# Patient Record
Sex: FEMALE | Race: WHITE | NOT HISPANIC OR LATINO | Employment: OTHER | URBAN - METROPOLITAN AREA
[De-identification: names, ages, dates, MRNs, and addresses within clinical notes are randomized per-mention and may not be internally consistent; named-entity substitution may affect disease eponyms.]

---

## 2017-01-05 ENCOUNTER — HOSPITAL ENCOUNTER (OUTPATIENT)
Facility: MEDICAL CENTER | Age: 72
End: 2017-01-05
Attending: PHYSICIAN ASSISTANT
Payer: MEDICARE

## 2017-01-05 ENCOUNTER — OFFICE VISIT (OUTPATIENT)
Dept: URGENT CARE | Facility: PHYSICIAN GROUP | Age: 72
End: 2017-01-05
Payer: MEDICARE

## 2017-01-05 VITALS
DIASTOLIC BLOOD PRESSURE: 62 MMHG | HEART RATE: 116 BPM | HEIGHT: 60 IN | RESPIRATION RATE: 12 BRPM | TEMPERATURE: 98.1 F | WEIGHT: 99 LBS | SYSTOLIC BLOOD PRESSURE: 120 MMHG | OXYGEN SATURATION: 93 % | BODY MASS INDEX: 19.44 KG/M2

## 2017-01-05 DIAGNOSIS — N30.01 ACUTE CYSTITIS WITH HEMATURIA: ICD-10-CM

## 2017-01-05 LAB
APPEARANCE UR: CLEAR
BILIRUB UR STRIP-MCNC: NORMAL MG/DL
COLOR UR AUTO: YELLOW
GLUCOSE UR STRIP.AUTO-MCNC: NEGATIVE MG/DL
KETONES UR STRIP.AUTO-MCNC: NEGATIVE MG/DL
LEUKOCYTE ESTERASE UR QL STRIP.AUTO: NORMAL
NITRITE UR QL STRIP.AUTO: POSITIVE
PH UR STRIP.AUTO: 6 [PH] (ref 5–8)
PROT UR QL STRIP: NEGATIVE MG/DL
RBC UR QL AUTO: NORMAL
SP GR UR STRIP.AUTO: 1
UROBILINOGEN UR STRIP-MCNC: NEGATIVE MG/DL

## 2017-01-05 PROCEDURE — 87186 SC STD MICRODIL/AGAR DIL: CPT

## 2017-01-05 PROCEDURE — 99214 OFFICE O/P EST MOD 30 MIN: CPT | Performed by: PHYSICIAN ASSISTANT

## 2017-01-05 PROCEDURE — 81002 URINALYSIS NONAUTO W/O SCOPE: CPT | Performed by: PHYSICIAN ASSISTANT

## 2017-01-05 PROCEDURE — 87086 URINE CULTURE/COLONY COUNT: CPT

## 2017-01-05 PROCEDURE — 87077 CULTURE AEROBIC IDENTIFY: CPT

## 2017-01-05 PROCEDURE — 99000 SPECIMEN HANDLING OFFICE-LAB: CPT | Performed by: PHYSICIAN ASSISTANT

## 2017-01-05 RX ORDER — SULFAMETHOXAZOLE AND TRIMETHOPRIM 800; 160 MG/1; MG/1
1 TABLET ORAL 2 TIMES DAILY
Qty: 14 TAB | Refills: 0 | Status: SHIPPED | OUTPATIENT
Start: 2017-01-05 | End: 2017-02-17

## 2017-01-05 NOTE — PROGRESS NOTES
Chief Complaint   Patient presents with   • Urinary Frequency     Burning and lower abdominal pain - onset this morning       HISTORY OF PRESENT ILLNESS: Patient is a 71 y.o. female who presents today for 1 day of increased urinary frequency, urgency and dysuria.  She is also experiencing lower abdominal discomfort and fullness.   She denies fevers, chills, nausea or vomiting at this point.  No flank or back pain.  No vaginal bleeding or discharge.   She has taken Azo x 1 this morning, about an hour ago.  She was recently treated for pansensitive e coli with macrobid and did get better but it has returned in the last day.  She is not sexually active.     Patient Active Problem List    Diagnosis Date Noted   • History of diverticulitis of colon 11/16/2011     Priority: High   • Chronic use of opiate for therapeutic purpose 07/07/2016   • Eosinophilic colitis 07/21/2015   • Family history of nonmelanoma skin cancer    • COPD (chronic obstructive pulmonary disease) ([facility])    • Constipation 05/26/2012   • Subclavian artery stenosis, left ([facility]) 03/27/2012   • Herniated nucleus pulposus, L5-S1, left 09/08/2011   • Carotid atherosclerosis 06/15/2011   • Abdominal aortic aneurysm greater than 39 mm in diameter ([facility])    • Peripheral vascular disease ([facility])    • Essential hypertension    • Dyslipidemia    • Tobacco dependence    • Spinal stenosis of lumbar region    • Arteriosclerosis of arteries of extremities ([facility])        Allergies:Amoxicillin and Ciprofloxacin    Current Outpatient Prescriptions Ordered in [facility]   Medication Sig Dispense Refill   • phenazopyridine (PYRIDIUM) 100 MG Tab Take 1 Tab by mouth 3 times a day as needed. 12 Tab 0   • clopidogrel (PLAVIX) 75 MG Tab      • hydrocodone-acetaminophen (NORCO) 5-325 MG Tab per tablet Take 1 Tab by mouth every 6 hours as needed (back and neck pain). Seen in office 10/4/16, renewal of chronic medication, used episodically 90 Tab 0   • NITROSTAT 0.4 MG SL Tab Take 0.4 mg by  mouth.     • tizanidine (ZANAFLEX) 2 MG tablet Take 1-2 Tabs by mouth 2 times a day as needed (neck spasm). 40 Tab 0   • lisinopril (PRINIVIL) 10 MG Tab Take 1 Tab by mouth every day. 90 Tab 3   • atorvastatin (LIPITOR) 80 MG tablet Take 80 mg by mouth every day.     • aspirin 81 MG tablet Take 81 mg by mouth every day.       No current [facility]-ordered facility-administered medications on file.       Past Medical History   Diagnosis Date   • Atherosclerosis of arteries of the extremities      two stents in the groin, Dr. Pickett   • Hypertension    • Angina      with stress test   • Arthritis      thumbs   • Peripheral vascular disease (HCC)      9 months, may relate to PVD   • Unspecified hemorrhagic conditions (HCC)      asa/plavix, bruises easily   • Dyslipidemia    • COPD (chronic obstructive pulmonary disease) (H[facility])    • Diverticulosis of colon    • Spinal stenosis of lumbar region      Dr. Navarro   • Abdominal aortic aneurysm (HCC) 11/2010     3.6 cm 8/2014   • Carotid atherosclerosis      Dr. Pickett   • Diverticulitis      Dx 11/25/11   • Family history of nonmelanoma skin cancer    • PVD (posterior vitreous detachment), left eye 1/13/2015   • Eosinophilic colitis        Social History   Substance Use Topics   • Smoking status: Current Every Day Smoker -- 1.00 packs/day for 45 years     Types: Cigarettes   • Smokeless tobacco: Never Used      Comment: on nicotine patches, smokes off and on   • Alcohol Use: No       No family status information on file.     Family History   Problem Relation Age of Onset   • Hyperlipidemia Sister    • Hypertension Sister    • Non-contributory Sister      carotid atherosclerosis   • Hypertension Mother    • Hyperlipidemia Mother    • Heart Disease Mother 82     congestive heart failure, AAA   • Heart Disease Father 55     multiple heart issues   • Hypertension Father    • Hyperlipidemia Father    • Cancer Sister      sin cancer       ROS:  Review of Systems   Constitutional:  Negative for fever, chills, weight loss and malaise/fatigue.   HENT: Negative for ear pain, nosebleeds, congestion, sore throat and neck pain.    Eyes: Negative for blurred vision.   Respiratory: Negative for cough, sputum production, shortness of breath and wheezing.    Cardiovascular: Negative for chest pain, palpitations, orthopnea and leg swelling.   Gastrointestinal:SEE HPI  Genitourinary: SEE HPI  All other systems reviewed and are negative.       Exam:  Blood pressure 120/62, pulse 116, temperature 36.7 °C (98.1 °F), resp. rate 12, height 1.524 m (5'), weight 44.906 kg (99 lb), last menstrual period 03/01/1997, SpO2 93 %.  General:  Well nourished, well developed female in NAD  Eyes: PERRLA, EOM within normal limits, no conjunctival injection, no scleral icterus, visual fields and acuity grossly intact..   Mouth: reasonable hygiene, no erythema exudates or tonsillar enlargement.  Neck: no masses, range of motion within normal limits, no tracheal deviation. No lymphadenopathy  Pulmonary: Normal respiratory effort, no wheezes, crackles, or rhonchi.  Cardiovascular: regular rate and rhythm without murmurs, rubs, or gallops.  Abdomen: Soft, mild suprapubic TTP, nondistended. Normal bowel sounds. No hepatosplenomegaly or masses, or hernias. No rebound or guarding.  No CVA tenderness.   Skin: No visible rashes or lesion. Warm, pink, dry.   Extremities: no clubbing, cyanosis, or edema.  Neuro: A&O x 3. Speech normal/clear.  Normal gait.       Assessment/Plan:  1. Acute cystitis with hematuria  POCT Urinalysis    sulfamethoxazole-trimethoprim (BACTRIM DS) 800-160 MG tablet    URINE CULTURE(NEW)       -POCT U/A as above.  Culture sent again to ensure sensitivity.   -Fluids emphasized.   -signs and symptoms of worsening/ascending infection discussed and to seek prompt medical care should they arise.   -smoking cessation encouraged.     Supportive care, differential diagnoses, and indications for immediate follow-up  discussed with patient.   Pathogenesis of diagnosis discussed including typical length and natural progression.   Instructed to return to clinic or nearest emergency department for any change in condition, further concerns, or worsening of symptoms.  Patient states understanding of the plan of care and discharge instructions.  Instructed to make an appointment, for follow up, with their primary care provider.      Jessi Lainez PA-C

## 2017-01-05 NOTE — MR AVS SNAPSHOT
Karen Enriquez Jauregui   2017 10:00 AM   Office Visit   MRN: [id number]    Department:  [facility]   Dept Phone:  [phone]    Description:  Female : 1945   Provider:  Jessi Lainez PA-C           Reason for Visit     Urinary Frequency Burning and lower abdominal pain - onset this morning      Allergies as of 2017     Allergen Noted Reactions    Amoxicillin 2012   Diarrhea          Ciprofloxacin 2013   Diarrhea      You were diagnosed with     Acute cystitis with hematuria   [001545]         Vital Signs     Blood Pressure Pulse Temperature Respirations Height Weight    120/62 mmHg 116 36.7 °C (98.1 °F) 12 1.524 m (5') 44.906 kg (99 lb)    Body Mass Index Oxygen Saturation Last Menstrual Period Smoking Status          19.33 kg/m2 93% 1997 Current Every Day Smoker        Basic Information     Date Of Birth Sex Race Ethnicity Preferred Language    1945 Female White Non- English      Your appointments     2017  2:00 PM   Established Patient with [facility]   [address]  [place] [id number] [phone]           You will be receiving a confirmation call a few days before your appointment from our automated call confirmation system.            2017  2:30 PM   CT ANGIO WITH 30 with [address]   [facility] - CT - [address])    [address]  [place] [id number] [phone]           Medication List.            2017  3:30 PM   CT-CTA ABDO-PLVS W/WO POSTPROCESS with [address]   [facility] - CT - [address] ([address])    [address]  [place] [id number] [phone]              Problem List              ICD-10-CM Priority Class Noted - Resolved    Essential hypertension I10   Unknown - Present    Dyslipidemia E78.5   Unknown - Present    Tobacco dependence F17.200   Unknown - Present    Spinal stenosis of lumbar region M48.06    Unknown - Present    Arteriosclerosis of arteries of extremities ([facility]) I70.209   Unknown - Present    Abdominal aortic aneurysm greater than 39 mm in diameter ([facility]) I71.4   Unknown - Present    Peripheral vascular disease ([facility]) I73.9   Unknown - Present    Carotid atherosclerosis I65.29   6/15/2011 - Present    Herniated nucleus pulposus, L5-S1, left M51.27   9/8/2011 - Present    History of diverticulitis of colon Z87.19 High  11/16/2011 - Present    Subclavian artery stenosis, left ([facility]) I77.1   3/27/2012 - Present    Constipation    5/26/2012 - Present    COPD (chronic obstructive pulmonary disease) ([facility]) J44.9   Unknown - Present    Family history of nonmelanoma skin cancer Z80.8   Unknown - Present    Eosinophilic colitis K52.82   7/21/2015 - Present    Chronic use of opiate for therapeutic purpose Z79.899   7/7/2016 - Present      Health Maintenance        Date Due Completion Dates    IMM DTaP/Tdap/Td Vaccine (1 - Tdap) 5/17/1965 ---    IMM ZOSTER VACCINE 5/17/2006 ---    BONE DENSITY 11/30/2017 11/30/2012 (Postponed), 3/22/2011, 7/24/2008, 11/7/2005    Override on 11/30/2012: Postponed    COLONOSCOPY 3/9/2025 3/9/2015, 7/2/2013            Current Immunizations     13-VALENT PCV PREVNAR 12/8/2016    Influenza TIV (IM) 10/28/2012, 10/27/2011    Influenza Vaccine Adult HD 10/4/2016, 12/23/2015    Influenza Vaccine Quad Inj (Pf) 10/14/2014    Pneumococcal Vaccine (UF)Historical Data 9/30/2006    Pneumococcal polysaccharide vaccine (PPSV-23) 11/30/2012      Below and/or attached are the medications your provider expects you to take. Review all of your home medications and newly ordered medications with your provider and/or pharmacist. Follow medication instructions as directed by your provider and/or pharmacist. Please keep your medication list with you and share with your provider. Update the information when medications are discontinued, doses are changed, or new medications (including over-the-counter  products) are added; and carry medication information at all times in the event of emergency situations     Allergies:  AMOXICILLIN - Diarrhea     CIPROFLOXACIN - Diarrhea               Medications  Valid as of: January 05, 2017 - 10:39 AM    Generic Name Brand Name Tablet Size Instructions for use    Aspirin (Tab) aspirin 81 MG Take 81 mg by mouth every day.        Atorvastatin Calcium (Tab) LIPITOR 80 MG Take 80 mg by mouth every day.        Clopidogrel Bisulfate (Tab) PLAVIX 75 MG         Hydrocodone-Acetaminophen (Tab) NORCO 5-325 MG Take 1 Tab by mouth every 6 hours as needed (back and neck pain). Seen in office 10/4/16, renewal of chronic medication, used episodically        Lisinopril (Tab) PRINIVIL 10 MG Take 1 Tab by mouth every day.        Nitroglycerin (SL Tab) NITROSTAT 0.4 MG Take 0.4 mg by mouth.        Phenazopyridine HCl (Tab) PYRIDIUM 100 MG Take 1 Tab by mouth 3 times a day as needed.        Sulfamethoxazole-Trimethoprim (Tab) BACTRIM -160 MG Take 1 Tab by mouth 2 times a day.        TiZANidine HCl (Tab) ZANAFLEX 2 MG Take 1-2 Tabs by mouth 2 times a day as needed (neck spasm).        .                 Medicines prescribed today were sent to:     [facility][id number] - [place] - [address]    [address] [place] [address]    Phone: [phone] Fax: [phone]    Open 24 Hours?: No      Medication refill instructions:       If your prescription bottle indicates you have medication refills left, it is not necessary to call your provider’s office. Please contact your pharmacy and they will refill your medication.    If your prescription bottle indicates you do not have any refills left, you may request refills at any time through one of the following ways: The online [email or web address] system (except Urgent Care), by calling your provider’s office, or by asking your pharmacy to contact your provider’s office with a refill request. Medication refills are processed only during regular business hours  and may not be available until the next business day. Your provider may request additional information or to have a follow-up visit with you prior to refilling your medication.   *Please Note: Medication refills are assigned a new Rx number when refilled electronically. Your pharmacy may indicate that no refills were authorized even though a new prescription for the same medication is available at the pharmacy. Please request the medicine by name with the pharmacy before contacting your provider for a refill.        Your To Do List     Future Labs/Procedures Complete By Expires    URINE CULTURE(NEW)  As directed 1/5/2018         [email or web address] Access Code: Activation code not generated  Current [email or web address] Status: Active

## 2017-01-07 ENCOUNTER — HOSPITAL ENCOUNTER (OUTPATIENT)
Dept: LAB | Facility: MEDICAL CENTER | Age: 72
End: 2017-01-07
Attending: SURGERY
Payer: MEDICARE

## 2017-01-07 LAB
BACTERIA UR CULT: ABNORMAL
BUN SERPL-MCNC: 17 MG/DL (ref 8–22)
CREAT SERPL-MCNC: 0.73 MG/DL (ref 0.5–1.4)
SIGNIFICANT IND 70042: ABNORMAL
SOURCE SOURCE: ABNORMAL

## 2017-01-07 PROCEDURE — 36415 COLL VENOUS BLD VENIPUNCTURE: CPT

## 2017-01-07 PROCEDURE — 82565 ASSAY OF CREATININE: CPT

## 2017-01-07 PROCEDURE — 84520 ASSAY OF UREA NITROGEN: CPT

## 2017-01-09 ENCOUNTER — APPOINTMENT (OUTPATIENT)
Dept: RADIOLOGY | Facility: MEDICAL CENTER | Age: 72
End: 2017-01-09
Attending: SURGERY
Payer: MEDICARE

## 2017-01-09 DIAGNOSIS — I71.40 ABDOMINAL AORTIC ANEURYSM WITHOUT RUPTURE (HCC): ICD-10-CM

## 2017-01-09 DIAGNOSIS — I65.29 STENOSIS OF CAROTID ARTERY, UNSPECIFIED LATERALITY: ICD-10-CM

## 2017-01-09 PROCEDURE — 70498 CT ANGIOGRAPHY NECK: CPT

## 2017-01-09 PROCEDURE — 700117 HCHG RX CONTRAST REV CODE 255: Performed by: SURGERY

## 2017-01-09 RX ADMIN — IOHEXOL 100 ML: 350 INJECTION, SOLUTION INTRAVENOUS at 14:40

## 2017-01-20 ENCOUNTER — APPOINTMENT (OUTPATIENT)
Dept: RADIOLOGY | Facility: MEDICAL CENTER | Age: 72
End: 2017-01-20
Attending: SURGERY
Payer: MEDICARE

## 2017-02-10 ENCOUNTER — HOSPITAL ENCOUNTER (OUTPATIENT)
Dept: RADIOLOGY | Facility: MEDICAL CENTER | Age: 72
End: 2017-02-10
Attending: SURGERY
Payer: MEDICARE

## 2017-02-10 DIAGNOSIS — I71.40 ABDOMINAL AORTIC ANEURYSM WITHOUT RUPTURE (HCC): ICD-10-CM

## 2017-02-10 DIAGNOSIS — I65.29 STENOSIS OF CAROTID ARTERY, UNSPECIFIED LATERALITY: ICD-10-CM

## 2017-02-10 PROCEDURE — 700117 HCHG RX CONTRAST REV CODE 255: Performed by: SURGERY

## 2017-02-10 PROCEDURE — 74174 CTA ABD&PLVS W/CONTRAST: CPT

## 2017-02-10 RX ADMIN — IOHEXOL 100 ML: 350 INJECTION, SOLUTION INTRAVENOUS at 15:58

## 2017-02-17 ENCOUNTER — HOSPITAL ENCOUNTER (INPATIENT)
Facility: MEDICAL CENTER | Age: 72
LOS: 4 days | DRG: 389 | End: 2017-02-21
Attending: EMERGENCY MEDICINE | Admitting: HOSPITALIST
Payer: MEDICARE

## 2017-02-17 ENCOUNTER — APPOINTMENT (OUTPATIENT)
Dept: RADIOLOGY | Facility: MEDICAL CENTER | Age: 72
DRG: 389 | End: 2017-02-17
Attending: SURGERY
Payer: MEDICARE

## 2017-02-17 ENCOUNTER — RESOLUTE PROFESSIONAL BILLING HOSPITAL PROF FEE (OUTPATIENT)
Dept: HOSPITALIST | Facility: MEDICAL CENTER | Age: 72
End: 2017-02-17
Payer: MEDICARE

## 2017-02-17 ENCOUNTER — APPOINTMENT (OUTPATIENT)
Dept: RADIOLOGY | Facility: MEDICAL CENTER | Age: 72
DRG: 389 | End: 2017-02-17
Attending: EMERGENCY MEDICINE
Payer: MEDICARE

## 2017-02-17 DIAGNOSIS — K56.609 SBO (SMALL BOWEL OBSTRUCTION) (HCC): ICD-10-CM

## 2017-02-17 DIAGNOSIS — N30.00 ACUTE CYSTITIS WITHOUT HEMATURIA: ICD-10-CM

## 2017-02-17 DIAGNOSIS — I71.43 ANEURYSM OF INFRARENAL ABDOMINAL AORTA (HCC): ICD-10-CM

## 2017-02-17 DIAGNOSIS — R10.84 GENERALIZED ABDOMINAL PAIN: ICD-10-CM

## 2017-02-17 PROBLEM — N39.0 UTI (URINARY TRACT INFECTION): Status: ACTIVE | Noted: 2017-02-17

## 2017-02-17 LAB
ALBUMIN SERPL BCP-MCNC: 4.1 G/DL (ref 3.2–4.9)
ALBUMIN/GLOB SERPL: 1.3 G/DL
ALP SERPL-CCNC: 110 U/L (ref 30–99)
ALT SERPL-CCNC: 11 U/L (ref 2–50)
AMORPH CRY #/AREA URNS HPF: PRESENT /HPF
ANION GAP SERPL CALC-SCNC: 3 MMOL/L (ref 0–11.9)
APPEARANCE UR: ABNORMAL
AST SERPL-CCNC: 16 U/L (ref 12–45)
BACTERIA #/AREA URNS HPF: ABNORMAL /HPF
BASOPHILS # BLD AUTO: 0.7 % (ref 0–1.8)
BASOPHILS # BLD: 0.07 K/UL (ref 0–0.12)
BILIRUB SERPL-MCNC: 0.5 MG/DL (ref 0.1–1.5)
BILIRUB UR QL STRIP.AUTO: NEGATIVE
BUN SERPL-MCNC: 18 MG/DL (ref 8–22)
CALCIUM SERPL-MCNC: 10 MG/DL (ref 8.5–10.5)
CHLORIDE SERPL-SCNC: 105 MMOL/L (ref 96–112)
CO2 SERPL-SCNC: 27 MMOL/L (ref 20–33)
COLOR UR: YELLOW
CREAT SERPL-MCNC: 0.75 MG/DL (ref 0.5–1.4)
CULTURE IF INDICATED INDCX: YES UA CULTURE
EKG IMPRESSION: NORMAL
EOSINOPHIL # BLD AUTO: 0.26 K/UL (ref 0–0.51)
EOSINOPHIL NFR BLD: 2.4 % (ref 0–6.9)
EPI CELLS #/AREA URNS HPF: ABNORMAL /HPF
ERYTHROCYTE [DISTWIDTH] IN BLOOD BY AUTOMATED COUNT: 43.4 FL (ref 35.9–50)
GFR SERPL CREATININE-BSD FRML MDRD: >60 ML/MIN/1.73 M 2
GLOBULIN SER CALC-MCNC: 3.1 G/DL (ref 1.9–3.5)
GLUCOSE SERPL-MCNC: 98 MG/DL (ref 65–99)
GLUCOSE UR STRIP.AUTO-MCNC: NEGATIVE MG/DL
HCT VFR BLD AUTO: 50.2 % (ref 37–47)
HGB BLD-MCNC: 16.7 G/DL (ref 12–16)
IMM GRANULOCYTES # BLD AUTO: 0.03 K/UL (ref 0–0.11)
IMM GRANULOCYTES NFR BLD AUTO: 0.3 % (ref 0–0.9)
KETONES UR STRIP.AUTO-MCNC: NEGATIVE MG/DL
LACTATE BLD-SCNC: 0.7 MMOL/L (ref 0.5–2)
LACTATE BLD-SCNC: 0.9 MMOL/L (ref 0.5–2)
LEUKOCYTE ESTERASE UR QL STRIP.AUTO: ABNORMAL
LIPASE SERPL-CCNC: 33 U/L (ref 11–82)
LYMPHOCYTES # BLD AUTO: 2.25 K/UL (ref 1–4.8)
LYMPHOCYTES NFR BLD: 21 % (ref 22–41)
MCH RBC QN AUTO: 29.6 PG (ref 27–33)
MCHC RBC AUTO-ENTMCNC: 33.3 G/DL (ref 33.6–35)
MCV RBC AUTO: 89 FL (ref 81.4–97.8)
MICRO URNS: ABNORMAL
MONOCYTES # BLD AUTO: 0.5 K/UL (ref 0–0.85)
MONOCYTES NFR BLD AUTO: 4.7 % (ref 0–13.4)
MUCOUS THREADS #/AREA URNS HPF: ABNORMAL /HPF
NEUTROPHILS # BLD AUTO: 7.6 K/UL (ref 2–7.15)
NEUTROPHILS NFR BLD: 70.9 % (ref 44–72)
NITRITE UR QL STRIP.AUTO: NEGATIVE
NRBC # BLD AUTO: 0 K/UL
NRBC BLD AUTO-RTO: 0 /100 WBC
PH UR STRIP.AUTO: 6 [PH]
PLATELET # BLD AUTO: 256 K/UL (ref 164–446)
PMV BLD AUTO: 9.9 FL (ref 9–12.9)
POTASSIUM SERPL-SCNC: 3.6 MMOL/L (ref 3.6–5.5)
PROT SERPL-MCNC: 7.2 G/DL (ref 6–8.2)
PROT UR QL STRIP: 70 MG/DL
RBC # BLD AUTO: 5.64 M/UL (ref 4.2–5.4)
RBC # URNS HPF: ABNORMAL /HPF
RBC UR QL AUTO: ABNORMAL
SODIUM SERPL-SCNC: 135 MMOL/L (ref 135–145)
SP GR UR STRIP.AUTO: 1.02
TROPONIN I SERPL-MCNC: <0.01 NG/ML (ref 0–0.04)
WBC # BLD AUTO: 10.7 K/UL (ref 4.8–10.8)
WBC #/AREA URNS HPF: ABNORMAL /HPF

## 2017-02-17 PROCEDURE — 99285 EMERGENCY DEPT VISIT HI MDM: CPT

## 2017-02-17 PROCEDURE — 770006 HCHG ROOM/CARE - MED/SURG/GYN SEMI*

## 2017-02-17 PROCEDURE — 700101 HCHG RX REV CODE 250: Performed by: HOSPITALIST

## 2017-02-17 PROCEDURE — 36415 COLL VENOUS BLD VENIPUNCTURE: CPT

## 2017-02-17 PROCEDURE — 700117 HCHG RX CONTRAST REV CODE 255: Performed by: EMERGENCY MEDICINE

## 2017-02-17 PROCEDURE — 700101 HCHG RX REV CODE 250: Performed by: SURGERY

## 2017-02-17 PROCEDURE — 99223 1ST HOSP IP/OBS HIGH 75: CPT | Mod: AI | Performed by: HOSPITALIST

## 2017-02-17 PROCEDURE — 83690 ASSAY OF LIPASE: CPT

## 2017-02-17 PROCEDURE — 96374 THER/PROPH/DIAG INJ IV PUSH: CPT

## 2017-02-17 PROCEDURE — 83605 ASSAY OF LACTIC ACID: CPT

## 2017-02-17 PROCEDURE — 700102 HCHG RX REV CODE 250 W/ 637 OVERRIDE(OP): Performed by: HOSPITALIST

## 2017-02-17 PROCEDURE — 80053 COMPREHEN METABOLIC PANEL: CPT

## 2017-02-17 PROCEDURE — 84484 ASSAY OF TROPONIN QUANT: CPT

## 2017-02-17 PROCEDURE — 93005 ELECTROCARDIOGRAM TRACING: CPT

## 2017-02-17 PROCEDURE — 96375 TX/PRO/DX INJ NEW DRUG ADDON: CPT

## 2017-02-17 PROCEDURE — 700111 HCHG RX REV CODE 636 W/ 250 OVERRIDE (IP): Performed by: HOSPITALIST

## 2017-02-17 PROCEDURE — 85025 COMPLETE CBC W/AUTO DIFF WBC: CPT

## 2017-02-17 PROCEDURE — 74177 CT ABD & PELVIS W/CONTRAST: CPT

## 2017-02-17 PROCEDURE — 700111 HCHG RX REV CODE 636 W/ 250 OVERRIDE (IP): Performed by: EMERGENCY MEDICINE

## 2017-02-17 PROCEDURE — 87086 URINE CULTURE/COLONY COUNT: CPT

## 2017-02-17 PROCEDURE — 96361 HYDRATE IV INFUSION ADD-ON: CPT

## 2017-02-17 PROCEDURE — 93005 ELECTROCARDIOGRAM TRACING: CPT | Performed by: EMERGENCY MEDICINE

## 2017-02-17 PROCEDURE — 81001 URINALYSIS AUTO W/SCOPE: CPT

## 2017-02-17 PROCEDURE — 700105 HCHG RX REV CODE 258: Performed by: EMERGENCY MEDICINE

## 2017-02-17 PROCEDURE — A9270 NON-COVERED ITEM OR SERVICE: HCPCS | Performed by: HOSPITALIST

## 2017-02-17 RX ORDER — CEFTRIAXONE 1 G/1
1 INJECTION, POWDER, FOR SOLUTION INTRAMUSCULAR; INTRAVENOUS ONCE
Status: DISCONTINUED | OUTPATIENT
Start: 2017-02-17 | End: 2017-02-17

## 2017-02-17 RX ORDER — HEPARIN SODIUM 5000 [USP'U]/ML
5000 INJECTION, SOLUTION INTRAVENOUS; SUBCUTANEOUS EVERY 8 HOURS
Status: DISCONTINUED | OUTPATIENT
Start: 2017-02-17 | End: 2017-02-18

## 2017-02-17 RX ORDER — ONDANSETRON 2 MG/ML
4 INJECTION INTRAMUSCULAR; INTRAVENOUS ONCE
Status: COMPLETED | OUTPATIENT
Start: 2017-02-17 | End: 2017-02-17

## 2017-02-17 RX ORDER — SODIUM CHLORIDE 9 MG/ML
1000 INJECTION, SOLUTION INTRAVENOUS ONCE
Status: COMPLETED | OUTPATIENT
Start: 2017-02-17 | End: 2017-02-17

## 2017-02-17 RX ORDER — DOCUSATE SODIUM 100 MG/1
100 CAPSULE, LIQUID FILLED ORAL 2 TIMES DAILY
Status: DISCONTINUED | OUTPATIENT
Start: 2017-02-17 | End: 2017-02-18

## 2017-02-17 RX ORDER — ENEMA 19; 7 G/133ML; G/133ML
1 ENEMA RECTAL
Status: COMPLETED | OUTPATIENT
Start: 2017-02-17 | End: 2017-02-19

## 2017-02-17 RX ORDER — DEXTROSE MONOHYDRATE, SODIUM CHLORIDE, AND POTASSIUM CHLORIDE 50; 1.49; 9 G/1000ML; G/1000ML; G/1000ML
INJECTION, SOLUTION INTRAVENOUS CONTINUOUS
Status: DISCONTINUED | OUTPATIENT
Start: 2017-02-17 | End: 2017-02-18

## 2017-02-17 RX ORDER — LISINOPRIL 10 MG/1
10 TABLET ORAL EVERY EVENING
Status: DISCONTINUED | OUTPATIENT
Start: 2017-02-17 | End: 2017-02-21 | Stop reason: HOSPADM

## 2017-02-17 RX ORDER — ATORVASTATIN CALCIUM 40 MG/1
80 TABLET, FILM COATED ORAL EVERY EVENING
Status: DISCONTINUED | OUTPATIENT
Start: 2017-02-17 | End: 2017-02-21 | Stop reason: HOSPADM

## 2017-02-17 RX ORDER — CLOPIDOGREL BISULFATE 75 MG/1
75 TABLET ORAL EVERY EVENING
Status: DISCONTINUED | OUTPATIENT
Start: 2017-02-17 | End: 2017-02-21 | Stop reason: HOSPADM

## 2017-02-17 RX ORDER — HYDROCODONE BITARTRATE AND ACETAMINOPHEN 5; 325 MG/1; MG/1
1 TABLET ORAL EVERY 6 HOURS PRN
Status: DISCONTINUED | OUTPATIENT
Start: 2017-02-17 | End: 2017-02-18

## 2017-02-17 RX ORDER — AMOXICILLIN 250 MG
1 CAPSULE ORAL
Status: DISCONTINUED | OUTPATIENT
Start: 2017-02-17 | End: 2017-02-21 | Stop reason: HOSPADM

## 2017-02-17 RX ORDER — ONDANSETRON 4 MG/1
4 TABLET, ORALLY DISINTEGRATING ORAL EVERY 4 HOURS PRN
Status: DISCONTINUED | OUTPATIENT
Start: 2017-02-17 | End: 2017-02-21 | Stop reason: HOSPADM

## 2017-02-17 RX ORDER — MORPHINE SULFATE 4 MG/ML
4 INJECTION, SOLUTION INTRAMUSCULAR; INTRAVENOUS ONCE
Status: COMPLETED | OUTPATIENT
Start: 2017-02-17 | End: 2017-02-17

## 2017-02-17 RX ORDER — CLOPIDOGREL BISULFATE 75 MG/1
75 TABLET ORAL EVERY EVENING
COMMUNITY
End: 2018-10-10 | Stop reason: SDUPTHER

## 2017-02-17 RX ORDER — AMOXICILLIN 250 MG
1 CAPSULE ORAL NIGHTLY
Status: DISCONTINUED | OUTPATIENT
Start: 2017-02-17 | End: 2017-02-18

## 2017-02-17 RX ORDER — ASPIRIN 81 MG/1
81 TABLET, CHEWABLE ORAL EVERY EVENING
Status: DISCONTINUED | OUTPATIENT
Start: 2017-02-17 | End: 2017-02-21 | Stop reason: HOSPADM

## 2017-02-17 RX ORDER — MORPHINE SULFATE 4 MG/ML
4 INJECTION, SOLUTION INTRAMUSCULAR; INTRAVENOUS ONCE
Status: DISCONTINUED | OUTPATIENT
Start: 2017-02-17 | End: 2017-02-17

## 2017-02-17 RX ORDER — LACTULOSE 20 G/30ML
30 SOLUTION ORAL
Status: DISCONTINUED | OUTPATIENT
Start: 2017-02-17 | End: 2017-02-21 | Stop reason: HOSPADM

## 2017-02-17 RX ORDER — ONDANSETRON 2 MG/ML
4 INJECTION INTRAMUSCULAR; INTRAVENOUS EVERY 4 HOURS PRN
Status: DISCONTINUED | OUTPATIENT
Start: 2017-02-17 | End: 2017-02-21 | Stop reason: HOSPADM

## 2017-02-17 RX ORDER — LISINOPRIL 10 MG/1
10 TABLET ORAL EVERY EVENING
Status: ON HOLD | COMMUNITY
End: 2017-04-04

## 2017-02-17 RX ORDER — BISACODYL 10 MG
10 SUPPOSITORY, RECTAL RECTAL
Status: DISCONTINUED | OUTPATIENT
Start: 2017-02-17 | End: 2017-02-18

## 2017-02-17 RX ORDER — ACETAMINOPHEN 325 MG/1
650 TABLET ORAL EVERY 6 HOURS PRN
Status: DISCONTINUED | OUTPATIENT
Start: 2017-02-17 | End: 2017-02-18

## 2017-02-17 RX ORDER — LORAZEPAM 2 MG/ML
0.5 INJECTION INTRAMUSCULAR EVERY 4 HOURS PRN
Status: DISCONTINUED | OUTPATIENT
Start: 2017-02-17 | End: 2017-02-18

## 2017-02-17 RX ORDER — MORPHINE SULFATE 4 MG/ML
2 INJECTION, SOLUTION INTRAMUSCULAR; INTRAVENOUS EVERY 4 HOURS PRN
Status: DISCONTINUED | OUTPATIENT
Start: 2017-02-17 | End: 2017-02-21 | Stop reason: HOSPADM

## 2017-02-17 RX ADMIN — POTASSIUM CHLORIDE, DEXTROSE MONOHYDRATE AND SODIUM CHLORIDE: 150; 5; 900 INJECTION, SOLUTION INTRAVENOUS at 19:37

## 2017-02-17 RX ADMIN — POTASSIUM CHLORIDE, DEXTROSE MONOHYDRATE AND SODIUM CHLORIDE: 150; 5; 900 INJECTION, SOLUTION INTRAVENOUS at 11:20

## 2017-02-17 RX ADMIN — ONDANSETRON 4 MG: 2 INJECTION, SOLUTION INTRAMUSCULAR; INTRAVENOUS at 20:17

## 2017-02-17 RX ADMIN — HYDROCODONE BITARTRATE AND ACETAMINOPHEN 1 TABLET: 5; 325 TABLET ORAL at 11:01

## 2017-02-17 RX ADMIN — SODIUM CHLORIDE 1000 ML: 9 INJECTION, SOLUTION INTRAVENOUS at 06:35

## 2017-02-17 RX ADMIN — MORPHINE SULFATE 4 MG: 4 INJECTION INTRAVENOUS at 06:10

## 2017-02-17 RX ADMIN — ONDANSETRON 4 MG: 2 INJECTION, SOLUTION INTRAMUSCULAR; INTRAVENOUS at 06:10

## 2017-02-17 RX ADMIN — MORPHINE SULFATE 2 MG: 4 INJECTION INTRAVENOUS at 20:19

## 2017-02-17 RX ADMIN — LORAZEPAM 0.5 MG: 2 INJECTION INTRAMUSCULAR; INTRAVENOUS at 20:30

## 2017-02-17 RX ADMIN — IOHEXOL 80 ML: 350 INJECTION, SOLUTION INTRAVENOUS at 07:25

## 2017-02-17 RX ADMIN — CEFTRIAXONE SODIUM 1 G: 1 INJECTION, POWDER, FOR SOLUTION INTRAMUSCULAR; INTRAVENOUS at 12:21

## 2017-02-17 RX ADMIN — MORPHINE SULFATE 2 MG: 4 INJECTION INTRAVENOUS at 14:01

## 2017-02-17 ASSESSMENT — ENCOUNTER SYMPTOMS
BACK PAIN: 1
HEADACHES: 0
CONSTIPATION: 1
DIZZINESS: 0
VOMITING: 0
FLANK PAIN: 0
COUGH: 0
ABDOMINAL PAIN: 1
NAUSEA: 0
DIAPHORESIS: 1
DIARRHEA: 1
SHORTNESS OF BREATH: 0
FEVER: 0

## 2017-02-17 ASSESSMENT — PATIENT HEALTH QUESTIONNAIRE - PHQ9
2. FEELING DOWN, DEPRESSED, IRRITABLE, OR HOPELESS: NOT AT ALL
1. LITTLE INTEREST OR PLEASURE IN DOING THINGS: NOT AT ALL
SUM OF ALL RESPONSES TO PHQ9 QUESTIONS 1 AND 2: 0
SUM OF ALL RESPONSES TO PHQ QUESTIONS 1-9: 0

## 2017-02-17 ASSESSMENT — LIFESTYLE VARIABLES
DO YOU DRINK ALCOHOL: NO
EVER_SMOKED: YES
ALCOHOL_USE: NO

## 2017-02-17 ASSESSMENT — PAIN SCALES - GENERAL
PAINLEVEL_OUTOF10: 10
PAINLEVEL_OUTOF10: 8
PAINLEVEL_OUTOF10: 8

## 2017-02-17 NOTE — ED NOTES
All results back, chart up for ERP. Pt resting comfortably on gurney. Pt with no changes from previous assessments. Respirations are even and unlabored. Bed in lowest position, call light within reach. Pt with no further needs at this time.

## 2017-02-17 NOTE — ED NOTES
The Medication Reconciliation has been completed per patient  Allergies have been reviewed  Antibiotic use in 30 days - NONE    Pharmacy - [facility]

## 2017-02-17 NOTE — IP AVS SNAPSHOT
MedAvail Access Code: Activation code not generated  Current MedAvail Status: Active    [employer] Flow Powerhart  A secure, online tool to manage your health information     [employer]’s MedAvail® is a secure, online tool that connects you to your personalized health information from the privacy of your home -- day or night - making it very easy for you to manage your healthcare. Once the activation process is completed, you can even access your medical information using the MedAvail evelia, which is available for free in the Apple Evelia store or Google Play store.     MedAvail provides the following levels of access (as shown below):   My Chart Features   [facility] Primary Care Doctor [facility]  Specialists [facility]  Urgent  Care Non-[facility]  Primary Care  Doctor   Email your healthcare team securely and privately 24/7 X X X X   Manage appointments: schedule your next appointment; view details of past/upcoming appointments X      Request prescription refills. X      View recent personal medical records, including lab and immunizations X X X X   View health record, including health history, allergies, medications X X X X   Read reports about your outpatient visits, procedures, consult and ER notes X X X X   See your discharge summary, which is a recap of your hospital and/or ER visit that includes your diagnosis, lab results, and care plan. X X       How to register for MedAvail:  1. Go to  [email or web address].  2. Click on the Sign Up Now box, which takes you to the New Member Sign Up page. You will need to provide the following information:  a. Enter your MedAvail Access Code exactly as it appears at the top of this page. (You will not need to use this code after you’ve completed the sign-up process. If you do not sign up before the expiration date, you must request a new code.)   b. Enter your date of birth.   c. Enter your home email address.   d. Click Submit, and follow the next screen’s instructions.  3. Create a MedAvail ID. This will  be your [employer] login ID and cannot be changed, so think of one that is secure and easy to remember.  4. Create a [employer] password. You can change your password at any time.  5. Enter your Password Reset Question and Answer. This can be used at a later time if you forget your password.   6. Enter your e-mail address. This allows you to receive e-mail notifications when new information is available in [employer].  7. Click Sign Up. You can now view your health information.    For assistance activating your [employer] account, call [phone]

## 2017-02-17 NOTE — IP AVS SNAPSHOT
" Home Care Instructions                                                                                                                  Name:Karen Jauregui  Medical Record Number:[id number]  CSN: [id number]    YOB: 1945   Age: 71 y.o.  Sex: female  HT:1.549 m (5' 1\") WT: 43 kg (94 lb 12.8 oz)          Admit Date: 2/17/2017     Discharge Date:   Today's Date: 2/21/2017  Attending Doctor:  Brooke Duncan M.D.                  Allergies:  Amoxicillin and Ciprofloxacin            Discharge Instructions       Discharge Instructions    Discharged to home by car with relative. Discharged via wheelchair, hospital escort: Yes.  Special equipment needed: Not Applicable    Be sure to schedule a follow-up appointment with your primary care doctor or any specialists as instructed.     Discharge Plan:   Smoking Cessation Offered: Patient Counseled  Influenza Vaccine Indication: Not indicated: Previously immunized this influenza season and > 8 years of age    I understand that a diet low in cholesterol, fat, and sodium is recommended for good health. Unless I have been given specific instructions below for another diet, I accept this instruction as my diet prescription.   Other diet: regular    Special Instructions: None    · Is patient discharged on Warfarin / Coumadin?   No     · Is patient Post Blood Transfusion?  No    Depression / Suicide Risk    As you are discharged from this [facility] facility, it is important to learn how to keep safe from harming yourself.    Recognize the warning signs:  · Abrupt changes in personality, positive or negative- including increase in energy   · Giving away possessions  · Change in eating patterns- significant weight changes-  positive or negative  · Change in sleeping patterns- unable to sleep or sleeping all the time   · Unwillingness or inability to communicate  · Depression  · Unusual sadness, discouragement and loneliness  · Talk of wanting to die  · Neglect of " personal appearance   · Rebelliousness- reckless behavior  · Withdrawal from people/activities they love  · Confusion- inability to concentrate     If you or a loved one observes any of these behaviors or has concerns about self-harm, here's what you can do:  · Talk about it- your feelings and reasons for harming yourself  · Remove any means that you might use to hurt yourself (examples: pills, rope, extension cords, firearm)  · Get professional help from the community (Mental Health, Substance Abuse, psychological counseling)  · Do not be alone:Call your Safe Contact- someone whom you trust who will be there for you.  · Call your local CRISIS HOTLINE 142-6317 or 154-516-0879  · Call your local Children's Mobile Crisis Response Team [place] (407) 116-4225 or www.[employer]  · Call the toll free National Suicide Prevention Hotlines   · National Suicide Prevention Lifeline 377-061-UJBH (9505)  · [employer] Line Network 800-SUICIDE (282-1371)    Small Bowel Obstruction  A small bowel obstruction is a blockage (obstruction) of the small intestine (small bowel). The small bowel is a long, slender tube that connects the stomach to the colon. Its job is to absorb nutrients from the fluids and foods you consume into the bloodstream.   CAUSES   There are many causes of intestinal blockage. The most common ones include:  · Hernias. This is a more common cause in children than adults.  · Inflammatory bowel disease (enteritis and colitis).  · Twisting of the bowel (volvulus).  · Tumors.  · Scar tissue (adhesions) from previous surgery or radiation treatment.  · Recent surgery. This may cause an acute small bowel obstruction called an ileus.  SYMPTOMS   · Abdominal pain. This may be dull cramps or sharp pain. It may occur in one area or may be present in the entire abdomen. Pain can range from mild to severe, depending on the degree of obstruction.  · Nausea and vomiting. Vomit may be greenish or yellow bile  color.  · Distended or swollen stomach. Abdominal bloating is a common symptom.  · Constipation.  · Lack of passing gas.  · Frequent belching.  · Diarrhea. This may occur if runny stool is able to leak around the obstruction.  DIAGNOSIS   Your caregiver can usually diagnose small bowel obstruction by taking a history, doing a physical exam, and taking X-rays. If the cause is unclear, a CT scan (computerized tomography) of your abdomen and pelvis may be needed.  TREATMENT   Treatment of the blockage depends on the cause and how bad the problem is.   · Sometimes, the obstruction improves with bed rest and intravenous (IV) fluids.  · Resting the bowel is very important. This means following a simple diet. Sometimes, a clear liquid diet may be required for several days.  · Sometimes, a small tube (nasogastric tube) is placed into the stomach to decompress the bowel. When the bowel is blocked, it usually swells up like a balloon filled with air and fluids. Decompression means that the air and fluids are removed by suction through that tube. This can help with pain, discomfort, and nausea. It can also help the obstruction resolve faster.  · Surgery may be required if other treatments do not work. Bowel obstruction from a hernia may require early surgery and can be an emergency procedure. Adhesions that cause frequent or severe obstructions may also require surgery.  HOME CARE INSTRUCTIONS  If your bowel obstruction is only partial or incomplete, you may be allowed to go home.  · Get plenty of rest.  · Follow your diet as directed by your caregiver.  · Only consume clear liquids until your condition improves.  · Avoid solid foods as instructed.  SEEK IMMEDIATE MEDICAL CARE IF:  · You have increased pain or cramping.  · You vomit blood.  · You have uncontrolled vomiting or nausea.  · You cannot drink fluids due to vomiting or pain.  · You develop confusion.  · You begin feeling very dry or thirsty (dehydrated).  · You have  severe bloating.  · You have chills.  · You have a fever.  · You feel extremely weak or you faint.  MAKE SURE YOU:  · Understand these instructions.  · Will watch your condition.  · Will get help right away if you are not doing well or get worse.     This information is not intended to replace advice given to you by your health care provider. Make sure you discuss any questions you have with your health care provider.     Document Released: 03/06/2007 Document Revised: 03/11/2013 Document Reviewed: 02/11/2016  [employer] Interactive Patient Education ©2016 [employer]    Food Choices for Gastroesophageal Reflux Disease, Adult  When you have gastroesophageal reflux disease (GERD), the foods you eat and your eating habits are very important. Choosing the right foods can help ease the discomfort of GERD.  WHAT GENERAL GUIDELINES DO I NEED TO FOLLOW?  · Choose fruits, vegetables, whole grains, low-fat dairy products, and low-fat meat, fish, and poultry.  · Limit fats such as oils, salad dressings, butter, nuts, and avocado.  · Keep a food diary to identify foods that cause symptoms.  · Avoid foods that cause reflux. These may be different for different people.  · Eat frequent small meals instead of three large meals each day.  · Eat your meals slowly, in a relaxed setting.  · Limit fried foods.  · Cook foods using methods other than frying.  · Avoid drinking alcohol.  · Avoid drinking large amounts of liquids with your meals.  · Avoid bending over or lying down until 2-3 hours after eating.  WHAT FOODS ARE NOT RECOMMENDED?  The following are some foods and drinks that may worsen your symptoms:  Vegetables  Tomatoes. Tomato juice. Tomato and spaghetti sauce. Chili peppers. Onion and garlic. Horseradish.  Fruits  Oranges, grapefruit, and lemon (fruit and juice).  Meats  High-fat meats, fish, and poultry. This includes hot dogs, ribs, ham, sausage, salami, and christian.  Dairy  Whole milk and chocolate milk. Sour cream.  Cream. Butter. Ice cream. Cream cheese.   Beverages  Coffee and tea, with or without caffeine. Carbonated beverages or energy drinks.  Condiments  Hot sauce. Barbecue sauce.   Sweets/Desserts  Chocolate and cocoa. Donuts. Peppermint and spearmint.  Fats and Oils  High-fat foods, including French fries and potato chips.  Other  Vinegar. Strong spices, such as black pepper, white pepper, red pepper, cayenne, quick powder, cloves, ginger, and chili powder.  The items listed above may not be a complete list of foods and beverages to avoid. Contact your dietitian for more information.     This information is not intended to replace advice given to you by your health care provider. Make sure you discuss any questions you have with your health care provider.     Document Released: 12/18/2006 Document Revised: 01/08/2016 Document Reviewed: 10/22/2014  [employer] Interactive Patient Education ©2016 [employer] Inc.            Follow-up Information     1. Follow up with Radha Julio M.D. In 1 week.    Specialty:  Family Medicine    Contact information    [address] [place] [address] [address]  [address]  [facility] [id number] [phone]           Discharge Medication Instructions:    Below are the medications your physician expects you to take upon discharge:    Review all your home medications and newly ordered medications with your doctor and/or pharmacist. Follow medication instructions as directed by your doctor and/or pharmacist.    Please keep your medication list with you and share with your physician.               Medication List      START taking these medications        Instructions    carvedilol 6.25 MG Tabs   Last time this was given:  6.25 mg on 2/21/2017  9:26 AM   Commonly known as:  COREG    Take 1 Tab by mouth 2 times a day, with meals.   Dose:  6.25 mg       nicotine 21 MG/24HR Pt24   Last time this was given:  21 mg on 2/21/2017  5:52 AM   Commonly known as:  NICODERM    Apply 1 Patch to skin as directed every 24 hours.   Dose:  1  Patch       ondansetron 4 MG Tbdp   Commonly known as:  ZOFRAN ODT    Take 1 Tab by mouth every four hours as needed for Nausea/Vomiting (give PO if IV route is unavailable. May give per feeding tube.).   Dose:  4 mg         CONTINUE taking these medications        Instructions    aspirin 81 MG tablet    Take 81 mg by mouth every evening.   Dose:  81 mg       atorvastatin 80 MG tablet   Last time this was given:  80 mg on 2/20/2017  8:21 PM   Commonly known as:  LIPITOR    Take 80 mg by mouth every day.   Dose:  80 mg       clopidogrel 75 MG Tabs   Last time this was given:  75 mg on 2/20/2017  8:21 PM   Commonly known as:  PLAVIX    Take 75 mg by mouth every evening.   Dose:  75 mg       hydrocodone-acetaminophen 5-325 MG Tabs per tablet   Last time this was given:  1 Tab on 2/18/2017 10:59 PM   Commonly known as:  NORCO    Take 1 Tab by mouth every 6 hours as needed (back and neck pain). Seen in office 10/4/16, renewal of chronic medication, used episodically   Dose:  1 Tab       lisinopril 10 MG Tabs   Last time this was given:  10 mg on 2/20/2017  8:22 PM   Commonly known as:  PRINIVIL    Take 10 mg by mouth every evening.   Dose:  10 mg               Instructions           Diet / Nutrition:    Follow any diet instructions given to you by your doctor or the dietician, including how much salt (sodium) you are allowed each day.    If you are overweight, talk to your doctor about a weight reduction plan.    Activity:    Remain physically active following your doctor's instructions about exercise and activity.    Rest often.     Any time you become even a little tired or short of breath, SIT DOWN and rest.    Worsening Symptoms:    Report any of the following signs and symptoms to the doctor's office immediately:    *Pain of jaw, arm, or neck  *Chest pain not relieved by medication                               *Dizziness or loss of consciousness  *Difficulty breathing even when at rest   *More tired than usual                                        *Bleeding drainage or swelling of surgical site  *Swelling of feet, ankles, legs or stomach                 *Fever (>100ºF)  *Pink or blood tinged sputum  *Weight gain (3lbs/day or 5lbs /week)           *Shock from internal defibrillator (if applicable)  *Palpitations or irregular heartbeats                *Cool and/or numb extremities    Stroke Awareness    Common Risk Factors for Stroke include:    Age  Atrial Fibrillation  Carotid Artery Stenosis  Diabetes Mellitus  Excessive alcohol consumption  High blood pressure  Overweight   Physical inactivity  Smoking    Warning signs and symptoms of a stroke include:    *Sudden numbness or weakness of the face, arm or leg (especially on one side of the body).  *Sudden confusion, trouble speaking or understanding.  *Sudden trouble seeing in one or both eyes.  *Sudden trouble walking, dizziness, loss of balance or coordination.Sudden severe headache with no known cause.    It is very important to get treatment quickly when a stroke occurs. If you experience any of the above warning signs, call 911 immediately.                   Disclaimer         Quit Smoking / Tobacco Use:    I understand the use of any tobacco products increases my chance of suffering from future heart disease or stroke and could cause other illnesses which may shorten my life. Quitting the use of tobacco products is the single most important thing I can do to improve my health. For further information on smoking / tobacco cessation call a Toll Free Quit Line at 1-893.618.2652 (*National Cancer [place]) or 1-146.775.3822 (American Lung Association) or you can access the web based program at www.lungusa.org.    [place] Tobacco Users Help Line:  (436) 940-7231       Toll Free: 1-507.974.2004  Quit Tobacco Program [facility] [phone]    Crisis Hotline:    National Crisis Hotline:  5-392-GMBYYVV or 1-272.251.6511    [place] Crisis Hotline:     0-067-512-1630 or 619-906-4188    Discharge Survey:   Thank you for choosing [facility]. We hope we did everything we could to make your hospital stay a pleasant one. You may be receiving a phone survey and we would appreciate your time and participation in answering the questions. Your input is very valuable to us in our efforts to improve our service to our patients and their families.        My signature on this form indicates that:    1. I have reviewed and understand the above information.  2. My questions regarding this information have been answered to my satisfaction.  3. I have formulated a plan with my discharge nurse to obtain my prescribed medications for home.                  Disclaimer         __________________________________                     __________       ________                       Patient Signature                                                 Date                    Time

## 2017-02-17 NOTE — CONSULTS
Surgical Consult    Date: 2/17/2017    Requesting Physician: [place]    Consulting Physician: Abran Worthington M.D. [facility]    Reason for consultation: abdominal pain, obstruction      HPI: This is a 71 y.o. female who is presenting with a three week history of intermittent abdominal pain. Pain usually lasts one day and is relieved with diaarhea. Current episode is sharp pain, mostly left upper quadrant. No associated nausea or vomiting. Had small amount diarrhea this AM. Passed small amount of gas this AM, none recently. She has a significant history of hemicolectomy for diverticulitis.    Past Medical History   Diagnosis Date   • Atherosclerosis of arteries of the extremities      two stents in the groin, Dr. Pickett   • Hypertension    • Angina      with stress test   • Arthritis      thumbs   • Peripheral vascular disease (CMS-HCC)      9 months, may relate to PVD   • Unspecified hemorrhagic conditions (CMS-[facility])      asa/plavix, bruises easily   • Dyslipidemia    • COPD (chronic obstructive pulmonary disease) (CMS-HCC)    • Diverticulosis of colon    • Spinal stenosis of lumbar region      Dr. Navarro   • Abdominal aortic aneurysm (CMS-[facility]) 11/2010     3.6 cm 8/2014   • Carotid atherosclerosis      Dr. Pickett   • Diverticulitis      Dx 11/25/11   • Family history of nonmelanoma skin cancer    • PVD (posterior vitreous detachment), left eye 1/13/2015   • Eosinophilic colitis        Past Surgical History   Procedure Laterality Date   • Colonoscopy  3/1/2009, 4/2012, 7/2/13     normal, normal, normal but heavy diverticulosis   • Gyn surgery  2002     hysterectomy, tubal, fibroids, ovaries gone   • Gyn surgery  1975     ectopic pregnacy   • Other       LLE /RLEstent, common iliac, Dr. Pickett   • Colon resection laparoscopic  8/5/2013     Performed by Spring Pemberton M.D. at SURGERY [facility]   • Colonoscopy with biopsy  3/6/2015     Performed by Pranav THOMPSON M.D. at ENDOSCOPY [facility]        Current Facility-Administered Medications   Medication Dose Route Frequency Provider Last Rate Last Dose   • cefTRIAXone (ROCEPHIN) injection 1 g  1 g Intravenous Once Jessi Hi D.O.       • lisinopril (PRINIVIL) 10 MG tablet 10 mg  10 mg Oral Q EVENING Kwasi William M.D.       • hydrocodone-acetaminophen (NORCO) 5-325 MG per tablet 1 Tab  1 Tab Oral Q6HRS PRN Kwasi William M.D.   1 Tab at 02/17/17 1101   • clopidogrel (PLAVIX) tablet 75 mg  75 mg Oral Q EVENING Kwasi William M.D.       • atorvastatin (LIPITOR) tablet 80 mg  80 mg Oral Q EVENING Kwasi William M.D.       • aspirin (ASA) chewable tab 81 mg  81 mg Oral Q EVENING Kwasi William M.D.       • docusate sodium (COLACE) capsule 100 mg  100 mg Oral BID Kwasi William M.D.       • senna-docusate (PERICOLACE or SENOKOT S) 8.6-50 MG per tablet 1 Tab  1 Tab Oral Nightly Kwasi William M.D.       • senna-docusate (PERICOLACE or SENOKOT S) 8.6-50 MG per tablet 1 Tab  1 Tab Oral Q24HRS PRN Kwasi William M.D.       • lactulose 20 GM/30ML solution 30 mL  30 mL Oral Q24HRS PRN Kwasi William M.D.       • bisacodyl (DULCOLAX) suppository 10 mg  10 mg Rectal Q24HRS PRN Kwasi William M.D.       • fleet enema 133 mL  1 Each Rectal Once PRN Kwasi William M.D.       • dextrose 5 % and 0.9 % NaCl with KCl 20 mEq infusion   Intravenous Continuous Kwasi William M.D.       • acetaminophen (TYLENOL) tablet 650 mg  650 mg Oral Q6HRS PRN Kwasi William M.D.       • ondansetron (ZOFRAN) syringe/vial injection 4 mg  4 mg Intravenous Q4HRS PRN Kwasi William M.D.       • ondansetron (ZOFRAN ODT) dispertab 4 mg  4 mg Oral Q4HRS PRN Kwasi William M.D.       • heparin injection 5,000 Units  5,000 Units Subcutaneous Q8HRS Kwasi William M.D.       • [START ON 2/18/2017] cefTRIAXone (ROCEPHIN) 1 g in  mL IVPB  1 g Intravenous Q24HRS Kwasi William M.D.           Social History     Social History   • Marital Status:      Spouse Name: N/A   • Number of Children: N/A   • Years  of Education: N/A     Occupational History   • [employer] [address]     Social History Main Topics   • Smoking status: Current Every Day Smoker -- 1.00 packs/day for 45 years     Types: Cigarettes   • Smokeless tobacco: Never Used      Comment: on nicotine patches, smokes off and on   • Alcohol Use: No   • Drug Use: No   • Sexual Activity: Not on file     Other Topics Concern   • Not on file     Social History Narrative       Family History   Problem Relation Age of Onset   • Hyperlipidemia Sister    • Hypertension Sister    • Non-contributory Sister      carotid atherosclerosis   • Hypertension Mother    • Hyperlipidemia Mother    • Heart Disease Mother 82     congestive heart failure, AAA   • Heart Disease Father 55     multiple heart issues   • Hypertension Father    • Hyperlipidemia Father    • Cancer Sister      sin cancer       Allergies:  Amoxicillin and Ciprofloxacin    Review of Systems:  Constitutional: Negative for fever, chills, weight loss, malaise/fatigue and diaphoresis.   HENT: Negative for hearing loss, ear pain, nosebleeds, congestion, sore throat, neck pain, tinnitus and ear discharge.    Eyes: Negative for blurred vision, double vision, photophobia, pain, discharge and redness.   Respiratory: Negative for cough, hemoptysis, sputum production, shortness of breath, wheezing and stridor.    Cardiovascular: Negative for chest pain, palpitations, orthopnea, claudication, leg swelling and PND.   Gastrointestinal: as above  Genitourinary: Negative for dysuria, urgency, frequency, hematuria and flank pain.   Musculoskeletal: Negative for myalgias, back pain, joint pain and falls.   Skin: Negative for itching and rash.  Neurological: Negative for dizziness, tingling, tremors, sensory change, speech change, focal weakness, seizures, loss of consciousness, weakness and headaches.   Endo/Heme/Allergies: Negative for environmental allergies and polydipsia. Does not bruise/bleed easily.  "  Psychiatric/Behavioral: Negative for depression, suicidal ideas, hallucinations, memory loss and substance abuse. The patient is not nervous/anxious and does not have insomnia.    Physical Exam:  Blood pressure 151/65, pulse 82, temperature 36.6 °C (97.8 °F), temperature source Temporal, resp. rate 16, height 1.549 m (5' 1\"), weight 43 kg (94 lb 12.8 oz), last menstrual period 03/01/1997, SpO2 92 %.    Constitutional: she is oriented to person, place, and time.  she appears well-developed and well-nourished. Mild distress.   Head: Normocephalic and atraumatic.   Neck: Normal range of motion. Neck supple. No JVD present. No tracheal deviation present. No thyromegaly present.   Cardiovascular: Normal rate, regular rhythm, normal heart sounds and intact distal pulses.  Exam reveals no gallop and no friction rub.  No murmur heard.  Pulmonary/Chest: Effort normal and breath sounds normal. No stridor. No respiratory distress. she has no wheezes. She has no rales.   Abdominal: Soft.  she is distended and bowel loops are palpable There is  Tenderness to palpation periumbilically and LUQ. There is no rebound and no guarding.   Musculoskeletal: Normal range of motion. she exhibits no edema and no tenderness.   Neurological: she is alert and oriented to person, place, and time. she has normal reflexes. No cranial nerve deficit. Coordination normal.   Skin: Skin is warm and dry. No rash noted. she is not diaphoretic. No erythema. No pallor.   Psychiatric: she has a normal mood and affect.  Behavior is normal.       Labs:  Recent Labs      02/17/17 0515   WBC  10.7   RBC  5.64*   HEMOGLOBIN  16.7*   HEMATOCRIT  50.2*   MCV  89.0   MCH  29.6   MCHC  33.3*   RDW  43.4   PLATELETCT  256   MPV  9.9     Recent Labs      02/17/17 0515   SODIUM  135   POTASSIUM  3.6   CHLORIDE  105   CO2  27   GLUCOSE  98   BUN  18   CREATININE  0.75   CALCIUM  10.0         Recent Labs      02/17/17 0515   ASTSGOT  16   ALTSGPT  11   TBILIRUBIN "  0.5   ALKPHOSPHAT  110*   GLOBULIN  3.1       Radiology:  CT abd/pelvis:  1. Findings in keeping with mechanical small bowel obstruction, likely partial given bowel gas is seen distally.    2. The transition point is in the mid abdomen with some twisting of the mesentery    3. Unchanged infrarenal abdominal aortic aneurysm, measuring up to 4.8 cm.    Assessment/Plan: This is a 71 y.o. With small bowel obstruction. She appears ok clinically. No signs of peritonitis. There is some concern of twisting mesentery, but does no appear to be volvulus. Recommend continued conservative management for now with hydration and bowel rest/NGT. Trend lactates and follow abdominal exam. If change in clinical picture, may need operative intervention. Will Follow        Thank you very much for this consultation.    Abran Worthington M.D.  [facility]  [phone]

## 2017-02-17 NOTE — IP AVS SNAPSHOT
" <p align=\"LEFT\"><IMG SRC=\"//[email or web address]$/[email or web address]\" alt=\"Image\" WIDTH=\"50%\" HEIGHT=\"200\" BORDER=\"\"></p>                   Name:Karen Jauregui  Medical Record Number:[id number]  CSN: [id number]    YOB: 1945   Age: 71 y.o.  Sex: female  HT:1.549 m (5' 1\") WT: 43 kg (94 lb 12.8 oz)          Admit Date: 2/17/2017     Discharge Date:   Today's Date: 2/21/2017  Attending Doctor:  Brooke Duncan M.D.                  Allergies:  Amoxicillin and Ciprofloxacin          Follow-up Information     1. Follow up with Radha Julio M.D. In 1 week.    Specialty:  Family Medicine    Contact information    [address] [place] [address] [address]  [address]  [facility] [address] [phone]           Medication List      Take these Medications        Instructions    aspirin 81 MG tablet    Take 81 mg by mouth every evening.   Dose:  81 mg       atorvastatin 80 MG tablet   Commonly known as:  LIPITOR    Take 80 mg by mouth every day.   Dose:  80 mg       carvedilol 6.25 MG Tabs   Commonly known as:  COREG    Take 1 Tab by mouth 2 times a day, with meals.   Dose:  6.25 mg       clopidogrel 75 MG Tabs   Commonly known as:  PLAVIX    Take 75 mg by mouth every evening.   Dose:  75 mg       hydrocodone-acetaminophen 5-325 MG Tabs per tablet   Commonly known as:  NORCO    Take 1 Tab by mouth every 6 hours as needed (back and neck pain). Seen in office 10/4/16, renewal of chronic medication, used episodically   Dose:  1 Tab       lisinopril 10 MG Tabs   Commonly known as:  PRINIVIL    Take 10 mg by mouth every evening.   Dose:  10 mg       nicotine 21 MG/24HR Pt24   Commonly known as:  NICODERM    Apply 1 Patch to skin as directed every 24 hours.   Dose:  1 Patch       ondansetron 4 MG Tbdp   Commonly known as:  ZOFRAN ODT    Take 1 Tab by mouth every four hours as needed for Nausea/Vomiting (give PO if IV route is unavailable. May give per feeding tube.).   Dose:  4 mg         "

## 2017-02-17 NOTE — ED NOTES
Pt up to ambulate to bathroom with steady gait. Urine sample obtained and sent to lab. Medicated per ERPs orders. Updated on POC.

## 2017-02-17 NOTE — ED PROVIDER NOTES
"ED Provider Note    Scribed for Jessi Hi D.O. by Celeste Metzger. 2/17/2017, 5:30 AM.    Primary care provider: Radha Julio M.D.  Means of arrival: Walk-in  History obtained from: Patient  History limited by: None    CHIEF COMPLAINT  Chief Complaint   Patient presents with   • LUQ Pain       HPI  Karen Jauregui is a 71 y.o. Female with a history of diverticulits who presents to the Emergency Department complaining of intermittent severe abdominal pain, onset 2-3 weeks ago with worsening severity since yesterday. Her abdominal pain is located generally throughout but is greatest to her mid abdomen. Patient reports having three episodes of severe abdominal pain since onset. Her current episode of abdominal pain does not feel like past episodes of diverticulitis. Patient has associated sense of bloating and sensation of a gas build-up that she can not release. She reports having alternating episodes of diarrhea and constipation. Bowel movements improve her abdominal pain and eating exacerbates her pain. Her abdominal pain is not worse with deep breathing.  She confirms \"sweats\" but denies nausea, vomiting, fever, abnormal urination and shortness of breath. She has chronic back pain with no new or different treatment. She also has a history of an abdominal aortic aneurysm which was evaluated one week ago with a CT scan.  Patient has a history of hysterectomy but otherwise denies abdominal surgeries.       REVIEW OF SYSTEMS  Review of Systems   Constitutional: Positive for diaphoresis. Negative for fever.   Respiratory: Negative for cough and shortness of breath.    Cardiovascular: Negative for chest pain.   Gastrointestinal: Positive for abdominal pain, diarrhea and constipation. Negative for nausea and vomiting.   Genitourinary: Negative.  Negative for dysuria and flank pain.   Musculoskeletal: Positive for back pain.   Neurological: Negative for dizziness and headaches.   All other systems reviewed " and are negative.  C.    PAST MEDICAL HISTORY   has a past medical history of Atherosclerosis of arteries of the extremities; Hypertension; Angina; Arthritis; Peripheral vascular disease (CMS-HCC); Unspecified hemorrhagic conditions (CMS-[facility]); Dyslipidemia; COPD (chronic obstructive pulmonary disease) (CMS-[facility]); Diverticulosis of colon; Spinal stenosis of lumbar region; Abdominal aortic aneurysm (CMS-HCC) (11/2010); Carotid atherosclerosis; Diverticulitis; Family history of nonmelanoma skin cancer; PVD (posterior vitreous detachment), left eye (1/13/2015); and Eosinophilic colitis.      SURGICAL HISTORY   has past surgical history that includes colonoscopy (3/1/2009, 4/2012, 7/2/13); gyn surgery (2002); gyn surgery (1975); other; colon resection laparoscopic (8/5/2013); and colonoscopy with biopsy (3/6/2015).      SOCIAL HISTORY  Social History   Substance Use Topics   • Smoking status: Current Every Day Smoker -- 1.00 packs/day for 45 years     Types: Cigarettes   • Smokeless tobacco: Never Used      Comment: on nicotine patches, smokes off and on   • Alcohol Use: No      History   Drug Use No       FAMILY HISTORY  Family History   Problem Relation Age of Onset   • Hyperlipidemia Sister    • Hypertension Sister    • Non-contributory Sister      carotid atherosclerosis   • Hypertension Mother    • Hyperlipidemia Mother    • Heart Disease Mother 82     congestive heart failure, AAA   • Heart Disease Father 55     multiple heart issues   • Hypertension Father    • Hyperlipidemia Father    • Cancer Sister      sin cancer       CURRENT MEDICATIONS  Home Medications     Reviewed by James Martinez (Pharmacy Tech) on 02/17/17 at 0758  Med List Status: Complete    Medication Last Dose Status    aspirin 81 MG tablet 2/15/2017 Active    atorvastatin (LIPITOR) 80 MG tablet 2/15/2017 Active    clopidogrel (PLAVIX) 75 MG Tab 2/15/2017 Active    hydrocodone-acetaminophen (NORCO) 5-325 MG Tab per tablet >week Active     "lisinopril (PRINIVIL) 10 MG Tab 2/15/2017 Active                ALLERGIES  Allergies   Allergen Reactions   • Amoxicillin Diarrhea     Rxn = years ago        • Ciprofloxacin Diarrhea     Rxn = years ago          PHYSICAL EXAM  VITAL SIGNS: /79 mmHg  Pulse 112  Temp(Src) 37.3 °C (99.1 °F) (Temporal)  Resp 16  Ht 1.549 m (5' 1\")  Wt 43 kg (94 lb 12.8 oz)  BMI 17.92 kg/m2  SpO2 94%  LMP 03/01/1997  Vitals reviewed.  Constitutional: Patient is oriented to person, place, and time. Appears well-developed and well-nourished. Mild distress   Head: Normocephalic and atraumatic.   Ears: Normal external ears bilaterally.   Mouth/Throat: Oropharynx is clear and moist, no exudates.   Eyes: Conjunctivae are normal. Pupils are equal, round, and reactive to light.   Neck: Normal range of motion. Neck supple.  Cardiovascular: Tachycardic rate, regular rhythm and normal heart sounds. Normal peripheral pulses.  Pulmonary/Chest: Effort normal and breath sounds normal. No respiratory distress, no wheezes, rhonchi, or rales. No chest wall tenderness.  Abdominal: Soft. Bowel sounds are normal. Diffuse abdominal tenderness, worse in periumbilical area, no rebound or guarding, or peritoneal signs. No pulsatile masses.  No CVA tenderness.  Musculoskeletal: No edema. Bilateral lumbar paraspinal muscle tenderness.   Lymphadenopathy: No cervical adenopathy.   Neurological: No focal deficits.   Skin: Skin is warm and dry. No erythema. No pallor.   Psychiatric: Patient has a normal mood and affect.     LABS  Results for orders placed or performed during the hospital encounter of 02/17/17   CBC WITH DIFFERENTIAL   Result Value Ref Range    WBC 10.7 4.8 - 10.8 K/uL    RBC 5.64 (H) 4.20 - 5.40 M/uL    Hemoglobin 16.7 (H) 12.0 - 16.0 g/dL    Hematocrit 50.2 (H) 37.0 - 47.0 %    MCV 89.0 81.4 - 97.8 fL    MCH 29.6 27.0 - 33.0 pg    MCHC 33.3 (L) 33.6 - 35.0 g/dL    RDW 43.4 35.9 - 50.0 fL    Platelet Count 256 164 - 446 K/uL    MPV 9.9 " 9.0 - 12.9 fL    Neutrophils-Polys 70.90 44.00 - 72.00 %    Lymphocytes 21.00 (L) 22.00 - 41.00 %    Monocytes 4.70 0.00 - 13.40 %    Eosinophils 2.40 0.00 - 6.90 %    Basophils 0.70 0.00 - 1.80 %    Immature Granulocytes 0.30 0.00 - 0.90 %    Nucleated RBC 0.00 /100 WBC    Neutrophils (Absolute) 7.60 (H) 2.00 - 7.15 K/uL    Lymphs (Absolute) 2.25 1.00 - 4.80 K/uL    Monos (Absolute) 0.50 0.00 - 0.85 K/uL    Eos (Absolute) 0.26 0.00 - 0.51 K/uL    Baso (Absolute) 0.07 0.00 - 0.12 K/uL    Immature Granulocytes (abs) 0.03 0.00 - 0.11 K/uL    NRBC (Absolute) 0.00 K/uL   COMP METABOLIC PANEL   Result Value Ref Range    Sodium 135 135 - 145 mmol/L    Potassium 3.6 3.6 - 5.5 mmol/L    Chloride 105 96 - 112 mmol/L    Co2 27 20 - 33 mmol/L    Anion Gap 3.0 0.0 - 11.9    Glucose 98 65 - 99 mg/dL    Bun 18 8 - 22 mg/dL    Creatinine 0.75 0.50 - 1.40 mg/dL    Calcium 10.0 8.5 - 10.5 mg/dL    AST(SGOT) 16 12 - 45 U/L    ALT(SGPT) 11 2 - 50 U/L    Alkaline Phosphatase 110 (H) 30 - 99 U/L    Total Bilirubin 0.5 0.1 - 1.5 mg/dL    Albumin 4.1 3.2 - 4.9 g/dL    Total Protein 7.2 6.0 - 8.2 g/dL    Globulin 3.1 1.9 - 3.5 g/dL    A-G Ratio 1.3 g/dL   LIPASE   Result Value Ref Range    Lipase 33 11 - 82 U/L   TROPONIN   Result Value Ref Range    Troponin I <0.01 0.00 - 0.04 ng/mL   ESTIMATED GFR   Result Value Ref Range    GFR If African American >60 >60 mL/min/1.73 m 2    GFR If Non African American >60 >60 mL/min/1.73 m 2   URINALYSIS,CULTURE IF INDICATED   Result Value Ref Range    Micro Urine Req Microscopic     Color Yellow     Character Sl Cloudy (A)     Specific Gravity 1.019 <1.035    Ph 6.0 5.0-8.0    Glucose Negative Negative mg/dL    Ketones Negative Negative mg/dL    Protein 70 (A) Negative mg/dL    Bilirubin Negative Negative    Nitrite Negative Negative    Leukocyte Esterase Large (A) Negative    Occult Blood Small (A) Negative    Culture Indicated Yes UA Culture   URINE MICROSCOPIC (W/UA)   Result Value Ref Range    WBC  10-20 (A) /hpf    RBC 5-10 (A) /hpf    Bacteria Moderate (A) None /hpf    Epithelial Cells Few /hpf    Mucous Threads Moderate /hpf    Amorphous Crystal Present /hpf   EKG (ER)   Result Value Ref Range    Report       [facility] Emergency Dept.    Test Date:  2017  Pt Name:    AIDE TONY             Department: ER  MRN:        [id number]                      Room:        11  Gender:     F                            Technician: [id number]  :        1945                   Requested By:ER TRIAGE PROTOCOL  Order #:    [id number]                    Reading MD:    Measurements  Intervals                                Axis  Rate:       104                          P:          87  MA:         164                          QRS:        16  QRSD:       78                           T:          40  QT:         352  QTc:        463    Interpretive Statements  SINUS TACHYCARDIA  BIATRIAL ABNORMALITIES  CONSIDER RIGHT VENTRICULAR HYPERTROPHY  PROBABLE LEFT VENTRICULAR HYPERTROPHY  PROBABLE INFERIOR INFARCT, OLD  CONSIDER ANTERIOR INFARCT  Compared to ECG 2013 11:54:38  Myocardial infarct finding now present  Sinus rhythm no longer present     EKG (NOW)   Result Value Ref Range    Report       [facility] Emergency Dept.    Test Date:  2017  Pt Name:    AIDE TONY             Department: ER  MRN:        [id number]                      Room:       RD 11  Gender:     F                            Technician: [id number]  :        1945                   Requested By:VISHNU HOPKINS  Order #:    [id number]                    Reading MD:    Measurements  Intervals                                Axis  Rate:       107                          P:          84  MA:         180                          QRS:        -25  QRSD:       80                           T:          54  QT:         336  QTc:        449    Interpretive Statements  SINUS TACHYCARDIA  BIATRIAL  ABNORMALITIES  RSR' IN V1 OR V2, PROBABLY NORMAL VARIANT  PROBABLE LEFT VENTRICULAR HYPERTROPHY  PROBABLE INFERIOR INFARCT, OLD  CONSIDER ANTERIOR INFARCT  BASELINE WANDER IN LEAD(S) V5  Compared to ECG 07/31/2013 11:54:38  RSR' in V1 or V2 now present  Myocardial infarct finding now present  Sinus rh ythm no longer present     All labs reviewed by me.        EKG Interpretation  Interpreted by me  Rhythm: sinus tach   Rate: 104  Axis: normal  Ectopy: none  Conduction: normal  ST Segments: no acute change  T Waves: no acute change  Q Waves: none  Clinical Impression: no acute changes and normal EKG. Comparison made to prior EKG from 7/13/2013. Previously patient in a NSR AT 78. O/W no significant morphology changes.       RADIOLOGY  CT-ABDOMEN-PELVIS WITH   Final Result         1. Findings in keeping with mechanical small bowel obstruction, likely partial given bowel gas is seen distally.      2. The transition point is in the mid abdomen with some twisting of the mesentery      3. Unchanged infrarenal abdominal aortic aneurysm, measuring up to 4.8 cm.      The radiologist's interpretation of all radiological studies have been reviewed by me. CT abd /pelvis  from 2/10/2017:          Impression        1.  Infrarenal abdominal aortic aneurysm currently measures 4.8 cm maximum diameter as compared to 4.5 cm on 3/5/2015. No leakage of contrast or retroperitoneal hematoma.  2.  Severe atherosclerotic changes including considerable ulcerated plaque within the aorta.  3.  Enlarged left lobe of the liver unchanged. No focal mass identified.  4.  No hydronephrosis.  5.  No evidence of bowel obstruction or free fluid.  6.  Hyperexpanded and emphysematous lung bases.  7.  No definite distal colon mass identified. The lower mesentery spirals but no no definite volvulus or obstruction.         COURSE & MEDICAL DECISION MAKING  Pertinent Labs & Imaging studies reviewed. (See chart for details)    5:30 AM - Patient seen and  "examined at bedside. Patient will be treated with 4 mg/ml of morphine and 4mg Zofran. Ordered CT-abdomen-Pelvis, urinalysis, estimated GFR, CBC with differential, CMP, Lipase, and Troponin to evaluate her symptoms. The differential diagnoses include but are not limited to SBO, gastritis, diverticulitis, or pancreatits.     6:05 AM Obtained and reviewed patient's CT scan of the abdomen from one week ago.     6:17 AM Patient was reevaluated at bedside and updated with plan of care.     7:58 AM Discussed CT results with the patient which indicated positive bowel obstruction. Labs also show UTI. We discussed placement of an NGT, to help decompress bowel.    8:03 AM I discussed the patient's case and the above findings with Dr. William (Hospitalist) who agrees to admit the patient.     8:04 AM Paged Dr. Pemberton.    8:09 AM I discussed the patient's case and the above findings with Dr. Pemberton (General surgery) who says he is going out of town and advised me to call the general surgeon on call.    8:15 AM Dr. Pemberton called me back to report there is a \"agreement\" among the general surgery groups that if it has been over a year since they have operated on the patient then it can go to the on call general surgeon.    8:28 AM discussed with Dr. Worthington, general surgeon on-call, who agrees to see this patient in consultation. He is aware, that the patient has been seen and operated on in the past, August 2015 for recurrent diverticulitis by Dr. Kan who also follows her for her aneurysm. We discussed CT findings including small bowel obstruction likely partial as there is presence of distal air. Her labs were overall unremarkable. No white blood cell count elevation, no fever. I advised, I would have the nurse place an NG tube and he is aware, that a party spoken to the hospitalist. He is in agreement with this plan of care and will see her later today.    Dr. William updated on General Surgical consult change.      "     DISPOSITION:  Patient will be admitted to Dr. William in guarded condition.      FINAL IMPRESSION  1. SBO (small bowel obstruction) (CMS-HCC)    2. Aneurysm of infrarenal abdominal aorta (CMS-HCC)    3. Acute cystitis without hematuria    4. Generalized abdominal pain          Celeste WILKS (Valerie), am scribing for, and in the presence of, Jessi Hi D.O..  Electronically signed by: Celeste Metzger (Valerie), 2/17/2017  IJessi D.O. personally performed the services described in this documentation, as scribed by Celeste Metzger in my presence, and it is both accurate and complete.    The note accurately reflects work and decisions made by me.  Jessi Hi  2/17/2017  6:03 AM

## 2017-02-17 NOTE — ED NOTES
"Chief Complaint   Patient presents with   • LUQ Pain     Pt has had LUQ pain off and on for a few weeks, woke up tonight the worst it has been.  Pt has abdominal aortic aneurysm, had CT scan last Friday for it, goes in Tuesday to get results.  Describes pain as sharp, pinching.  Denies n/v.  States she feels like she can't pass gas.      L /80  R /78     EKG done by this RN and shown to ERP      /79 mmHg  Pulse 112  Temp(Src) 37.3 °C (99.1 °F) (Temporal)  Resp 16  Ht 1.549 m (5' 1\")  Wt 43 kg (94 lb 12.8 oz)  BMI 17.92 kg/m2  SpO2 94%  LMP 03/01/1997    Pt placed back in lobby, educated on triage process, and told to inform staff of any change in condition.       "

## 2017-02-17 NOTE — IP AVS SNAPSHOT
2/21/2017          Karen Jauregui  [address]  [place] [address]    Dear Karen:    [facility] wants to ensure your discharge home is safe and you or your loved ones have had all your questions answered regarding your care after you leave the hospital.    You may receive a telephone call within two days of your discharge.  This call is to make certain you understand your discharge instructions as well as ensure we provided you with the best care possible during your stay with us.     The call will only last approximately 3-5 minutes and will be done by a nurse.    Once again, we want to ensure your discharge home is safe and that you have a clear understanding of any next steps in your care.  If you have any questions or concerns, please do not hesitate to contact us, we are here for you.  Thank you for choosing [facility] for your healthcare needs.    Sincerely,    Bobby Mcdaniel    [facility]

## 2017-02-17 NOTE — H&P
HOSPITAL MEDICINE HISTORY/ PHYSICAL    Date & Time note created:    2/17/2017   8:28 AM       Patient ID:   Name: Karen Jauregui. YOB: 1945. Age: 71 y.o. female. MRN: [id number]    Admitting Attending:  Kwasi William     PCP : Radha Juloi M.D.    Outpatient GIC: Dr. Sorenson    Previous outpatient surgeon: Dr. Pemberton    Chief Complaint:       Partial small bowel obstruction    History of Present Illness:    Juventino is a 71 y.o. female w/h/o atherosclerosis, hypertension, PVD, dyslipidemia, COPD, diverticulitis who presents with abdominal pain from partial small bowel obstruction. Patient started having abdominal pain about 1 week ago. It was an epigastric pain that was intermittent. She has had a total of 3 episodes. She feels that abdominal gas makes the pain worse. She often has a diarrhea bowel movement at the end of the day and this usually makes the pain better. She has been trying a bland diet and she's not sure if this is helping or not.  She has had a history of abdominal surgeries including ex lap. She also previously had a colonoscopy back in March 2015 that showed colonic inflammatory stricture. Pathology from the biopsy at that time showed no malignancy but did show inflammation with eosinophilic infiltrate.    Review of Systems:    Has cough, constipation, diarrhea, some burning with urination left over from a UTI she had 2 months ago.  Please see HPI, all other systems were reviewed and are negative (AMA/CMS criteria)              Past Medical/ Family / Social history (PFSH):   Past Medical History   Diagnosis Date   • Atherosclerosis of arteries of the extremities      two stents in the groin, Dr. Pickett   • Hypertension    • Angina      with stress test   • Arthritis      thumbs   • Peripheral vascular disease (CMS-HCC)      9 months, may relate to PVD   • Unspecified hemorrhagic conditions (CMS-HCC)      asa/plavix, bruises easily   • Dyslipidemia    • COPD (chronic obstructive  pulmonary disease) (CMS-HCC)    • Diverticulosis of colon    • Spinal stenosis of lumbar region      Dr. Navarro   • Abdominal aortic aneurysm (CMS-[facility]) 11/2010     3.6 cm 8/2014   • Carotid atherosclerosis      Dr. Pickett   • Diverticulitis      Dx 11/25/11   • Family history of nonmelanoma skin cancer    • PVD (posterior vitreous detachment), left eye 1/13/2015   • Eosinophilic colitis      Past Surgical History   Procedure Laterality Date   • Colonoscopy  3/1/2009, 4/2012, 7/2/13     normal, normal, normal but heavy diverticulosis   • Gyn surgery  2002     hysterectomy, tubal, fibroids, ovaries gone   • Gyn surgery  1975     ectopic pregnacy   • Other       LLE /RLEstent, common iliac, Dr. Pickett   • Colon resection laparoscopic  8/5/2013     Performed by Spring Pemberton M.D. at SURGERY [facility]   • Colonoscopy with biopsy  3/6/2015     Performed by Pranav THOMPSON M.D. at ENDOSCOPY [facility]     Current Outpatient Medications:  No current facility-administered medications on file prior to encounter.     Current Outpatient Prescriptions on File Prior to Encounter   Medication Sig Dispense Refill   • hydrocodone-acetaminophen (NORCO) 5-325 MG Tab per tablet Take 1 Tab by mouth every 6 hours as needed (back and neck pain). Seen in office 10/4/16, renewal of chronic medication, used episodically 90 Tab 0   • atorvastatin (LIPITOR) 80 MG tablet Take 80 mg by mouth every day.     • aspirin 81 MG tablet Take 81 mg by mouth every evening.       Medication Allergy/Sensitivities:  Allergies   Allergen Reactions   • Amoxicillin Diarrhea     Rxn = years ago        • Ciprofloxacin Diarrhea     Rxn = years ago        Family History:  Family History   Problem Relation Age of Onset   • Hyperlipidemia Sister    • Hypertension Sister    • Non-contributory Sister      carotid atherosclerosis   • Hypertension Mother    • Hyperlipidemia Mother    • Heart Disease Mother 82     congestive heart failure, AAA   • Heart  "Disease Father 55     multiple heart issues   • Hypertension Father    • Hyperlipidemia Father    • Cancer Sister      sin cancer      Social History:  Social History   Substance Use Topics   • Smoking status: Current Every Day Smoker -- 1.00 packs/day for 45 years     Types: Cigarettes   • Smokeless tobacco: Never Used      Comment: on nicotine patches, smokes off and on   • Alcohol Use: No     #################################################################  Physical Exam:   Vitals/ General Appearance:   Weight/BMI: Body mass index is 17.92 kg/(m^2).  Blood pressure 184/79, pulse 86, temperature 37.3 °C (99.1 °F), temperature source Temporal, resp. rate 15, height 1.549 m (5' 1\"), weight 43 kg (94 lb 12.8 oz), last menstrual period 03/01/1997, SpO2 91 %.   Filed Vitals:    02/17/17 0547 02/17/17 0600 02/17/17 0630 02/17/17 0700   BP:       Pulse:   95 86   Temp:       TempSrc:       Resp: 20 20 16 15   Height:       Weight:       SpO2:   91%     Oxygen Therapy:  Pulse Oximetry: 91 %    Constitutional:  thin, frail  HENMT: Normocephalic, atraumatic, b/l ears normal, nose normal  Eyes:  EOMI, conjunctiva normal, no discharge  Neck: no tracheal deviation, supple  Cardiovascular: normal heart rate, normal rhythm, no murmurs, no rubs or gallops; no cyanosis, clubbing or edema  Lungs: Respiratory effort is normal, normal breath sounds, breath sounds clear to auscultation b/l, no rales, rhonchi or wheezing  Abdomen: soft, epigastric tenderness to palpation with some guarding but no rebound  Skin: warm, dry, no erythema, no rash  Neurologic: Alert and oriented  Psychiatric: Some anxiety or depression    #################################################################  Lab Data Review:    Objective  Recent Results (from the past 24 hour(s))   EKG (ER)    Collection Time: 02/17/17  4:48 AM   Result Value Ref Range    Report       [facility] Emergency Dept.    Test Date:  2017-02-17  Pt Name:    " AIDE TONY             Department: ER  MRN:        [id number]                      Room:       [facility]  Gender:     F                            Technician: [id number]  :        1945                   Requested By:ER TRIAGE PROTOCOL  Order #:    [id number]                    Reading MD:    Measurements  Intervals                                Axis  Rate:       104                          P:          87  OK:         164                          QRS:        16  QRSD:       78                           T:          40  QT:         352  QTc:        463    Interpretive Statements  SINUS TACHYCARDIA  BIATRIAL ABNORMALITIES  CONSIDER RIGHT VENTRICULAR HYPERTROPHY  PROBABLE LEFT VENTRICULAR HYPERTROPHY  PROBABLE INFERIOR INFARCT, OLD  CONSIDER ANTERIOR INFARCT  Compared to ECG 2013 11:54:38  Myocardial infarct finding now present  Sinus rhythm no longer present     CBC WITH DIFFERENTIAL    Collection Time: 17  5:15 AM   Result Value Ref Range    WBC 10.7 4.8 - 10.8 K/uL    RBC 5.64 (H) 4.20 - 5.40 M/uL    Hemoglobin 16.7 (H) 12.0 - 16.0 g/dL    Hematocrit 50.2 (H) 37.0 - 47.0 %    MCV 89.0 81.4 - 97.8 fL    MCH 29.6 27.0 - 33.0 pg    MCHC 33.3 (L) 33.6 - 35.0 g/dL    RDW 43.4 35.9 - 50.0 fL    Platelet Count 256 164 - 446 K/uL    MPV 9.9 9.0 - 12.9 fL    Neutrophils-Polys 70.90 44.00 - 72.00 %    Lymphocytes 21.00 (L) 22.00 - 41.00 %    Monocytes 4.70 0.00 - 13.40 %    Eosinophils 2.40 0.00 - 6.90 %    Basophils 0.70 0.00 - 1.80 %    Immature Granulocytes 0.30 0.00 - 0.90 %    Nucleated RBC 0.00 /100 WBC    Neutrophils (Absolute) 7.60 (H) 2.00 - 7.15 K/uL    Lymphs (Absolute) 2.25 1.00 - 4.80 K/uL    Monos (Absolute) 0.50 0.00 - 0.85 K/uL    Eos (Absolute) 0.26 0.00 - 0.51 K/uL    Baso (Absolute) 0.07 0.00 - 0.12 K/uL    Immature Granulocytes (abs) 0.03 0.00 - 0.11 K/uL    NRBC (Absolute) 0.00 K/uL   COMP METABOLIC PANEL    Collection Time: 17  5:15 AM   Result Value Ref Range    Sodium 135 135 - 145  mmol/L    Potassium 3.6 3.6 - 5.5 mmol/L    Chloride 105 96 - 112 mmol/L    Co2 27 20 - 33 mmol/L    Anion Gap 3.0 0.0 - 11.9    Glucose 98 65 - 99 mg/dL    Bun 18 8 - 22 mg/dL    Creatinine 0.75 0.50 - 1.40 mg/dL    Calcium 10.0 8.5 - 10.5 mg/dL    AST(SGOT) 16 12 - 45 U/L    ALT(SGPT) 11 2 - 50 U/L    Alkaline Phosphatase 110 (H) 30 - 99 U/L    Total Bilirubin 0.5 0.1 - 1.5 mg/dL    Albumin 4.1 3.2 - 4.9 g/dL    Total Protein 7.2 6.0 - 8.2 g/dL    Globulin 3.1 1.9 - 3.5 g/dL    A-G Ratio 1.3 g/dL   LIPASE    Collection Time: 02/17/17  5:15 AM   Result Value Ref Range    Lipase 33 11 - 82 U/L   TROPONIN    Collection Time: 02/17/17  5:15 AM   Result Value Ref Range    Troponin I <0.01 0.00 - 0.04 ng/mL   ESTIMATED GFR    Collection Time: 02/17/17  5:15 AM   Result Value Ref Range    GFR If African American >60 >60 mL/min/1.73 m 2    GFR If Non African American >60 >60 mL/min/1.73 m 2   URINALYSIS,CULTURE IF INDICATED    Collection Time: 02/17/17  6:10 AM   Result Value Ref Range    Micro Urine Req Microscopic     Color Yellow     Character Sl Cloudy (A)     Specific Gravity 1.019 <1.035    Ph 6.0 5.0-8.0    Glucose Negative Negative mg/dL    Ketones Negative Negative mg/dL    Protein 70 (A) Negative mg/dL    Bilirubin Negative Negative    Nitrite Negative Negative    Leukocyte Esterase Large (A) Negative    Occult Blood Small (A) Negative    Culture Indicated Yes UA Culture   URINE MICROSCOPIC (W/UA)    Collection Time: 02/17/17  6:10 AM   Result Value Ref Range    WBC 10-20 (A) /hpf    RBC 5-10 (A) /hpf    Bacteria Moderate (A) None /hpf    Epithelial Cells Few /hpf    Mucous Threads Moderate /hpf    Amorphous Crystal Present /hpf       (click the triangle to expand results)  My interpretation of lab results:   Hemoglobin 16.7, UA mildly abnormal with 10-20 WBCs and moderate bacteria  Imaging/Procedures Review:    CT-ABDOMEN-PELVIS WITH   Final Result         1. Findings in keeping with mechanical small bowel  obstruction, likely partial given bowel gas is seen distally.      2. The transition point is in the mid abdomen with some twisting of the mesentery      3. Unchanged infrarenal abdominal aortic aneurysm, measuring up to 4.8 cm.        EKG:   per my independant read:  QTc:463, HR: 104, sinus tach, no ST/T changes    Assessment and Plan:      1. Abdominal pain  - Likely from abdominal gas and partial small bowel obstruction  2. Small bowel obstruction, partial  - History of ex-lap and lysis of adhesions back in 2013  - ER physician initially consulted Dr. Pemberton but he requested that we consult the surgeon on call  - ERP consulted on-call surgeon Dr. Munson  - Conservative management for now  3. Abdominal aortic aneurysm  - Infrarenal  - Unchanged at 4.8 cm  4. UTI  - Recently treated 2 months ago  - Has some dysuria with slightly abnormal UA  - Will treat with ceftriaxone for 3 days  - Urine culture pending  5. Peripheral vascular disease  - Status post previous femoral stent  - Continue clopidogrel, aspirin, atorvastatin  6. Chronic pain  - Continue norco PRN but judicious use of pain medications given SBO  7. Prophylaxis: sc heparin  8. Code: DNR per patient   9. Dispo: She will be admitted to inpatient for management that is expected to take greater than 2 midnights

## 2017-02-18 ENCOUNTER — APPOINTMENT (OUTPATIENT)
Dept: RADIOLOGY | Facility: MEDICAL CENTER | Age: 72
DRG: 389 | End: 2017-02-18
Attending: HOSPITALIST
Payer: MEDICARE

## 2017-02-18 LAB
ALBUMIN SERPL BCP-MCNC: 3 G/DL (ref 3.2–4.9)
BASOPHILS # BLD AUTO: 0.6 % (ref 0–1.8)
BASOPHILS # BLD: 0.06 K/UL (ref 0–0.12)
BUN SERPL-MCNC: 15 MG/DL (ref 8–22)
CALCIUM SERPL-MCNC: 8.8 MG/DL (ref 8.5–10.5)
CHLORIDE SERPL-SCNC: 112 MMOL/L (ref 96–112)
CO2 SERPL-SCNC: 25 MMOL/L (ref 20–33)
CREAT SERPL-MCNC: 0.68 MG/DL (ref 0.5–1.4)
EOSINOPHIL # BLD AUTO: 0.35 K/UL (ref 0–0.51)
EOSINOPHIL NFR BLD: 3.3 % (ref 0–6.9)
ERYTHROCYTE [DISTWIDTH] IN BLOOD BY AUTOMATED COUNT: 44.5 FL (ref 35.9–50)
GFR SERPL CREATININE-BSD FRML MDRD: >60 ML/MIN/1.73 M 2
GLUCOSE SERPL-MCNC: 107 MG/DL (ref 65–99)
HCT VFR BLD AUTO: 46 % (ref 37–47)
HGB BLD-MCNC: 15.2 G/DL (ref 12–16)
IMM GRANULOCYTES # BLD AUTO: 0.03 K/UL (ref 0–0.11)
IMM GRANULOCYTES NFR BLD AUTO: 0.3 % (ref 0–0.9)
LACTATE BLD-SCNC: 0.7 MMOL/L (ref 0.5–2)
LACTATE BLD-SCNC: 0.8 MMOL/L (ref 0.5–2)
LACTATE BLD-SCNC: 0.9 MMOL/L (ref 0.5–2)
LYMPHOCYTES # BLD AUTO: 2.27 K/UL (ref 1–4.8)
LYMPHOCYTES NFR BLD: 21.5 % (ref 22–41)
MCH RBC QN AUTO: 29.7 PG (ref 27–33)
MCHC RBC AUTO-ENTMCNC: 33 G/DL (ref 33.6–35)
MCV RBC AUTO: 90 FL (ref 81.4–97.8)
MONOCYTES # BLD AUTO: 0.6 K/UL (ref 0–0.85)
MONOCYTES NFR BLD AUTO: 5.7 % (ref 0–13.4)
NEUTROPHILS # BLD AUTO: 7.26 K/UL (ref 2–7.15)
NEUTROPHILS NFR BLD: 68.6 % (ref 44–72)
NRBC # BLD AUTO: 0 K/UL
NRBC BLD AUTO-RTO: 0 /100 WBC
PHOSPHATE SERPL-MCNC: 2.8 MG/DL (ref 2.5–4.5)
PLATELET # BLD AUTO: 237 K/UL (ref 164–446)
PMV BLD AUTO: 10.1 FL (ref 9–12.9)
POTASSIUM SERPL-SCNC: 5.2 MMOL/L (ref 3.6–5.5)
RBC # BLD AUTO: 5.11 M/UL (ref 4.2–5.4)
SODIUM SERPL-SCNC: 141 MMOL/L (ref 135–145)
WBC # BLD AUTO: 10.6 K/UL (ref 4.8–10.8)

## 2017-02-18 PROCEDURE — 80069 RENAL FUNCTION PANEL: CPT

## 2017-02-18 PROCEDURE — 700105 HCHG RX REV CODE 258: Performed by: HOSPITALIST

## 2017-02-18 PROCEDURE — A9270 NON-COVERED ITEM OR SERVICE: HCPCS | Performed by: HOSPITALIST

## 2017-02-18 PROCEDURE — 700102 HCHG RX REV CODE 250 W/ 637 OVERRIDE(OP): Performed by: HOSPITALIST

## 2017-02-18 PROCEDURE — 770006 HCHG ROOM/CARE - MED/SURG/GYN SEMI*

## 2017-02-18 PROCEDURE — 74000 DX-ABDOMEN-1 VIEW: CPT

## 2017-02-18 PROCEDURE — 700111 HCHG RX REV CODE 636 W/ 250 OVERRIDE (IP): Performed by: HOSPITALIST

## 2017-02-18 PROCEDURE — 83605 ASSAY OF LACTIC ACID: CPT | Mod: 91

## 2017-02-18 PROCEDURE — 36415 COLL VENOUS BLD VENIPUNCTURE: CPT

## 2017-02-18 PROCEDURE — 99233 SBSQ HOSP IP/OBS HIGH 50: CPT | Performed by: HOSPITALIST

## 2017-02-18 PROCEDURE — 85025 COMPLETE CBC W/AUTO DIFF WBC: CPT

## 2017-02-18 RX ORDER — BISACODYL 10 MG
10 SUPPOSITORY, RECTAL RECTAL DAILY
Status: DISCONTINUED | OUTPATIENT
Start: 2017-02-18 | End: 2017-02-21 | Stop reason: HOSPADM

## 2017-02-18 RX ORDER — HYDROCODONE BITARTRATE AND ACETAMINOPHEN 5; 325 MG/1; MG/1
1 TABLET ORAL EVERY 6 HOURS PRN
Status: DISCONTINUED | OUTPATIENT
Start: 2017-02-18 | End: 2017-02-21 | Stop reason: HOSPADM

## 2017-02-18 RX ORDER — HEPARIN SODIUM 5000 [USP'U]/ML
5000 INJECTION, SOLUTION INTRAVENOUS; SUBCUTANEOUS EVERY 12 HOURS
Status: DISCONTINUED | OUTPATIENT
Start: 2017-02-18 | End: 2017-02-21 | Stop reason: HOSPADM

## 2017-02-18 RX ORDER — ACETAMINOPHEN 325 MG/1
650 TABLET ORAL EVERY 6 HOURS PRN
Status: DISCONTINUED | OUTPATIENT
Start: 2017-02-18 | End: 2017-02-21 | Stop reason: HOSPADM

## 2017-02-18 RX ORDER — LORAZEPAM 2 MG/ML
0.5 INJECTION INTRAMUSCULAR EVERY 8 HOURS PRN
Status: DISCONTINUED | OUTPATIENT
Start: 2017-02-18 | End: 2017-02-20

## 2017-02-18 RX ORDER — NICOTINE 21 MG/24HR
21 PATCH, TRANSDERMAL 24 HOURS TRANSDERMAL
Status: DISCONTINUED | OUTPATIENT
Start: 2017-02-18 | End: 2017-02-21 | Stop reason: HOSPADM

## 2017-02-18 RX ORDER — SODIUM CHLORIDE 9 MG/ML
INJECTION, SOLUTION INTRAVENOUS CONTINUOUS
Status: DISCONTINUED | OUTPATIENT
Start: 2017-02-18 | End: 2017-02-21 | Stop reason: HOSPADM

## 2017-02-18 RX ADMIN — HYDROCODONE BITARTRATE AND ACETAMINOPHEN 1 TABLET: 5; 325 TABLET ORAL at 22:59

## 2017-02-18 RX ADMIN — BISACODYL 10 MG: 10 SUPPOSITORY RECTAL at 10:38

## 2017-02-18 RX ADMIN — ONDANSETRON 4 MG: 2 INJECTION, SOLUTION INTRAMUSCULAR; INTRAVENOUS at 01:04

## 2017-02-18 RX ADMIN — MORPHINE SULFATE 2 MG: 4 INJECTION INTRAVENOUS at 01:04

## 2017-02-18 RX ADMIN — FAMOTIDINE 20 MG: 10 INJECTION INTRAVENOUS at 12:49

## 2017-02-18 RX ADMIN — CEFTRIAXONE SODIUM 1 G: 1 INJECTION, POWDER, FOR SOLUTION INTRAMUSCULAR; INTRAVENOUS at 08:08

## 2017-02-18 RX ADMIN — SODIUM CHLORIDE: 9 INJECTION, SOLUTION INTRAVENOUS at 15:41

## 2017-02-18 RX ADMIN — MORPHINE SULFATE 2 MG: 4 INJECTION INTRAVENOUS at 19:29

## 2017-02-18 RX ADMIN — CLOPIDOGREL 75 MG: 75 TABLET, FILM COATED ORAL at 22:59

## 2017-02-18 RX ADMIN — NICOTINE 21 MG: 21 PATCH TRANSDERMAL at 12:54

## 2017-02-18 RX ADMIN — MORPHINE SULFATE 2 MG: 4 INJECTION INTRAVENOUS at 15:43

## 2017-02-18 RX ADMIN — LORAZEPAM 0.5 MG: 2 INJECTION INTRAMUSCULAR; INTRAVENOUS at 08:18

## 2017-02-18 RX ADMIN — MORPHINE SULFATE 2 MG: 4 INJECTION INTRAVENOUS at 10:37

## 2017-02-18 RX ADMIN — LISINOPRIL 10 MG: 10 TABLET ORAL at 22:58

## 2017-02-18 RX ADMIN — ATORVASTATIN CALCIUM 80 MG: 40 TABLET, FILM COATED ORAL at 23:00

## 2017-02-18 RX ADMIN — ASPIRIN 81 MG: 81 TABLET, CHEWABLE ORAL at 22:59

## 2017-02-18 ASSESSMENT — PAIN SCALES - GENERAL
PAINLEVEL_OUTOF10: 7
PAINLEVEL_OUTOF10: 4
PAINLEVEL_OUTOF10: 7
PAINLEVEL_OUTOF10: 4
PAINLEVEL_OUTOF10: 7
PAINLEVEL_OUTOF10: 7

## 2017-02-18 ASSESSMENT — ENCOUNTER SYMPTOMS
VOMITING: 0
NAUSEA: 0
FEVER: 0
INSOMNIA: 1
CONSTIPATION: 1
CHILLS: 0
HEARTBURN: 1
ABDOMINAL PAIN: 1

## 2017-02-18 ASSESSMENT — COPD QUESTIONNAIRES
DURING THE PAST 4 WEEKS HOW MUCH DID YOU FEEL SHORT OF BREATH: SOME OF THE TIME
DO YOU EVER COUGH UP ANY MUCUS OR PHLEGM?: YES, A FEW DAYS A WEEK OR MONTH
COPD SCREENING SCORE: 6
HAVE YOU SMOKED AT LEAST 100 CIGARETTES IN YOUR ENTIRE LIFE: YES

## 2017-02-18 NOTE — PROGRESS NOTES
Pt arrived to unit from ED. VSS. No apparent distress. Complaining of abdominal pain - pain medication to be given. Denies nausea. Pt oriented to room. NG tube in place and to low continuous suction. Personal items and call light in reach.

## 2017-02-18 NOTE — RESPIRATORY CARE
COPD EDUCATION by COPD CLINICAL EDUCATOR  2/18/2017 at 6:48 AM by Senait Salmeron     Patient reviewed by COPD education team. Patient does not qualify for COPD program.

## 2017-02-18 NOTE — PROGRESS NOTES
"Surgical Progress Note:      Passing gas  Pain improved      PE:  /58 mmHg  Pulse 80  Temp(Src) 36.4 °C (97.5 °F) (Temporal)  Resp 13  Ht 1.549 m (5' 1\")  Wt 43 kg (94 lb 12.8 oz)  BMI 17.92 kg/m2  SpO2 92%  LMP 03/01/1997  Breastfeeding? No    I/O:   Intake/Output Summary (Last 24 hours) at 02/18/17 1037  Last data filed at 02/18/17 0705   Gross per 24 hour   Intake    975 ml   Output    400 ml   Net    575 ml     UOP:  PO:  IV:    GEN: NAD  COR: RRR  PULM: CTA  ABD: soft, NT. Distention slight improved. NGT clear      Labs:  Recent Labs      02/17/17   0515  02/18/17   0041   WBC  10.7  10.6   RBC  5.64*  5.11   HEMOGLOBIN  16.7*  15.2   HEMATOCRIT  50.2*  46.0   MCV  89.0  90.0   MCH  29.6  29.7   RDW  43.4  44.5   PLATELETCT  256  237   MPV  9.9  10.1   NEUTSPOLYS  70.90  68.60   LYMPHOCYTES  21.00*  21.50*   MONOCYTES  4.70  5.70   EOSINOPHILS  2.40  3.30   BASOPHILS  0.70  0.60     Recent Labs      02/17/17   0515  02/18/17   0041   SODIUM  135  141   POTASSIUM  3.6  5.2   CHLORIDE  105  112   CO2  27  25   GLUCOSE  98  107*   BUN  18  15         A/P:   SBO  Passing gas, but still prominent distention  Continue NPO for now, IVF hydration     Abran Worthington M.D.  [facility]  [phone]      "

## 2017-02-18 NOTE — PROGRESS NOTES
This RN called and read results impression from abdominal xray to Dr. Vasquez.  No new orders received.  Will continue with current plan of care.  This RN attempted to advance NG tube and patient declined.  Education provided to patient regard NGT not in most effective location to decompress bowels and suction gastric contents, patient verbalized understanding, but declined to allow this RN to advance tube beyond the GE junction.  This RN updated Dr. Vasquez on NGT location and refusal of patient to allow RN to advance.

## 2017-02-18 NOTE — PROGRESS NOTES
Hospital Medicine Progress Note, Adult, Complex               Author: Sandro Vasquez Date & Time created: 2/18/2017  11:21 AM     Interval History:  Admitted for PSBO.  Having heart burns.  Passing gas but no BM as she's not eaten anything in several days.    Review of Systems:  Review of Systems   Constitutional: Negative for fever and chills.   Gastrointestinal: Positive for heartburn, abdominal pain and constipation. Negative for nausea and vomiting.   Psychiatric/Behavioral: The patient has insomnia.    All other systems reviewed and are negative.      Physical Exam:  Physical Exam  Nursing note and vitals reviewed.  Constitutional: She is oriented to person, place, and time. She appears well-developed and well-nourished .   HENT:   Head: Normocephalic and atraumatic.   Right Ear: External ear normal.   Left Ear: External ear normal.   Nose: Nose normal.  NGT in place.  Mouth/Throat: Oropharynx is small with Mallanpati score of 4.  Mucosa is clear and moist.   Eyes: Conjunctivae and extraocular motions are normal. Pupils are equal, round, and reactive to light.   Neck: Normal range of motion. Neck supple.   Cardiovascular: Normal rate, regular rhythm, normal heart sounds and intact distal pulses.    Pulmonary/Chest: Effort normal and breath sounds normal.   Abdominal: Bowel sounds are decreased.  Palpation deferred.  Musculoskeletal: Normal range of motion.   Neurological: She is alert and oriented to person, place, and time.   Skin: Skin is warm and dry.       Labs:        Invalid input(s): [id number]  Recent Labs      02/17/17   0515   TROPONINI  <0.01     Recent Labs      02/17/17 0515  02/18/17   0041   SODIUM  135  141   POTASSIUM  3.6  5.2   CHLORIDE  105  112   CO2  27  25   BUN  18  15   CREATININE  0.75  0.68   PHOSPHORUS   --   2.8   CALCIUM  10.0  8.8     Recent Labs      02/17/17   0515  02/18/17   0041   ALTSGPT  11   --    ASTSGOT  16   --    ALKPHOSPHAT  110*   --    TBILIRUBIN  0.5   --     LIPASE  33   --    GLUCOSE  98  107*     Recent Labs      17   0515  17   0041   RBC  5.64*  5.11   HEMOGLOBIN  16.7*  15.2   HEMATOCRIT  50.2*  46.0   PLATELETCT  256  237     Recent Labs      17   0515  17   0041   WBC  10.7  10.6   NEUTSPOLYS  70.90  68.60   LYMPHOCYTES  21.00*  21.50*   MONOCYTES  4.70  5.70   EOSINOPHILS  2.40  3.30   BASOPHILS  0.70  0.60   ASTSGOT  16   --    ALTSGPT  11   --    ALKPHOSPHAT  110*   --    TBILIRUBIN  0.5   --            Hemodynamics:  Temp (24hrs), Av.5 °C (97.7 °F), Min:36.3 °C (97.4 °F), Max:37 °C (98.6 °F)  Temperature: 36.4 °C (97.5 °F)  Pulse  Av.7  Min: 80  Max: 112   Blood Pressure : 134/58 mmHg     Respiratory:    Respiration: 13, Pulse Oximetry: 92 %           Fluids:    Intake/Output Summary (Last 24 hours) at 17 1121  Last data filed at 17 0705   Gross per 24 hour   Intake    975 ml   Output    400 ml   Net    575 ml     Weight: 43 kg (94 lb 12.8 oz)  GI/Nutrition:  Orders Placed This Encounter   Procedures   • Diet NPO     Standing Status: Standing      Number of Occurrences: 1      Standing Expiration Date:      Order Specific Question:  Restrict to:     Answer:  Strict [1]     Medical Decision Making, by Problem:  Active Hospital Problems    Diagnosis   • SBO (small bowel obstruction) (CMS-[facility]) [K56.69] - cont NGT to wall suction.  Change PO laxatives to VT.  Check AXR.   • UTI (urinary tract infection) [N39.0] - cont ceftriaxone.  Follow UCX.   • Abdominal aortic aneurysm greater than 39 mm in diameter (CMS-HCC) [I71.4] - outpatient vas surg follow up.   COPD hx - O2, RT, IS, Vax.  Tobacco dep - cessation ed.  Nicoderm.  Anxiety - follow c Nicoderm initiation.  Insomnia - outpatient follow up.  Stable issues - med hx (eosinophilic colitis, spinal stenosis, DLD/PAD, HTN),  Preventives - Dulcolax VT and DVTP.  Dispo - complex/guarded.      EKG reviewed, Radiology images reviewed, Labs reviewed and Medications  reviewed  Resendiz catheter: No Resendiz      DVT Prophylaxis: Heparin        Assessed for rehab: Patient unable to tolerate rehabilitation therapeutic regimen

## 2017-02-18 NOTE — PROGRESS NOTES
AO x 4, NGT to LCWS.  Clear, pink tinged drainage.  Medicated with ativan for anxiety r/t NGT.  Denies pain.  Expresses discomfort from heart burn.  No nausea.  Reports a little of flatus.  Plan of care discussed, questions answered, verbalized understanding.

## 2017-02-18 NOTE — PROGRESS NOTES
Pt care assumed and assessment completed.  Pt is aaox4, up self and steady.  NG tube set to cont low suction.  Pt highly anxious and irritable.  States want to be disconnected and wants to leave AMA.  Rn educated pt on importance of staying and treating problem.  Chela LAURENT and recvd iv Ativan orders.  Medicated per MAR.  C/o pain and nausea, medicated per MAR.  PT strict NPO, offered mouth swabs, pt refused.  Will continue to monitor

## 2017-02-18 NOTE — PROGRESS NOTES
1500: CXR was obtained to very placement of NG tube. Impression suggests the NG tube is in the distal esophagus and should be advanced.      NG tube advanced 3cm by RN. Repeat CXR obtained and verified that NG tube placement is now in the stomach. NG placed back to low continuous suction.

## 2017-02-19 ENCOUNTER — APPOINTMENT (OUTPATIENT)
Dept: RADIOLOGY | Facility: MEDICAL CENTER | Age: 72
DRG: 389 | End: 2017-02-19
Attending: HOSPITALIST
Payer: MEDICARE

## 2017-02-19 LAB
BACTERIA UR CULT: ABNORMAL
BACTERIA UR CULT: ABNORMAL
EKG IMPRESSION: NORMAL
LACTATE BLD-SCNC: 0.9 MMOL/L (ref 0.5–2)
LACTATE BLD-SCNC: 1 MMOL/L (ref 0.5–2)
LACTATE BLD-SCNC: 1 MMOL/L (ref 0.5–2)
SIGNIFICANT IND 70042: ABNORMAL
SITE SITE: ABNORMAL
SOURCE SOURCE: ABNORMAL

## 2017-02-19 PROCEDURE — 700111 HCHG RX REV CODE 636 W/ 250 OVERRIDE (IP): Performed by: HOSPITALIST

## 2017-02-19 PROCEDURE — 36415 COLL VENOUS BLD VENIPUNCTURE: CPT

## 2017-02-19 PROCEDURE — 99233 SBSQ HOSP IP/OBS HIGH 50: CPT | Performed by: HOSPITALIST

## 2017-02-19 PROCEDURE — 74000 DX-ABDOMEN-1 VIEW: CPT

## 2017-02-19 PROCEDURE — A9270 NON-COVERED ITEM OR SERVICE: HCPCS | Performed by: HOSPITALIST

## 2017-02-19 PROCEDURE — 770006 HCHG ROOM/CARE - MED/SURG/GYN SEMI*

## 2017-02-19 PROCEDURE — 700101 HCHG RX REV CODE 250: Performed by: HOSPITALIST

## 2017-02-19 PROCEDURE — 83605 ASSAY OF LACTIC ACID: CPT | Mod: 91

## 2017-02-19 PROCEDURE — 700102 HCHG RX REV CODE 250 W/ 637 OVERRIDE(OP): Performed by: HOSPITALIST

## 2017-02-19 PROCEDURE — 700105 HCHG RX REV CODE 258: Performed by: HOSPITALIST

## 2017-02-19 RX ORDER — CARVEDILOL 6.25 MG/1
6.25 TABLET ORAL 2 TIMES DAILY WITH MEALS
Status: DISCONTINUED | OUTPATIENT
Start: 2017-02-19 | End: 2017-02-21 | Stop reason: HOSPADM

## 2017-02-19 RX ADMIN — CARVEDILOL 6.25 MG: 6.25 TABLET, FILM COATED ORAL at 16:22

## 2017-02-19 RX ADMIN — HEPARIN SODIUM 5000 UNITS: 5000 INJECTION, SOLUTION INTRAVENOUS; SUBCUTANEOUS at 08:15

## 2017-02-19 RX ADMIN — FAMOTIDINE 20 MG: 10 INJECTION INTRAVENOUS at 08:11

## 2017-02-19 RX ADMIN — LISINOPRIL 10 MG: 10 TABLET ORAL at 19:45

## 2017-02-19 RX ADMIN — NICOTINE 21 MG: 21 PATCH TRANSDERMAL at 05:55

## 2017-02-19 RX ADMIN — ASPIRIN 81 MG: 81 TABLET, CHEWABLE ORAL at 19:44

## 2017-02-19 RX ADMIN — SODIUM CHLORIDE: 9 INJECTION, SOLUTION INTRAVENOUS at 12:41

## 2017-02-19 RX ADMIN — MORPHINE SULFATE 2 MG: 4 INJECTION INTRAVENOUS at 16:18

## 2017-02-19 RX ADMIN — CLOPIDOGREL 75 MG: 75 TABLET, FILM COATED ORAL at 19:45

## 2017-02-19 RX ADMIN — LORAZEPAM 0.5 MG: 2 INJECTION INTRAMUSCULAR; INTRAVENOUS at 04:42

## 2017-02-19 RX ADMIN — ATORVASTATIN CALCIUM 80 MG: 40 TABLET, FILM COATED ORAL at 19:44

## 2017-02-19 RX ADMIN — SODIUM PHOSPHATE 133 ML: 7; 19 ENEMA RECTAL at 17:28

## 2017-02-19 RX ADMIN — BISACODYL 10 MG: 10 SUPPOSITORY RECTAL at 08:22

## 2017-02-19 RX ADMIN — CEFTRIAXONE SODIUM 1 G: 1 INJECTION, POWDER, FOR SOLUTION INTRAMUSCULAR; INTRAVENOUS at 08:19

## 2017-02-19 RX ADMIN — MORPHINE SULFATE 2 MG: 4 INJECTION INTRAVENOUS at 04:44

## 2017-02-19 ASSESSMENT — PAIN SCALES - GENERAL
PAINLEVEL_OUTOF10: 0
PAINLEVEL_OUTOF10: 6
PAINLEVEL_OUTOF10: 0
PAINLEVEL_OUTOF10: 3
PAINLEVEL_OUTOF10: 7
PAINLEVEL_OUTOF10: 0
PAINLEVEL_OUTOF10: 4

## 2017-02-19 ASSESSMENT — ENCOUNTER SYMPTOMS
VOMITING: 0
HEARTBURN: 1
PALPITATIONS: 0
NAUSEA: 0
ABDOMINAL PAIN: 1
CONSTIPATION: 0
INSOMNIA: 1

## 2017-02-19 NOTE — PROGRESS NOTES
Patient reports two small formed stools today.  + flatus.  Scant drainage to NGT. Patient agreed to allow this rn to advance NGT, tube advanced, xray shows needs to be advanced more, patient declines at this time despite education.

## 2017-02-19 NOTE — PROGRESS NOTES
"Patient refused bed alarm.  Patient educated on fall risks and importance of using call light. Discussed safety and fall prevention.  Patient verbalized understanding and continued to refused bed alarm.  Patient held up call bell and stated, \"See I have this to call you when I need to get up.\"  Fall precautions in place: treaded slipper socks, bed is in low position, personal belongings, wastebasket, call light, and phone are within patient's reach.    "

## 2017-02-19 NOTE — PROGRESS NOTES
Hospital Medicine Progress Note, Adult, Complex               Author: Sandro Vasquez Date & Time created: 2/19/2017  12:16 PM     Interval History:  Admitted for PSBO. Passing gas and had very small BM's c the last being mucous and blood mixed.  Feeling a little better.   She wants the NGT removed, as she thinks she'll do better.    Review of Systems:  Review of Systems   Cardiovascular: Negative for chest pain and palpitations.   Gastrointestinal: Positive for heartburn and abdominal pain. Negative for nausea, vomiting and constipation.   Psychiatric/Behavioral: The patient has insomnia.    All other systems reviewed and are negative.      Physical Exam:  Physical Exam  Nursing note and vitals reviewed.  Constitutional: She is oriented to person, place, and time. She appears well-developed and well-nourished.   HENT:   Head: Normocephalic and atraumatic.   Right Ear: External ear normal.   Left Ear: External ear normal.   Nose: Nose normal.  NGT in place.  Eyes: Conjunctivae and extraocular motions are normal.    Neck: Normal range of motion. Neck supple.   Cardiovascular: Normal rate.    Pulmonary/Chest: Effort normal.   Abdominal: Soft. Bowel sounds are normal.   Musculoskeletal: Normal range of motion.   Neurological: She is alert and oriented to person, place, and time.   Skin: Skin is warm and dry.              Labs:        Invalid input(s): [id number]  Recent Labs      02/17/17 0515   TROPONINI  <0.01     Recent Labs      02/17/17 0515 02/18/17 0041   SODIUM  135  141   POTASSIUM  3.6  5.2   CHLORIDE  105  112   CO2  27  25   BUN  18  15   CREATININE  0.75  0.68   PHOSPHORUS   --   2.8   CALCIUM  10.0  8.8     Recent Labs      02/17/17 0515  02/18/17 0041   ALTSGPT  11   --    ASTSGOT  16   --    ALKPHOSPHAT  110*   --    TBILIRUBIN  0.5   --    LIPASE  33   --    GLUCOSE  98  107*     Recent Labs      02/17/17 0515 02/18/17 0041   RBC  5.64*  5.11   HEMOGLOBIN  16.7*  15.2   HEMATOCRIT   50.2*  46.0   PLATELETCT  256  237     Recent Labs      17   0515  17   0041   WBC  10.7  10.6   NEUTSPOLYS  70.90  68.60   LYMPHOCYTES  21.00*  21.50*   MONOCYTES  4.70  5.70   EOSINOPHILS  2.40  3.30   BASOPHILS  0.70  0.60   ASTSGOT  16   --    ALTSGPT  11   --    ALKPHOSPHAT  110*   --    TBILIRUBIN  0.5   --            Hemodynamics:  Temp (24hrs), Av.9 °C (98.4 °F), Min:36.6 °C (97.9 °F), Max:37.3 °C (99.2 °F)  Temperature: 37.1 °C (98.8 °F)  Pulse  Av.7  Min: 80  Max: 114   Blood Pressure : 144/85 mmHg     Respiratory:    Respiration: 18, Pulse Oximetry: 93 %        RUL Breath Sounds: Clear, RML Breath Sounds: Clear, RLL Breath Sounds: Diminished, KISHA Breath Sounds: Clear, LLL Breath Sounds: Diminished  Fluids:    Intake/Output Summary (Last 24 hours) at 17 1216  Last data filed at 17 1200   Gross per 24 hour   Intake   1420 ml   Output   1120 ml   Net    300 ml        GI/Nutrition:  Orders Placed This Encounter   Procedures   • Diet NPO     Standing Status: Standing      Number of Occurrences: 1      Standing Expiration Date:      Order Specific Question:  Restrict to:     Answer:  Strict [1]     Medical Decision Making, by Problem:  Active Hospital Problems    Diagnosis   • SBO (small bowel obstruction) (CMS-[facility]) [K56.69] - the need for NGT and the consequences of no NGT were discussed with the patient.  She's adamant about removing the tube.  DC NGT per patient's request.  Repeat AXR.   • UTI (urinary tract infection) [N39.0] - cont ceftriaxone.  UCX neg.  Complete 3 days of Abx.   • Abdominal aortic aneurysm greater than 39 mm in diameter (CMS-[facility]) [I71.4] - outpatient vas surg follow up.   COPD hx - O2, RT, IS, Vax.  Tobacco dep - cessation ed.  Nicoderm.  Anxiety - follow c Nicoderm initiation.  Insomnia - outpatient follow up.  HTN - labile.  Follow c anxiety and pain management.  Stable issues - med hx (eosinophilic colitis, spinal stenosis, DLD/PAD, HTN),  preventives.  Dispo - complex/guarded.      Radiology images reviewed, Labs reviewed and Medications reviewed  Resendiz catheter: No Resendiz      DVT Prophylaxis: Heparin        Assessed for rehab: Patient unable to tolerate rehabilitation therapeutic regimen

## 2017-02-19 NOTE — PROGRESS NOTES
AO x 4, declines pain medication for nose soreness.  Up to restroom to void, per patient no flatus, did have scant mucous BM, clear in color.  Suppository administered this am.  Tachycardic. Non productive, congested cough.  IS in use, achieves 1500, needs encouragement to use. NG tube to low suction.  Small amount of brownish drainage in cannister, air bolus auscultated.  Has steady gait, calls for assistance. Plan of care discussed, questions answered, verbalized understanding.

## 2017-02-19 NOTE — PROGRESS NOTES
Assumed care of Ms Jauregui at 1900.    Pt is A&O x4.  Pain 4/10.  Pt describes as a tolerable level  Nausea denied  NPO  NGT in Rt Nare set to low continuous suction  Positive Urine output  Positive BM Void   Negative Flatus  1 assist   SCD's on  Bed in lowest position and locked.    ** Bed alarm refused despite pt education on fall risk.  Pt is A&O x4 and demonstrates appropriate use of call light to call for assistance.  CLIP, Hourly rounding in place.      Pt resting comfortably now.  Review plan of care with patient  Call light within reach  Hourly rounds in place  All needs met at this time

## 2017-02-19 NOTE — CARE PLAN
Problem: Safety  Goal: Will remain free from falls  Outcome: PROGRESSING AS EXPECTED  Pt remains free from fall.  Pt educated on fall risk.  Pt demonstrates understanding by appropriate use of call light to call for assistance.  CLIP, hourly rounding in place    Problem: Venous Thromboembolism (VTW)/Deep Vein Thrombosis (DVT) Prevention:  Goal: Patient will participate in Venous Thrombosis (VTE)/Deep Vein Thrombosis (DVT)Prevention Measures  Outcome: PROGRESSING SLOWER THAN EXPECTED  Pt educated on use of heparin for DVT prophylaxis.  Pt verbalized understanding but continued to refuse heparin.  Pt is currently taking Asa and Plavix.

## 2017-02-20 PROBLEM — G47.00 INSOMNIA DISORDER: Status: ACTIVE | Noted: 2017-02-20

## 2017-02-20 PROBLEM — K56.7 ILEUS (HCC): Status: ACTIVE | Noted: 2017-02-20

## 2017-02-20 PROBLEM — Z87.09 HISTORY OF COPD: Status: ACTIVE | Noted: 2017-02-20

## 2017-02-20 LAB
LACTATE BLD-SCNC: 0.8 MMOL/L (ref 0.5–2)
LACTATE BLD-SCNC: 1.3 MMOL/L (ref 0.5–2)

## 2017-02-20 PROCEDURE — A9270 NON-COVERED ITEM OR SERVICE: HCPCS | Performed by: HOSPITALIST

## 2017-02-20 PROCEDURE — 700105 HCHG RX REV CODE 258: Performed by: HOSPITALIST

## 2017-02-20 PROCEDURE — 700111 HCHG RX REV CODE 636 W/ 250 OVERRIDE (IP): Performed by: HOSPITALIST

## 2017-02-20 PROCEDURE — 99233 SBSQ HOSP IP/OBS HIGH 50: CPT | Performed by: HOSPITALIST

## 2017-02-20 PROCEDURE — 700102 HCHG RX REV CODE 250 W/ 637 OVERRIDE(OP): Performed by: HOSPITALIST

## 2017-02-20 PROCEDURE — 36415 COLL VENOUS BLD VENIPUNCTURE: CPT

## 2017-02-20 PROCEDURE — 770006 HCHG ROOM/CARE - MED/SURG/GYN SEMI*

## 2017-02-20 PROCEDURE — 83605 ASSAY OF LACTIC ACID: CPT

## 2017-02-20 RX ORDER — LORAZEPAM 1 MG/1
0.5 TABLET ORAL EVERY 6 HOURS PRN
Status: DISCONTINUED | OUTPATIENT
Start: 2017-02-20 | End: 2017-02-21 | Stop reason: HOSPADM

## 2017-02-20 RX ADMIN — CARVEDILOL 6.25 MG: 6.25 TABLET, FILM COATED ORAL at 17:09

## 2017-02-20 RX ADMIN — LISINOPRIL 10 MG: 10 TABLET ORAL at 20:22

## 2017-02-20 RX ADMIN — CLOPIDOGREL 75 MG: 75 TABLET, FILM COATED ORAL at 20:21

## 2017-02-20 RX ADMIN — HEPARIN SODIUM 5000 UNITS: 5000 INJECTION, SOLUTION INTRAVENOUS; SUBCUTANEOUS at 09:08

## 2017-02-20 RX ADMIN — FAMOTIDINE 20 MG: 10 INJECTION INTRAVENOUS at 09:05

## 2017-02-20 RX ADMIN — BISACODYL 10 MG: 10 SUPPOSITORY RECTAL at 09:11

## 2017-02-20 RX ADMIN — LORAZEPAM 0.5 MG: 1 TABLET ORAL at 17:10

## 2017-02-20 RX ADMIN — CARVEDILOL 6.25 MG: 6.25 TABLET, FILM COATED ORAL at 09:10

## 2017-02-20 RX ADMIN — MAGNESIUM CITRATE 148 ML: 1.75 LIQUID ORAL at 09:40

## 2017-02-20 RX ADMIN — ASPIRIN 81 MG: 81 TABLET, CHEWABLE ORAL at 20:21

## 2017-02-20 RX ADMIN — SODIUM CHLORIDE: 9 INJECTION, SOLUTION INTRAVENOUS at 09:03

## 2017-02-20 RX ADMIN — NICOTINE 21 MG: 21 PATCH TRANSDERMAL at 05:25

## 2017-02-20 RX ADMIN — ATORVASTATIN CALCIUM 80 MG: 40 TABLET, FILM COATED ORAL at 20:21

## 2017-02-20 ASSESSMENT — PAIN SCALES - GENERAL
PAINLEVEL_OUTOF10: 2
PAINLEVEL_OUTOF10: 0
PAINLEVEL_OUTOF10: 0

## 2017-02-20 ASSESSMENT — ENCOUNTER SYMPTOMS
HEARTBURN: 0
INSOMNIA: 1
PALPITATIONS: 0
CONSTIPATION: 1
ABDOMINAL PAIN: 1
VOMITING: 0
NAUSEA: 0

## 2017-02-20 NOTE — PROGRESS NOTES
Assumed care of Ms Jauregui at 1900.     Pt is A&O x4.  Pain denied  Nausea denied  Tolerating clear liquid diet  Positive Urine output  Positive BM Void  Positive Flatus  1 assist    SCD's on  Bed in lowest position and locked.    ** Bed alarm refused despite pt education on fall risk.  Pt is A&O x4 and demonstrates appropriate use of call light to call for assistance.  CLIP, Hourly rounding in place.      Pt resting comfortably now.  Review plan of care with patient  Call light within reach  Hourly rounds in place  All needs met at this time

## 2017-02-20 NOTE — PROGRESS NOTES
Hospital Medicine Progress Note, Adult, Complex               Author: Sandro Vasquez Date & Time created: 2017  10:55 AM     Interval History:  Admitted for PSBO. Passing gas and had very small BM's c the last being mucous and blood mixed.  Feeling better.   Had an enema c small response yesterday.    Review of Systems:  Review of Systems   Cardiovascular: Negative for chest pain and palpitations.   Gastrointestinal: Positive for abdominal pain and constipation. Negative for heartburn, nausea and vomiting.   Psychiatric/Behavioral: The patient has insomnia.    All other systems reviewed and are negative.      Physical Exam:  Physical Exam  Nursing note and vitals reviewed.  Constitutional: She is oriented to person, place, and time. She appears well-developed and well-nourished.   HENT:   Head: Normocephalic and atraumatic.   Right Ear: External ear normal.   Left Ear: External ear normal.   Nose: Nose normal.  NGT in place.  Eyes: Conjunctivae and extraocular motions are normal.    Neck: Normal range of motion. Neck supple.   Cardiovascular: Normal rate.    Pulmonary/Chest: Effort normal.   Abdominal: Bowel sounds are normal.   Musculoskeletal: Normal range of motion.   Neurological: She is alert and oriented to person, place, and time.   Skin: Skin is warm and dry.              Labs:        Invalid input(s): [id number]      Recent Labs      17   004   SODIUM  141   POTASSIUM  5.2   CHLORIDE  112   CO2  25   BUN  15   CREATININE  0.68   PHOSPHORUS  2.8   CALCIUM  8.8     Recent Labs      17   004   GLUCOSE  107*     Recent Labs      17   RBC  5.11   HEMOGLOBIN  15.2   HEMATOCRIT  46.0   PLATELETCT  237     Recent Labs      17   WBC  10.6   NEUTSPOLYS  68.60   LYMPHOCYTES  21.50*   MONOCYTES  5.70   EOSINOPHILS  3.30   BASOPHILS  0.60           Hemodynamics:  Temp (24hrs), Av.7 °C (98 °F), Min:36.1 °C (97 °F), Max:37.1 °C (98.8 °F)  Temperature: 36.7 °C (98  °F)  Pulse  Av.8  Min: 80  Max: 115   Blood Pressure : 136/76 mmHg     Respiratory:    Respiration: 15, Pulse Oximetry: 92 %        RUL Breath Sounds: Clear, RML Breath Sounds: Clear, RLL Breath Sounds: Diminished;Rhonchi, KISHA Breath Sounds: Clear, LLL Breath Sounds: Diminished;Rhonchi  Fluids:    Intake/Output Summary (Last 24 hours) at 17 1055  Last data filed at 17 0851   Gross per 24 hour   Intake   1580 ml   Output   1100 ml   Net    480 ml        GI/Nutrition:  Orders Placed This Encounter   Procedures   • DIET ORDER     Standing Status: Standing      Number of Occurrences: 1      Standing Expiration Date:      Order Specific Question:  Diet:     Answer:  Clear Liquid [10]     Medical Decision Making, by Problem:  Active Hospital Problems    Diagnosis   • Ileus/constipation - no evidence of SBO.  Mag citrate and continue NE Dulcolax.   • UTI (urinary tract infection) [N39.0] - DC ceftriaxone.  UCX neg.     • Abdominal aortic aneurysm greater than 39 mm in diameter (CMS-HCC) [I71.4] - outpatient vas surg follow up.   COPD hx - O2, RT, IS, Vax.  Tobacco dep - cessation ed.  Nicoderm.  Anxiety - change PRN Ativan to PO.  Insomnia - outpatient follow up.  HTN - labile.  Follow c anxiety and pain management.  Other - increase activity.  Stable issues - med hx (eosinophilic colitis, spinal stenosis, DLD/PAD, HTN), preventives, ?SBO.  Dispo - complex/guarded.      Labs reviewed and Medications reviewed  Resendiz catheter: No Resendiz      DVT Prophylaxis: Heparin        Assessed for rehab: Patient unable to tolerate rehabilitation therapeutic regimen

## 2017-02-20 NOTE — PROGRESS NOTES
"Surgical Progress Note:      Passing gas  Small hard BM  Pain improved    PE:  /83 mmHg  Pulse 115  Temp(Src) 36.9 °C (98.4 °F) (Temporal)  Resp 16  Ht 1.549 m (5' 1\")  Wt 43 kg (94 lb 12.8 oz)  BMI 17.92 kg/m2  SpO2 94%  LMP 03/01/1997  Breastfeeding? No    I/O:   Intake/Output Summary (Last 24 hours) at 02/19/17 1718  Last data filed at 02/19/17 1600   Gross per 24 hour   Intake   1420 ml   Output    920 ml   Net    500 ml     UOP:  PO:  IV:    GEN: NAd  COR: RRR  PULM: CTA  ABD: soft, NT. Mild distention, improved      Labs:  Recent Labs      02/17/17   0515  02/18/17   0041   WBC  10.7  10.6   RBC  5.64*  5.11   HEMOGLOBIN  16.7*  15.2   HEMATOCRIT  50.2*  46.0   MCV  89.0  90.0   MCH  29.6  29.7   RDW  43.4  44.5   PLATELETCT  256  237   MPV  9.9  10.1   NEUTSPOLYS  70.90  68.60   LYMPHOCYTES  21.00*  21.50*   MONOCYTES  4.70  5.70   EOSINOPHILS  2.40  3.30   BASOPHILS  0.70  0.60     Recent Labs      02/17/17   0515  02/18/17   0041   SODIUM  135  141   POTASSIUM  3.6  5.2   CHLORIDE  105  112   CO2  27  25   GLUCOSE  98  107*   BUN  18  15         A/P:   SBO vs ileus vs constipation  AXR reviewed, no obstruction, some increased stool burden  Agree with enema  Ok to advance to clear liquid diet  Please call for questions     Abran Worthington M.D.  [facility]  [phone]      "

## 2017-02-20 NOTE — CARE PLAN
Problem: Bowel/Gastric:  Goal: Will not experience complications related to bowel motility  Intervention: Implement interventions to promote bowel evacuation if inadequate bowel movements in past 48 hours  Enema administered.  Patient become hot and uncomfortable.  Then up to restroom, small amount of flatus and one small formed brown stool.  Patient expressed relief afterwards.  No further BM.  No nausea.  Going slowly on some clear liquids.   Intervention: Implement Bowel Protocol, if applicable  Enema administered.

## 2017-02-20 NOTE — PROGRESS NOTES
AO x 4, hyperactive BS, no BM over night.  Suppository given, now drinking mag citrate.  No n/v, about 50% of clear liquid diet.  Denies need for pain med for abdominal cramping.  Plan of care dicussed, questions answered, verbalized understanding.

## 2017-02-20 NOTE — PROGRESS NOTES
Up to restroom, + diarrhea stool, medium.  Continues to c/o mild cramping, declines medication.  Ambulated in hallway, steady

## 2017-02-20 NOTE — DIETARY
"Nutrition Services    Pt seen per Low BMI < 19 (17.9)    Pt is a 72 yo female with adm dx: SBO and UTI   Past Medical History   Diagnosis Date   • Atherosclerosis of arteries of the extremities      two stents in the groin, Dr. Pickett   • Hypertension    • Angina      with stress test   • Arthritis      thumbs   • Peripheral vascular disease (CMS-HCC)      9 months, may relate to PVD   • Unspecified hemorrhagic conditions (CMS-HCC)      asa/plavix, bruises easily   • Dyslipidemia    • COPD (chronic obstructive pulmonary disease) (CMS-HCC)    • Diverticulosis of colon    • Spinal stenosis of lumbar region      Dr. Navarro   • Abdominal aortic aneurysm (CMS-HCC) 11/2010     3.6 cm 8/2014   • Carotid atherosclerosis      Dr. Pickett   • Diverticulitis      Dx 11/25/11   • Family history of nonmelanoma skin cancer    • PVD (posterior vitreous detachment), left eye 1/13/2015   • Eosinophilic colitis      Spoke w/pt at bedside, she appears very thin with clavicle wasting present and slightly sunken occipital lobes. She reports her appetite is \"ok\" and PTA she was eating per her norm. She denies having any issues chewing or swallowing. Discussed snacks and supplements, pt states she consumes 1 boost per day at home. Offered her to try boost breeze, she agreed and preference obtained.     Current diet: Clear Liquid diet - per ADLs she consumed 50-75% of breakfast this morning   Wt: (2/17) 43 kg, stand up scale. Obtaining a wt hx was very confusing as it was very difficult to obtain straight answers from the pt. She first reports she lost approx 6# over the past month as her current wt is 94# but then stated her usual wt is 50 kg and was last that wt about 1 month ago.  Pt was unable to clarify as her UBW is more than 6# from her current wt.  With information obtained from the pt, she is noted with a significant wt loss of 14% x 1 month.    BMI: 17.9  Pertinent labs: reviewed   Pertinent meds: Coreg, Heparin, Zofran (prn) "   Fluids: IVF NS at 50 mL/hr cont   Skin: No skin breakdown noted at this time    GI: per ADLs - last BMI 2/19, pt reports having difficulty with BMs, denies having any abd pain at this time     Recommendations:   · Advance diet as pt is able   · Encourage po intake  · Obtain supplement per RD order - will send orange boost breeze BID between meals   · Record percentage of meals, snacks and supplements in ADLs to help monitor po adequacy   · Monitor wt and lab trends     RD will con't to monitor

## 2017-02-21 VITALS
BODY MASS INDEX: 17.9 KG/M2 | TEMPERATURE: 98.4 F | WEIGHT: 94.8 LBS | HEIGHT: 61 IN | RESPIRATION RATE: 17 BRPM | OXYGEN SATURATION: 92 % | HEART RATE: 85 BPM | SYSTOLIC BLOOD PRESSURE: 162 MMHG | DIASTOLIC BLOOD PRESSURE: 82 MMHG

## 2017-02-21 PROCEDURE — 700105 HCHG RX REV CODE 258: Performed by: HOSPITALIST

## 2017-02-21 PROCEDURE — 700102 HCHG RX REV CODE 250 W/ 637 OVERRIDE(OP): Performed by: HOSPITALIST

## 2017-02-21 PROCEDURE — A9270 NON-COVERED ITEM OR SERVICE: HCPCS | Performed by: HOSPITALIST

## 2017-02-21 PROCEDURE — 99239 HOSP IP/OBS DSCHRG MGMT >30: CPT | Performed by: INTERNAL MEDICINE

## 2017-02-21 PROCEDURE — 700111 HCHG RX REV CODE 636 W/ 250 OVERRIDE (IP): Performed by: HOSPITALIST

## 2017-02-21 RX ORDER — CARVEDILOL 6.25 MG/1
6.25 TABLET ORAL 2 TIMES DAILY WITH MEALS
Qty: 60 TAB | Refills: 0 | Status: SHIPPED | OUTPATIENT
Start: 2017-02-21 | End: 2017-03-13 | Stop reason: SDUPTHER

## 2017-02-21 RX ORDER — ONDANSETRON 4 MG/1
4 TABLET, ORALLY DISINTEGRATING ORAL EVERY 4 HOURS PRN
Qty: 14 TAB | Refills: 0 | Status: SHIPPED | OUTPATIENT
Start: 2017-02-21 | End: 2017-03-28

## 2017-02-21 RX ORDER — NICOTINE 21 MG/24HR
1 PATCH, TRANSDERMAL 24 HOURS TRANSDERMAL EVERY 24 HOURS
Qty: 30 PATCH | Refills: 2 | Status: SHIPPED | OUTPATIENT
Start: 2017-02-21 | End: 2017-03-28

## 2017-02-21 RX ADMIN — CARVEDILOL 6.25 MG: 6.25 TABLET, FILM COATED ORAL at 09:26

## 2017-02-21 RX ADMIN — SODIUM CHLORIDE: 9 INJECTION, SOLUTION INTRAVENOUS at 05:55

## 2017-02-21 RX ADMIN — FAMOTIDINE 20 MG: 10 INJECTION INTRAVENOUS at 09:26

## 2017-02-21 RX ADMIN — NICOTINE 21 MG: 21 PATCH TRANSDERMAL at 05:52

## 2017-02-21 ASSESSMENT — PAIN SCALES - GENERAL
PAINLEVEL_OUTOF10: 0

## 2017-02-21 NOTE — CARE PLAN
Problem: Nutritional:  Goal: Achieve adequate nutritional intake  Patient will consume >50% of meals and snacks   Outcome: PROGRESSING SLOWER THAN EXPECTED

## 2017-02-21 NOTE — DISCHARGE INSTRUCTIONS
Discharge Instructions    Discharged to home by car with relative. Discharged via wheelchair, hospital escort: Yes.  Special equipment needed: Not Applicable    Be sure to schedule a follow-up appointment with your primary care doctor or any specialists as instructed.     Discharge Plan:   Smoking Cessation Offered: Patient Counseled  Influenza Vaccine Indication: Not indicated: Previously immunized this influenza season and > 8 years of age    I understand that a diet low in cholesterol, fat, and sodium is recommended for good health. Unless I have been given specific instructions below for another diet, I accept this instruction as my diet prescription.   Other diet: regular    Special Instructions: None    · Is patient discharged on Warfarin / Coumadin?   No     · Is patient Post Blood Transfusion?  No    Depression / Suicide Risk    As you are discharged from this [facility] facility, it is important to learn how to keep safe from harming yourself.    Recognize the warning signs:  · Abrupt changes in personality, positive or negative- including increase in energy   · Giving away possessions  · Change in eating patterns- significant weight changes-  positive or negative  · Change in sleeping patterns- unable to sleep or sleeping all the time   · Unwillingness or inability to communicate  · Depression  · Unusual sadness, discouragement and loneliness  · Talk of wanting to die  · Neglect of personal appearance   · Rebelliousness- reckless behavior  · Withdrawal from people/activities they love  · Confusion- inability to concentrate     If you or a loved one observes any of these behaviors or has concerns about self-harm, here's what you can do:  · Talk about it- your feelings and reasons for harming yourself  · Remove any means that you might use to hurt yourself (examples: pills, rope, extension cords, firearm)  · Get professional help from the community (Mental Health, Substance Abuse, psychological  counseling)  · Do not be alone:Call your Safe Contact- someone whom you trust who will be there for you.  · Call your local CRISIS HOTLINE 940-1004 or 849-889-5544  · Call your local Children's Mobile Crisis Response Team [place] (530) 630-1892 or www.PriceArea  · Call the toll free National Suicide Prevention Hotlines   · National Suicide Prevention Lifeline 273-800-ORFA (3280)  · Cross[employer] Line Network 800-SUICIDE (984-7066)    Small Bowel Obstruction  A small bowel obstruction is a blockage (obstruction) of the small intestine (small bowel). The small bowel is a long, slender tube that connects the stomach to the colon. Its job is to absorb nutrients from the fluids and foods you consume into the bloodstream.   CAUSES   There are many causes of intestinal blockage. The most common ones include:  · Hernias. This is a more common cause in children than adults.  · Inflammatory bowel disease (enteritis and colitis).  · Twisting of the bowel (volvulus).  · Tumors.  · Scar tissue (adhesions) from previous surgery or radiation treatment.  · Recent surgery. This may cause an acute small bowel obstruction called an ileus.  SYMPTOMS   · Abdominal pain. This may be dull cramps or sharp pain. It may occur in one area or may be present in the entire abdomen. Pain can range from mild to severe, depending on the degree of obstruction.  · Nausea and vomiting. Vomit may be greenish or yellow bile color.  · Distended or swollen stomach. Abdominal bloating is a common symptom.  · Constipation.  · Lack of passing gas.  · Frequent belching.  · Diarrhea. This may occur if runny stool is able to leak around the obstruction.  DIAGNOSIS   Your caregiver can usually diagnose small bowel obstruction by taking a history, doing a physical exam, and taking X-rays. If the cause is unclear, a CT scan (computerized tomography) of your abdomen and pelvis may be needed.  TREATMENT   Treatment of the blockage depends on the cause  and how bad the problem is.   · Sometimes, the obstruction improves with bed rest and intravenous (IV) fluids.  · Resting the bowel is very important. This means following a simple diet. Sometimes, a clear liquid diet may be required for several days.  · Sometimes, a small tube (nasogastric tube) is placed into the stomach to decompress the bowel. When the bowel is blocked, it usually swells up like a balloon filled with air and fluids. Decompression means that the air and fluids are removed by suction through that tube. This can help with pain, discomfort, and nausea. It can also help the obstruction resolve faster.  · Surgery may be required if other treatments do not work. Bowel obstruction from a hernia may require early surgery and can be an emergency procedure. Adhesions that cause frequent or severe obstructions may also require surgery.  HOME CARE INSTRUCTIONS  If your bowel obstruction is only partial or incomplete, you may be allowed to go home.  · Get plenty of rest.  · Follow your diet as directed by your caregiver.  · Only consume clear liquids until your condition improves.  · Avoid solid foods as instructed.  SEEK IMMEDIATE MEDICAL CARE IF:  · You have increased pain or cramping.  · You vomit blood.  · You have uncontrolled vomiting or nausea.  · You cannot drink fluids due to vomiting or pain.  · You develop confusion.  · You begin feeling very dry or thirsty (dehydrated).  · You have severe bloating.  · You have chills.  · You have a fever.  · You feel extremely weak or you faint.  MAKE SURE YOU:  · Understand these instructions.  · Will watch your condition.  · Will get help right away if you are not doing well or get worse.     This information is not intended to replace advice given to you by your health care provider. Make sure you discuss any questions you have with your health care provider.     Document Released: 03/06/2007 Document Revised: 03/11/2013 Document Reviewed: 02/11/2016  [employer]  Interactive Patient Education ©2016 [employer]    Food Choices for Gastroesophageal Reflux Disease, Adult  When you have gastroesophageal reflux disease (GERD), the foods you eat and your eating habits are very important. Choosing the right foods can help ease the discomfort of GERD.  WHAT GENERAL GUIDELINES DO I NEED TO FOLLOW?  · Choose fruits, vegetables, whole grains, low-fat dairy products, and low-fat meat, fish, and poultry.  · Limit fats such as oils, salad dressings, butter, nuts, and avocado.  · Keep a food diary to identify foods that cause symptoms.  · Avoid foods that cause reflux. These may be different for different people.  · Eat frequent small meals instead of three large meals each day.  · Eat your meals slowly, in a relaxed setting.  · Limit fried foods.  · Cook foods using methods other than frying.  · Avoid drinking alcohol.  · Avoid drinking large amounts of liquids with your meals.  · Avoid bending over or lying down until 2-3 hours after eating.  WHAT FOODS ARE NOT RECOMMENDED?  The following are some foods and drinks that may worsen your symptoms:  Vegetables  Tomatoes. Tomato juice. Tomato and spaghetti sauce. Chili peppers. Onion and garlic. Horseradish.  Fruits  Oranges, grapefruit, and lemon (fruit and juice).  Meats  High-fat meats, fish, and poultry. This includes hot dogs, ribs, ham, sausage, salami, and christian.  Dairy  Whole milk and chocolate milk. Sour cream. Cream. Butter. Ice cream. Cream cheese.   Beverages  Coffee and tea, with or without caffeine. Carbonated beverages or energy drinks.  Condiments  Hot sauce. Barbecue sauce.   Sweets/Desserts  Chocolate and cocoa. Donuts. Peppermint and spearmint.  Fats and Oils  High-fat foods, including French fries and potato chips.  Other  Vinegar. Strong spices, such as black pepper, white pepper, red pepper, cayenne, quick powder, cloves, ginger, and chili powder.  The items listed above may not be a complete list of foods and  beverages to avoid. Contact your dietitian for more information.     This information is not intended to replace advice given to you by your health care provider. Make sure you discuss any questions you have with your health care provider.     Document Released: 12/18/2006 Document Revised: 01/08/2016 Document Reviewed: 10/22/2014  Elsevier Interactive Patient Education ©2016 [employer]

## 2017-02-21 NOTE — PROGRESS NOTES
Pt discharged home. IV out, all belongings with patient.     DC instructions including prescriptions provided to patient at bedside. Pt able to teach back DC instructions including medication regimen, follow up information, and s/s to report. Pt taken down to lobby in wheelchair by staff member for transport home in private vehicle.

## 2017-02-21 NOTE — CARE PLAN
Problem: Bowel/Gastric:  Goal: Normal bowel function is maintained or improved  Multiple times up to restroom for loose BM's.  Brown/green in color.

## 2017-02-21 NOTE — DISCHARGE SUMMARY
DISCHARGE SUMMARY     ADMIT DATE:  2/17/2017         DISCHARGE DATE:  2/21/17    PATIENT ID:  Name: Karen Jauregui   YOB: 1945  Age: 71 y.o. female   MRN: [id number]  Address: [address] [place] [address].  [place] [address]  Phone: [phone] (home)    DISCHARGE DIAGNOSIS:  Active Hospital Problems    Diagnosis   • Ileus (CMS-[facility]) [K56.7]resolved   • Insomnia disorder [G47.00]   • History of COPD [Z87.09]on RA   • SBO (small bowel obstruction) (CMS-[facility]) [K56.69]   • UTI (urinary tract infection) [N39.0]Gardernella vaginalis on urine cultures   • Abdominal aortic aneurysm greater than 39 mm in diameter (CMS-[facility]) [I71.4]follow op with pcp   • Tobacco dependence [F17.200]counseled cessation     CONDITION:Stable    DISPOSITION:Home    DIET: GI soft bland diet and advance as tolerated.    ACTIVITY: As tolerated     HPI/HOSPITAL COURSE:  Please refer to HPI dictated by  on 2/17/17 for complete details. In brief patient is a 71 F with hx of abdominal surgeries presented with abdominal pain, found to have partial small bowel obstruction. Patient started having abdominal pain about 1 week ago.General surgery Dr.Alex Worthington was consulted. Pt was treated conservatively with NG decompression, then she was started on clears and advanced to soft diet. She had many BMs yesterday and tolerated diet. She feels better, anxious to go home. She is ambulatory, walking independently, on RA. I have counseled her tobacco cessation.    RADIOLOGY:  [id number] VIEW   Final Result      1.  Moderate amount of stool in the colon which may represent mild constipation.      2.  No evidence of small bowel obstruction.      DX-ABDOMEN FOR TUBE PLACEMENT   Final Result      Enteric tube tip projects over the mid stomach      DX-ABDOMEN FOR TUBE PLACEMENT   Final Result      NG tube terminates over the gastric fundus laterally. The proximal port of the NG tube is near the GE junction. Advancement is recommended.       [id number] VIEW   Final Result      1.  Resolving partial small bowel obstruction. No residual small bowel dilatation. Increased gas within the colon.      2.  Recommend NG tube advancement.      DX-ABDOMEN FOR TUBE PLACEMENT   Final Result      Enteric tube has been advanced and the tip projects over the stomach.      DX-ABDOMEN FOR TUBE PLACEMENT   Final Result      1.  NG tube projects over the T12 vertebral body and is probably in the distal esophagus. This needs to be advanced several centimeters into the stomach.      CT-ABDOMEN-PELVIS WITH   Final Result         1. Findings in keeping with mechanical small bowel obstruction, likely partial given bowel gas is seen distally.      2. The transition point is in the mid abdomen with some twisting of the mesentery      3. Unchanged infrarenal abdominal aortic aneurysm, measuring up to 4.8 cm.        DISCHARGE LABS:    No results for input(s): WBC, RBC, HEMOGLOBIN, HEMATOCRIT, MCV, MCH, RDW, PLATELETCT, MPV, NEUTSPOLYS, LYMPHOCYTES, MONOCYTES, EOSINOPHILS, BASOPHILS, RBCMORPHOLO in the last 72 hours.  No results found for: [id number], FOLATE, FERRITIN, IRON, TOTIRONBC    Estimated GFR/CRCL = CrCl cannot be calculated (Unknown ideal weight.).  No results for input(s): SODIUM, POTASSIUM, CHLORIDE, CO2, BUN, CREATININE, CALCIUM, MAGNESIUM, PHOSPHORUS, ALBUMIN in the last 72 hours.    No results for input(s): ALTSGPT, ASTSGOT, ALKPHOSPHAT, TBILIRUBIN, DBILIRUBIN, GAMMAGT, LIPASE, ALBUMIN, GLOBULIN, PREALBUMIN, INR, MACROCYTOSIS in the last 72 hours.    @PNLABRCNT(GLUCOSE:3,POCGLUCOSE:3)  )  Lab Results   Component Value Date    FREET4 0.89 12/09/2016    FREET4 1.01 11/16/2011       DISCHARGE MEDICATIONS:  (X)  Medication Reconciliation Completed   Karen Jauregui   [facility] Medication Instructions SHANTANU:[id number]    Printed on:02/21/17 [id number]   Medication Information                      aspirin 81 MG tablet  Take 81 mg by mouth every evening.             atorvastatin  (LIPITOR) 80 MG tablet  Take 80 mg by mouth every day.             carvedilol (COREG) 6.25 MG Tab  Take 1 Tab by mouth 2 times a day, with meals.             clopidogrel (PLAVIX) 75 MG Tab  Take 75 mg by mouth every evening.             hydrocodone-acetaminophen (NORCO) 5-325 MG Tab per tablet  Take 1 Tab by mouth every 6 hours as needed (back and neck pain). Seen in office 10/4/16, renewal of chronic medication, used episodically             lisinopril (PRINIVIL) 10 MG Tab  Take 10 mg by mouth every evening.             nicotine (NICODERM) 21 MG/24HR PATCH 24 HR  Apply 1 Patch to skin as directed every 24 hours.             ondansetron (ZOFRAN ODT) 4 MG TABLET DISPERSIBLE  Take 1 Tab by mouth every four hours as needed for Nausea/Vomiting (give PO if IV route is unavailable. May give per feeding tube.).                 INSTRUCTIONS:   The patient was instructed to return to the ER in the event of worsening symptoms. I have counseled the patient on the importance of compliance and the patient has agreed to proceed with all medical recommendations and follow up plan indicated above.   The patient understands that all medications come with benefits and risks. Risks may include permanent injury or death and these risks can be minimized with close reassessment and monitoring.        Follow up appointment details :     PCP-Dr.Kiser Garcia in 1 week    Primary Care Provider:   Dr. Sumanth Garcia    Time spent on discharge day patient visit, preparing discharge paperwork and arranging for patient follow up 35 mins.

## 2017-02-21 NOTE — DISCHARGE PLANNING
Medical Social Work    Referral: Discharge planning     Intervention: 72 y/o female admitted on 02/17/17 for a SBO. Pt is on RA and ambulates with a steady gait with no assistance needed.     Per chart, pt lives locally with her family in a 1-story apartment. She is being followed by Dr. Julio for primary care needs and insurance coverage is showing as Medicare and . Anticipate no d/c planning needs.     Plan: Will continue to follow for d/c planning needs.

## 2017-02-21 NOTE — PROGRESS NOTES
Patient report received from day shift RN, patient resting comfortably in bed. Patient AxO x4, VSS, denies need for pain medication at this time. Patient tolerating clear liquids, denies n/v. +void, +BS, had had multiple loose stools today. All needs met at this time, hourly rounding in place.     Patient refuses a bed alarm despite education, patient calls appropriately.

## 2017-03-06 ENCOUNTER — OFFICE VISIT (OUTPATIENT)
Dept: MEDICAL GROUP | Facility: CLINIC | Age: 72
End: 2017-03-06
Payer: MEDICARE

## 2017-03-06 VITALS
TEMPERATURE: 99 F | BODY MASS INDEX: 18.2 KG/M2 | HEART RATE: 112 BPM | RESPIRATION RATE: 16 BRPM | DIASTOLIC BLOOD PRESSURE: 68 MMHG | OXYGEN SATURATION: 97 % | SYSTOLIC BLOOD PRESSURE: 128 MMHG | HEIGHT: 60 IN | WEIGHT: 92.7 LBS

## 2017-03-06 DIAGNOSIS — K56.609 SBO (SMALL BOWEL OBSTRUCTION) (HCC): ICD-10-CM

## 2017-03-06 DIAGNOSIS — Z09 HOSPITAL DISCHARGE FOLLOW-UP: ICD-10-CM

## 2017-03-06 DIAGNOSIS — R19.7 DIARRHEA, UNSPECIFIED TYPE: ICD-10-CM

## 2017-03-06 DIAGNOSIS — Z87.19 HISTORY OF DIVERTICULITIS OF COLON: ICD-10-CM

## 2017-03-06 DIAGNOSIS — K56.7 ILEUS (HCC): ICD-10-CM

## 2017-03-06 PROCEDURE — G8598 ASA/ANTIPLAT THER USED: HCPCS | Performed by: NURSE PRACTITIONER

## 2017-03-06 PROCEDURE — 4004F PT TOBACCO SCREEN RCVD TLK: CPT | Performed by: NURSE PRACTITIONER

## 2017-03-06 PROCEDURE — 99214 OFFICE O/P EST MOD 30 MIN: CPT | Performed by: NURSE PRACTITIONER

## 2017-03-06 PROCEDURE — 1101F PT FALLS ASSESS-DOCD LE1/YR: CPT | Performed by: NURSE PRACTITIONER

## 2017-03-06 PROCEDURE — G8432 DEP SCR NOT DOC, RNG: HCPCS | Performed by: NURSE PRACTITIONER

## 2017-03-06 PROCEDURE — 3017F COLORECTAL CA SCREEN DOC REV: CPT | Performed by: NURSE PRACTITIONER

## 2017-03-06 PROCEDURE — 4040F PNEUMOC VAC/ADMIN/RCVD: CPT | Performed by: NURSE PRACTITIONER

## 2017-03-06 PROCEDURE — 3014F SCREEN MAMMO DOC REV: CPT | Mod: 8P | Performed by: NURSE PRACTITIONER

## 2017-03-06 PROCEDURE — G8482 FLU IMMUNIZE ORDER/ADMIN: HCPCS | Performed by: NURSE PRACTITIONER

## 2017-03-06 PROCEDURE — G8419 CALC BMI OUT NRM PARAM NOF/U: HCPCS | Performed by: NURSE PRACTITIONER

## 2017-03-07 NOTE — PROGRESS NOTES
CC: Hospital Follow-up        HPI:     Karen is a patient of Dr. Gross, all problems are new to me today presents today for the followin. Hospital discharge follow-up/ History of diverticulitis of colon/SBO (small bowel obstruction) (CMS-[facility])/ Ileus (CMS-[facility])  Patient is here status post hospital discharge for small bowel obstruction and ileus she was there for -. She was nothing by mouth for the first several nights. With an NG tube. She did not need surgical intervention. She does have a history of colon surgery to history of diverticulitis. She got in the hospital with severe abdominal pain and bloating with a history of no bowel movements. She did not have associated nausea or vomiting.  At this point she still does not have nausea or vomiting. She has having bowel movements about every 2 or 3 days. Normally she has bowel movements every other day. She tells me at this point she is eating less because she has less appetite. She gets very hungry however she is not able to eat much at a time. She's lost about 7 pounds since she was last seen in January which is significant for her already low body weight.   She is eating things like chicken soup and essentially normal diet etc. She states the worst thing she ate was on one occasion she had a cheeseburger. She seemed to tolerate this fine.      5. Diarrhea, unspecified type  Patient states since discharge she's had a couple episodes where she'll have abdominal cramping and discomfort followed by a few episodes of diarrhea. Nothing is black or bloody. It self resolving. She's been unable to associate it with certain types of food    Current Outpatient Prescriptions   Medication Sig Dispense Refill   • carvedilol (COREG) 6.25 MG Tab Take 1 Tab by mouth 2 times a day, with meals. 60 Tab 0   • nicotine (NICODERM) 21 MG/24HR PATCH 24 HR Apply 1 Patch to skin as directed every 24 hours. 30 Patch 2   • ondansetron (ZOFRAN ODT) 4 MG TABLET DISPERSIBLE  Take 1 Tab by mouth every four hours as needed for Nausea/Vomiting (give PO if IV route is unavailable. May give per feeding tube.). 14 Tab 0   • clopidogrel (PLAVIX) 75 MG Tab Take 75 mg by mouth every evening.     • lisinopril (PRINIVIL) 10 MG Tab Take 10 mg by mouth every evening.     • hydrocodone-acetaminophen (NORCO) 5-325 MG Tab per tablet Take 1 Tab by mouth every 6 hours as needed (back and neck pain). Seen in office 10/4/16, renewal of chronic medication, used episodically 90 Tab 0   • atorvastatin (LIPITOR) 80 MG tablet Take 80 mg by mouth every day.     • aspirin 81 MG tablet Take 81 mg by mouth every evening.       No current facility-administered medications for this visit.     Social History   Substance Use Topics   • Smoking status: Current Every Day Smoker -- 1.00 packs/day for 45 years     Types: Cigarettes   • Smokeless tobacco: Never Used      Comment: on nicotine patches, smokes off and on   • Alcohol Use: No     I reviewed patients allergies, problem list and medications today in [facility].    ROS: Any/all pertinent positives listed in the HPI, otherwise all others reviewed are negative today.      /68 mmHg  Pulse 112  Temp(Src) 37.2 °C (99 °F)  Resp 16  Ht 1.524 m (5')  Wt 42.048 kg (92 lb 11.2 oz)  BMI 18.10 kg/m2  SpO2 97%  LMP 03/01/1997  Breastfeeding? No    Exam:    Gen: Alert and oriented, No apparent distress. WDWN, thin  Psych: A+Ox3, normal affect and mood  Skin: Warm, dry and intact. Good turgor   No rashes in visible areas.  Eye: Conjunctiva clear, lids normal  ENMT: Lips without lesions, good dentition   Oropharynx clear.   Neck: No Lymphadenopathy, Thyromegaly, Bruits.   Trachea midline, no masses  Lungs: Clear to auscultation bilaterally, no rales or rhonchi   Unlabored respiratory effort.   CV: Regular rate and rhythm, S1, S2. No murmurs.   No Edema  Abd: Soft non tender, non distended. Normal active bowel sounds.    No Hepatosplenomegaly, No pulsatile masses.           Assessment and Plan.   71 y.o. female with the following issues.    1. Hospital discharge follow-up/History of diverticulitis of colon/SBO (small bowel obstruction) (CMS-HCC)/ Ileus (CMS-HCC)  Stable this point. She is to follow-up with GI. She does have a scheduled provider and she will make an appointment with that person. She understands and referral was placed. She is stable and she is having bowel movements. She will proceed with a normal diet as she has been doing. She will avoid heavy or greasy foods. Small regular meals. We did complete her FMLA paperwork. She will have an additional 2 weeks off and she has lost a lot of weight and is still having some fatigue so that she is able to get her energy back.  She is having difficulty completing some of her ADLs at home due to lack of energy. She does live with her son and he does help her with this  - REFERRAL TO GASTROENTEROLOGY    5. Diarrhea, unspecified type  Stable. Rule out C. difficile given she was in the hospital. Patient will  the supplies to complete at home  - CDIFF BY PCR; Future

## 2017-03-13 ENCOUNTER — OFFICE VISIT (OUTPATIENT)
Dept: MEDICAL GROUP | Facility: CLINIC | Age: 72
End: 2017-03-13
Payer: MEDICARE

## 2017-03-13 VITALS
TEMPERATURE: 97.9 F | HEIGHT: 61 IN | DIASTOLIC BLOOD PRESSURE: 68 MMHG | OXYGEN SATURATION: 96 % | BODY MASS INDEX: 17.79 KG/M2 | HEART RATE: 68 BPM | RESPIRATION RATE: 18 BRPM | WEIGHT: 94.2 LBS | SYSTOLIC BLOOD PRESSURE: 124 MMHG

## 2017-03-13 DIAGNOSIS — I71.40 ABDOMINAL AORTIC ANEURYSM GREATER THAN 39 MM IN DIAMETER (HCC): ICD-10-CM

## 2017-03-13 DIAGNOSIS — I10 ESSENTIAL HYPERTENSION: ICD-10-CM

## 2017-03-13 DIAGNOSIS — K56.7 ILEUS (HCC): ICD-10-CM

## 2017-03-13 DIAGNOSIS — K56.609 SBO (SMALL BOWEL OBSTRUCTION) (HCC): ICD-10-CM

## 2017-03-13 DIAGNOSIS — R19.7 DIARRHEA, UNSPECIFIED TYPE: ICD-10-CM

## 2017-03-13 DIAGNOSIS — R10.84 GENERALIZED ABDOMINAL PAIN: ICD-10-CM

## 2017-03-13 PROCEDURE — 1111F DSCHRG MED/CURRENT MED MERGE: CPT | Performed by: NURSE PRACTITIONER

## 2017-03-13 PROCEDURE — G8419 CALC BMI OUT NRM PARAM NOF/U: HCPCS | Performed by: NURSE PRACTITIONER

## 2017-03-13 PROCEDURE — G8482 FLU IMMUNIZE ORDER/ADMIN: HCPCS | Performed by: NURSE PRACTITIONER

## 2017-03-13 PROCEDURE — G8432 DEP SCR NOT DOC, RNG: HCPCS | Performed by: NURSE PRACTITIONER

## 2017-03-13 PROCEDURE — 3017F COLORECTAL CA SCREEN DOC REV: CPT | Performed by: NURSE PRACTITIONER

## 2017-03-13 PROCEDURE — 4040F PNEUMOC VAC/ADMIN/RCVD: CPT | Performed by: NURSE PRACTITIONER

## 2017-03-13 PROCEDURE — 99214 OFFICE O/P EST MOD 30 MIN: CPT | Performed by: NURSE PRACTITIONER

## 2017-03-13 PROCEDURE — 1101F PT FALLS ASSESS-DOCD LE1/YR: CPT | Performed by: NURSE PRACTITIONER

## 2017-03-13 PROCEDURE — 3014F SCREEN MAMMO DOC REV: CPT | Mod: 8P | Performed by: NURSE PRACTITIONER

## 2017-03-13 PROCEDURE — G8598 ASA/ANTIPLAT THER USED: HCPCS | Performed by: NURSE PRACTITIONER

## 2017-03-13 PROCEDURE — 4004F PT TOBACCO SCREEN RCVD TLK: CPT | Performed by: NURSE PRACTITIONER

## 2017-03-13 RX ORDER — CARVEDILOL 6.25 MG/1
6.25 TABLET ORAL 2 TIMES DAILY WITH MEALS
Qty: 60 TAB | Refills: 5 | Status: SHIPPED | OUTPATIENT
Start: 2017-03-13 | End: 2017-03-16 | Stop reason: SDUPTHER

## 2017-03-13 NOTE — MR AVS SNAPSHOT
"        Karen Enriquez Juventino   3/13/2017 1:40 PM   Office Visit   MRN: [id number]    Department:  [facility]   Dept Phone:  [phone]    Description:  Female : 1945   Provider:  PRASHANT España           Reason for Visit     Letter for School/Work Work letter       Allergies as of 3/13/2017     Allergen Noted Reactions    Amoxicillin 2012   Diarrhea    Rxn = years ago         Ciprofloxacin 2013   Diarrhea    Rxn = years ago         You were diagnosed with     Abdominal aortic aneurysm greater than 39 mm in diameter (CMS-[facility])   [[id number]]       Essential hypertension   [4321936]       Ileus (CMS-[facility])   [[id number]]       SBO (small bowel obstruction) (CMS-[facility])   [[id number]]         Vital Signs     Blood Pressure Pulse Temperature Respirations Height Weight    124/68 mmHg 68 36.6 °C (97.9 °F) 18 1.549 m (5' 0.98\") 42.729 kg (94 lb 3.2 oz)    Body Mass Index Oxygen Saturation Last Menstrual Period Breastfeeding? Smoking Status       17.81 kg/m2 96% 1997 No Current Every Day Smoker       Basic Information     Date Of Birth Sex Race Ethnicity Preferred Language    1945 Female White Non- English      Problem List              ICD-10-CM Priority Class Noted - Resolved    Essential hypertension I10   Unknown - Present    Dyslipidemia E78.5   Unknown - Present    Tobacco dependence F17.200   Unknown - Present    Spinal stenosis of lumbar region M48.06   Unknown - Present    Arteriosclerosis of arteries of extremities (CMS-[facility]) I70.209   Unknown - Present    Abdominal aortic aneurysm greater than 39 mm in diameter (CMS-[facility]) I71.4   Unknown - Present    Peripheral vascular disease (CMS-[facility]) I73.9   Unknown - Present    Carotid atherosclerosis I65.29   6/15/2011 - Present    Herniated nucleus pulposus, L5-S1, left M51.27   2011 - Present    History of diverticulitis of colon Z87.19 High  2011 - Present    Subclavian artery stenosis, left (CMS-[facility]) I77.1   3/27/2012 - " Present    Constipation    5/26/2012 - Present    COPD (chronic obstructive pulmonary disease) (CMS-HCC) J44.9   Unknown - Present    Family history of nonmelanoma skin cancer Z80.8   Unknown - Present    Eosinophilic colitis K52.82   7/21/2015 - Present    Chronic use of opiate for therapeutic purpose Z79.899   7/7/2016 - Present    SBO (small bowel obstruction) (CMS-HCC) K56.69   2/17/2017 - Present    UTI (urinary tract infection) N39.0   2/17/2017 - Present    Ileus (CMS-HCC) K56.7   2/20/2017 - Present    Insomnia disorder G47.00   2/20/2017 - Present    History of COPD Z87.09   2/20/2017 - Present      Health Maintenance        Date Due Completion Dates    IMM DTaP/Tdap/Td Vaccine (1 - Tdap) 5/17/1965 ---    IMM ZOSTER VACCINE 5/17/2006 ---    BONE DENSITY 11/30/2017 11/30/2012 (Postponed), 3/22/2011, 7/24/2008, 11/7/2005    Override on 11/30/2012: Postponed    COLONOSCOPY 3/9/2025 3/9/2015, 3/6/2015, 7/2/2013            Current Immunizations     13-VALENT PCV PREVNAR 12/8/2016    Influenza TIV (IM) 10/28/2012, 10/27/2011    Influenza Vaccine Adult HD 10/4/2016, 12/23/2015    Influenza Vaccine Quad Inj (Pf) 10/14/2014    Pneumococcal Vaccine (UF)Historical Data 9/30/2006    Pneumococcal polysaccharide vaccine (PPSV-23) 11/30/2012      Below and/or attached are the medications your provider expects you to take. Review all of your home medications and newly ordered medications with your provider and/or pharmacist. Follow medication instructions as directed by your provider and/or pharmacist. Please keep your medication list with you and share with your provider. Update the information when medications are discontinued, doses are changed, or new medications (including over-the-counter products) are added; and carry medication information at all times in the event of emergency situations     Allergies:  AMOXICILLIN - Diarrhea     CIPROFLOXACIN - Diarrhea               Medications  Valid as of: March 13, 2017 -   2:05 PM    Generic Name Brand Name Tablet Size Instructions for use    Aspirin (Tab) aspirin 81 MG Take 81 mg by mouth every evening.        Atorvastatin Calcium (Tab) LIPITOR 80 MG Take 80 mg by mouth every day.        Carvedilol (Tab) COREG 6.25 MG Take 1 Tab by mouth 2 times a day, with meals.        Clopidogrel Bisulfate (Tab) PLAVIX 75 MG Take 75 mg by mouth every evening.        Hydrocodone-Acetaminophen (Tab) NORCO 5-325 MG Take 1 Tab by mouth every 6 hours as needed (back and neck pain). Seen in office 10/4/16, renewal of chronic medication, used episodically        Lisinopril (Tab) PRINIVIL 10 MG Take 10 mg by mouth every evening.        Nicotine (PATCH 24 HR) NICODERM 21 MG/24HR Apply 1 Patch to skin as directed every 24 hours.        Ondansetron (TABLET DISPERSIBLE) ZOFRAN ODT 4 MG Take 1 Tab by mouth every four hours as needed for Nausea/Vomiting (give PO if IV route is unavailable. May give per feeding tube.).        .                 Medicines prescribed today were sent to:     [facility] #[id number] - [place] - [address]    [address] [place] [address]    Phone: [phone] Fax: [phone]    Open 24 Hours?: No      Medication refill instructions:       If your prescription bottle indicates you have medication refills left, it is not necessary to call your provider’s office. Please contact your pharmacy and they will refill your medication.    If your prescription bottle indicates you do not have any refills left, you may request refills at any time through one of the following ways: The online [facility] system (except Urgent Care), by calling your provider’s office, or by asking your pharmacy to contact your provider’s office with a refill request. Medication refills are processed only during regular business hours and may not be available until the next business day. Your provider may request additional information or to have a follow-up visit with you prior to refilling your medication.      *Please Note: Medication refills are assigned a new Rx number when refilled electronically. Your pharmacy may indicate that no refills were authorized even though a new prescription for the same medication is available at the pharmacy. Please request the medicine by name with the pharmacy before contacting your provider for a refill.           "[employer]hart Access Code: Activation code not generated  Current [employer] Status: Active

## 2017-03-13 NOTE — PROGRESS NOTES
CC: Letter for School/Work        HPI:     Karen is a patient of Dr. Gross presents today for the followin. Diarrhea, unspecified type/Generalized abdominal pain/ SBO (small bowel obstruction) (CMS-HCC)/ Ileus (CMS-HCC)   Here following up from point last week. She does have an appointment with GI today for hospital follow-up for small bowel obstruction and ileus. She is having regular stools. She has not had diarrhea in a week. She does feel her appetite is increasing each day. She has gained 2 pounds from her appointment last week. She does daily have more and more energy.  She is here today for her return to work paperwork to be completed.      5. Abdominal aortic aneurysm greater than 39 mm in diameter (CMS-HCC)  Following up with her general vascular surgeon for a planned surgery to repair a almost 5 cm AAA. She is followed closely with their office to schedule a surgery which is anticipated to take place this month    6. Essential hypertension  She is doing well on the carvedilol. She is taking twice a day without any adverse reactions    Current Outpatient Prescriptions   Medication Sig Dispense Refill   • carvedilol (COREG) 6.25 MG Tab Take 1 Tab by mouth 2 times a day, with meals. 60 Tab 5   • nicotine (NICODERM) 21 MG/24HR PATCH 24 HR Apply 1 Patch to skin as directed every 24 hours. 30 Patch 2   • ondansetron (ZOFRAN ODT) 4 MG TABLET DISPERSIBLE Take 1 Tab by mouth every four hours as needed for Nausea/Vomiting (give PO if IV route is unavailable. May give per feeding tube.). 14 Tab 0   • clopidogrel (PLAVIX) 75 MG Tab Take 75 mg by mouth every evening.     • lisinopril (PRINIVIL) 10 MG Tab Take 10 mg by mouth every evening.     • hydrocodone-acetaminophen (NORCO) 5-325 MG Tab per tablet Take 1 Tab by mouth every 6 hours as needed (back and neck pain). Seen in office 10/4/16, renewal of chronic medication, used episodically 90 Tab 0   • atorvastatin (LIPITOR) 80 MG tablet Take 80 mg by mouth  "every day.     • aspirin 81 MG tablet Take 81 mg by mouth every evening.       No current facility-administered medications for this visit.     Social History   Substance Use Topics   • Smoking status: Current Every Day Smoker -- 1.00 packs/day for 45 years     Types: Cigarettes   • Smokeless tobacco: Never Used      Comment: on nicotine patches, smokes off and on   • Alcohol Use: No     I reviewed patients allergies, problem list and medications today in [facility].    ROS: Any/all pertinent positives listed in the HPI, otherwise all others reviewed are negative today.      /68 mmHg  Pulse 68  Temp(Src) 36.6 °C (97.9 °F)  Resp 18  Ht 1.549 m (5' 0.98\")  Wt 42.729 kg (94 lb 3.2 oz)  BMI 17.81 kg/m2  SpO2 96%  LMP 03/01/1997  Breastfeeding? No    Exam:   Gen: Alert and oriented, No apparent distress. WDWN  Psych: A+Ox3, normal affect and mood  Skin: Warm, dry and intact. Good turgor   No rashes in visible areas.  Eye: Conjunctiva clear, lids normal  ENMT: Lips without lesions, good dentition  Lungs: Clear to auscultation bilaterally, no rales or rhonchi   Unlabored respiratory effort.   CV: Regular rate and rhythm, S1, S2. No murmurs.   No Edema        Assessment and Plan.   71 y.o. female with the following issues.    1. Diarrhea, unspecified type/ Generalized abdominal pain  Resolved at this point     3. SBO (small bowel obstruction) (CMS-HCC)/ Ileus (CMS-HCC)  Stable. Has an upcoming appointment with gastroenterology    5. Abdominal aortic aneurysm greater than 39 mm in diameter (CMS-HCC)  Stable. She will follow up with her vascular surgeon. She talks with her office routinely in terms of scheduling her upcoming surgery. She'll continue on the carvedilol. Her return to work paperwork does exclude her from doing pushing and pulling and bending area  - carvedilol (COREG) 6.25 MG Tab; Take 1 Tab by mouth 2 times a day, with meals.  Dispense: 60 Tab; Refill: 5    6. Essential hypertension  Stable. Continue " medication, has refills  - carvedilol (COREG) 6.25 MG Tab; Take 1 Tab by mouth 2 times a day, with meals.  Dispense: 60 Tab; Refill: 5

## 2017-03-15 ENCOUNTER — PATIENT MESSAGE (OUTPATIENT)
Dept: MEDICAL GROUP | Facility: CLINIC | Age: 72
End: 2017-03-15

## 2017-03-15 DIAGNOSIS — I10 ESSENTIAL HYPERTENSION: ICD-10-CM

## 2017-03-15 DIAGNOSIS — I71.40 ABDOMINAL AORTIC ANEURYSM GREATER THAN 39 MM IN DIAMETER (HCC): ICD-10-CM

## 2017-03-16 RX ORDER — CARVEDILOL 6.25 MG/1
6.25 TABLET ORAL 2 TIMES DAILY WITH MEALS
Qty: 60 TAB | Refills: 5 | Status: SHIPPED | OUTPATIENT
Start: 2017-03-16 | End: 2018-04-19

## 2017-03-16 NOTE — TELEPHONE ENCOUNTER
From: Karen Jauregui  To: PRASHANT España  Sent: 3/15/2017 4:35 PM PDT  Subject: Prescription Question    I was in 3/13 and was suppose to get a perscription for carvedilol 6.25 mg. I checked with [facility], [place], and they had not received the perscription. Can this be filled? The hospital dr that gave me this medicine only gave me 30 day supply.    Thank you  Karen Jauregui

## 2017-03-21 ENCOUNTER — PATIENT MESSAGE (OUTPATIENT)
Dept: MEDICAL GROUP | Facility: CLINIC | Age: 72
End: 2017-03-21

## 2017-03-22 ENCOUNTER — PATIENT MESSAGE (OUTPATIENT)
Dept: MEDICAL GROUP | Facility: CLINIC | Age: 72
End: 2017-03-22

## 2017-03-22 NOTE — TELEPHONE ENCOUNTER
From: Karen Jauregui  To: Radha Julio M.D.  Sent: 3/22/2017 7:21 AM PDT  Subject: RE:what prescription are you referring to?    I requested a refill for hydrocodone. I came in and picked up a prescription.. [facility] would not accept the prescription, cause it was for a 90 day tab. They are only filling 30 day supply. Every time I go in there, its always something different, I'm just about ready to change pharmacies. Their not being straight with me, regarding this medication. She said the prescription was good for 14 day, and would not put it on file but then wouldn't fill it.  Thank you  Karen Jauregui   ----- Message -----  From: Radha Julio M.D.  Sent: 3/21/2017 5:45 PM PDT  To: Karen Jauregui  Subject: what prescription are you referring to?    None of your prescriptions are 90 day supply

## 2017-03-28 ENCOUNTER — HOSPITAL ENCOUNTER (OUTPATIENT)
Dept: RADIOLOGY | Facility: MEDICAL CENTER | Age: 72
DRG: 269 | End: 2017-03-28
Attending: SURGERY | Admitting: SURGERY
Payer: MEDICARE

## 2017-03-28 DIAGNOSIS — Z01.812 PRE-OPERATIVE LABORATORY EXAMINATION: ICD-10-CM

## 2017-03-28 DIAGNOSIS — Z01.811 PRE-OPERATIVE RESPIRATORY EXAMINATION: ICD-10-CM

## 2017-03-28 LAB
ANION GAP SERPL CALC-SCNC: 3 MMOL/L (ref 0–11.9)
BASOPHILS # BLD AUTO: 1.5 % (ref 0–1.8)
BASOPHILS # BLD: 0.1 K/UL (ref 0–0.12)
BUN SERPL-MCNC: 13 MG/DL (ref 8–22)
CALCIUM SERPL-MCNC: 9.7 MG/DL (ref 8.5–10.5)
CHLORIDE SERPL-SCNC: 104 MMOL/L (ref 96–112)
CO2 SERPL-SCNC: 31 MMOL/L (ref 20–33)
CREAT SERPL-MCNC: 0.63 MG/DL (ref 0.5–1.4)
EOSINOPHIL # BLD AUTO: 0.24 K/UL (ref 0–0.51)
EOSINOPHIL NFR BLD: 3.6 % (ref 0–6.9)
ERYTHROCYTE [DISTWIDTH] IN BLOOD BY AUTOMATED COUNT: 46.9 FL (ref 35.9–50)
GFR SERPL CREATININE-BSD FRML MDRD: >60 ML/MIN/1.73 M 2
GLUCOSE SERPL-MCNC: 92 MG/DL (ref 65–99)
HCT VFR BLD AUTO: 43 % (ref 37–47)
HGB BLD-MCNC: 14.4 G/DL (ref 12–16)
IMM GRANULOCYTES # BLD AUTO: 0.02 K/UL (ref 0–0.11)
IMM GRANULOCYTES NFR BLD AUTO: 0.3 % (ref 0–0.9)
LYMPHOCYTES # BLD AUTO: 2.63 K/UL (ref 1–4.8)
LYMPHOCYTES NFR BLD: 39.1 % (ref 22–41)
MCH RBC QN AUTO: 29.7 PG (ref 27–33)
MCHC RBC AUTO-ENTMCNC: 33.5 G/DL (ref 33.6–35)
MCV RBC AUTO: 88.7 FL (ref 81.4–97.8)
MONOCYTES # BLD AUTO: 0.5 K/UL (ref 0–0.85)
MONOCYTES NFR BLD AUTO: 7.4 % (ref 0–13.4)
NEUTROPHILS # BLD AUTO: 3.24 K/UL (ref 2–7.15)
NEUTROPHILS NFR BLD: 48.1 % (ref 44–72)
NRBC # BLD AUTO: 0 K/UL
NRBC BLD AUTO-RTO: 0 /100 WBC
PLATELET # BLD AUTO: 221 K/UL (ref 164–446)
PMV BLD AUTO: 10.1 FL (ref 9–12.9)
POTASSIUM SERPL-SCNC: 3.6 MMOL/L (ref 3.6–5.5)
RBC # BLD AUTO: 4.85 M/UL (ref 4.2–5.4)
SODIUM SERPL-SCNC: 138 MMOL/L (ref 135–145)
WBC # BLD AUTO: 6.7 K/UL (ref 4.8–10.8)

## 2017-03-28 PROCEDURE — 71020 DX-CHEST-2 VIEWS: CPT

## 2017-03-28 PROCEDURE — 85025 COMPLETE CBC W/AUTO DIFF WBC: CPT

## 2017-03-28 PROCEDURE — 80048 BASIC METABOLIC PNL TOTAL CA: CPT

## 2017-03-28 PROCEDURE — 36415 COLL VENOUS BLD VENIPUNCTURE: CPT

## 2017-03-31 ENCOUNTER — HOSPITAL ENCOUNTER (INPATIENT)
Facility: MEDICAL CENTER | Age: 72
LOS: 4 days | DRG: 269 | End: 2017-04-04
Attending: SURGERY | Admitting: SURGERY
Payer: MEDICARE

## 2017-03-31 ENCOUNTER — APPOINTMENT (OUTPATIENT)
Dept: RADIOLOGY | Facility: MEDICAL CENTER | Age: 72
DRG: 269 | End: 2017-03-31
Attending: SURGERY
Payer: MEDICARE

## 2017-03-31 DIAGNOSIS — I70.209 ARTERIOSCLEROSIS OF ARTERIES OF EXTREMITIES (HCC): ICD-10-CM

## 2017-03-31 PROBLEM — I71.40 ABDOMINAL AORTIC ANEURYSM (HCC): Status: ACTIVE | Noted: 2017-03-31

## 2017-03-31 PROCEDURE — 04LC3DZ OCCLUSION OF RIGHT COMMON ILIAC ARTERY WITH INTRALUMINAL DEVICE, PERCUTANEOUS APPROACH: ICD-10-PCS | Performed by: SURGERY

## 2017-03-31 PROCEDURE — 700101 HCHG RX REV CODE 250

## 2017-03-31 PROCEDURE — 700102 HCHG RX REV CODE 250 W/ 637 OVERRIDE(OP): Performed by: SURGERY

## 2017-03-31 PROCEDURE — 500002 HCHG ADHESIVE, DERMABOND: Performed by: SURGERY

## 2017-03-31 PROCEDURE — 502000 HCHG MISC OR IMPLANTS RC 0278: Performed by: SURGERY

## 2017-03-31 PROCEDURE — 500700 HCHG HEMOCLIP, SMALL (RED): Performed by: SURGERY

## 2017-03-31 PROCEDURE — 500445 HCHG HEMOSTAT, SURGICEL 4X8: Performed by: SURGERY

## 2017-03-31 PROCEDURE — 501422 HCHG SPONGE, SURGIFOAM 100: Performed by: SURGERY

## 2017-03-31 PROCEDURE — C1751 CATH, INF, PER/CENT/MIDLINE: HCPCS | Performed by: SURGERY

## 2017-03-31 PROCEDURE — C1894 INTRO/SHEATH, NON-LASER: HCPCS | Performed by: SURGERY

## 2017-03-31 PROCEDURE — 700111 HCHG RX REV CODE 636 W/ 250 OVERRIDE (IP): Performed by: SURGERY

## 2017-03-31 PROCEDURE — 110371 HCHG SHELL REV 272: Performed by: SURGERY

## 2017-03-31 PROCEDURE — 04V03EZ RESTRICTION OF ABDOMINAL AORTA WITH BRANCHED OR FENESTRATED INTRALUMINAL DEVICE, ONE OR TWO ARTERIES, PERCUTANEOUS APPROACH: ICD-10-PCS | Performed by: SURGERY

## 2017-03-31 PROCEDURE — 501499 HCHG SURG-I-LOOP, MINI (BLUE): Performed by: SURGERY

## 2017-03-31 PROCEDURE — 160009 HCHG ANES TIME/MIN: Performed by: SURGERY

## 2017-03-31 PROCEDURE — C1769 GUIDE WIRE: HCPCS | Performed by: SURGERY

## 2017-03-31 PROCEDURE — 700111 HCHG RX REV CODE 636 W/ 250 OVERRIDE (IP)

## 2017-03-31 PROCEDURE — 04CL0ZZ EXTIRPATION OF MATTER FROM LEFT FEMORAL ARTERY, OPEN APPROACH: ICD-10-PCS | Performed by: SURGERY

## 2017-03-31 PROCEDURE — C1876 STENT, NON-COA/NON-COV W/DEL: HCPCS | Performed by: SURGERY

## 2017-03-31 PROCEDURE — 110382 HCHG SHELL REV 271: Performed by: SURGERY

## 2017-03-31 PROCEDURE — 160035 HCHG PACU - 1ST 60 MINS PHASE I: Performed by: SURGERY

## 2017-03-31 PROCEDURE — 501445 HCHG STAPLER, SKIN DISP: Performed by: SURGERY

## 2017-03-31 PROCEDURE — 500043 HCHG BAG-A-JET: Performed by: SURGERY

## 2017-03-31 PROCEDURE — 500896 HCHG PACK, VASCULAR (FOR ROOM 10): Performed by: SURGERY

## 2017-03-31 PROCEDURE — 500698 HCHG HEMOCLIP, MEDIUM: Performed by: SURGERY

## 2017-03-31 PROCEDURE — 500258 HCHG CATH, SIZING OMNI 5F 70CM: Performed by: SURGERY

## 2017-03-31 PROCEDURE — 160002 HCHG RECOVERY MINUTES (STAT): Performed by: SURGERY

## 2017-03-31 PROCEDURE — 041L0JH BYPASS LEFT FEMORAL ARTERY TO RIGHT FEMORAL ARTERY WITH SYNTHETIC SUBSTITUTE, OPEN APPROACH: ICD-10-PCS | Performed by: SURGERY

## 2017-03-31 PROCEDURE — 500869 HCHG NEEDLE, THINWALL 19GA (COOK): Performed by: SURGERY

## 2017-03-31 PROCEDURE — 770006 HCHG ROOM/CARE - MED/SURG/GYN SEMI*

## 2017-03-31 PROCEDURE — 501837 HCHG SUTURE CV: Performed by: SURGERY

## 2017-03-31 PROCEDURE — 501498 HCHG SURG-I-LOOP, MAXI (BLUE): Performed by: SURGERY

## 2017-03-31 PROCEDURE — 160029 HCHG SURGERY MINUTES - 1ST 30 MINS LEVEL 4: Performed by: SURGERY

## 2017-03-31 PROCEDURE — 501500 HCHG SURG-I-LOOP, SUPER MAXI (BLUE): Performed by: SURGERY

## 2017-03-31 PROCEDURE — A4606 OXYGEN PROBE USED W OXIMETER: HCPCS | Performed by: SURGERY

## 2017-03-31 PROCEDURE — 502626 HCHG SURGIFLO HEMOSTATIC MATRIX 6ML: Performed by: SURGERY

## 2017-03-31 PROCEDURE — B41G1ZZ FLUOROSCOPY OF LEFT LOWER EXTREMITY ARTERIES USING LOW OSMOLAR CONTRAST: ICD-10-PCS | Performed by: SURGERY

## 2017-03-31 PROCEDURE — 160041 HCHG SURGERY MINUTES - EA ADDL 1 MIN LEVEL 4: Performed by: SURGERY

## 2017-03-31 PROCEDURE — 502594 HCHG SCISSOR HANDLE, HARMONIC ACE: Performed by: SURGERY

## 2017-03-31 PROCEDURE — 501838 HCHG SUTURE GENERAL: Performed by: SURGERY

## 2017-03-31 PROCEDURE — B4101ZZ FLUOROSCOPY OF ABDOMINAL AORTA USING LOW OSMOLAR CONTRAST: ICD-10-PCS | Performed by: SURGERY

## 2017-03-31 PROCEDURE — 502240 HCHG MISC OR SUPPLY RC 0272: Performed by: SURGERY

## 2017-03-31 PROCEDURE — 160048 HCHG OR STATISTICAL LEVEL 1-5: Performed by: SURGERY

## 2017-03-31 PROCEDURE — 160036 HCHG PACU - EA ADDL 30 MINS PHASE I: Performed by: SURGERY

## 2017-03-31 PROCEDURE — A9270 NON-COVERED ITEM OR SERVICE: HCPCS | Performed by: SURGERY

## 2017-03-31 PROCEDURE — L8670 VASCULAR GRAFT, SYNTHETIC: HCPCS | Performed by: SURGERY

## 2017-03-31 DEVICE — IMPLANTABLE DEVICE: Type: IMPLANTABLE DEVICE | Status: FUNCTIONAL

## 2017-03-31 DEVICE — GRAFT GORE PROPATEN 6MMX80CM: Type: IMPLANTABLE DEVICE | Status: FUNCTIONAL

## 2017-03-31 RX ORDER — ATORVASTATIN CALCIUM 40 MG/1
80 TABLET, FILM COATED ORAL
Status: DISCONTINUED | OUTPATIENT
Start: 2017-03-31 | End: 2017-04-04 | Stop reason: HOSPADM

## 2017-03-31 RX ORDER — ONDANSETRON 2 MG/ML
4 INJECTION INTRAMUSCULAR; INTRAVENOUS EVERY 4 HOURS PRN
Status: DISCONTINUED | OUTPATIENT
Start: 2017-03-31 | End: 2017-04-04 | Stop reason: HOSPADM

## 2017-03-31 RX ORDER — HYDROCODONE BITARTRATE AND ACETAMINOPHEN 5; 325 MG/1; MG/1
1-2 TABLET ORAL EVERY 6 HOURS PRN
Status: DISCONTINUED | OUTPATIENT
Start: 2017-03-31 | End: 2017-04-01

## 2017-03-31 RX ORDER — HYDRALAZINE HYDROCHLORIDE 20 MG/ML
5 INJECTION INTRAMUSCULAR; INTRAVENOUS EVERY 4 HOURS PRN
Status: DISCONTINUED | OUTPATIENT
Start: 2017-03-31 | End: 2017-04-04 | Stop reason: HOSPADM

## 2017-03-31 RX ORDER — CLOPIDOGREL BISULFATE 75 MG/1
75 TABLET ORAL EVERY EVENING
Status: DISCONTINUED | OUTPATIENT
Start: 2017-03-31 | End: 2017-04-04 | Stop reason: HOSPADM

## 2017-03-31 RX ORDER — LISINOPRIL 10 MG/1
10 TABLET ORAL EVERY EVENING
Status: DISCONTINUED | OUTPATIENT
Start: 2017-03-31 | End: 2017-04-02

## 2017-03-31 RX ORDER — SODIUM CHLORIDE, SODIUM LACTATE, POTASSIUM CHLORIDE, CALCIUM CHLORIDE 600; 310; 30; 20 MG/100ML; MG/100ML; MG/100ML; MG/100ML
1000 INJECTION, SOLUTION INTRAVENOUS
Status: COMPLETED | OUTPATIENT
Start: 2017-03-31 | End: 2017-03-31

## 2017-03-31 RX ORDER — IODIXANOL 270 MG/ML
INJECTION, SOLUTION INTRAVASCULAR
Status: DISCONTINUED | OUTPATIENT
Start: 2017-03-31 | End: 2017-03-31 | Stop reason: HOSPADM

## 2017-03-31 RX ORDER — ASPIRIN 81 MG/1
81 TABLET, CHEWABLE ORAL EVERY EVENING
Status: DISCONTINUED | OUTPATIENT
Start: 2017-03-31 | End: 2017-04-04 | Stop reason: HOSPADM

## 2017-03-31 RX ORDER — BUPIVACAINE HYDROCHLORIDE AND EPINEPHRINE 5; 5 MG/ML; UG/ML
INJECTION, SOLUTION EPIDURAL; INTRACAUDAL; PERINEURAL
Status: DISCONTINUED | OUTPATIENT
Start: 2017-03-31 | End: 2017-03-31 | Stop reason: HOSPADM

## 2017-03-31 RX ORDER — MORPHINE SULFATE 4 MG/ML
1-2 INJECTION, SOLUTION INTRAMUSCULAR; INTRAVENOUS
Status: DISCONTINUED | OUTPATIENT
Start: 2017-03-31 | End: 2017-04-04 | Stop reason: HOSPADM

## 2017-03-31 RX ORDER — DEXTROSE, SODIUM CHLORIDE, SODIUM LACTATE, POTASSIUM CHLORIDE, AND CALCIUM CHLORIDE 5; .6; .31; .03; .02 G/100ML; G/100ML; G/100ML; G/100ML; G/100ML
INJECTION, SOLUTION INTRAVENOUS CONTINUOUS
Status: DISCONTINUED | OUTPATIENT
Start: 2017-03-31 | End: 2017-04-02

## 2017-03-31 RX ORDER — CARVEDILOL 6.25 MG/1
6.25 TABLET ORAL 2 TIMES DAILY WITH MEALS
Status: DISCONTINUED | OUTPATIENT
Start: 2017-03-31 | End: 2017-04-02

## 2017-03-31 RX ADMIN — LISINOPRIL 10 MG: 10 TABLET ORAL at 20:48

## 2017-03-31 RX ADMIN — CLOPIDOGREL 75 MG: 75 TABLET, FILM COATED ORAL at 20:48

## 2017-03-31 RX ADMIN — SODIUM CHLORIDE, SODIUM LACTATE, POTASSIUM CHLORIDE, CALCIUM CHLORIDE 1000 ML: 600; 310; 30; 20 INJECTION, SOLUTION INTRAVENOUS at 12:46

## 2017-03-31 RX ADMIN — FENTANYL CITRATE 25 MCG: 50 INJECTION, SOLUTION INTRAMUSCULAR; INTRAVENOUS at 19:54

## 2017-03-31 RX ADMIN — FENTANYL CITRATE 25 MCG: 50 INJECTION, SOLUTION INTRAMUSCULAR; INTRAVENOUS at 19:43

## 2017-03-31 RX ADMIN — HYDROCODONE BITARTRATE AND ACETAMINOPHEN 1 TABLET: 5; 325 TABLET ORAL at 20:48

## 2017-03-31 RX ADMIN — CARVEDILOL 6.25 MG: 6.25 TABLET, FILM COATED ORAL at 20:48

## 2017-03-31 RX ADMIN — ASPIRIN 81 MG: 81 TABLET, CHEWABLE ORAL at 20:48

## 2017-03-31 RX ADMIN — SODIUM CHLORIDE, SODIUM LACTATE, POTASSIUM CHLORIDE, CALCIUM CHLORIDE AND DEXTROSE MONOHYDRATE: 5; 600; 310; 30; 20 INJECTION, SOLUTION INTRAVENOUS at 20:49

## 2017-03-31 RX ADMIN — MORPHINE SULFATE 2 MG: 4 INJECTION INTRAVENOUS at 23:11

## 2017-03-31 RX ADMIN — CEFAZOLIN SODIUM 1 G: 1 INJECTION, SOLUTION INTRAVENOUS at 21:04

## 2017-03-31 RX ADMIN — ATORVASTATIN CALCIUM 80 MG: 40 TABLET, FILM COATED ORAL at 20:48

## 2017-03-31 ASSESSMENT — PAIN SCALES - GENERAL
PAINLEVEL_OUTOF10: 6
PAINLEVEL_OUTOF10: 0
PAINLEVEL_OUTOF10: 4
PAINLEVEL_OUTOF10: 0
PAINLEVEL_OUTOF10: 0
PAINLEVEL_OUTOF10: 6
PAINLEVEL_OUTOF10: 0

## 2017-03-31 ASSESSMENT — LIFESTYLE VARIABLES
EVER_SMOKED: YES
ALCOHOL_USE: NO

## 2017-03-31 NOTE — IP AVS SNAPSHOT
" <p align=\"LEFT\"><IMG SRC=\"//[email or web address]$/[email or web address]\" alt=\"Image\" WIDTH=\"50%\" HEIGHT=\"200\" BORDER=\"\"></p>                   Name:Karne Jauregui  Medical Record Number:[id number]  CSN: [id number]    YOB: 1945   Age: 71 y.o.  Sex: female  HT:1.549 m (5' 1\") WT: 42.3 kg (93 lb 4.1 oz)          Admit Date: 3/31/2017     Discharge Date:   Today's Date: 4/4/2017  Attending Doctor:  Spring Pemberton M.D.                  Allergies:  Amoxicillin and Ciprofloxacin          Follow-up Information     1. Follow up with Spring Pemberton M.D. On 4/7/2017.    Specialty:  Surgery    Contact information    [address] [facility] [id number]  [address]  [facility] [address] [phone]           Medication List      Take these Medications        Instructions    aspirin 81 MG tablet    Take 81 mg by mouth every evening. Continue to take up to surgery per Dr. Pemberton order. Last dose 3/30 pm   Dose:  81 mg       atorvastatin 80 MG tablet   Commonly known as:  LIPITOR    Take 80 mg by mouth every day.   Dose:  80 mg       carvedilol 6.25 MG Tabs   Commonly known as:  COREG    Take 1 Tab by mouth 2 times a day, with meals.   Dose:  6.25 mg       clopidogrel 75 MG Tabs   Commonly known as:  PLAVIX    Take 75 mg by mouth every evening. Stop 7 days before surgery per Dr. Pemberton order   Dose:  75 mg       hydrocodone-acetaminophen 5-325 MG Tabs per tablet   Commonly known as:  NORCO    Take 1 Tab by mouth every 6 hours as needed (back and neck pain). Seen in office 3 /13/17 .renewal of chronic medication, used episodically   Dose:  1 Tab       sennosides-docusate sodium 8.6-50 MG tablet   Commonly known as:  SENOKOT-S    Doctor's comments:  Hold for diarrhea.   Take 1 Tab by mouth 2 times a day.   Dose:  1 Tab         "

## 2017-03-31 NOTE — IP AVS SNAPSHOT
4/4/2017          Karen Jauregui  [address]  [place] [address]    Dear Karen:    [facility] wants to ensure your discharge home is safe and you or your loved ones have had all your questions answered regarding your care after you leave the hospital.    You may receive a telephone call within two days of your discharge.  This call is to make certain you understand your discharge instructions as well as ensure we provided you with the best care possible during your stay with us.     The call will only last approximately 3-5 minutes and will be done by a nurse.    Once again, we want to ensure your discharge home is safe and that you have a clear understanding of any next steps in your care.  If you have any questions or concerns, please do not hesitate to contact us, we are here for you.  Thank you for choosing [facility] for your healthcare needs.    Sincerely,    Bobby Mcdaniel    [facility]

## 2017-03-31 NOTE — IP AVS SNAPSHOT
[employer] Access Code: Activation code not generated  Current [employer] Status: Active    [employer]  A secure, online tool to manage your health information     [employer]’s [employer]® is a secure, online tool that connects you to your personalized health information from the privacy of your home -- day or night - making it very easy for you to manage your healthcare. Once the activation process is completed, you can even access your medical information using the [employer] evelia, which is available for free in the [employer] E[employer]ia store or Google Play store.     [employer] provides the following levels of access (as shown below):   My Chart Features   [facility] Primary Care Doctor [facility]  Specialists [facility]  Urgent  Care Non-[facility]  Primary Care  Doctor   Email your healthcare team securely and privately 24/7 X X X X   Manage appointments: schedule your next appointment; view details of past/upcoming appointments X      Request prescription refills. X      View recent personal medical records, including lab and immunizations X X X X   View health record, including health history, allergies, medications X X X X   Read reports about your outpatient visits, procedures, consult and ER notes X X X X   See your discharge summary, which is a recap of your hospital and/or ER visit that includes your diagnosis, lab results, and care plan. X X       How to register for [employer]:  1. Go to  [email or web address].  2. Click on the Sign Up Now box, which takes you to the New Member Sign Up page. You will need to provide the following information:  a. Enter your [employer] Access Code exactly as it appears at the top of this page. (You will not need to use this code after you’ve completed the sign-up process. If you do not sign up before the expiration date, you must request a new code.)   b. Enter your date of birth.   c. Enter your home email address.   d. Click Submit, and follow the next screen’s instructions.  3. Create a [employer] ID. This will  be your [employer] login ID and cannot be changed, so think of one that is secure and easy to remember.  4. Create a [employer] password. You can change your password at any time.  5. Enter your Password Reset Question and Answer. This can be used at a later time if you forget your password.   6. Enter your e-mail address. This allows you to receive e-mail notifications when new information is available in [employer].  7. Click Sign Up. You can now view your health information.    For assistance activating your [employer] account, call [phone]

## 2017-03-31 NOTE — IP AVS SNAPSHOT
" Home Care Instructions                                                                                                                  Name:Karen Jauregui  Medical Record Number:[id number]  CSN: [id number]    YOB: 1945   Age: 71 y.o.  Sex: female  HT:1.549 m (5' 1\") WT: 42.3 kg (93 lb 4.1 oz)          Admit Date: 3/31/2017     Discharge Date:   Today's Date: 4/4/2017  Attending Doctor:  Spring Pemberton M.D.                  Allergies:  Amoxicillin and Ciprofloxacin            Discharge Instructions       Discharge Instructions    Discharged to home by car with relative. Discharged via wheelchair, hospital escort: Yes.  Special equipment needed: Walker    Be sure to schedule a follow-up appointment with your primary care doctor or any specialists as instructed.     Discharge Plan:   Diet Plan: Discussed  Activity Level: Discussed  Smoking Cessation Offered: Patient Refused  Confirmed Follow up Appointment: Patient to Call and Schedule Appointment  Confirmed Symptoms Management: Discussed  Medication Reconciliation Updated: Yes  Influenza Vaccine Indication: Not indicated: Previously immunized this influenza season and > 8 years of age (September 2016)    I understand that a diet low in cholesterol, fat, and sodium is recommended for good health. Unless I have been given specific instructions below for another diet, I accept this instruction as my diet prescription.   Other diet: Regular as tolerated    Special Instructions:   1) Act: As tolerated except no lifting more than 10lbs.   2) Resume home meds except stop taking Lisinopril. Hold Coreg for SBP < 110.   3) Come to Dr. Pemberton's office this Friday morning to have Prevena dressings removed and to be given follow-up appointment with me in 2 weeks.   4) Stop smoking.    · Is patient discharged on Warfarin / Coumadin?   No     · Is patient Post Blood Transfusion?  No    Depression / Suicide Risk    As you are discharged from this [facility] " facility, it is important to learn how to keep safe from harming yourself.    Recognize the warning signs:  · Abrupt changes in personality, positive or negative- including increase in energy   · Giving away possessions  · Change in eating patterns- significant weight changes-  positive or negative  · Change in sleeping patterns- unable to sleep or sleeping all the time   · Unwillingness or inability to communicate  · Depression  · Unusual sadness, discouragement and loneliness  · Talk of wanting to die  · Neglect of personal appearance   · Rebelliousness- reckless behavior  · Withdrawal from people/activities they love  · Confusion- inability to concentrate     If you or a loved one observes any of these behaviors or has concerns about self-harm, here's what you can do:  · Talk about it- your feelings and reasons for harming yourself  · Remove any means that you might use to hurt yourself (examples: pills, rope, extension cords, firearm)  · Get professional help from the community (Mental Health, Substance Abuse, psychological counseling)  · Do not be alone:Call your Safe Contact- someone whom you trust who will be there for you.  · Call your local CRISIS HOTLINE 259-9052 or 969-356-3179  · Call your local Children's Mobile Crisis Response Team [place] (821) 646-8697 or www.[employer]  · Call the toll free National Suicide Prevention Hotlines   · National Suicide Prevention Lifeline 429-643-XLWQ (6525)  · National Hope Line Network 800-SUICIDE (458-3042)      Abdominal Aortic Aneurysm Endograft Repair  Abdominal aortic aneurysm endograft repair is a procedure to fix an abdominal aortic aneurysm. An aneurysm is a weakened or damaged part of an artery wall that bulges out from the normal force of blood pumping through the body. An abdominal aortic aneurysm is an aneurysm that occurs in the lower part of the aorta, the main artery of the body.   The repair procedure is often done if the aneurysm gets so  large that it might burst (rupture). A ruptured aneurysm would cause bleeding inside the body that is life threatening. Before that happens, this procedure is needed to fix the condition. The procedure may also be done if the aneurysm causes symptoms such as pain in the back, abdomen, or side. In this procedure, a tube made up of fabric supported by a metal mesh (endograft or stent-graft) is placed in the weakened part of the aorta to repair the area. This procedure may take 1-3 hours.   LET YOUR HEALTH CARE PROVIDER KNOW ABOUT:  · Any allergies you have.    · All medicines you are taking, including vitamins, herbs, eye drops, creams, and over-the-counter medicines.  · Any steroids you are using (by mouth or as creams).    · Previous problems you or members of your family have had with the use of anesthetics.    · Any blood disorders or history of blood clots.    · Previous surgeries you have had.    · Other health problems you have.  · Possibility of pregnancy, if this applies.    RISKS AND COMPLICATIONS  Generally, endograft repair of an abdominal aortic aneurysm is a safe procedure. However, as with any surgical procedure, complications can occur. Possible complications include:  · Leaking of blood around the endograft.  · Infection.  · Damage to surrounding nerves, tissues, or structures.    · Displacement of the endograft away from the proper location.    · Blockage of blood flow through the graft.  · Blood clots.    · Kidney problems.  · Blockage of blood flow to the legs (rare).   · Rupture of the aorta even after successful endograft repair (rare).    BEFORE THE PROCEDURE   · You may need to have blood tests, a test to check heart rhythm (electrocardiography), or a test to check blood flow (angiography) done before the day of the procedure.  · Imaging tests will be done to help determine the size and location of the aneurysm. This could include ultrasonography, a CT scan, or an MRI.   · Ask your health care  provider about changing or stopping your regular medicines.  · Do not eat or drink anything for at least 8 hours before the procedure. Ask your health care provider if it is okay to take any needed medicines with a sip of water.  · Do not smoke for as long as possible before the surgery.  · Make plans to have someone drive you home after your hospital stay.  PROCEDURE   · You will be given medicine to help you relax (sedative). You may also be given medicine to make you sleep through the procedure (general anesthetic) or medicine to numb the affected area of the body (local or regional anesthetic). Medicine may be given through an intravenous (IV) access tube that is put into one of your veins.    · The groin area will be washed and shaved.    · Small cuts (incisions) are usually made on both sides of the groin. Long, thin tubes (catheters) are passed through these incisions, inserted into the artery in your thigh, and moved up into the aneurysm in the aorta.    · The health care provider uses live X-ray pictures to guide the endograft through the catheter to the site of the aneurysm.  · The endograft is released to seal off the aneurysm and line the aorta. It prevents blood from flowing into the aneurysm and helps prevent it from rupturing. The placement of the endograft is permanent.  · X-rays are used to check the position of the endograft and confirm proper placement.  · The catheters are taken out, and the incisions are closed with stitches.  AFTER THE PROCEDURE   · You will need to lie flat for several hours. Bending the leg that has the insertion site can cause it to bleed and swell.  · You will then be encouraged to move around several times a day and to gradually increase activity.    · Your blood pressure and pulse (vital signs) will be checked often.  · You will receive medicines to control pain.  · Certain tests may be done to check the function and location of the endograft after your procedure.  · You  will need to stay in the hospital for about 1-4 days.       This information is not intended to replace advice given to you by your health care provider. Make sure you discuss any questions you have with your health care provider.     Document Released: 05/06/2010 Document Revised: 08/20/2014 Document Reviewed: 05/09/2014  [employer] Interactive Patient Education ©2016 [employer]      Vacuum-Assisted Closure Therapy Home Guide  Vacuum-assisted closure therapy (VAC therapy) is a device that helps wounds heal. It is used on wounds that cannot be closed with stitches. They often heal slowly. VAC therapy helps the wound stay clean and healthy while its edges slowly grow back together.  VAC therapy uses a bandage (dressing) that is made of foam. It is put inside the wound. Then, a drape is placed over the wound. This drape sticks to your skin (adhesive) to keep air out. A tube is hooked up to a small pump and is attached to the drape. The pump sucks fluid and germs from the wound. It can also decrease any bad smell that comes from the wound.  RISKS AND COMPLICATIONS  VAC therapy is usually safe to use at home. Your skin may get sore from the adhesive drape. That is the most common problem. However, more serious problems can develop, such as:   · Bleeding. This can happen if the dressing in the wound comes into contact with blood vessels. A little bleeding may occur when the dressing is being changed. This is normal now and then. Major bleeding can happen if a large blood vessel breaks. This is more likely if you are taking blood-thinning medicine. Emergency surgery may be needed.  · Infection. This can happen if the dressing has an air leak that is not repaired within a couple of hours.  · Dehydration. This can happen if the pump sucks out too much body fluid.  DRESSING CHANGES  Your dressing will have to be changed. Sometimes this is needed once a day. Other times, a dressing change must be done 3 times a week. How  often you change your dressing will depend on what your wound is like. A trained caregiver will most likely change the dressing. However, a family member or friend may be trained to change the dressing. Below are steps to change a dressing in order to prevent an infection. The steps apply to you or the person that changes your dressing.  · Wash your hands with soap and water before and after each dressing change.  · Wear gloves and protective clothing. This may include eye protection.  · Do not allow anyone to change your dressing if they have an infection or a skin condition. Even a small cut can be a problem.  To change the dressing:   · Turn off the pump.  · Take off the adhesive drape.  · Disconnect the tube from the dressing.  · Take out the dressing that is inside the wound. If the dressing sticks, use a germ-free (sterile), saltwater solution to wet the dressing. This helps it come out more easily. If it hurts when the dressing is changed, take pain medicine 30 minutes before the dressing change.  · Cleanse the wound with normal saline or sterile water.  · Apply a skin barrier film to the skin that will be covered with the drape. This will protect the skin.  · Put a new dressing into the wound.  · Apply a new drape and tube.  · Replace the container in the pump that collects fluid if it is full. Do this at least once per week.  · Turn the pump back on.  · Your doctor will decide what setting of suction is best. Do not change the settings on the machine without talking to your nurse or doctor.  HOME CARE INSTRUCTIONS   · The VAC pump has an alarm. It goes off if there are any problems such as a leak.  · Ask your caregiver what to do if the alarm goes off.  · Call your caregiver right away if the alarm goes off and you cannot fix the problem.  · Do not turn off the pump for more than 2 hours.  · Check your wound carefully at each dressing change for signs of infection. Watch for redness, swelling, or any fluid  leaking from the wound. If you develop an infection:  · You may have to stop VAC therapy.  · The wound will need to be cleaned and washed out.  · You will have to take antibiotic medicine.  · Ask your caregiver what activities you should or should not do while you are getting VAC therapy. This will depend on your particular wound.  · Ask if it is okay to turn off the pump so you can take a shower. If it is okay, make sure the wound is covered with plastic. The wound area must stay dry.  · Drink enough fluids to keep your urine clear or pale yellow.  · Eat foods that contain a lot of protein. Examples are meat, poultry, seafood, eggs, nuts, beans, and peas. Protein can help your wound heal.  SEEK MEDICAL CARE IF:  · Your wound itches or hurts.  · Dressing changes are often painful or bleeding often occurs.  · You have a headache.  · You have diarrhea.  · You have a sore throat.  · You have a rash.  · You feel nauseous.  · You feel dizzy or weak.  SEEK IMMEDIATE MEDICAL CARE IF:   · You have very bad pain.  · You have bleeding that will not stop.  · Your wound smells bad.  · You have redness, swelling, or fluid leaking from your wound.  · Your alarm goes off and you do not know what to do.  · You have a fever.     This information is not intended to replace advice given to you by your health care provider. Make sure you discuss any questions you have with your health care provider.     Document Released: 03/11/2013 Document Reviewed: 03/11/2013  [employer] Interactive Patient Education ©2016 [employer]      Incentive Spirometer  An incentive spirometer is a tool that can help keep your lungs clear and active. This tool measures how well you are filling your lungs with each breath. Taking long, deep breaths may help reverse or decrease the chance of developing breathing (pulmonary) problems (especially infection) following:  · Surgery of the chest or abdomen.  · Surgery if you have a history of smoking or a lung  problem.  · A long period of time when you are unable to move or be active.  BEFORE THE PROCEDURE   · If the spirometer includes an indicator to show your best effort, your nurse or respiratory therapist will set it to a desired goal.  · If possible, sit up straight or lean slightly forward. Try not to slouch.  · Hold the incentive spirometer in an upright position.  INSTRUCTIONS FOR USE   · Sit on the edge of your bed if possible, or sit up as far as you can in bed or on a chair.  · Hold the incentive spirometer in an upright position.  · Breathe out normally.  · Place the mouthpiece in your mouth and seal your lips tightly around it.  · Breathe in slowly and as deeply as possible, raising the piston or the ball toward the top of the column.  · Hold your breath for 3-5 seconds or for as long as possible. Allow the piston or ball to fall to the bottom of the column.  · Remove the mouthpiece from your mouth and breathe out normally.  · Rest for a few seconds and repeat Steps 1 through 7 at least 10 times every 1-2 hours when you are awake. Take your time and take a few normal breaths between deep breaths.  · The spirometer may include an indicator to show your best effort. Use the indicator as a goal to work toward during each repetition.  · After each set of 10 deep breaths, practice coughing to be sure your lungs are clear. If you have an incision (the cut made at the time of surgery), support your incision when coughing by placing a pillow or rolled-up towels firmly against it.  Once you are able to get out of bed, walk around indoors and cough well. You may stop using the incentive spirometer when instructed by your caregiver.   RISKS AND COMPLICATIONS  · Breathing too quickly may cause dizziness. At an extreme, this could cause you to pass out. Take your time so you do not get dizzy or light-headed.  · If you are in pain, you may need to take or ask for pain medication before doing incentive spirometry. It is  harder to take a deep breath if you are having pain.  AFTER USE  · Rest and breathe slowly and easily.  · It can be helpful to keep a log of your progress. Your caregiver can provide you with a simple table to help with this.  If you are using the spirometer at home, follow these instructions:  SEEK MEDICAL CARE IF:   · You are having difficultly using the spirometer.  · You have trouble using the spirometer as often as instructed.  · Your pain medication is not giving enough relief while using the spirometer.  · You develop fever of 100.5°F (38.1°C) or higher.  SEEK IMMEDIATE MEDICAL CARE IF:   · You cough up bloody sputum that had not been present before.  · You develop fever of 102°F (38.9°C) or greater.  · You develop worsening pain at or near the incision site.  MAKE SURE YOU:   · Understand these instructions.  · Will watch your condition.  · Will get help right away if you are not doing well or get worse.     This information is not intended to replace advice given to you by your health care provider. Make sure you discuss any questions you have with your health care provider.     Document Released: 04/29/2008 Document Revised: 01/08/2016 Document Reviewed: 07/27/2015  [employer] Interactive Patient Education ©2016 [employer]      Fall Prevention in the Home   Falls can cause injuries and can affect people from all age groups. There are many simple things that you can do to make your home safe and to help prevent falls.  WHAT CAN I DO ON THE OUTSIDE OF MY HOME?  · Regularly repair the edges of walkways and driveways and fix any cracks.  · Remove high doorway thresholds.  · Trim any shrubbery on the main path into your home.  · Use bright outdoor lighting.  · Clear walkways of debris and clutter, including tools and rocks.  · Regularly check that handrails are securely fastened and in good repair. Both sides of any steps should have handrails.  · Install guardrails along the edges of any raised decks or  porches.  · Have leaves, snow, and ice cleared regularly.  · Use sand or salt on walkways during winter months.  · In the garage, clean up any spills right away, including grease or oil spills.  WHAT CAN I DO IN THE BATHROOM?  · Use night lights.  · Install grab bars by the toilet and in the tub and shower. Do not use towel bars as grab bars.  · Use non-skid mats or decals on the floor of the tub or shower.  · If you need to sit down while you are in the shower, use a plastic, non-slip stool..  · Keep the floor dry. Immediately clean up any water that spills on the floor.  · Remove soap buildup in the tub or shower on a regular basis.  · Attach bath mats securely with double-sided non-slip rug tape.  · Remove throw rugs and other tripping hazards from the floor.  WHAT CAN I DO IN THE BEDROOM?  · Use night lights.  · Make sure that a bedside light is easy to reach.  · Do not use oversized bedding that drapes onto the floor.  · Have a firm chair that has side arms to use for getting dressed.  · Remove throw rugs and other tripping hazards from the floor.  WHAT CAN I DO IN THE KITCHEN?   · Clean up any spills right away.  · Avoid walking on wet floors.  · Place frequently used items in easy-to-reach places.  · If you need to reach for something above you, use a sturdy step stool that has a grab bar.  · Keep electrical cables out of the way.  · Do not use floor polish or wax that makes floors slippery. If you have to use wax, make sure that it is non-skid floor wax.  · Remove throw rugs and other tripping hazards from the floor.  WHAT CAN I DO IN THE STAIRWAYS?  · Do not leave any items on the stairs.  · Make sure that there are handrails on both sides of the stairs. Fix handrails that are broken or loose. Make sure that handrails are as long as the stairways.  · Check any carpeting to make sure that it is firmly attached to the stairs. Fix any carpet that is loose or worn.  · Avoid having throw rugs at the top or  bottom of stairways, or secure the rugs with carpet tape to prevent them from moving.  · Make sure that you have a light switch at the top of the stairs and the bottom of the stairs. If you do not have them, have them installed.  WHAT ARE SOME OTHER FALL PREVENTION TIPS?  · Wear closed-toe shoes that fit well and support your feet. Wear shoes that have rubber soles or low heels.  · When you use a stepladder, make sure that it is completely opened and that the sides are firmly locked. Have someone hold the ladder while you are using it. Do not climb a closed stepladder.  · Add color or contrast paint or tape to grab bars and handrails in your home. Place contrasting color strips on the first and last steps.  · Use mobility aids as needed, such as canes, walkers, scooters, and crutches.  · Turn on lights if it is dark. Replace any light bulbs that burn out.  · Set up furniture so that there are clear paths. Keep the furniture in the same spot.  · Fix any uneven floor surfaces.  · Choose a carpet design that does not hide the edge of steps of a stairway.  · Be aware of any and all pets.  · Review your medicines with your healthcare provider. Some medicines can cause dizziness or changes in blood pressure, which increase your risk of falling.  Talk with your health care provider about other ways that you can decrease your risk of falls. This may include working with a physical therapist or  to improve your strength, balance, and endurance.     This information is not intended to replace advice given to you by your health care provider. Make sure you discuss any questions you have with your health care provider.     Document Released: 12/08/2003 Document Revised: 05/03/2016 Document Reviewed: 01/22/2016  [employer] Interactive Patient Education ©2016 [employer]          Follow-up Information     1. Follow up with Spring Pemberton M.D. On 4/7/2017.    Specialty:  Surgery    Contact information    [address]  [phone]  [place] [address]  [phone]           Discharge Medication Instructions:    Below are the medications your physician expects you to take upon discharge:    Review all your home medications and newly ordered medications with your doctor and/or pharmacist. Follow medication instructions as directed by your doctor and/or pharmacist.    Please keep your medication list with you and share with your physician.               Medication List      START taking these medications        Instructions    sennosides-docusate sodium 8.6-50 MG tablet   Commonly known as:  SENOKOT-S    Doctor's comments:  Hold for diarrhea.   Take 1 Tab by mouth 2 times a day.   Dose:  1 Tab         CONTINUE taking these medications        Instructions    aspirin 81 MG tablet    Take 81 mg by mouth every evening. Continue to take up to surgery per Dr. Pemberton order. Last dose 3/30 pm   Dose:  81 mg       atorvastatin 80 MG tablet   Last time this was given:  80 mg on 4/3/2017  9:52 PM   Commonly known as:  LIPITOR    Take 80 mg by mouth every day.   Dose:  80 mg       carvedilol 6.25 MG Tabs   Last time this was given:  6.25 mg on 4/4/2017  8:30 AM   Commonly known as:  COREG    Take 1 Tab by mouth 2 times a day, with meals.   Dose:  6.25 mg       clopidogrel 75 MG Tabs   Last time this was given:  75 mg on 4/3/2017  9:52 PM   Commonly known as:  PLAVIX    Take 75 mg by mouth every evening. Stop 7 days before surgery per Dr. Pemberton order   Dose:  75 mg       hydrocodone-acetaminophen 5-325 MG Tabs per tablet   Last time this was given:  2 Tabs on 4/1/2017 10:00 AM   Commonly known as:  NORCO    Take 1 Tab by mouth every 6 hours as needed (back and neck pain). Seen in office 3 /13/17 .renewal of chronic medication, used episodically   Dose:  1 Tab         STOP taking these medications     lisinopril 10 MG Tabs   Commonly known as:  PRINIVIL               Instructions           Diet / Nutrition:    Follow any diet instructions given to  you by your doctor or the dietician, including how much salt (sodium) you are allowed each day.    If you are overweight, talk to your doctor about a weight reduction plan.    Activity:    Remain physically active following your doctor's instructions about exercise and activity.    Rest often.     Any time you become even a little tired or short of breath, SIT DOWN and rest.    Worsening Symptoms:    Report any of the following signs and symptoms to the doctor's office immediately:    *Pain of jaw, arm, or neck  *Chest pain not relieved by medication                               *Dizziness or loss of consciousness  *Difficulty breathing even when at rest   *More tired than usual                                       *Bleeding drainage or swelling of surgical site  *Swelling of feet, ankles, legs or stomach                 *Fever (>100ºF)  *Pink or blood tinged sputum  *Weight gain (3lbs/day or 5lbs /week)           *Shock from internal defibrillator (if applicable)  *Palpitations or irregular heartbeats                *Cool and/or numb extremities    Stroke Awareness    Common Risk Factors for Stroke include:    Age  Atrial Fibrillation  Carotid Artery Stenosis  Diabetes Mellitus  Excessive alcohol consumption  High blood pressure  Overweight   Physical inactivity  Smoking    Warning signs and symptoms of a stroke include:    *Sudden numbness or weakness of the face, arm or leg (especially on one side of the body).  *Sudden confusion, trouble speaking or understanding.  *Sudden trouble seeing in one or both eyes.  *Sudden trouble walking, dizziness, loss of balance or coordination.Sudden severe headache with no known cause.    It is very important to get treatment quickly when a stroke occurs. If you experience any of the above warning signs, call 911 immediately.                   Disclaimer         Quit Smoking / Tobacco Use:    I understand the use of any tobacco products increases my chance of suffering from  future heart disease or stroke and could cause other illnesses which may shorten my life. Quitting the use of tobacco products is the single most important thing I can do to improve my health. For further information on smoking / tobacco cessation call a Toll Free Quit Line at 1-156.529.8777 (*National [employer]) or 1-113.270.5967 (American Lung Association) or you can access the web based program at www.lungusa.org.    [place] Tobacco Users Help Line:  (500) 312-3802       Toll Free: 1-699.210.3406  Quit Tobacco Program [facility] (222)[phone]    Crisis Hotline:    [place] Crisis Hotline:  7-708-JSCRBGL or 1-395.298.6409    [place] Crisis Hotline:    1-193.208.7744 or 388-768-6430    Discharge Survey:   Thank you for choosing [facility]. We hope we did everything we could to make your hospital stay a pleasant one. You may be receiving a phone survey and we would appreciate your time and participation in answering the questions. Your input is very valuable to us in our efforts to improve our service to our patients and their families.        My signature on this form indicates that:    1. I have reviewed and understand the above information.  2. My questions regarding this information have been answered to my satisfaction.  3. I have formulated a plan with my discharge nurse to obtain my prescribed medications for home.                  Disclaimer         __________________________________                     __________       ________                       Patient Signature                                                 Date                    Time

## 2017-04-01 LAB
ANION GAP SERPL CALC-SCNC: 2 MMOL/L (ref 0–11.9)
BASOPHILS # BLD AUTO: 0.7 % (ref 0–1.8)
BASOPHILS # BLD: 0.06 K/UL (ref 0–0.12)
BUN SERPL-MCNC: 12 MG/DL (ref 8–22)
CALCIUM SERPL-MCNC: 8.3 MG/DL (ref 8.5–10.5)
CHLORIDE SERPL-SCNC: 106 MMOL/L (ref 96–112)
CO2 SERPL-SCNC: 30 MMOL/L (ref 20–33)
CREAT SERPL-MCNC: 0.64 MG/DL (ref 0.5–1.4)
EOSINOPHIL # BLD AUTO: 0.11 K/UL (ref 0–0.51)
EOSINOPHIL NFR BLD: 1.3 % (ref 0–6.9)
ERYTHROCYTE [DISTWIDTH] IN BLOOD BY AUTOMATED COUNT: 48.6 FL (ref 35.9–50)
GFR SERPL CREATININE-BSD FRML MDRD: >60 ML/MIN/1.73 M 2
GLUCOSE SERPL-MCNC: 97 MG/DL (ref 65–99)
HCT VFR BLD AUTO: 33.9 % (ref 37–47)
HGB BLD-MCNC: 11.3 G/DL (ref 12–16)
IMM GRANULOCYTES # BLD AUTO: 0.03 K/UL (ref 0–0.11)
IMM GRANULOCYTES NFR BLD AUTO: 0.4 % (ref 0–0.9)
LYMPHOCYTES # BLD AUTO: 1.5 K/UL (ref 1–4.8)
LYMPHOCYTES NFR BLD: 17.6 % (ref 22–41)
MCH RBC QN AUTO: 29.5 PG (ref 27–33)
MCHC RBC AUTO-ENTMCNC: 32.6 G/DL (ref 33.6–35)
MCV RBC AUTO: 90.5 FL (ref 81.4–97.8)
MONOCYTES # BLD AUTO: 0.6 K/UL (ref 0–0.85)
MONOCYTES NFR BLD AUTO: 7.1 % (ref 0–13.4)
NEUTROPHILS # BLD AUTO: 6.2 K/UL (ref 2–7.15)
NEUTROPHILS NFR BLD: 72.9 % (ref 44–72)
NRBC # BLD AUTO: 0 K/UL
NRBC BLD AUTO-RTO: 0 /100 WBC
PLATELET # BLD AUTO: 151 K/UL (ref 164–446)
PMV BLD AUTO: 10.1 FL (ref 9–12.9)
POTASSIUM SERPL-SCNC: 4 MMOL/L (ref 3.6–5.5)
RBC # BLD AUTO: 3.8 M/UL (ref 4.2–5.4)
SODIUM SERPL-SCNC: 138 MMOL/L (ref 135–145)
WBC # BLD AUTO: 8.5 K/UL (ref 4.8–10.8)

## 2017-04-01 PROCEDURE — A9270 NON-COVERED ITEM OR SERVICE: HCPCS | Performed by: SURGERY

## 2017-04-01 PROCEDURE — 700111 HCHG RX REV CODE 636 W/ 250 OVERRIDE (IP): Performed by: SURGERY

## 2017-04-01 PROCEDURE — 36415 COLL VENOUS BLD VENIPUNCTURE: CPT

## 2017-04-01 PROCEDURE — 85025 COMPLETE CBC W/AUTO DIFF WBC: CPT

## 2017-04-01 PROCEDURE — 770006 HCHG ROOM/CARE - MED/SURG/GYN SEMI*

## 2017-04-01 PROCEDURE — 80048 BASIC METABOLIC PNL TOTAL CA: CPT

## 2017-04-01 PROCEDURE — 700102 HCHG RX REV CODE 250 W/ 637 OVERRIDE(OP): Performed by: SURGERY

## 2017-04-01 RX ORDER — HEPARIN SODIUM 5000 [USP'U]/ML
5000 INJECTION, SOLUTION INTRAVENOUS; SUBCUTANEOUS EVERY 12 HOURS
Status: DISCONTINUED | OUTPATIENT
Start: 2017-04-01 | End: 2017-04-04 | Stop reason: HOSPADM

## 2017-04-01 RX ORDER — CYCLOBENZAPRINE HCL 10 MG
10 TABLET ORAL 3 TIMES DAILY PRN
Status: DISCONTINUED | OUTPATIENT
Start: 2017-04-01 | End: 2017-04-04 | Stop reason: HOSPADM

## 2017-04-01 RX ORDER — OXYCODONE HYDROCHLORIDE AND ACETAMINOPHEN 5; 325 MG/1; MG/1
1-2 TABLET ORAL EVERY 4 HOURS PRN
Status: DISCONTINUED | OUTPATIENT
Start: 2017-04-01 | End: 2017-04-04 | Stop reason: HOSPADM

## 2017-04-01 RX ORDER — HEPARIN SODIUM 5000 [USP'U]/ML
5000 INJECTION, SOLUTION INTRAVENOUS; SUBCUTANEOUS EVERY 12 HOURS
Status: DISCONTINUED | OUTPATIENT
Start: 2017-04-01 | End: 2017-04-01

## 2017-04-01 RX ADMIN — HYDROCODONE BITARTRATE AND ACETAMINOPHEN 2 TABLET: 5; 325 TABLET ORAL at 10:00

## 2017-04-01 RX ADMIN — ASPIRIN 81 MG: 81 TABLET, CHEWABLE ORAL at 20:05

## 2017-04-01 RX ADMIN — ONDANSETRON 4 MG: 2 INJECTION, SOLUTION INTRAMUSCULAR; INTRAVENOUS at 10:01

## 2017-04-01 RX ADMIN — ATORVASTATIN CALCIUM 80 MG: 40 TABLET, FILM COATED ORAL at 20:05

## 2017-04-01 RX ADMIN — HEPARIN SODIUM 5000 UNITS: 5000 INJECTION, SOLUTION INTRAVENOUS; SUBCUTANEOUS at 20:06

## 2017-04-01 RX ADMIN — CARVEDILOL 6.25 MG: 6.25 TABLET, FILM COATED ORAL at 17:06

## 2017-04-01 RX ADMIN — SODIUM CHLORIDE, SODIUM LACTATE, POTASSIUM CHLORIDE, CALCIUM CHLORIDE AND DEXTROSE MONOHYDRATE: 5; 600; 310; 30; 20 INJECTION, SOLUTION INTRAVENOUS at 20:27

## 2017-04-01 RX ADMIN — CEFAZOLIN SODIUM 1 G: 1 INJECTION, SOLUTION INTRAVENOUS at 06:29

## 2017-04-01 RX ADMIN — CYCLOBENZAPRINE HYDROCHLORIDE 10 MG: 10 TABLET, FILM COATED ORAL at 15:54

## 2017-04-01 RX ADMIN — CARVEDILOL 6.25 MG: 6.25 TABLET, FILM COATED ORAL at 07:54

## 2017-04-01 RX ADMIN — CLOPIDOGREL 75 MG: 75 TABLET, FILM COATED ORAL at 20:05

## 2017-04-01 RX ADMIN — ENOXAPARIN SODIUM 40 MG: 100 INJECTION SUBCUTANEOUS at 07:54

## 2017-04-01 RX ADMIN — MORPHINE SULFATE 2 MG: 4 INJECTION INTRAVENOUS at 11:32

## 2017-04-01 RX ADMIN — OXYCODONE HYDROCHLORIDE AND ACETAMINOPHEN 2 TABLET: 5; 325 TABLET ORAL at 20:05

## 2017-04-01 RX ADMIN — HYDROCODONE BITARTRATE AND ACETAMINOPHEN 2 TABLET: 5; 325 TABLET ORAL at 02:43

## 2017-04-01 RX ADMIN — MORPHINE SULFATE 2 MG: 4 INJECTION INTRAVENOUS at 14:27

## 2017-04-01 RX ADMIN — LISINOPRIL 10 MG: 10 TABLET ORAL at 20:06

## 2017-04-01 RX ADMIN — OXYCODONE HYDROCHLORIDE AND ACETAMINOPHEN 2 TABLET: 5; 325 TABLET ORAL at 15:54

## 2017-04-01 RX ADMIN — MORPHINE SULFATE 2 MG: 4 INJECTION INTRAVENOUS at 07:54

## 2017-04-01 RX ADMIN — CEFAZOLIN SODIUM 1 G: 1 INJECTION, SOLUTION INTRAVENOUS at 14:27

## 2017-04-01 RX ADMIN — SODIUM CHLORIDE, SODIUM LACTATE, POTASSIUM CHLORIDE, CALCIUM CHLORIDE AND DEXTROSE MONOHYDRATE: 5; 600; 310; 30; 20 INJECTION, SOLUTION INTRAVENOUS at 10:52

## 2017-04-01 ASSESSMENT — PAIN SCALES - GENERAL
PAINLEVEL_OUTOF10: 8
PAINLEVEL_OUTOF10: 4
PAINLEVEL_OUTOF10: 6
PAINLEVEL_OUTOF10: 8
PAINLEVEL_OUTOF10: 9
PAINLEVEL_OUTOF10: 7
PAINLEVEL_OUTOF10: 9
PAINLEVEL_OUTOF10: 6
PAINLEVEL_OUTOF10: 7
PAINLEVEL_OUTOF10: 9
PAINLEVEL_OUTOF10: 3
PAINLEVEL_OUTOF10: 5
PAINLEVEL_OUTOF10: 5

## 2017-04-01 ASSESSMENT — ENCOUNTER SYMPTOMS: BACK PAIN: 1

## 2017-04-01 ASSESSMENT — COPD QUESTIONNAIRES
COPD SCREENING SCORE: 7
DURING THE PAST 4 WEEKS HOW MUCH DID YOU FEEL SHORT OF BREATH: SOME OF THE TIME
HAVE YOU SMOKED AT LEAST 100 CIGARETTES IN YOUR ENTIRE LIFE: YES
DO YOU EVER COUGH UP ANY MUCUS OR PHLEGM?: YES, A FEW DAYS A WEEK OR MONTH

## 2017-04-01 ASSESSMENT — LIFESTYLE VARIABLES: EVER_SMOKED: YES

## 2017-04-01 NOTE — OR SURGEON
Immediate Post-Operative Note      PreOp Diagnosis: 5cm AAA and severe right iliac stenosis.    PostOp Diagnosis: Same.    Procedure(s):  AAA WITH STENT GRAFT - 5-CM INFRARENAL - Wound Class: Clean with Drain  FEMORAL FEMORAL BYPASS - POSS - Wound Class: Clean with Drain    Surgeon:  Spring Pemberton M.D.    Assistant:  Charlie Daniel M.D.    Anesthesiologist/Type of Anesthesia:  Anesthesiologist: Will Billy M.D./General    Surgical Staff:  Circulator: Melina Magaña R.N.; Zamzam Razo R.N.  Scrub Person: Ivis Noland  Interventional Radiology Tech: Zoe Pindea    Specimen: None.    Estimated Blood Loss: 200ml.     Findings: Good fixation of stent grafts.    Complications: None.        3/31/2017 5:45 PM Spring Pemberton

## 2017-04-01 NOTE — CARE PLAN
Problem: Safety  Goal: Will remain free from falls  Outcome: PROGRESSING AS EXPECTED  Intervention: Implement fall precautions  Room/floor clear. Non skid socks on. Proper signs up. Bed alarm on, bed low and locked.  Call light and belongings within reach.  Hourly rounding in place to make sure needs are met.      Problem: Pain Management  Goal: Pain level will decrease to patient’s comfort goal  Outcome: PROGRESSING AS EXPECTED  Intervention: Follow pain managment plan developed in collaboration with patient and Interdisciplinary Team  Pain assessment q4h or q2h after medication intervention.  Encourage patient to report pain, verbalize understanding. Medicate PRN

## 2017-04-01 NOTE — PROGRESS NOTES
Surgical Progress Note    Author: Spring TERESA Ramirezen Date & Time created: 2017   9:05 AM     Interval Events:  Complains of chronic back pain.  No event overnight.     Review of Systems   Musculoskeletal: Positive for back pain (Chronic).   All other systems reviewed and are negative.    Hemodynamics:  Temp (24hrs), Av.4 °C (97.6 °F), Min:36 °C (96.8 °F), Max:37.8 °C (100 °F)  Temperature: 36.4 °C (97.5 °F)  Pulse  Av.8  Min: 57  Max: 94Heart Rate (Monitored): (!) 58  Blood Pressure : 112/54 mmHg, Arterial BP: 125/50 mmHg, NIBP: 131/48 mmHg     Respiratory:    Respiration: 18, Pulse Oximetry: 98 %, O2 Daily Delivery Respiratory : Silicone Nasal Cannula           Neuro:  GCS       Fluids:    Intake/Output Summary (Last 24 hours) at 17 0905  Last data filed at 17 0515   Gross per 24 hour   Intake   3100 ml   Output   1325 ml   Net   1775 ml     Weight: 42.3 kg (93 lb 4.1 oz)  Current Diet Order   Procedures   • DIET ORDER     Physical Exam   Constitutional: She is oriented to person, place, and time.   Thin female in NAD.   Cardiovascular: Normal rate and regular rhythm.    Pulmonary/Chest: Effort normal and breath sounds normal.   Abdominal: Soft.   No pulsatile mass.  + Well healed scars.  + Small chronic incisional hernia, reducible.   Musculoskeletal:   Prevena dressings in place.  Multiphasic Doppler flow signals over bilateral PT.   Neurological: She is alert and oriented to person, place, and time. She has normal reflexes.   Skin: Skin is warm and dry.   Psychiatric: She has a normal mood and affect. Her behavior is normal. Judgment and thought content normal.     Labs:  Recent Results (from the past 24 hour(s))   CBC WITH DIFFERENTIAL IN AM    Collection Time: 17  2:35 AM   Result Value Ref Range    WBC 8.5 4.8 - 10.8 K/uL    RBC 3.80 (L) 4.20 - 5.40 M/uL    Hemoglobin 11.3 (L) 12.0 - 16.0 g/dL    Hematocrit 33.9 (L) 37.0 - 47.0 %    MCV 90.5 81.4 - 97.8 fL    MCH 29.5 27.0 - 33.0  pg    MCHC 32.6 (L) 33.6 - 35.0 g/dL    RDW 48.6 35.9 - 50.0 fL    Platelet Count 151 (L) 164 - 446 K/uL    MPV 10.1 9.0 - 12.9 fL    Neutrophils-Polys 72.90 (H) 44.00 - 72.00 %    Lymphocytes 17.60 (L) 22.00 - 41.00 %    Monocytes 7.10 0.00 - 13.40 %    Eosinophils 1.30 0.00 - 6.90 %    Basophils 0.70 0.00 - 1.80 %    Immature Granulocytes 0.40 0.00 - 0.90 %    Nucleated RBC 0.00 /100 WBC    Neutrophils (Absolute) 6.20 2.00 - 7.15 K/uL    Lymphs (Absolute) 1.50 1.00 - 4.80 K/uL    Monos (Absolute) 0.60 0.00 - 0.85 K/uL    Eos (Absolute) 0.11 0.00 - 0.51 K/uL    Baso (Absolute) 0.06 0.00 - 0.12 K/uL    Immature Granulocytes (abs) 0.03 0.00 - 0.11 K/uL    NRBC (Absolute) 0.00 K/uL   BASIC METABOLIC PANEL IN AM    Collection Time: 04/01/17  2:35 AM   Result Value Ref Range    Sodium 138 135 - 145 mmol/L    Potassium 4.0 3.6 - 5.5 mmol/L    Chloride 106 96 - 112 mmol/L    Co2 30 20 - 33 mmol/L    Glucose 97 65 - 99 mg/dL    Bun 12 8 - 22 mg/dL    Creatinine 0.64 0.50 - 1.40 mg/dL    Calcium 8.3 (L) 8.5 - 10.5 mg/dL    Anion Gap 2.0 0.0 - 11.9   ESTIMATED GFR    Collection Time: 04/01/17  2:35 AM   Result Value Ref Range    GFR If African American >60 >60 mL/min/1.73 m 2    GFR If Non African American >60 >60 mL/min/1.73 m 2     Medical Decision Making, by Problem:  Active Hospital Problems    Diagnosis   • Abdominal aortic aneurysm (CMS-[facility]) [I71.4]     Plan:  Stable.  Mobilize.  D/C Resendiz.  Advance diet.  Ambulate with assistance.  PT consult.    I wrote in my Lovenox order NOT to be injected below the umbilicus to avoid graft infection; however, Lovenox shot was given RIGHT next to graft.  I have discussed this with patient's RN and charge nurse as well as with patient.    Quality Measures:    Resendiz catheter: One or Two Days Post Surgery (Day of Surgery being Day 0)      DVT Prophylaxis: Enoxaparin (Lovenox)  DVT prophylaxis - mechanical: SCDs  Ulcer prophylaxis: Not indicated          Discussed patient condition  with patient, RN, and charge nurse.

## 2017-04-01 NOTE — PROGRESS NOTES
Pt continues to complain of 10 out of 10 pain unrelieved by pain medications.  Paged Dr. Pemberton.  New orders received.

## 2017-04-01 NOTE — PROGRESS NOTES
Discussed Lovenox injection with Dr. Pemberton.  Informed that shot should not have been given in the lower abdomen.  No administration instructions or nursing communication seen for Lovenox.  Placed nursing communication and requested that heparin subq be administered instead of Lovenox as Lovenox administration requires it to be given below the umbilicus.  New orders received.

## 2017-04-01 NOTE — OP REPORT
DATE OF SERVICE:  03/31/2017    SURGEON:  Spring Pemberton M.D.    ASSISTANT:  Charlie Daniel M.D.    ANESTHESIOLOGIST:  Will Billy M.D.    TYPE OF ANESTHESIA:  General anesthesia.    PREOPERATIVE DIAGNOSES:  A 5 cm abdominal aortic aneurysm and severe right   iliac disease.    POSTOPERATIVE DIAGNOSES:  A 5 cm abdominal aortic aneurysm and severe right   iliac disease.    PROCEDURES PERFORMED:  1.  Bilateral common femoral artery exposure.  2.  Catheter, abdominal aorta.  3.  Endovascular repair of 5.0 cm infrarenal abdominal aortic aneurysm using   Endurant mchak-itw-pasii device on [id number] mm via left femoral approach.  4.  Placement of left iliac extensor, [id number] mm.  5.  Left common iliac stenting using [id number] balloon-expandable stent.  6.  Endo-occlusion of right common iliac artery using 12 mm Amplatzer occluder.  7.  Left-to-right femoral bypass using 6 mm Propaten graft.    INDICATION FOR THE PROCEDURE:  The patient is a pleasant lady with multiple   medical problems, prior abdominal surgery, and partial colectomy who on recent   radiology study was found to have enlargement of the aneurysm to 5 cm in   size.  She has also had a severe iliac disease on the right side.  Discussion   was made with the patient.  She would like to undergo above procedure, fully   understanding all risks.    PROCEDURE:  Informed consent was obtained.  Patient was taken to the   endovascular suite and was placed in the supine position.  She was given Ancef   intravenously.  General anesthesia was induced.  A left radial arterial   catheter was placed by Dr. Billy for intraoperative blood pressure   monitoring.  A Resendiz catheter was placed under sterile condition.    Next, her lower chest, abdomen, both thighs, and perineum were sterilely   prepped and draped in the normal fashion.  A time-out procedure was done.  A   slightly oblique incision was made in each groin, centering over the common   femoral arteries.   This incision was extended through subcutaneous tissue   using the Harmonic scalpel.  The common femoral artery was identified and   carefully dissected free from the surrounding tissue.  On the right side, was   found to have mild posterior plaque formation.  On the left side, there was   moderate posterior plaque formation.    A 6 mm Propaten graft was brought through a subcutaneous tunnel connecting the   left groin with the right groin.  Patient was then given heparin 5000 units   intravenously.  On the right side, the common femoral artery was cannulated   using an access needle.  The guidewire was passed through the needle.  The   needle was removed and replaced with a 6-Scottish sheath.  Attempt was made to   advance the 6-Scottish sheath to the common iliac artery was met with difficulty   secondary to severe iliac disease.  This problem was circumvented using a   Glidewire.  Over the guidewire, an Omni flush catheter was advanced into the   abdominal aorta and positioned at L2 vertebral level.    On the left side, the common femoral artery was also cannulated using an   access needle.  A guidewire was passed through the needle.  The needle was   removed and replaced with a 4-Scottish sheath.  A guidewire was replaced with a   Lunderquist wire.    Next, an abdominal aortogram was performed with the image intensifier   angulated at 15 degree Irish and 10 degree craniocaudal.  There was a single   renal artery on the right side.  On the left side, the main renal artery was   located superiorly.  The accessory renal artery was located inferiorly near   the aneurysm.  In order to provide an optimal fixation, the accessory renal   artery had to be covered.  Patient was made aware of this preoperatively.  The   12-Scottish sheath was removed over the Lunderquist wire on the left side.  Over   the Lunderquist wire, [id number] mm  wggya-iyd-mgasj device was advanced into   the arterial system.  The device was deployed right  below the level   of the left main renal artery.  The delivery system was removed and   replaced with a 12-Tunisian sheath.  Retrograde left iliac angiogram was   obtained with a marking catheter in place.  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  An iliac extensor was needed to   ensure optimal fixation of the devices into the left common iliac artery.  The   12-Tunisian sheath was removed over the guidewire.  Over the Lunderquist wire   on the left side, a [id number] mm iliac extensor was advanced into the arterial   system and successfully deployed under fluoroscopic guidance with appropriate   overlapping with the main device.  The delivery system was removed   and replaced with a 12-Tunisian sheath.    Following this, angioplasty of the devices was performed using [employer]   balloon.  There was moderate stenosis seen in the left common iliac artery.    This area was then stented using 10x29 mm balloon expandable stent.  A followup   angiogram was then obtained and showed complete of restoration of luminal   patency.    A followup aortogram was also obtained.  Proximally, the stent graft device   was located just right below the left renal artery.  Distally, there was 2 cm   of fixation in the left common iliac artery.    Next, attention was then turned to perform the placement of an endo-occlusion   device for the right common iliac artery followed by fem-fem bypass graft.  A   12 mm Amplatzer occlusion device was advanced into the sheath into the   common iliac artery of the right side.  The sheath was retracted.  The   occluder device was deployed with no difficulty.    Next, the sheath and wire were removed.  Vascular control of the bilateral   common femoral arteries was obtained with vascular clamps.  A longitudinal   arteriotomy was made on the left side, from the arteriotomy site.  There was   moderate posterior plaque formation.  Thromboendarterectomy of the left   common femoral  artery was performed.  The intima of the distal common femoral   artery was tacked using interrupted 6-0 Prolene sutures.  The 6 mm Propaten   graft was beveled and anastomosed end-to-side to the common femoral artery   using the CV-6 Huntington-Joseph suture.  After this was done, backbleeding and   flushing were obtained through the graft.  The graft was then clamped above the   anastomosis.    On the right side, the longitudinal arteriotomy was made on the common   femoral artery, extended from the sheath entrance site.  The Propaten graft was cut to appropriate length, beveled, and   anastomosed end-to-side to the right common femoral artery using CV-6 Huntington Joseph   suture.  Prior to completing the anastomosis, backbleeding and flushing were   obtained.  The anastomosis was completeled.  Flow was first restored retrograde   into the right iliac system and then antegrade into the profunda and then   superficial femoral artery.  A sterile Doppler probe was brought onto  the   field and excellent Doppler flow signals were obtained over the profunda femoral   and superficial femoral arteries bilaterally.    The wounds were irrigated and were found to have excellent hemostasis.  The   wounds were anesthetized with 20 mL of 0.5% Marcaine with epinephrine solution.    The wounds were closedsubcutaneously in layers with running 3-0 Vicryl sutures. and subcuticularly with 4-0 Monocryl sutures.  The wounds were cleaned and small bilateral Prevena dressings were applied.    ESTIMATED BLOOD LOSS:  200 mL.    INTRAVENOUS FLUID:  2.6 liters of crystalloids.    CONTRAST USED:  60 mL of Visipaque.    Sponge and instrument count was correct x2.    Patient was then awakened, extubated, and taken to the recovery area in stable   condition with palpable bilateral posterior tibial pulses with                    Multiphasic Doppler flow signals.       ____________________________________     MD LAURA CASTAÑEDA / ABDIEL    DD:  03/31/2017  18:25:59  DT:  03/31/2017 20:56:44    D#:  [id number]  Job#:  [id number]    cc: ZIYAD AHUMADA MD, RAMOS WILLIAMSON MD

## 2017-04-01 NOTE — RESPIRATORY CARE
COPD EDUCATION by COPD CLINICAL EDUCATOR  4/1/2017 at 5:58 AM by Senait Salmeron     Patient reviewed by COPD education team. Patient does not qualify for COPD program.

## 2017-04-01 NOTE — CARE PLAN
Acute Bacterial Rhinosinusitis (ABRS)  Acute bacterial rhinosinusitis (ABRS) is an infection of your nasal cavity and sinuses. It’s caused by bacteria. Acute means that you’ve had symptoms for less than 12 weeks.  Understanding your sinuses  The nasal cavity is the large air-filled space behind your nose. The sinuses are a group of spaces formed by the bones of your face. They connect with your nasal cavity. ABRS causes the tissue lining these spaces to become inflamed. Mucus may not drain normally. This leads to facial pain and other symptoms.  What causes ABRS?  ABRS most often follows an upper respiratory infection caused by a virus. Bacteria then infect the lining of your nasal cavity and sinuses. But you can also get ABRS if you have:  · Nasal allergies  · Long-term nasal swelling and congestion not caused by allergies  · Blockage in the nose  Symptoms of ABRS  The symptoms of ABRS may be different for each person, and can include:  · Nasal congestion  · Runny nose  · Fluid draining from the nose down the throat (postnasal drip)  · Headache  · Cough  · Pain in the sinuses  · Thick, colored fluid from the nose (mucus)  · Fever  Diagnosing ABRS  ABRS may be diagnosed if you’ve had an upper respiratory infection like a cold and cough for longer than 10 to 14 days. Your health care provider will ask about your symptoms and your medical history. The provider will check your vital signs, including your temperature. You’ll have a physical exam. The health care provider will check your ears, nose, and throat. You likely won’t need any tests. If ABRS comes back, you may have a culture or other tests.  Treatment for ABRS  Treatment may include:  · Antibiotic medicine. This is for symptoms that last for at least 10 to 14 days.  · Nasal corticosteroid medicine. Drops or spray used in the nose can lessen swelling and congestion.  · Over-the-counter pain medicine. This is to lessen sinus pain and pressure.  · Nasal  Problem: Communication  Goal: The ability to communicate needs accurately and effectively will improve  Patient able to communicate her needs verbally in English.    Problem: Infection  Goal: Will remain free from infection  Pt has no s/s of infection at this time.  Afebrile.    Problem: Venous Thromboembolism (VTW)/Deep Vein Thrombosis (DVT) Prevention:  Goal: Patient will participate in Venous Thrombosis (VTE)/Deep Vein Thrombosis (DVT)Prevention Measures  Pt received Lovenox subq this am.  Now switched to heparin subq to be administered in upper arms.    Problem: Bowel/Gastric:  Goal: Normal bowel function is maintained or improved  Pt had bowel movement prior to admit.    Problem: Mobility  Goal: Risk for activity intolerance will decrease  Pt with significant pain.  Requires 1 person assistance with ambulation.         decongestant medicine. Spray or drops may help to lessen congestion. Do not use them for more than a few days.  · Salt wash (saline irrigation). This can help to loosen mucus.  Possible complications of ABRS  ABRS may come back or become long-term (chronic).  In rare cases, ABRS may cause complications such as:   · Inflamed tissue around the brain and spinal cord (meningitis)  · Inflamed tissue around the eyes (orbital cellulitis)  · Inflamed bones around the sinuses (osteitis)  These problems may need to be treated in a hospital with intravenous (IV) antibiotic medicine or surgery.  When to call the health care provider  Call your health care provider if you have any of the following:  · Symptoms that don’t get better, or get worse  · Symptoms that don’t get better after 3 to 5 days on antibiotics  · Trouble seeing  · Swelling around your eyes  · Confusion or trouble staying awake      © 1857-9757 [employer]. [address] [place] [address], [place] [address]. All rights reserved. This information is not intended as a substitute for professional medical care. Always follow your healthcare professional's instructions.

## 2017-04-02 PROCEDURE — A9270 NON-COVERED ITEM OR SERVICE: HCPCS | Performed by: SURGERY

## 2017-04-02 PROCEDURE — 700111 HCHG RX REV CODE 636 W/ 250 OVERRIDE (IP): Performed by: SURGERY

## 2017-04-02 PROCEDURE — 700102 HCHG RX REV CODE 250 W/ 637 OVERRIDE(OP): Performed by: SURGERY

## 2017-04-02 PROCEDURE — 770006 HCHG ROOM/CARE - MED/SURG/GYN SEMI*

## 2017-04-02 RX ORDER — CARVEDILOL 6.25 MG/1
6.25 TABLET ORAL 2 TIMES DAILY WITH MEALS
Status: DISCONTINUED | OUTPATIENT
Start: 2017-04-02 | End: 2017-04-04 | Stop reason: HOSPADM

## 2017-04-02 RX ORDER — LISINOPRIL 10 MG/1
10 TABLET ORAL EVERY EVENING
Status: DISCONTINUED | OUTPATIENT
Start: 2017-04-02 | End: 2017-04-04 | Stop reason: HOSPADM

## 2017-04-02 RX ADMIN — HEPARIN SODIUM 5000 UNITS: 5000 INJECTION, SOLUTION INTRAVENOUS; SUBCUTANEOUS at 10:24

## 2017-04-02 RX ADMIN — CYCLOBENZAPRINE HYDROCHLORIDE 10 MG: 10 TABLET, FILM COATED ORAL at 20:06

## 2017-04-02 RX ADMIN — ATORVASTATIN CALCIUM 80 MG: 40 TABLET, FILM COATED ORAL at 20:06

## 2017-04-02 RX ADMIN — HEPARIN SODIUM 5000 UNITS: 5000 INJECTION, SOLUTION INTRAVENOUS; SUBCUTANEOUS at 20:06

## 2017-04-02 RX ADMIN — ASPIRIN 81 MG: 81 TABLET, CHEWABLE ORAL at 20:06

## 2017-04-02 RX ADMIN — OXYCODONE HYDROCHLORIDE AND ACETAMINOPHEN 1 TABLET: 5; 325 TABLET ORAL at 16:55

## 2017-04-02 RX ADMIN — CLOPIDOGREL 75 MG: 75 TABLET, FILM COATED ORAL at 20:06

## 2017-04-02 RX ADMIN — OXYCODONE HYDROCHLORIDE AND ACETAMINOPHEN 2 TABLET: 5; 325 TABLET ORAL at 07:51

## 2017-04-02 RX ADMIN — CYCLOBENZAPRINE HYDROCHLORIDE 10 MG: 10 TABLET, FILM COATED ORAL at 12:45

## 2017-04-02 ASSESSMENT — PAIN SCALES - GENERAL
PAINLEVEL_OUTOF10: 5
PAINLEVEL_OUTOF10: 2
PAINLEVEL_OUTOF10: 5
PAINLEVEL_OUTOF10: 9
PAINLEVEL_OUTOF10: 4

## 2017-04-02 ASSESSMENT — ENCOUNTER SYMPTOMS: BACK PAIN: 1

## 2017-04-02 NOTE — PROGRESS NOTES
VSS. A+Ox4. Reports pain to back and abd; medicated per MAR with good effect. Bilateral groin wound vac intact. Tolerating diet. Denies n/v.  Voiding with no issues. Ambulating with 1 assist to bathroom. IVF infusing as ordered. Hourly rounding ongoing. Call bell in reach.

## 2017-04-02 NOTE — CARE PLAN
Problem: Pain Management  Goal: Pain level will decrease to patient’s comfort goal  Outcome: PROGRESSING AS EXPECTED  Pain is being managed with current medication regimen.     Problem: Mobility  Goal: Risk for activity intolerance will decrease  Outcome: PROGRESSING AS EXPECTED  OOB for meals. Walking in halls TID.

## 2017-04-02 NOTE — PROGRESS NOTES
A/Ox4. Ans  O2 at *** L, sating ***%.  Pulls *** on the IS.   HUNTER ***, *** Weakness***, *** N/T ***.    *** assist.  Bed alarm ***, Bed in lowest position, Restraints ***, Splints/Braces ***.  *** wounds.    *** diet, tolerating ***.    Voiding ***, ***BS, ***flatus, ***BM.    Family ***.    Pain ***.

## 2017-04-02 NOTE — CARE PLAN
Problem: Communication  Goal: The ability to communicate needs accurately and effectively will improve  Outcome: PROGRESSING AS EXPECTED  Pt able to communicate all needs effectively. POC discussed. Pt verbalizes understanding.     Problem: Safety  Goal: Will remain free from falls  Outcome: PROGRESSING AS EXPECTED  Remains free from falls. Calls appropriately for assistance.     Problem: Infection  Goal: Will remain free from infection  Outcome: PROGRESSING AS EXPECTED  No s/s infection. Will continue to monitor.     Problem: Venous Thromboembolism (VTW)/Deep Vein Thrombosis (DVT) Prevention:  Goal: Patient will participate in Venous Thrombosis (VTE)/Deep Vein Thrombosis (DVT)Prevention Measures  Outcome: PROGRESSING AS EXPECTED  SUbq heparin and SCDs on while in bed.     Problem: Pain Management  Goal: Pain level will decrease to patient’s comfort goal  Outcome: PROGRESSING AS EXPECTED  Medicated for pain according to MAR with good effect.

## 2017-04-02 NOTE — PROGRESS NOTES
"Surgical Progress Note    Author: Spring Pemberton Date & Time created: 2017   9:14 AM     Interval Events:  Lower back pain is better controlled.  Tolerating regular diet.     Review of Systems   Musculoskeletal: Positive for back pain (\"Better\").   All other systems reviewed and are negative.    Hemodynamics:  Temp (24hrs), Av.6 °C (97.9 °F), Min:36.4 °C (97.5 °F), Max:36.7 °C (98.1 °F)  Temperature: 36.6 °C (97.9 °F)  Pulse  Av  Min: 57  Max: 94  Blood Pressure : (!) 97/54 mmHg     Respiratory:    Respiration: 16, Pulse Oximetry: 96 %        RUL Breath Sounds: Diminished, RML Breath Sounds: Diminished, RLL Breath Sounds: Diminished, KISHA Breath Sounds: Diminished, LLL Breath Sounds: Diminished  Neuro:  GCS       Fluids:    Intake/Output Summary (Last 24 hours) at 17 0914  Last data filed at 17 0600   Gross per 24 hour   Intake   2250 ml   Output   1175 ml   Net   1075 ml        Current Diet Order   Procedures   • DIET ORDER     Physical Exam   Constitutional: She is oriented to person, place, and time.   Thin female in NAD.   Cardiovascular: Normal rate and regular rhythm.    Pulmonary/Chest: Effort normal and breath sounds normal.   Abdominal: Soft.   No pulsatile mass, NT.   Musculoskeletal:   Prevena's in place, no surrounding erythema or drainage.  Palpable bilateral PT pulses with multiphasic Doppler flow signals.   Neurological: She is alert and oriented to person, place, and time. She has normal reflexes.   Skin: Skin is warm and dry.   Psychiatric: She has a normal mood and affect. Her behavior is normal. Judgment and thought content normal.     Labs:  No results found for this or any previous visit (from the past 24 hour(s)).  Medical Decision Making, by Problem:  Active Hospital Problems    Diagnosis   • Abdominal aortic aneurysm (CMS-HCC) [I71.4]     Plan:  Progressing.  Saline lock IVF.  Ambulate in hallway with assistance.  Home once able to ambulate independently.    Quality " Measures:    Resendiz catheter: No Resendiz      DVT Prophylaxis: Heparin  DVT prophylaxis - mechanical: SCDs  Ulcer prophylaxis: Not indicated          Discussed patient condition with patient.

## 2017-04-03 LAB
BASOPHILS # BLD AUTO: 0.5 % (ref 0–1.8)
BASOPHILS # BLD: 0.05 K/UL (ref 0–0.12)
EOSINOPHIL # BLD AUTO: 0.1 K/UL (ref 0–0.51)
EOSINOPHIL NFR BLD: 1 % (ref 0–6.9)
ERYTHROCYTE [DISTWIDTH] IN BLOOD BY AUTOMATED COUNT: 48.9 FL (ref 35.9–50)
HCT VFR BLD AUTO: 29.8 % (ref 37–47)
HGB BLD-MCNC: 9.8 G/DL (ref 12–16)
IMM GRANULOCYTES # BLD AUTO: 0.03 K/UL (ref 0–0.11)
IMM GRANULOCYTES NFR BLD AUTO: 0.3 % (ref 0–0.9)
LYMPHOCYTES # BLD AUTO: 0.96 K/UL (ref 1–4.8)
LYMPHOCYTES NFR BLD: 9.4 % (ref 22–41)
MCH RBC QN AUTO: 29.7 PG (ref 27–33)
MCHC RBC AUTO-ENTMCNC: 32.9 G/DL (ref 33.6–35)
MCV RBC AUTO: 90.3 FL (ref 81.4–97.8)
MONOCYTES # BLD AUTO: 0.7 K/UL (ref 0–0.85)
MONOCYTES NFR BLD AUTO: 6.9 % (ref 0–13.4)
NEUTROPHILS # BLD AUTO: 8.35 K/UL (ref 2–7.15)
NEUTROPHILS NFR BLD: 81.9 % (ref 44–72)
NRBC # BLD AUTO: 0 K/UL
NRBC BLD AUTO-RTO: 0 /100 WBC
PLATELET # BLD AUTO: 155 K/UL (ref 164–446)
PMV BLD AUTO: 10.4 FL (ref 9–12.9)
RBC # BLD AUTO: 3.3 M/UL (ref 4.2–5.4)
WBC # BLD AUTO: 10.2 K/UL (ref 4.8–10.8)

## 2017-04-03 PROCEDURE — G8978 MOBILITY CURRENT STATUS: HCPCS | Mod: CK

## 2017-04-03 PROCEDURE — A9270 NON-COVERED ITEM OR SERVICE: HCPCS | Performed by: SURGERY

## 2017-04-03 PROCEDURE — 700111 HCHG RX REV CODE 636 W/ 250 OVERRIDE (IP): Performed by: SURGERY

## 2017-04-03 PROCEDURE — 770006 HCHG ROOM/CARE - MED/SURG/GYN SEMI*

## 2017-04-03 PROCEDURE — 85025 COMPLETE CBC W/AUTO DIFF WBC: CPT

## 2017-04-03 PROCEDURE — 97162 PT EVAL MOD COMPLEX 30 MIN: CPT

## 2017-04-03 PROCEDURE — G8979 MOBILITY GOAL STATUS: HCPCS | Mod: CI

## 2017-04-03 PROCEDURE — 36415 COLL VENOUS BLD VENIPUNCTURE: CPT

## 2017-04-03 PROCEDURE — 700102 HCHG RX REV CODE 250 W/ 637 OVERRIDE(OP): Performed by: SURGERY

## 2017-04-03 RX ORDER — SODIUM CHLORIDE 9 MG/ML
500 INJECTION, SOLUTION INTRAVENOUS ONCE
Status: COMPLETED | OUTPATIENT
Start: 2017-04-03 | End: 2017-04-03

## 2017-04-03 RX ADMIN — HEPARIN SODIUM 5000 UNITS: 5000 INJECTION, SOLUTION INTRAVENOUS; SUBCUTANEOUS at 07:55

## 2017-04-03 RX ADMIN — HEPARIN SODIUM 5000 UNITS: 5000 INJECTION, SOLUTION INTRAVENOUS; SUBCUTANEOUS at 21:52

## 2017-04-03 RX ADMIN — OXYCODONE HYDROCHLORIDE AND ACETAMINOPHEN 1 TABLET: 5; 325 TABLET ORAL at 05:46

## 2017-04-03 RX ADMIN — ASPIRIN 81 MG: 81 TABLET, CHEWABLE ORAL at 21:52

## 2017-04-03 RX ADMIN — OXYCODONE HYDROCHLORIDE AND ACETAMINOPHEN 1 TABLET: 5; 325 TABLET ORAL at 15:47

## 2017-04-03 RX ADMIN — OXYCODONE HYDROCHLORIDE AND ACETAMINOPHEN 1 TABLET: 5; 325 TABLET ORAL at 23:26

## 2017-04-03 RX ADMIN — CARVEDILOL 6.25 MG: 6.25 TABLET, FILM COATED ORAL at 17:23

## 2017-04-03 RX ADMIN — ATORVASTATIN CALCIUM 80 MG: 40 TABLET, FILM COATED ORAL at 21:52

## 2017-04-03 RX ADMIN — CLOPIDOGREL 75 MG: 75 TABLET, FILM COATED ORAL at 21:52

## 2017-04-03 RX ADMIN — SODIUM CHLORIDE 500 ML: 9 INJECTION, SOLUTION INTRAVENOUS at 07:28

## 2017-04-03 ASSESSMENT — PAIN SCALES - GENERAL
PAINLEVEL_OUTOF10: 7
PAINLEVEL_OUTOF10: 5
PAINLEVEL_OUTOF10: 8
PAINLEVEL_OUTOF10: 4
PAINLEVEL_OUTOF10: 6
PAINLEVEL_OUTOF10: 2

## 2017-04-03 ASSESSMENT — GAIT ASSESSMENTS
ASSISTIVE DEVICE: FRONT WHEEL WALKER
GAIT LEVEL OF ASSIST: CONTACT GUARD ASSIST
DISTANCE (FEET): 50

## 2017-04-03 ASSESSMENT — ENCOUNTER SYMPTOMS: BACK PAIN: 1

## 2017-04-03 NOTE — THERAPY
"Physical Therapy Evaluation completed.   Bed Mobility:  Supine to Sit: Moderate Assist  Transfers: Sit to Stand: Contact Guard Assist  Gait: Level Of Assist: Contact Guard Assist with Front-Wheel Walker       Plan of Care: Will benefit from Physical Therapy 3 times per week  Discharge Recommendations: Equipment: Will Continue to Assess for Equipment Needs. Post-acute therapy Discharge to home with outpatient or home health for additional skilled therapy services.    Pt is a pleasant 70 yo F who came to [facility] secondary to infrarenal abdominal aortic aneurysm.  Pt is now s/p abdominal aortic catheterization as well as iliac stnading and L to R femoral bypass.  Pt presents with B groin grafts with wound vacs, increased pain, decreased strength, and decreased ability to stand upright secondary to pain.  Pt required ModA for bed mobility and CGA for transfer and gait with use of FWW.  Pt currently will continue to benefit from skilled PT to increase safety and independence with funcitonal mobility.    See \"Rehab Therapy-Acute\" Patient Summary Report for complete documentation.     "

## 2017-04-03 NOTE — DISCHARGE PLANNING
Care Transition Team Assessment    Information Source  Orientation : Oriented x 4  Information Given By: Patient  Informant's Name: Karen  Who is responsible for making decisions for patient? : Patient    Readmission Evaluation  Is this a readmission?: No    Elopement Risk  Legal Hold: No  Ambulatory or Self Mobile in Wheelchair: No-Not an Elopement Risk  Elopement Risk: Not at Risk for Elopement    Interdisciplinary Discharge Planning  Does Admitting Nurse Feel This Could be a Complex Discharge?: No  Primary Care Physician:  (Radha Gross MD)  Lives with - Patient's Self Care Capacity: Adult Children  Patient or legal guardian wants to designate a caregiver (see row info): No  Support Systems: Children, Family Member(s)  Housing / Facility: [address]  Do You Take your Prescribed Medications Regularly: Yes  Able to Return to Previous ADL's: No  Mobility Issues: No  Prior Services: None  Assistance Needed: No  Durable Medical Equipment: Not Applicable    Discharge Preparedness  What is your plan after discharge?: Uncertain - pending medical team collaboration  What are your discharge supports?: Child (two sons)  Prior Functional Level: Ambulatory  Difficulity with ADLs: None  Difficulity with IADLs: None    Functional Assesment  Prior Functional Level: Ambulatory    Finances  Financial Barriers to Discharge: No  Prescription Coverage: Yes    Vision / Hearing Impairment  Vision Impairment : No  Hearing Impairment : No    Values / Beliefs / Concerns  Values / Beliefs Concerns : No  Spiritual Requests During Hospitalization: No    Advance Directive  Advance Directive?: Living Will (patient not sure if it is on file)    Domestic Abuse  Have you ever been the victim of abuse or violence?: No  Physical Abuse or Sexual Abuse: No  Verbal Abuse or Emotional Abuse: No  Possible Abuse Reported to:: Not Applicable    Psychological Assessment  History of Substance Abuse: None  History of Psychiatric Problems:  No  Non-compliant with Treatment: No  Newly Diagnosed Illness: No    Discharge Risks or Barriers  Discharge risks or barriers?: No    Anticipated Discharge Information  Anticipated discharge disposition:  (unknown at this time)  Discharge Address: [address] [place] [address]  Discharge Contact Phone Number: [phone]

## 2017-04-03 NOTE — DIETARY
"Nutrition Services: Low BMI <19 kg/m2. This is a 71 y.o female with admit dx:, Abdominal aortic aneurysm (CMS-HCC). Ht: 5'1\"  Wt: 42.3 kg  BMI: 17.63 kg/m2  The patient is s/p  AAA WITH STENT GRAFT - 5-CM INFRARENAL on 3/31. The patient is on a PO diet of regular, per recorded meals PO intake is between 25-50% of meals. Per nutrition admit screen no weight loss or poor po intake noted. Labs and meds reviewed, no skin breakdown is noted, a surgical wound is noted per chart  Plan/Recommendations: Encourage good PO intake, fluids per MD  RD monitoring   "

## 2017-04-03 NOTE — PROGRESS NOTES
Pt c/o sharp shooting pain in right thigh and knee. Pt unclear about whether she has had pain like this -- at first said no but also said it feels like before she has a moshe horse. No swelling, warm, equal bilaterally, no pain in calf. Pedal and post tib pulses still easily heard on doppler. Charge RN consulted. Continue to monitor. Pt states pain has settled down a bit.

## 2017-04-03 NOTE — PROGRESS NOTES
Report received, poc discussed, assumed care of pt.   Call light in reach, hourly rounding in place.   Pt gets up 1 assist with FWW.   Reg diet, poor intake, encouraged.  + void. LBM pta.   Flexeril and perc for pain.  No further needs.  Pt assisted from chair back to bed.

## 2017-04-03 NOTE — CARE PLAN
Problem: Bowel/Gastric:  Goal: Normal bowel function is maintained or improved  Outcome: PROGRESSING SLOWER THAN EXPECTED  Notify physician about no BM for 3 days to get bowel management meds on MAR, ambulate patient x3 today, up to chair for meals, regular diet.    Problem: Mobility  Goal: Risk for activity intolerance will decrease  Outcome: PROGRESSING AS EXPECTED  Ambulate in hallway x3 today, up to chair for meals, encourage patient to turn every 2 hours when in bed.

## 2017-04-03 NOTE — CARE PLAN
"Problem: Safety  Goal: Will remain free from injury  Outcome: PROGRESSING AS EXPECTED  Pt able to answer orientation question but sometimes starts to get out of bed without assistance. Pt is a 1 assist. Room near nursing station not available, assessed frequently, bed alarm in place.     Problem: Communication  Goal: The ability to communicate needs accurately and effectively will improve  Outcome: PROGRESSING AS EXPECTED  Pt occasionally says odd things like, \"did I leave something in the oven.\" But then is able to say she is at [facility]. Pt reoriented frequently. One on one discussion provided.         "

## 2017-04-03 NOTE — PROGRESS NOTES
Assumed care of patient at 0700. Patient is alert and oriented, respirations are unlabored and regular, patient sitting in chair at this time, states she doesn't feel good because of the pain, repositioned for comfort. Dr. Pemberton was notified of patient blood pressure of 71/49, orders given to bolus 500mL normal saline, stat CBC and hold all blood pressure medications. Pedal pulse bilateral heard with doppler.  Wound vacs in place in bilateral groin. Patient states no further needs at this time.

## 2017-04-04 ENCOUNTER — APPOINTMENT (OUTPATIENT)
Dept: RADIOLOGY | Facility: MEDICAL CENTER | Age: 72
DRG: 269 | End: 2017-04-04
Attending: SURGERY
Payer: MEDICARE

## 2017-04-04 VITALS
HEIGHT: 61 IN | TEMPERATURE: 97.5 F | BODY MASS INDEX: 17.61 KG/M2 | SYSTOLIC BLOOD PRESSURE: 102 MMHG | OXYGEN SATURATION: 92 % | RESPIRATION RATE: 17 BRPM | WEIGHT: 93.25 LBS | HEART RATE: 96 BPM | DIASTOLIC BLOOD PRESSURE: 64 MMHG

## 2017-04-04 PROCEDURE — 700102 HCHG RX REV CODE 250 W/ 637 OVERRIDE(OP): Performed by: SURGERY

## 2017-04-04 PROCEDURE — A9270 NON-COVERED ITEM OR SERVICE: HCPCS | Performed by: SURGERY

## 2017-04-04 PROCEDURE — 700111 HCHG RX REV CODE 636 W/ 250 OVERRIDE (IP): Performed by: SURGERY

## 2017-04-04 PROCEDURE — 74020 DX-ABDOMEN-2 VIEWS: CPT

## 2017-04-04 RX ORDER — SENNA AND DOCUSATE SODIUM 50; 8.6 MG/1; MG/1
1 TABLET, FILM COATED ORAL 2 TIMES DAILY
Qty: 40 TAB | Refills: 1 | Status: ON HOLD | OUTPATIENT
Start: 2017-04-04 | End: 2017-06-03

## 2017-04-04 RX ADMIN — HEPARIN SODIUM 5000 UNITS: 5000 INJECTION, SOLUTION INTRAVENOUS; SUBCUTANEOUS at 08:31

## 2017-04-04 RX ADMIN — OXYCODONE HYDROCHLORIDE AND ACETAMINOPHEN 1 TABLET: 5; 325 TABLET ORAL at 05:45

## 2017-04-04 RX ADMIN — CARVEDILOL 6.25 MG: 6.25 TABLET, FILM COATED ORAL at 08:30

## 2017-04-04 ASSESSMENT — PAIN SCALES - GENERAL
PAINLEVEL_OUTOF10: 3
PAINLEVEL_OUTOF10: 7
PAINLEVEL_OUTOF10: 7
PAINLEVEL_OUTOF10: 3

## 2017-04-04 NOTE — CARE PLAN
Problem: Pain Management  Goal: Pain level will decrease to patient’s comfort goal  Intervention: Follow pain managment plan developed in collaboration with patient and Interdisciplinary Team  Patient requesting oral pain medication as needed.       Problem: Mobility  Goal: Risk for activity intolerance will decrease  Patient ambulating with staff.

## 2017-04-04 NOTE — DISCHARGE INSTRUCTIONS
Discharge Instructions    Discharged to home by car with relative. Discharged via wheelchair, hospital escort: Yes.  Special equipment needed: Walker    Be sure to schedule a follow-up appointment with your primary care doctor or any specialists as instructed.     Discharge Plan:   Diet Plan: Discussed  Activity Level: Discussed  Smoking Cessation Offered: Patient Refused  Confirmed Follow up Appointment: Patient to Call and Schedule Appointment  Confirmed Symptoms Management: Discussed  Medication Reconciliation Updated: Yes  Influenza Vaccine Indication: Not indicated: Previously immunized this influenza season and > 8 years of age (September 2016)    I understand that a diet low in cholesterol, fat, and sodium is recommended for good health. Unless I have been given specific instructions below for another diet, I accept this instruction as my diet prescription.   Other diet: Regular as tolerated    Special Instructions:   1) Act: As tolerated except no lifting more than 10lbs.   2) Resume home meds except stop taking Lisinopril. Hold Coreg for SBP < 110.   3) Come to Dr. Pemberton's office this Friday morning to have Prevena dressings removed and to be given follow-up appointment with me in 2 weeks.   4) Stop smoking.    · Is patient discharged on Warfarin / Coumadin?   No     · Is patient Post Blood Transfusion?  No    Depression / Suicide Risk    As you are discharged from this [facility] facility, it is important to learn how to keep safe from harming yourself.    Recognize the warning signs:  · Abrupt changes in personality, positive or negative- including increase in energy   · Giving away possessions  · Change in eating patterns- significant weight changes-  positive or negative  · Change in sleeping patterns- unable to sleep or sleeping all the time   · Unwillingness or inability to communicate  · Depression  · Unusual sadness, discouragement and loneliness  · Talk of wanting to die  · Neglect of personal  appearance   · Rebelliousness- reckless behavior  · Withdrawal from people/activities they love  · Confusion- inability to concentrate     If you or a loved one observes any of these behaviors or has concerns about self-harm, here's what you can do:  · Talk about it- your feelings and reasons for harming yourself  · Remove any means that you might use to hurt yourself (examples: pills, rope, extension cords, firearm)  · Get professional help from the community (Mental Health, Substance Abuse, psychological counseling)  · Do not be alone:Call your Safe Contact- someone whom you trust who will be there for you.  · Call your local CRISIS HOTLINE 153-0223 or 389-091-3224  · Call your local Children's Mobile Crisis Response Team [place] (890) 766-8934 or www.Goodoc  · Call the toll free National Suicide Prevention Hotlines   · National Suicide Prevention Lifeline 345-303-ZNXS (1522)  · [employer] [facility] Network 800-ASKXYUL (465-0879)      Abdominal Aortic Aneurysm Endograft Repair  Abdominal aortic aneurysm endograft repair is a procedure to fix an abdominal aortic aneurysm. An aneurysm is a weakened or damaged part of an artery wall that bulges out from the normal force of blood pumping through the body. An abdominal aortic aneurysm is an aneurysm that occurs in the lower part of the aorta, the main artery of the body.   The repair procedure is often done if the aneurysm gets so large that it might burst (rupture). A ruptured aneurysm would cause bleeding inside the body that is life threatening. Before that happens, this procedure is needed to fix the condition. The procedure may also be done if the aneurysm causes symptoms such as pain in the back, abdomen, or side. In this procedure, a tube made up of fabric supported by a metal mesh (endograft or stent-graft) is placed in the weakened part of the aorta to repair the area. This procedure may take 1-3 hours.   LET YOUR HEALTH CARE PROVIDER KNOW  ABOUT:  · Any allergies you have.    · All medicines you are taking, including vitamins, herbs, eye drops, creams, and over-the-counter medicines.  · Any steroids you are using (by mouth or as creams).    · Previous problems you or members of your family have had with the use of anesthetics.    · Any blood disorders or history of blood clots.    · Previous surgeries you have had.    · Other health problems you have.  · Possibility of pregnancy, if this applies.    RISKS AND COMPLICATIONS  Generally, endograft repair of an abdominal aortic aneurysm is a safe procedure. However, as with any surgical procedure, complications can occur. Possible complications include:  · Leaking of blood around the endograft.  · Infection.  · Damage to surrounding nerves, tissues, or structures.    · Displacement of the endograft away from the proper location.    · Blockage of blood flow through the graft.  · Blood clots.    · Kidney problems.  · Blockage of blood flow to the legs (rare).   · Rupture of the aorta even after successful endograft repair (rare).    BEFORE THE PROCEDURE   · You may need to have blood tests, a test to check heart rhythm (electrocardiography), or a test to check blood flow (angiography) done before the day of the procedure.  · Imaging tests will be done to help determine the size and location of the aneurysm. This could include ultrasonography, a CT scan, or an MRI.   · Ask your health care provider about changing or stopping your regular medicines.  · Do not eat or drink anything for at least 8 hours before the procedure. Ask your health care provider if it is okay to take any needed medicines with a sip of water.  · Do not smoke for as long as possible before the surgery.  · Make plans to have someone drive you home after your hospital stay.  PROCEDURE   · You will be given medicine to help you relax (sedative). You may also be given medicine to make you sleep through the procedure (general anesthetic) or  medicine to numb the affected area of the body (local or regional anesthetic). Medicine may be given through an intravenous (IV) access tube that is put into one of your veins.    · The groin area will be washed and shaved.    · Small cuts (incisions) are usually made on both sides of the groin. Long, thin tubes (catheters) are passed through these incisions, inserted into the artery in your thigh, and moved up into the aneurysm in the aorta.    · The health care provider uses live X-ray pictures to guide the endograft through the catheter to the site of the aneurysm.  · The endograft is released to seal off the aneurysm and line the aorta. It prevents blood from flowing into the aneurysm and helps prevent it from rupturing. The placement of the endograft is permanent.  · X-rays are used to check the position of the endograft and confirm proper placement.  · The catheters are taken out, and the incisions are closed with stitches.  AFTER THE PROCEDURE   · You will need to lie flat for several hours. Bending the leg that has the insertion site can cause it to bleed and swell.  · You will then be encouraged to move around several times a day and to gradually increase activity.    · Your blood pressure and pulse (vital signs) will be checked often.  · You will receive medicines to control pain.  · Certain tests may be done to check the function and location of the endograft after your procedure.  · You will need to stay in the hospital for about 1-4 days.       This information is not intended to replace advice given to you by your health care provider. Make sure you discuss any questions you have with your health care provider.     Document Released: 05/06/2010 Document Revised: 08/20/2014 Document Reviewed: 05/09/2014  [employer] Interactive Patient Education ©2016 [employer]      Vacuum-Assisted Closure Therapy Home Guide  Vacuum-assisted closure therapy (VAC therapy) is a device that helps wounds heal. It is used  on wounds that cannot be closed with stitches. They often heal slowly. VAC therapy helps the wound stay clean and healthy while its edges slowly grow back together.  VAC therapy uses a bandage (dressing) that is made of foam. It is put inside the wound. Then, a drape is placed over the wound. This drape sticks to your skin (adhesive) to keep air out. A tube is hooked up to a small pump and is attached to the drape. The pump sucks fluid and germs from the wound. It can also decrease any bad smell that comes from the wound.  RISKS AND COMPLICATIONS  VAC therapy is usually safe to use at home. Your skin may get sore from the adhesive drape. That is the most common problem. However, more serious problems can develop, such as:   · Bleeding. This can happen if the dressing in the wound comes into contact with blood vessels. A little bleeding may occur when the dressing is being changed. This is normal now and then. Major bleeding can happen if a large blood vessel breaks. This is more likely if you are taking blood-thinning medicine. Emergency surgery may be needed.  · Infection. This can happen if the dressing has an air leak that is not repaired within a couple of hours.  · Dehydration. This can happen if the pump sucks out too much body fluid.  DRESSING CHANGES  Your dressing will have to be changed. Sometimes this is needed once a day. Other times, a dressing change must be done 3 times a week. How often you change your dressing will depend on what your wound is like. A trained caregiver will most likely change the dressing. However, a family member or friend may be trained to change the dressing. Below are steps to change a dressing in order to prevent an infection. The steps apply to you or the person that changes your dressing.  · Wash your hands with soap and water before and after each dressing change.  · Wear gloves and protective clothing. This may include eye protection.  · Do not allow anyone to change your  dressing if they have an infection or a skin condition. Even a small cut can be a problem.  To change the dressing:   · Turn off the pump.  · Take off the adhesive drape.  · Disconnect the tube from the dressing.  · Take out the dressing that is inside the wound. If the dressing sticks, use a germ-free (sterile), saltwater solution to wet the dressing. This helps it come out more easily. If it hurts when the dressing is changed, take pain medicine 30 minutes before the dressing change.  · Cleanse the wound with normal saline or sterile water.  · Apply a skin barrier film to the skin that will be covered with the drape. This will protect the skin.  · Put a new dressing into the wound.  · Apply a new drape and tube.  · Replace the container in the pump that collects fluid if it is full. Do this at least once per week.  · Turn the pump back on.  · Your doctor will decide what setting of suction is best. Do not change the settings on the machine without talking to your nurse or doctor.  HOME CARE INSTRUCTIONS   · The VAC pump has an alarm. It goes off if there are any problems such as a leak.  · Ask your caregiver what to do if the alarm goes off.  · Call your caregiver right away if the alarm goes off and you cannot fix the problem.  · Do not turn off the pump for more than 2 hours.  · Check your wound carefully at each dressing change for signs of infection. Watch for redness, swelling, or any fluid leaking from the wound. If you develop an infection:  · You may have to stop VAC therapy.  · The wound will need to be cleaned and washed out.  · You will have to take antibiotic medicine.  · Ask your caregiver what activities you should or should not do while you are getting VAC therapy. This will depend on your particular wound.  · Ask if it is okay to turn off the pump so you can take a shower. If it is okay, make sure the wound is covered with plastic. The wound area must stay dry.  · Drink enough fluids to keep your  urine clear or pale yellow.  · Eat foods that contain a lot of protein. Examples are meat, poultry, seafood, eggs, nuts, beans, and peas. Protein can help your wound heal.  SEEK MEDICAL CARE IF:  · Your wound itches or hurts.  · Dressing changes are often painful or bleeding often occurs.  · You have a headache.  · You have diarrhea.  · You have a sore throat.  · You have a rash.  · You feel nauseous.  · You feel dizzy or weak.  SEEK IMMEDIATE MEDICAL CARE IF:   · You have very bad pain.  · You have bleeding that will not stop.  · Your wound smells bad.  · You have redness, swelling, or fluid leaking from your wound.  · Your alarm goes off and you do not know what to do.  · You have a fever.     This information is not intended to replace advice given to you by your health care provider. Make sure you discuss any questions you have with your health care provider.     Document Released: 03/11/2013 Document Reviewed: 03/11/2013  [employer] Interactive Patient Education ©2016 [employer]      Incentive Spirometer  An incentive spirometer is a tool that can help keep your lungs clear and active. This tool measures how well you are filling your lungs with each breath. Taking long, deep breaths may help reverse or decrease the chance of developing breathing (pulmonary) problems (especially infection) following:  · Surgery of the chest or abdomen.  · Surgery if you have a history of smoking or a lung problem.  · A long period of time when you are unable to move or be active.  BEFORE THE PROCEDURE   · If the spirometer includes an indicator to show your best effort, your nurse or respiratory therapist will set it to a desired goal.  · If possible, sit up straight or lean slightly forward. Try not to slouch.  · Hold the incentive spirometer in an upright position.  INSTRUCTIONS FOR USE   · Sit on the edge of your bed if possible, or sit up as far as you can in bed or on a chair.  · Hold the incentive spirometer in an upright  position.  · Breathe out normally.  · Place the mouthpiece in your mouth and seal your lips tightly around it.  · Breathe in slowly and as deeply as possible, raising the piston or the ball toward the top of the column.  · Hold your breath for 3-5 seconds or for as long as possible. Allow the piston or ball to fall to the bottom of the column.  · Remove the mouthpiece from your mouth and breathe out normally.  · Rest for a few seconds and repeat Steps 1 through 7 at least 10 times every 1-2 hours when you are awake. Take your time and take a few normal breaths between deep breaths.  · The spirometer may include an indicator to show your best effort. Use the indicator as a goal to work toward during each repetition.  · After each set of 10 deep breaths, practice coughing to be sure your lungs are clear. If you have an incision (the cut made at the time of surgery), support your incision when coughing by placing a pillow or rolled-up towels firmly against it.  Once you are able to get out of bed, walk around indoors and cough well. You may stop using the incentive spirometer when instructed by your caregiver.   RISKS AND COMPLICATIONS  · Breathing too quickly may cause dizziness. At an extreme, this could cause you to pass out. Take your time so you do not get dizzy or light-headed.  · If you are in pain, you may need to take or ask for pain medication before doing incentive spirometry. It is harder to take a deep breath if you are having pain.  AFTER USE  · Rest and breathe slowly and easily.  · It can be helpful to keep a log of your progress. Your caregiver can provide you with a simple table to help with this.  If you are using the spirometer at home, follow these instructions:  SEEK MEDICAL CARE IF:   · You are having difficultly using the spirometer.  · You have trouble using the spirometer as often as instructed.  · Your pain medication is not giving enough relief while using the spirometer.  · You develop  fever of 100.5°F (38.1°C) or higher.  SEEK IMMEDIATE MEDICAL CARE IF:   · You cough up bloody sputum that had not been present before.  · You develop fever of 102°F (38.9°C) or greater.  · You develop worsening pain at or near the incision site.  MAKE SURE YOU:   · Understand these instructions.  · Will watch your condition.  · Will get help right away if you are not doing well or get worse.     This information is not intended to replace advice given to you by your health care provider. Make sure you discuss any questions you have with your health care provider.     Document Released: 04/29/2008 Document Revised: 01/08/2016 Document Reviewed: 07/27/2015  [employer] Interactive Patient Education ©2016 [employer]      Fall Prevention in the Home   Falls can cause injuries and can affect people from all age groups. There are many simple things that you can do to make your home safe and to help prevent falls.  WHAT CAN I DO ON THE OUTSIDE OF MY HOME?  · Regularly repair the edges of walkways and driveways and fix any cracks.  · Remove high doorway thresholds.  · Trim any shrubbery on the main path into your home.  · Use bright outdoor lighting.  · Clear walkways of debris and clutter, including tools and rocks.  · Regularly check that handrails are securely fastened and in good repair. Both sides of any steps should have handrails.  · Install guardrails along the edges of any raised decks or porches.  · Have leaves, snow, and ice cleared regularly.  · Use sand or salt on walkways during winter months.  · In the garage, clean up any spills right away, including grease or oil spills.  WHAT CAN I DO IN THE BATHROOM?  · Use night lights.  · Install grab bars by the toilet and in the tub and shower. Do not use towel bars as grab bars.  · Use non-skid mats or decals on the floor of the tub or shower.  · If you need to sit down while you are in the shower, use a plastic, non-slip stool..  · Keep the floor dry. Immediately  clean up any water that spills on the floor.  · Remove soap buildup in the tub or shower on a regular basis.  · Attach bath mats securely with double-sided non-slip rug tape.  · Remove throw rugs and other tripping hazards from the floor.  WHAT CAN I DO IN THE BEDROOM?  · Use night lights.  · Make sure that a bedside light is easy to reach.  · Do not use oversized bedding that drapes onto the floor.  · Have a firm chair that has side arms to use for getting dressed.  · Remove throw rugs and other tripping hazards from the floor.  WHAT CAN I DO IN THE KITCHEN?   · Clean up any spills right away.  · Avoid walking on wet floors.  · Place frequently used items in easy-to-reach places.  · If you need to reach for something above you, use a sturdy step stool that has a grab bar.  · Keep electrical cables out of the way.  · Do not use floor polish or wax that makes floors slippery. If you have to use wax, make sure that it is non-skid floor wax.  · Remove throw rugs and other tripping hazards from the floor.  WHAT CAN I DO IN THE STAIRWAYS?  · Do not leave any items on the stairs.  · Make sure that there are handrails on both sides of the stairs. Fix handrails that are broken or loose. Make sure that handrails are as long as the stairways.  · Check any carpeting to make sure that it is firmly attached to the stairs. Fix any carpet that is loose or worn.  · Avoid having throw rugs at the top or bottom of stairways, or secure the rugs with carpet tape to prevent them from moving.  · Make sure that you have a light switch at the top of the stairs and the bottom of the stairs. If you do not have them, have them installed.  WHAT ARE SOME OTHER FALL PREVENTION TIPS?  · Wear closed-toe shoes that fit well and support your feet. Wear shoes that have rubber soles or low heels.  · When you use a stepladder, make sure that it is completely opened and that the sides are firmly locked. Have someone hold the ladder while you are using  it. Do not climb a closed stepladder.  · Add color or contrast paint or tape to grab bars and handrails in your home. Place contrasting color strips on the first and last steps.  · Use mobility aids as needed, such as canes, walkers, scooters, and crutches.  · Turn on lights if it is dark. Replace any light bulbs that burn out.  · Set up furniture so that there are clear paths. Keep the furniture in the same spot.  · Fix any uneven floor surfaces.  · Choose a carpet design that does not hide the edge of steps of a stairway.  · Be aware of any and all pets.  · Review your medicines with your healthcare provider. Some medicines can cause dizziness or changes in blood pressure, which increase your risk of falling.  Talk with your health care provider about other ways that you can decrease your risk of falls. This may include working with a physical therapist or  to improve your strength, balance, and endurance.     This information is not intended to replace advice given to you by your health care provider. Make sure you discuss any questions you have with your health care provider.     Document Released: 12/08/2003 Document Revised: 05/03/2016 Document Reviewed: 01/22/2016  [employer] Interactive Patient Education ©2016 [employer]

## 2017-04-04 NOTE — PROGRESS NOTES
Assumed care of patient. Patient denying pain at this time. Bilateral groin incisions with Provena wound vacs. Vacs intact. Bilateral post tib and pedal pulses found with doppler. Patient ambulating with staff. Abdominal X-ray ordered for this morning. No other needs at this time. Call light within reach.

## 2017-04-04 NOTE — CARE PLAN
Problem: Safety  Goal: Will remain free from injury  Outcome: PROGRESSING AS EXPECTED  Bed alarm in place. FWW 1 assist.     Problem: Communication  Goal: The ability to communicate needs accurately and effectively will improve  Outcome: PROGRESSING AS EXPECTED  Pt intermittently confused at night time. Starts to get out of bed without assistance. Reinforced calling for help.

## 2017-04-04 NOTE — PROGRESS NOTES
Discharge paperwork discussed and signed. All questions answered and prescriptions given. Patient verbalized understanding of when to return to ER or call MD. FWW has been delivered. Patient aware of follow up appointment on Friday for MD to remove Prevena vacs. Patient escorted out in wheelchair by staff.

## 2017-04-04 NOTE — PROGRESS NOTES
Report received, poc discussed, assumed care of pt.    Call light in reach, hourly rounding in place.    Pt gets up 1 assist with FWW.    Reg diet, poor intake, encouraged.  + void. No BM meds, will ask when MD rounds, pt states she had BM 4/1 after surgery, but was not documented by staff.    Flexeril and perc for pain.  No further needs.  Pt sleeping at this time.

## 2017-04-04 NOTE — FACE TO FACE
Face to Face Note  -  Durable Medical Equipment    Spring Pemberton M.D. - NPI: [id number]  I certify that this patient is under my care and that they had a durable medical equipment(DME)face to face encounter by myself that meets the physician DME face-to-face encounter requirements with this patient on:    Date of encounter:   Patient:                    MRN:                       YOB: 2016  Karen Jauregui  [id number]  1945     The encounter with the patient was in whole, or in part, for the following medical condition, which is the primary reason for durable medical equipment:  Other - walker    I certify that, based on my findings, the following durable medical equipment is medically necessary:  Walkers.    HOME O2 Saturation Measurements:(Values must be present for Home Oxygen orders)         ,     ,         My Clinical findings support the need for the above equipment due to:  Abnormal Gait    Supporting Symptoms: Post-op

## 2017-04-04 NOTE — PROGRESS NOTES
Surgical Progress Note    Author: Solisemily Pemberton Date & Time created: 4/3/2017   7:31 PM     Interval Events:  Low blood pressure earlier, asymptomatic.       Review of Systems   Musculoskeletal: Positive for back pain (Lower, but better).   All other systems reviewed and are negative.    Hemodynamics:  Temp (24hrs), Av.7 °C (98.1 °F), Min:36.2 °C (97.1 °F), Max:37.1 °C (98.8 °F)  Temperature: 36.8 °C (98.3 °F)  Pulse  Av.1  Min: 57  Max: 103  Blood Pressure : 142/63 mmHg     Respiratory:    Respiration: 16, Pulse Oximetry: 99 %        RUL Breath Sounds: Clear, RML Breath Sounds: Diminished, RLL Breath Sounds: Diminished, KISHA Breath Sounds: Clear, LLL Breath Sounds: Diminished  Neuro:  GCS       Fluids:    Intake/Output Summary (Last 24 hours) at 17 1931  Last data filed at 17 1758   Gross per 24 hour   Intake    880 ml   Output   1150 ml   Net   -270 ml        Current Diet Order   Procedures   • DIET ORDER     Physical Exam   Constitutional: She is oriented to person, place, and time.   Thin female in NAD.   Cardiovascular: Normal rate and regular rhythm.    Pulmonary/Chest: Effort normal and breath sounds normal.   Abdominal: Soft.   + Scars.  NO pulsatile mass.   Musculoskeletal:   Palpable bilateral PT pulses with strong Doppler flow signals.  Prevena's in place.   Neurological: She is alert and oriented to person, place, and time. She has normal reflexes.   Skin: Skin is warm and dry.   Psychiatric: She has a normal mood and affect. Her behavior is normal. Judgment and thought content normal.     Labs:  Recent Results (from the past 24 hour(s))   CBC WITH DIFFERENTIAL    Collection Time: 17  7:44 AM   Result Value Ref Range    WBC 10.2 4.8 - 10.8 K/uL    RBC 3.30 (L) 4.20 - 5.40 M/uL    Hemoglobin 9.8 (L) 12.0 - 16.0 g/dL    Hematocrit 29.8 (L) 37.0 - 47.0 %    MCV 90.3 81.4 - 97.8 fL    MCH 29.7 27.0 - 33.0 pg    MCHC 32.9 (L) 33.6 - 35.0 g/dL    RDW 48.9 35.9 - 50.0 fL    Platelet  Count 155 (L) 164 - 446 K/uL    MPV 10.4 9.0 - 12.9 fL    Neutrophils-Polys 81.90 (H) 44.00 - 72.00 %    Lymphocytes 9.40 (L) 22.00 - 41.00 %    Monocytes 6.90 0.00 - 13.40 %    Eosinophils 1.00 0.00 - 6.90 %    Basophils 0.50 0.00 - 1.80 %    Immature Granulocytes 0.30 0.00 - 0.90 %    Nucleated RBC 0.00 /100 WBC    Neutrophils (Absolute) 8.35 (H) 2.00 - 7.15 K/uL    Lymphs (Absolute) 0.96 (L) 1.00 - 4.80 K/uL    Monos (Absolute) 0.70 0.00 - 0.85 K/uL    Eos (Absolute) 0.10 0.00 - 0.51 K/uL    Baso (Absolute) 0.05 0.00 - 0.12 K/uL    Immature Granulocytes (abs) 0.03 0.00 - 0.11 K/uL    NRBC (Absolute) 0.00 K/uL     Medical Decision Making, by Problem:  Active Hospital Problems    Diagnosis   • Abdominal aortic aneurysm (CMS-[facility]) [I71.4]     Plan:  Improving.  Home tomorrow if continues to do well.    Quality Measures:          DVT Prophylaxis: Heparin  DVT prophylaxis - mechanical: SCDs            Discussed patient condition with patient.

## 2017-04-05 NOTE — DOCUMENTATION QUERY
DOCUMENTATION QUERY    PROVIDERS: Please select “Cosign w/ note”to reply to query.    To better represent the severity of illness of your patient, please review the following information and exercise your independent professional judgment in responding to this query.     A BMI of 17.63 is documented in the Progress Notes. Based upon the clinical findings, risk factors, and treatment, can a diagnosis be provided to support this finding?    • Underweight  • Cachexia  • Unable to determine  • Findings of no clinical significance  • Other explanation of clinical findings    The medical record reflects the following:   Clinical Findings 4/3 progress note @ 3:54 pm: BMI of 17.63 is documented.   Treatment  Dietary consult   Risk Factors  Age   Location within medical record  Progress Notes     Thank you,   Janet Prasad RN  Clinical Documentation Improvement  [phone]

## 2017-04-06 ENCOUNTER — TELEPHONE (OUTPATIENT)
Dept: MEDICAL GROUP | Facility: CLINIC | Age: 72
End: 2017-04-06

## 2017-04-06 DIAGNOSIS — I10 ESSENTIAL HYPERTENSION: ICD-10-CM

## 2017-04-06 RX ORDER — LISINOPRIL AND HYDROCHLOROTHIAZIDE 20; 12.5 MG/1; MG/1
1 TABLET ORAL EVERY EVENING
Qty: 30 TAB | Refills: 6 | Status: SHIPPED | OUTPATIENT
Start: 2017-04-06 | End: 2017-04-07 | Stop reason: CLARIF

## 2017-04-06 NOTE — DISCHARGE SUMMARY
ADMITTING DIAGNOSES:  1.  Enlarging infrarenal abdominal aortic aneurysm, now 5 cm in size.  2.  Hypertension.  3.  Dyslipidemia.  4.  Chronic obstructive pulmonary disease.  5.  Tobacco use.  6.  Peripheral arterial disease.  7.  Severe right iliac stenosis, diffusively.  8.  Chronic lower back pain.    DISCHARGE DIAGNOSES:  1.  Enlarging infrarenal abdominal aortic aneurysm, now 5 cm in size.  2.  Hypertension.  3.  Dyslipidemia.  4.  Chronic obstructive pulmonary disease.  5.  Tobacco use.  6.  Peripheral arterial disease.  7.  Severe right iliac stenosis, diffusively.  8.  Chronic lower back pain.\  9.  Underweight.    PROCEDURES:  1.  Bilateral femoral artery exposure.  2.  Endovascular repair of enlarging 5 cm infrarenal abdominal aortic aneurysm   using Endurant [id number] devices.  3.  Placement of left iliac extension.  4.  Left iliac stenting.  5.  Placement of right iliac occlusive device.  6.  Femorofemoral bypass.  7.  Underweight.    These procedures were performed on 03/31/2017.    HOSPITAL COURSE:  The patient is a pleasant lady with multiple medical   problems who underwent above procedures for an enlarging of 5 cm infrarenal   abdominal aortic aneurysm as well as severe diffuse right iliac artery   stenosis.  She tolerated the procedure well with minimal blood loss and was   admitted to the floor postoperatively.  She continued to progress, although   slowly secondary to her being frail.  By postop day #4, she was able to   ambulate independently with a walker.  She was able to tolerate diet and   having regular bowel movements.  Her incisions remained clean, dry, and intact   with no evidence of infection.  Her posterior tibial pulses were strongly   palpable with multiphasic Doppler flow signals.  She would like to go home and   was therefore discharged home.    DISCHARGE INSTRUCTIONS:  1.  Medication:  Resume home medication except discontinue lisinopril and to   hold Coreg for systolic  blood pressure less than 110.  The patient was   instructed to resume aspirin and Plavix at home.  She was prescribed   Rebecca-Colace for her chronic constipation.  She is on chronic pain medication   at home for her lower back.    ACTIVITY:  As tolerated except no lifting more than 10 pounds.    DISCHARGE INSTRUCTIONS:  The patient was instructed to come by my office this   Friday to have the Prevena dressing was removed.    The patient was also instructed to call my office for any problems.  She was   strongly recommended to stop smoking and to follow up with her primary care   provider for continuation of risk factor modification treatment program.    CONDITION AT THE TIME OF DISCHARGE:  Stable.       ____________________________________     MD LAURA CASTAÑEDA / ABDIEL    DD:  04/05/2017 11:23:59  DT:  04/05/2017 19:19:35    D#:  [id number]  Job#:  [id number]

## 2017-04-06 NOTE — TELEPHONE ENCOUNTER
Was the patient seen in the last year in this department? Yes     Does patient have an active prescription for medications requested? No     Received Request Via: Patient    Patient requesting discontinued medication

## 2017-04-07 RX ORDER — LISINOPRIL 10 MG/1
10 TABLET ORAL DAILY
Qty: 30 TAB | Refills: 6 | Status: SHIPPED | OUTPATIENT
Start: 2017-04-07 | End: 2017-11-22 | Stop reason: SDUPTHER

## 2017-04-07 NOTE — TELEPHONE ENCOUNTER
1. Caller Name: Karen Jauregui                      Call Back Number: [phone] (home)     2. Message: pt called stating her lisinopril 10 mg was changed to lisinopril-hydrochlorothiazide (PRINZIDE, ZESTORETIC) 20-12.5 MG per.  Is this a mistake? because that she knows of she shoul be on lisinopril 10 mg only nothing else, Dr. Spring Pemberton didn't mention anything about changing her med's after surgery. Could Dr. Julio please check this medications for her before she starts taking it, wants to make sure is safe enough to take. Thank you     3. Patient approves office to leave a detailed voicemail/MyChart message: yes

## 2017-04-19 ENCOUNTER — OFFICE VISIT (OUTPATIENT)
Dept: MEDICAL GROUP | Facility: CLINIC | Age: 72
End: 2017-04-19
Payer: MEDICARE

## 2017-04-19 VITALS
TEMPERATURE: 97.8 F | DIASTOLIC BLOOD PRESSURE: 86 MMHG | HEART RATE: 106 BPM | SYSTOLIC BLOOD PRESSURE: 146 MMHG | OXYGEN SATURATION: 95 % | HEIGHT: 60 IN | WEIGHT: 98 LBS | BODY MASS INDEX: 19.24 KG/M2 | RESPIRATION RATE: 16 BRPM

## 2017-04-19 DIAGNOSIS — M79.604 RIGHT LEG PAIN: ICD-10-CM

## 2017-04-19 DIAGNOSIS — R53.1 WEAKNESS: ICD-10-CM

## 2017-04-19 DIAGNOSIS — Z98.890 S/P ABDOMINAL AORTIC ANEURYSM REPAIR: ICD-10-CM

## 2017-04-19 DIAGNOSIS — G89.29 CHRONIC RIGHT-SIDED LOW BACK PAIN WITHOUT SCIATICA: ICD-10-CM

## 2017-04-19 DIAGNOSIS — Z86.79 S/P ABDOMINAL AORTIC ANEURYSM REPAIR: ICD-10-CM

## 2017-04-19 DIAGNOSIS — M54.50 CHRONIC RIGHT-SIDED LOW BACK PAIN WITHOUT SCIATICA: ICD-10-CM

## 2017-04-19 DIAGNOSIS — R63.6 UNDERWEIGHT: ICD-10-CM

## 2017-04-19 DIAGNOSIS — R53.81 DEBILITY: ICD-10-CM

## 2017-04-19 PROCEDURE — G8420 CALC BMI NORM PARAMETERS: HCPCS | Performed by: NURSE PRACTITIONER

## 2017-04-19 PROCEDURE — 99213 OFFICE O/P EST LOW 20 MIN: CPT | Performed by: NURSE PRACTITIONER

## 2017-04-19 PROCEDURE — G8598 ASA/ANTIPLAT THER USED: HCPCS | Performed by: NURSE PRACTITIONER

## 2017-04-19 PROCEDURE — 4040F PNEUMOC VAC/ADMIN/RCVD: CPT | Performed by: NURSE PRACTITIONER

## 2017-04-19 PROCEDURE — 4004F PT TOBACCO SCREEN RCVD TLK: CPT | Performed by: NURSE PRACTITIONER

## 2017-04-19 PROCEDURE — 3014F SCREEN MAMMO DOC REV: CPT | Mod: 8P | Performed by: NURSE PRACTITIONER

## 2017-04-19 PROCEDURE — 1111F DSCHRG MED/CURRENT MED MERGE: CPT | Performed by: NURSE PRACTITIONER

## 2017-04-19 PROCEDURE — 1101F PT FALLS ASSESS-DOCD LE1/YR: CPT | Performed by: NURSE PRACTITIONER

## 2017-04-19 PROCEDURE — G8432 DEP SCR NOT DOC, RNG: HCPCS | Performed by: NURSE PRACTITIONER

## 2017-04-19 ASSESSMENT — PAIN SCALES - GENERAL: PAINLEVEL: 10=SEVERE PAIN

## 2017-04-20 ENCOUNTER — HOME HEALTH ADMISSION (OUTPATIENT)
Dept: HOME HEALTH SERVICES | Facility: HOME HEALTHCARE | Age: 72
End: 2017-04-20
Payer: MEDICARE

## 2017-04-20 NOTE — PROGRESS NOTES
"CC: Back Pain        HPI:     Karen presents today for the followin. Chronic right-sided low back pain without sciatica/Right leg pain  With complaints related to some chronic low back pain (has prescription for daily hydrocodone related to this ) however new complaint of right leg pain that goes from the groin down to the anterior aspect of the leg. Describes \"charley horse\" type cramping around the knee area.  This did initially present after surgery in the hospital.    She was told by her vascular surgeon that she could go back and discuss this with her neurosurgeon if needed-did not feel this is related to vascular component/recent surgery. Previously she was told by her neurosurgeon she could pursue another back surgery. She is not interested in pursuing this.  No reports of bowel or bladder dysfunction    2. S/p abdominal aneurysm repair  Recently had surgery for a repair for a infrarenal abdominal aneurysm, and a femorofemoral bypass.  Was in the hospital for several days afterwards-Recuperated well however slowly related to frailty.  Had appointment yesterday-doing well with her incisions.  Vascular exam was normal in his office yesterday    3. Debility/ Weakness/underweight  Since patient's surgery she has been having some weakness and debility mostly related from frailty and some decompensation and strength over the last several weeks since her surgery. She is eating and doing well. She eats about 2 meals a day one of these is maybe only a partial male. Previously used to drink boost daily however no longer does this anymore. Doesn't have a reason why.    She has been less active since her surgery partially due to rest/recuperation however also because she is just weaker after her hospital stay.      Current Outpatient Prescriptions   Medication Sig Dispense Refill   • lisinopril (PRINIVIL) 10 MG Tab Take 1 Tab by mouth every day. 30 Tab 6   • sennosides-docusate sodium (SENOKOT-S) 8.6-50 MG " tablet Take 1 Tab by mouth 2 times a day. 40 Tab 1   • hydrocodone-acetaminophen (NORCO) 5-325 MG Tab per tablet Take 1 Tab by mouth every 6 hours as needed (back and neck pain). Seen in office 3 /13/17 .renewal of chronic medication, used episodically 90 Tab 0   • carvedilol (COREG) 6.25 MG Tab Take 1 Tab by mouth 2 times a day, with meals. 60 Tab 5   • clopidogrel (PLAVIX) 75 MG Tab Take 75 mg by mouth every evening. Stop 7 days before surgery per Dr. Pemberton order     • atorvastatin (LIPITOR) 80 MG tablet Take 80 mg by mouth every day.     • aspirin 81 MG tablet Take 81 mg by mouth every evening. Continue to take up to surgery per Dr. Pemberton order. Last dose 3/30 pm       No current facility-administered medications for this visit.     Social History   Substance Use Topics   • Smoking status: Current Every Day Smoker -- 0.50 packs/day for 45 years     Types: Cigarettes   • Smokeless tobacco: Never Used      Comment: on nicotine patches, smokes off and on   • Alcohol Use: No     I reviewed patients allergies, problem list and medications today in [facility].    ROS: Any/all pertinent positives listed in the HPI, otherwise all others reviewed are negative today.      /86 mmHg  Pulse 106  Temp(Src) 36.6 °C (97.8 °F)  Resp 16  Ht 1.524 m (5')  Wt 44.453 kg (98 lb)  BMI 19.14 kg/m2  SpO2 95%  LMP 03/01/1997  Breastfeeding? No    Exam:    Gen: Alert and oriented, No apparent distress. WDWN  Psych: A+Ox3, normal affect and mood  Skin: Warm, dry and intact. Good turgor   No rashes in visible areas.  Eye: Conjunctiva clear, lids normal  ENMT: Lips without lesions, good dentition  Lungs: Clear to auscultation bilaterally, no rales or rhonchi   Unlabored respiratory effort.   CV: Regular rate and rhythm, S1, S2. No murmurs.   No Edema  Abd: Soft non tender, non distended. Normal active bowel sounds. Visible femoral artery graft in the lower abdomen  Inguinal incisions are healing. Dry, intact without any signs of  infection, mildly tender in the inguinal areas.  She states she has a general ache in the pelvic area which has been improving since surgery.     She has patellar and Achilles DTRs are 2+ and symmetrical bilaterally. Strength of lower extremities are 5 out of 5 and symmetrical bilaterally.  Negative straight leg raise bilaterally. Her right leg is slightly weaker when she did try and raise off the table the right leg. Wobble slightly.     no sign of vascular compromise with the extremities    There is no gross deformity, erythema or edema of the extremity.    Assessment and Plan.   71 y.o. female with the following issues.    1. Chronic right-sided low back pain without sciatica/ Right leg pain  Stable.  Suspect this is more related to prolonged sitting and laying and a known hx of DDD.  Watchingful waiting on this.     2. S/p abdominal aneurysm repair   stable. Has routine surgical follow-up with vascular surgeon about 2 weeks.   Importance of proper nutrition.    3. Debility/Weakness/underweight  Stable. Will have home health come out and help her work on some strength with some physical therapy. Long discussion again about nutrition and getting some weight on her. Protein with her meals. Patient declines the need for any DME at this time to help with ambulation.  She is off work for about another month  - REFERRAL TO HOME HEALTH

## 2017-04-22 ENCOUNTER — HOME CARE VISIT (OUTPATIENT)
Dept: HOME HEALTH SERVICES | Facility: HOME HEALTHCARE | Age: 72
End: 2017-04-22
Payer: MEDICARE

## 2017-04-22 VITALS
WEIGHT: 91.13 LBS | HEIGHT: 60 IN | TEMPERATURE: 99.1 F | SYSTOLIC BLOOD PRESSURE: 128 MMHG | HEART RATE: 101 BPM | BODY MASS INDEX: 17.89 KG/M2 | DIASTOLIC BLOOD PRESSURE: 80 MMHG | RESPIRATION RATE: 18 BRPM

## 2017-04-22 PROCEDURE — 665001 SOC-HOME HEALTH

## 2017-04-22 PROCEDURE — 665999 HH PPS REVENUE DEBIT

## 2017-04-22 PROCEDURE — A6250 SKIN SEAL PROTECT MOISTURIZR: HCPCS

## 2017-04-22 PROCEDURE — G0162 HHC RN E&M PLAN SVS, 15 MIN: HCPCS

## 2017-04-22 PROCEDURE — 665998 HH PPS REVENUE CREDIT

## 2017-04-22 SDOH — ECONOMIC STABILITY: HOUSING INSECURITY: UNSAFE APPLIANCES: 0

## 2017-04-22 SDOH — ECONOMIC STABILITY: HOUSING INSECURITY: UNSAFE COOKING RANGE AREA: 0

## 2017-04-22 ASSESSMENT — ENCOUNTER SYMPTOMS: MENTAL STATUS CHANGE: 0

## 2017-04-22 ASSESSMENT — ACTIVITIES OF DAILY LIVING (ADL)
OASIS_M1830: 03
HOME_HEALTH_OASIS: 01

## 2017-04-22 ASSESSMENT — PATIENT HEALTH QUESTIONNAIRE - PHQ9
2. FEELING DOWN, DEPRESSED, IRRITABLE, OR HOPELESS: 01
1. LITTLE INTEREST OR PLEASURE IN DOING THINGS: 00

## 2017-04-23 PROCEDURE — 665998 HH PPS REVENUE CREDIT

## 2017-04-23 PROCEDURE — 665999 HH PPS REVENUE DEBIT

## 2017-04-24 ENCOUNTER — HOME CARE VISIT (OUTPATIENT)
Dept: HOME HEALTH SERVICES | Facility: HOME HEALTHCARE | Age: 72
End: 2017-04-24
Payer: MEDICARE

## 2017-04-24 VITALS
RESPIRATION RATE: 16 BRPM | TEMPERATURE: 98.8 F | BODY MASS INDEX: 18.04 KG/M2 | DIASTOLIC BLOOD PRESSURE: 68 MMHG | SYSTOLIC BLOOD PRESSURE: 118 MMHG | WEIGHT: 92.38 LBS | HEART RATE: 87 BPM

## 2017-04-24 PROCEDURE — 665999 HH PPS REVENUE DEBIT

## 2017-04-24 PROCEDURE — 665998 HH PPS REVENUE CREDIT

## 2017-04-24 PROCEDURE — G0151 HHCP-SERV OF PT,EA 15 MIN: HCPCS

## 2017-04-24 PROCEDURE — G0299 HHS/HOSPICE OF RN EA 15 MIN: HCPCS

## 2017-04-24 SDOH — ECONOMIC STABILITY: HOUSING INSECURITY: UNSAFE APPLIANCES: 0

## 2017-04-24 SDOH — ECONOMIC STABILITY: HOUSING INSECURITY: UNSAFE COOKING RANGE AREA: 0

## 2017-04-24 ASSESSMENT — ACTIVITIES OF DAILY LIVING (ADL)
EATING_ASSISTANCE: 0
GROOMING_ASSISTANCE: 0
BATHING_ASSISTANCE: 0
SHOPPING_ASSISTANCE: 6
IADLS_COMMENTS: <!--EPICS-->SEE OT REPORT<!--EPICE-->
MEAL_PREP_ASSISTANCE: 5
TELEPHONE_ASSISTANCE: 0
ORAL_CARE_ASSISTANCE: 0
ADLS_COMMENTS: <!--EPICS-->SEE OT REPORT<!--EPICE-->
TOILETING_ASSISTANCE: 0
LAUNDRY_ASSISTANCE: 6
DRESSING_LB_ASSISTANCE: 0
HOUSEKEEPING_ASSISTANCE: 6
DRESSING_UB_ASSISTANCE: 0
TRANSPORTATION_ASSISTANCE: 6

## 2017-04-24 ASSESSMENT — ENCOUNTER SYMPTOMS
VOMITING: DNIES
NAUSEA: DENIES
DEBILITATING PAIN: 1

## 2017-04-25 VITALS
TEMPERATURE: 209.7 F | RESPIRATION RATE: 18 BRPM | HEART RATE: 87 BPM | WEIGHT: 92.6 LBS | BODY MASS INDEX: 18.08 KG/M2 | DIASTOLIC BLOOD PRESSURE: 68 MMHG | SYSTOLIC BLOOD PRESSURE: 118 MMHG

## 2017-04-25 PROCEDURE — 665999 HH PPS REVENUE DEBIT

## 2017-04-25 PROCEDURE — 665998 HH PPS REVENUE CREDIT

## 2017-04-26 PROCEDURE — 665998 HH PPS REVENUE CREDIT

## 2017-04-26 PROCEDURE — 665999 HH PPS REVENUE DEBIT

## 2017-04-27 ENCOUNTER — HOME CARE VISIT (OUTPATIENT)
Dept: HOME HEALTH SERVICES | Facility: HOME HEALTHCARE | Age: 72
End: 2017-04-27
Payer: MEDICARE

## 2017-04-27 VITALS
DIASTOLIC BLOOD PRESSURE: 84 MMHG | RESPIRATION RATE: 18 BRPM | HEART RATE: 91 BPM | SYSTOLIC BLOOD PRESSURE: 147 MMHG | WEIGHT: 93 LBS | BODY MASS INDEX: 18.16 KG/M2 | TEMPERATURE: 99.7 F

## 2017-04-27 PROCEDURE — G0299 HHS/HOSPICE OF RN EA 15 MIN: HCPCS

## 2017-04-27 PROCEDURE — 665998 HH PPS REVENUE CREDIT

## 2017-04-27 PROCEDURE — 665999 HH PPS REVENUE DEBIT

## 2017-04-27 ASSESSMENT — ENCOUNTER SYMPTOMS
VOMITING: DENIES
NAUSEA: DENIES

## 2017-04-27 ASSESSMENT — ACTIVITIES OF DAILY LIVING (ADL)
LAUNDRY_ASSISTANCE: 4
GROOMING_ASSISTANCE: 0
SHOPPING_ASSISTANCE: 6
TRANSPORTATION_ASSISTANCE: 6
TOILETING_ASSISTANCE: 0
BATHING_ASSISTANCE: 0
DRESSING_LB_ASSISTANCE: 0
ORAL_CARE_ASSISTANCE: 0
DRESSING_UB_ASSISTANCE: 0
TELEPHONE_ASSISTANCE: 0
MEAL_PREP_ASSISTANCE: 4
HOUSEKEEPING_ASSISTANCE: 4
EATING_ASSISTANCE: 0

## 2017-04-28 PROCEDURE — 665999 HH PPS REVENUE DEBIT

## 2017-04-28 PROCEDURE — 665998 HH PPS REVENUE CREDIT

## 2017-04-28 SDOH — ECONOMIC STABILITY: HOUSING INSECURITY: UNSAFE APPLIANCES: 0

## 2017-04-28 SDOH — ECONOMIC STABILITY: HOUSING INSECURITY: UNSAFE COOKING RANGE AREA: 0

## 2017-04-29 PROCEDURE — 665999 HH PPS REVENUE DEBIT

## 2017-04-29 PROCEDURE — 665998 HH PPS REVENUE CREDIT

## 2017-04-30 PROCEDURE — 665999 HH PPS REVENUE DEBIT

## 2017-04-30 PROCEDURE — 665998 HH PPS REVENUE CREDIT

## 2017-05-01 PROCEDURE — 665999 HH PPS REVENUE DEBIT

## 2017-05-01 PROCEDURE — 665998 HH PPS REVENUE CREDIT

## 2017-05-02 ENCOUNTER — HOME CARE VISIT (OUTPATIENT)
Dept: HOME HEALTH SERVICES | Facility: HOME HEALTHCARE | Age: 72
End: 2017-05-02
Payer: MEDICARE

## 2017-05-02 VITALS
HEART RATE: 95 BPM | TEMPERATURE: 98.9 F | BODY MASS INDEX: 17.58 KG/M2 | DIASTOLIC BLOOD PRESSURE: 69 MMHG | RESPIRATION RATE: 18 BRPM | WEIGHT: 90 LBS | SYSTOLIC BLOOD PRESSURE: 125 MMHG

## 2017-05-02 VITALS
TEMPERATURE: 98.9 F | RESPIRATION RATE: 18 BRPM | SYSTOLIC BLOOD PRESSURE: 125 MMHG | BODY MASS INDEX: 17.69 KG/M2 | HEART RATE: 96 BPM | WEIGHT: 90.6 LBS | DIASTOLIC BLOOD PRESSURE: 69 MMHG

## 2017-05-02 PROCEDURE — G0299 HHS/HOSPICE OF RN EA 15 MIN: HCPCS

## 2017-05-02 PROCEDURE — 665998 HH PPS REVENUE CREDIT

## 2017-05-02 PROCEDURE — G0151 HHCP-SERV OF PT,EA 15 MIN: HCPCS

## 2017-05-02 PROCEDURE — 665999 HH PPS REVENUE DEBIT

## 2017-05-02 SDOH — ECONOMIC STABILITY: HOUSING INSECURITY: UNSAFE APPLIANCES: 0

## 2017-05-02 SDOH — ECONOMIC STABILITY: HOUSING INSECURITY: UNSAFE COOKING RANGE AREA: 0

## 2017-05-02 ASSESSMENT — ACTIVITIES OF DAILY LIVING (ADL)
EATING_ASSISTANCE: 0
TELEPHONE_ASSISTANCE: 0
HOUSEKEEPING_ASSISTANCE: 5
BATHING_ASSISTANCE: 0
MEAL_PREP_ASSISTANCE: 4
SHOPPING_ASSISTANCE: 6
DRESSING_LB_ASSISTANCE: 0
GROOMING_ASSISTANCE: 0
TRANSPORTATION_ASSISTANCE: 6
LAUNDRY_ASSISTANCE: 4
TOILETING_ASSISTANCE: 0
ORAL_CARE_ASSISTANCE: 0
DRESSING_UB_ASSISTANCE: 0

## 2017-05-02 ASSESSMENT — ENCOUNTER SYMPTOMS
NAUSEA: OCCASIONALLY
VOMITING: DENIES

## 2017-05-03 PROCEDURE — 665998 HH PPS REVENUE CREDIT

## 2017-05-03 PROCEDURE — 665999 HH PPS REVENUE DEBIT

## 2017-05-03 PROCEDURE — G0180 MD CERTIFICATION HHA PATIENT: HCPCS | Performed by: FAMILY MEDICINE

## 2017-05-04 PROCEDURE — 665999 HH PPS REVENUE DEBIT

## 2017-05-04 PROCEDURE — 665998 HH PPS REVENUE CREDIT

## 2017-05-05 ENCOUNTER — HOME CARE VISIT (OUTPATIENT)
Dept: HOME HEALTH SERVICES | Facility: HOME HEALTHCARE | Age: 72
End: 2017-05-05
Payer: MEDICARE

## 2017-05-05 VITALS
WEIGHT: 90 LBS | RESPIRATION RATE: 18 BRPM | DIASTOLIC BLOOD PRESSURE: 61 MMHG | HEART RATE: 86 BPM | SYSTOLIC BLOOD PRESSURE: 120 MMHG | BODY MASS INDEX: 17.58 KG/M2 | TEMPERATURE: 99.2 F

## 2017-05-05 PROCEDURE — 665999 HH PPS REVENUE DEBIT

## 2017-05-05 PROCEDURE — 665998 HH PPS REVENUE CREDIT

## 2017-05-05 PROCEDURE — G0299 HHS/HOSPICE OF RN EA 15 MIN: HCPCS

## 2017-05-05 SDOH — ECONOMIC STABILITY: HOUSING INSECURITY: UNSAFE APPLIANCES: 0

## 2017-05-05 SDOH — ECONOMIC STABILITY: HOUSING INSECURITY: UNSAFE COOKING RANGE AREA: 0

## 2017-05-05 ASSESSMENT — ACTIVITIES OF DAILY LIVING (ADL)
DRESSING_UB_ASSISTANCE: 0
LAUNDRY_ASSISTANCE: 4
HOUSEKEEPING_ASSISTANCE: 4
TELEPHONE_ASSISTANCE: 0
DRESSING_LB_ASSISTANCE: 0
BATHING_ASSISTANCE: 0
TOILETING_ASSISTANCE: 0
TRANSPORTATION_ASSISTANCE: 6
EATING_ASSISTANCE: 0
MEAL_PREP_ASSISTANCE: 0
ORAL_CARE_ASSISTANCE: 0
GROOMING_ASSISTANCE: 0
SHOPPING_ASSISTANCE: 6

## 2017-05-05 ASSESSMENT — ENCOUNTER SYMPTOMS
NAUSEA: DENIES
VOMITING: DENIES

## 2017-05-06 PROCEDURE — 665999 HH PPS REVENUE DEBIT

## 2017-05-06 PROCEDURE — 665998 HH PPS REVENUE CREDIT

## 2017-05-07 PROCEDURE — 665998 HH PPS REVENUE CREDIT

## 2017-05-07 PROCEDURE — 665999 HH PPS REVENUE DEBIT

## 2017-05-08 PROCEDURE — 665998 HH PPS REVENUE CREDIT

## 2017-05-08 PROCEDURE — 665999 HH PPS REVENUE DEBIT

## 2017-05-09 ENCOUNTER — HOME CARE VISIT (OUTPATIENT)
Dept: HOME HEALTH SERVICES | Facility: HOME HEALTHCARE | Age: 72
End: 2017-05-09
Payer: MEDICARE

## 2017-05-09 VITALS
RESPIRATION RATE: 16 BRPM | DIASTOLIC BLOOD PRESSURE: 76 MMHG | SYSTOLIC BLOOD PRESSURE: 138 MMHG | BODY MASS INDEX: 17.28 KG/M2 | WEIGHT: 88.5 LBS | HEART RATE: 90 BPM | TEMPERATURE: 99.5 F

## 2017-05-09 PROCEDURE — 665998 HH PPS REVENUE CREDIT

## 2017-05-09 PROCEDURE — G0151 HHCP-SERV OF PT,EA 15 MIN: HCPCS

## 2017-05-09 PROCEDURE — 665999 HH PPS REVENUE DEBIT

## 2017-05-09 PROCEDURE — G0299 HHS/HOSPICE OF RN EA 15 MIN: HCPCS

## 2017-05-09 SDOH — ECONOMIC STABILITY: HOUSING INSECURITY: UNSAFE COOKING RANGE AREA: 0

## 2017-05-09 SDOH — ECONOMIC STABILITY: HOUSING INSECURITY: UNSAFE APPLIANCES: 0

## 2017-05-09 ASSESSMENT — ACTIVITIES OF DAILY LIVING (ADL)
GROOMING_ASSISTANCE: 0
EATING_ASSISTANCE: 0
TOILETING_ASSISTANCE: 0
DRESSING_LB_ASSISTANCE: 0
SHOPPING_ASSISTANCE: 6
ORAL_CARE_ASSISTANCE: 0
HOUSEKEEPING_ASSISTANCE: 4
BATHING_ASSISTANCE: 0
TELEPHONE_ASSISTANCE: 0
DRESSING_UB_ASSISTANCE: 0
TRANSPORTATION_ASSISTANCE: 6
MEAL_PREP_ASSISTANCE: 4
LAUNDRY_ASSISTANCE: 4

## 2017-05-09 ASSESSMENT — ENCOUNTER SYMPTOMS
VOMITING: DENIES
NAUSEA: DENIES

## 2017-05-10 VITALS
TEMPERATURE: 98.9 F | HEART RATE: 94 BPM | RESPIRATION RATE: 16 BRPM | DIASTOLIC BLOOD PRESSURE: 73 MMHG | BODY MASS INDEX: 17.34 KG/M2 | SYSTOLIC BLOOD PRESSURE: 121 MMHG | WEIGHT: 88.8 LBS

## 2017-05-10 PROCEDURE — 665999 HH PPS REVENUE DEBIT

## 2017-05-10 PROCEDURE — 665998 HH PPS REVENUE CREDIT

## 2017-05-11 PROCEDURE — 665998 HH PPS REVENUE CREDIT

## 2017-05-11 PROCEDURE — 665999 HH PPS REVENUE DEBIT

## 2017-05-12 ENCOUNTER — HOME CARE VISIT (OUTPATIENT)
Dept: HOME HEALTH SERVICES | Facility: HOME HEALTHCARE | Age: 72
End: 2017-05-12

## 2017-05-12 ENCOUNTER — HOME CARE VISIT (OUTPATIENT)
Dept: HOME HEALTH SERVICES | Facility: HOME HEALTHCARE | Age: 72
End: 2017-05-12
Payer: MEDICARE

## 2017-05-12 PROCEDURE — 665998 HH PPS REVENUE CREDIT

## 2017-05-12 PROCEDURE — 665999 HH PPS REVENUE DEBIT

## 2017-05-13 PROCEDURE — 665999 HH PPS REVENUE DEBIT

## 2017-05-13 PROCEDURE — 665998 HH PPS REVENUE CREDIT

## 2017-05-14 PROCEDURE — 665998 HH PPS REVENUE CREDIT

## 2017-05-14 PROCEDURE — 665999 HH PPS REVENUE DEBIT

## 2017-05-15 ENCOUNTER — PATIENT MESSAGE (OUTPATIENT)
Dept: MEDICAL GROUP | Facility: CLINIC | Age: 72
End: 2017-05-15

## 2017-05-15 DIAGNOSIS — M48.061 SPINAL STENOSIS OF LUMBAR REGION: ICD-10-CM

## 2017-05-15 PROCEDURE — 665999 HH PPS REVENUE DEBIT

## 2017-05-15 PROCEDURE — 665998 HH PPS REVENUE CREDIT

## 2017-05-15 RX ORDER — HYDROCODONE BITARTRATE AND ACETAMINOPHEN 5; 325 MG/1; MG/1
1 TABLET ORAL EVERY 6 HOURS PRN
Qty: 90 TAB | Refills: 0 | Status: SHIPPED | OUTPATIENT
Start: 2017-05-15 | End: 2017-05-23

## 2017-05-16 ENCOUNTER — HOME CARE VISIT (OUTPATIENT)
Dept: HOME HEALTH SERVICES | Facility: HOME HEALTHCARE | Age: 72
End: 2017-05-16
Payer: MEDICARE

## 2017-05-16 PROCEDURE — 665999 HH PPS REVENUE DEBIT

## 2017-05-16 PROCEDURE — 665998 HH PPS REVENUE CREDIT

## 2017-05-16 ASSESSMENT — ACTIVITIES OF DAILY LIVING (ADL)
HOME_HEALTH_OASIS: 00
OASIS_M1830: 00

## 2017-05-17 PROCEDURE — 665999 HH PPS REVENUE DEBIT

## 2017-05-17 PROCEDURE — 665998 HH PPS REVENUE CREDIT

## 2017-05-18 PROCEDURE — 665999 HH PPS REVENUE DEBIT

## 2017-05-18 PROCEDURE — 665998 HH PPS REVENUE CREDIT

## 2017-05-19 PROCEDURE — 665999 HH PPS REVENUE DEBIT

## 2017-05-19 PROCEDURE — 665998 HH PPS REVENUE CREDIT

## 2017-05-20 PROCEDURE — 665999 HH PPS REVENUE DEBIT

## 2017-05-20 PROCEDURE — 665998 HH PPS REVENUE CREDIT

## 2017-05-21 PROCEDURE — 665998 HH PPS REVENUE CREDIT

## 2017-05-21 PROCEDURE — 665999 HH PPS REVENUE DEBIT

## 2017-05-22 PROCEDURE — 665998 HH PPS REVENUE CREDIT

## 2017-05-22 PROCEDURE — 665999 HH PPS REVENUE DEBIT

## 2017-05-23 ENCOUNTER — APPOINTMENT (OUTPATIENT)
Dept: RADIOLOGY | Facility: MEDICAL CENTER | Age: 72
DRG: 389 | End: 2017-05-23
Attending: EMERGENCY MEDICINE
Payer: MEDICARE

## 2017-05-23 ENCOUNTER — RESOLUTE PROFESSIONAL BILLING HOSPITAL PROF FEE (OUTPATIENT)
Dept: HOSPITALIST | Facility: MEDICAL CENTER | Age: 72
End: 2017-05-23
Payer: MEDICARE

## 2017-05-23 ENCOUNTER — HOSPITAL ENCOUNTER (INPATIENT)
Facility: MEDICAL CENTER | Age: 72
LOS: 2 days | DRG: 389 | End: 2017-05-25
Attending: EMERGENCY MEDICINE | Admitting: INTERNAL MEDICINE
Payer: MEDICARE

## 2017-05-23 ENCOUNTER — HOSPITAL ENCOUNTER (OUTPATIENT)
Dept: RADIOLOGY | Facility: MEDICAL CENTER | Age: 72
DRG: 389 | End: 2017-05-23
Attending: SURGERY
Payer: MEDICARE

## 2017-05-23 DIAGNOSIS — I71.40 ABDOMINAL AORTIC ANEURYSM WITHOUT RUPTURE (HCC): ICD-10-CM

## 2017-05-23 PROBLEM — K56.600 PARTIAL SMALL BOWEL OBSTRUCTION (HCC): Status: ACTIVE | Noted: 2017-05-23

## 2017-05-23 LAB
ALBUMIN SERPL BCP-MCNC: 3.5 G/DL (ref 3.2–4.9)
ALBUMIN/GLOB SERPL: 1.2 G/DL
ALP SERPL-CCNC: 95 U/L (ref 30–99)
ALT SERPL-CCNC: 17 U/L (ref 2–50)
ANION GAP SERPL CALC-SCNC: 7 MMOL/L (ref 0–11.9)
AST SERPL-CCNC: 18 U/L (ref 12–45)
BASOPHILS # BLD AUTO: 0.6 % (ref 0–1.8)
BASOPHILS # BLD: 0.07 K/UL (ref 0–0.12)
BILIRUB SERPL-MCNC: 0.4 MG/DL (ref 0.1–1.5)
BUN SERPL-MCNC: 15 MG/DL (ref 8–22)
CALCIUM SERPL-MCNC: 9.1 MG/DL (ref 8.5–10.5)
CHLORIDE SERPL-SCNC: 106 MMOL/L (ref 96–112)
CO2 SERPL-SCNC: 26 MMOL/L (ref 20–33)
CREAT SERPL-MCNC: 0.49 MG/DL (ref 0.5–1.4)
EOSINOPHIL # BLD AUTO: 0.09 K/UL (ref 0–0.51)
EOSINOPHIL NFR BLD: 0.7 % (ref 0–6.9)
ERYTHROCYTE [DISTWIDTH] IN BLOOD BY AUTOMATED COUNT: 46.8 FL (ref 35.9–50)
GFR SERPL CREATININE-BSD FRML MDRD: >60 ML/MIN/1.73 M 2
GLOBULIN SER CALC-MCNC: 2.9 G/DL (ref 1.9–3.5)
GLUCOSE SERPL-MCNC: 109 MG/DL (ref 65–99)
HCT VFR BLD AUTO: 40 % (ref 37–47)
HGB BLD-MCNC: 12.9 G/DL (ref 12–16)
IMM GRANULOCYTES # BLD AUTO: 0.04 K/UL (ref 0–0.11)
IMM GRANULOCYTES NFR BLD AUTO: 0.3 % (ref 0–0.9)
LIPASE SERPL-CCNC: 10 U/L (ref 11–82)
LYMPHOCYTES # BLD AUTO: 1.8 K/UL (ref 1–4.8)
LYMPHOCYTES NFR BLD: 14.3 % (ref 22–41)
MCH RBC QN AUTO: 28.6 PG (ref 27–33)
MCHC RBC AUTO-ENTMCNC: 32.3 G/DL (ref 33.6–35)
MCV RBC AUTO: 88.7 FL (ref 81.4–97.8)
MONOCYTES # BLD AUTO: 0.71 K/UL (ref 0–0.85)
MONOCYTES NFR BLD AUTO: 5.6 % (ref 0–13.4)
NEUTROPHILS # BLD AUTO: 9.91 K/UL (ref 2–7.15)
NEUTROPHILS NFR BLD: 78.5 % (ref 44–72)
NRBC # BLD AUTO: 0 K/UL
NRBC BLD AUTO-RTO: 0 /100 WBC
PLATELET # BLD AUTO: 279 K/UL (ref 164–446)
PMV BLD AUTO: 9.6 FL (ref 9–12.9)
POTASSIUM SERPL-SCNC: 3.7 MMOL/L (ref 3.6–5.5)
PROT SERPL-MCNC: 6.4 G/DL (ref 6–8.2)
RBC # BLD AUTO: 4.51 M/UL (ref 4.2–5.4)
SODIUM SERPL-SCNC: 139 MMOL/L (ref 135–145)
WBC # BLD AUTO: 12.6 K/UL (ref 4.8–10.8)

## 2017-05-23 PROCEDURE — 304562 HCHG STAT O2 MASK/CANNULA

## 2017-05-23 PROCEDURE — 99223 1ST HOSP IP/OBS HIGH 75: CPT | Mod: AI | Performed by: INTERNAL MEDICINE

## 2017-05-23 PROCEDURE — 80053 COMPREHEN METABOLIC PANEL: CPT

## 2017-05-23 PROCEDURE — 36415 COLL VENOUS BLD VENIPUNCTURE: CPT

## 2017-05-23 PROCEDURE — 96374 THER/PROPH/DIAG INJ IV PUSH: CPT

## 2017-05-23 PROCEDURE — 85025 COMPLETE CBC W/AUTO DIFF WBC: CPT

## 2017-05-23 PROCEDURE — 665998 HH PPS REVENUE CREDIT

## 2017-05-23 PROCEDURE — 700102 HCHG RX REV CODE 250 W/ 637 OVERRIDE(OP): Performed by: INTERNAL MEDICINE

## 2017-05-23 PROCEDURE — 74176 CT ABD & PELVIS W/O CONTRAST: CPT

## 2017-05-23 PROCEDURE — 700111 HCHG RX REV CODE 636 W/ 250 OVERRIDE (IP): Performed by: EMERGENCY MEDICINE

## 2017-05-23 PROCEDURE — A9270 NON-COVERED ITEM OR SERVICE: HCPCS | Performed by: INTERNAL MEDICINE

## 2017-05-23 PROCEDURE — 99285 EMERGENCY DEPT VISIT HI MDM: CPT

## 2017-05-23 PROCEDURE — 96375 TX/PRO/DX INJ NEW DRUG ADDON: CPT

## 2017-05-23 PROCEDURE — 700111 HCHG RX REV CODE 636 W/ 250 OVERRIDE (IP): Performed by: INTERNAL MEDICINE

## 2017-05-23 PROCEDURE — 665999 HH PPS REVENUE DEBIT

## 2017-05-23 PROCEDURE — 304561 HCHG STAT O2

## 2017-05-23 PROCEDURE — 83690 ASSAY OF LIPASE: CPT

## 2017-05-23 PROCEDURE — 770006 HCHG ROOM/CARE - MED/SURG/GYN SEMI*

## 2017-05-23 RX ORDER — POLYETHYLENE GLYCOL 3350 17 G/17G
1 POWDER, FOR SOLUTION ORAL
Status: DISCONTINUED | OUTPATIENT
Start: 2017-05-23 | End: 2017-05-25 | Stop reason: HOSPADM

## 2017-05-23 RX ORDER — HYDROCODONE BITARTRATE AND ACETAMINOPHEN 5; 325 MG/1; MG/1
1 TABLET ORAL EVERY 6 HOURS PRN
Status: DISCONTINUED | OUTPATIENT
Start: 2017-05-23 | End: 2017-05-25 | Stop reason: HOSPADM

## 2017-05-23 RX ORDER — CARVEDILOL 6.25 MG/1
6.25 TABLET ORAL 2 TIMES DAILY WITH MEALS
Status: DISCONTINUED | OUTPATIENT
Start: 2017-05-24 | End: 2017-05-23

## 2017-05-23 RX ORDER — HEPARIN SODIUM 5000 [USP'U]/ML
5000 INJECTION, SOLUTION INTRAVENOUS; SUBCUTANEOUS EVERY 8 HOURS
Status: DISCONTINUED | OUTPATIENT
Start: 2017-05-23 | End: 2017-05-25 | Stop reason: HOSPADM

## 2017-05-23 RX ORDER — ONDANSETRON 4 MG/1
4 TABLET, ORALLY DISINTEGRATING ORAL EVERY 4 HOURS PRN
Status: DISCONTINUED | OUTPATIENT
Start: 2017-05-23 | End: 2017-05-25 | Stop reason: HOSPADM

## 2017-05-23 RX ORDER — AMOXICILLIN 250 MG
2 CAPSULE ORAL 2 TIMES DAILY
Status: DISCONTINUED | OUTPATIENT
Start: 2017-05-23 | End: 2017-05-25 | Stop reason: HOSPADM

## 2017-05-23 RX ORDER — BISACODYL 10 MG
10 SUPPOSITORY, RECTAL RECTAL
Status: DISCONTINUED | OUTPATIENT
Start: 2017-05-23 | End: 2017-05-25 | Stop reason: HOSPADM

## 2017-05-23 RX ORDER — CLOPIDOGREL BISULFATE 75 MG/1
75 TABLET ORAL EVERY EVENING
Status: DISCONTINUED | OUTPATIENT
Start: 2017-05-23 | End: 2017-05-23

## 2017-05-23 RX ORDER — LISINOPRIL 10 MG/1
10 TABLET ORAL DAILY
Status: DISCONTINUED | OUTPATIENT
Start: 2017-05-24 | End: 2017-05-23

## 2017-05-23 RX ORDER — ONDANSETRON 2 MG/ML
4 INJECTION INTRAMUSCULAR; INTRAVENOUS ONCE
Status: COMPLETED | OUTPATIENT
Start: 2017-05-23 | End: 2017-05-23

## 2017-05-23 RX ORDER — ONDANSETRON 2 MG/ML
4 INJECTION INTRAMUSCULAR; INTRAVENOUS EVERY 4 HOURS PRN
Status: DISCONTINUED | OUTPATIENT
Start: 2017-05-23 | End: 2017-05-25 | Stop reason: HOSPADM

## 2017-05-23 RX ORDER — ACETAMINOPHEN 325 MG/1
650 TABLET ORAL EVERY 6 HOURS PRN
Status: DISCONTINUED | OUTPATIENT
Start: 2017-05-23 | End: 2017-05-25 | Stop reason: HOSPADM

## 2017-05-23 RX ORDER — MORPHINE SULFATE 4 MG/ML
4 INJECTION, SOLUTION INTRAMUSCULAR; INTRAVENOUS ONCE
Status: COMPLETED | OUTPATIENT
Start: 2017-05-23 | End: 2017-05-23

## 2017-05-23 RX ORDER — SODIUM CHLORIDE 9 MG/ML
INJECTION, SOLUTION INTRAVENOUS CONTINUOUS
Status: DISCONTINUED | OUTPATIENT
Start: 2017-05-23 | End: 2017-05-23

## 2017-05-23 RX ORDER — LABETALOL HYDROCHLORIDE 5 MG/ML
10 INJECTION, SOLUTION INTRAVENOUS EVERY 4 HOURS PRN
Status: DISCONTINUED | OUTPATIENT
Start: 2017-05-23 | End: 2017-05-25 | Stop reason: HOSPADM

## 2017-05-23 RX ORDER — ATORVASTATIN CALCIUM 20 MG/1
80 TABLET, FILM COATED ORAL DAILY
Status: DISCONTINUED | OUTPATIENT
Start: 2017-05-24 | End: 2017-05-23

## 2017-05-23 RX ADMIN — HYDROMORPHONE HYDROCHLORIDE 1 MG: 1 INJECTION, SOLUTION INTRAMUSCULAR; INTRAVENOUS; SUBCUTANEOUS at 21:39

## 2017-05-23 RX ADMIN — IOHEXOL 70 ML: 350 INJECTION, SOLUTION INTRAVENOUS at 14:30

## 2017-05-23 RX ADMIN — ONDANSETRON 4 MG: 2 INJECTION INTRAMUSCULAR; INTRAVENOUS at 21:17

## 2017-05-23 RX ADMIN — MORPHINE SULFATE 4 MG: 4 INJECTION INTRAVENOUS at 21:18

## 2017-05-23 RX ADMIN — HEPARIN SODIUM 5000 UNITS: 5000 INJECTION, SOLUTION INTRAVENOUS; SUBCUTANEOUS at 23:37

## 2017-05-23 RX ADMIN — STANDARDIZED SENNA CONCENTRATE AND DOCUSATE SODIUM 2 TABLET: 8.6; 5 TABLET, FILM COATED ORAL at 23:37

## 2017-05-23 ASSESSMENT — PAIN SCALES - GENERAL
PAINLEVEL_OUTOF10: 4
PAINLEVEL_OUTOF10: 8

## 2017-05-23 ASSESSMENT — LIFESTYLE VARIABLES
EVER_SMOKED: YES
ALCOHOL_USE: NO
DO YOU DRINK ALCOHOL: NO

## 2017-05-23 NOTE — IP AVS SNAPSHOT
" <p align=\"LEFT\"><IMG SRC=\"//[email or web address]$/[email or web address]\" alt=\"Image\" WIDTH=\"50%\" HEIGHT=\"200\" BORDER=\"\"></p>                   Name:Karen Jauregui  Medical Record Number:[id number]  CSN: [id number]    YOB: 1945   Age: 72 y.o.  Sex: female  HT:1.549 m (5' 1\") WT: 41.731 kg (92 lb)          Admit Date: 5/23/2017     Discharge Date:   Today's Date: 5/25/2017  Attending Doctor:  Champ Gibbons M.D.                  Allergies:  Review of patient's allergies indicates no active allergies.          Follow-up Information     1. Schedule an appointment as soon as possible for a visit with Radha Julio M.D..    Specialty:  Family Medicine    Why:  As needed, If symptoms worsen.    Contact information    [address] [place] [address] [address]  [address]  [facility] [address] [phone]           Medication List      Take these Medications        Instructions    aspirin EC 81 MG Tbec   Commonly known as:  ECOTRIN    Take 81 mg by mouth every day.   Dose:  81 mg       atorvastatin 80 MG tablet   Commonly known as:  LIPITOR    Take 80 mg by mouth every bedtime.   Dose:  80 mg       carvedilol 6.25 MG Tabs   Commonly known as:  COREG    Take 1 Tab by mouth 2 times a day, with meals.   Dose:  6.25 mg       clopidogrel 75 MG Tabs   Commonly known as:  PLAVIX    Take 75 mg by mouth every evening.   Dose:  75 mg       lisinopril 10 MG Tabs   Commonly known as:  PRINIVIL    Doctor's comments:  Please note the 10 milligram lisinopril is correct. The lisinopril HCT is not.   Take 1 Tab by mouth every day.   Dose:  10 mg       sennosides-docusate sodium 8.6-50 MG tablet   Commonly known as:  SENOKOT-S    Doctor's comments:  Hold for diarrhea.   Take 1 Tab by mouth 2 times a day.   Dose:  1 Tab         "

## 2017-05-23 NOTE — IP AVS SNAPSHOT
" Home Care Instructions                                                                                                                  Name:Karen Jauregui  Medical Record Number:[id number]  CSN: [id number]    YOB: 1945   Age: 72 y.o.  Sex: female  HT:1.549 m (5' 1\") WT: 41.731 kg (92 lb)          Admit Date: 5/23/2017     Discharge Date:   Today's Date: 5/25/2017  Attending Doctor:  Champ Gibbons M.D.                  Allergies:  Review of patient's allergies indicates no active allergies.            Discharge Instructions       Discharge Instructions    Discharged to home by car with relative. Discharged via wheelchair, hospital escort: Yes.  Special equipment needed: Not Applicable    Be sure to schedule a follow-up appointment with your primary care doctor or any specialists as instructed.     Discharge Plan:   Diet Plan: Discussed  Activity Level: Discussed  Confirmed Follow up Appointment: Patient to Call and Schedule Appointment  Confirmed Symptoms Management: Discussed  Medication Reconciliation Updated: Yes  Influenza Vaccine Indication: Patient Refuses    I understand that a diet low in cholesterol, fat, and sodium is recommended for good health. Unless I have been given specific instructions below for another diet, I accept this instruction as my diet prescription.   Other diet: Diet as tolerated    Special Instructions: None    · Is patient discharged on Warfarin / Coumadin?   No     · Is patient Post Blood Transfusion?  No    Depression / Suicide Risk    As you are discharged from this [facility] facility, it is important to learn how to keep safe from harming yourself.    Recognize the warning signs:  · Abrupt changes in personality, positive or negative- including increase in energy   · Giving away possessions  · Change in eating patterns- significant weight changes-  positive or negative  · Change in sleeping patterns- unable to sleep or sleeping all the time   · Unwillingness " or inability to communicate  · Depression  · Unusual sadness, discouragement and loneliness  · Talk of wanting to die  · Neglect of personal appearance   · Rebelliousness- reckless behavior  · Withdrawal from people/activities they love  · Confusion- inability to concentrate     If you or a loved one observes any of these behaviors or has concerns about self-harm, here's what you can do:  · Talk about it- your feelings and reasons for harming yourself  · Remove any means that you might use to hurt yourself (examples: pills, rope, extension cords, firearm)  · Get professional help from the community (Mental Health, Substance Abuse, psychological counseling)  · Do not be alone:Call your Safe Contact- someone whom you trust who will be there for you.  · Call your local CRISIS HOTLINE 961-1996 or 357-302-6170  · Call your local Children's Mobile Crisis Response Team [place] (222) 142-9892 or www.Wenwo  · Call the toll free National Suicide Prevention Hotlines   · National Suicide Prevention Lifeline 983-064-ZPCN (2120)  · [facility] Line Network 800-SUICIDE (997-7425)    Small Bowel Obstruction  A small bowel obstruction is a blockage (obstruction) of the small intestine (small bowel). The small bowel is a long, slender tube that connects the stomach to the colon. Its job is to absorb nutrients from the fluids and foods you consume into the bloodstream.   CAUSES   There are many causes of intestinal blockage. The most common ones include:  · Hernias. This is a more common cause in children than adults.  · Inflammatory bowel disease (enteritis and colitis).  · Twisting of the bowel (volvulus).  · Tumors.  · Scar tissue (adhesions) from previous surgery or radiation treatment.  · Recent surgery. This may cause an acute small bowel obstruction called an ileus.  SYMPTOMS   · Abdominal pain. This may be dull cramps or sharp pain. It may occur in one area or may be present in the entire abdomen. Pain can  range from mild to severe, depending on the degree of obstruction.  · Nausea and vomiting. Vomit may be greenish or yellow bile color.  · Distended or swollen stomach. Abdominal bloating is a common symptom.  · Constipation.  · Lack of passing gas.  · Frequent belching.  · Diarrhea. This may occur if runny stool is able to leak around the obstruction.  DIAGNOSIS   Your caregiver can usually diagnose small bowel obstruction by taking a history, doing a physical exam, and taking X-rays. If the cause is unclear, a CT scan (computerized tomography) of your abdomen and pelvis may be needed.  TREATMENT   Treatment of the blockage depends on the cause and how bad the problem is.   · Sometimes, the obstruction improves with bed rest and intravenous (IV) fluids.  · Resting the bowel is very important. This means following a simple diet. Sometimes, a clear liquid diet may be required for several days.  · Sometimes, a small tube (nasogastric tube) is placed into the stomach to decompress the bowel. When the bowel is blocked, it usually swells up like a balloon filled with air and fluids. Decompression means that the air and fluids are removed by suction through that tube. This can help with pain, discomfort, and nausea. It can also help the obstruction resolve faster.  · Surgery may be required if other treatments do not work. Bowel obstruction from a hernia may require early surgery and can be an emergency procedure. Adhesions that cause frequent or severe obstructions may also require surgery.  HOME CARE INSTRUCTIONS  If your bowel obstruction is only partial or incomplete, you may be allowed to go home.  · Get plenty of rest.  · Follow your diet as directed by your caregiver.  · Only consume clear liquids until your condition improves.  · Avoid solid foods as instructed.  SEEK IMMEDIATE MEDICAL CARE IF:  · You have increased pain or cramping.  · You vomit blood.  · You have uncontrolled vomiting or nausea.  · You cannot  drink fluids due to vomiting or pain.  · You develop confusion.  · You begin feeling very dry or thirsty (dehydrated).  · You have severe bloating.  · You have chills.  · You have a fever.  · You feel extremely weak or you faint.  MAKE SURE YOU:  · Understand these instructions.  · Will watch your condition.  · Will get help right away if you are not doing well or get worse.     This information is not intended to replace advice given to you by your health care provider. Make sure you discuss any questions you have with your health care provider.     Document Released: 03/06/2007 Document Revised: 03/11/2013 Document Reviewed: 02/11/2016  Zoomdata Interactive Patient Education ©2016 Elsevier Inc.    Full Liquid Diet  A full liquid diet may be used:   · To help you transition from a clear liquid diet to a soft diet.    · When your body is healing and can only tolerate foods that are easy to digest.  · Before or after certain a procedure, test, or surgery (such as stomach or intestinal surgeries).    · If you have trouble swallowing or chewing.    A full liquid diet includes fluids and foods that are liquid or will become liquid at room temperature. The full liquid diet gives you the proteins, fluids, salts, and minerals that you need for energy.  If you continue this diet for more than 72 hours, talk to your health care provider about how many calories you need to consume. If you continue the diet for more than 5 days, talk to your health care provider about taking a multivitamin or a nutritional supplement.  WHAT DO I NEED TO KNOW ABOUT A FULL LIQUID DIET?  · You may have any liquid.  · You may have any food that becomes a liquid at room temperature. The food is considered a liquid if it can be poured off a spoon at room temperature.  · Drink one serving of citrus or vitamin C-enriched fruit juice daily.  WHAT FOODS CAN I EAT?  Grains  Any grain food that can be pureed in soup (such as crackers, pasta, and rice).  Hot cereal (such as farina or oatmeal) that has been blended. Talk to your health care provider or dietitian about these foods.  Vegetables  Pulp-free tomato or vegetable juice. Vegetables pureed in soup.   Fruits  Fruit juice, including nectars and juices with pulp.  Meats and Other Protein Sources  Eggs in custard, eggnog mix, and eggs used in ice cream or pudding. Strained meats, like in baby food, may be allowed. Consult your health care provider.   Dairy  Milk and milk-based beverages, including milk shakes and instant breakfast mixes. Smooth yogurt. Pureed cottage cheese. Avoid these foods if they are not well tolerated.  Beverages  All beverages, including liquid nutritional supplements. Ask your health care provider if you can have carbonated beverages. They may not be well tolerated.  Condiments  Iodized salt, pepper, spices, and flavorings. Cocoa powder. Vinegar, ketchup, yellow mustard, smooth sauces (such as hollandaise, cheese sauce, or white sauce), and soy sauce.  Sweets and Desserts  Custard, smooth pudding. Flavored gelatin. Tapioca, junket. Plain ice cream, sherbet, fruit ices. Frozen ice pops, frozen fudge pops, pudding pops, and other frozen bars with cream. Syrups, including chocolate syrup. Sugar, honey, jelly.   Fats and Oils  Margarine, butter, cream, sour cream, and oils.  Other  Broth and cream soups. Strained, broth-based soups.  The items listed above may not be a complete list of recommended foods or beverages. Contact your dietitian for more options.   WHAT FOODS CAN I NOT EAT?  Grains  All breads. Grains are not allowed unless they are pureed into soup.  Vegetables  Vegetables are not allowed unless they are juiced, or cooked and pureed into soup.  Fruits  Fruits are not allowed unless they are juiced.  Meats and Other Protein Sources  Any meat or fish. Cooked or raw eggs. Nut butters.   Dairy  Cheese.   Condiments  Stone ground mustards.  Fats and Oils  Fats that are coarse or  chunky.  Sweets and Desserts  Ice cream or other frozen desserts that have any solids in them or on top, such as nuts, chocolate chips, and pieces of cookies. Cakes. Cookies. Candy.  Others  Soups with chunks or pieces in them.  The items listed above may not be a complete list of foods and beverages to avoid. Contact your dietitian for more information.     This information is not intended to replace advice given to you by your health care provider. Make sure you discuss any questions you have with your health care provider.     Document Released: 12/18/2006 Document Revised: 12/23/2014 Document Reviewed: 10/23/2014  [employer] Interactive Patient Education ©2016 Elsevier Inc.      Follow-up Information     1. Schedule an appointment as soon as possible for a visit with Radha Julio M.D..    Specialty:  Family Medicine    Why:  As needed, If symptoms worsen.    Contact information    [address] [place] [address] [address]  [address]  [facility] [address] [phone]           Discharge Medication Instructions:    Below are the medications your physician expects you to take upon discharge:    Review all your home medications and newly ordered medications with your doctor and/or pharmacist. Follow medication instructions as directed by your doctor and/or pharmacist.    Please keep your medication list with you and share with your physician.               Medication List      CONTINUE taking these medications        Instructions    Morning Afternoon Evening Bedtime    aspirin EC 81 MG Tbec   Last time this was given:  81 mg on 5/25/2017  8:12 AM   Commonly known as:  ECOTRIN   Next Dose Due:  5/26/2017        Take 81 mg by mouth every day.   Dose:  81 mg                        atorvastatin 80 MG tablet   Commonly known as:  LIPITOR   Next Dose Due:  5/25/2017 at bedtime        Take 80 mg by mouth every bedtime.   Dose:  80 mg                        carvedilol 6.25 MG Tabs   Last time this was given:  6.25 mg on 5/25/2017  8:12 AM   Commonly  known as:  COREG   Next Dose Due:  5/25/2017 with dinner        Take 1 Tab by mouth 2 times a day, with meals.   Dose:  6.25 mg                        clopidogrel 75 MG Tabs   Commonly known as:  PLAVIX   Next Dose Due:  5/25/2017 Evening        Take 75 mg by mouth every evening.   Dose:  75 mg                        lisinopril 10 MG Tabs   Last time this was given:  10 mg on 5/25/2017  8:12 AM   Commonly known as:  PRINIVIL   Next Dose Due:  5/26/2017        Doctor's comments:  Please note the 10 milligram lisinopril is correct. The lisinopril HCT is not.   Take 1 Tab by mouth every day.   Dose:  10 mg                        sennosides-docusate sodium 8.6-50 MG tablet   Last time this was given:  2 Tabs on 5/25/2017  8:12 AM   Commonly known as:  SENOKOT-S   Next Dose Due:  5/25/2017 9pm        Doctor's comments:  Hold for diarrhea.   Take 1 Tab by mouth 2 times a day.   Dose:  1 Tab                                Instructions           Diet / Nutrition:    Follow any diet instructions given to you by your doctor or the dietician, including how much salt (sodium) you are allowed each day.    If you are overweight, talk to your doctor about a weight reduction plan.    Activity:    Remain physically active following your doctor's instructions about exercise and activity.    Rest often.     Any time you become even a little tired or short of breath, SIT DOWN and rest.    Worsening Symptoms:    Report any of the following signs and symptoms to the doctor's office immediately:    *Pain of jaw, arm, or neck  *Chest pain not relieved by medication                               *Dizziness or loss of consciousness  *Difficulty breathing even when at rest   *More tired than usual                                       *Bleeding drainage or swelling of surgical site  *Swelling of feet, ankles, legs or stomach                 *Fever (>100ºF)  *Pink or blood tinged sputum  *Weight gain (3lbs/day or 5lbs /week)           *Shock  from internal defibrillator (if applicable)  *Palpitations or irregular heartbeats                *Cool and/or numb extremities    Stroke Awareness    Common Risk Factors for Stroke include:    Age  Atrial Fibrillation  Carotid Artery Stenosis  Diabetes Mellitus  Excessive alcohol consumption  High blood pressure  Overweight   Physical inactivity  Smoking    Warning signs and symptoms of a stroke include:    *Sudden numbness or weakness of the face, arm or leg (especially on one side of the body).  *Sudden confusion, trouble speaking or understanding.  *Sudden trouble seeing in one or both eyes.  *Sudden trouble walking, dizziness, loss of balance or coordination.Sudden severe headache with no known cause.    It is very important to get treatment quickly when a stroke occurs. If you experience any of the above warning signs, call 911 immediately.                   Disclaimer         Quit Smoking / Tobacco Use:    I understand the use of any tobacco products increases my chance of suffering from future heart disease or stroke and could cause other illnesses which may shorten my life. Quitting the use of tobacco products is the single most important thing I can do to improve my health. For further information on smoking / tobacco cessation call a Toll Free Quit Line at 1-902.130.8735 (*National [employer] [place]) or 1-228.192.4554 (American Lung Association) or you can access the web based program at www.lungusa.org.    [place] Tobacco Users Help Line:  (842) 225-4386       Toll Free: 1-188.654.3834  Quit Tobacco Program [facility] ([phone]    Crisis Hotline:    [place] Crisis Hotline:  8-888-PDAZESR or 1-105.846.2067    [place] Crisis Hotline:    1-442.122.4016 or 489-615-3948    Discharge Survey:   Thank you for choosing [facility]. We hope we did everything we could to make your hospital stay a pleasant one. You may be receiving a phone survey and we would appreciate your time and  participation in answering the questions. Your input is very valuable to us in our efforts to improve our service to our patients and their families.        My signature on this form indicates that:    1. I have reviewed and understand the above information.  2. My questions regarding this information have been answered to my satisfaction.  3. I have formulated a plan with my discharge nurse to obtain my prescribed medications for home.                  Disclaimer         __________________________________                     __________       ________                       Patient Signature                                                 Date                    Time

## 2017-05-23 NOTE — IP AVS SNAPSHOT
Capital Teas Access Code: Activation code not generated  Current Capital Teas Status: Active    "[employer]"hart  A secure, online tool to manage your health information     [employer]’s Capital Teas® is a secure, online tool that connects you to your personalized health information from the privacy of your home -- day or night - making it very easy for you to manage your healthcare. Once the activation process is completed, you can even access your medical information using the Capital Teas evelia, which is available for free in the Apple Evelia store or Google Play store.     Capital Teas provides the following levels of access (as shown below):   My Chart Features   [facility] Primary Care Doctor [facility]  Specialists [facility]  Urgent  Care Non-[facility]  Primary Care  Doctor   Email your healthcare team securely and privately 24/7 X X X X   Manage appointments: schedule your next appointment; view details of past/upcoming appointments X      Request prescription refills. X      View recent personal medical records, including lab and immunizations X X X X   View health record, including health history, allergies, medications X X X X   Read reports about your outpatient visits, procedures, consult and ER notes X X X X   See your discharge summary, which is a recap of your hospital and/or ER visit that includes your diagnosis, lab results, and care plan. X X       How to register for Capital Teas:  1. Go to  [email or web address].  2. Click on the Sign Up Now box, which takes you to the New Member Sign Up page. You will need to provide the following information:  a. Enter your Capital Teas Access Code exactly as it appears at the top of this page. (You will not need to use this code after you’ve completed the sign-up process. If you do not sign up before the expiration date, you must request a new code.)   b. Enter your date of birth.   c. Enter your home email address.   d. Click Submit, and follow the next screen’s instructions.  3. Create a Capital Teas ID. This will  be your [employer] login ID and cannot be changed, so think of one that is secure and easy to remember.  4. Create a [employer] password. You can change your password at any time.  5. Enter your Password Reset Question and Answer. This can be used at a later time if you forget your password.   6. Enter your e-mail address. This allows you to receive e-mail notifications when new information is available in A[employer]et.  7. Click Sign Up. You can now view your health information.    For assistance activating your [employer] account, call [phone]

## 2017-05-23 NOTE — IP AVS SNAPSHOT
5/25/2017    Karen Jauregui  [address]  [place] [address]    Dear Karen:    [facility] wants to ensure your discharge home is safe and you or your loved ones have had all of your questions answered regarding your care after you leave the hospital.    Below is a list of resources and contact information should you have any questions regarding your hospital stay, follow-up instructions, or active medical symptoms.    Questions or Concerns Regarding… Contact   Medical Questions Related to Your Discharge  (7 days a week, 8am-5pm) Contact a Nurse Care Coordinator   [phone]   Medical Questions Not Related to Your Discharge  (24 hours a day / 7 days a week)  Contact the Nurse Health Line   [phone]    Medications or Discharge Instructions Refer to your discharge packet   or contact your [facility] Primary Care Provider   [phone]   Follow-up Appointment(s) Schedule your appointment via GenieDB   or contact Scheduling [phone]   Billing Review your statement via GenieDB  or contact Billing [phone]   Medical Records Review your records via GenieDB   or contact Medical Records [phone]     You may receive a telephone call within two days of discharge. This call is to make certain you understand your discharge instructions and have the opportunity to have any questions answered. You can also easily access your medical information, test results and upcoming appointments via the GenieDB free online health management tool. You can learn more and sign up at [employer] [facility]/GenieDB. For assistance setting up your GenieDB account, please call [phone].    Once again, we want to ensure your discharge home is safe and that you have a clear understanding of any next steps in your care. If you have any questions or concerns, please do not hesitate to contact us, we are here for you. Thank you for choosing [facility] for your healthcare needs.    Sincerely,    Your [facility] Healthcare Team

## 2017-05-24 PROBLEM — R10.9 ABDOMINAL PAIN: Status: ACTIVE | Noted: 2017-05-24

## 2017-05-24 LAB
ANION GAP SERPL CALC-SCNC: 7 MMOL/L (ref 0–11.9)
BUN SERPL-MCNC: 15 MG/DL (ref 8–22)
CALCIUM SERPL-MCNC: 9.7 MG/DL (ref 8.5–10.5)
CHLORIDE SERPL-SCNC: 103 MMOL/L (ref 96–112)
CO2 SERPL-SCNC: 29 MMOL/L (ref 20–33)
CREAT SERPL-MCNC: 0.52 MG/DL (ref 0.5–1.4)
ERYTHROCYTE [DISTWIDTH] IN BLOOD BY AUTOMATED COUNT: 47.5 FL (ref 35.9–50)
GFR SERPL CREATININE-BSD FRML MDRD: >60 ML/MIN/1.73 M 2
GLUCOSE SERPL-MCNC: 97 MG/DL (ref 65–99)
HCT VFR BLD AUTO: 45 % (ref 37–47)
HGB BLD-MCNC: 14.2 G/DL (ref 12–16)
MCH RBC QN AUTO: 28.1 PG (ref 27–33)
MCHC RBC AUTO-ENTMCNC: 31.6 G/DL (ref 33.6–35)
MCV RBC AUTO: 89.1 FL (ref 81.4–97.8)
PLATELET # BLD AUTO: 315 K/UL (ref 164–446)
PMV BLD AUTO: 10 FL (ref 9–12.9)
POTASSIUM SERPL-SCNC: 4.3 MMOL/L (ref 3.6–5.5)
RBC # BLD AUTO: 5.05 M/UL (ref 4.2–5.4)
SODIUM SERPL-SCNC: 139 MMOL/L (ref 135–145)
WBC # BLD AUTO: 12.8 K/UL (ref 4.8–10.8)

## 2017-05-24 PROCEDURE — 665998 HH PPS REVENUE CREDIT

## 2017-05-24 PROCEDURE — 770001 HCHG ROOM/CARE - MED/SURG/GYN PRIV*

## 2017-05-24 PROCEDURE — 700105 HCHG RX REV CODE 258: Performed by: INTERNAL MEDICINE

## 2017-05-24 PROCEDURE — A9270 NON-COVERED ITEM OR SERVICE: HCPCS | Performed by: INTERNAL MEDICINE

## 2017-05-24 PROCEDURE — 700111 HCHG RX REV CODE 636 W/ 250 OVERRIDE (IP): Performed by: INTERNAL MEDICINE

## 2017-05-24 PROCEDURE — 700101 HCHG RX REV CODE 250: Performed by: INTERNAL MEDICINE

## 2017-05-24 PROCEDURE — 36415 COLL VENOUS BLD VENIPUNCTURE: CPT

## 2017-05-24 PROCEDURE — 665999 HH PPS REVENUE DEBIT

## 2017-05-24 PROCEDURE — 99232 SBSQ HOSP IP/OBS MODERATE 35: CPT | Performed by: HOSPITALIST

## 2017-05-24 PROCEDURE — 700102 HCHG RX REV CODE 250 W/ 637 OVERRIDE(OP): Performed by: INTERNAL MEDICINE

## 2017-05-24 PROCEDURE — 80048 BASIC METABOLIC PNL TOTAL CA: CPT

## 2017-05-24 PROCEDURE — 85027 COMPLETE CBC AUTOMATED: CPT

## 2017-05-24 RX ORDER — SODIUM CHLORIDE 9 MG/ML
INJECTION, SOLUTION INTRAVENOUS CONTINUOUS
Status: DISCONTINUED | OUTPATIENT
Start: 2017-05-24 | End: 2017-05-25 | Stop reason: HOSPADM

## 2017-05-24 RX ORDER — MORPHINE SULFATE 4 MG/ML
4 INJECTION, SOLUTION INTRAMUSCULAR; INTRAVENOUS EVERY 4 HOURS PRN
Status: DISCONTINUED | OUTPATIENT
Start: 2017-05-24 | End: 2017-05-25 | Stop reason: HOSPADM

## 2017-05-24 RX ADMIN — STANDARDIZED SENNA CONCENTRATE AND DOCUSATE SODIUM 2 TABLET: 8.6; 5 TABLET, FILM COATED ORAL at 10:03

## 2017-05-24 RX ADMIN — SODIUM CHLORIDE: 9 INJECTION, SOLUTION INTRAVENOUS at 16:58

## 2017-05-24 RX ADMIN — ONDANSETRON 4 MG: 2 INJECTION INTRAMUSCULAR; INTRAVENOUS at 04:03

## 2017-05-24 RX ADMIN — HYDROCODONE BITARTRATE AND ACETAMINOPHEN 1 TABLET: 5; 325 TABLET ORAL at 03:08

## 2017-05-24 RX ADMIN — LABETALOL HYDROCHLORIDE 10 MG: 5 INJECTION INTRAVENOUS at 05:20

## 2017-05-24 RX ADMIN — MORPHINE SULFATE 4 MG: 4 INJECTION INTRAVENOUS at 04:20

## 2017-05-24 RX ADMIN — SODIUM CHLORIDE: 9 INJECTION, SOLUTION INTRAVENOUS at 03:09

## 2017-05-24 RX ADMIN — STANDARDIZED SENNA CONCENTRATE AND DOCUSATE SODIUM 2 TABLET: 8.6; 5 TABLET, FILM COATED ORAL at 22:36

## 2017-05-24 RX ADMIN — HEPARIN SODIUM 5000 UNITS: 5000 INJECTION, SOLUTION INTRAVENOUS; SUBCUTANEOUS at 22:36

## 2017-05-24 RX ADMIN — HEPARIN SODIUM 5000 UNITS: 5000 INJECTION, SOLUTION INTRAVENOUS; SUBCUTANEOUS at 14:51

## 2017-05-24 ASSESSMENT — ENCOUNTER SYMPTOMS
TINGLING: 0
COUGH: 0
CHILLS: 0
TREMORS: 0
HEARTBURN: 0
DIZZINESS: 0
BACK PAIN: 0
MYALGIAS: 0
SPEECH CHANGE: 0
NAUSEA: 0
FEVER: 0
HEMOPTYSIS: 0
SENSORY CHANGE: 0
HEADACHES: 0
NECK PAIN: 0
PALPITATIONS: 0
VOMITING: 0
WEIGHT LOSS: 0

## 2017-05-24 ASSESSMENT — PAIN SCALES - GENERAL
PAINLEVEL_OUTOF10: 5
PAINLEVEL_OUTOF10: 8
PAINLEVEL_OUTOF10: 0
PAINLEVEL_OUTOF10: 8
PAINLEVEL_OUTOF10: 0

## 2017-05-24 ASSESSMENT — PATIENT HEALTH QUESTIONNAIRE - PHQ9
1. LITTLE INTEREST OR PLEASURE IN DOING THINGS: NOT AT ALL
SUM OF ALL RESPONSES TO PHQ9 QUESTIONS 1 AND 2: 0
2. FEELING DOWN, DEPRESSED, IRRITABLE, OR HOPELESS: NOT AT ALL
SUM OF ALL RESPONSES TO PHQ QUESTIONS 1-9: 0

## 2017-05-24 ASSESSMENT — COPD QUESTIONNAIRES
COPD SCREENING SCORE: 4
DURING THE PAST 4 WEEKS HOW MUCH DID YOU FEEL SHORT OF BREATH: NONE/LITTLE OF THE TIME
DO YOU EVER COUGH UP ANY MUCUS OR PHLEGM?: NO/ONLY WITH OCCASIONAL COLDS OR INFECTIONS
HAVE YOU SMOKED AT LEAST 100 CIGARETTES IN YOUR ENTIRE LIFE: YES

## 2017-05-24 ASSESSMENT — LIFESTYLE VARIABLES: DO YOU DRINK ALCOHOL: NO

## 2017-05-24 NOTE — ED NOTES
"BIBA to room 36 from home with   Chief Complaint   Patient presents with   • Abdominal Pain     onset 3 pm today; sudden sharp \"like someone squeezed me\" pain; localized above umbilicus; denies radiation     Pt has extensive PMH as added below. She reports having \"an aneurysm fixed in March of this year and had a CT of my abdomen today with IV contrast to see how it's healing\".  Pt without perfusion deficits RT arm 187/83; LT arm 171/86.    Last BM today at noon described as normal. Denies fevers.   Reports one episode emesis earlier today but no longer nauseous.   Received 100 mcg fentanyl and 250 ml NS PTA.   PIV established. Pt connected to the monitor.     Past Medical History   Diagnosis Date   • Atherosclerosis of arteries of the extremities      two stents in the groin, Dr. Pickett   • Hypertension    • Angina      with stress test   • Arthritis      thumbs   • Peripheral vascular disease (CMS-HCC)      9 months, may relate to PVD   • Unspecified hemorrhagic conditions      asa/plavix, bruises easily   • Dyslipidemia    • COPD (chronic obstructive pulmonary disease) (CMS-HCC)    • Diverticulosis of colon    • Spinal stenosis of lumbar region      Dr. Navarro   • Abdominal aortic aneurysm (CMS-[facility]) 11/2010     3.6 cm 8/2014   • Carotid atherosclerosis      Dr. Pickett   • Diverticulitis      Dx 11/25/11   • Family history of nonmelanoma skin cancer    • PVD (posterior vitreous detachment), left eye 1/13/2015   • Eosinophilic colitis    • Bowel habit changes    • Emphysema of lung (CMS-HCC)    • Snoring    • Pain        "

## 2017-05-24 NOTE — ED PROVIDER NOTES
"ED Provider Note    Scribed for Tonny Abdi M.D. by Jenna Daniel. 5/23/2017, 7:53 PM.    Primary care provider: Radha Julio M.D.  Means of arrival: EMS  History obtained from: Patient  History limited by: None    CHIEF COMPLAINT  Chief Complaint   Patient presents with   • Abdominal Pain     onset 3 pm today; sudden sharp \"like someone squeezed me\" pain; localized above umbilicus; denies radiation       HPI  Karen Jauregui is a 72 y.o. female who presents to the Emergency Department for sharp epigastric pain onset 12 PM today. The patient states she could no longer bare the pain so she came to the ED. She had one episode of vomiting today. The patient was last seen in the ED for a bowel obstruction.  She has been constipated secondary to her pain medications from her back surgery that she had in March but continues to have small bowel movements. The patient had a bowel movement this morning that she describes as a small amount. She has history of an AAA in March which was repaired by Dr. Pemberton.The patient had a CT scan with contrast to see if her AAA repair was healing properly today, but states that thereafter, the patient was having worsening of her abdominal pain which she describes as periumbilical in location, nonradiating, sharp and tightening in character, and moderate in severity.       REVIEW OF SYSTEMS  See HPI for further details. All other systems are negative.     PAST MEDICAL HISTORY   has a past medical history of Atherosclerosis of arteries of the extremities; Hypertension; Angina; Arthritis; Peripheral vascular disease (CMS-HCC); Unspecified hemorrhagic conditions; Dyslipidemia; COPD (chronic obstructive pulmonary disease) (CMS-HCC); Diverticulosis of colon; Spinal stenosis of lumbar region; Abdominal aortic aneurysm (CMS-HCC) (11/2010); Carotid atherosclerosis; Diverticulitis; Family history of nonmelanoma skin cancer; PVD (posterior vitreous detachment), left eye " "(1/13/2015); Eosinophilic colitis; Bowel habit changes; Emphysema of lung (CMS-HCC); Snoring; and Pain.    SURGICAL HISTORY   has past surgical history that includes colonoscopy (3/1/2009, 4/2012, 7/2/13); gyn surgery (2002); gyn surgery (1975); other; colon resection laparoscopic (8/5/2013); colonoscopy with biopsy (3/6/2015); other cardiac surgery (2005); aaa with stent graft (N/A, 3/31/2017); and femoral femoral bypass (N/A, 3/31/2017).    SOCIAL HISTORY  Social History   Substance Use Topics   • Smoking status: Current Every Day Smoker -- 0.50 packs/day for 45 years     Types: Cigarettes   • Smokeless tobacco: Never Used      Comment: on nicotine patches, smokes off and on   • Alcohol Use: No      History   Drug Use No       FAMILY HISTORY  Family History   Problem Relation Age of Onset   • Hyperlipidemia Sister    • Hypertension Sister    • Non-contributory Sister      carotid atherosclerosis   • Hypertension Mother    • Hyperlipidemia Mother    • Heart Disease Mother 82     congestive heart failure, AAA   • Heart Disease Father 55     multiple heart issues   • Hypertension Father    • Hyperlipidemia Father    • Cancer Sister      sin cancer       CURRENT MEDICATIONS  Reviewed.  See Encounter Summary.     ALLERGIES  Allergies   Allergen Reactions   • Amoxicillin Diarrhea     Rxn = years ago        • Ciprofloxacin Diarrhea     Rxn = years ago          PHYSICAL EXAM  VITAL SIGNS: /83 mmHg  Pulse 90  Temp(Src) 37.3 °C (99.1 °F)  Resp 18  Ht 1.549 m (5' 1\")  Wt 41.731 kg (92 lb)  BMI 17.39 kg/m2  SpO2 93%  LMP 03/01/1997   Pulse ox interpretation: I interpret this pulse ox as normal.  Constitutional: Alert in no apparent distress.  HENT: Head normocephalic, atraumatic, Bilateral external ears normal, Nose normal.  Eyes: Pupils are equal, extraocular movements intact, Conjunctiva normal, Non-icteric.   Neck: Normal range of motion, Supple, No stridor.   Lymphatic: No lymphadenopathy noted. "   Cardiovascular: Regular rate and rhythm   Thorax & Lungs: No acute respiratory distress, No wheezing, No increased work of breathing.   Abdomen: Soft, minimally tender in the periumbilical region, Non-distended, No pulsatile masses, No peritoneal signs.  Skin: Warm, Dry, No erythema, No rash.   Back: No bony tenderness, No CVA tenderness.   Extremities: Intact distal pulses, No edema, No tenderness, No cyanosis.    Musculoskeletal: Good range of motion in all major joints. No tenderness to palpation or major deformities noted.   Neurologic: Alert , Normal motor function, Normal sensory function, No focal deficits noted.   Psychiatric: Affect normal, Judgment normal, Mood normal.       DIAGNOSTIC STUDIES / PROCEDURES     LABS  Results for orders placed or performed during the hospital encounter of 05/23/17   CBC WITH DIFFERENTIAL   Result Value Ref Range    WBC 12.6 (H) 4.8 - 10.8 K/uL    RBC 4.51 4.20 - 5.40 M/uL    Hemoglobin 12.9 12.0 - 16.0 g/dL    Hematocrit 40.0 37.0 - 47.0 %    MCV 88.7 81.4 - 97.8 fL    MCH 28.6 27.0 - 33.0 pg    MCHC 32.3 (L) 33.6 - 35.0 g/dL    RDW 46.8 35.9 - 50.0 fL    Platelet Count 279 164 - 446 K/uL    MPV 9.6 9.0 - 12.9 fL    Neutrophils-Polys 78.50 (H) 44.00 - 72.00 %    Lymphocytes 14.30 (L) 22.00 - 41.00 %    Monocytes 5.60 0.00 - 13.40 %    Eosinophils 0.70 0.00 - 6.90 %    Basophils 0.60 0.00 - 1.80 %    Immature Granulocytes 0.30 0.00 - 0.90 %    Nucleated RBC 0.00 /100 WBC    Neutrophils (Absolute) 9.91 (H) 2.00 - 7.15 K/uL    Lymphs (Absolute) 1.80 1.00 - 4.80 K/uL    Monos (Absolute) 0.71 0.00 - 0.85 K/uL    Eos (Absolute) 0.09 0.00 - 0.51 K/uL    Baso (Absolute) 0.07 0.00 - 0.12 K/uL    Immature Granulocytes (abs) 0.04 0.00 - 0.11 K/uL    NRBC (Absolute) 0.00 K/uL   COMP METABOLIC PANEL   Result Value Ref Range    Sodium 139 135 - 145 mmol/L    Potassium 3.7 3.6 - 5.5 mmol/L    Chloride 106 96 - 112 mmol/L    Co2 26 20 - 33 mmol/L    Anion Gap 7.0 0.0 - 11.9    Glucose 109  (H) 65 - 99 mg/dL    Bun 15 8 - 22 mg/dL    Creatinine 0.49 (L) 0.50 - 1.40 mg/dL    Calcium 9.1 8.5 - 10.5 mg/dL    AST(SGOT) 18 12 - 45 U/L    ALT(SGPT) 17 2 - 50 U/L    Alkaline Phosphatase 95 30 - 99 U/L    Total Bilirubin 0.4 0.1 - 1.5 mg/dL    Albumin 3.5 3.2 - 4.9 g/dL    Total Protein 6.4 6.0 - 8.2 g/dL    Globulin 2.9 1.9 - 3.5 g/dL    A-G Ratio 1.2 g/dL   LIPASE   Result Value Ref Range    Lipase 10 (L) 11 - 82 U/L   ESTIMATED GFR   Result Value Ref Range    GFR If African American >60 >60 mL/min/1.73 m 2    GFR If Non African American >60 >60 mL/min/1.73 m 2       All labs were reviewed by me.    RADIOLOGY  CT-ABDOMEN-PELVIS W/O   Final Result      1.  Wall thickening of small bowel in the RIGHT lower quadrant suggests enteritis, with associated early or partial small bowel obstruction.   2.  Small amount of peritoneal fluid present, potentially increased from prior exam obtained 6 hours earlier.   3.  Increased colonic stool.   4.  Postoperative changes as described with prior abdominal aortic aneurysm repair.   5.  No pneumoperitoneum to indicate bowel perforation.        The radiologist's interpretation of all radiological studies have been reviewed by me.    COURSE & MEDICAL DECISION MAKING  Pertinent Labs & Imaging studies reviewed. (See chart for details)    7:53 PM - Patient seen and examined at bedside. Patient will be treated with 4mg morphine and 4mg Zofran. Ordered CT-abdomen, estimated GFR, CBC with differential, CMP, Lipase to evaluate her symptoms.     9:11 PM Consulted with radiology about reading patient's CT scan from today.     9:26 PM Upon recheck, the patient is still feeling abdominal pain. Patient treated with 1mg Dilaudid for her pain.    9:31 PM Paged Dr. Zayas (Hospitalist).     9:43 PM - I discussed the patient's case and the above findings with Dr. Zayas (Hospitalist) who agrees to admit the patient.     9:44 PM Paged General Surgery.     9:55 PM - I discussed the patient's case  and the above findings with Dr. Castorena (General Surgery) who recommends conservative management, he will evaluate her in the morning.      Decision Making:  This is a 72 y.o. year old female who presents with periumbilical abdominal pain. The patient has a history of small bowel obstruction, and she states that her symptoms are similar to the symptoms she had with that bowel obstruction in the past. Here the patient has a fairly nonfocal abdominal examination however I had a high suspicion for small bowel obstruction given her history. Here CT scan shows possible early obstruction with likely focal enteritis. After discussion with Dr. Castorena, he feels more concerned regarding the focal enteritis particularly given the patient's history of AAA repair, and the possibility the patient might have a focal ischemic area of the bowel. I discussed considerations with Dr. Zayas, who will admit the patient for further conservative management at this time.    DISPOSITION:  Patient will be admitted to Dr. Zayas (Hospitalist) in guarded condition.      FINAL IMPRESSION  1. Focal enteritis  2. Early small bowel obstruction  3. History of AAA     Jenna WILKS (Scribe), am scribing for, and in the presence of, Tonny Abdi M.D..    Electronically signed by: Jenna Daniel (Scribe), 5/23/2017    Tonny WILKS M.D. personally performed the services described in this documentation, as scribed by Jenna Daniel in my presence, and it is both accurate and complete.    The note accurately reflects work and decisions made by me.  Tonny Abdi  5/23/2017  10:55 PM

## 2017-05-24 NOTE — ED NOTES
Pod nurse: spoke with Dr Abdi about pts pain and elevated BP    Order pain meds and nausea medication

## 2017-05-24 NOTE — H&P
PRIMARY CARE PHYSICIAN:  Radha Julio MD    CHIEF COMPLAINT:  Abdominal pain.    HISTORY OF PRESENT ILLNESS:  This is a 72-year-old female with a past medical   history significant for COPD, peripheral vascular disease, hypertension who   presents today for evaluation of epigastric abdominal pain that began   approximately noon today.  Patient states that it is sharp in nature without   any radiation.  She presently states her pain is rated at 6/10.  Patient had   one episode of nonbloody emesis earlier today.  The patient was admitted to   our service in February of 2017 for ileus pressure and patient was admitted to   our service in February 2017 for partial small bowel obstruction.  At that   time, she was treated conservatively with NG decompression.  The patient   states her last bowel movement was earlier today she had a small bowel   movement.  She states that presently she is not passing any flatus.  She   denies presently any nausea, vomiting, chest pain, shortness of breath,   urinary urgency, frequency, dysuria, pyuria, hematuria, hematochezia or   melena.  She denies any fevers, chills, headache, sore throat.    REVIEW OF SYSTEMS:  A comprehensive review of systems was conducted and all   pertinent positive and negative are documented in the HPI.    PAST MEDICAL HISTORY:  Significant for hypertension, peripheral vascular   disease, dyslipidemia, COPD, diverticulosis, posterior vitreous detachment of   left eye, history of eosinophilic colitis, history of AAA status post repair,   history of severe right iliac peripheral vascular disease, status post left   common iliac stent placement and left to right femoral bypass done in   3/31/2017 by Dr. Pemberton.    ALLERGIES:  PATIENT IS ALLERGIC TO AMOXICILLIN, CIPROFLOXACIN.    FAMILY HISTORY:  Significant for heart disease.    SOCIAL HISTORY:  Patient denies any alcohol or illicit drug use.  The patient   has been smoking 3/4 of pack per day since the age of  17.    HOME MEDICATIONS:  Significant for aspirin 81 mg daily, Lipitor 80 mg daily,   Coreg 6.25 mg twice a day, Plavix 75 mg daily, Norco 5/325 mg q. 6 hours   p.r.n. for back and neck pain, lisinopril 10 mg daily, Senokot 1 tablet twice   a day, vitamin D 1000 units daily.    PHYSICAL EXAMINATION:  VITAL SIGNS:  Temperature of 37.3, heart rate of 83, respirations of 18, blood   pressure 150/66, O2 saturation of 97% on 2 liters nasal cannula.  GENERAL:  This is a 72-year-old pleasant female in no acute distress.  HEENT:  Normocephalic, atraumatic.  Pupils equal and reactive to light.    Extraocular motions intact.  Moist oral mucosa.  No oral exudate or erythema   noted.  NECK:  Supple, no lymphadenopathy or JVD noted.  CARDIOVASCULAR:  Regular rate and rhythm.  No murmurs, rubs or gallops noted.  LUNGS:  Clear to auscultation bilaterally.  No rhonchi, wheezes, or rales.  ABDOMEN:  Soft, nontender, and nondistended.  Bowel sounds present.  EXTREMITIES:  No clubbing, cyanosis or edema noted.  NEUROLOGIC:  Alert, awake, oriented x3, no focal deficits noted.  PSYCHIATRIC:  Normal mood, normal affect, normal judgment.    LABORATORY DATA:  WBC of 12.6, hemoglobin of 12.9, hematocrit 40, platelet   count of 279, lipase of 10, glucose of 109, BUN of 15, creatinine of 0.49, GFR   of greater than 60.    IMAGING STUDIES:  CT abdomen and pelvis without contrast shows wall thickening   of small bowel in the right lower quadrant suggestive of enteritis with   associated early or partial small-bowel obstruction, small amount of   peritoneal fluid present.  Patient had a CTA abdomen and pelvis earlier today,   which has not been read presently.    ASSESSMENT AND PLAN:  1.  For patient's partial small-bowel obstruction patient will be kept n.p.o.,   started on IV fluids.  NG tube is inserted.  Dr. Castorena from surgery has   been consulted.  I appreciate his input.  2.  Mild leukocytosis, this is likely reactive.  We will continue  to monitor   and hold off on antibiotics at the present time.  Patient does not appear to   be infected.  3.  Hyperglycemia.  Patient does not have a history of diabetes.  This is   likely reactive.  We will continue to monitor.  4.  Hyperlipidemia.  I will hold patient's Lipitor at present time and will   resume once the patient's partial small-bowel obstruction is resolved.  5.  Hypertension.  I will hold patient's Coreg at present time until   resolution of the patient's small-bowel obstruction  6.  Peripheral vascular disease.  Patient's aspirin and Plavix were held until   resolution of the patient's small-bowel obstruction.    DISPOSITION:  Inpatient, patient will likely require greater than 2 midnights   of care.    PROPHYLAXIS:  Patient started on heparin subcutaneous for DVT prophylaxis.  No   GI prophylaxis indicated.  Bowel protocol initiated.    CODE STATUS:  Full.       ____________________________________     LISA CASANOVA MD MS / NTS    DD:  05/23/2017 22:20:46  DT:  05/23/2017 22:45:15    D#:  [id number]  Job#:  [id number]

## 2017-05-24 NOTE — ED NOTES
Med rec  Updated and complete  Allergies reviewed.   Met with pt at bedside and dicussed current medications  And last doses taken.  Pt denies taking antibiotics in last 30 days.

## 2017-05-24 NOTE — ED NOTES
"Pod nurse: Medicated per MAR.    Also spoke with Dr Abdi again about high blood pressures let him know that I did them on both arms and that they are both high, also let him know that her pain is still \"extremely bad\"     1mg Dilaudid has been ordered, since just gave 4mg morphine let him know I will give in 5 mins   "

## 2017-05-24 NOTE — ED NOTES
Pod nurse: medicated per MAR     Spoke with ERP that I can smell a feces smell coming from breath, no vomit here in ER.

## 2017-05-24 NOTE — ED NOTES
"Pod nurse: Called ERP bedside as pressures are not going down also pt states pain is \"really bad again\"     ERP bedside also primary RN Justin gave report to her as well   "

## 2017-05-24 NOTE — DISCHARGE PLANNING
"Care Transition Team Assessment    Information Source  Orientation : Oriented x 4  Information Given By: Patient  Informant's Name: Karen  Who is responsible for making decisions for patient? : Patient    Readmission Evaluation  Is this a readmission?: No    Elopement Risk  Legal Hold: No  Ambulatory or Self Mobile in Wheelchair: Yes  Elopement Risk: Not at Risk for Elopement    Interdisciplinary Discharge Planning  Does Admitting Nurse Feel This Could be a Complex Discharge?: No  Primary Care Physician: Dr. zeeshan Gross  Lives with - Patient's Self Care Capacity: Adult Children  Support Systems: Children  Housing / Facility: [address] [facility]  Do You Take your Prescribed Medications Regularly: Yes  Mobility Issues: No  Patient Expects to be Discharged to:: home    Discharge Preparedness  What is your plan after discharge?: Uncertain - pending medical team collaboration  What are your discharge supports?: Other (comment) (states that \"I live with my boys\")  Prior Functional Level: Ambulatory  Difficulity with ADLs: None  Difficulity with IADLs: None    Functional Assesment  Prior Functional Level: Ambulatory    Finances  Financial Barriers to Discharge: No  Prescription Coverage: Yes    Vision / Hearing Impairment  Vision Impairment : Yes  Right Eye Vision: Wears Glasses  Left Eye Vision: Wears Glasses  Hearing Impairment : No              Domestic Abuse  Have you ever been the victim of abuse or violence?: No  Physical Abuse or Sexual Abuse: No  Verbal Abuse or Emotional Abuse: No    Psychological Assessment  History of Substance Abuse: None  History of Psychiatric Problems: No  Non-compliant with Treatment: No  Newly Diagnosed Illness: No    Discharge Risks or Barriers  Discharge risks or barriers?: No    Anticipated Discharge Information  Anticipated discharge disposition: Discharge needs currently unknown  Discharge Address: [address] [place] [address]. DANNI [place]. [id number]        "

## 2017-05-24 NOTE — CARE PLAN
Problem: Communication  Goal: The ability to communicate needs accurately and effectively will improve  Outcome: PROGRESSING AS EXPECTED  POC discussed,NPO status. Admission completed. Oriented to the floor, call light use, safety, hourly rounds.    Problem: Venous Thromboembolism (VTW)/Deep Vein Thrombosis (DVT) Prevention:  Goal: Patient will participate in Venous Thrombosis (VTE)/Deep Vein Thrombosis (DVT)Prevention Measures  Outcome: PROGRESSING AS EXPECTED  Heparin administered on arm for DVT. Pt ambulatory.

## 2017-05-24 NOTE — CARE PLAN
Problem: Nutritional:  Goal: Achieve adequate nutritional intake  Patient will start PO diet and will consume ~50% of meals  Outcome: NOT MET

## 2017-05-24 NOTE — PROGRESS NOTES
Pt complaining of pain not relieved by Norco that was taken 1hr ago.  hospitalist on-call was paged and obtained an order for Morphine 4mg IV Q4hrs PRN.

## 2017-05-24 NOTE — RESPIRATORY CARE
COPD EDUCATION by COPD CLINICAL EDUCATOR  5/24/2017 at 9:20 AM by Senait Salmeron     Patient reviewed by COPD education team. Patient does not qualify for COPD program.

## 2017-05-24 NOTE — PROGRESS NOTES
Pt arrived on the unit via gurney with transport. Ambulated to bed, steady on feet.  AOx4, RA sat at 93%.  Pain rated at 4/10 abd location.  NPO, sips with medications.  Pt verbalized preference to have the heparin administered on the arm, per MD recommendation.  Admission done.  POC discussed to include pain management, safety, call light use, rounds.  Will continue to monitor.    Skin check performed with Charge [place] Lew. Scabbed surgical site on left femoral area noted, otherwise, skin intact.

## 2017-05-24 NOTE — CONSULTS
"SURGERY HISTORY AND PHYSICAL    Date of service: 5/24/2017    Consult requested by: Dr. Abdi in the emergency department    CHIEF COMPLAINT: Abdominal bloating and pain    HISTORY OF PRESENT ILLNESS:  72-year-old female who presents with epigastric abdominal pain starting at noon yesterday. Pain became so severe that she presented to the emergency department. Pain was also associated with an episode of vomiting. Patient has had these in the past, most recently in January.    REVIEW OF SYSTEMS:  As per HPI and is otherwise negative    PAST MEDICAL HISTORY:  has a past medical history of Atherosclerosis of arteries of the extremities; Hypertension; Angina; Arthritis; Peripheral vascular disease (CMS-HCC); Unspecified hemorrhagic conditions; Dyslipidemia; COPD (chronic obstructive pulmonary disease) (CMS-HCC); Diverticulosis of colon; Spinal stenosis of lumbar region; Abdominal aortic aneurysm (CMS-[facility]) (11/2010); Carotid atherosclerosis; Diverticulitis; Family history of nonmelanoma skin cancer; PVD (posterior vitreous detachment), left eye (1/13/2015); Eosinophilic colitis; Bowel habit changes; Emphysema of lung (CMS-HCC); Snoring; and Pain.    PAST SURGICAL HISTORY:  has past surgical history that includes colonoscopy (3/1/2009, 4/2012, 7/2/13); gyn surgery (2002); gyn surgery (1975); other; colon resection laparoscopic (8/5/2013); colonoscopy with biopsy (3/6/2015); other cardiac surgery (2005); aaa with stent graft (N/A, 3/31/2017); and femoral femoral bypass (N/A, 3/31/2017).    FAMILY HISTORY:  Noncontributory    SOCIAL HISTORY:  Continued tobacco use at half pack per day, no alcohol use, no illicit drug use    ALLERGIES: Cipro, amoxicillin, both cause diarrhea, which is a side effect  MEDICATIONS:  Aspirin, Lipitor, carvedilol, Plavix, lisinopril, Senokot    VITAL SIGNS:  /65 mmHg  Pulse 81  Temp(Src) 36.5 °C (97.7 °F)  Resp 16  Ht 1.549 m (5' 1\")  Wt 41.731 kg (92 lb)  BMI 17.39 kg/m2  SpO2 98%  " [facility] 03/01/1997    PHYSICAL EXAMINATION:  Pale, thin, relatively cachectic elderly female in no acute distress  Because membranes somewhat dry  Neck supple  Chest clear bilaterally with equal rise.  Heart regular without murmur, pulses palpable in the upper extremities  Abdomen is softly distended and somewhat tender  Extremities have no cyanosis clubbing or edema  Neurologic examination is nonfocal    LABORATORY STUDIES:  White count 12.8, hemoglobin 14.2, platelet count 3:15  Chemistries entirely in the normal range    DIAGNOSTIC STUDIES:  CT scan of the abdomen done with contrast yesterday showed all of her vascular grafts to be patent with no sign of overt occlusion of the mesenteric vessels. There are dilated loops of small bowel with some thickening of the terminal ileum and mild amount of free fluid in the abdominal cavity     ASSESSMENT AND PLAN:  72-year-old female who appears to have a partial small bowel obstruction of uncertain etiology, but it is likely adhesive. At this time she does not have overt peritonitis or sepsis. Patient does not want surgery at this time. Patient is to be watched closely for signs of deterioration or perforation in which case she would require surgery. Otherwise, fluid resuscitation with bowel rest is an acceptable approach. Nasogastric tube should be placed if she vomits. She should have daily labs.    This note was dictated using voice recognition software and as such may have  inaccuracies that do not reflect patient care plan.

## 2017-05-24 NOTE — ED NOTES
Charge RN notified of pt score 2 on aortic screening. Told to notify ERP. Notified ERP.   Did not start aortic protocol as pt had CT with contrast today. Abdominal pain protocol initiated.

## 2017-05-25 VITALS
OXYGEN SATURATION: 92 % | TEMPERATURE: 99.3 F | SYSTOLIC BLOOD PRESSURE: 132 MMHG | DIASTOLIC BLOOD PRESSURE: 63 MMHG | BODY MASS INDEX: 17.37 KG/M2 | HEART RATE: 79 BPM | WEIGHT: 92 LBS | RESPIRATION RATE: 16 BRPM | HEIGHT: 61 IN

## 2017-05-25 PROBLEM — K56.600 PARTIAL SMALL BOWEL OBSTRUCTION (HCC): Status: RESOLVED | Noted: 2017-05-23 | Resolved: 2017-05-25

## 2017-05-25 PROBLEM — R10.9 ABDOMINAL PAIN: Status: RESOLVED | Noted: 2017-05-24 | Resolved: 2017-05-25

## 2017-05-25 LAB
ANION GAP SERPL CALC-SCNC: 5 MMOL/L (ref 0–11.9)
BASOPHILS # BLD AUTO: 0.6 % (ref 0–1.8)
BASOPHILS # BLD: 0.05 K/UL (ref 0–0.12)
BUN SERPL-MCNC: 12 MG/DL (ref 8–22)
CALCIUM SERPL-MCNC: 9 MG/DL (ref 8.5–10.5)
CHLORIDE SERPL-SCNC: 108 MMOL/L (ref 96–112)
CO2 SERPL-SCNC: 26 MMOL/L (ref 20–33)
CREAT SERPL-MCNC: 0.42 MG/DL (ref 0.5–1.4)
EOSINOPHIL # BLD AUTO: 0.4 K/UL (ref 0–0.51)
EOSINOPHIL NFR BLD: 4.6 % (ref 0–6.9)
ERYTHROCYTE [DISTWIDTH] IN BLOOD BY AUTOMATED COUNT: 46.4 FL (ref 35.9–50)
GFR SERPL CREATININE-BSD FRML MDRD: >60 ML/MIN/1.73 M 2
GLUCOSE SERPL-MCNC: 78 MG/DL (ref 65–99)
HCT VFR BLD AUTO: 36.7 % (ref 37–47)
HGB BLD-MCNC: 11.7 G/DL (ref 12–16)
IMM GRANULOCYTES # BLD AUTO: 0.03 K/UL (ref 0–0.11)
IMM GRANULOCYTES NFR BLD AUTO: 0.3 % (ref 0–0.9)
LYMPHOCYTES # BLD AUTO: 1.97 K/UL (ref 1–4.8)
LYMPHOCYTES NFR BLD: 22.7 % (ref 22–41)
MCH RBC QN AUTO: 28.5 PG (ref 27–33)
MCHC RBC AUTO-ENTMCNC: 31.9 G/DL (ref 33.6–35)
MCV RBC AUTO: 89.3 FL (ref 81.4–97.8)
MONOCYTES # BLD AUTO: 0.55 K/UL (ref 0–0.85)
MONOCYTES NFR BLD AUTO: 6.3 % (ref 0–13.4)
NEUTROPHILS # BLD AUTO: 5.68 K/UL (ref 2–7.15)
NEUTROPHILS NFR BLD: 65.5 % (ref 44–72)
NRBC # BLD AUTO: 0 K/UL
NRBC BLD AUTO-RTO: 0 /100 WBC
PLATELET # BLD AUTO: 238 K/UL (ref 164–446)
PMV BLD AUTO: 10.3 FL (ref 9–12.9)
POTASSIUM SERPL-SCNC: 3.8 MMOL/L (ref 3.6–5.5)
RBC # BLD AUTO: 4.11 M/UL (ref 4.2–5.4)
SODIUM SERPL-SCNC: 139 MMOL/L (ref 135–145)
WBC # BLD AUTO: 8.7 K/UL (ref 4.8–10.8)

## 2017-05-25 PROCEDURE — 700102 HCHG RX REV CODE 250 W/ 637 OVERRIDE(OP): Performed by: INTERNAL MEDICINE

## 2017-05-25 PROCEDURE — 700111 HCHG RX REV CODE 636 W/ 250 OVERRIDE (IP): Performed by: INTERNAL MEDICINE

## 2017-05-25 PROCEDURE — 36415 COLL VENOUS BLD VENIPUNCTURE: CPT

## 2017-05-25 PROCEDURE — A9270 NON-COVERED ITEM OR SERVICE: HCPCS | Performed by: INTERNAL MEDICINE

## 2017-05-25 PROCEDURE — 99239 HOSP IP/OBS DSCHRG MGMT >30: CPT | Performed by: HOSPITALIST

## 2017-05-25 PROCEDURE — 700105 HCHG RX REV CODE 258: Performed by: INTERNAL MEDICINE

## 2017-05-25 PROCEDURE — 665998 HH PPS REVENUE CREDIT

## 2017-05-25 PROCEDURE — 665999 HH PPS REVENUE DEBIT

## 2017-05-25 PROCEDURE — 85025 COMPLETE CBC W/AUTO DIFF WBC: CPT

## 2017-05-25 PROCEDURE — 80048 BASIC METABOLIC PNL TOTAL CA: CPT

## 2017-05-25 PROCEDURE — 700102 HCHG RX REV CODE 250 W/ 637 OVERRIDE(OP): Performed by: HOSPITALIST

## 2017-05-25 PROCEDURE — A9270 NON-COVERED ITEM OR SERVICE: HCPCS | Performed by: HOSPITALIST

## 2017-05-25 RX ORDER — CARVEDILOL 6.25 MG/1
6.25 TABLET ORAL 2 TIMES DAILY WITH MEALS
Status: DISCONTINUED | OUTPATIENT
Start: 2017-05-25 | End: 2017-05-25 | Stop reason: HOSPADM

## 2017-05-25 RX ORDER — CLOPIDOGREL BISULFATE 75 MG/1
75 TABLET ORAL EVERY EVENING
Status: DISCONTINUED | OUTPATIENT
Start: 2017-05-25 | End: 2017-05-25 | Stop reason: HOSPADM

## 2017-05-25 RX ORDER — ATORVASTATIN CALCIUM 40 MG/1
80 TABLET, FILM COATED ORAL
Status: DISCONTINUED | OUTPATIENT
Start: 2017-05-25 | End: 2017-05-25 | Stop reason: HOSPADM

## 2017-05-25 RX ORDER — LISINOPRIL 10 MG/1
10 TABLET ORAL
Status: DISCONTINUED | OUTPATIENT
Start: 2017-05-25 | End: 2017-05-25 | Stop reason: HOSPADM

## 2017-05-25 RX ADMIN — HEPARIN SODIUM 5000 UNITS: 5000 INJECTION, SOLUTION INTRAVENOUS; SUBCUTANEOUS at 05:48

## 2017-05-25 RX ADMIN — CARVEDILOL 6.25 MG: 6.25 TABLET, FILM COATED ORAL at 08:12

## 2017-05-25 RX ADMIN — ASPIRIN 81 MG: 81 TABLET ORAL at 08:12

## 2017-05-25 RX ADMIN — STANDARDIZED SENNA CONCENTRATE AND DOCUSATE SODIUM 2 TABLET: 8.6; 5 TABLET, FILM COATED ORAL at 08:12

## 2017-05-25 RX ADMIN — LISINOPRIL 10 MG: 10 TABLET ORAL at 08:12

## 2017-05-25 RX ADMIN — SODIUM CHLORIDE: 9 INJECTION, SOLUTION INTRAVENOUS at 05:00

## 2017-05-25 ASSESSMENT — PAIN SCALES - GENERAL: PAINLEVEL_OUTOF10: 0

## 2017-05-25 NOTE — PROGRESS NOTES
Alert and oriented, Vitals stable, calls appropriately, all needs met, denies pain, remains NPO, IV infusing, Heparin given for DVT prevention, ambulates independently to the bathroom.

## 2017-05-25 NOTE — PROGRESS NOTES
Patient discharged to home. No IV access. Pain well controlled .Tolerating full liquid diet with no nausea or abdominal pain. Voiding adequate amounts without difficulty. Ambulating with steady gait. Discharge instructions  provided. Patient verbalized understanding of discharge instructions and have no further questions or concerns at this time

## 2017-05-25 NOTE — DISCHARGE INSTRUCTIONS
Discharge Instructions    Discharged to home by car with relative. Discharged via wheelchair, hospital escort: Yes.  Special equipment needed: Not Applicable    Be sure to schedule a follow-up appointment with your primary care doctor or any specialists as instructed.     Discharge Plan:   Diet Plan: Discussed  Activity Level: Discussed  Confirmed Follow up Appointment: Patient to Call and Schedule Appointment  Confirmed Symptoms Management: Discussed  Medication Reconciliation Updated: Yes  Influenza Vaccine Indication: Patient Refuses    I understand that a diet low in cholesterol, fat, and sodium is recommended for good health. Unless I have been given specific instructions below for another diet, I accept this instruction as my diet prescription.   Other diet: Diet as tolerated    Special Instructions: None    · Is patient discharged on Warfarin / Coumadin?   No     · Is patient Post Blood Transfusion?  No    Depression / Suicide Risk    As you are discharged from this [facility] facility, it is important to learn how to keep safe from harming yourself.    Recognize the warning signs:  · Abrupt changes in personality, positive or negative- including increase in energy   · Giving away possessions  · Change in eating patterns- significant weight changes-  positive or negative  · Change in sleeping patterns- unable to sleep or sleeping all the time   · Unwillingness or inability to communicate  · Depression  · Unusual sadness, discouragement and loneliness  · Talk of wanting to die  · Neglect of personal appearance   · Rebelliousness- reckless behavior  · Withdrawal from people/activities they love  · Confusion- inability to concentrate     If you or a loved one observes any of these behaviors or has concerns about self-harm, here's what you can do:  · Talk about it- your feelings and reasons for harming yourself  · Remove any means that you might use to hurt yourself (examples: pills, rope, extension cords,  firearm)  · Get professional help from the community (Mental Health, Substance Abuse, psychological counseling)  · Do not be alone:Call your Safe Contact- someone whom you trust who will be there for you.  · Call your local CRISIS HOTLINE 216-6213 or 056-873-0427  · Call your local Children's Mobile Crisis Response Team [place] (875) 315-2130 or www.[employer]  · Call the toll free National Suicide Prevention Hotlines   · National Suicide Prevention Lifeline 248-934-LIQM (1896)  · [employer] Line Network 800-SUICIDE (847-3812)    Small Bowel Obstruction  A small bowel obstruction is a blockage (obstruction) of the small intestine (small bowel). The small bowel is a long, slender tube that connects the stomach to the colon. Its job is to absorb nutrients from the fluids and foods you consume into the bloodstream.   CAUSES   There are many causes of intestinal blockage. The most common ones include:  · Hernias. This is a more common cause in children than adults.  · Inflammatory bowel disease (enteritis and colitis).  · Twisting of the bowel (volvulus).  · Tumors.  · Scar tissue (adhesions) from previous surgery or radiation treatment.  · Recent surgery. This may cause an acute small bowel obstruction called an ileus.  SYMPTOMS   · Abdominal pain. This may be dull cramps or sharp pain. It may occur in one area or may be present in the entire abdomen. Pain can range from mild to severe, depending on the degree of obstruction.  · Nausea and vomiting. Vomit may be greenish or yellow bile color.  · Distended or swollen stomach. Abdominal bloating is a common symptom.  · Constipation.  · Lack of passing gas.  · Frequent belching.  · Diarrhea. This may occur if runny stool is able to leak around the obstruction.  DIAGNOSIS   Your caregiver can usually diagnose small bowel obstruction by taking a history, doing a physical exam, and taking X-rays. If the cause is unclear, a CT scan (computerized tomography) of  your abdomen and pelvis may be needed.  TREATMENT   Treatment of the blockage depends on the cause and how bad the problem is.   · Sometimes, the obstruction improves with bed rest and intravenous (IV) fluids.  · Resting the bowel is very important. This means following a simple diet. Sometimes, a clear liquid diet may be required for several days.  · Sometimes, a small tube (nasogastric tube) is placed into the stomach to decompress the bowel. When the bowel is blocked, it usually swells up like a balloon filled with air and fluids. Decompression means that the air and fluids are removed by suction through that tube. This can help with pain, discomfort, and nausea. It can also help the obstruction resolve faster.  · Surgery may be required if other treatments do not work. Bowel obstruction from a hernia may require early surgery and can be an emergency procedure. Adhesions that cause frequent or severe obstructions may also require surgery.  HOME CARE INSTRUCTIONS  If your bowel obstruction is only partial or incomplete, you may be allowed to go home.  · Get plenty of rest.  · Follow your diet as directed by your caregiver.  · Only consume clear liquids until your condition improves.  · Avoid solid foods as instructed.  SEEK IMMEDIATE MEDICAL CARE IF:  · You have increased pain or cramping.  · You vomit blood.  · You have uncontrolled vomiting or nausea.  · You cannot drink fluids due to vomiting or pain.  · You develop confusion.  · You begin feeling very dry or thirsty (dehydrated).  · You have severe bloating.  · You have chills.  · You have a fever.  · You feel extremely weak or you faint.  MAKE SURE YOU:  · Understand these instructions.  · Will watch your condition.  · Will get help right away if you are not doing well or get worse.     This information is not intended to replace advice given to you by your health care provider. Make sure you discuss any questions you have with your health care provider.      Document Released: 03/06/2007 Document Revised: 03/11/2013 Document Reviewed: 02/11/2016  [employer] Interactive Patient Education ©2016 Elsevier Inc.    Full Liquid Diet  A full liquid diet may be used:   · To help you transition from a clear liquid diet to a soft diet.    · When your body is healing and can only tolerate foods that are easy to digest.  · Before or after certain a procedure, test, or surgery (such as stomach or intestinal surgeries).    · If you have trouble swallowing or chewing.    A full liquid diet includes fluids and foods that are liquid or will become liquid at room temperature. The full liquid diet gives you the proteins, fluids, salts, and minerals that you need for energy.  If you continue this diet for more than 72 hours, talk to your health care provider about how many calories you need to consume. If you continue the diet for more than 5 days, talk to your health care provider about taking a multivitamin or a nutritional supplement.  WHAT DO I NEED TO KNOW ABOUT A FULL LIQUID DIET?  · You may have any liquid.  · You may have any food that becomes a liquid at room temperature. The food is considered a liquid if it can be poured off a spoon at room temperature.  · Drink one serving of citrus or vitamin C-enriched fruit juice daily.  WHAT FOODS CAN I EAT?  Grains  Any grain food that can be pureed in soup (such as crackers, pasta, and rice). Hot cereal (such as farina or oatmeal) that has been blended. Talk to your health care provider or dietitian about these foods.  Vegetables  Pulp-free tomato or vegetable juice. Vegetables pureed in soup.   Fruits  Fruit juice, including nectars and juices with pulp.  Meats and Other Protein Sources  Eggs in custard, eggnog mix, and eggs used in ice cream or pudding. Strained meats, like in baby food, may be allowed. Consult your health care provider.   Dairy  Milk and milk-based beverages, including milk shakes and instant breakfast mixes. Smooth  yogurt. Pureed cottage cheese. Avoid these foods if they are not well tolerated.  Beverages  All beverages, including liquid nutritional supplements. Ask your health care provider if you can have carbonated beverages. They may not be well tolerated.  Condiments  Iodized salt, pepper, spices, and flavorings. Cocoa powder. Vinegar, ketchup, yellow mustard, smooth sauces (such as hollandaise, cheese sauce, or white sauce), and soy sauce.  Sweets and Desserts  Custard, smooth pudding. Flavored gelatin. Tapioca, junket. Plain ice cream, sherbet, fruit ices. Frozen ice pops, frozen fudge pops, pudding pops, and other frozen bars with cream. Syrups, including chocolate syrup. Sugar, honey, jelly.   Fats and Oils  Margarine, butter, cream, sour cream, and oils.  Other  Broth and cream soups. Strained, broth-based soups.  The items listed above may not be a complete list of recommended foods or beverages. Contact your dietitian for more options.   WHAT FOODS CAN I NOT EAT?  Grains  All breads. Grains are not allowed unless they are pureed into soup.  Vegetables  Vegetables are not allowed unless they are juiced, or cooked and pureed into soup.  Fruits  Fruits are not allowed unless they are juiced.  Meats and Other Protein Sources  Any meat or fish. Cooked or raw eggs. Nut butters.   Dairy  Cheese.   Condiments  Stone ground mustards.  Fats and Oils  Fats that are coarse or chunky.  Sweets and Desserts  Ice cream or other frozen desserts that have any solids in them or on top, such as nuts, chocolate chips, and pieces of cookies. Cakes. Cookies. Candy.  Others  Soups with chunks or pieces in them.  The items listed above may not be a complete list of foods and beverages to avoid. Contact your dietitian for more information.     This information is not intended to replace advice given to you by your health care provider. Make sure you discuss any questions you have with your health care provider.     Document Released:  12/18/2006 Document Revised: 12/23/2014 Document Reviewed: 10/23/2014  Elsevier Interactive Patient Education ©2016 Elsevier Inc.

## 2017-05-25 NOTE — PROGRESS NOTES
Hospital Medicine Interval Note  Date of Service:  5/24/2017    Chief Complaint  72 y.o.-year-old female admitted 5/23/2017 with sbo    Interval Problem Update  No emergent surgery needed    Sh states she is passing gas    Had not need any narcotics this afternoon    Afebrile    She refused ngt but at this point, seems to have improved since yesterday    Consultants/Specialty  Surgery dr segura    Disposition  home     Review of Systems   Constitutional: Positive for malaise/fatigue. Negative for fever, chills and weight loss.   HENT: Negative for ear pain and tinnitus.    Respiratory: Negative for cough and hemoptysis.    Cardiovascular: Negative for chest pain and palpitations.   Gastrointestinal: Negative for heartburn, nausea and vomiting.   Genitourinary: Negative for dysuria and urgency.   Musculoskeletal: Negative for myalgias, back pain, joint pain and neck pain.   Neurological: Negative for dizziness, tingling, tremors, sensory change, speech change and headaches.   All other systems reviewed and are negative.     Physical Exam Laboratory/Imaging   Filed Vitals:    05/24/17 0400 05/24/17 0618 05/24/17 0715 05/24/17 1618   BP: 185/85 152/56 128/65 150/60   Pulse: 96 72 81 75   Temp: 37.1 °C (98.7 °F)  36.5 °C (97.7 °F) 37.2 °C (98.9 °F)   Resp: 17  16 16   Height:       Weight:       SpO2: 93%  98% 97%     Physical Exam   Constitutional: She is oriented to person, place, and time. No distress.   Neck: No JVD present. No thyromegaly present.   Cardiovascular: Normal rate.  Exam reveals no gallop and no friction rub.    No murmur heard.  Pulmonary/Chest: No respiratory distress. She has no wheezes.   Abdominal: Bowel sounds are normal. She exhibits distension. She exhibits no mass. There is no tenderness. There is no rebound and no guarding.   Musculoskeletal: She exhibits no edema or tenderness.   Neurological: She is alert and oriented to person, place, and time. No cranial nerve deficit. Coordination  normal.   Skin: She is not diaphoretic. There is pallor.    Lab Results   Component Value Date/Time    WBC 12.8* 05/24/2017 03:01 AM    HEMOGLOBIN 14.2 05/24/2017 03:01 AM    HEMATOCRIT 45.0 05/24/2017 03:01 AM    PLATELET COUNT 315 05/24/2017 03:01 AM     Lab Results   Component Value Date/Time    SODIUM 139 05/24/2017 03:01 AM    POTASSIUM 4.3 05/24/2017 03:01 AM    CHLORIDE 103 05/24/2017 03:01 AM    CO2 29 05/24/2017 03:01 AM    GLUCOSE 97 05/24/2017 03:01 AM    BUN 15 05/24/2017 03:01 AM    CREATININE 0.52 05/24/2017 03:01 AM      Assessment/Plan    * Partial small bowel obstruction (CMS-HCC) (present on admission)  Assessment & Plan  Bowel rest    Ambulate    Diet as per surgery    hydrate    Abdominal pain  Assessment & Plan  Iv morphine prn    Essential hypertension (present on admission)  Assessment & Plan  Prn iv labetolol         Resendiz catheter: No Resendiz      DVT Prophylaxis: Heparin

## 2017-05-25 NOTE — PROGRESS NOTES
"Surgery    Multiple bowel movements last 24 hours  Passing flatus  Tolerating diet    /63 mmHg  Pulse 79  Temp(Src) 37.4 °C (99.3 °F)  Resp 16  Ht 1.549 m (5' 1\")  Wt 41.731 kg (92 lb)  BMI 17.39 kg/m2  SpO2 92%  LMP 03/01/1997    Abdomen soft nontender nondistended    Assessment and plan  Okay to discharge from surgical point of view  Patient should follow-up with her surgeon  Consult surgeon on call if assistance needed in the future  "

## 2017-05-26 PROCEDURE — 665998 HH PPS REVENUE CREDIT

## 2017-05-26 PROCEDURE — 665999 HH PPS REVENUE DEBIT

## 2017-05-27 PROCEDURE — 665999 HH PPS REVENUE DEBIT

## 2017-05-27 PROCEDURE — 665998 HH PPS REVENUE CREDIT

## 2017-05-27 NOTE — DISCHARGE SUMMARY
DATE OF ADMISSION:  05/23/2017    DATE OF DISCHARGE:  05/25/2017    CONSULTANT:  Dr. Jacky Castorena, surgery.    HOSPITAL COURSE:  The patient came in with complaint of abdominal pain,   diagnosed with small-bowel obstruction.  She has had this in the past.  She   was offered an NG tube for decompression, she refused.  She was given bowel   rest and hydration.  We tried to minimize narcotics.  With ambulation and   supportive treatment, patient started having passing gas and having bowel   movements and she was stable for discharge on 05/25/2017.    DISCHARGE MEDICATIONS:  Patient to resume all her previous outpatient   medications.  Please see med rec for such.    PERTINENT LABORATORY DATA:  White count of 8, hemoglobin 11, hematocrit 37,   platelet 238 on 05/25/2017.  Sodium is 138, potassium 3.8, BUN 12, creatinine   0.42.    IMPRESSION:  1.  Partial small bowel obstruction, resolved.  2.  Abdominal pain secondary to above.  3.  History of hypertension.    On day of discharge, 38 minutes taken with this patient.  Patient will follow   up with her primary physician and her surgeon in the outpatient setting as   needed.       ____________________________________     MD YANIV SPRINGER / ABDIEL    DD:  05/26/2017 10:51:42  DT:  05/26/2017 22:52:49    D#:  [id number]  Job#:  [id number]

## 2017-05-28 PROCEDURE — 665999 HH PPS REVENUE DEBIT

## 2017-05-28 PROCEDURE — 665998 HH PPS REVENUE CREDIT

## 2017-05-29 PROCEDURE — 665998 HH PPS REVENUE CREDIT

## 2017-05-29 PROCEDURE — 665999 HH PPS REVENUE DEBIT

## 2017-05-30 PROCEDURE — 665998 HH PPS REVENUE CREDIT

## 2017-05-30 PROCEDURE — 665999 HH PPS REVENUE DEBIT

## 2017-05-31 PROCEDURE — 665998 HH PPS REVENUE CREDIT

## 2017-05-31 PROCEDURE — 665999 HH PPS REVENUE DEBIT

## 2017-06-01 ENCOUNTER — HOSPITAL ENCOUNTER (INPATIENT)
Facility: MEDICAL CENTER | Age: 72
LOS: 2 days | DRG: 389 | End: 2017-06-03
Attending: EMERGENCY MEDICINE | Admitting: HOSPITALIST
Payer: MEDICARE

## 2017-06-01 ENCOUNTER — RESOLUTE PROFESSIONAL BILLING HOSPITAL PROF FEE (OUTPATIENT)
Dept: HOSPITALIST | Facility: MEDICAL CENTER | Age: 72
End: 2017-06-01
Payer: MEDICARE

## 2017-06-01 ENCOUNTER — APPOINTMENT (OUTPATIENT)
Dept: RADIOLOGY | Facility: MEDICAL CENTER | Age: 72
DRG: 389 | End: 2017-06-01
Attending: EMERGENCY MEDICINE
Payer: MEDICARE

## 2017-06-01 DIAGNOSIS — K56.609 SMALL BOWEL OBSTRUCTION (HCC): ICD-10-CM

## 2017-06-01 LAB
ALBUMIN SERPL BCP-MCNC: 3.4 G/DL (ref 3.2–4.9)
ALBUMIN/GLOB SERPL: 1.1 G/DL
ALP SERPL-CCNC: 83 U/L (ref 30–99)
ALT SERPL-CCNC: 15 U/L (ref 2–50)
ANION GAP SERPL CALC-SCNC: 5 MMOL/L (ref 0–11.9)
AST SERPL-CCNC: 17 U/L (ref 12–45)
BASOPHILS # BLD AUTO: 0.8 % (ref 0–1.8)
BASOPHILS # BLD: 0.08 K/UL (ref 0–0.12)
BILIRUB SERPL-MCNC: 0.2 MG/DL (ref 0.1–1.5)
BUN SERPL-MCNC: 21 MG/DL (ref 8–22)
CALCIUM SERPL-MCNC: 9.4 MG/DL (ref 8.5–10.5)
CHLORIDE SERPL-SCNC: 103 MMOL/L (ref 96–112)
CO2 SERPL-SCNC: 28 MMOL/L (ref 20–33)
CREAT SERPL-MCNC: 0.74 MG/DL (ref 0.5–1.4)
EOSINOPHIL # BLD AUTO: 0.26 K/UL (ref 0–0.51)
EOSINOPHIL NFR BLD: 2.7 % (ref 0–6.9)
ERYTHROCYTE [DISTWIDTH] IN BLOOD BY AUTOMATED COUNT: 44.8 FL (ref 35.9–50)
GFR SERPL CREATININE-BSD FRML MDRD: >60 ML/MIN/1.73 M 2
GLOBULIN SER CALC-MCNC: 3.2 G/DL (ref 1.9–3.5)
GLUCOSE SERPL-MCNC: 99 MG/DL (ref 65–99)
HCT VFR BLD AUTO: 39.8 % (ref 37–47)
HGB BLD-MCNC: 13 G/DL (ref 12–16)
IMM GRANULOCYTES # BLD AUTO: 0.04 K/UL (ref 0–0.11)
IMM GRANULOCYTES NFR BLD AUTO: 0.4 % (ref 0–0.9)
LIPASE SERPL-CCNC: 21 U/L (ref 11–82)
LYMPHOCYTES # BLD AUTO: 1.85 K/UL (ref 1–4.8)
LYMPHOCYTES NFR BLD: 19.3 % (ref 22–41)
MCH RBC QN AUTO: 28.6 PG (ref 27–33)
MCHC RBC AUTO-ENTMCNC: 32.7 G/DL (ref 33.6–35)
MCV RBC AUTO: 87.5 FL (ref 81.4–97.8)
MONOCYTES # BLD AUTO: 0.58 K/UL (ref 0–0.85)
MONOCYTES NFR BLD AUTO: 6.1 % (ref 0–13.4)
NEUTROPHILS # BLD AUTO: 6.77 K/UL (ref 2–7.15)
NEUTROPHILS NFR BLD: 70.7 % (ref 44–72)
NRBC # BLD AUTO: 0 K/UL
NRBC BLD AUTO-RTO: 0 /100 WBC
PLATELET # BLD AUTO: 326 K/UL (ref 164–446)
PMV BLD AUTO: 9.6 FL (ref 9–12.9)
POTASSIUM SERPL-SCNC: 4 MMOL/L (ref 3.6–5.5)
PROT SERPL-MCNC: 6.6 G/DL (ref 6–8.2)
RBC # BLD AUTO: 4.55 M/UL (ref 4.2–5.4)
SODIUM SERPL-SCNC: 136 MMOL/L (ref 135–145)
WBC # BLD AUTO: 9.6 K/UL (ref 4.8–10.8)

## 2017-06-01 PROCEDURE — 665999 HH PPS REVENUE DEBIT

## 2017-06-01 PROCEDURE — 700111 HCHG RX REV CODE 636 W/ 250 OVERRIDE (IP): Performed by: HOSPITALIST

## 2017-06-01 PROCEDURE — 83690 ASSAY OF LIPASE: CPT

## 2017-06-01 PROCEDURE — 700102 HCHG RX REV CODE 250 W/ 637 OVERRIDE(OP): Performed by: HOSPITALIST

## 2017-06-01 PROCEDURE — 665998 HH PPS REVENUE CREDIT

## 2017-06-01 PROCEDURE — 700105 HCHG RX REV CODE 258: Performed by: EMERGENCY MEDICINE

## 2017-06-01 PROCEDURE — 770006 HCHG ROOM/CARE - MED/SURG/GYN SEMI*

## 2017-06-01 PROCEDURE — 74177 CT ABD & PELVIS W/CONTRAST: CPT

## 2017-06-01 PROCEDURE — 99285 EMERGENCY DEPT VISIT HI MDM: CPT

## 2017-06-01 PROCEDURE — 96375 TX/PRO/DX INJ NEW DRUG ADDON: CPT

## 2017-06-01 PROCEDURE — 700117 HCHG RX CONTRAST REV CODE 255: Performed by: EMERGENCY MEDICINE

## 2017-06-01 PROCEDURE — 700102 HCHG RX REV CODE 250 W/ 637 OVERRIDE(OP): Performed by: NURSE PRACTITIONER

## 2017-06-01 PROCEDURE — 99223 1ST HOSP IP/OBS HIGH 75: CPT | Mod: AI | Performed by: HOSPITALIST

## 2017-06-01 PROCEDURE — 96374 THER/PROPH/DIAG INJ IV PUSH: CPT

## 2017-06-01 PROCEDURE — 80053 COMPREHEN METABOLIC PANEL: CPT

## 2017-06-01 PROCEDURE — 96376 TX/PRO/DX INJ SAME DRUG ADON: CPT

## 2017-06-01 PROCEDURE — A9270 NON-COVERED ITEM OR SERVICE: HCPCS | Performed by: HOSPITALIST

## 2017-06-01 PROCEDURE — A9270 NON-COVERED ITEM OR SERVICE: HCPCS | Performed by: NURSE PRACTITIONER

## 2017-06-01 PROCEDURE — 85025 COMPLETE CBC W/AUTO DIFF WBC: CPT

## 2017-06-01 PROCEDURE — 700111 HCHG RX REV CODE 636 W/ 250 OVERRIDE (IP): Performed by: EMERGENCY MEDICINE

## 2017-06-01 PROCEDURE — 700105 HCHG RX REV CODE 258: Performed by: HOSPITALIST

## 2017-06-01 RX ORDER — CARVEDILOL 6.25 MG/1
6.25 TABLET ORAL 2 TIMES DAILY WITH MEALS
Status: DISCONTINUED | OUTPATIENT
Start: 2017-06-01 | End: 2017-06-03 | Stop reason: HOSPADM

## 2017-06-01 RX ORDER — POLYETHYLENE GLYCOL 3350 17 G/17G
1 POWDER, FOR SOLUTION ORAL
Status: DISCONTINUED | OUTPATIENT
Start: 2017-06-01 | End: 2017-06-03 | Stop reason: HOSPADM

## 2017-06-01 RX ORDER — OXYCODONE HYDROCHLORIDE 5 MG/1
2.5 TABLET ORAL
Status: DISCONTINUED | OUTPATIENT
Start: 2017-06-01 | End: 2017-06-01

## 2017-06-01 RX ORDER — ATORVASTATIN CALCIUM 40 MG/1
80 TABLET, FILM COATED ORAL
Status: DISCONTINUED | OUTPATIENT
Start: 2017-06-01 | End: 2017-06-03 | Stop reason: HOSPADM

## 2017-06-01 RX ORDER — LISINOPRIL 10 MG/1
10 TABLET ORAL DAILY
Status: DISCONTINUED | OUTPATIENT
Start: 2017-06-01 | End: 2017-06-03 | Stop reason: HOSPADM

## 2017-06-01 RX ORDER — SODIUM CHLORIDE 9 MG/ML
INJECTION, SOLUTION INTRAVENOUS CONTINUOUS
Status: DISCONTINUED | OUTPATIENT
Start: 2017-06-01 | End: 2017-06-03 | Stop reason: HOSPADM

## 2017-06-01 RX ORDER — HYDROCODONE BITARTRATE AND ACETAMINOPHEN 5; 325 MG/1; MG/1
1 TABLET ORAL EVERY 6 HOURS PRN
COMMUNITY
End: 2017-08-17 | Stop reason: SDUPTHER

## 2017-06-01 RX ORDER — SODIUM CHLORIDE 9 MG/ML
1000 INJECTION, SOLUTION INTRAVENOUS ONCE
Status: COMPLETED | OUTPATIENT
Start: 2017-06-01 | End: 2017-06-01

## 2017-06-01 RX ORDER — ONDANSETRON 2 MG/ML
4 INJECTION INTRAMUSCULAR; INTRAVENOUS ONCE
Status: COMPLETED | OUTPATIENT
Start: 2017-06-01 | End: 2017-06-01

## 2017-06-01 RX ORDER — HYDROCODONE BITARTRATE AND ACETAMINOPHEN 5; 325 MG/1; MG/1
1 TABLET ORAL EVERY 4 HOURS PRN
Status: DISCONTINUED | OUTPATIENT
Start: 2017-06-01 | End: 2017-06-02

## 2017-06-01 RX ORDER — BISACODYL 10 MG
10 SUPPOSITORY, RECTAL RECTAL
Status: DISCONTINUED | OUTPATIENT
Start: 2017-06-01 | End: 2017-06-03 | Stop reason: HOSPADM

## 2017-06-01 RX ORDER — MORPHINE SULFATE 4 MG/ML
2 INJECTION, SOLUTION INTRAMUSCULAR; INTRAVENOUS
Status: DISCONTINUED | OUTPATIENT
Start: 2017-06-01 | End: 2017-06-03 | Stop reason: HOSPADM

## 2017-06-01 RX ORDER — OXYCODONE HYDROCHLORIDE 5 MG/1
5 TABLET ORAL
Status: DISCONTINUED | OUTPATIENT
Start: 2017-06-01 | End: 2017-06-01

## 2017-06-01 RX ORDER — AMOXICILLIN 250 MG
2 CAPSULE ORAL 2 TIMES DAILY
Status: DISCONTINUED | OUTPATIENT
Start: 2017-06-01 | End: 2017-06-03 | Stop reason: HOSPADM

## 2017-06-01 RX ORDER — CLOPIDOGREL BISULFATE 75 MG/1
75 TABLET ORAL EVERY EVENING
Status: DISCONTINUED | OUTPATIENT
Start: 2017-06-01 | End: 2017-06-03 | Stop reason: HOSPADM

## 2017-06-01 RX ADMIN — ONDANSETRON 4 MG: 2 INJECTION INTRAMUSCULAR; INTRAVENOUS at 14:59

## 2017-06-01 RX ADMIN — SODIUM CHLORIDE 1000 ML: 9 INJECTION, SOLUTION INTRAVENOUS at 14:59

## 2017-06-01 RX ADMIN — HYDROMORPHONE HYDROCHLORIDE 0.5 MG: 1 INJECTION, SOLUTION INTRAMUSCULAR; INTRAVENOUS; SUBCUTANEOUS at 17:09

## 2017-06-01 RX ADMIN — SODIUM CHLORIDE: 9 INJECTION, SOLUTION INTRAVENOUS at 22:02

## 2017-06-01 RX ADMIN — HYDROMORPHONE HYDROCHLORIDE 0.5 MG: 1 INJECTION, SOLUTION INTRAMUSCULAR; INTRAVENOUS; SUBCUTANEOUS at 19:31

## 2017-06-01 RX ADMIN — CLOPIDOGREL 75 MG: 75 TABLET, FILM COATED ORAL at 22:01

## 2017-06-01 RX ADMIN — ATORVASTATIN CALCIUM 80 MG: 40 TABLET, FILM COATED ORAL at 22:01

## 2017-06-01 RX ADMIN — HYDROMORPHONE HYDROCHLORIDE 0.5 MG: 1 INJECTION, SOLUTION INTRAMUSCULAR; INTRAVENOUS; SUBCUTANEOUS at 14:59

## 2017-06-01 RX ADMIN — IOHEXOL 100 ML: 350 INJECTION, SOLUTION INTRAVENOUS at 16:35

## 2017-06-01 RX ADMIN — ASPIRIN 81 MG: 81 TABLET ORAL at 22:01

## 2017-06-01 RX ADMIN — HYDROCODONE BITARTRATE AND ACETAMINOPHEN 1 TABLET: 5; 325 TABLET ORAL at 23:50

## 2017-06-01 RX ADMIN — MORPHINE SULFATE 2 MG: 4 INJECTION INTRAVENOUS at 21:51

## 2017-06-01 ASSESSMENT — PAIN SCALES - GENERAL
PAINLEVEL_OUTOF10: 7
PAINLEVEL_OUTOF10: 7
PAINLEVEL_OUTOF10: 6
PAINLEVEL_OUTOF10: 6

## 2017-06-01 ASSESSMENT — LIFESTYLE VARIABLES
EVER_SMOKED: YES
ALCOHOL_USE: NO

## 2017-06-01 NOTE — ED NOTES
Pt to rm 27 bib remsa .  Chief Complaint   Patient presents with   • Abdominal Pain     since 10 am. states hst of SBO and worried she may have again. pt states last BM x2 this am. denies n/v    given fentanyl and zofran pta

## 2017-06-01 NOTE — IP AVS SNAPSHOT
6/3/2017    Karen Jauregui  [address]  [place] [address]    Dear Karen:    [facility] wants to ensure your discharge home is safe and you or your loved ones have had all of your questions answered regarding your care after you leave the hospital.    Below is a list of resources and contact information should you have any questions regarding your hospital stay, follow-up instructions, or active medical symptoms.    Questions or Concerns Regarding… Contact   Medical Questions Related to Your Discharge  (7 days a week, 8am-5pm) Contact a Nurse Care Coordinator   [phone]   Medical Questions Not Related to Your Discharge  (24 hours a day / 7 days a week)  Contact the Nurse Health Line   [phone]    Medications or Discharge Instructions Refer to your discharge packet   or contact your [facility] Primary Care Provider   [phone]   Follow-up Appointment(s) Schedule your appointment via [employer]   or contact Scheduling [phone]   Billing Review your statement via [employer]  or contact Billing [phone]   Medical Records Review your records via [employer]   or contact Medical Records [phone]     You may receive a telephone call within two days of discharge. This call is to make certain you understand your discharge instructions and have the opportunity to have any questions answered. You can also easily access your medical information, test results and upcoming appointments via the [employer] free online health management tool. You can learn more and sign up at [employer]. For assistance setting up your [employer] account, please call [phone].    Once again, we want to ensure your discharge home is safe and that you have a clear understanding of any next steps in your care. If you have any questions or concerns, please do not hesitate to contact us, we are here for you. Thank you for choosing [facility] for your healthcare needs.    Sincerely,    Your [facility] Healthcare Team

## 2017-06-01 NOTE — IP AVS SNAPSHOT
" <p align=\"LEFT\"><IMG SRC=\"//[email or web address]\" alt=\"Image\" WIDTH=\"50%\" HEIGHT=\"200\" BORDER=\"\"></p>                   Name:Karen Jauregui  Medical Record Number:[id number]  CSN: [id number]    YOB: 1945   Age: 72 y.o.  Sex: female  HT:1.549 m (5' 0.98\") WT: 41.731 kg (92 lb)          Admit Date: 6/1/2017     Discharge Date:   Today's Date: 6/3/2017  Attending Doctor:  JULIO CESAR Campbell*                  Allergies:  Amoxicillin and Ciprofloxacin          Follow-up Information     1. Schedule an appointment as soon as possible for a visit with Dakota Huffman M.D..    Specialties:  Surgery, Radiology    Why:  As needed    Contact information    [address]  [place] [id number] [phone]           Medication List      Take these Medications        Instructions    aspirin EC 81 MG Tbec   Commonly known as:  ECOTRIN    Take 81 mg by mouth every evening.   Dose:  81 mg       atorvastatin 80 MG tablet   Commonly known as:  LIPITOR    Take 80 mg by mouth every bedtime.   Dose:  80 mg       carvedilol 6.25 MG Tabs   Commonly known as:  COREG    Take 1 Tab by mouth 2 times a day, with meals.   Dose:  6.25 mg       clopidogrel 75 MG Tabs   Commonly known as:  PLAVIX    Take 75 mg by mouth every evening.   Dose:  75 mg       hydrocodone-acetaminophen 5-325 MG Tabs per tablet   Commonly known as:  NORCO    Take 1 Tab by mouth every 6 hours as needed.   Dose:  1 Tab       lisinopril 10 MG Tabs   Commonly known as:  PRINIVIL    Doctor's comments:  Please note the 10 milligram lisinopril is correct. The lisinopril HCT is not.   Take 1 Tab by mouth every day.   Dose:  10 mg         "

## 2017-06-01 NOTE — IP AVS SNAPSHOT
Noveporter Access Code: Activation code not generated  Current Noveporter Status: Active    [employer]  A secure, online tool to manage your health information     [employer] Noveporter® is a secure, online tool that connects you to your personalized health information from the privacy of your home -- day or night - making it very easy for you to manage your healthcare. Once the activation process is completed, you can even access your medical information using the Noveporter evelia, which is available for free in the [employer] store or Google Play store.     Noveporter provides the following levels of access (as shown below):   My Chart Features   [facility] Primary Care Doctor [facility]  Specialists [facility]  Urgent  Care Non-[facility]  Primary Care  Doctor   Email your healthcare team securely and privately 24/7 X X X X   Manage appointments: schedule your next appointment; view details of past/upcoming appointments X      Request prescription refills. X      View recent personal medical records, including lab and immunizations X X X X   View health record, including health history, allergies, medications X X X X   Read reports about your outpatient visits, procedures, consult and ER notes X X X X   See your discharge summary, which is a recap of your hospital and/or ER visit that includes your diagnosis, lab results, and care plan. X X       How to register for Noveporter:  1. Go to  [email or web address].  2. Click on the Sign Up Now box, which takes you to the New Member Sign Up page. You will need to provide the following information:  a. Enter your Noveporter Access Code exactly as it appears at the top of this page. (You will not need to use this code after you’ve completed the sign-up process. If you do not sign up before the expiration date, you must request a new code.)   b. Enter your date of birth.   c. Enter your home email address.   d. Click Submit, and follow the next screen’s instructions.  3. Create a Noveporter ID. This will  be your [employer] login ID and cannot be changed, so think of one that is secure and easy to remember.  4. Create a [employer] password. You can change your password at any time.  5. Enter your Password Reset Question and Answer. This can be used at a later time if you forget your password.   6. Enter your e-mail address. This allows you to receive e-mail notifications when new information is available in [employer].  7. Click Sign Up. You can now view your health information.    For assistance activating your [employer] account, call [phone]

## 2017-06-01 NOTE — IP AVS SNAPSHOT
" Home Care Instructions                                                                                                                  Name:Karen Jauregui  Medical Record Number:[id number]  CSN: [id number]    YOB: 1945   Age: 72 y.o.  Sex: female  HT:1.549 m (5' 0.98\") WT: 41.731 kg (92 lb)          Admit Date: 6/1/2017     Discharge Date:   Today's Date: 6/3/2017  Attending Doctor:  JULIO CESAR Campbell*                  Allergies:  Amoxicillin and Ciprofloxacin            Discharge Instructions       Discharge Instructions    Discharged to home by car with relative. Discharged via wheelchair, hospital escort: Yes.  Special equipment needed: Not Applicable    Be sure to schedule a follow-up appointment with your primary care doctor or any specialists as instructed.     Discharge Plan:   Diet Plan: Discussed  Activity Level: Discussed  Smoking Cessation Offered: Patient Refused  Confirmed Follow up Appointment: Patient to Call and Schedule Appointment  Confirmed Symptoms Management: Discussed  Medication Reconciliation Updated: Yes  Influenza Vaccine Indication: Patient Refuses    I understand that a diet low in cholesterol, fat, and sodium is recommended for good health. Unless I have been given specific instructions below for another diet, I accept this instruction as my diet prescription.   Other diet: full liquid diet    Special Instructions: None    · Is patient discharged on Warfarin / Coumadin?   No     · Is patient Post Blood Transfusion?  No    Depression / Suicide Risk    As you are discharged from this [facility] facility, it is important to learn how to keep safe from harming yourself.    Recognize the warning signs:  · Abrupt changes in personality, positive or negative- including increase in energy   · Giving away possessions  · Change in eating patterns- significant weight changes-  positive or negative  · Change in sleeping patterns- unable to sleep or sleeping all the " time   · Unwillingness or inability to communicate  · Depression  · Unusual sadness, discouragement and loneliness  · Talk of wanting to die  · Neglect of personal appearance   · Rebelliousness- reckless behavior  · Withdrawal from people/activities they love  · Confusion- inability to concentrate     If you or a loved one observes any of these behaviors or has concerns about self-harm, here's what you can do:  · Talk about it- your feelings and reasons for harming yourself  · Remove any means that you might use to hurt yourself (examples: pills, rope, extension cords, firearm)  · Get professional help from the community (Mental Health, Substance Abuse, psychological counseling)  · Do not be alone:Call your Safe Contact- someone whom you trust who will be there for you.  · Call your local CRISIS HOTLINE 656-7031 or 503-442-4501  · Call your local Children's Mobile Crisis Response Team [place] (990) 840-2647 or www.MessageParty  · Call the toll free National Suicide Prevention Hotlines   · National Suicide Prevention Lifeline 536-917-WCKJ (2981)  · [facility] Hope Line Network 800-SUICIDE (656-0706)          Follow-up Information     1. Schedule an appointment as soon as possible for a visit with Dakota Huffman M.D..    Specialties:  Surgery, Radiology    Why:  As needed    Contact information    [address]  [place] [address] [phone]           Discharge Medication Instructions:    Below are the medications your physician expects you to take upon discharge:    Review all your home medications and newly ordered medications with your doctor and/or pharmacist. Follow medication instructions as directed by your doctor and/or pharmacist.    Please keep your medication list with you and share with your physician.               Medication List      CONTINUE taking these medications        Instructions    Morning Afternoon Evening Bedtime    aspirin EC 81 MG Tbec   Last time this was given:  81  mg on 6/2/2017  8:02 PM   Commonly known as:  ECOTRIN        Take 81 mg by mouth every evening.   Dose:  81 mg                        atorvastatin 80 MG tablet   Last time this was given:  80 mg on 6/2/2017  8:01 PM   Commonly known as:  LIPITOR        Take 80 mg by mouth every bedtime.   Dose:  80 mg                        carvedilol 6.25 MG Tabs   Last time this was given:  6.25 mg on 6/3/2017  8:06 AM   Commonly known as:  COREG        Take 1 Tab by mouth 2 times a day, with meals.   Dose:  6.25 mg                        clopidogrel 75 MG Tabs   Last time this was given:  75 mg on 6/2/2017  8:02 PM   Commonly known as:  PLAVIX        Take 75 mg by mouth every evening.   Dose:  75 mg                        hydrocodone-acetaminophen 5-325 MG Tabs per tablet   Last time this was given:  2 Tabs on 6/2/2017  3:30 AM   Commonly known as:  NORCO        Take 1 Tab by mouth every 6 hours as needed.   Dose:  1 Tab                        lisinopril 10 MG Tabs   Last time this was given:  10 mg on 6/2/2017  8:01 PM   Commonly known as:  PRINIVIL        Doctor's comments:  Please note the 10 milligram lisinopril is correct. The lisinopril HCT is not.   Take 1 Tab by mouth every day.   Dose:  10 mg                          STOP taking these medications     sennosides-docusate sodium 8.6-50 MG tablet   Commonly known as:  SENOKOT-S                       Instructions           Diet / Nutrition:    Follow any diet instructions given to you by your doctor or the dietician, including how much salt (sodium) you are allowed each day.    If you are overweight, talk to your doctor about a weight reduction plan.    Activity:    Remain physically active following your doctor's instructions about exercise and activity.    Rest often.     Any time you become even a little tired or short of breath, SIT DOWN and rest.    Worsening Symptoms:    Report any of the following signs and symptoms to the doctor's office immediately:    *Pain of jaw,  arm, or neck  *Chest pain not relieved by medication                               *Dizziness or loss of consciousness  *Difficulty breathing even when at rest   *More tired than usual                                       *Bleeding drainage or swelling of surgical site  *Swelling of feet, ankles, legs or stomach                 *Fever (>100ºF)  *Pink or blood tinged sputum  *Weight gain (3lbs/day or 5lbs /week)           *Shock from internal defibrillator (if applicable)  *Palpitations or irregular heartbeats                *Cool and/or numb extremities    Stroke Awareness    Common Risk Factors for Stroke include:    Age  Atrial Fibrillation  Carotid Artery Stenosis  Diabetes Mellitus  Excessive alcohol consumption  High blood pressure  Overweight   Physical inactivity  Smoking    Warning signs and symptoms of a stroke include:    *Sudden numbness or weakness of the face, arm or leg (especially on one side of the body).  *Sudden confusion, trouble speaking or understanding.  *Sudden trouble seeing in one or both eyes.  *Sudden trouble walking, dizziness, loss of balance or coordination.Sudden severe headache with no known cause.    It is very important to get treatment quickly when a stroke occurs. If you experience any of the above warning signs, call 911 immediately.                   Disclaimer         Quit Smoking / Tobacco Use:    I understand the use of any tobacco products increases my chance of suffering from future heart disease or stroke and could cause other illnesses which may shorten my life. Quitting the use of tobacco products is the single most important thing I can do to improve my health. For further information on smoking / tobacco cessation call a Toll Free Quit Line at 1-649.687.4204 (*National Cancer [place]) or 1-191.554.7251 (American Lung Association) or you can access the web based program at www.lungusa.org.    [place] Tobacco Users Help Line:  (763) 820-3814       Toll Free:  7-067-928-6759  Quit Tobacco Program [facility] (320)666-0517    Crisis Hotline:    National Crisis Hotline:  3-927-SIZZHCW or 1-655.431.5283    [place] Crisis Hotline:    1-795.472.8832 or 359-371-1881    Discharge Survey:   Thank you for choosing [facility]. We hope we did everything we could to make your hospital stay a pleasant one. You may be receiving a phone survey and we would appreciate your time and participation in answering the questions. Your input is very valuable to us in our efforts to improve our service to our patients and their families.        My signature on this form indicates that:    1. I have reviewed and understand the above information.  2. My questions regarding this information have been answered to my satisfaction.  3. I have formulated a plan with my discharge nurse to obtain my prescribed medications for home.                  Disclaimer         __________________________________                     __________       ________                       Patient Signature                                                 Date                    Time

## 2017-06-01 NOTE — ED PROVIDER NOTES
ED Provider Note    Scribed for Gil Roman M.D. by Jenna Daniel. 6/1/2017  2:40 PM    Primary care provider: Radha Julio M.D.  Means of arrival: EMS  History obtained from: Patient   History limited by: None    CHIEF COMPLAINT  Chief Complaint   Patient presents with   • Abdominal Pain     since 10 am. states hst of SBO and worried she may have again. pt states last BM x2 this am. denies n/v        HPI  Karen Jauregui is a 72 y.o. female who presents to the Emergency Department for intermittent epigastric abdominal pain, onset 10AM this morning. She describes the pain as cramping. The patient had a bowel movement this morning and has been passing gas. She has no alleviating or exacerbating factors of her pain. The patient denies any fever, shortness of breath or vomiting associated. She was seen in the ED for similar symptoms last week and was admitted to the hospital for 2 days on IV fluids and stool softener. The patient did not want surgery at this time. Dr. Pemberton preformed a CT-scan the day after she got out of the hospital. The first episode of her abdominal pain was in February.     REVIEW OF SYSTEMS  Pertinent positives include epigastric abdominal pain. Pertinent negatives include no shortness of breath, fever, vomiting.  All other systems reviewed and negative.  C.    PAST MEDICAL HISTORY   has a past medical history of Atherosclerosis of arteries of the extremities; Hypertension; Angina; Arthritis; Peripheral vascular disease (CMS-HCC); Unspecified hemorrhagic conditions; Dyslipidemia; COPD (chronic obstructive pulmonary disease) (CMS-HCC); Diverticulosis of colon; Spinal stenosis of lumbar region; Abdominal aortic aneurysm (CMS-[facility]) (11/2010); Carotid atherosclerosis; Diverticulitis; Family history of nonmelanoma skin cancer; PVD (posterior vitreous detachment), left eye (1/13/2015); Eosinophilic colitis; Bowel habit changes; Emphysema of lung (CMS-[facility]); Snoring; and  "Pain.    SURGICAL HISTORY   has past surgical history that includes colonoscopy (3/1/2009, 4/2012, 7/2/13); gyn surgery (2002); gyn surgery (1975); other; colon resection laparoscopic (8/5/2013); colonoscopy with biopsy (3/6/2015); other cardiac surgery (2005); aaa with stent graft (N/A, 3/31/2017); and femoral femoral bypass (N/A, 3/31/2017).    SOCIAL HISTORY  Social History   Substance Use Topics   • Smoking status: Current Every Day Smoker -- 0.50 packs/day for 45 years     Types: Cigarettes   • Smokeless tobacco: Never Used      Comment: on nicotine patches, smokes off and on   • Alcohol Use: No      History   Drug Use No       FAMILY HISTORY  Family History   Problem Relation Age of Onset   • Hyperlipidemia Sister    • Hypertension Sister    • Non-contributory Sister      carotid atherosclerosis   • Hypertension Mother    • Hyperlipidemia Mother    • Heart Disease Mother 82     congestive heart failure, AAA   • Heart Disease Father 55     multiple heart issues   • Hypertension Father    • Hyperlipidemia Father    • Cancer Sister      sin cancer       CURRENT MEDICATIONS  Home Medications     Reviewed by James Calvin (Pharmacy Tech) on 06/01/17 at 1719  Med List Status: Complete    Medication Last Dose Status    aspirin EC (ECOTRIN) 81 MG Tablet Delayed Response 5/31/2017 Active    atorvastatin (LIPITOR) 80 MG tablet 5/31/2017 Active    carvedilol (COREG) 6.25 MG Tab 5/31/2017 Active    clopidogrel (PLAVIX) 75 MG Tab 5/31/2017 Active    hydrocodone-acetaminophen (NORCO) 5-325 MG Tab per tablet 6/1/2017 Active    lisinopril (PRINIVIL) 10 MG Tab 5/31/2017 Active    sennosides-docusate sodium (SENOKOT-S) 8.6-50 MG tablet 5/31/2017 Active                ALLERGIES  Allergies   Allergen Reactions   • Amoxicillin    • Ciprofloxacin        PHYSICAL EXAM  VITAL SIGNS: /78 mmHg  Pulse 83  Temp(Src) 36.3 °C (97.4 °F)  Resp 16  Ht 1.549 m (5' 0.98\")  Wt 41.731 kg (92 lb)  BMI 17.39 kg/m2  SpO2 95%  " LMP 03/01/1997    Constitutional: Well developed, Well nourished, mild distress, Non-toxic appearance.   HENT: Normocephalic, Atraumatic, Bilateral external ears normal, Oropharynx moist, No oral exudates.   Eyes: PERRLA, EOMI, Conjunctiva normal, No discharge.   Neck: No tenderness, Supple, No stridor.   Lymphatic: No lymphadenopathy noted.   Cardiovascular: Normal heart rate, Normal rhythm.   Thorax & Lungs: Clear to auscultation bilaterally, No respiratory distress, No wheezing, No crackles.   Abdomen: Soft, mild epigastric tenderness to palpation, No masses, No pulsatile masses. Hyperactive bowel sounds throughout.   Skin: Warm, Dry, No erythema, No rash.   Extremities:, No edema No cyanosis.   Musculoskeletal: No tenderness to palpation or major deformities noted.  Intact distal pulses  Neurologic: Awake, alert. Moves all extremities spontaneously.  Psychiatric: Affect normal, Judgment normal, Mood normal.     LABS  Labs Reviewed   CBC WITH DIFFERENTIAL - Abnormal; Notable for the following:     MCHC 32.7 (*)     Lymphocytes 19.30 (*)     All other components within normal limits   COMP METABOLIC PANEL   LIPASE   ESTIMATED GFR     All labs reviewed by me.      RADIOLOGY  CT-ABDOMEN-PELVIS WITH   Final Result      Dilated and fluid-filled loops of small bowel likely representing partial obstruction.      Small amount of free fluid in the abdomen and pelvis.      Moderate amount of colonic stool.      Prominent atherosclerotic plaque within the aorta. Abdominal aortic aneurysm is unchanged with an endovascular stent graft again noted.      Sequelae of prior granulomatous exposure.        The radiologist's interpretation of all radiological studies have been reviewed by me.    COURSE & MEDICAL DECISION MAKING  Pertinent Labs & Imaging studies reviewed. (See chart for details)    2:40 PM - Patient seen and examined at bedside. Patient will be treated with IV fluids for CT contrast, 4mg Zofran, 0.5mg Dilaudid.  Ordered CT-abdomen-pelvis with, CMP, CBC with differential, Lipase to evaluate her symptoms. The differential diagnoses include but are not limited to: Small bowel obstruction, nonspecific abdominal pain    5:07 PM Recheck: Patient re-evaluated at beside. Patient reports feeling no improvement in her abdominal pain. Discussed patient's condition and treatment plan. Patient's lab and radiology results discussed. The patient understood and is in agreement for admission.     5:40 PM - I discussed the patient's case and the above findings with Dr. Bourne (Hospitalist) who agrees to admit the patient.         Decision Making:  Patient with crampy abdominal pain concerning for small bowel obstruction she was just discharged from the hospital with small bowel obstruction, CT confirms small bowel obstruction, discussed case with Dr. Bourne for admission to hospital. I did not consult general surgery because the patient has no interest in getting surgery.    DISPOSITION:  Patient will be admitted to Dr. Bourne (Hospitalist) in guarded condition.      FINAL IMPRESSION  1. Small bowel obstruction (CMS-HCC)          Jenna WILKS (Scribe), am scribing for, and in the presence of, Gil Roman M.D..    Electronically signed by: Jenna Daniel (Valerie), 6/1/2017    IGil M.D. personally performed the services described in this documentation, as scribed by Jenna Daniel in my presence, and it is both accurate and complete.    The note accurately reflects work and decisions made by me.  Gil Roman  6/1/2017  7:55 PM

## 2017-06-02 PROBLEM — I10 HTN (HYPERTENSION): Status: ACTIVE | Noted: 2017-06-02

## 2017-06-02 PROBLEM — I73.9 PVD (PERIPHERAL VASCULAR DISEASE) (HCC): Status: ACTIVE | Noted: 2017-06-02

## 2017-06-02 PROBLEM — F11.20 OPIOID DEPENDENCE (HCC): Status: ACTIVE | Noted: 2017-06-02

## 2017-06-02 PROBLEM — F17.200 TOBACCO USE DISORDER: Status: ACTIVE | Noted: 2017-06-02

## 2017-06-02 LAB
ANION GAP SERPL CALC-SCNC: 5 MMOL/L (ref 0–11.9)
BASOPHILS # BLD AUTO: 0.5 % (ref 0–1.8)
BASOPHILS # BLD: 0.04 K/UL (ref 0–0.12)
BUN SERPL-MCNC: 20 MG/DL (ref 8–22)
CALCIUM SERPL-MCNC: 9.2 MG/DL (ref 8.5–10.5)
CHLORIDE SERPL-SCNC: 106 MMOL/L (ref 96–112)
CO2 SERPL-SCNC: 26 MMOL/L (ref 20–33)
CREAT SERPL-MCNC: 0.66 MG/DL (ref 0.5–1.4)
EOSINOPHIL # BLD AUTO: 0.28 K/UL (ref 0–0.51)
EOSINOPHIL NFR BLD: 3.5 % (ref 0–6.9)
ERYTHROCYTE [DISTWIDTH] IN BLOOD BY AUTOMATED COUNT: 45.9 FL (ref 35.9–50)
GFR SERPL CREATININE-BSD FRML MDRD: >60 ML/MIN/1.73 M 2
GLUCOSE SERPL-MCNC: 85 MG/DL (ref 65–99)
HCT VFR BLD AUTO: 39.9 % (ref 37–47)
HGB BLD-MCNC: 12.6 G/DL (ref 12–16)
IMM GRANULOCYTES # BLD AUTO: 0.03 K/UL (ref 0–0.11)
IMM GRANULOCYTES NFR BLD AUTO: 0.4 % (ref 0–0.9)
LYMPHOCYTES # BLD AUTO: 1.89 K/UL (ref 1–4.8)
LYMPHOCYTES NFR BLD: 23.3 % (ref 22–41)
MCH RBC QN AUTO: 28.1 PG (ref 27–33)
MCHC RBC AUTO-ENTMCNC: 31.6 G/DL (ref 33.6–35)
MCV RBC AUTO: 89.1 FL (ref 81.4–97.8)
MONOCYTES # BLD AUTO: 0.59 K/UL (ref 0–0.85)
MONOCYTES NFR BLD AUTO: 7.3 % (ref 0–13.4)
NEUTROPHILS # BLD AUTO: 5.28 K/UL (ref 2–7.15)
NEUTROPHILS NFR BLD: 65 % (ref 44–72)
NRBC # BLD AUTO: 0 K/UL
NRBC BLD AUTO-RTO: 0 /100 WBC
PLATELET # BLD AUTO: 299 K/UL (ref 164–446)
PMV BLD AUTO: 9.7 FL (ref 9–12.9)
POTASSIUM SERPL-SCNC: 4 MMOL/L (ref 3.6–5.5)
RBC # BLD AUTO: 4.48 M/UL (ref 4.2–5.4)
SODIUM SERPL-SCNC: 137 MMOL/L (ref 135–145)
WBC # BLD AUTO: 8.1 K/UL (ref 4.8–10.8)

## 2017-06-02 PROCEDURE — 665999 HH PPS REVENUE DEBIT

## 2017-06-02 PROCEDURE — 700102 HCHG RX REV CODE 250 W/ 637 OVERRIDE(OP): Performed by: NURSE PRACTITIONER

## 2017-06-02 PROCEDURE — 665998 HH PPS REVENUE CREDIT

## 2017-06-02 PROCEDURE — 700111 HCHG RX REV CODE 636 W/ 250 OVERRIDE (IP): Performed by: HOSPITALIST

## 2017-06-02 PROCEDURE — 700102 HCHG RX REV CODE 250 W/ 637 OVERRIDE(OP): Performed by: HOSPITALIST

## 2017-06-02 PROCEDURE — A9270 NON-COVERED ITEM OR SERVICE: HCPCS | Performed by: NURSE PRACTITIONER

## 2017-06-02 PROCEDURE — 770006 HCHG ROOM/CARE - MED/SURG/GYN SEMI*

## 2017-06-02 PROCEDURE — 80048 BASIC METABOLIC PNL TOTAL CA: CPT

## 2017-06-02 PROCEDURE — 99232 SBSQ HOSP IP/OBS MODERATE 35: CPT | Performed by: INTERNAL MEDICINE

## 2017-06-02 PROCEDURE — 36415 COLL VENOUS BLD VENIPUNCTURE: CPT

## 2017-06-02 PROCEDURE — 700105 HCHG RX REV CODE 258: Performed by: HOSPITALIST

## 2017-06-02 PROCEDURE — A9270 NON-COVERED ITEM OR SERVICE: HCPCS | Performed by: HOSPITALIST

## 2017-06-02 PROCEDURE — 700111 HCHG RX REV CODE 636 W/ 250 OVERRIDE (IP): Performed by: INTERNAL MEDICINE

## 2017-06-02 PROCEDURE — 85025 COMPLETE CBC W/AUTO DIFF WBC: CPT

## 2017-06-02 RX ORDER — HYDROCODONE BITARTRATE AND ACETAMINOPHEN 5; 325 MG/1; MG/1
2 TABLET ORAL EVERY 4 HOURS PRN
Status: DISCONTINUED | OUTPATIENT
Start: 2017-06-02 | End: 2017-06-03 | Stop reason: HOSPADM

## 2017-06-02 RX ORDER — ONDANSETRON 2 MG/ML
4 INJECTION INTRAMUSCULAR; INTRAVENOUS EVERY 4 HOURS PRN
Status: DISCONTINUED | OUTPATIENT
Start: 2017-06-02 | End: 2017-06-03 | Stop reason: HOSPADM

## 2017-06-02 RX ADMIN — HYDROCODONE BITARTRATE AND ACETAMINOPHEN 2 TABLET: 5; 325 TABLET ORAL at 03:30

## 2017-06-02 RX ADMIN — SODIUM CHLORIDE: 9 INJECTION, SOLUTION INTRAVENOUS at 17:23

## 2017-06-02 RX ADMIN — STANDARDIZED SENNA CONCENTRATE AND DOCUSATE SODIUM 2 TABLET: 8.6; 5 TABLET, FILM COATED ORAL at 20:01

## 2017-06-02 RX ADMIN — CARVEDILOL 6.25 MG: 6.25 TABLET, FILM COATED ORAL at 09:42

## 2017-06-02 RX ADMIN — FAMOTIDINE 20 MG: 10 INJECTION, SOLUTION INTRAVENOUS at 20:01

## 2017-06-02 RX ADMIN — ONDANSETRON 4 MG: 2 INJECTION INTRAMUSCULAR; INTRAVENOUS at 14:07

## 2017-06-02 RX ADMIN — ENOXAPARIN SODIUM 40 MG: 100 INJECTION SUBCUTANEOUS at 09:42

## 2017-06-02 RX ADMIN — SODIUM CHLORIDE: 9 INJECTION, SOLUTION INTRAVENOUS at 05:45

## 2017-06-02 RX ADMIN — CLOPIDOGREL 75 MG: 75 TABLET, FILM COATED ORAL at 20:02

## 2017-06-02 RX ADMIN — LISINOPRIL 10 MG: 10 TABLET ORAL at 20:01

## 2017-06-02 RX ADMIN — ASPIRIN 81 MG: 81 TABLET ORAL at 20:02

## 2017-06-02 RX ADMIN — STANDARDIZED SENNA CONCENTRATE AND DOCUSATE SODIUM 2 TABLET: 8.6; 5 TABLET, FILM COATED ORAL at 09:42

## 2017-06-02 RX ADMIN — MORPHINE SULFATE 2 MG: 4 INJECTION INTRAVENOUS at 01:34

## 2017-06-02 RX ADMIN — CARVEDILOL 6.25 MG: 6.25 TABLET, FILM COATED ORAL at 18:09

## 2017-06-02 RX ADMIN — ATORVASTATIN CALCIUM 80 MG: 40 TABLET, FILM COATED ORAL at 20:01

## 2017-06-02 RX ADMIN — SODIUM CHLORIDE: 9 INJECTION, SOLUTION INTRAVENOUS at 03:26

## 2017-06-02 RX ADMIN — MORPHINE SULFATE 2 MG: 4 INJECTION INTRAVENOUS at 05:47

## 2017-06-02 ASSESSMENT — ENCOUNTER SYMPTOMS
WEIGHT LOSS: 1
ABDOMINAL PAIN: 1
SHORTNESS OF BREATH: 0
NAUSEA: 1
DIARRHEA: 0
FEVER: 0
CONSTIPATION: 0
ROS GI COMMENTS: NO FLATUS
CHILLS: 0
VOMITING: 0
NERVOUS/ANXIOUS: 1

## 2017-06-02 ASSESSMENT — LIFESTYLE VARIABLES: DO YOU DRINK ALCOHOL: NO

## 2017-06-02 ASSESSMENT — PAIN SCALES - GENERAL
PAINLEVEL_OUTOF10: 8
PAINLEVEL_OUTOF10: 2
PAINLEVEL_OUTOF10: 0

## 2017-06-02 NOTE — CARE PLAN
Problem: Safety  Goal: Will remain free from injury  Outcome: PROGRESSING AS EXPECTED  Pt up self. Instructed to call out for assistance if needed. Pt verbalized understanding.     Problem: Pain Management  Goal: Pain level will decrease to patient’s comfort goal  Outcome: PROGRESSING AS EXPECTED  Pt currently denies pain. Pt instructed to call out if pain worsens.

## 2017-06-02 NOTE — DIETARY
Nutrition Services    Pt seen per Low BMI <19 (17.3) and nutrition admit screen triggers: poor po and unplanned wt loss PTA     Pt is a 73 yo female with adm dx: SBO (small bowel obstruction) (CMS-HCC)  Past Medical History   Diagnosis Date   • Atherosclerosis of arteries of the extremities      two stents in the groin, Dr. Pickett   • Hypertension    • Angina      with stress test   • Arthritis      thumbs   • Peripheral vascular disease (CMS-HCC)      9 months, may relate to PVD   • Unspecified hemorrhagic conditions      asa/plavix, bruises easily   • Dyslipidemia    • COPD (chronic obstructive pulmonary disease) (CMS-HCC)    • Diverticulosis of colon    • Spinal stenosis of lumbar region      Dr. Navarro   • Abdominal aortic aneurysm (CMS-HCC) 11/2010     3.6 cm 8/2014   • Carotid atherosclerosis      Dr. Pickett   • Diverticulitis      Dx 11/25/11   • Family history of nonmelanoma skin cancer    • PVD (posterior vitreous detachment), left eye 1/13/2015   • Eosinophilic colitis    • Bowel habit changes    • Emphysema of lung (CMS-HCC)    • Snoring    • Pain      Past Surgical History   Procedure Laterality Date   • Colonoscopy  3/1/2009, 4/2012, 7/2/13     normal, normal, normal but heavy diverticulosis   • Gyn surgery  2002     hysterectomy, tubal, fibroids, ovaries gone   • Gyn surgery  1975     ectopic pregnacy   • Other       LLE /RLEstent, common iliac, Dr. Pickett   • Colon resection laparoscopic  8/5/2013     Performed by Spring Pemberton M.D. at SURGERY [facility]   • Colonoscopy with biopsy  3/6/2015     Performed by Pranav THOMPSON M.D. at ENDOSCOPY [facility]   • Other cardiac surgery  2005     cardiac stents   • Aaa with stent graft N/A 3/31/2017     Procedure: AAA WITH STENT GRAFT - 5-CM INFRARENAL;  Surgeon: Spring Pemberton M.D.;  Location: SURGERY [facility];  Service:    • Femoral femoral bypass N/A 3/31/2017     Procedure: FEMORAL FEMORAL BYPASS - POSS;  Surgeon: Spring Pemberton  M.D.;  Location: SURGERY [facility];  Service:      Spoke w/pt at bedside, she appears thin and elderly with mild clavicle wasting present. She states she feels that her appetitie is improving.  PTA her po intake was decreased for the past 2 weeks d/t back pain.  She denies having any issues chewing or swallowing. Ivania not discuss supplements or snacks at this time d/t pt's current NPO status.     Per chart review, pt has been hospitalized for SBO and UTI 2/17-2/21,  3/31-4/4 s/p AAA with stent graft - 5-CM INFRARENAL on 3/31 and again in 5/23-5/25 for partial SBO. During pt's previous admission she has been followed by nutrition services.      Current diet: NPO   Wt: (6/1) 41.7 kg, stated.  Pt reports she has had approx a 10# wt dec since February.  When asked her usual wt she reports 47.7 kg but then states she does not know when she was last at that weight.   Per chart review, during pt's admission in February she had a stand up scale wt of 43 kg.  With pt's current wt she is noted with a 3% wt loss x 4 months, not significant. Unsure of accuracy as pt's current wt is stated.   BMI: 17.3   Pertinent labs: reviewed; CMP WNL   Pertinent meds: Lipitor, Coreg, Lovenox (refused), Zofran (prn)  Fluids: IVF NS at 100 mL/hr cont   Skin: no skin breakdown noted at this time   GI: Per ADLs, last BM 5/31 - pt reports having abd pain     Recommendations:   · Obtain a measured wt as current wt is stated   · Advance to po diet as pt is able   Record percentage of meals conusmed in ADLs to help monitor po adequacy  Monitor wt and lab trends     RD following

## 2017-06-02 NOTE — PROGRESS NOTES
AOx4, ambulatory with sba, nausea but no vomiting, -flatus, hypoactive bs, voiding, -BM, reporting abd pain that is 8/10, medicated per MAR.  Call light in place, personal belongings available, patient educated on importance of calling for assistance, hourly rounding in place. No additional needs at this time. VSS

## 2017-06-02 NOTE — PROGRESS NOTES
Pt currently denies pain. Pt A&Ox4. Call light within reach. Safety maintained. Morning meds given.

## 2017-06-02 NOTE — H&P
DATE OF ADMISSION:  06/01/2017    PRIMARY CARE PHYSICIAN:  Dr. Julio.    VASCULAR SURGEON:  Dr. Pemberton.    GENERAL SURGEON:  Jacky Castorena DO    CHIEF COMPLAINT:  Abdominal pain.    HISTORY OF PRESENT ILLNESS:  A 72-year-old female.  She has a history of   multiple abdominal surgeries including hysterectomy and hemicolectomy for   diverticulitis.  The patient has had issues with small bowel obstructions that   she has had 4 episodes this year.  She was admitted to the hospital   05/24/2017 for small bowel obstruction.  Patient was seen in consultation by   Dr. Castorena.  Patient did not want to proceed with surgery and she was treated   conservatively.  Her symptoms improved.  She is having bowel movements and   she was discharged home on 05/25/2017.  Patient said that she was fine for a   couple of days.  Her symptoms have returned.  She has not had any bowel   movements.  She has had increasing abdominal distention and pain.  She has had   nausea, but no emesis.  She came to the emergency room for evaluation.    PAST MEDICAL HISTORY:  1.  Hypertension.  2.  Peptic ulcer disease.  3.  History of multiple small bowel obstructions.  4.  Hyperlipidemia.    SOCIAL HISTORY:  Smokes half pack of cigarettes per day.  She does not drink   alcohol.    FAMILY HISTORY:  Reviewed and noncontributory.    ALLERGIES:  AMOXICILLIN AND CIPROFLOXACIN.    MEDICATIONS:  1.  Aspirin 81 mg daily.  2.  Atorvastatin 80 mg at bedtime.  3.  Coreg 6.25 mg twice daily with meals.  4.  Plavix 75 mg in the evening.  5.  Norco 5/325 one tablet every 6 hours as needed.  6.  Lisinopril 10 mg daily.  7.  Docusate 1 tab by mouth twice daily.    PHYSICAL EXAMINATION:  VITAL SIGNS:  Temperature 36.3, blood pressure 123/61, pulse 90, respirations   18, saturating 92% on room air.  GENERAL:  The patient is somewhat thin and frail appearing.  She is in no   apparent distress.  EYES:  Pupils equal, round and reactive, anicteric sclerae, moist conjunctiva    with no lid lag.  HEENT:  Normocephalic, atraumatic.  Mucous membranes are moist.  Oropharynx is   clear.  NECK:  Trachea midline.  Neck is supple with no thyromegaly.  LUNGS:  Normal respiratory effort.  No wheezing, no crackles.  ABDOMEN:  Distended, soft, generally tender to palpation.  There are   hyperactive bowel sounds throughout.  EXTREMITIES:  No clubbing, no cyanosis, no edema.  SKIN:  No rash, no petechiae.  Skin has normal temperature.    IMAGING:  CT scan of the abdomen and pelvis, dilated and fluid filled loops of   small bowel likely representing partial obstruction, small amount of free   fluid within the abdomen and pelvis.    ASSESSMENT AND PLAN:  A 72-year-old female with history of multiple abdominal   surgeries, presents with partial or early small bowel obstruction.  1.  Small bowel obstruction.  This is a recurrent episode of small bowel   obstruction.  She has had 4 episodes this year.  I discussed possible   treatment in detail with the patient.  She says that she is not mentally   prepared for surgery and would not agree to having surgery during this   admission.  For this reason, I have not contacted Dr. Castorena, although I   recommended the patient that she is likely going to need surgery if this will   recur.  Patient will be made n.p.o., IV fluids, place an NG tube if she has   nausea, vomiting.  2.  Hypertension, acutely hypertensive in the emergency room with systolic   blood pressure to the 180s.  This is improved.  Continue her Coreg and   lisinopril.  3.  Prophylaxis, Lovenox and bowel regimen.  4.  Full code.    Case discussed with emergency room physician, Dr. Gil Roman.  I have   reviewed the CT scan of the abdomen and pelvis showing no evidence of free air   myself.  I expect the patient to remain in the hospital for greater than 2   midnights.       ____________________________________     MD CLAUDIA BROWN / ABDIEL    DD:  06/01/2017 23:55:16  DT:  06/02/2017  00:33:41    D#:  [id number]  Job#:  [id number]

## 2017-06-02 NOTE — CARE PLAN
Problem: Nutritional:  Goal: Achieve adequate nutritional intake  Patient will consume >50% of meals  Outcome: NOT MET  Pt currently NPO.  Advance diet as pt is able

## 2017-06-02 NOTE — RESPIRATORY CARE
COPD EDUCATION by COPD CLINICAL EDUCATOR  6/2/2017 at 6:50 AM by Senait Salmeron     Patient reviewed by COPD education team. Patient does not qualify for COPD program.

## 2017-06-02 NOTE — DISCHARGE PLANNING
Care Transition Team Assessment    Information Source  Orientation : Oriented x 4  Information Given By: Patient  Informant's Name: Karen  Who is responsible for making decisions for patient? : Patient    Readmission Evaluation  Is this a readmission?: No    Elopement Risk  Legal Hold: No  Ambulatory or Self Mobile in Wheelchair: Yes  Disoriented: No  Psychiatric Symptoms: None  History of Wandering: No  Elopement this Admit: No  Vocalizing Wanting to Leave: No  Displays Behaviors, Body Language Wanting to Leave: No-Not at Risk for Elopement  Elopement Risk: Not at Risk for Elopement    Interdisciplinary Discharge Planning  Does Admitting Nurse Feel This Could be a Complex Discharge?: No  Primary Care Physician: Dr. Gross  Lives with - Patient's Self Care Capacity: Adult Children  Patient or legal guardian wants to designate a caregiver (see row info): No  Support Systems: Family Member(s), Friends / Neighbors  Housing / Facility: [address] [place] [address] House  Do You Take your Prescribed Medications Regularly: Yes  Able to Return to Previous ADL's: Yes  Mobility Issues: No  Prior Services: None  Patient Expects to be Discharged to:: home  Assistance Needed: No  Durable Medical Equipment: Not Applicable    Discharge Preparedness  What is your plan after discharge?: Uncertain - pending medical team collaboration  What are your discharge supports?: Child  Prior Functional Level: Ambulatory  Difficulity with ADLs: None  Difficulity with IADLs: None    Functional Assesment  Prior Functional Level: Ambulatory    Finances  Financial Barriers to Discharge: No  Prescription Coverage: Yes    Vision / Hearing Impairment  Vision Impairment : No  Hearing Impairment : No    Values / Beliefs / Concerns  Values / Beliefs Concerns : No    Advance Directive  Advance Directive?: None  Advance Directive offered?:  (patient has booklet)    Domestic Abuse  Have you ever been the victim of abuse or violence?: No  Physical Abuse or Sexual Abuse:  No  Verbal Abuse or Emotional Abuse: No  Possible Abuse Reported to:: Not Applicable    Psychological Assessment  History of Substance Abuse: None  History of Psychiatric Problems: No  Non-compliant with Treatment: No  Newly Diagnosed Illness: No    Discharge Risks or Barriers  Discharge risks or barriers?: No    Anticipated Discharge Information  Anticipated discharge disposition: Discharge needs currently unknown  Discharge Address: See attached  Discharge Contact Phone Number: See attached

## 2017-06-03 VITALS
TEMPERATURE: 98.3 F | BODY MASS INDEX: 17.37 KG/M2 | WEIGHT: 92 LBS | HEIGHT: 61 IN | OXYGEN SATURATION: 98 % | SYSTOLIC BLOOD PRESSURE: 153 MMHG | DIASTOLIC BLOOD PRESSURE: 54 MMHG | RESPIRATION RATE: 16 BRPM | HEART RATE: 85 BPM

## 2017-06-03 PROCEDURE — 700111 HCHG RX REV CODE 636 W/ 250 OVERRIDE (IP): Performed by: INTERNAL MEDICINE

## 2017-06-03 PROCEDURE — A9270 NON-COVERED ITEM OR SERVICE: HCPCS | Performed by: HOSPITALIST

## 2017-06-03 PROCEDURE — 700105 HCHG RX REV CODE 258: Performed by: HOSPITALIST

## 2017-06-03 PROCEDURE — 99239 HOSP IP/OBS DSCHRG MGMT >30: CPT | Performed by: INTERNAL MEDICINE

## 2017-06-03 PROCEDURE — 700102 HCHG RX REV CODE 250 W/ 637 OVERRIDE(OP): Performed by: HOSPITALIST

## 2017-06-03 PROCEDURE — 665998 HH PPS REVENUE CREDIT

## 2017-06-03 PROCEDURE — 665999 HH PPS REVENUE DEBIT

## 2017-06-03 RX ORDER — FAMOTIDINE 20 MG/1
20 TABLET, FILM COATED ORAL DAILY
Status: DISCONTINUED | OUTPATIENT
Start: 2017-06-04 | End: 2017-06-03 | Stop reason: HOSPADM

## 2017-06-03 RX ADMIN — STANDARDIZED SENNA CONCENTRATE AND DOCUSATE SODIUM 2 TABLET: 8.6; 5 TABLET, FILM COATED ORAL at 08:06

## 2017-06-03 RX ADMIN — SODIUM CHLORIDE: 9 INJECTION, SOLUTION INTRAVENOUS at 02:56

## 2017-06-03 RX ADMIN — FAMOTIDINE 20 MG: 10 INJECTION, SOLUTION INTRAVENOUS at 08:07

## 2017-06-03 RX ADMIN — CARVEDILOL 6.25 MG: 6.25 TABLET, FILM COATED ORAL at 08:06

## 2017-06-03 RX ADMIN — SODIUM CHLORIDE: 9 INJECTION, SOLUTION INTRAVENOUS at 12:46

## 2017-06-03 ASSESSMENT — PAIN SCALES - GENERAL
PAINLEVEL_OUTOF10: 0
PAINLEVEL_OUTOF10: 2
PAINLEVEL_OUTOF10: 0

## 2017-06-03 ASSESSMENT — LIFESTYLE VARIABLES: EVER_SMOKED: NEVER

## 2017-06-03 NOTE — CARE PLAN
Problem: Safety  Goal: Will remain free from falls  Outcome: PROGRESSING AS EXPECTED  Pt ambulates with standby assist. Pt calls for assistance appropriately.     Problem: Bowel/Gastric:  Goal: Normal bowel function is maintained or improved  Outcome: PROGRESSING AS EXPECTED  Pt denies nausea and vomiting. +BM, loose brown stools.

## 2017-06-03 NOTE — DISCHARGE INSTRUCTIONS
Discharge Instructions    Discharged to home by car with relative. Discharged via wheelchair, hospital escort: Yes.  Special equipment needed: Not Applicable    Be sure to schedule a follow-up appointment with your primary care doctor or any specialists as instructed.     Discharge Plan:   Diet Plan: Discussed  Activity Level: Discussed  Smoking Cessation Offered: Patient Refused  Confirmed Follow up Appointment: Patient to Call and Schedule Appointment  Confirmed Symptoms Management: Discussed  Medication Reconciliation Updated: Yes  Influenza Vaccine Indication: Patient Refuses    I understand that a diet low in cholesterol, fat, and sodium is recommended for good health. Unless I have been given specific instructions below for another diet, I accept this instruction as my diet prescription.   Other diet: full liquid diet    Special Instructions: None    · Is patient discharged on Warfarin / Coumadin?   No     · Is patient Post Blood Transfusion?  No    Depression / Suicide Risk    As you are discharged from this [facility] facility, it is important to learn how to keep safe from harming yourself.    Recognize the warning signs:  · Abrupt changes in personality, positive or negative- including increase in energy   · Giving away possessions  · Change in eating patterns- significant weight changes-  positive or negative  · Change in sleeping patterns- unable to sleep or sleeping all the time   · Unwillingness or inability to communicate  · Depression  · Unusual sadness, discouragement and loneliness  · Talk of wanting to die  · Neglect of personal appearance   · Rebelliousness- reckless behavior  · Withdrawal from people/activities they love  · Confusion- inability to concentrate     If you or a loved one observes any of these behaviors or has concerns about self-harm, here's what you can do:  · Talk about it- your feelings and reasons for harming yourself  · Remove any means that you might use to hurt yourself  (examples: pills, rope, extension cords, firearm)  · Get professional help from the community (Mental Health, Substance Abuse, psychological counseling)  · Do not be alone:Call your Safe Contact- someone whom you trust who will be there for you.  · Call your local CRISIS HOTLINE 500-4225 or 067-263-7517  · Call your local Children's Mobile Crisis Response Team [place] (764) 082-7384 or www.[facility]  · Call the toll free National Suicide Prevention Hotlines   · National Suicide Prevention Lifeline 889-486-VXZG (2482)  · National Hope Line Network 800-SUICIDE (558-5213)

## 2017-06-03 NOTE — CARE PLAN
Problem: Safety  Goal: Will remain free from falls  Outcome: PROGRESSING AS EXPECTED  Pt up self. Pt instructed to call out for help if needed. Pt verbalized understanding. Call light within reach.     Problem: Pain Management  Goal: Pain level will decrease to patient’s comfort goal  Outcome: PROGRESSING AS EXPECTED  Pt states pain is 2/10. Pt states pain is tolerable and declines pain medication.

## 2017-06-03 NOTE — PROGRESS NOTES
Patient states pain is 2/10. Pt currently denies nausea. Skin intact. Pt awake and alert, A&Ox4. Call light within reach. Morning meds given. No complaints at this time. BM 6/2/2017

## 2017-06-03 NOTE — PROGRESS NOTES
Patient awake and alert and preparing to ambulate in schafer. Pt A&Ox4. Call light within reach. Patient states pain is 2/10 and declines pain medication. BM 5/29/17. BS normoactive. TPN running at 75ml/hr through Right DL PICC

## 2017-06-03 NOTE — PROGRESS NOTES
[facility] Hospitalist Progress Note    Date of Service: 2017    Chief Complaint  72 y.o. female with PMH HTN (on Coreg, lisinopril)< chronic pain ( on norco) PVD s/p AAA w/ recent endograft repair (on plavix), smokes 1/2 ppd, prior hysterectomy and hemicolectomy (for diverticular disease), recently here for partial SBO, resolved, went home for a few days, after 2 days sx began to occur w/  abdominal pain, nausea, no BM, no flatus, bloating. This is her 4th episode this year, she has lost ~10lbs, she is willing to reconsider surgery. Dr Pemberton was notified but  As the recent vascular surgery did not enter the abdominal cavity, he defers to general surgery. Dr Huffman was called    Interval Problem Update  No change since admit  Refused NG    Consultants/Specialty  General surgery- Nathanael    Disposition  Likely home        Review of Systems   Constitutional: Positive for weight loss. Negative for fever and chills.   Respiratory: Negative for shortness of breath.    Cardiovascular: Negative for chest pain.   Gastrointestinal: Positive for nausea and abdominal pain. Negative for vomiting, diarrhea and constipation.        No flatus   Psychiatric/Behavioral: The patient is nervous/anxious.       Physical Exam  Laboratory/Imaging   Hemodynamics  Temp (24hrs), Av.6 °C (97.9 °F), Min:36 °C (96.8 °F), Max:37.2 °C (98.9 °F)   Temperature: 36.8 °C (98.2 °F)  Pulse  Av.4  Min: 62  Max: 84    Blood Pressure : 130/41 mmHg, NIBP: 144/56 mmHg      Respiratory      Respiration: 17, Pulse Oximetry: 99 %, O2 Daily Delivery Respiratory : Silicone Nasal Cannula             Fluids    Intake/Output Summary (Last 24 hours) at 17 1842  Last data filed at 17 1815   Gross per 24 hour   Intake    600 ml   Output      0 ml   Net    600 ml       Nutrition  Orders Placed This Encounter   Procedures   • Diet NPO     Standing Status: Standing      Number of Occurrences: 1      Standing Expiration Date:      Order Specific  Question:  Restrict to:     Answer:  Sips with Medications [3]     Physical Exam   Constitutional: She is oriented to person, place, and time. She appears well-developed and well-nourished. No distress.   Thin  Ketotic breath   HENT:   Head: Normocephalic and atraumatic.   Eyes: EOM are normal. Pupils are equal, round, and reactive to light. Right eye exhibits no discharge. Left eye exhibits no discharge.   Neck: Neck supple.   Cardiovascular: Normal rate and regular rhythm.    Pulmonary/Chest: Effort normal.   Decreased, no wheezes   Abdominal: She exhibits distension (lower- round protuberant,decreased BS). There is tenderness. There is no rebound and no guarding.   Musculoskeletal: She exhibits no edema or tenderness.   Neurological: She is alert and oriented to person, place, and time. No cranial nerve deficit.   Skin: Skin is warm and dry. She is not diaphoretic.   Psychiatric: She has a normal mood and affect.   Nursing note and vitals reviewed.      Recent Labs      06/01/17   1500  06/02/17   0307   WBC  9.6  8.1   RBC  4.55  4.48   HEMOGLOBIN  13.0  12.6   HEMATOCRIT  39.8  39.9   MCV  87.5  89.1   MCH  28.6  28.1   MCHC  32.7*  31.6*   RDW  44.8  45.9   PLATELETCT  326  299   MPV  9.6  9.7     Recent Labs      06/01/17   1500  06/02/17   0307   SODIUM  136  137   POTASSIUM  4.0  4.0   CHLORIDE  103  106   CO2  28  26   GLUCOSE  99  85   BUN  21  20   CREATININE  0.74  0.66   CALCIUM  9.4  9.2                      Assessment/Plan     Dyslipidemia (present on admission)  Assessment & Plan  On lipitor    COPD (chronic obstructive pulmonary disease) (CMS-HCC) (present on admission)  Assessment & Plan  No exacerbation    SBO (small bowel obstruction) (CMS-HCC)  Assessment & Plan  Recurrent> refuses NG, not vomiting, on IVF, surgery called  pepcid initated    PVD (peripheral vascular disease) (CMS-HCC)  Assessment & Plan  S/p recent endograft    Tobacco use disorder  Assessment & Plan  Monitor for sx    Opioid  dependence (CMS-HCC)  Assessment & Plan  On norco- home med    HTN (hypertension)  Assessment & Plan  On coreg, lisinopril- at goal      Labs reviewed and Medications reviewed  Resendiz catheter: No Resendiz      DVT Prophylaxis: Enoxaparin (Lovenox)    Ulcer prophylaxis: Yes    Assessed for rehab: Patient returned to prior level of function, rehabilitation not indicated at this time

## 2017-06-03 NOTE — PROGRESS NOTES
Received report from day shift RN. Pt A&O x 4. Pt denies nausea, vomiting, pain, shortness of breath at this time. +BM, medium brown soft loose stools. +void. Pt ambulates with standby assist, steady gait noted. Call light within reach. Bed locked and in lowest position.

## 2017-06-03 NOTE — CARE PLAN
Problem: Safety  Goal: Will remain free from falls  Outcome: PROGRESSING AS EXPECTED  Pt up self. Instructed to call for help getting up if needed. Pt verbalized understanding.     Problem: Bowel/Gastric:  Goal: Normal bowel function is maintained or improved  Outcome: PROGRESSING AS EXPECTED  Pt had bm yesterday. BS present and normoactive x4.

## 2017-06-03 NOTE — CONSULTS
Surgical History and Physical    Date: 6/3/2017    Requesting Physician: Dr Gaines    Consulting Physician: Dakota Huffman    Reason for consultation: recurrent partial small bowel obstruction    CC: abdominal pain with nausea    HPI: This is a 72 y.o. female who is presenting to the emergency room with recurrent small bowel obstruction.  She has a complicated medical history including a hysterectomy and a sigmoid colectomy 4 years ago.  No Prior history of radiation exposure.  No Prior history of inflammatory bowel disease including Crohn's or Ulcerative Colitis.  The patient denies any history of foreign body ingestion. Her last bowel movement was yesterday and she reports resolution of her symptoms.    Past Medical History   Diagnosis Date   • Atherosclerosis of arteries of the extremities      two stents in the groin, Dr. Pickett   • Hypertension    • Angina      with stress test   • Arthritis      thumbs   • Peripheral vascular disease (CMS-HCC)      9 months, may relate to PVD   • Unspecified hemorrhagic conditions      asa/plavix, bruises easily   • Dyslipidemia    • COPD (chronic obstructive pulmonary disease) (CMS-HCC)    • Diverticulosis of colon    • Spinal stenosis of lumbar region      Dr. Navarro   • Abdominal aortic aneurysm (CMS-HCC) 11/2010     3.6 cm 8/2014   • Carotid atherosclerosis      Dr. Pickett   • Diverticulitis      Dx 11/25/11   • Family history of nonmelanoma skin cancer    • PVD (posterior vitreous detachment), left eye 1/13/2015   • Eosinophilic colitis    • Bowel habit changes    • Emphysema of lung (CMS-HCC)    • Snoring    • Pain        Past Surgical History   Procedure Laterality Date   • Colonoscopy  3/1/2009, 4/2012, 7/2/13     normal, normal, normal but heavy diverticulosis   • Gyn surgery  2002     hysterectomy, tubal, fibroids, ovaries gone   • Gyn surgery  1975     ectopic pregnacy   • Other       LLE /RLEstent, common iliac, Dr. Pickett   • Colon resection laparoscopic   8/5/2013     Performed by Spring Pemberton M.D. at SURGERY [facility]   • Colonoscopy with biopsy  3/6/2015     Performed by Pranav THOMPSON M.D. at ENDOSCOPY [facility]   • Other cardiac surgery  2005     cardiac stents   • Aaa with stent graft N/A 3/31/2017     Procedure: AAA WITH STENT GRAFT - 5-CM INFRARENAL;  Surgeon: Spring Pemberton M.D.;  Location: SURGERY [facility];  Service:    • Femoral femoral bypass N/A 3/31/2017     Procedure: FEMORAL FEMORAL BYPASS - POSS;  Surgeon: Spring Pemberton M.D.;  Location: SURGERY [facility];  Service:        Current Facility-Administered Medications   Medication Dose Route Frequency Provider Last Rate Last Dose   • [START ON 6/4/2017] famotidine (PEPCID) tablet 20 mg  20 mg Oral DAILY Kristy Johnson, PHARMD       • hydrocodone-acetaminophen (NORCO) 5-325 MG per tablet 2 Tab  2 Tab Oral Q4HRS PRN Aislinn Diaz, A.P.R.N.   2 Tab at 06/02/17 0330   • ondansetron (ZOFRAN) syringe/vial injection 4 mg  4 mg Intravenous Q4HRS PRN Dianelys Gaines M.D.   4 mg at 06/02/17 1407   • aspirin EC (ECOTRIN) tablet 81 mg  81 mg Oral Q EVENING Vito Gray M.D.   81 mg at 06/02/17 2002   • atorvastatin (LIPITOR) tablet 80 mg  80 mg Oral QHS Vito Gray M.D.   80 mg at 06/02/17 2001   • carvedilol (COREG) tablet 6.25 mg  6.25 mg Oral BID WITH MEALS Vito Gray M.D.   6.25 mg at 06/03/17 0806   • clopidogrel (PLAVIX) tablet 75 mg  75 mg Oral Q EVENING Vito Gray M.D.   75 mg at 06/02/17 2002   • lisinopril (PRINIVIL) 10 MG tablet 10 mg  10 mg Oral DAILY Vito Gray M.D.   10 mg at 06/02/17 2001   • senna-docusate (PERICOLACE or SENOKOT S) 8.6-50 MG per tablet 2 Tab  2 Tab Oral BID Vito Gray M.D.   2 Tab at 06/03/17 0806    And   • polyethylene glycol/lytes (MIRALAX) PACKET 1 Packet  1 Packet Oral QDAY PRN Vito Gray M.D.        And   • magnesium hydroxide (MILK OF MAGNESIA) suspension 30 mL  30 mL Oral QDAY PRN Vito Gray M.D.        And   •  bisacodyl (DULCOLAX) suppository 10 mg  10 mg Rectal QDAY PRN Vito Gray M.D.       • NS infusion   Intravenous Continuous Vito Gray M.D. 100 mL/hr at 06/03/17 1246     • enoxaparin (LOVENOX) inj 40 mg  40 mg Subcutaneous DAILY Vito Gray M.D.   40 mg at 06/02/17 0942   • Pharmacy Consult Request ...Pain Management Review   Other PRN Vito Gray M.D.        And   • morphine (pf) 4 mg/ml injection 2 mg  2 mg Intravenous Q3HRS PRN Vito Gray M.D.   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[employer] [address]     Social History Main Topics   • Smoking status: Current Every Day Smoker -- 0.50 packs/day for 45 years     Types: Cigarettes   • Smokeless tobacco: Never Used      Comment: on nicotine patches, smokes off and on   • Alcohol Use: No   • Drug Use: No   • Sexual Activity: Not on file     Other Topics Concern   • Not on file     Social History Narrative       Family History   Problem Relation Age of Onset   • Hyperlipidemia Sister    • Hypertension Sister    • Non-contributory Sister      carotid atherosclerosis   • Hypertension Mother    • Hyperlipidemia Mother    • Heart Disease Mother 82     congestive heart failure, AAA   • Heart Disease Father 55     multiple heart issues   • Hypertension Father    • Hyperlipidemia Father    • Cancer Sister      sin cancer       Allergies:  Amoxicillin and Ciprofloxacin    Review of Systems:  Constitutional: Negative for fever, chills or weight loss  HENT: Negative for nosebleeds   Eyes: Negative for changes in vision or photophobia  Respiratory: Negative for cough, shortness of breath or wheezing  Cardiovascular: Negative for chest pain or palpitations  Gastrointestinal: Negative for nausea, vomiting, diarrhea, blood in stool and melena.   Genitourinary: Negative for dysuria or urinary incontinence  "  Musculoskeletal: Negative for back pain and joint pain.   Skin: Negative for itching and rash.  Neurological: Negative for dizziness, lightheadedness or loss of consciousness  Endo/Heme/Allergies: Does not bruise/bleed easily.   Psychiatric/Behavioral: Negative for substance abuse. The patient is not nervous/anxious and does not have insomnia.    Physical Exam:  Blood pressure 153/54, pulse 85, temperature 36.8 °C (98.3 °F), resp. rate 16, height 1.549 m (5' 0.98\"), weight 41.731 kg (92 lb), last menstrual period 03/01/1997, SpO2 98 %, not currently breastfeeding.    Constitutional: oriented to person, place, and time.  appears well-developed and well-nourished. No distress.   Head: Normocephalic and atraumatic.   Neck: Normal range of motion. Neck supple. No JVD present. No tracheal deviation present. No thyromegaly present.   Cardiovascular: Normal rate, regular rhythm, normal heart sounds and intact distal pulses.  Exam reveals no gallop and no friction rub.  No murmur heard.  Pulmonary/Chest: normal effort. No stridor. No respiratory distress.   Abdominal: Evidence of abdominal wall or groin hernia. Soft, nontender, nondistended without rebound or guarding.    Musculoskeletal: Normal range of motion.  exhibits no edema and no tenderness.   Neurological: he is alert and oriented to person, place, and time. Coordination normal.   Skin: Skin is warm and dry. No rash noted. he is not diaphoretic. No erythema. No pallor.   Psychiatric: normal mood and affect.  Behavior is normal.       Labs:  Recent Labs      06/01/17   1500  06/02/17   0307   WBC  9.6  8.1   RBC  4.55  4.48   HEMOGLOBIN  13.0  12.6   HEMATOCRIT  39.8  39.9   MCV  87.5  89.1   MCH  28.6  28.1   MCHC  32.7*  31.6*   RDW  44.8  45.9   PLATELETCT  326  299   MPV  9.6  9.7     Recent Labs      06/01/17   1500  06/02/17   0307   SODIUM  136  137   POTASSIUM  4.0  4.0   CHLORIDE  103  106   CO2  28  26   GLUCOSE  99  85   BUN  21  20   CREATININE  0.74  " 0.66   CALCIUM  9.4  9.2         Recent Labs      06/01/17   1500   ASTSGOT  17   ALTSGPT  15   TBILIRUBIN  0.2   ALKPHOSPHAT  83   GLOBULIN  3.2         Radiology:  CT-ABDOMEN-PELVIS WITH   Final Result      Dilated and fluid-filled loops of small bowel likely representing partial obstruction.      Small amount of free fluid in the abdomen and pelvis.      Moderate amount of colonic stool.      Prominent atherosclerotic plaque within the aorta. Abdominal aortic aneurysm is unchanged with an endovascular stent graft again noted.      Sequelae of prior granulomatous exposure.          Assessment: This is a 72 y.o.     Active Hospital Problems    Diagnosis   • PVD (peripheral vascular disease) (CMS-HCC) [I73.9]   • Tobacco use disorder [F17.200]   • Opioid dependence (CMS-HCC) [F11.20]   • HTN (hypertension) [I10]   • SBO (small bowel obstruction) (CMS-HCC) [K56.69]   • COPD (chronic obstructive pulmonary disease) (CMS-HCC) [J44.9]   • Dyslipidemia [E78.5]       Plan:  1)  Discussed at length with the patient options for exploration in order to identify the source of recurrent small bowel obstructions.  We discussed options for inpatient versus outpatient workup and as she has not had a colonoscopy since her sigmoid colon resection possibilities for issues included both large and small bowel.  She and I feel that it would be best as she is stable for her to follow up with me in the clinic.  2)  Clear liquids with nutritional supplementation and advance as tolerated.  3) Minimize exposure to narcotics  4)  No plans for surgical intervention at this point.     Dakota Huffman MD PhD  [facility]  Colon and Rectal Surgeon  [phone]

## 2017-06-04 PROCEDURE — 665998 HH PPS REVENUE CREDIT

## 2017-06-04 PROCEDURE — 665999 HH PPS REVENUE DEBIT

## 2017-06-04 NOTE — DISCHARGE SUMMARY
CHIEF COMPLAINT ON ADMISSION  Chief Complaint   Patient presents with   • Abdominal Pain     since 10 am. states hst of SBO and worried she may have again. pt states last BM x2 this am. denies n/v        CODE STATUS  Prior    HPI & HOSPITAL COURSE  72 y.o. female with PMH HTN (on Coreg, lisinopril)< chronic pain ( on norco) PVD s/p AAA w/ recent endograft repair (on plavix), smokes 1/2 ppd, prior hysterectomy and hemicolectomy (for diverticular disease), recently here for partial SBO, resolved, went home for a few days, after 2 days sx began to occur w/  abdominal pain, nausea, no BM, no flatus, bloating. This is her 4th episode this year, she has lost ~10lbs, she is willing to reconsider surgery. Dr Pemberton was notified but  As the recent vascular surgery did not enter the abdominal cavity, he defers to general surgery. Dr Huffman was called, patient never had colonocopy post her abdominal surgery in 2013, he theorizes there may be a stricture. Patient had several BM's tolerated clears and was hungry the day after admission. She was started on Fulls and tolerated this. Patient was discussed w/ Dr Huffman who will see her soon in f.u.    Therefore, she is discharged in good and stable condition with close outpatient follow-up.    SPECIFIC OUTPATIENT FOLLOW-UP  Dr Dakota Huffman- General surgery    DISCHARGE PROBLEM LIST  Active Problems:    Dyslipidemia POA: Yes    COPD (chronic obstructive pulmonary disease) (CMS-[facility]) POA: Yes    SBO (small bowel obstruction) (CMS-[facility])    PVD (peripheral vascular disease) (CMS-[facility]) POA: Unknown    Tobacco use disorder POA: Unknown    Opioid dependence (CMS-HCC) POA: Unknown    HTN (hypertension) POA: Unknown  Resolved Problems:    * No resolved hospital problems. *      FOLLOW UP  No future appointments.  Dakota Huffman M.D.  [address]  [address]  [facility] [id number] [phone]    Schedule an appointment as soon as possible for a visit  As needed    Radha Julio,  M.D.  [address]  [address]  [facility] [address]  [phone]            MEDICATIONS ON DISCHARGE   Karen Jauregui   Home Medication Instructions SHANTANU:[id number]    Printed on:06/04/17 1237   Medication Information                      aspirin EC (ECOTRIN) 81 MG Tablet Delayed Response  Take 81 mg by mouth every evening.             atorvastatin (LIPITOR) 80 MG tablet  Take 80 mg by mouth every bedtime.             carvedilol (COREG) 6.25 MG Tab  Take 1 Tab by mouth 2 times a day, with meals.             clopidogrel (PLAVIX) 75 MG Tab  Take 75 mg by mouth every evening.             hydrocodone-acetaminophen (NORCO) 5-325 MG Tab per tablet  Take 1 Tab by mouth every 6 hours as needed.   NOT REFILLED          lisinopril (PRINIVIL) 10 MG Tab  Take 1 Tab by mouth every day.                 DIET  Full liquid    ACTIVITY  As tolerated/ regular      CONSULTATIONS  Dr Huffman    PROCEDURES  none    LABORATORY  Lab Results   Component Value Date/Time    SODIUM 137 06/02/2017 03:07 AM    POTASSIUM 4.0 06/02/2017 03:07 AM    CHLORIDE 106 06/02/2017 03:07 AM    CO2 26 06/02/2017 03:07 AM    GLUCOSE 85 06/02/2017 03:07 AM    BUN 20 06/02/2017 03:07 AM    CREATININE 0.66 06/02/2017 03:07 AM        Lab Results   Component Value Date/Time    WBC 8.1 06/02/2017 03:07 AM    HEMOGLOBIN 12.6 06/02/2017 03:07 AM    HEMATOCRIT 39.9 06/02/2017 03:07 AM    PLATELET COUNT 299 06/02/2017 03:07 AM        Total time of the discharge process exceeds 38 minutes

## 2017-06-05 PROCEDURE — 665999 HH PPS REVENUE DEBIT

## 2017-06-05 PROCEDURE — 665998 HH PPS REVENUE CREDIT

## 2017-06-06 PROCEDURE — 665998 HH PPS REVENUE CREDIT

## 2017-06-06 PROCEDURE — 665999 HH PPS REVENUE DEBIT

## 2017-06-07 PROCEDURE — 665999 HH PPS REVENUE DEBIT

## 2017-06-07 PROCEDURE — 665998 HH PPS REVENUE CREDIT

## 2017-06-08 PROCEDURE — 665999 HH PPS REVENUE DEBIT

## 2017-06-08 PROCEDURE — 665998 HH PPS REVENUE CREDIT

## 2017-06-09 PROCEDURE — 665999 HH PPS REVENUE DEBIT

## 2017-06-09 PROCEDURE — 665998 HH PPS REVENUE CREDIT

## 2017-06-10 PROCEDURE — 665998 HH PPS REVENUE CREDIT

## 2017-06-10 PROCEDURE — 665999 HH PPS REVENUE DEBIT

## 2017-06-11 PROCEDURE — 665999 HH PPS REVENUE DEBIT

## 2017-06-11 PROCEDURE — 665998 HH PPS REVENUE CREDIT

## 2017-06-12 PROCEDURE — 665998 HH PPS REVENUE CREDIT

## 2017-06-12 PROCEDURE — 665999 HH PPS REVENUE DEBIT

## 2017-06-13 PROCEDURE — 665998 HH PPS REVENUE CREDIT

## 2017-06-13 PROCEDURE — 665999 HH PPS REVENUE DEBIT

## 2017-06-14 PROCEDURE — 665998 HH PPS REVENUE CREDIT

## 2017-06-14 PROCEDURE — 665999 HH PPS REVENUE DEBIT

## 2017-06-15 PROCEDURE — 665998 HH PPS REVENUE CREDIT

## 2017-06-15 PROCEDURE — 665999 HH PPS REVENUE DEBIT

## 2017-06-16 PROCEDURE — 665998 HH PPS REVENUE CREDIT

## 2017-06-16 PROCEDURE — 665999 HH PPS REVENUE DEBIT

## 2017-06-17 PROCEDURE — 665998 HH PPS REVENUE CREDIT

## 2017-06-17 PROCEDURE — 665999 HH PPS REVENUE DEBIT

## 2017-06-18 PROCEDURE — 665999 HH PPS REVENUE DEBIT

## 2017-06-18 PROCEDURE — 665998 HH PPS REVENUE CREDIT

## 2017-06-19 PROCEDURE — 665999 HH PPS REVENUE DEBIT

## 2017-06-19 PROCEDURE — 665998 HH PPS REVENUE CREDIT

## 2017-06-20 PROCEDURE — 665998 HH PPS REVENUE CREDIT

## 2017-06-20 PROCEDURE — 665999 HH PPS REVENUE DEBIT

## 2017-06-26 ENCOUNTER — TELEPHONE (OUTPATIENT)
Dept: MEDICAL GROUP | Facility: CLINIC | Age: 72
End: 2017-06-26

## 2017-06-26 NOTE — TELEPHONE ENCOUNTER
ESTABLISHED PATIENT PRE-VISIT PLANNING     Note: Patient will not be contacted if there is no indication to call.     1.  Reviewed notes from the last few office visits within the medical group: Yes    2.  If any orders were placed at last visit or intended to be done for this visit (i.e. 6 mos follow-up), do we have Results/Consult Notes?        •  Labs - Labs were not ordered at last office visit.       •  Imaging - Imaging was not ordered at last office visit.       •  Referrals - Referral ordered, patient was seen and consult notes are in chart. Care Teams updated  YES.    3. Is this appointment scheduled as a Hospital Follow-Up? No    4.  Immunizations were updated in Epic using WebIZ?: Epic matches WebIZ       •  Web Iz Recommendations: Hep A, adult Zoster (Shingles) Tdap      5.  Patient is due for the following Health Maintenance Topics:   Health Maintenance Due   Topic Date Due   • PFT SCREENING-FEV1 AND FEV/FVC RATIO / SPIROMETRY SHOULD BE PERFORMED ANNUALLY  05/17/1964   • IMM DTaP/Tdap/Td Vaccine (1 - Tdap) 05/17/1965   • IMM ZOSTER VACCINE  05/17/2006   • URINE DRUG SCREEN  08/08/2013           6.  Patient was NOT informed to arrive 15 min prior to their scheduled appointment and bring in their medication bottles.

## 2017-06-27 ENCOUNTER — OFFICE VISIT (OUTPATIENT)
Dept: MEDICAL GROUP | Facility: CLINIC | Age: 72
End: 2017-06-27
Payer: MEDICARE

## 2017-06-27 ENCOUNTER — HOSPITAL ENCOUNTER (OUTPATIENT)
Dept: RADIOLOGY | Facility: MEDICAL CENTER | Age: 72
End: 2017-06-27
Attending: FAMILY MEDICINE
Payer: MEDICARE

## 2017-06-27 VITALS
OXYGEN SATURATION: 96 % | HEIGHT: 61 IN | DIASTOLIC BLOOD PRESSURE: 80 MMHG | TEMPERATURE: 98.1 F | RESPIRATION RATE: 16 BRPM | SYSTOLIC BLOOD PRESSURE: 128 MMHG | BODY MASS INDEX: 16.42 KG/M2 | WEIGHT: 87 LBS | HEART RATE: 90 BPM

## 2017-06-27 DIAGNOSIS — Z87.19 HISTORY OF ISCHEMIC COLITIS: ICD-10-CM

## 2017-06-27 DIAGNOSIS — J41.0 SIMPLE CHRONIC BRONCHITIS (HCC): ICD-10-CM

## 2017-06-27 DIAGNOSIS — M54.50 LUMBAR PAIN WITH RADIATION DOWN RIGHT LEG: ICD-10-CM

## 2017-06-27 DIAGNOSIS — Z79.891 CHRONIC USE OF OPIATE FOR THERAPEUTIC PURPOSE: ICD-10-CM

## 2017-06-27 DIAGNOSIS — M79.604 LUMBAR PAIN WITH RADIATION DOWN RIGHT LEG: ICD-10-CM

## 2017-06-27 DIAGNOSIS — K56.699 COLONIC STRICTURE (HCC): ICD-10-CM

## 2017-06-27 DIAGNOSIS — F17.200 TOBACCO USE DISORDER: ICD-10-CM

## 2017-06-27 DIAGNOSIS — G57.91 NEUROPATHY OF RIGHT LOWER EXTREMITY: ICD-10-CM

## 2017-06-27 DIAGNOSIS — Z87.19 HISTORY OF SMALL BOWEL OBSTRUCTION: ICD-10-CM

## 2017-06-27 DIAGNOSIS — K52.82 EOSINOPHILIC COLITIS: ICD-10-CM

## 2017-06-27 PROBLEM — F11.20 OPIOID DEPENDENCE (HCC): Status: RESOLVED | Noted: 2017-06-02 | Resolved: 2017-06-27

## 2017-06-27 PROBLEM — N39.0 UTI (URINARY TRACT INFECTION): Status: RESOLVED | Noted: 2017-02-17 | Resolved: 2017-06-27

## 2017-06-27 PROBLEM — I10 HTN (HYPERTENSION): Status: RESOLVED | Noted: 2017-06-02 | Resolved: 2017-06-27

## 2017-06-27 PROBLEM — K56.7 ILEUS (HCC): Status: RESOLVED | Noted: 2017-02-20 | Resolved: 2017-06-27

## 2017-06-27 PROCEDURE — 72100 X-RAY EXAM L-S SPINE 2/3 VWS: CPT

## 2017-06-27 PROCEDURE — 99214 OFFICE O/P EST MOD 30 MIN: CPT | Performed by: FAMILY MEDICINE

## 2017-06-27 RX ORDER — GABAPENTIN 300 MG/1
300 CAPSULE ORAL 3 TIMES DAILY
Qty: 90 CAP | Refills: 2 | Status: SHIPPED | OUTPATIENT
Start: 2017-06-27 | End: 2018-02-14 | Stop reason: SDUPTHER

## 2017-06-27 ASSESSMENT — ENCOUNTER SYMPTOMS: DEPRESSION: 0

## 2017-06-27 ASSESSMENT — LIFESTYLE VARIABLES: HISTORY_ALCOHOL_USE: 0

## 2017-06-27 NOTE — PROGRESS NOTES
CC: neuropathy, lumbar pain, colonic stricture, ischemic colitis, smoking, chronic bronchitis    HPI:   Karen presents today with the following.    1. Neuropathy of right lower extremity/lumbar pain with radiation down right leg  She is having searing pain in the right leg.  This comes and goes but is becoming more intense and frequent.  She is having only modest help from the hydrocodone.  Discussed possible gabapentin.    2. Colonic stricture (CMS-[facility])/eosinophilic colitis/history of small bowel obstrustruction  She is having recurring bowel obstructions.  Some appear to be small bowel, but some may be lower.  Recent colonoscopy showed a stricture, not fully explained.    3. History of ischemic colitis  GI consult recently is reviewed.  Discussed continuing the plavix.    4. Tobacco use disorder  She feels she cannot stop.  Has tried several times.  She has severe atherosclerotic disease and realizes she should stop if at all possible.    5. Simple chronic bronchitis (CMS-[facility])  She has daily bronchitis and mucus production.  Denies hemoptysis or increased shortness of breath.    6. Chronic use of opiate for therapeutic purpose  No aberrant or addictive use of medications has been observed.  Patient counseled to not add Tylenol to current regimen.  Patient counseled to keep medications locked up or under personal control.          Patient Active Problem List    Diagnosis Date Noted   • History of diverticulitis of colon 11/16/2011     Priority: High   • Colonic stricture (CMS-HCC) 06/27/2017   • History of ischemic colitis 06/27/2017   • PVD (peripheral vascular disease) (CMS-HCC) 06/02/2017   • Tobacco use disorder 06/02/2017   • Abdominal aortic aneurysm (CMS-HCC) 03/31/2017   • Insomnia disorder 02/20/2017   • History of COPD 02/20/2017   • History of small bowel obstruction 02/17/2017   • Chronic use of opiate for therapeutic purpose 07/07/2016   • Eosinophilic colitis 07/21/2015   • Family history of  "nonmelanoma skin cancer    • COPD (chronic obstructive pulmonary disease) (CMS-HCC)    • Constipation 05/26/2012   • Subclavian artery stenosis, left (CMS-HCC) 03/27/2012   • Herniated nucleus pulposus, L5-S1, left 09/08/2011   • Carotid atherosclerosis 06/15/2011   • Abdominal aortic aneurysm greater than 39 mm in diameter (CMS-HCC)    • Peripheral vascular disease (CMS-HCC)    • Essential hypertension    • Dyslipidemia    • Tobacco dependence    • Spinal stenosis of lumbar region    • Arteriosclerosis of arteries of extremities (CMS-HCC)        Current Outpatient Prescriptions   Medication Sig Dispense Refill   • gabapentin (NEURONTIN) 300 MG Cap Take 1 Cap by mouth 3 times a day. 90 Cap 2   • hydrocodone-acetaminophen (NORCO) 5-325 MG Tab per tablet Take 1 Tab by mouth every 6 hours as needed.     • aspirin EC (ECOTRIN) 81 MG Tablet Delayed Response Take 81 mg by mouth every evening.     • lisinopril (PRINIVIL) 10 MG Tab Take 1 Tab by mouth every day. 30 Tab 6   • carvedilol (COREG) 6.25 MG Tab Take 1 Tab by mouth 2 times a day, with meals. 60 Tab 5   • clopidogrel (PLAVIX) 75 MG Tab Take 75 mg by mouth every evening.     • atorvastatin (LIPITOR) 80 MG tablet Take 80 mg by mouth every bedtime.       No current facility-administered medications for this visit.         Allergies as of 06/27/2017 - Mike as Reviewed 06/27/2017   Allergen Reaction Noted   • Amoxicillin  03/02/2012   • Ciprofloxacin  06/25/2013        ROS: As per HPI.    /80 mmHg  Pulse 90  Temp(Src) 36.7 °C (98.1 °F)  Resp 16  Ht 1.549 m (5' 0.98\")  Wt 39.463 kg (87 lb)  BMI 16.45 kg/m2  SpO2 96%  LMP 03/01/1997    Physical Exam:  Gen:         Alert and oriented, No apparent distress.  Lucid and fluent.  Neck:       Full ROM, no JVD or carotid bruits appreciated.   Lungs:     Good air movement, scattered rhonchi.  CV:          Regular rate and rhythm. No murmurs, rubs or gallops.            Ext:          No clubbing, cyanosis, " edema.      Assessment and Plan.   72 y.o. female with the following issues.    1. Neuropathy of right lower extremity  Discussed trial of gabapentin.  X-ray discussed.  Physical therapy referral placed.  - gabapentin (NEURONTIN) 300 MG Cap; Take 1 Cap by mouth 3 times a day.  Dispense: 90 Cap; Refill: 2  - DX-LUMBAR SPINE-2 OR 3 VIEWS; Future  - REFERRAL TO PHYSICAL THERAPY Reason for Therapy: Eval/Treat/Report    2. Lumbar pain with radiation down right leg  Orders discussed and placed.  - DX-LUMBAR SPINE-2 OR 3 VIEWS; Future  - REFERRAL TO PHYSICAL THERAPY Reason for Therapy: Eval/Treat/Report    3. Colonic stricture (CMS-HCC)  GI ordered a barium enema.      4. History of ischemic colitis  Discussed not stopping the plavix.  GI will be continuing to evaluate.  Discussed continuing the low dose aspirin as well.    5. Eosinophilic colitis  Discussed possible allergies.  Discussed diet.    6. Tobacco use disorder  Discussed tobacco cessation.  She has tried many things.    7. History of small bowel obstruction  This is a recurring problem.  She is working with GI.  Discussed staying hydrated, moderate amount of fiber.    8. Simple chronic bronchitis (CMS-HCC)  Discussed tobacco cessation.  Discussed good hydration.  discussed steroids for rescue.      9. Chronic use of opiate for therapeutic purpose  [facility] board of pharmacy interface is reviewed.  No inconsistencies are found.  Her average MME is 7, active is 0, max active is 30.  This is within CDC guideline for chronic pain prescribing by primary care.  - [facility] SCREEN; Future    Pain medication not renewed today, patient states she has plenty left, she is using it very sparingly.

## 2017-06-27 NOTE — MR AVS SNAPSHOT
"        Karen Enriquez Jauregui   2017 8:20 AM   Office Visit   MRN: [id number]    Department:  [facility]   Dept Phone:  [phone]    Description:  Female : 1945   Provider:  Radha Julio M.D.           Reason for Visit     Numbness right leg      Allergies as of 2017     Allergen Noted Reactions    Amoxicillin 2012       Ciprofloxacin 2013         You were diagnosed with     Neuropathy of right lower extremity   [1087681]       Lumbar pain with radiation down right leg   [280855]       Colonic stricture (CMS-[facility])   [672184]       History of ischemic colitis   [1860399]       Eosinophilic colitis   [558.42.ICD-9-CM]       Tobacco use disorder   [305.1.ICD-9-CM]       History of small bowel obstruction   [214681]       Simple chronic bronchitis (CMS-HCC)   [491.0.ICD-9-CM]       Chronic use of opiate for therapeutic purpose   [4113308]         Vital Signs     Blood Pressure Pulse Temperature Respirations Height Weight    128/80 mmHg 90 36.7 °C (98.1 °F) 16 1.549 m (5' 0.98\") 39.463 kg (87 lb)    Body Mass Index Oxygen Saturation Last Menstrual Period Smoking Status          16.45 kg/m2 96% 1997 Current Every Day Smoker        Basic Information     Date Of Birth Sex Race Ethnicity Preferred Language    1945 Female White Non- English      Your appointments     2017 10:30 AM   [facility] with [address], RADIOLOGIST, NNR   [facility] - [address] ([address])    [address]  [place] [id number] [phone]           Some exams require specific prep instructions that would have been given to you at time of scheduling. If you have any additional questions about the prep instructions, please call Imaging Scheduling at [phone] and press #[phone].              Problem List              ICD-10-CM Priority Class Noted - Resolved    Essential hypertension I10   Unknown - Present    Dyslipidemia E78.5   Unknown - Present    Tobacco " dependence F17.200   Unknown - Present    Spinal stenosis of lumbar region M48.06   Unknown - Present    Arteriosclerosis of arteries of extremities (CMS-HCC) I70.209   Unknown - Present    Abdominal aortic aneurysm greater than 39 mm in diameter (CMS-HCC) I71.4   Unknown - Present    Peripheral vascular disease (CMS-HCC) I73.9   Unknown - Present    Carotid atherosclerosis I65.29   6/15/2011 - Present    Herniated nucleus pulposus, L5-S1, left M51.27   9/8/2011 - Present    History of diverticulitis of colon Z87.19 High  11/16/2011 - Present    Subclavian artery stenosis, left (CMS-HCC) I77.1   3/27/2012 - Present    Constipation    5/26/2012 - Present    COPD (chronic obstructive pulmonary disease) (CMS-HCC) J44.9   Unknown - Present    Family history of nonmelanoma skin cancer Z80.8   Unknown - Present    Eosinophilic colitis K52.82   7/21/2015 - Present    Chronic use of opiate for therapeutic purpose Z79.891   7/7/2016 - Present    History of small bowel obstruction Z87.19   2/17/2017 - Present    Insomnia disorder G47.00   2/20/2017 - Present    History of COPD Z87.09   2/20/2017 - Present    Abdominal aortic aneurysm (CMS-HCC) I71.4   3/31/2017 - Present    PVD (peripheral vascular disease) (CMS-HCC) I73.9   6/2/2017 - Present    Tobacco use disorder F17.200   6/2/2017 - Present    Colonic stricture (CMS-HCC) K56.69   6/27/2017 - Present    History of ischemic colitis Z87.19   6/27/2017 - Present      Health Maintenance        Date Due Completion Dates    IMM DTaP/Tdap/Td Vaccine (1 - Tdap) 8/1/2018 (Originally 5/17/1965) ---    IMM ZOSTER VACCINE 8/1/2018 (Originally 5/17/2006) ---    BONE DENSITY 11/30/2017 11/30/2012 (Postponed), 3/22/2011, 7/24/2008, 11/7/2005    Override on 11/30/2012: Postponed    COLONOSCOPY 3/9/2025 3/9/2015, 3/6/2015, 7/2/2013            Current Immunizations     13-VALENT PCV PREVNAR 12/8/2016    Influenza TIV (IM) 10/28/2012, 10/27/2011    Influenza Vaccine Adult HD 10/4/2016,  12/23/2015    Influenza Vaccine Quad Inj (Pf) 10/14/2014    Pneumococcal Vaccine (UF)Historical Data 9/30/2006    Pneumococcal polysaccharide vaccine (PPSV-23) 11/30/2012      Below and/or attached are the medications your provider expects you to take. Review all of your home medications and newly ordered medications with your provider and/or pharmacist. Follow medication instructions as directed by your provider and/or pharmacist. Please keep your medication list with you and share with your provider. Update the information when medications are discontinued, doses are changed, or new medications (including over-the-counter products) are added; and carry medication information at all times in the event of emergency situations     Allergies:  AMOXICILLIN - (reactions not documented)     CIPROFLOXACIN - (reactions not documented)               Medications  Valid as of: June 27, 2017 -  9:32 AM    Generic Name Brand Name Tablet Size Instructions for use    Aspirin (Tablet Delayed Response) ECOTRIN 81 MG Take 81 mg by mouth every evening.        Atorvastatin Calcium (Tab) LIPITOR 80 MG Take 80 mg by mouth every bedtime.        Carvedilol (Tab) COREG 6.25 MG Take 1 Tab by mouth 2 times a day, with meals.        Clopidogrel Bisulfate (Tab) PLAVIX 75 MG Take 75 mg by mouth every evening.        Gabapentin (Cap) NEURONTIN 300 MG Take 1 Cap by mouth 3 times a day.        Hydrocodone-Acetaminophen (Tab) NORCO 5-325 MG Take 1 Tab by mouth every 6 hours as needed.        Lisinopril (Tab) PRINIVIL 10 MG Take 1 Tab by mouth every day.        .                 Medicines prescribed today were sent to:     [facility][id number] - [place] [address]    Phone: [phone] Fax: [phone]    Open 24 Hours?: No      Medication refill instructions:       If your prescription bottle indicates you have medication refills left, it is not necessary to call your provider’s office. Please contact your  pharmacy and they will refill your medication.    If your prescription bottle indicates you do not have any refills left, you may request refills at any time through one of the following ways: The online [employer] system (except Urgent Care), by calling your provider’s office, or by asking your pharmacy to contact your provider’s office with a refill request. Medication refills are processed only during regular business hours and may not be available until the next business day. Your provider may request additional information or to have a follow-up visit with you prior to refilling your medication.   *Please Note: Medication refills are assigned a new Rx number when refilled electronically. Your pharmacy may indicate that no refills were authorized even though a new prescription for the same medication is available at the pharmacy. Please request the medicine by name with the pharmacy before contacting your provider for a refill.        Your To Do List     Future Labs/Procedures Complete By Expires    DX-LUMBAR SPINE-2 OR 3 VIEWS  As directed 12/28/2017    [facility] PAIN MANAGEMENT SCREEN  As directed 6/27/2018    Comments:    Current Meds (name, sig, last dose):   Current outpatient prescriptions:   •  gabapentin, 300 mg, Oral, TID  •  hydrocodone-acetaminophen, 1 Tab, Oral, Q6HRS PRN  •  aspirin EC, 81 mg, Oral, Q EVENING  •  lisinopril, 10 mg, Oral, DAILY  •  carvedilol, 6.25 mg, Oral, BID WITH MEALS  •  clopidogrel, 75 mg, Oral, Q EVENING  •  atorvastatin, 80 mg, Oral, QHS            Referral     A referral request has been sent to our patient care coordination department. Please allow 3-5 business days for us to process this request and contact you either by phone or mail. If you do not hear from us by the 5th business day, please call us at [phone].           [employer] Access Code: Activation code not generated  Current [employer] Status: Active          Quit Tobacco Information     Do you want to quit using  tobacco?    Quitting tobacco decreases risks of cancer, heart and lung disease, increases life expectancy, improves sense of taste and smell, and increases spending money, among other benefits.    If you are thinking about quitting, we can help.  • [facility] Quit Tobacco Program: [phone]  o Program occurs weekly for four weeks and includes pharmacist consultation on products to support quitting smoking or chewing tobacco. A provider referral is needed for pharmacist consultation.  • Tobacco Users Help Hotline: 6-334-QUIT-NOW (234-2832) or https://[place].quitlogix.org/  o Free, confidential telephone and online coaching for [place] residents. Sessions are designed on a schedule that is convenient for you. Eligible clients receive free nicotine replacement therapy.  • Nationally: www.smokefree.gov  o Information and professional assistance to support both immediate and long-term needs as you become, and remain, a non-smoker. Smokefree.gov allows you to choose the help that best fits your needs.

## 2017-07-10 NOTE — CARE PLAN
Problem: Safety  Goal: Will remain free from falls  Outcome: PROGRESSING AS EXPECTED  Pt remains free from fall.  Pt educated on fall risk.  Pt demonstrates understanding by appropriate use of call light to call for assistance.  CLIP, hourly rounding in place    Problem: Venous Thromboembolism (VTW)/Deep Vein Thrombosis (DVT) Prevention:  Goal: Patient will participate in Venous Thrombosis (VTE)/Deep Vein Thrombosis (DVT)Prevention Measures  Outcome: PROGRESSING SLOWER THAN EXPECTED  Pt educated on use of heparin for DVT prophylaxis.  Pt verbalized understanding but continued to refuse heparin.  Pt is currently taking Asa and Plavix.  SCD's on.  CLIP, Hourly rounding in place   10-Jul-2017

## 2017-07-11 ENCOUNTER — HOSPITAL ENCOUNTER (OUTPATIENT)
Dept: RADIOLOGY | Facility: MEDICAL CENTER | Age: 72
End: 2017-07-11
Attending: SURGERY
Payer: MEDICARE

## 2017-07-11 DIAGNOSIS — K59.00 UNSPECIFIED CONSTIPATION: ICD-10-CM

## 2017-07-11 PROCEDURE — 74270 X-RAY XM COLON 1CNTRST STD: CPT

## 2017-07-20 ENCOUNTER — PATIENT OUTREACH (OUTPATIENT)
Dept: HEALTH INFORMATION MANAGEMENT | Facility: OTHER | Age: 72
End: 2017-07-20

## 2017-07-20 NOTE — PROGRESS NOTES
Karen Jauregui was discharged from [facility] on 6/3/2017 for a diagnosis of intestinal obstruction and a LACE score of 73. The patient was discharged home with instructions to follow up with PCP and Surgery. The patient kept both appointments. The patient did not have transportation barriers. Patient Advocate was able to engage with the patient several times after discharge and confirm that all medications were filled. At time of closing, the patient’s appointments are as follows:  • PCP Dr. Julio scheduled 6/27 - Kept  • Surgery Dr. Huffman scheduled 7/17 - Kept

## 2017-08-01 ENCOUNTER — HOSPITAL ENCOUNTER (OUTPATIENT)
Dept: RADIOLOGY | Facility: MEDICAL CENTER | Age: 72
End: 2017-08-01
Attending: SURGERY
Payer: MEDICARE

## 2017-08-01 DIAGNOSIS — K59.00 UNSPECIFIED CONSTIPATION: ICD-10-CM

## 2017-08-01 PROCEDURE — A9270 NON-COVERED ITEM OR SERVICE: HCPCS | Performed by: SURGERY

## 2017-08-01 PROCEDURE — 700112 HCHG RX REV CODE 229: Performed by: SURGERY

## 2017-08-01 PROCEDURE — 74245 DX-UPPER GI-SMALL BOWEL FOLLOW THRU: CPT

## 2017-08-01 RX ADMIN — ANTACID/ANTIFLATULENT 1 PACKET: 380; 550; 10; 10 GRANULE, EFFERVESCENT ORAL at 09:30

## 2017-08-17 ENCOUNTER — PATIENT MESSAGE (OUTPATIENT)
Dept: MEDICAL GROUP | Facility: CLINIC | Age: 72
End: 2017-08-17

## 2017-08-17 DIAGNOSIS — I73.9 PERIPHERAL VASCULAR DISEASE (HCC): ICD-10-CM

## 2017-08-17 DIAGNOSIS — M48.061 SPINAL STENOSIS OF LUMBAR REGION: ICD-10-CM

## 2017-08-17 RX ORDER — HYDROCODONE BITARTRATE AND ACETAMINOPHEN 5; 325 MG/1; MG/1
1 TABLET ORAL EVERY 8 HOURS PRN
Qty: 40 TAB | Refills: 0 | Status: SHIPPED | OUTPATIENT
Start: 2017-08-17 | End: 2017-08-23 | Stop reason: SDUPTHER

## 2017-08-17 NOTE — TELEPHONE ENCOUNTER
From: Karen Jauregui  To: Radha Julio M.D.  Sent: 8/17/2017 2:53 PM PDT  Subject: RE:hydrocodone    Its not urgent, so I will wait until I come.    Thank you  Karen Jauregui  ----- Message -----  From: Radha Julio M.D.  Sent: 8/17/2017 10:33 AM PDT  To: Karen Jauregui  Subject: hydrocodone    As a schedule II substance we are very limited as to what we can do. However, I believe I can write you a short prescription to last until the 23rd. It will be ready for  at the .

## 2017-08-23 ENCOUNTER — OFFICE VISIT (OUTPATIENT)
Dept: MEDICAL GROUP | Facility: CLINIC | Age: 72
End: 2017-08-23
Payer: MEDICARE

## 2017-08-23 VITALS
HEIGHT: 61 IN | SYSTOLIC BLOOD PRESSURE: 116 MMHG | DIASTOLIC BLOOD PRESSURE: 60 MMHG | TEMPERATURE: 98.2 F | HEART RATE: 90 BPM | BODY MASS INDEX: 16.8 KG/M2 | RESPIRATION RATE: 16 BRPM | WEIGHT: 89 LBS | OXYGEN SATURATION: 92 %

## 2017-08-23 DIAGNOSIS — Z79.891 CHRONIC USE OF OPIATE FOR THERAPEUTIC PURPOSE: ICD-10-CM

## 2017-08-23 DIAGNOSIS — F17.200 TOBACCO DEPENDENCE: ICD-10-CM

## 2017-08-23 DIAGNOSIS — J44.0 CHRONIC OBSTRUCTIVE PULMONARY DISEASE WITH ACUTE LOWER RESPIRATORY INFECTION (HCC): ICD-10-CM

## 2017-08-23 DIAGNOSIS — I73.9 PERIPHERAL VASCULAR DISEASE (HCC): ICD-10-CM

## 2017-08-23 DIAGNOSIS — M48.061 SPINAL STENOSIS OF LUMBAR REGION: ICD-10-CM

## 2017-08-23 PROCEDURE — 99214 OFFICE O/P EST MOD 30 MIN: CPT | Performed by: FAMILY MEDICINE

## 2017-08-23 RX ORDER — HYDROCODONE BITARTRATE AND ACETAMINOPHEN 5; 325 MG/1; MG/1
1 TABLET ORAL EVERY 8 HOURS PRN
Qty: 90 TAB | Refills: 0 | Status: SHIPPED | OUTPATIENT
Start: 2017-08-23 | End: 2017-08-23 | Stop reason: SDUPTHER

## 2017-08-23 RX ORDER — ALBUTEROL SULFATE 90 UG/1
2 AEROSOL, METERED RESPIRATORY (INHALATION) EVERY 6 HOURS PRN
Qty: 8.5 G | Refills: 2 | Status: SHIPPED | OUTPATIENT
Start: 2017-08-23 | End: 2018-02-14 | Stop reason: SDUPTHER

## 2017-08-23 RX ORDER — HYDROCODONE BITARTRATE AND ACETAMINOPHEN 5; 325 MG/1; MG/1
1 TABLET ORAL EVERY 8 HOURS PRN
Qty: 90 TAB | Refills: 0 | Status: SHIPPED | OUTPATIENT
Start: 2017-08-23 | End: 2018-02-14 | Stop reason: SDUPTHER

## 2017-08-23 RX ORDER — AZITHROMYCIN 250 MG/1
TABLET, FILM COATED ORAL
Qty: 6 TAB | Refills: 0 | Status: SHIPPED | OUTPATIENT
Start: 2017-08-23 | End: 2017-11-09

## 2017-08-23 RX ORDER — PREDNISONE 20 MG/1
20 TABLET ORAL 2 TIMES DAILY WITH MEALS
Qty: 10 TAB | Refills: 0 | Status: SHIPPED | OUTPATIENT
Start: 2017-08-23 | End: 2017-08-28

## 2017-08-23 RX ORDER — PREDNISONE 20 MG/1
20 TABLET ORAL 2 TIMES DAILY WITH MEALS
Qty: 10 TAB | Refills: 0 | Status: SHIPPED | OUTPATIENT
Start: 2017-08-23 | End: 2017-08-23 | Stop reason: SDUPTHER

## 2017-08-23 NOTE — MR AVS SNAPSHOT
"        Karen Enriquez Juventino   2017 2:40 PM   Office Visit   MRN: [id number]    Department:  [facility]   Dept Phone:  [phone]    Description:  Female : 1945   Provider:  Radha Julio M.D.           Reason for Visit     Tailbone Pain     Weight Loss     COPD           Allergies as of 2017     Allergen Noted Reactions    Amoxicillin 2012       Ciprofloxacin 2013         You were diagnosed with     Chronic obstructive pulmonary disease with acute lower respiratory infection (CMS-[facility])   [125937]       Tobacco dependence   [968209]       Spinal stenosis of lumbar region   [068447]       Peripheral vascular disease (CMS-[facility])   [504485]       Chronic use of opiate for therapeutic purpose   [8895292]         Vital Signs     Blood Pressure Pulse Temperature Respirations Height Weight    116/60 mmHg 90 36.8 °C (98.2 °F) 16 1.549 m (5' 0.98\") 40.37 kg (89 lb)    Body Mass Index Oxygen Saturation Last Menstrual Period Smoking Status          16.83 kg/m2 92% 1997 Current Every Day Smoker        Basic Information     Date Of Birth Sex Race Ethnicity Preferred Language    1945 Female White Non- English      Problem List              ICD-10-CM Priority Class Noted - Resolved    Essential hypertension I10   Unknown - Present    Dyslipidemia E78.5   Unknown - Present    Tobacco dependence F17.200   Unknown - Present    Spinal stenosis of lumbar region M48.06   Unknown - Present    Arteriosclerosis of arteries of extremities (CMS-HCC) I70.209   Unknown - Present    Abdominal aortic aneurysm greater than 39 mm in diameter (CMS-HCC) I71.4   Unknown - Present    Peripheral vascular disease (CMS-HCC) I73.9   Unknown - Present    Carotid atherosclerosis I65.29   6/15/2011 - Present    Herniated nucleus pulposus, L5-S1, left M51.27   2011 - Present    History of diverticulitis of colon Z87.19 High  2011 - Present    Subclavian artery stenosis, left (CMS-HCC) I77.1   " 3/27/2012 - Present    Constipation    5/26/2012 - Present    COPD (chronic obstructive pulmonary disease) (CMS-HCC) J44.9   Unknown - Present    Family history of nonmelanoma skin cancer Z80.8   Unknown - Present    Eosinophilic colitis K52.82   7/21/2015 - Present    Chronic use of opiate for therapeutic purpose Z79.891   7/7/2016 - Present    History of small bowel obstruction Z87.19   2/17/2017 - Present    Insomnia disorder G47.00   2/20/2017 - Present    History of COPD Z87.09   2/20/2017 - Present    Abdominal aortic aneurysm (CMS-HCC) I71.4   3/31/2017 - Present    PVD (peripheral vascular disease) (CMS-HCC) I73.9   6/2/2017 - Present    Colonic stricture (CMS-HCC) K56.69   6/27/2017 - Present    History of ischemic colitis Z87.19   6/27/2017 - Present      Health Maintenance        Date Due Completion Dates    IMM INFLUENZA (1) 9/1/2017 10/4/2016, 12/23/2015, 10/14/2014, 10/28/2012, 10/27/2011    IMM DTaP/Tdap/Td Vaccine (1 - Tdap) 8/1/2018 (Originally 5/17/1965) ---    IMM ZOSTER VACCINE 8/1/2018 (Originally 5/17/2006) ---    BONE DENSITY 11/30/2017 11/30/2012 (Postponed), 3/22/2011, 7/24/2008, 11/7/2005    Override on 11/30/2012: Postponed    COLONOSCOPY 3/9/2025 3/9/2015, 3/6/2015, 7/2/2013            Current Immunizations     13-VALENT PCV PREVNAR 12/8/2016    Influenza TIV (IM) 10/28/2012, 10/27/2011    Influenza Vaccine Adult HD 10/4/2016, 12/23/2015    Influenza Vaccine Quad Inj (Pf) 10/14/2014    Pneumococcal Vaccine (UF)Historical Data 9/30/2006    Pneumococcal polysaccharide vaccine (PPSV-23) 11/30/2012      Below and/or attached are the medications your provider expects you to take. Review all of your home medications and newly ordered medications with your provider and/or pharmacist. Follow medication instructions as directed by your provider and/or pharmacist. Please keep your medication list with you and share with your provider. Update the information when medications are discontinued, doses  are changed, or new medications (including over-the-counter products) are added; and carry medication information at all times in the event of emergency situations     Allergies:  AMOXICILLIN - (reactions not documented)     CIPROFLOXACIN - (reactions not documented)               Medications  Valid as of: August 23, 2017 -  4:10 PM    Generic Name Brand Name Tablet Size Instructions for use    Albuterol Sulfate (Aero Soln) albuterol 108 (90 Base) MCG/ACT Inhale 2 Puffs by mouth every 6 hours as needed for Shortness of Breath.        Aspirin (Tablet Delayed Response) ECOTRIN 81 MG Take 81 mg by mouth every evening.        Atorvastatin Calcium (Tab) LIPITOR 80 MG Take 80 mg by mouth every bedtime.        Azithromycin (Tab) ZITHROMAX 250 MG As directed on pack        Carvedilol (Tab) COREG 6.25 MG Take 1 Tab by mouth 2 times a day, with meals.        Clopidogrel Bisulfate (Tab) PLAVIX 75 MG Take 75 mg by mouth every evening.        Gabapentin (Cap) NEURONTIN 300 MG Take 1 Cap by mouth 3 times a day.        Hydrocodone-Acetaminophen (Tab) NORCO 5-325 MG Take 1 Tab by mouth every 8 hours as needed.        Lisinopril (Tab) PRINIVIL 10 MG Take 1 Tab by mouth every day.        PredniSONE (Tab) DELTASONE 20 MG Take 1 Tab by mouth 2 times a day, with meals for 5 days.        .                 Medicines prescribed today were sent to:     [facility] #[id number] - [place] - [address] [place] [address]    Phone: [phone] Fax: [phone]    Open 24 Hours?: No      Medication refill instructions:       If your prescription bottle indicates you have medication refills left, it is not necessary to call your provider’s office. Please contact your pharmacy and they will refill your medication.    If your prescription bottle indicates you do not have any refills left, you may request refills at any time through one of the following ways: The online [employer] system (except Urgent Care), by calling your provider’s  office, or by asking your pharmacy to contact your provider’s office with a refill request. Medication refills are processed only during regular business hours and may not be available until the next business day. Your provider may request additional information or to have a follow-up visit with you prior to refilling your medication.   *Please Note: Medication refills are assigned a new Rx number when refilled electronically. Your pharmacy may indicate that no refills were authorized even though a new prescription for the same medication is available at the pharmacy. Please request the medicine by name with the pharmacy before contacting your provider for a refill.        Your To Do List     Future Labs/Procedures Complete By Expires    DX-CHEST-2 VIEWS  As directed 8/23/2018         [employer] Access Code: Activation code not generated  Current [employer] Status: Active          Quit Tobacco Information     Do you want to quit using tobacco?    Quitting tobacco decreases risks of cancer, heart and lung disease, increases life expectancy, improves sense of taste and smell, and increases spending money, among other benefits.    If you are thinking about quitting, we can help.  • [facility] Quit Tobacco Program: 767.145.5808  o Program occurs weekly for four weeks and includes pharmacist consultation on products to support quitting smoking or chewing tobacco. A provider referral is needed for pharmacist consultation.  • Tobacco Users Help Hotline: 8-825-QUITNOW (797-9207) or https://[place].quitlogix.org/  o Free, confidential telephone and online coaching for [place] residents. Sessions are designed on a schedule that is convenient for you. Eligible clients receive free nicotine replacement therapy.  • Nationally: www.smokefree.gov  o Information and professional assistance to support both immediate and long-term needs as you become, and remain, a non-smoker. Smokefree.gov allows you to choose the help that best fits your  needs.

## 2017-08-23 NOTE — PROGRESS NOTES
CC: productive cough, tobacco dependence, lumbar spinal stenosis, PVD    HPI:   Karen presents today with the following.    1. Chronic obstructive pulmonary disease with acute lower respiratory infection (CMS-HCC)  3 weeks of increased phlegm.  The phlegm is generally clear.  Appetite is low.  Denies fever or chills.  Denies hemoptysis.  She does feel more short of breath, was worse this am.    2. Tobacco dependence  She is having great difficulty trying to get away from tobacco. She is trying nicotine patches again.    3. Spinal stenosis of lumbar region  She has multiple level moderate spinal stenosis. Denies current urinary or stool incontinence. She has not yet tried the gabapentin. She wasn't sure if she would tolerate it. Have urged her to start with one at nighttime and see if this is helpful. Have also urged her to use adequate padding on her chairs. She is working on trying to regain some of her weight.    4. Peripheral vascular disease (CMS-HCC)  This is a continued problem made worse by her smoking. She is urged to continue the aspirin and the clopidogrel as well as her atorvastatin and her walking regimen. Denies any gangrenous changes or ulcerations on her feet.    5. Chronic use of opiate for therapeutic purpose  No aberrant or addictive use of medications has been observed.  Patient counseled to not add Tylenol to current regimen.  Patient counseled to keep medications locked up or under personal control.        Patient Active Problem List    Diagnosis Date Noted   • History of diverticulitis of colon 11/16/2011     Priority: High   • Colonic stricture (CMS-HCC) 06/27/2017   • History of ischemic colitis 06/27/2017   • PVD (peripheral vascular disease) (CMS-HCC) 06/02/2017   • Abdominal aortic aneurysm (CMS-HCC) 03/31/2017   • Insomnia disorder 02/20/2017   • History of COPD 02/20/2017   • History of small bowel obstruction 02/17/2017   • Chronic use of opiate for therapeutic purpose 07/07/2016  "  • Eosinophilic colitis 07/21/2015   • Family history of nonmelanoma skin cancer    • COPD (chronic obstructive pulmonary disease) (CMS-HCC)    • Constipation 05/26/2012   • Subclavian artery stenosis, left (CMS-HCC) 03/27/2012   • Herniated nucleus pulposus, L5-S1, left 09/08/2011   • Carotid atherosclerosis 06/15/2011   • Abdominal aortic aneurysm greater than 39 mm in diameter (CMS-HCC)    • Peripheral vascular disease (CMS-HCC)    • Essential hypertension    • Dyslipidemia    • Tobacco dependence    • Spinal stenosis of lumbar region    • Arteriosclerosis of arteries of extremities (CMS-HCC)        Current Outpatient Prescriptions   Medication Sig Dispense Refill   • hydrocodone-acetaminophen (NORCO) 5-325 MG Tab per tablet Take 1 Tab by mouth every 8 hours as needed. 90 Tab 0   • gabapentin (NEURONTIN) 300 MG Cap Take 1 Cap by mouth 3 times a day. 90 Cap 2   • aspirin EC (ECOTRIN) 81 MG Tablet Delayed Response Take 81 mg by mouth every evening.     • lisinopril (PRINIVIL) 10 MG Tab Take 1 Tab by mouth every day. 30 Tab 6   • carvedilol (COREG) 6.25 MG Tab Take 1 Tab by mouth 2 times a day, with meals. 60 Tab 5   • clopidogrel (PLAVIX) 75 MG Tab Take 75 mg by mouth every evening.     • atorvastatin (LIPITOR) 80 MG tablet Take 80 mg by mouth every bedtime.       No current facility-administered medications for this visit.         Allergies as of 08/23/2017 - Mike as Reviewed 08/23/2017   Allergen Reaction Noted   • Amoxicillin  03/02/2012   • Ciprofloxacin  06/25/2013        ROS: As per HPI.    /60 mmHg  Pulse 90  Temp(Src) 36.8 °C (98.2 °F)  Resp 16  Ht 1.549 m (5' 0.98\")  Wt 40.37 kg (89 lb)  BMI 16.83 kg/m2  SpO2 92%  LMP 03/01/1997 oxygen level is room air at rest    Physical Exam:  Gen:         Alert and oriented, No apparent distress.Very wet cough.  Neck:       Range of motion is full, No Lymphadenopathy or Bruits.  No retractions noted.  Lungs:     Scattered high pitched rhonchi, good air " movement but distant BS  CV:          Regular rate and rhythm. No murmurs, rubs or gallops.               Ext:          No clubbing, cyanosis, edema.      Assessment and Plan.   72 y.o. female with the following issues.    1. Chronic obstructive pulmonary disease with acute lower respiratory infection (CMS-HCC)  Chest x-ray discussed. I feel this is a COPD exacerbation. Antibiotics and steroids also discussed. Have renewed her inhaler.  - DX-CHEST-2 VIEWS; Future  - albuterol (PROAIR HFA) 108 (90 Base) MCG/ACT Aero Soln inhalation aerosol; Inhale 2 Puffs by mouth every 6 hours as needed for Shortness of Breath.  Dispense: 8.5 g; Refill: 2  - azithromycin (ZITHROMAX) 250 MG Tab; As directed on pack  Dispense: 6 Tab; Refill: 0  - predniSONE (DELTASONE) 20 MG Tab; Take 1 Tab by mouth 2 times a day, with meals for 5 days.  Dispense: 10 Tab; Refill: 0    2. Tobacco dependence  Discussed further tries at smoking cessation. She has tried many things including hypnosis. She is continuing to try to wean down and is down to half pack a day from 2 packs a day in the past. She is congratulated on her progress.    3. Spinal stenosis of lumbar region  The pain medication continues to be helpful and well tolerated and his renewed  - hydrocodone-acetaminophen (NORCO) 5-325 MG Tab per tablet; Take 1 Tab by mouth every 8 hours as needed.  Dispense: 90 Tab; Refill: 0    4. Peripheral vascular disease (CMS-[facility])  The pain is problematic. She is urged to continue pushing herself to walk.  - hydrocodone-acetaminophen (NORCO) 5-325 MG Tab per tablet; Take 1 Tab by mouth every 8 hours as needed.  Dispense: 90 Tab; Refill: 0    5. Chronic use of opiate for therapeutic purpose  [facility] board of pharmacy interface is reviewed.  No inconsistencies are found.  Her average MME is 6, active is 12, max active is 16.  This is within CDC guideline for chronic pain prescribing by primary care.  - Controlled Substance Treatment Agreement      Chronic  pain recheck:   Last dose of controlled substance: yesterday pm  Chronic pain treated with hydrocodone/apap taken 2-3 times a day    She  reports that she does not drink alcohol.  She  reports that she does not use illicit drugs.    Consequences of Chronic Opiate therapy:  (5 A's)  Analgesia: Compared to no treatment or prior treatment, pain is currently improved  Activity: improved  Adverse Events: She denies constipation, itchy skin, nausea and sedation  Aberrant Behaviors: She reports she is taking medication as prescribed and is not veering from agreed treatment regimen. There have been no inappropriate refills or lost/stolen meds reported.   Affect/Mood: Pain is impacting patient's mood.  Patient denies depression/anxiety.    Nonnarcotic treatments that are being used: Gabapentin.   Treatment goals discussed.    Opioid Risk Score: 0    Interpretation of Opioid Risk Score   Score 0-3 = Low risk of abuse. Do UDS at least once per year.  Score 4-7 = Moderate risk of abuse. Do UDS 1-4 times per year.  Score 8+ = High risk of abuse. Refer to specialist.    Last order of CONTROLLED SUBSTANCE TREATMENT AGREEMENT was found on 7/7/2016 from Office Visit on 7/7/2016   Updated controlled substance treatment agreement today    UDS Summary                URINE DRUG SCREEN Next Due 6/22/2018      Done 6/27/2017 [facility] PAIN MANAGEMENT SCREEN     Patient has more history with this topic...        Most recent UDS reviewed today and is consistent with prescribed medications.    I have reviewed the medical records, the Prescription Monitoring Program and I have determined that controlled substance treatment is medically indicated.

## 2017-08-29 ENCOUNTER — HOSPITAL ENCOUNTER (OUTPATIENT)
Dept: RADIOLOGY | Facility: MEDICAL CENTER | Age: 72
End: 2017-08-29
Attending: FAMILY MEDICINE
Payer: MEDICARE

## 2017-08-29 DIAGNOSIS — J44.0 CHRONIC OBSTRUCTIVE PULMONARY DISEASE WITH ACUTE LOWER RESPIRATORY INFECTION (HCC): ICD-10-CM

## 2017-08-29 PROCEDURE — 71020 DX-CHEST-2 VIEWS: CPT

## 2017-08-31 ENCOUNTER — PATIENT MESSAGE (OUTPATIENT)
Dept: MEDICAL GROUP | Facility: CLINIC | Age: 72
End: 2017-08-31

## 2017-09-10 ENCOUNTER — TELEPHONE (OUTPATIENT)
Dept: VASCULAR LAB | Facility: MEDICAL CENTER | Age: 72
End: 2017-09-10

## 2017-09-11 NOTE — TELEPHONE ENCOUNTER
Most recent imaging reviewed in accordance with institutional eVAR guideline.  Patient with EVAR on 3/31/17  Had CTA in may 2017 - no endoleak - reviewed by vascular surgeon.  Seen by vascular surgeon in late may - he is recommending a f/u aorto-iliac duplex in november (approx 6 month scan) in accordance with EVAR guidelines.  Will defer all f/u to PCP, cards and vascular surgery.    Michael Bloch, MD  Vascular Care    Cc: SEFERINO Pemberton MD

## 2017-09-20 ENCOUNTER — HOSPITAL ENCOUNTER (OUTPATIENT)
Dept: LAB | Facility: MEDICAL CENTER | Age: 72
End: 2017-09-20
Attending: INTERNAL MEDICINE
Payer: MEDICARE

## 2017-09-20 PROCEDURE — 84439 ASSAY OF FREE THYROXINE: CPT

## 2017-09-20 PROCEDURE — 36415 COLL VENOUS BLD VENIPUNCTURE: CPT

## 2017-09-21 LAB — T4 FREE SERPL-MCNC: 1.02 NG/DL (ref 0.53–1.43)

## 2017-11-09 ENCOUNTER — TELEPHONE (OUTPATIENT)
Dept: MEDICAL GROUP | Facility: CLINIC | Age: 72
End: 2017-11-09

## 2017-11-09 DIAGNOSIS — R06.02 SHORTNESS OF BREATH: ICD-10-CM

## 2017-11-09 DIAGNOSIS — R09.81 NASAL CONGESTION: ICD-10-CM

## 2017-11-09 DIAGNOSIS — R09.89 PHLEGM IN THROAT: ICD-10-CM

## 2017-11-09 DIAGNOSIS — R68.83 CHILLS: ICD-10-CM

## 2017-11-09 RX ORDER — AZITHROMYCIN 250 MG/1
TABLET, FILM COATED ORAL
Qty: 6 TAB | Refills: 0 | Status: SHIPPED | OUTPATIENT
Start: 2017-11-09 | End: 2017-11-22

## 2017-11-09 RX ORDER — FLUTICASONE PROPIONATE 50 MCG
1 SPRAY, SUSPENSION (ML) NASAL DAILY
Qty: 16 G | Refills: 0 | Status: SHIPPED | OUTPATIENT
Start: 2017-11-09 | End: 2018-05-09

## 2017-11-09 NOTE — TELEPHONE ENCOUNTER
1. Caller Name: Karen Jauregui                      Call Back Number: [phone] (home) [phone] (work)    2. Message: pt called stating she may have pneumonia can you prescribe something for her, [facility] is her pharmacy. Thank you    3. Patient approves office to leave a detailed voicemail/MyChart message: yes

## 2017-11-09 NOTE — TELEPHONE ENCOUNTER
Called patient and informed of provider's message below regarding prescriptions sent to pharmacy for antibiotics and nasal spray.  Outcome: Patient voiced understanding of provider's recommendatios and agreed to start nasal spray, antibiotic and she will buy mucinex as well.

## 2017-11-09 NOTE — TELEPHONE ENCOUNTER
Actually this sounds more like a sinus infection. I suggest Flonase nasal spray. I suggest that she continue the Mucinex. I will send in an antibiotic.

## 2017-11-09 NOTE — TELEPHONE ENCOUNTER
Called patient to get more information, she stated that her message was she has a cough and post nasal drainage, chills denies any fever.  Patient asked if she can have something to help her clear her mucus? Patient has been using mucinex over the counter to help with her symptoms but is not doing the job.  Please advised.

## 2017-11-16 ENCOUNTER — PATIENT MESSAGE (OUTPATIENT)
Dept: MEDICAL GROUP | Facility: CLINIC | Age: 72
End: 2017-11-16

## 2017-11-16 DIAGNOSIS — F17.200 TOBACCO DEPENDENCE: ICD-10-CM

## 2017-11-16 DIAGNOSIS — R05.3 PERSISTENT COUGH FOR 3 WEEKS OR LONGER: ICD-10-CM

## 2017-11-16 DIAGNOSIS — J41.1 MUCOPURULENT CHRONIC BRONCHITIS (HCC): ICD-10-CM

## 2017-11-21 ENCOUNTER — PATIENT MESSAGE (OUTPATIENT)
Dept: MEDICAL GROUP | Facility: CLINIC | Age: 72
End: 2017-11-21

## 2017-11-21 NOTE — TELEPHONE ENCOUNTER
----- Message from Karen Jauregui sent at 11/21/2017  9:04 AM PST -----  Regarding: Prescription Question  Contact: [phone]  Dr Gross    I need to get a refill on Lisinopril 10 mg.    Thank you  Karen Jauregui

## 2017-11-22 ENCOUNTER — OFFICE VISIT (OUTPATIENT)
Dept: MEDICAL GROUP | Facility: CLINIC | Age: 72
End: 2017-11-22
Payer: MEDICARE

## 2017-11-22 VITALS
WEIGHT: 91 LBS | DIASTOLIC BLOOD PRESSURE: 80 MMHG | HEART RATE: 94 BPM | HEIGHT: 60 IN | OXYGEN SATURATION: 95 % | RESPIRATION RATE: 16 BRPM | TEMPERATURE: 99.7 F | SYSTOLIC BLOOD PRESSURE: 124 MMHG | BODY MASS INDEX: 17.87 KG/M2

## 2017-11-22 DIAGNOSIS — J44.0 ACUTE BRONCHITIS WITH COPD (HCC): ICD-10-CM

## 2017-11-22 DIAGNOSIS — J44.1 COPD WITH ACUTE EXACERBATION (HCC): ICD-10-CM

## 2017-11-22 DIAGNOSIS — J20.9 ACUTE BRONCHITIS WITH COPD (HCC): ICD-10-CM

## 2017-11-22 DIAGNOSIS — I10 ESSENTIAL HYPERTENSION: ICD-10-CM

## 2017-11-22 DIAGNOSIS — E78.5 DYSLIPIDEMIA: ICD-10-CM

## 2017-11-22 LAB
FLUAV+FLUBV AG SPEC QL IA: NEGATIVE
INT CON NEG: NEGATIVE
INT CON POS: POSITIVE

## 2017-11-22 PROCEDURE — 87804 INFLUENZA ASSAY W/OPTIC: CPT | Performed by: FAMILY MEDICINE

## 2017-11-22 PROCEDURE — 99214 OFFICE O/P EST MOD 30 MIN: CPT | Performed by: FAMILY MEDICINE

## 2017-11-22 RX ORDER — LISINOPRIL 10 MG/1
10 TABLET ORAL DAILY
Qty: 90 TAB | Refills: 3 | Status: SHIPPED | OUTPATIENT
Start: 2017-11-22 | End: 2017-12-27 | Stop reason: SDUPTHER

## 2017-11-22 RX ORDER — SULFAMETHOXAZOLE AND TRIMETHOPRIM 800; 160 MG/1; MG/1
1 TABLET ORAL 2 TIMES DAILY
Qty: 14 TAB | Refills: 0 | Status: SHIPPED | OUTPATIENT
Start: 2017-11-22 | End: 2017-11-29

## 2017-11-22 ASSESSMENT — PATIENT HEALTH QUESTIONNAIRE - PHQ9: CLINICAL INTERPRETATION OF PHQ2 SCORE: 0

## 2017-11-22 NOTE — PROGRESS NOTES
Chief Complaint   Patient presents with   • Cough     Cough x 4 weeks; chills last week; phlegm clear   • Hypertension   • Hyperlipidemia       Subjective:     HPI:   Karen Jauregui presents today with the followin. Acute bronchitis with COPD (CMS-[facility])  3-4 weeks of wet persistent cough.  Usually clear or white phlegm.  However, it is keeping her up all night.    2. COPD with acute exacerbation (CMS-[facility])  I believe she is as an underlying COPD exacerbation which is why the cough is so persistent.  Patient states she is using her inhalers regularly. Discussed inhaler use. Discussed steroid use if the cough does not improve. If this worsens over the holiday she will go to urgent care.  If not better by Monday she will let us know.    3. Essential hypertension  HTN - Chronic condition stable. Currently taking all meds as directed.   She is taking baby aspirin daily.   She is monitoring BP at home. Her machine has been showing readings similar to here, generally 120s systolic.  Denies symptoms low BP: light-headed, tunnel-vision, unusual fatigue.   Denies symptoms high BP:pounding headache, visual changes, palpitations, flushed face.   Denies medicine side effects: unusual fatigue, slow heartbeat, foot/leg swelling, cough.  Laboratory orders are discussed and placed.    4. Dyslipidemia  Patient denies chest pain, chest pressure, palpitations or exertional shortness of breath. Patient denies muscle aches or muscle weakness from the atorvastatin medication. Patient is a current intermittent smoker. Patient takes clopidogrel and aspirin daily. Patient has no history of myocardial infarction or stroke. She has a history of aortic atherosclerosis with aneurysm, carotid atherosclerosis and peripheral vascular disease. She has not been able to stop smoking. Discussed different methods. She has been able to now skip occasional days. Overall she is down about half the amount of cigarettes weekly. She continues to  follow with cardiology and pulmonology. Lab orders are discussed and placed.    Patient Active Problem List    Diagnosis Date Noted   • History of diverticulitis of colon 11/16/2011     Priority: High   • Colonic stricture 06/27/2017   • History of ischemic colitis 06/27/2017   • PVD (peripheral vascular disease) (CMS-HCC) 06/02/2017   • Abdominal aortic aneurysm (CMS-HCC) 03/31/2017   • Insomnia disorder 02/20/2017   • History of COPD 02/20/2017   • History of small bowel obstruction 02/17/2017   • Chronic use of opiate for therapeutic purpose 07/07/2016   • Eosinophilic colitis 07/21/2015   • Family history of nonmelanoma skin cancer    • COPD (chronic obstructive pulmonary disease) (CMS-HCC)    • Constipation 05/26/2012   • Subclavian artery stenosis, left (CMS-HCC) 03/27/2012   • Herniated nucleus pulposus, L5-S1, left 09/08/2011   • Carotid atherosclerosis 06/15/2011   • Abdominal aortic aneurysm greater than 39 mm in diameter (CMS-HCC)    • Peripheral vascular disease (CMS-HCC)    • Essential hypertension    • Dyslipidemia    • Tobacco dependence    • Spinal stenosis of lumbar region    • Arteriosclerosis of arteries of extremities (CMS-HCC)        Current medicines (including changes today)  Current Outpatient Prescriptions   Medication Sig Dispense Refill   • lisinopril (PRINIVIL) 10 MG Tab Take 1 Tab by mouth every day. 90 Tab 3   • sulfamethoxazole-trimethoprim (BACTRIM DS) 800-160 MG tablet Take 1 Tab by mouth 2 times a day for 7 days. 14 Tab 0   • fluticasone (FLONASE) 50 MCG/ACT nasal spray Spray 1 Spray in nose every day. 16 g 0   • hydrocodone-acetaminophen (NORCO) 5-325 MG Tab per tablet Take 1 Tab by mouth every 8 hours as needed. 90 Tab 0   • albuterol (PROAIR HFA) 108 (90 Base) MCG/ACT Aero Soln inhalation aerosol Inhale 2 Puffs by mouth every 6 hours as needed for Shortness of Breath. 8.5 g 2   • gabapentin (NEURONTIN) 300 MG Cap Take 1 Cap by mouth 3 times a day. 90 Cap 2   • aspirin EC  (ECOTRIN) 81 MG Tablet Delayed Response Take 81 mg by mouth every evening.     • carvedilol (COREG) 6.25 MG Tab Take 1 Tab by mouth 2 times a day, with meals. 60 Tab 5   • clopidogrel (PLAVIX) 75 MG Tab Take 75 mg by mouth every evening.     • atorvastatin (LIPITOR) 80 MG tablet Take 80 mg by mouth every bedtime.       No current facility-administered medications for this visit.        Allergies   Allergen Reactions   • Amoxicillin    • Ciprofloxacin        ROS: As per HPI       Objective:     Blood pressure 124/80, pulse 94, temperature 37.6 °C (99.7 °F), resp. rate 16, height 1.524 m (5'), weight 41.3 kg (91 lb), last menstrual period 03/01/2002, SpO2 95 %. Body mass index is 17.77 kg/m².    Physical Exam:  Constitutional: Well-developed and well-nourished. Not diaphoretic. No distress. Lucid and fluent.  Alert and oriented x 3.  Wet cough, persistent.  Skin: Skin is warm and dry. No rash noted.  Head: Atraumatic without lesions.  Eyes: Conjunctivae and extraocular motions are normal. Pupils are equal, round, and reactive to light. No scleral icterus.   Nose: Nares patent. Mucosa without edema or erythema. No discharge. No facial tenderness.  Mouth/Throat: Tongue normal. Oropharynx is clear and moist. Posterior pharynx without erythema or exudates.  Neck: Supple, trachea midline. No thyromegaly present. No cervical or supraclavicular lymphadenopathy. No JVD or carotid bruits appreciated  Cardiovascular: Regular rate and rhythm.  Normal S1, S2 without murmur appreciated.  Chest: Effort normal. Distant breath sounds, scattered very wet rhonchi.  No actual rales appreciated.  Psychiatric:  Behavior, mood, and affect are appropriate.       Assessment and Plan:     72 y.o. female with the following issues:    1. Acute bronchitis with COPD (CMS-[facility])  sulfamethoxazole-trimethoprim (BACTRIM DS) 800-160 MG tablet    POCT Influenza A/B    DX-CHEST-2 VIEWS   2. COPD with acute exacerbation (CMS-HCC)  COMP METABOLIC PANEL     CBC WITHOUT DIFFERENTIAL    DX-CHEST-2 VIEWS   3. Essential hypertension  lisinopril (PRINIVIL) 10 MG Tab    COMP METABOLIC PANEL   4. Dyslipidemia  COMP METABOLIC PANEL    LIPID PROFILE         Followup: Return in about 3 months (around 2/22/2018), or if symptoms worsen or fail to improve.

## 2017-11-29 ENCOUNTER — APPOINTMENT (OUTPATIENT)
Dept: RADIOLOGY | Facility: MEDICAL CENTER | Age: 72
End: 2017-11-29
Payer: MEDICARE

## 2017-12-01 ENCOUNTER — HOSPITAL ENCOUNTER (OUTPATIENT)
Dept: RADIOLOGY | Facility: MEDICAL CENTER | Age: 72
End: 2017-12-01
Attending: FAMILY MEDICINE
Payer: MEDICARE

## 2017-12-01 DIAGNOSIS — J44.0 ACUTE BRONCHITIS WITH COPD (HCC): ICD-10-CM

## 2017-12-01 DIAGNOSIS — J20.9 ACUTE BRONCHITIS WITH COPD (HCC): ICD-10-CM

## 2017-12-01 DIAGNOSIS — J44.1 COPD WITH ACUTE EXACERBATION (HCC): ICD-10-CM

## 2017-12-01 PROCEDURE — 71020 DX-CHEST-2 VIEWS: CPT

## 2017-12-13 ENCOUNTER — HOSPITAL ENCOUNTER (OUTPATIENT)
Dept: LAB | Facility: MEDICAL CENTER | Age: 72
End: 2017-12-13
Attending: FAMILY MEDICINE
Payer: MEDICARE

## 2017-12-13 ENCOUNTER — HOSPITAL ENCOUNTER (OUTPATIENT)
Dept: RADIOLOGY | Facility: MEDICAL CENTER | Age: 72
End: 2017-12-13
Attending: FAMILY MEDICINE
Payer: MEDICARE

## 2017-12-13 DIAGNOSIS — J44.1 COPD WITH ACUTE EXACERBATION (HCC): ICD-10-CM

## 2017-12-13 DIAGNOSIS — E78.5 DYSLIPIDEMIA: ICD-10-CM

## 2017-12-13 DIAGNOSIS — I10 ESSENTIAL HYPERTENSION: ICD-10-CM

## 2017-12-13 LAB
ALBUMIN SERPL BCP-MCNC: 3.8 G/DL (ref 3.2–4.9)
ALBUMIN/GLOB SERPL: 1.3 G/DL
ALP SERPL-CCNC: 99 U/L (ref 30–99)
ALT SERPL-CCNC: 14 U/L (ref 2–50)
ANION GAP SERPL CALC-SCNC: 7 MMOL/L (ref 0–11.9)
AST SERPL-CCNC: 21 U/L (ref 12–45)
BILIRUB SERPL-MCNC: 0.3 MG/DL (ref 0.1–1.5)
BUN SERPL-MCNC: 16 MG/DL (ref 8–22)
CALCIUM SERPL-MCNC: 10 MG/DL (ref 8.5–10.5)
CHLORIDE SERPL-SCNC: 96 MMOL/L (ref 96–112)
CHOLEST SERPL-MCNC: 88 MG/DL (ref 100–199)
CO2 SERPL-SCNC: 32 MMOL/L (ref 20–33)
CREAT SERPL-MCNC: 0.6 MG/DL (ref 0.5–1.4)
ERYTHROCYTE [DISTWIDTH] IN BLOOD BY AUTOMATED COUNT: 44.6 FL (ref 35.9–50)
GFR SERPL CREATININE-BSD FRML MDRD: >60 ML/MIN/1.73 M 2
GLOBULIN SER CALC-MCNC: 3 G/DL (ref 1.9–3.5)
GLUCOSE SERPL-MCNC: 97 MG/DL (ref 65–99)
HCT VFR BLD AUTO: 44.1 % (ref 37–47)
HDLC SERPL-MCNC: 38 MG/DL
HGB BLD-MCNC: 14.7 G/DL (ref 12–16)
LDLC SERPL CALC-MCNC: 38 MG/DL
MCH RBC QN AUTO: 29.4 PG (ref 27–33)
MCHC RBC AUTO-ENTMCNC: 33.3 G/DL (ref 33.6–35)
MCV RBC AUTO: 88.2 FL (ref 81.4–97.8)
PLATELET # BLD AUTO: 274 K/UL (ref 164–446)
PMV BLD AUTO: 9.9 FL (ref 9–12.9)
POTASSIUM SERPL-SCNC: 3.4 MMOL/L (ref 3.6–5.5)
PROT SERPL-MCNC: 6.8 G/DL (ref 6–8.2)
RBC # BLD AUTO: 5 M/UL (ref 4.2–5.4)
SODIUM SERPL-SCNC: 135 MMOL/L (ref 135–145)
TRIGL SERPL-MCNC: 59 MG/DL (ref 0–149)
WBC # BLD AUTO: 8.6 K/UL (ref 4.8–10.8)

## 2017-12-13 PROCEDURE — 93978 VASCULAR STUDY: CPT

## 2017-12-13 PROCEDURE — 93922 UPR/L XTREMITY ART 2 LEVELS: CPT | Mod: 26 | Performed by: SURGERY

## 2017-12-13 PROCEDURE — 93922 UPR/L XTREMITY ART 2 LEVELS: CPT

## 2017-12-13 PROCEDURE — 85027 COMPLETE CBC AUTOMATED: CPT

## 2017-12-13 PROCEDURE — 80061 LIPID PANEL: CPT

## 2017-12-13 PROCEDURE — 80053 COMPREHEN METABOLIC PANEL: CPT

## 2017-12-13 PROCEDURE — 93978 VASCULAR STUDY: CPT | Mod: 26 | Performed by: SURGERY

## 2017-12-13 PROCEDURE — 93931 UPPER EXTREMITY STUDY: CPT | Mod: 26 | Performed by: SURGERY

## 2017-12-13 PROCEDURE — 36415 COLL VENOUS BLD VENIPUNCTURE: CPT

## 2017-12-13 PROCEDURE — 93926 LOWER EXTREMITY STUDY: CPT

## 2017-12-18 ENCOUNTER — TELEPHONE (OUTPATIENT)
Dept: VASCULAR LAB | Facility: MEDICAL CENTER | Age: 72
End: 2017-12-18

## 2017-12-18 NOTE — TELEPHONE ENCOUNTER
Imaging reviewed in accordance with institution TEVAR/EVAR guideline  This imaging performed approx 6 months after procedure    Review of aotoiliac duplex demonstrates stable endograft without endoleak.    Patient instructed to follow up with vascular surgeon and PCP for medical management.    Anticipate repeat imaging in on year in accordance with institution guideline    Michael Bloch, MD  Vascular Care:    Cc:   SEFERINO Pemberton MD

## 2017-12-19 ENCOUNTER — HOSPITAL ENCOUNTER (OUTPATIENT)
Dept: RADIOLOGY | Facility: MEDICAL CENTER | Age: 72
End: 2017-12-19
Attending: SURGERY
Payer: MEDICARE

## 2017-12-19 ENCOUNTER — APPOINTMENT (OUTPATIENT)
Dept: RADIOLOGY | Facility: MEDICAL CENTER | Age: 72
End: 2017-12-19
Attending: FAMILY MEDICINE
Payer: MEDICARE

## 2017-12-19 DIAGNOSIS — I71.40 ABDOMINAL AORTIC ANEURYSM WITHOUT RUPTURE (HCC): ICD-10-CM

## 2017-12-19 DIAGNOSIS — I74.5 EMBOLISM AND THROMBOSIS OF ILIAC ARTERY (HCC): ICD-10-CM

## 2017-12-19 PROCEDURE — 74020 DX-ABDOMEN-2 VIEWS: CPT

## 2017-12-19 PROCEDURE — 72170 X-RAY EXAM OF PELVIS: CPT

## 2017-12-27 DIAGNOSIS — I10 ESSENTIAL HYPERTENSION: ICD-10-CM

## 2017-12-28 RX ORDER — LISINOPRIL 10 MG/1
TABLET ORAL
Qty: 30 TAB | Refills: 5 | Status: SHIPPED | OUTPATIENT
Start: 2017-12-28 | End: 2018-02-14

## 2018-01-10 ENCOUNTER — HOSPITAL ENCOUNTER (OUTPATIENT)
Dept: LAB | Facility: MEDICAL CENTER | Age: 73
End: 2018-01-10
Attending: INTERNAL MEDICINE
Payer: MEDICARE

## 2018-01-10 LAB
ANION GAP SERPL CALC-SCNC: 6 MMOL/L (ref 0–11.9)
BNP SERPL-MCNC: 48 PG/ML (ref 0–100)
BUN SERPL-MCNC: 17 MG/DL (ref 8–22)
CALCIUM SERPL-MCNC: 9.7 MG/DL (ref 8.5–10.5)
CHLORIDE SERPL-SCNC: 97 MMOL/L (ref 96–112)
CO2 SERPL-SCNC: 30 MMOL/L (ref 20–33)
CREAT SERPL-MCNC: 0.65 MG/DL (ref 0.5–1.4)
GLUCOSE SERPL-MCNC: 154 MG/DL (ref 65–99)
POTASSIUM SERPL-SCNC: 3.5 MMOL/L (ref 3.6–5.5)
SODIUM SERPL-SCNC: 133 MMOL/L (ref 135–145)

## 2018-01-10 PROCEDURE — 80048 BASIC METABOLIC PNL TOTAL CA: CPT

## 2018-01-10 PROCEDURE — 83880 ASSAY OF NATRIURETIC PEPTIDE: CPT

## 2018-01-10 PROCEDURE — 36415 COLL VENOUS BLD VENIPUNCTURE: CPT

## 2018-02-14 ENCOUNTER — OFFICE VISIT (OUTPATIENT)
Dept: MEDICAL GROUP | Facility: CLINIC | Age: 73
End: 2018-02-14
Payer: MEDICARE

## 2018-02-14 VITALS
DIASTOLIC BLOOD PRESSURE: 90 MMHG | HEIGHT: 60 IN | RESPIRATION RATE: 14 BRPM | OXYGEN SATURATION: 92 % | HEART RATE: 117 BPM | BODY MASS INDEX: 18.46 KG/M2 | TEMPERATURE: 99 F | SYSTOLIC BLOOD PRESSURE: 152 MMHG | WEIGHT: 94 LBS

## 2018-02-14 DIAGNOSIS — J44.0 CHRONIC OBSTRUCTIVE PULMONARY DISEASE WITH ACUTE LOWER RESPIRATORY INFECTION (HCC): ICD-10-CM

## 2018-02-14 DIAGNOSIS — G57.91 NEUROPATHY OF RIGHT LOWER EXTREMITY: ICD-10-CM

## 2018-02-14 DIAGNOSIS — I70.209 ARTERIOSCLEROSIS OF ARTERIES OF EXTREMITIES (HCC): ICD-10-CM

## 2018-02-14 DIAGNOSIS — Z79.891 CHRONIC USE OF OPIATE FOR THERAPEUTIC PURPOSE: ICD-10-CM

## 2018-02-14 DIAGNOSIS — I73.9 PERIPHERAL VASCULAR DISEASE (HCC): ICD-10-CM

## 2018-02-14 DIAGNOSIS — J01.00 ACUTE NON-RECURRENT MAXILLARY SINUSITIS: ICD-10-CM

## 2018-02-14 DIAGNOSIS — Z23 NEED FOR VACCINATION: ICD-10-CM

## 2018-02-14 DIAGNOSIS — M85.88 OSTEOPENIA OF LUMBAR SPINE: ICD-10-CM

## 2018-02-14 DIAGNOSIS — I71.40 ABDOMINAL AORTIC ANEURYSM GREATER THAN 39 MM IN DIAMETER (HCC): ICD-10-CM

## 2018-02-14 DIAGNOSIS — F17.200 TOBACCO DEPENDENCE: ICD-10-CM

## 2018-02-14 DIAGNOSIS — M48.062 SPINAL STENOSIS OF LUMBAR REGION WITH NEUROGENIC CLAUDICATION: ICD-10-CM

## 2018-02-14 PROCEDURE — G0008 ADMIN INFLUENZA VIRUS VAC: HCPCS | Performed by: FAMILY MEDICINE

## 2018-02-14 PROCEDURE — 99214 OFFICE O/P EST MOD 30 MIN: CPT | Mod: 25 | Performed by: FAMILY MEDICINE

## 2018-02-14 PROCEDURE — 90662 IIV NO PRSV INCREASED AG IM: CPT | Performed by: FAMILY MEDICINE

## 2018-02-14 RX ORDER — HYDROCODONE BITARTRATE AND ACETAMINOPHEN 5; 325 MG/1; MG/1
1 TABLET ORAL EVERY 8 HOURS PRN
Qty: 90 TAB | Refills: 0 | Status: SHIPPED | OUTPATIENT
Start: 2018-02-14 | End: 2018-02-14 | Stop reason: SDUPTHER

## 2018-02-14 RX ORDER — AZITHROMYCIN 250 MG/1
TABLET, FILM COATED ORAL
Qty: 6 TAB | Refills: 0 | Status: SHIPPED | OUTPATIENT
Start: 2018-02-14 | End: 2018-04-19

## 2018-02-14 RX ORDER — LISINOPRIL AND HYDROCHLOROTHIAZIDE 25; 20 MG/1; MG/1
0.5 TABLET ORAL
COMMUNITY
Start: 2018-02-08 | End: 2019-03-25

## 2018-02-14 RX ORDER — ALBUTEROL SULFATE 90 UG/1
2 AEROSOL, METERED RESPIRATORY (INHALATION) EVERY 6 HOURS PRN
Qty: 8.5 G | Refills: 2 | Status: SHIPPED | OUTPATIENT
Start: 2018-02-14 | End: 2018-05-09

## 2018-02-14 RX ORDER — GABAPENTIN 300 MG/1
300 CAPSULE ORAL 3 TIMES DAILY
Qty: 90 CAP | Refills: 3 | Status: SHIPPED | OUTPATIENT
Start: 2018-02-14 | End: 2018-04-19

## 2018-02-14 RX ORDER — HYDROCODONE BITARTRATE AND ACETAMINOPHEN 5; 325 MG/1; MG/1
1 TABLET ORAL EVERY 8 HOURS PRN
Qty: 90 TAB | Refills: 0 | Status: SHIPPED | OUTPATIENT
Start: 2018-04-15 | End: 2018-04-19

## 2018-02-14 RX ORDER — HYDROCODONE BITARTRATE AND ACETAMINOPHEN 5; 325 MG/1; MG/1
1 TABLET ORAL EVERY 8 HOURS PRN
Qty: 90 TAB | Refills: 0 | Status: SHIPPED | OUTPATIENT
Start: 2018-03-16 | End: 2018-02-14 | Stop reason: SDUPTHER

## 2018-02-14 RX ORDER — POTASSIUM CHLORIDE 600 MG/1
1 TABLET, FILM COATED, EXTENDED RELEASE ORAL DAILY
COMMUNITY
Start: 2018-01-18 | End: 2018-04-19

## 2018-02-14 ASSESSMENT — PAIN SCALES - GENERAL: PAINLEVEL: NO PAIN

## 2018-02-14 NOTE — PROGRESS NOTES
Chief Complaint   Patient presents with   • Sinusitis   • Back Pain   • Osteopenia       Subjective:     HPI:   Karen Jauregui presents today with the followin. Spinal stenosis of lumbar region with neurogenic claudication/Neuropathy of right lower extremity  She has chronic pain from her spinal stenosis. This is a persistent and bothersome problem. She states the pain medication is quite helpful. She tries to avoid using this but is finding increased pain lately. She states the pain medication remains helpful bringing the pain from an 8 to a 5. Denies somnolence, balance problems or confusion from the medication.     2. Chronic use of opiate for therapeutic purpose  No aberrant or addictive use of medications has been observed.  Patient counseled to not add Tylenol to current regimen.  Patient counseled to keep medications locked up or under personal control. [facility] board of pharmacy interface is reviewed.  No inconsistencies are found.  Her average MME is 0, active is 0, max active is 15.  This is within CDC guideline for chronic pain prescribing by primary care.    3. Acute non-recurrent maxillary sinusitis  She is once again having sinus pain and pressure. She describes this is relatively mild at this time. However, she feels a little congested and mild shortness of breath on exertion. Denies significant fever. I note that her fever here today is low-grade.    4. Need for vaccination  Need influenza vaccine, administered    5. Peripheral vascular disease (CMS-HCC)  Follows with vascular surgery, Dr. Pemberton.  Has been stable, tobacco cessation needed.    6. Arteriosclerosis of arteries of extremities (CMS-HCC)  Follows with Dr. Pemberton (vascular surgery)    7. Abdominal aortic aneurysm greater than 39 mm in diameter (CMS-HCC)  Following with Dr. Pemberton.  Stable with graft.    8. Tobacco dependence  Cannot quit, has tried several times.  Has just bought an electronic cigarette.    9. Osteopenia of  lumbar spine  Needs follow up bone density    10. Chronic obstructive pulmonary disease with acute lower respiratory infection (CMS-HCC)  Auscultation of her lungs reveals scattered wet rhonchi consistent with bronchitis. While she is here today her cough has worsened and is quite wet. Denies any severe fatigue. Denies significant fever.        Patient Active Problem List    Diagnosis Date Noted   • History of diverticulitis of colon 11/16/2011     Priority: High   • Osteopenia of lumbar spine 02/14/2018   • Colonic stricture 06/27/2017   • History of ischemic colitis 06/27/2017   • PVD (peripheral vascular disease) (CMS-HCC) 06/02/2017   • Abdominal aortic aneurysm (CMS-HCC) 03/31/2017   • Insomnia disorder 02/20/2017   • History of COPD 02/20/2017   • History of small bowel obstruction 02/17/2017   • Chronic use of opiate for therapeutic purpose 07/07/2016   • Eosinophilic colitis 07/21/2015   • Family history of nonmelanoma skin cancer    • COPD (chronic obstructive pulmonary disease) (CMS-HCC)    • Constipation 05/26/2012   • Subclavian artery stenosis, left (CMS-HCC) 03/27/2012   • Herniated nucleus pulposus, L5-S1, left 09/08/2011   • Carotid atherosclerosis 06/15/2011   • Abdominal aortic aneurysm greater than 39 mm in diameter (CMS-HCC)    • Peripheral vascular disease (CMS-HCC)    • Essential hypertension    • Dyslipidemia    • Tobacco dependence    • Spinal stenosis of lumbar region    • Arteriosclerosis of arteries of extremities (CMS-HCC)        Current medicines (including changes today)  Current Outpatient Prescriptions   Medication Sig Dispense Refill   • [START ON 4/15/2018] HYDROcodone-acetaminophen (NORCO) 5-325 MG Tab per tablet Take 1 Tab by mouth every 8 hours as needed for up to 30 days. 90 Tab 0   • gabapentin (NEURONTIN) 300 MG Cap Take 1 Cap by mouth 3 times a day. 90 Cap 3   • albuterol (PROAIR HFA) 108 (90 Base) MCG/ACT Aero Soln inhalation aerosol Inhale 2 Puffs by mouth every 6 hours as  needed for Shortness of Breath. 8.5 g 2   • azithromycin (ZITHROMAX) 250 MG Tab As directed on pack 6 Tab 0   • lisinopril-hydrochlorothiazide (PRINZIDE, ZESTORETIC) 20-25 MG per tablet Take 1 Tab by mouth every day.     • potassium chloride (KLOR-CON) 8 MEQ tablet Take 1 Tab by mouth every day.     • fluticasone (FLONASE) 50 MCG/ACT nasal spray Spray 1 Spray in nose every day. 16 g 0   • aspirin EC (ECOTRIN) 81 MG Tablet Delayed Response Take 81 mg by mouth every evening.     • carvedilol (COREG) 6.25 MG Tab Take 1 Tab by mouth 2 times a day, with meals. 60 Tab 5   • clopidogrel (PLAVIX) 75 MG Tab Take 75 mg by mouth every evening.     • atorvastatin (LIPITOR) 80 MG tablet Take 80 mg by mouth every bedtime.       No current facility-administered medications for this visit.        Allergies   Allergen Reactions   • Amoxicillin    • Ciprofloxacin        ROS: As per HPI       Objective:     Blood pressure 152/90, pulse (!) 117, temperature 37.2 °C (99 °F), resp. rate 14, height 1.524 m (5'), weight 42.6 kg (94 lb), last menstrual period 03/01/2002, SpO2 92 %, not currently breastfeeding. Body mass index is 18.36 kg/m².    Physical Exam:  Constitutional: Well-developed and well-nourished. Not diaphoretic. No distress. Lucid and fluent.  Skin: Skin is warm and dry. No rash noted.  Head: Atraumatic without lesions.  Eyes: Conjunctivae and extraocular motions are normal. Pupils are equal, round, and reactive to light. No scleral icterus.   Ears:  External ears unremarkable. Tympanic membranes clear and intact.  Nose: Nares patent. Mucosa with erythema. No discharge. Minimal facial tenderness.  Mouth/Throat: Tongue normal. Oropharynx is clear and moist. Posterior pharynx without erythema or exudates.  Neck: Supple, trachea midline. No thyromegaly present. No cervical or supraclavicular lymphadenopathy. No JVD or carotid bruits appreciated  Cardiovascular: Regular with rapid rate and normal rhythm.  Normal S1, S2 without  murmur appreciated.  Chest: Effort normal. Scattered wet rhonchi, good air movement  Abdomen: Soft, non tender, and without distention. Active bowel sounds in all four quadrants. No rebound, guarding, masses or hepatosplenomegaly.  Extremities: No cyanosis, clubbing, erythema, nor edema.   Neurological: Alert and oriented x 3.   Psychiatric:  Behavior, mood, and affect are appropriate.       Assessment and Plan:     72 y.o. female with the following issues:    1. Spinal stenosis of lumbar region with neurogenic claudication  HYDROcodone-acetaminophen (NORCO) 5-325 MG Tab per tablet    DISCONTINUED: HYDROcodone-acetaminophen (NORCO) 5-325 MG Tab per tablet    DISCONTINUED: HYDROcodone-acetaminophen (NORCO) 5-325 MG Tab per tablet   2. Chronic use of opiate for therapeutic purpose  CONSENT FOR OPIATE PRESCRIPTION   3. Acute non-recurrent maxillary sinusitis  azithromycin (ZITHROMAX) 250 MG Tab   4. Need for vaccination  INFLUENZA VACCINE, HIGH DOSE (65+ ONLY)   5. Peripheral vascular disease (CMS-HCC)  HYDROcodone-acetaminophen (NORCO) 5-325 MG Tab per tablet    DISCONTINUED: HYDROcodone-acetaminophen (NORCO) 5-325 MG Tab per tablet    DISCONTINUED: HYDROcodone-acetaminophen (NORCO) 5-325 MG Tab per tablet   6. Arteriosclerosis of arteries of extremities (CMS-HCC)     7. Abdominal aortic aneurysm greater than 39 mm in diameter (CMS-[facility])     8. Tobacco dependence     9. Osteopenia of lumbar spine  DS-BONE DENSITY STUDY (DEXA)   10. Neuropathy of right lower extremity  gabapentin (NEURONTIN) 300 MG Cap   11. Chronic obstructive pulmonary disease with acute lower respiratory infection (CMS-[facility])  albuterol (PROAIR HFA) 108 (90 Base) MCG/ACT Aero Soln inhalation aerosol    azithromycin (ZITHROMAX) 250 MG Tab     Chronic pain recheck:   Last dose of controlled substance: last night  Chronic pain treated with hydrocodone/apap taken once every other day generally    She  reports that she does not drink alcohol.  She  reports  that she does not use drugs.    Consequences of Chronic Opiate therapy:  (5 A's)  Analgesia: Compared to no treatment or prior treatment, pain is currently improved  Activity: improved  Adverse Events: She denies constipation, itchy skin, nausea and sedation  Aberrant Behaviors: She reports she is taking medication as prescribed and is not veering from agreed treatment regimen. There have been no inappropriate refills or lost/stolen meds reported.   Affect/Mood: Pain is occasionally impacting patient's mood.  Patient denies depression/anxiety.    Nonnarcotic treatments that are being used: Gabapentin, topical agents and cannot use NSAIDS due to severe vascular issues and risk of clot.   Treatment goals discussed.    Opioid Risk Score: 0    Interpretation of Opioid Risk Score   Score 0-3 = Low risk of abuse. Do UDS at least once per year.  Score 4-7 = Moderate risk of abuse. Do UDS 1-4 times per year.  Score 8+ = High risk of abuse. Refer to specialist.    Last order of CONTROLLED SUBSTANCE TREATMENT AGREEMENT was found on 8/23/2017 from Office Visit on 8/23/2017     UDS Summary                URINE DRUG SCREEN Next Due 6/22/2018      Done 6/27/2017 [facility] PAIN MANAGEMENT SCREEN     Patient has more history with this topic...        Most recent UDS reviewed today and is consistent with prescribed medications.    I have reviewed the medical records, the Prescription Monitoring Program and I have determined that controlled substance treatment is medically indicated.        Followup: Return in about 4 months (around 6/14/2018), or if symptoms worsen or fail to improve.

## 2018-02-27 ENCOUNTER — APPOINTMENT (OUTPATIENT)
Dept: RADIOLOGY | Facility: MEDICAL CENTER | Age: 73
End: 2018-02-27
Attending: FAMILY MEDICINE
Payer: MEDICARE

## 2018-04-19 ENCOUNTER — APPOINTMENT (OUTPATIENT)
Dept: RADIOLOGY | Facility: MEDICAL CENTER | Age: 73
DRG: 389 | End: 2018-04-19
Attending: EMERGENCY MEDICINE
Payer: MEDICARE

## 2018-04-19 ENCOUNTER — RESOLUTE PROFESSIONAL BILLING HOSPITAL PROF FEE (OUTPATIENT)
Dept: HOSPITALIST | Facility: MEDICAL CENTER | Age: 73
End: 2018-04-19
Payer: MEDICARE

## 2018-04-19 ENCOUNTER — HOSPITAL ENCOUNTER (INPATIENT)
Facility: MEDICAL CENTER | Age: 73
LOS: 5 days | DRG: 389 | End: 2018-04-24
Attending: EMERGENCY MEDICINE | Admitting: HOSPITALIST
Payer: MEDICARE

## 2018-04-19 DIAGNOSIS — R10.84 GENERALIZED ABDOMINAL PAIN: ICD-10-CM

## 2018-04-19 DIAGNOSIS — K57.90 DIVERTICULOSIS OF INTESTINE WITHOUT BLEEDING, UNSPECIFIED INTESTINAL TRACT LOCATION: ICD-10-CM

## 2018-04-19 DIAGNOSIS — J44.9 CHRONIC OBSTRUCTIVE PULMONARY DISEASE, UNSPECIFIED COPD TYPE (HCC): ICD-10-CM

## 2018-04-19 DIAGNOSIS — I15.9 SECONDARY HYPERTENSION: ICD-10-CM

## 2018-04-19 DIAGNOSIS — I71.40 ABDOMINAL AORTIC ANEURYSM (AAA) WITHOUT RUPTURE (HCC): ICD-10-CM

## 2018-04-19 PROBLEM — K56.600 PARTIAL SMALL BOWEL OBSTRUCTION (HCC): Status: ACTIVE | Noted: 2018-04-19

## 2018-04-19 PROBLEM — R79.9 ELEVATED BUN: Status: ACTIVE | Noted: 2018-04-19

## 2018-04-19 PROBLEM — R11.2 NAUSEA AND VOMITING: Status: ACTIVE | Noted: 2018-04-19

## 2018-04-19 PROBLEM — E87.1 HYPONATREMIA: Status: ACTIVE | Noted: 2018-04-19

## 2018-04-19 PROBLEM — R10.9 AP (ABDOMINAL PAIN): Status: ACTIVE | Noted: 2018-04-19

## 2018-04-19 LAB
ALBUMIN SERPL BCP-MCNC: 3.7 G/DL (ref 3.2–4.9)
ALBUMIN/GLOB SERPL: 1.1 G/DL
ALP SERPL-CCNC: 85 U/L (ref 30–99)
ALT SERPL-CCNC: 10 U/L (ref 2–50)
ANION GAP SERPL CALC-SCNC: 8 MMOL/L (ref 0–11.9)
AST SERPL-CCNC: 17 U/L (ref 12–45)
BASOPHILS # BLD AUTO: 0.7 % (ref 0–1.8)
BASOPHILS # BLD: 0.07 K/UL (ref 0–0.12)
BILIRUB SERPL-MCNC: 0.5 MG/DL (ref 0.1–1.5)
BUN SERPL-MCNC: 30 MG/DL (ref 8–22)
CALCIUM SERPL-MCNC: 9.8 MG/DL (ref 8.5–10.5)
CHLORIDE SERPL-SCNC: 93 MMOL/L (ref 96–112)
CO2 SERPL-SCNC: 30 MMOL/L (ref 20–33)
CREAT SERPL-MCNC: 0.89 MG/DL (ref 0.5–1.4)
EOSINOPHIL # BLD AUTO: 0.29 K/UL (ref 0–0.51)
EOSINOPHIL NFR BLD: 3 % (ref 0–6.9)
ERYTHROCYTE [DISTWIDTH] IN BLOOD BY AUTOMATED COUNT: 46.4 FL (ref 35.9–50)
GLOBULIN SER CALC-MCNC: 3.4 G/DL (ref 1.9–3.5)
GLUCOSE SERPL-MCNC: 107 MG/DL (ref 65–99)
HCT VFR BLD AUTO: 44.6 % (ref 37–47)
HGB BLD-MCNC: 15.4 G/DL (ref 12–16)
IMM GRANULOCYTES # BLD AUTO: 0.04 K/UL (ref 0–0.11)
IMM GRANULOCYTES NFR BLD AUTO: 0.4 % (ref 0–0.9)
INR PPP: 1.04 (ref 0.87–1.13)
LACTATE BLD-SCNC: 1.2 MMOL/L (ref 0.5–2)
LIPASE SERPL-CCNC: 36 U/L (ref 11–82)
LYMPHOCYTES # BLD AUTO: 1.69 K/UL (ref 1–4.8)
LYMPHOCYTES NFR BLD: 17.3 % (ref 22–41)
MCH RBC QN AUTO: 28.9 PG (ref 27–33)
MCHC RBC AUTO-ENTMCNC: 34.5 G/DL (ref 33.6–35)
MCV RBC AUTO: 83.8 FL (ref 81.4–97.8)
MONOCYTES # BLD AUTO: 0.73 K/UL (ref 0–0.85)
MONOCYTES NFR BLD AUTO: 7.5 % (ref 0–13.4)
NEUTROPHILS # BLD AUTO: 6.95 K/UL (ref 2–7.15)
NEUTROPHILS NFR BLD: 71.1 % (ref 44–72)
NRBC # BLD AUTO: 0 K/UL
NRBC BLD-RTO: 0 /100 WBC
PLATELET # BLD AUTO: 282 K/UL (ref 164–446)
PMV BLD AUTO: 9.2 FL (ref 9–12.9)
POTASSIUM SERPL-SCNC: 4.3 MMOL/L (ref 3.6–5.5)
PROT SERPL-MCNC: 7.1 G/DL (ref 6–8.2)
PROTHROMBIN TIME: 13.3 SEC (ref 12–14.6)
RBC # BLD AUTO: 5.32 M/UL (ref 4.2–5.4)
SODIUM SERPL-SCNC: 131 MMOL/L (ref 135–145)
TROPONIN I SERPL-MCNC: <0.01 NG/ML (ref 0–0.04)
TSH SERPL DL<=0.005 MIU/L-ACNC: 1.68 UIU/ML (ref 0.38–5.33)
WBC # BLD AUTO: 9.8 K/UL (ref 4.8–10.8)

## 2018-04-19 PROCEDURE — 84443 ASSAY THYROID STIM HORMONE: CPT

## 2018-04-19 PROCEDURE — 84484 ASSAY OF TROPONIN QUANT: CPT

## 2018-04-19 PROCEDURE — 94760 N-INVAS EAR/PLS OXIMETRY 1: CPT

## 2018-04-19 PROCEDURE — 770006 HCHG ROOM/CARE - MED/SURG/GYN SEMI*

## 2018-04-19 PROCEDURE — 74176 CT ABD & PELVIS W/O CONTRAST: CPT

## 2018-04-19 PROCEDURE — 99223 1ST HOSP IP/OBS HIGH 75: CPT | Mod: AI | Performed by: HOSPITALIST

## 2018-04-19 PROCEDURE — 83605 ASSAY OF LACTIC ACID: CPT

## 2018-04-19 PROCEDURE — 85610 PROTHROMBIN TIME: CPT

## 2018-04-19 PROCEDURE — A9270 NON-COVERED ITEM OR SERVICE: HCPCS | Performed by: HOSPITALIST

## 2018-04-19 PROCEDURE — 96375 TX/PRO/DX INJ NEW DRUG ADDON: CPT

## 2018-04-19 PROCEDURE — 700105 HCHG RX REV CODE 258: Performed by: HOSPITALIST

## 2018-04-19 PROCEDURE — 99285 EMERGENCY DEPT VISIT HI MDM: CPT

## 2018-04-19 PROCEDURE — 83036 HEMOGLOBIN GLYCOSYLATED A1C: CPT

## 2018-04-19 PROCEDURE — 85025 COMPLETE CBC W/AUTO DIFF WBC: CPT

## 2018-04-19 PROCEDURE — 80053 COMPREHEN METABOLIC PANEL: CPT

## 2018-04-19 PROCEDURE — 96374 THER/PROPH/DIAG INJ IV PUSH: CPT

## 2018-04-19 PROCEDURE — 83690 ASSAY OF LIPASE: CPT

## 2018-04-19 PROCEDURE — 700111 HCHG RX REV CODE 636 W/ 250 OVERRIDE (IP): Performed by: EMERGENCY MEDICINE

## 2018-04-19 PROCEDURE — 36415 COLL VENOUS BLD VENIPUNCTURE: CPT

## 2018-04-19 PROCEDURE — 700102 HCHG RX REV CODE 250 W/ 637 OVERRIDE(OP): Performed by: HOSPITALIST

## 2018-04-19 RX ORDER — HEPARIN SODIUM 5000 [USP'U]/ML
5000 INJECTION, SOLUTION INTRAVENOUS; SUBCUTANEOUS EVERY 8 HOURS
Status: DISCONTINUED | OUTPATIENT
Start: 2018-04-19 | End: 2018-04-24 | Stop reason: HOSPADM

## 2018-04-19 RX ORDER — ONDANSETRON 2 MG/ML
4 INJECTION INTRAMUSCULAR; INTRAVENOUS EVERY 4 HOURS PRN
Status: DISCONTINUED | OUTPATIENT
Start: 2018-04-19 | End: 2018-04-24 | Stop reason: HOSPADM

## 2018-04-19 RX ORDER — ONDANSETRON 4 MG/1
4 TABLET, ORALLY DISINTEGRATING ORAL EVERY 4 HOURS PRN
Status: DISCONTINUED | OUTPATIENT
Start: 2018-04-19 | End: 2018-04-24 | Stop reason: HOSPADM

## 2018-04-19 RX ORDER — LISINOPRIL AND HYDROCHLOROTHIAZIDE 25; 20 MG/1; MG/1
0.5 TABLET ORAL
Status: DISCONTINUED | OUTPATIENT
Start: 2018-04-19 | End: 2018-04-19

## 2018-04-19 RX ORDER — SODIUM CHLORIDE 9 MG/ML
INJECTION, SOLUTION INTRAVENOUS CONTINUOUS
Status: DISCONTINUED | OUTPATIENT
Start: 2018-04-19 | End: 2018-04-20

## 2018-04-19 RX ORDER — AMOXICILLIN 250 MG
2 CAPSULE ORAL 2 TIMES DAILY
Status: DISCONTINUED | OUTPATIENT
Start: 2018-04-19 | End: 2018-04-21

## 2018-04-19 RX ORDER — CLOPIDOGREL BISULFATE 75 MG/1
75 TABLET ORAL EVERY EVENING
Status: DISCONTINUED | OUTPATIENT
Start: 2018-04-19 | End: 2018-04-21

## 2018-04-19 RX ORDER — ATORVASTATIN CALCIUM 40 MG/1
80 TABLET, FILM COATED ORAL
Status: DISCONTINUED | OUTPATIENT
Start: 2018-04-19 | End: 2018-04-24 | Stop reason: HOSPADM

## 2018-04-19 RX ORDER — LISINOPRIL 10 MG/1
10 TABLET ORAL
Status: DISCONTINUED | OUTPATIENT
Start: 2018-04-19 | End: 2018-04-21

## 2018-04-19 RX ORDER — HYDROCODONE BITARTRATE AND ACETAMINOPHEN 5; 325 MG/1; MG/1
0.5 TABLET ORAL EVERY 8 HOURS PRN
COMMUNITY
End: 2018-05-09

## 2018-04-19 RX ORDER — OXYCODONE HYDROCHLORIDE 5 MG/1
5 TABLET ORAL
Status: DISCONTINUED | OUTPATIENT
Start: 2018-04-19 | End: 2018-04-20

## 2018-04-19 RX ORDER — HYDROCHLOROTHIAZIDE 12.5 MG/1
12.5 TABLET ORAL
Status: DISCONTINUED | OUTPATIENT
Start: 2018-04-19 | End: 2018-04-21

## 2018-04-19 RX ORDER — POLYETHYLENE GLYCOL 3350 17 G/17G
1 POWDER, FOR SOLUTION ORAL
Status: DISCONTINUED | OUTPATIENT
Start: 2018-04-19 | End: 2018-04-21

## 2018-04-19 RX ORDER — ONDANSETRON 2 MG/ML
4 INJECTION INTRAMUSCULAR; INTRAVENOUS ONCE
Status: COMPLETED | OUTPATIENT
Start: 2018-04-19 | End: 2018-04-19

## 2018-04-19 RX ORDER — MORPHINE SULFATE 4 MG/ML
4 INJECTION, SOLUTION INTRAMUSCULAR; INTRAVENOUS ONCE
Status: COMPLETED | OUTPATIENT
Start: 2018-04-19 | End: 2018-04-19

## 2018-04-19 RX ORDER — BISACODYL 10 MG
10 SUPPOSITORY, RECTAL RECTAL
Status: DISCONTINUED | OUTPATIENT
Start: 2018-04-19 | End: 2018-04-24 | Stop reason: HOSPADM

## 2018-04-19 RX ORDER — OXYCODONE HYDROCHLORIDE 10 MG/1
10 TABLET ORAL
Status: DISCONTINUED | OUTPATIENT
Start: 2018-04-19 | End: 2018-04-20

## 2018-04-19 RX ADMIN — ONDANSETRON 4 MG: 2 INJECTION, SOLUTION INTRAMUSCULAR; INTRAVENOUS at 17:00

## 2018-04-19 RX ADMIN — HYDROCHLOROTHIAZIDE 12.5 MG: 12.5 TABLET ORAL at 22:40

## 2018-04-19 RX ADMIN — MORPHINE SULFATE 4 MG: 4 INJECTION INTRAVENOUS at 17:01

## 2018-04-19 RX ADMIN — STANDARDIZED SENNA CONCENTRATE AND DOCUSATE SODIUM 2 TABLET: 8.6; 5 TABLET, FILM COATED ORAL at 22:41

## 2018-04-19 RX ADMIN — SODIUM CHLORIDE: 9 INJECTION, SOLUTION INTRAVENOUS at 22:51

## 2018-04-19 RX ADMIN — CLOPIDOGREL 75 MG: 75 TABLET, FILM COATED ORAL at 22:40

## 2018-04-19 RX ADMIN — ATORVASTATIN CALCIUM 80 MG: 40 TABLET, FILM COATED ORAL at 22:41

## 2018-04-19 RX ADMIN — LISINOPRIL 10 MG: 10 TABLET ORAL at 22:40

## 2018-04-19 RX ADMIN — ASPIRIN 81 MG: 81 TABLET, COATED ORAL at 22:41

## 2018-04-19 ASSESSMENT — ENCOUNTER SYMPTOMS
BLURRED VISION: 0
COUGH: 0
FEVER: 0
DOUBLE VISION: 0
WEAKNESS: 0
NAUSEA: 1
BRUISES/BLEEDS EASILY: 0
MYALGIAS: 0
CHILLS: 0
SHORTNESS OF BREATH: 0
HEARTBURN: 0
EYE DISCHARGE: 0
VOMITING: 1
PALPITATIONS: 0
DIZZINESS: 0
ABDOMINAL PAIN: 1
DEPRESSION: 0
HALLUCINATIONS: 0
FOCAL WEAKNESS: 0
SENSORY CHANGE: 0
FLANK PAIN: 0
HEMOPTYSIS: 0
SPEECH CHANGE: 0

## 2018-04-19 ASSESSMENT — LIFESTYLE VARIABLES
SUBSTANCE_ABUSE: 0
DO YOU DRINK ALCOHOL: NO
EVER_SMOKED: YES
ALCOHOL_USE: NO

## 2018-04-19 ASSESSMENT — PATIENT HEALTH QUESTIONNAIRE - PHQ9
SUM OF ALL RESPONSES TO PHQ9 QUESTIONS 1 AND 2: 0
2. FEELING DOWN, DEPRESSED, IRRITABLE, OR HOPELESS: NOT AT ALL
1. LITTLE INTEREST OR PLEASURE IN DOING THINGS: NOT AT ALL

## 2018-04-19 ASSESSMENT — PAIN SCALES - GENERAL: PAINLEVEL_OUTOF10: 8

## 2018-04-19 NOTE — ED NOTES
"Rounded on pt, states that her pain is \"down a little bit\" to 5/10 at this time. Pt with tech for blood draw at this time.   "

## 2018-04-19 NOTE — ED TRIAGE NOTES
Chief Complaint   Patient presents with   • Abdominal Pain     hx sbo, feels like the same.  small bm this am

## 2018-04-19 NOTE — ED PROVIDER NOTES
ED Provider Note  CHIEF COMPLAINT  Chief Complaint   Patient presents with   • Abdominal Pain     hx sbo, feels like the same.  small bm this am       HPI  Karen Jauregui is a 72 y.o. female who presents complaining of some vague achy crampy intermittent periumbilical abdominal pain. The patient has a history of small bowel obstruction. She feels like her symptoms today are similar. She also has a history of a slightly enlarged, no aneurysm. 3.6 cm. Also has a history of hypertension and COPD.    REVIEW OF SYSTEMS  No headache, no chest pain, no difficulty breathing.  ALL OTHER SYSTEMS NEGATIVE    ALLERGIES  Allergies   Allergen Reactions   • Amoxicillin    • Ciprofloxacin        CURRENT MEDICATIONS  Home Medications     Reviewed by Ltety Pacheco R.N. (Registered Nurse) on 04/19/18 at 1501  Med List Status: Complete   Medication Last Dose Status   albuterol (PROAIR HFA) 108 (90 Base) MCG/ACT Aero Soln inhalation aerosol 4/18/2018 Active   aspirin EC (ECOTRIN) 81 MG Tablet Delayed Response 4/17/2018 Active   atorvastatin (LIPITOR) 80 MG tablet 4/17/2018 Active   carvedilol (COREG) 6.25 MG Tab 4/17/2018 Active   clopidogrel (PLAVIX) 75 MG Tab 4/17/2018 Active   fluticasone (FLONASE) 50 MCG/ACT nasal spray 4/17/2018 Active   gabapentin (NEURONTIN) 300 MG Cap 4/17/2018 Active   HYDROcodone-acetaminophen (NORCO) 5-325 MG Tab per tablet 4/19/2018 Active   lisinopril-hydrochlorothiazide (PRINZIDE, ZESTORETIC) 20-25 MG per tablet 4/17/2018 Active   potassium chloride (KLOR-CON) 8 MEQ tablet 4/17/2018 Active                PAST MEDICAL HISTORY  Past Medical History:   Diagnosis Date   • Abdominal aortic aneurysm (CMS-HCC) 11/2010    3.6 cm 8/2014   • Angina     with stress test   • Arthritis     thumbs   • Atherosclerosis of arteries of the extremities     two stents in the groin, Dr. Pickett   • Bowel habit changes    • Carotid atherosclerosis     Dr. Pickett   • COPD (chronic obstructive pulmonary disease) (CMS-HCC)     • Diverticulitis     Dx 11/25/11   • Diverticulosis of colon    • Dyslipidemia    • Emphysema of lung (CMS-HCC)    • Eosinophilic colitis    • Family history of nonmelanoma skin cancer    • Hypertension    • Pain    • Peripheral vascular disease (CMS-HCC)     9 months, may relate to PVD   • PVD (posterior vitreous detachment), left eye 1/13/2015   • Snoring    • Spinal stenosis of lumbar region     Dr. Navarro   • Unspecified hemorrhagic conditions     asa/plavix, bruises easily       SURGICAL HISTORY  Past Surgical History:   Procedure Laterality Date   • AAA WITH STENT GRAFT N/A 3/31/2017    Procedure: AAA WITH STENT GRAFT - 5-CM INFRARENAL;  Surgeon: Spring Pemberton M.D.;  Location: SURGERY [facility];  Service:    • FEMORAL FEMORAL BYPASS N/A 3/31/2017    Procedure: FEMORAL FEMORAL BYPASS - POSS;  Surgeon: Spring Pemberton M.D.;  Location: SURGERY [facility];  Service:    • COLONOSCOPY WITH BIOPSY  3/6/2015    Performed by Pranav THOMPSON M.D. at ENDOSCOPY [facility]   • COLON RESECTION LAPAROSCOPIC  8/5/2013    Performed by Spring Pemberton M.D. at SURGERY [facility]   • OTHER CARDIAC SURGERY  2005    cardiac stents   • GYN SURGERY  2002    hysterectomy, tubal, fibroids, ovaries gone   • GYN SURGERY  1975    ectopic pregnacy   • COLONOSCOPY  3/1/2009, 4/2012, 7/2/13    normal, normal, normal but heavy diverticulosis   • OTHER      LLE /RLEstent, common iliac, Dr. Pickett       FAMILY HISTORY  Family History   Problem Relation Age of Onset   • Hyperlipidemia Sister    • Hypertension Sister    • Non-contributory Sister      carotid atherosclerosis   • Hypertension Mother    • Hyperlipidemia Mother    • Heart Disease Mother 82     congestive heart failure, AAA   • Heart Disease Father 55     multiple heart issues   • Hypertension Father    • Hyperlipidemia Father    • Cancer Sister      sin cancer       SOCIAL HISTORY  Social History     Social History   • Marital status:      Spouse  "name: N/A   • Number of children: N/A   • Years of education: N/A     Occupational History   • 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[employer] [address]     Social History Main Topics   • Smoking status: Current Every Day Smoker     Packs/day: 0.50     Years: 45.00     Types: Cigarettes   • Smokeless tobacco: Never Used      Comment: on nicotine patches, smokes off and on, discussed stopping, trying to   • Alcohol use No   • Drug use: No   • Sexual activity: Not on file     Other Topics Concern   • Not on file     Social History Narrative   • No narrative on file       PHYSICAL EXAM  GENERAL: Alert thin female adult  VITAL SIGNS: BP (!) 98/61   Pulse (!) 104   Temp 37 °C (98.6 °F) (Temporal)   Resp 16   Ht 1.549 m (5' 1\")   Wt 42.1 kg (92 lb 13 oz)   LMP 03/01/2002   SpO2 91%   BMI 17.54 kg/m²   Constitutional: Alert healthy-appearing adult   HENT: Scalp is normal size and nontender. Ears are clear. Nose is clear. Throat is clear with no stridor no drooling no trismus. Teeth are all intact.  Eyes: Pupils equal round and reactive to light, extraocular motor fall. There is no scleral icterus.  Neck: Neck is supple and nontender. There is no meningismus. No adenitis. No thyromegaly.  Lymphatic: No adenopathy.   Cardiovascular: Heart regular rhythm without murmurs or gallops   Thorax & Lungs: No chest wall tenderness. Lungs are clear. Patient has good breath sounds bilateral. No rales, no rhonchi, no wheezes.  Abdomen: Abdomen is soft, nontender, not rigid, no guarding, and no organomegaly. There is no palpable hernia . The patient's abdomen is low but distended and tympanitic. She also has a palpable cord in the lower abdomen which was inserted when she had aneurysm surgery.  Skin: Warm, pink, and dry with no erythema and no rash.   Back: Nontender, no midline bony tenderness, no flank tenderness.  Genitalia no lower abdominal pain and no lower abdominal tenderness and no vaginal bleeding and no vaginal discharge. Pelvic exam has been " deferred.  Extremities: Full range of motion  No tenderness to palpation and no deformities noted. No calf or thigh swelling. No calf or thigh tenderness. No clinical DVT.  Neurologic: Alert & oriented . Cranial nerves are grossly intact as tested. Patient moves all 4 extremities well. Patient has good strong flexion and extension of the ankle joints knee joints hip joints and elbow joints. Sensation is normal and symmetrical in the upper and lower extremities.   Psychiatric: Patient is alert oriented coherent and rational.     RADIOLOGY/PROCEDURES  CT-RENAL COLIC EVALUATION(A/P W/O)   Final Result      1.  Findings consistent with early or partial distal small bowel obstruction.   2.  No evidence for bowel perforation.   3.  No renal stone or hydronephrosis.   4.  Postoperative changes as described.            COURSE & MEDICAL DECISION MAKING  She presents for evaluation of abdominal pain. She feels like she may have a another small bowel obstruction. She has a history of a abdominal aortic aneurysm at 3.7 cm. Differential diagnosis: Abdominal pain, gastritis, hepatitis, pancreatitis, irritable bowel syndrome, colitis, diverticulitis, small bowel obstruction, etc.    Plan: #1 IV #2 nothing by mouth #3 CT the abdomen without contrast #4. Lab including CBC, CMV, lipase, ProTime etc. 5. Review of previous medical records    Review of previous medical records: Diverticulosis, double aortic aneurysm 3.7 cm small bowel obstruction, hypertension.    Laboratory and reexamination:    CT the abdomen is consistent with partial distal small bowel obstruction. CBC is normal no anemia and no bandemia. Chemistry panel shows no significant renal or liver dysfunction. Ponin is negative.    Results for orders placed or performed during the hospital encounter of 04/19/18   CBC WITH DIFFERENTIAL   Result Value Ref Range    WBC 9.8 4.8 - 10.8 K/uL    RBC 5.32 4.20 - 5.40 M/uL    Hemoglobin 15.4 12.0 - 16.0 g/dL    Hematocrit 44.6 37.0  - 47.0 %    MCV 83.8 81.4 - 97.8 fL    MCH 28.9 27.0 - 33.0 pg    MCHC 34.5 33.6 - 35.0 g/dL    RDW 46.4 35.9 - 50.0 fL    Platelet Count 282 164 - 446 K/uL    MPV 9.2 9.0 - 12.9 fL    Neutrophils-Polys 71.10 44.00 - 72.00 %    Lymphocytes 17.30 (L) 22.00 - 41.00 %    Monocytes 7.50 0.00 - 13.40 %    Eosinophils 3.00 0.00 - 6.90 %    Basophils 0.70 0.00 - 1.80 %    Immature Granulocytes 0.40 0.00 - 0.90 %    Nucleated RBC 0.00 /100 WBC    Neutrophils (Absolute) 6.95 2.00 - 7.15 K/uL    Lymphs (Absolute) 1.69 1.00 - 4.80 K/uL    Monos (Absolute) 0.73 0.00 - 0.85 K/uL    Eos (Absolute) 0.29 0.00 - 0.51 K/uL    Baso (Absolute) 0.07 0.00 - 0.12 K/uL    Immature Granulocytes (abs) 0.04 0.00 - 0.11 K/uL    NRBC (Absolute) 0.00 K/uL   COMP METABOLIC PANEL   Result Value Ref Range    Sodium 131 (L) 135 - 145 mmol/L    Potassium 4.3 3.6 - 5.5 mmol/L    Chloride 93 (L) 96 - 112 mmol/L    Co2 30 20 - 33 mmol/L    Anion Gap 8.0 0.0 - 11.9    Glucose 107 (H) 65 - 99 mg/dL    Bun 30 (H) 8 - 22 mg/dL    Creatinine 0.89 0.50 - 1.40 mg/dL    Calcium 9.8 8.5 - 10.5 mg/dL    AST(SGOT) 17 12 - 45 U/L    ALT(SGPT) 10 2 - 50 U/L    Alkaline Phosphatase 85 30 - 99 U/L    Total Bilirubin 0.5 0.1 - 1.5 mg/dL    Albumin 3.7 3.2 - 4.9 g/dL    Total Protein 7.1 6.0 - 8.2 g/dL    Globulin 3.4 1.9 - 3.5 g/dL    A-G Ratio 1.1 g/dL   LIPASE   Result Value Ref Range    Lipase 36 11 - 82 U/L   ESTIMATED GFR   Result Value Ref Range    GFR If African American >60 >60 mL/min/1.73 m 2    GFR If Non African American >60 >60 mL/min/1.73 m 2   TROPONIN   Result Value Ref Range    Troponin I <0.01 0.00 - 0.04 ng/mL   LACTIC ACID   Result Value Ref Range    Lactic Acid 1.2 0.5 - 2.0 mmol/L   PROTHROMBIN TIME (INR)   Result Value Ref Range    PT 13.3 12.0 - 14.6 sec    INR 1.04 0.87 - 1.13      The hospitalist Dr. Malik has been called and the case discussed. The patient stable on admission after 6:45 PM.  FINAL IMPRESSION  1. Abdominal pain  2. Early small  bowel obstruction           Electronically signed by: Gary Gansert, 4/19/2018 /6:40 PM

## 2018-04-20 PROBLEM — K59.81 PSEUDO-OBSTRUCTION OF COLON: Status: ACTIVE | Noted: 2018-04-20

## 2018-04-20 LAB
ALBUMIN SERPL BCP-MCNC: 2.9 G/DL (ref 3.2–4.9)
ALBUMIN/GLOB SERPL: 1.1 G/DL
ALP SERPL-CCNC: 68 U/L (ref 30–99)
ALT SERPL-CCNC: 7 U/L (ref 2–50)
ANION GAP SERPL CALC-SCNC: 8 MMOL/L (ref 0–11.9)
AST SERPL-CCNC: 14 U/L (ref 12–45)
BASOPHILS # BLD AUTO: 0.8 % (ref 0–1.8)
BASOPHILS # BLD: 0.07 K/UL (ref 0–0.12)
BILIRUB SERPL-MCNC: 0.6 MG/DL (ref 0.1–1.5)
BUN SERPL-MCNC: 32 MG/DL (ref 8–22)
CALCIUM SERPL-MCNC: 8.6 MG/DL (ref 8.5–10.5)
CHLORIDE SERPL-SCNC: 98 MMOL/L (ref 96–112)
CHOLEST SERPL-MCNC: 72 MG/DL (ref 100–199)
CO2 SERPL-SCNC: 27 MMOL/L (ref 20–33)
CREAT SERPL-MCNC: 0.99 MG/DL (ref 0.5–1.4)
EOSINOPHIL # BLD AUTO: 0.52 K/UL (ref 0–0.51)
EOSINOPHIL NFR BLD: 5.7 % (ref 0–6.9)
ERYTHROCYTE [DISTWIDTH] IN BLOOD BY AUTOMATED COUNT: 48 FL (ref 35.9–50)
EST. AVERAGE GLUCOSE BLD GHB EST-MCNC: 120 MG/DL
GLOBULIN SER CALC-MCNC: 2.6 G/DL (ref 1.9–3.5)
GLUCOSE SERPL-MCNC: 71 MG/DL (ref 65–99)
HBA1C MFR BLD: 5.8 % (ref 0–5.6)
HCT VFR BLD AUTO: 39.4 % (ref 37–47)
HDLC SERPL-MCNC: 26 MG/DL
HGB BLD-MCNC: 12.6 G/DL (ref 12–16)
IMM GRANULOCYTES # BLD AUTO: 0.04 K/UL (ref 0–0.11)
IMM GRANULOCYTES NFR BLD AUTO: 0.4 % (ref 0–0.9)
LDLC SERPL CALC-MCNC: 30 MG/DL
LYMPHOCYTES # BLD AUTO: 1.95 K/UL (ref 1–4.8)
LYMPHOCYTES NFR BLD: 21.5 % (ref 22–41)
MCH RBC QN AUTO: 27.8 PG (ref 27–33)
MCHC RBC AUTO-ENTMCNC: 32 G/DL (ref 33.6–35)
MCV RBC AUTO: 87 FL (ref 81.4–97.8)
MONOCYTES # BLD AUTO: 0.83 K/UL (ref 0–0.85)
MONOCYTES NFR BLD AUTO: 9.1 % (ref 0–13.4)
NEUTROPHILS # BLD AUTO: 5.68 K/UL (ref 2–7.15)
NEUTROPHILS NFR BLD: 62.5 % (ref 44–72)
NRBC # BLD AUTO: 0 K/UL
NRBC BLD-RTO: 0 /100 WBC
PLATELET # BLD AUTO: 230 K/UL (ref 164–446)
PMV BLD AUTO: 9.2 FL (ref 9–12.9)
POTASSIUM SERPL-SCNC: 3.9 MMOL/L (ref 3.6–5.5)
PROT SERPL-MCNC: 5.5 G/DL (ref 6–8.2)
RBC # BLD AUTO: 4.53 M/UL (ref 4.2–5.4)
SODIUM SERPL-SCNC: 133 MMOL/L (ref 135–145)
TRIGL SERPL-MCNC: 78 MG/DL (ref 0–149)
WBC # BLD AUTO: 9.1 K/UL (ref 4.8–10.8)

## 2018-04-20 PROCEDURE — 36415 COLL VENOUS BLD VENIPUNCTURE: CPT

## 2018-04-20 PROCEDURE — 770006 HCHG ROOM/CARE - MED/SURG/GYN SEMI*

## 2018-04-20 PROCEDURE — 80053 COMPREHEN METABOLIC PANEL: CPT

## 2018-04-20 PROCEDURE — A9270 NON-COVERED ITEM OR SERVICE: HCPCS | Performed by: HOSPITALIST

## 2018-04-20 PROCEDURE — 700102 HCHG RX REV CODE 250 W/ 637 OVERRIDE(OP): Performed by: HOSPITALIST

## 2018-04-20 PROCEDURE — 700111 HCHG RX REV CODE 636 W/ 250 OVERRIDE (IP): Performed by: INTERNAL MEDICINE

## 2018-04-20 PROCEDURE — 99233 SBSQ HOSP IP/OBS HIGH 50: CPT | Performed by: INTERNAL MEDICINE

## 2018-04-20 PROCEDURE — 700111 HCHG RX REV CODE 636 W/ 250 OVERRIDE (IP): Performed by: HOSPITALIST

## 2018-04-20 PROCEDURE — 700105 HCHG RX REV CODE 258: Performed by: HOSPITALIST

## 2018-04-20 PROCEDURE — 85025 COMPLETE CBC W/AUTO DIFF WBC: CPT

## 2018-04-20 PROCEDURE — 80061 LIPID PANEL: CPT

## 2018-04-20 RX ORDER — METOCLOPRAMIDE 10 MG/1
10 TABLET ORAL
Status: DISCONTINUED | OUTPATIENT
Start: 2018-04-21 | End: 2018-04-21

## 2018-04-20 RX ORDER — KETOROLAC TROMETHAMINE 30 MG/ML
15 INJECTION, SOLUTION INTRAMUSCULAR; INTRAVENOUS EVERY 6 HOURS PRN
Status: DISCONTINUED | OUTPATIENT
Start: 2018-04-20 | End: 2018-04-24 | Stop reason: HOSPADM

## 2018-04-20 RX ADMIN — OXYCODONE HYDROCHLORIDE 5 MG: 5 TABLET ORAL at 05:19

## 2018-04-20 RX ADMIN — SODIUM CHLORIDE: 9 INJECTION, SOLUTION INTRAVENOUS at 09:36

## 2018-04-20 RX ADMIN — STANDARDIZED SENNA CONCENTRATE AND DOCUSATE SODIUM 2 TABLET: 8.6; 5 TABLET, FILM COATED ORAL at 08:56

## 2018-04-20 RX ADMIN — ONDANSETRON HYDROCHLORIDE 4 MG: 2 INJECTION, SOLUTION INTRAMUSCULAR; INTRAVENOUS at 23:43

## 2018-04-20 RX ADMIN — KETOROLAC TROMETHAMINE 15 MG: 30 INJECTION, SOLUTION INTRAMUSCULAR at 22:34

## 2018-04-20 RX ADMIN — KETOROLAC TROMETHAMINE 15 MG: 30 INJECTION, SOLUTION INTRAMUSCULAR at 16:30

## 2018-04-20 ASSESSMENT — ENCOUNTER SYMPTOMS
HEADACHES: 0
CHILLS: 0
NAUSEA: 0
CONSTIPATION: 1
SORE THROAT: 0
VOMITING: 0
BACK PAIN: 1
SHORTNESS OF BREATH: 0
DIZZINESS: 0
FEVER: 0
ABDOMINAL PAIN: 0
NERVOUS/ANXIOUS: 1
COUGH: 0

## 2018-04-20 ASSESSMENT — PAIN SCALES - GENERAL
PAINLEVEL_OUTOF10: 5
PAINLEVEL_OUTOF10: 4
PAINLEVEL_OUTOF10: 0

## 2018-04-20 ASSESSMENT — PATIENT HEALTH QUESTIONNAIRE - PHQ9
SUM OF ALL RESPONSES TO PHQ9 QUESTIONS 1 AND 2: 0
2. FEELING DOWN, DEPRESSED, IRRITABLE, OR HOPELESS: NOT AT ALL
2. FEELING DOWN, DEPRESSED, IRRITABLE, OR HOPELESS: NOT AT ALL
1. LITTLE INTEREST OR PLEASURE IN DOING THINGS: NOT AT ALL
1. LITTLE INTEREST OR PLEASURE IN DOING THINGS: NOT AT ALL
SUM OF ALL RESPONSES TO PHQ9 QUESTIONS 1 AND 2: 0

## 2018-04-20 ASSESSMENT — LIFESTYLE VARIABLES: DO YOU DRINK ALCOHOL: NO

## 2018-04-20 NOTE — DIETARY
"Nutrition services: Day 1 of admit.  Karen Jauregui is a 72 y.o. female with admitting DX of partial small bowel obstruction.  Pt seen for poor PO intake pta per nutrition admit screen, as well as low BMI <19 (=17.54).     Visited pt at bedside, pt very petite and has pronounced clavicle bones with slightly squared shoulders. Pt states that her appetite has been poor for the past few weeks. At baseline pt reports she eats well with a light breakfast and a typical lunch and dinner. However in the past few weeks she says she has only been able to take a few bites of her meals, and then began to feel sick. She states that she never did have post prandial emesis however. Pt states that she has been trying to gain weight recently, and is actually up from her lowest weight of 88#. No wt loss recently, however pt is underweight per BMI classification. Pt states that she drinks Boost supplements at home and would like a chocolate Boost on breakfast trays while here, obtained order per MD. RD will monitor for adequate nutritional intake.     Assessment:  Height: 154.9 cm (5' 1\")  Weight: 42.1 kg (92 lb 13 oz)  Body mass index is 17.54 kg/m². (underweight)  Diet/Intake: Full liquid; no PO intake recorded at this time    Evaluation:   1. Underweight 71 yo F with visual appearance of fat and muscles loss  2. Per H&P pt with hx of small bowel obstruction and partial bowel obstructions in the past  3. Reviewed labs, meds, fluids, and ADLs    Recommendations/Plan:  1. Boost Plus with breakfast trays per pt preference  2. Encourage intake of meals and supplements  3. Document intake of all meals and supplements as % taken in ADL's to provide interdisciplinary communication across all shifts.   4. Monitor weight.  5. Nutrition rep will continue to see patient for ongoing meal and snack preferences.   6. RD to monitor PO intake, wt trends, and nutrition labs/meds            "

## 2018-04-20 NOTE — CARE PLAN
Problem: Pain Management  Goal: Pain level will decrease to patient's comfort goal    Intervention: Follow pain managment plan developed in collaboration with patient and Interdisciplinary Team  Pt is denying pain at this time. PRN meds ordered should she need them.       Problem: Venous Thromboembolism (VTW)/Deep Vein Thrombosis (DVT) Prevention:  Goal: Patient will participate in Venous Thrombosis (VTE)/Deep Vein Thrombosis (DVT)Prevention Measures    Intervention: Assess and monitor for anticoagulation complications  No S/S of VTE at this time. She is refusing heparin and SCD's, however she is up self and is also taking plavix and ASA nightly.

## 2018-04-20 NOTE — H&P
Hospital Medicine History and Physical    Date of Service  4/19/2018    Chief Complaint  Chief Complaint   Patient presents with   • Abdominal Pain     hx sbo, feels like the same.  small bm this am       History of Presenting Illness  72 y.o. female who presented 4/19/2018 with abdominal pain. She's had some crampy abdominal pain intermittent and periumbilical. Nonradiating. She has a history of small bowel obstruction and partial bowel obstructions in the past. She's had 3 episodes of similar symptoms. She has experienced nausea and vomiting. She states that she's been passing gas since being in the emergency department. She also had a very small bowel movement in the emergency department. No alleviating or exacerbating factors to her symptoms.   Primary Care Physician  Baltazar Julio M.D.    Consultants  none    Code Status  full    Review of Systems  Review of Systems   Constitutional: Negative for chills and fever.   HENT: Negative for congestion, hearing loss and tinnitus.    Eyes: Negative for blurred vision, double vision and discharge.   Respiratory: Negative for cough, hemoptysis and shortness of breath.    Cardiovascular: Negative for chest pain, palpitations and leg swelling.   Gastrointestinal: Positive for abdominal pain, nausea and vomiting. Negative for heartburn.   Genitourinary: Negative for dysuria and flank pain.   Musculoskeletal: Negative for joint pain and myalgias.   Skin: Negative for rash.   Neurological: Negative for dizziness, sensory change, speech change, focal weakness and weakness.   Endo/Heme/Allergies: Negative for environmental allergies. Does not bruise/bleed easily.   Psychiatric/Behavioral: Negative for depression, hallucinations and substance abuse.        Past Medical History  Past Medical History:   Diagnosis Date   • PVD (posterior vitreous detachment), left eye 1/13/2015   • Abdominal aortic aneurysm (CMS-HCC) 11/2010    3.6 cm 8/2014   • Angina     with stress test   •  Arthritis     thumbs   • Atherosclerosis of arteries of the extremities     two stents in the groin, Dr. Pickett   • Bowel habit changes    • Carotid atherosclerosis     Dr. Pickett   • COPD (chronic obstructive pulmonary disease) (CMS-HCC)    • Diverticulitis     Dx 11/25/11   • Diverticulosis of colon    • Dyslipidemia    • Emphysema of lung (CMS-HCC)    • Eosinophilic colitis    • Family history of nonmelanoma skin cancer    • Hypertension    • Pain    • Peripheral vascular disease (CMS-HCC)     9 months, may relate to PVD   • Snoring    • Spinal stenosis of lumbar region     Dr. Navarro   • Unspecified hemorrhagic conditions     asa/plavix, bruises easily       Surgical History  Past Surgical History:   Procedure Laterality Date   • AAA WITH STENT GRAFT N/A 3/31/2017    Procedure: AAA WITH STENT GRAFT - 5-CM INFRARENAL;  Surgeon: Spring Pemberton M.D.;  Location: SURGERY [facility];  Service:    • FEMORAL FEMORAL BYPASS N/A 3/31/2017    Procedure: FEMORAL FEMORAL BYPASS - POSS;  Surgeon: Spring Pemberton M.D.;  Location: SURGERY [facility];  Service:    • COLONOSCOPY WITH BIOPSY  3/6/2015    Performed by Pranav THOMPSON M.D. at ENDOSCOPY [facility]   • COLON RESECTION LAPAROSCOPIC  8/5/2013    Performed by Spring Pemberton M.D. at SURGERY [facility]   • OTHER CARDIAC SURGERY  2005    cardiac stents   • GYN SURGERY  2002    hysterectomy, tubal, fibroids, ovaries gone   • GYN SURGERY  1975    ectopic pregnacy   • COLONOSCOPY  3/1/2009, 4/2012, 7/2/13    normal, normal, normal but heavy diverticulosis   • OTHER      LLE /RLEstent, common iliac, Dr. Pickett       Medications  No current facility-administered medications on file prior to encounter.      Current Outpatient Prescriptions on File Prior to Encounter   Medication Sig Dispense Refill   • lisinopril-hydrochlorothiazide (PRINZIDE, ZESTORETIC) 20-25 MG per tablet Take 0.5 Tabs by mouth every bedtime.     • albuterol (PROAIR HFA) 108 (90 Base)  MCG/ACT Aero Soln inhalation aerosol Inhale 2 Puffs by mouth every 6 hours as needed for Shortness of Breath. 8.5 g 2   • fluticasone (FLONASE) 50 MCG/ACT nasal spray Spray 1 Spray in nose every day. 16 g 0   • aspirin EC (ECOTRIN) 81 MG Tablet Delayed Response Take 81 mg by mouth every evening.     • clopidogrel (PLAVIX) 75 MG Tab Take 75 mg by mouth every evening.     • atorvastatin (LIPITOR) 80 MG tablet Take 80 mg by mouth every bedtime.         Family History  Family History   Problem Relation Age of Onset   • Hyperlipidemia Sister    • Hypertension Sister    • Non-contributory Sister      carotid atherosclerosis   • Hypertension Mother    • Hyperlipidemia Mother    • Heart Disease Mother 82     congestive heart failure, AAA   • Heart Disease Father 55     multiple heart issues   • Hypertension Father    • Hyperlipidemia Father    • Cancer Sister      sin cancer       Social History  Social History   Substance Use Topics   • Smoking status: Current Every Day Smoker     Packs/day: 0.50     Years: 45.00     Types: Cigarettes   • Smokeless tobacco: Never Used      Comment: on nicotine patches, smokes off and on, discussed stopping, trying to   • Alcohol use No       Allergies  Allergies   Allergen Reactions   • Amoxicillin Diarrhea     Diarrhea   • Ciprofloxacin Unspecified     Unspecified        Physical Exam  Laboratory   Hemodynamics  Temp (24hrs), Av °C (98.6 °F), Min:37 °C (98.6 °F), Max:37 °C (98.6 °F)   Temperature: 37 °C (98.6 °F)  Pulse  Av  Min: 89  Max: 117 Heart Rate (Monitored): 89  Blood Pressure : (!) 98/61, NIBP: 112/55      Respiratory      Respiration: 15, Pulse Oximetry: 96 %             Physical Exam   Constitutional: She is oriented to person, place, and time. She appears well-developed and well-nourished. No distress.   HENT:   Head: Normocephalic and atraumatic.   Eyes: Conjunctivae and EOM are normal. Pupils are equal, round, and reactive to light.   Neck: Normal range of motion.  Neck supple. No JVD present.   Cardiovascular: Normal rate, regular rhythm, normal heart sounds and intact distal pulses.    No murmur heard.  Pulmonary/Chest: Effort normal and breath sounds normal. No respiratory distress. She has no wheezes.   Abdominal: Soft. Bowel sounds are normal. She exhibits no distension. There is tenderness.   preiumbilical    Musculoskeletal: Normal range of motion. She exhibits no edema.   Neurological: She is alert and oriented to person, place, and time. She exhibits normal muscle tone.   Skin: Skin is warm and dry.   Psychiatric: She has a normal mood and affect. Her behavior is normal. Judgment and thought content normal.   Nursing note and vitals reviewed.      Recent Labs      04/19/18   1455   WBC  9.8   RBC  5.32   HEMOGLOBIN  15.4   HEMATOCRIT  44.6   MCV  83.8   MCH  28.9   MCHC  34.5   RDW  46.4   PLATELETCT  282   MPV  9.2     Recent Labs      04/19/18   1455   SODIUM  131*   POTASSIUM  4.3   CHLORIDE  93*   CO2  30   GLUCOSE  107*   BUN  30*   CREATININE  0.89   CALCIUM  9.8     Recent Labs      04/19/18   1455   ALTSGPT  10   ASTSGOT  17   ALKPHOSPHAT  85   TBILIRUBIN  0.5   LIPASE  36   GLUCOSE  107*     Recent Labs      04/19/18   1702   INR  1.04             Lab Results   Component Value Date    TROPONINI <0.01 04/19/2018     Urinalysis:    Lab Results  Component Value Date/Time   SPECGRAVITY 1.019 02/17/2017 0610   GLUCOSEUR Negative 02/17/2017 0610   KETONES Negative 02/17/2017 0610   NITRITE Negative 02/17/2017 0610   WBCURINE 10-20 (A) 02/17/2017 0610   RBCURINE 5-10 (A) 02/17/2017 0610   BACTERIA Moderate (A) 02/17/2017 0610   EPITHELCELL Few 02/17/2017 0610        Imaging  CT-RENAL COLIC EVALUATION(A/P W/O) [[id number]] Resulted: 04/19/18 1721   Order Status: Completed Updated: 04/19/18 1723   Narrative:       4/19/2018 4:49 PM    HISTORY/REASON FOR EXAM:  ABDOMINAL PAIN.  Vomiting    TECHNIQUE/EXAM DESCRIPTION AND NUMBER OF VIEWS:  CT scan renal/colic without  contrast.    5 mm helical images of the abdomen and pelvis were obtained from the diaphragmatic domes through the pubic symphysis.    Low dose optimization technique was utilized for this CT exam including automated exposure control and adjustment of the mA and/or kV according to patient size.    COMPARISON: 6/1/2017    FINDINGS:  Visualized lung bases show mild dependent atelectasis.  Calcified RIGHT lower lobe nodule.  Pectus deformity again noted.  The liver is unremarkable.  The spleen is unremarkable.  Adrenal glands are unremarkable.  The kidneys are unremarkable.  Pancreatic duct is mildly prominent, but unchanged.  Gallbladder is contracted or absent.  Multiple dilated gas-filled small bowel loops throughout the abdomen.  Abdominal aortic aneurysm with stent graft again noted.  No gross periaortic fluid.  Appendix is not visualized.  No significant peritoneal fluid or pneumoperitoneum.  Increased colonic gas and stool.  Femoral-femoral bypass graft noted.  No major bony abnormality is seen.   Impression:       1.  Findings consistent with early or partial distal small bowel obstruction.  2.  No evidence for bowel perforation.  3.  No renal stone or hydronephrosis.  4.  Postoperative changes as described.        Assessment/Plan     I anticipate this patient will require at least two midnights for appropriate medical management, necessitating inpatient admission.    * Partial small bowel obstruction   Assessment & Plan    Patient will partial small bowel obstruction noted on CT  Will keep nothing by mouth she has had multiple of these in the past  Antiemetics and IV fluids  Advance diet as clinically appropriate  Consider general surgery consultation if no improvement        AP (abdominal pain)   Assessment & Plan    Pain medication management        Nausea and vomiting   Assessment & Plan    Antiemetics        Hyponatremia   Assessment & Plan    IV fluids and recheck        Elevated BUN   Assessment & Plan     IV fluids patient clinically dehydrated        COPD (chronic obstructive pulmonary disease) (CMS-HCC)- (present on admission)   Assessment & Plan    This is not an acute exacerbation continue respiratory care protocol        Peripheral vascular disease (CMS-HCC)- (present on admission)   Assessment & Plan    Continue aspirin and statin Plavix        Tobacco dependence- (present on admission)   Assessment & Plan    Patient needs smoking cessation counseling        Essential hypertension- (present on admission)   Assessment & Plan    Continue home medication            VTE prophylaxis: heparin

## 2018-04-20 NOTE — ED NOTES
Med rec completed by interview with patient at bedside and [facility]  No abx in past 30 days  Allergies reviewed (pt reported that she gets diarrhea from amoxicillin but did not know the rxn to ciprofloxacin)

## 2018-04-20 NOTE — CARE PLAN
Problem: Nutritional:  Goal: Achieve adequate nutritional intake  Patient will consume >50% of meals/supplements    Outcome: NOT MET

## 2018-04-20 NOTE — RESPIRATORY CARE
COPD EDUCATION by COPD CLINICAL EDUCATOR  4/20/2018 at 7:06 AM by Isabela Willson     Patient reviewed by COPD education team. Patient does not qualify for COPD program at this time

## 2018-04-20 NOTE — ASSESSMENT & PLAN NOTE
Patient will partial small bowel obstruction noted on CT  Will keep nothing by mouth she has had multiple of these in the past  Antiemetics and IV fluids  Advance diet as clinically appropriate  Consider general surgery consultation if no improvement

## 2018-04-20 NOTE — ED NOTES
Floor called and notified of transport, report to [facility].  Karen Jauregui transported to [facility] via [facility] with tech. All personal belongings in possession.  NAD noted.

## 2018-04-20 NOTE — PROGRESS NOTES
Two RN skin check preformed upon Pt's arrival to unit. Skin is unremarkable without signs of breakdown at this time.

## 2018-04-21 ENCOUNTER — APPOINTMENT (OUTPATIENT)
Dept: RADIOLOGY | Facility: MEDICAL CENTER | Age: 73
DRG: 389 | End: 2018-04-21
Attending: INTERNAL MEDICINE
Payer: MEDICARE

## 2018-04-21 PROBLEM — K56.609 SBO (SMALL BOWEL OBSTRUCTION) (HCC): Status: ACTIVE | Noted: 2018-04-20

## 2018-04-21 PROCEDURE — 99233 SBSQ HOSP IP/OBS HIGH 50: CPT | Performed by: INTERNAL MEDICINE

## 2018-04-21 PROCEDURE — 74176 CT ABD & PELVIS W/O CONTRAST: CPT

## 2018-04-21 PROCEDURE — 700101 HCHG RX REV CODE 250: Performed by: INTERNAL MEDICINE

## 2018-04-21 PROCEDURE — 700111 HCHG RX REV CODE 636 W/ 250 OVERRIDE (IP): Performed by: HOSPITALIST

## 2018-04-21 PROCEDURE — 700102 HCHG RX REV CODE 250 W/ 637 OVERRIDE(OP): Performed by: INTERNAL MEDICINE

## 2018-04-21 PROCEDURE — 770006 HCHG ROOM/CARE - MED/SURG/GYN SEMI*

## 2018-04-21 PROCEDURE — A9270 NON-COVERED ITEM OR SERVICE: HCPCS | Performed by: INTERNAL MEDICINE

## 2018-04-21 PROCEDURE — 700111 HCHG RX REV CODE 636 W/ 250 OVERRIDE (IP): Performed by: INTERNAL MEDICINE

## 2018-04-21 RX ORDER — LORAZEPAM 2 MG/ML
1 INJECTION INTRAMUSCULAR ONCE
Status: COMPLETED | OUTPATIENT
Start: 2018-04-21 | End: 2018-04-21

## 2018-04-21 RX ORDER — SODIUM CHLORIDE AND POTASSIUM CHLORIDE 150; 900 MG/100ML; MG/100ML
INJECTION, SOLUTION INTRAVENOUS CONTINUOUS
Status: DISCONTINUED | OUTPATIENT
Start: 2018-04-21 | End: 2018-04-23

## 2018-04-21 RX ORDER — BISACODYL 10 MG
10 SUPPOSITORY, RECTAL RECTAL ONCE
Status: DISPENSED | OUTPATIENT
Start: 2018-04-21 | End: 2018-04-22

## 2018-04-21 RX ADMIN — HEPARIN SODIUM 5000 UNITS: 5000 INJECTION, SOLUTION INTRAVENOUS; SUBCUTANEOUS at 22:15

## 2018-04-21 RX ADMIN — PROCHLORPERAZINE EDISYLATE 10 MG: 5 INJECTION INTRAMUSCULAR; INTRAVENOUS at 16:39

## 2018-04-21 RX ADMIN — POTASSIUM CHLORIDE AND SODIUM CHLORIDE: 900; 150 INJECTION, SOLUTION INTRAVENOUS at 22:18

## 2018-04-21 RX ADMIN — FENTANYL CITRATE 25 MCG: 50 INJECTION, SOLUTION INTRAMUSCULAR; INTRAVENOUS at 13:18

## 2018-04-21 RX ADMIN — KETOROLAC TROMETHAMINE 15 MG: 30 INJECTION, SOLUTION INTRAMUSCULAR at 07:47

## 2018-04-21 RX ADMIN — LORAZEPAM 1 MG: 2 INJECTION INTRAMUSCULAR; INTRAVENOUS at 16:43

## 2018-04-21 RX ADMIN — ONDANSETRON HYDROCHLORIDE 4 MG: 2 INJECTION, SOLUTION INTRAMUSCULAR; INTRAVENOUS at 07:47

## 2018-04-21 RX ADMIN — FENTANYL CITRATE 25 MCG: 50 INJECTION, SOLUTION INTRAMUSCULAR; INTRAVENOUS at 10:06

## 2018-04-21 RX ADMIN — METOCLOPRAMIDE HYDROCHLORIDE 10 MG: 10 TABLET ORAL at 07:47

## 2018-04-21 RX ADMIN — POTASSIUM CHLORIDE AND SODIUM CHLORIDE: 900; 150 INJECTION, SOLUTION INTRAVENOUS at 13:14

## 2018-04-21 RX ADMIN — HEPARIN SODIUM 5000 UNITS: 5000 INJECTION, SOLUTION INTRAVENOUS; SUBCUTANEOUS at 16:41

## 2018-04-21 ASSESSMENT — ENCOUNTER SYMPTOMS
NAUSEA: 1
HEADACHES: 0
DIZZINESS: 0
SHORTNESS OF BREATH: 0
BACK PAIN: 1
SORE THROAT: 0
CONSTIPATION: 0
NERVOUS/ANXIOUS: 1
FEVER: 0
ABDOMINAL PAIN: 1
CHILLS: 0
COUGH: 0
VOMITING: 1

## 2018-04-21 ASSESSMENT — PAIN SCALES - GENERAL
PAINLEVEL_OUTOF10: 0
PAINLEVEL_OUTOF10: 10
PAINLEVEL_OUTOF10: 9
PAINLEVEL_OUTOF10: 9

## 2018-04-21 NOTE — PROGRESS NOTES
[facility] Hospitalist Progress Note    Date of Service: 2018    Chief Complaint  72 y.o. female with past medical history of hypertension , chronic back pain secondary to spinal stenosis on chronic Norco admitted 2018 with CT concerning for small bowel obstruction, nausea vomiting and abdominal pain.    Interval Problem Update  Patient began having bowel movements morning, CT did not show clear transition point, there was quite a bit of stool in the right colon. She is on chronic Norco. I attempted to pull a prescription monitoring information however no data was obtained for unclear reasons. She does have her prescriptions filled locally. I discussed other alternatives for managing her back pain such as topical Voltaren gel.    Consultants/Specialty  Ziyad Leyva MD    Disposition  TBD        Review of Systems   Constitutional: Positive for malaise/fatigue. Negative for chills and fever.   HENT: Negative for sore throat.    Respiratory: Negative for cough and shortness of breath.    Cardiovascular: Negative for chest pain.   Gastrointestinal: Positive for abdominal pain, nausea and vomiting. Negative for constipation (no BM or gas since yesterday).   Musculoskeletal: Positive for back pain.   Neurological: Negative for dizziness and headaches.   Psychiatric/Behavioral: The patient is nervous/anxious.       Physical Exam  Laboratory/Imaging   Hemodynamics  Temp (24hrs), Av.8 °C (98.2 °F), Min:36.6 °C (97.9 °F), Max:37 °C (98.6 °F)   Temperature: 36.8 °C (98.2 °F)  Pulse  Av.1  Min: 68  Max: 117    Blood Pressure : 133/77      Respiratory      Respiration: 18, Pulse Oximetry: 90 %        RLL Breath Sounds: Diminished, LLL Breath Sounds: Diminished    Fluids    Intake/Output Summary (Last 24 hours) at 18 1502  Last data filed at 18 1505   Gross per 24 hour   Intake              120 ml   Output                0 ml   Net              120 ml       Nutrition  Orders Placed This Encounter    Procedures   • DIET NPO     Standing Status:   Standing     Number of Occurrences:   1     Order Specific Question:   Restrict to:     Answer:   Ice Chips [2]     Physical Exam   Constitutional: She is oriented to person, place, and time. She appears well-developed and well-nourished. No distress.   HENT:   Head: Normocephalic and atraumatic.   Mouth/Throat: Oropharynx is clear and moist.   Eyes: EOM are normal. Pupils are equal, round, and reactive to light. Right eye exhibits no discharge. Left eye exhibits no discharge.   Neck: Neck supple.   Cardiovascular: Normal rate and regular rhythm.    Pulmonary/Chest: Effort normal and breath sounds normal.   Abdominal: Soft. She exhibits distension (she is distended similarly to yesterday but is more firm-there are no appreciable bowel sounds at this time). There is no tenderness.   Musculoskeletal: She exhibits no edema or tenderness.   Neurological: She is alert and oriented to person, place, and time. No cranial nerve deficit.   Skin: Skin is warm and dry. She is not diaphoretic.   Psychiatric:   Anxious   Nursing note and vitals reviewed.      Recent Labs      04/19/18   1455  04/20/18   0257   WBC  9.8  9.1   RBC  5.32  4.53   HEMOGLOBIN  15.4  12.6   HEMATOCRIT  44.6  39.4   MCV  83.8  87.0   MCH  28.9  27.8   MCHC  34.5  32.0*   RDW  46.4  48.0   PLATELETCT  282  230   MPV  9.2  9.2     Recent Labs      04/19/18   1455  04/20/18   0257   SODIUM  131*  133*   POTASSIUM  4.3  3.9   CHLORIDE  93*  98   CO2  30  27   GLUCOSE  107*  71   BUN  30*  32*   CREATININE  0.89  0.99   CALCIUM  9.8  8.6     Recent Labs      04/19/18   1702   INR  1.04         Recent Labs      04/20/18   0257   TRIGLYCERIDE  78   HDL  26*   LDL  30          Assessment/Plan     SBO (small bowel obstruction)- (present on admission)   Assessment & Plan    Most of stool gone except for sm amount in rectum  Bowel distended with fluid proximal to apparent obstruction in the pelvic area.  Surgery  consult  Will place NG tube  Hold Plavix for now        Hyponatremia- (present on admission)   Assessment & Plan    Likely 2/2 HCTZ- hold for now        Partial small bowel obstruction   Assessment & Plan    Patient will partial small bowel obstruction noted on CT  Will keep nothing by mouth she has had multiple of these in the past  Antiemetics and IV fluids  Advance diet as clinically appropriate  Consider general surgery consultation if no improvement        Opiate dependence (CMS-HCC)- (present on admission)   Assessment & Plan    Advised patient to seek alternate options to manage her chronic back pain such as Voltaren gel        COPD (chronic obstructive pulmonary disease) (CMS-HCC)- (present on admission)   Assessment & Plan    W/o exacerbation- RT protocol        Peripheral vascular disease (CMS-HCC)- (present on admission)   Assessment & Plan    ASA, statin- will hold plavix as may need surgery        Tobacco dependence- (present on admission)   Assessment & Plan    Tobacco cessation counseling        Essential hypertension- (present on admission)   Assessment & Plan    At goal, monitor w/ IVF          Quality-Core Measures   Reviewed items::  Labs reviewed, Medications reviewed and Radiology images reviewed  Resendiz catheter::  No Resendiz  DVT prophylaxis pharmacological::  Heparin  Ulcer Prophylaxis::  Not indicated  Assessed for rehabilitation services:  Patient returned to prior level of function, rehabilitation not indicated at this time

## 2018-04-21 NOTE — ASSESSMENT & PLAN NOTE
Noncompliant with nothing by mouth  Tolerating clears today but abdomen is mildly distended  Will advance to full liquids  If tolerates surgery recommends GI soft at DC and follow-up with Dr. Dakota Huffman.  Patient has poor insight and will need to be monitored carefully before discharge for recurrent symptoms

## 2018-04-22 PROBLEM — F41.9 ANXIETY: Status: ACTIVE | Noted: 2018-04-22

## 2018-04-22 PROCEDURE — 700111 HCHG RX REV CODE 636 W/ 250 OVERRIDE (IP): Performed by: INTERNAL MEDICINE

## 2018-04-22 PROCEDURE — 700111 HCHG RX REV CODE 636 W/ 250 OVERRIDE (IP): Performed by: HOSPITALIST

## 2018-04-22 PROCEDURE — 99233 SBSQ HOSP IP/OBS HIGH 50: CPT | Performed by: INTERNAL MEDICINE

## 2018-04-22 PROCEDURE — 770006 HCHG ROOM/CARE - MED/SURG/GYN SEMI*

## 2018-04-22 PROCEDURE — 700101 HCHG RX REV CODE 250: Performed by: INTERNAL MEDICINE

## 2018-04-22 RX ORDER — LORAZEPAM 2 MG/ML
0.25 INJECTION INTRAMUSCULAR EVERY 6 HOURS PRN
Status: DISCONTINUED | OUTPATIENT
Start: 2018-04-22 | End: 2018-04-24 | Stop reason: HOSPADM

## 2018-04-22 RX ADMIN — HEPARIN SODIUM 5000 UNITS: 5000 INJECTION, SOLUTION INTRAVENOUS; SUBCUTANEOUS at 06:12

## 2018-04-22 RX ADMIN — LORAZEPAM 0.25 MG: 2 INJECTION INTRAMUSCULAR; INTRAVENOUS at 17:51

## 2018-04-22 RX ADMIN — POTASSIUM CHLORIDE AND SODIUM CHLORIDE: 900; 150 INJECTION, SOLUTION INTRAVENOUS at 17:47

## 2018-04-22 RX ADMIN — HEPARIN SODIUM 5000 UNITS: 5000 INJECTION, SOLUTION INTRAVENOUS; SUBCUTANEOUS at 14:58

## 2018-04-22 RX ADMIN — LORAZEPAM 0.25 MG: 2 INJECTION INTRAMUSCULAR; INTRAVENOUS at 11:43

## 2018-04-22 RX ADMIN — POTASSIUM CHLORIDE AND SODIUM CHLORIDE: 900; 150 INJECTION, SOLUTION INTRAVENOUS at 06:38

## 2018-04-22 ASSESSMENT — ENCOUNTER SYMPTOMS
FEVER: 0
HEADACHES: 0
COUGH: 0
BACK PAIN: 1
NAUSEA: 0
SHORTNESS OF BREATH: 0
VOMITING: 0
NERVOUS/ANXIOUS: 1
CHILLS: 0
DIZZINESS: 0
ABDOMINAL PAIN: 1
CONSTIPATION: 0
SORE THROAT: 0

## 2018-04-22 ASSESSMENT — PAIN SCALES - GENERAL: PAINLEVEL_OUTOF10: 0

## 2018-04-22 NOTE — CARE PLAN
Problem: Safety  Goal: Will remain free from falls  Outcome: PROGRESSING AS EXPECTED    Intervention: Implement fall precautions  Bed in low position, wheels locked, call light within reach, hourly rounding in place.      Problem: Venous Thromboembolism (VTW)/Deep Vein Thrombosis (DVT) Prevention:  Goal: Patient will participate in Venous Thrombosis (VTE)/Deep Vein Thrombosis (DVT)Prevention Measures  Outcome: PROGRESSING AS EXPECTED  Heparin subcutaneous TID.  Intervention: Ensure patient wears graduated elastic stockings (MICHELLE hose) and/or SCDs, if ordered, when in bed or chair (Remove at least once per shift for skin check)  SCDs in place.

## 2018-04-22 NOTE — CARE PLAN
Problem: Pain Management  Goal: Pain level will decrease to patient's comfort goal    Intervention: Follow pain managment plan developed in collaboration with patient and Interdisciplinary Team  Medicated for pain per mar.       Problem: Safety  Goal: Will remain free from falls    Intervention: Assess risk factors for falls  Fall risk assessment completed.

## 2018-04-22 NOTE — CONSULTS
Surgical Consultation    Date: 4/21/2018    Requesting Physician: Dr. Gaines  PCP: Baltazar Julio M.D.  Consulting Physician: Ziyad Leyva M.D.    CC: Bowel obstruction    HPI: This is a 72 y.o. female with multiple medical problems including peripheral vascular disease and abdominal aortic aneurysm status post EVAR and femoral-femoral bypass in 2017, a history of recurrent diverticulitis status post extended left hemicolectomy and sigmoid colectomy in 2013, COPD/emphysema, hyperlipidemia who has been admitted multiple times for bowel obstructive symptoms and is seen multiple surgical consultants over the last few years. She was seen by Dr. Dakota Huffman in June 2017, and a colonoscopy was recommended to rule out anastomotic stricture, but this was never performed because the patient said she was hypertensive and her colonoscopy was canceled. She was again admitted on 4/19/2018 with persistent obstructive symptoms. I was counseled because her symptoms worsened. She complains of nausea with intermittent emesis, but had no fever, chills, chest pain, shortness of breath. An NG tube was placed after which her pain and nausea improved.  CT showed significantly dilated, fluid-filled stomach and small bowel without evidence of pneumatosis, or abdominal free fluid to suggest bowel ischemia.      Past Medical History:   Diagnosis Date   • Abdominal aortic aneurysm (CMS-HCC) 11/2010    3.6 cm 8/2014   • Angina     with stress test   • Arthritis     thumbs   • Atherosclerosis of arteries of the extremities     two stents in the groin, Dr. Pickett   • Bowel habit changes    • Carotid atherosclerosis     Dr. Pickett   • COPD (chronic obstructive pulmonary disease) (CMS-HCC)    • Diverticulitis     Dx 11/25/11   • Diverticulosis of colon    • Dyslipidemia    • Emphysema of lung (CMS-HCC)    • Eosinophilic colitis    • Family history of nonmelanoma skin cancer    • Hypertension    • Pain    • Peripheral vascular disease  (CMS-[facility])     9 months, may relate to PVD   • PVD (posterior vitreous detachment), left eye 1/13/2015   • Snoring    • Spinal stenosis of lumbar region     Dr. Navarro   • Unspecified hemorrhagic conditions     asa/plavix, bruises easily       Past Surgical History:   Procedure Laterality Date   • AAA WITH STENT GRAFT N/A 3/31/2017    Procedure: AAA WITH STENT GRAFT - 5-CM INFRARENAL;  Surgeon: Spring Pemberton M.D.;  Location: SURGERY [facility];  Service:    • FEMORAL FEMORAL BYPASS N/A 3/31/2017    Procedure: FEMORAL FEMORAL BYPASS - POSS;  Surgeon: Spring Pemberton M.D.;  Location: SURGERY [facility];  Service:    • COLONOSCOPY WITH BIOPSY  3/6/2015    Performed by Pranav THOMPSON M.D. at ENDOSCOPY [facility]   • COLON RESECTION LAPAROSCOPIC  8/5/2013    Performed by Spring Pemberton M.D. at SURGERY [facility]   • OTHER CARDIAC SURGERY  2005    cardiac stents   • GYN SURGERY  2002    hysterectomy, tubal, fibroids, ovaries gone   • GYN SURGERY  1975    ectopic pregnacy   • COLONOSCOPY  3/1/2009, 4/2012, 7/2/13    normal, normal, normal but heavy diverticulosis   • OTHER      LLE /RLEstent, common iliac, Dr. Pickett       Current Facility-Administered Medications   Medication Dose Route Frequency Provider Last Rate Last Dose   • fentaNYL (SUBLIMAZE) injection 25 mcg  25 mcg Intravenous Q HOUR PRN Dianelys Gaines M.D.   25 mcg at 04/21/18 1738   • 0.9 % NaCl with KCl 20 mEq infusion   Intravenous Continuous Dianelys Gaines M.D. 125 mL/hr at 04/21/18 1314     • prochlorperazine (COMPAZINE) injection 10 mg  10 mg Intravenous Q6HRS PRN Dianelys Gaines M.D.   10 mg at 04/21/18 1639   • bisacodyl (DULCOLAX) suppository 10 mg  10 mg Rectal Once Dianelys Gaines M.D.   Stopped at 04/21/18 1733   • ketorolac (TORADOL) injection 15 mg  15 mg Intravenous Q6HRS PRN Dianelys Gaines M.D.   15 mg at 04/21/18 0747   • bisacodyl (DULCOLAX) suppository 10 mg  10 mg Rectal QDAY PRN  "Emile Desai M.D.       • heparin injection 5,000 Units  5,000 Units Subcutaneous Q8HRS Emile Desai M.D.   5,000 Units at 04/21/18 1641   • ondansetron (ZOFRAN) syringe/vial injection 4 mg  4 mg Intravenous Q4HRS PRN Emile Desai M.D.   4 mg at 04/21/18 0747   • ondansetron (ZOFRAN ODT) dispertab 4 mg  4 mg Oral Q4HRS PRN Emile Desai M.D.       • aspirin EC (ECOTRIN) tablet 81 mg  81 mg Oral Q EVENING Emile Desai M.D.   81 mg at 04/19/18 2241   • atorvastatin (LIPITOR) tablet 80 mg  80 mg Oral QHS Emile Desai M.D.   80 mg at 04/19/18 2241       Social History     Social History   • Marital status:      Spouse name: N/A   • Number of children: N/A   • Years of education: N/A     Occupational History   • [employer]     Social History Main Topics   • Smoking status: Current Every Day Smoker     Packs/day: 0.50     Years: 45.00     Types: Cigarettes   • Smokeless tobacco: Never Used      Comment: on nicotine patches, smokes off and on, discussed stopping, trying to   • Alcohol use No   • Drug use: No   • Sexual activity: Not on file     Other Topics Concern   • Not on file     Social History Narrative   • No narrative on file       Family History   Problem Relation Age of Onset   • Hyperlipidemia Sister    • Hypertension Sister    • Non-contributory Sister      carotid atherosclerosis   • Hypertension Mother    • Hyperlipidemia Mother    • Heart Disease Mother 82     congestive heart failure, AAA   • Heart Disease Father 55     multiple heart issues   • Hypertension Father    • Hyperlipidemia Father    • Cancer Sister      sin cancer       Allergies:  Amoxicillin and Ciprofloxacin    Review of Systems:  Negative except as noted above in the HPI on 10 point review    Physical Exam:  Blood pressure (!) 184/74, pulse 94, temperature 36.9 °C (98.5 °F), resp. rate 16, height 1.549 m (5' 1\"), weight 42.1 kg (92 lb 13 oz), last menstrual period 03/01/2002, SpO2 92 " %.    Constitutional: she is oriented to person, place, and time, but is currently sleepy as she recently received Ativan during the NG tube placement. She appears fatigued and undernourished. No acute distress.   Head: Normocephalic and atraumatic.   Neck: Normal range of motion. Neck supple. No JVD present. No tracheal deviation present. No thyromegaly present.   Cardiovascular: Normal rate, regular rhythm  Pulmonary/Chest: Effort normal and breath sounds normal.   Abdominal: Soft, nondistended. Mild diffuse tenderness without guarding. NG tube in place with bilious output  Musculoskeletal: Normal range of motion. she exhibits no edema and no tenderness.   Neurological: she is alert and oriented to person, place, and time. No gross motor or sensory deficits Skin: Skin is warm and dry. No rash noted. she is not diaphoretic. No erythema. No pallor.   Psychiatric: she has a normal mood and affect.     Labs:  Recent Labs      04/19/18   1455  04/20/18   0257   WBC  9.8  9.1   RBC  5.32  4.53   HEMOGLOBIN  15.4  12.6   HEMATOCRIT  44.6  39.4   MCV  83.8  87.0   MCH  28.9  27.8   MCHC  34.5  32.0*   RDW  46.4  48.0   PLATELETCT  282  230   MPV  9.2  9.2     Recent Labs      04/19/18   1455  04/20/18   0257   SODIUM  131*  133*   POTASSIUM  4.3  3.9   CHLORIDE  93*  98   CO2  30  27   GLUCOSE  107*  71   BUN  30*  32*   CREATININE  0.89  0.99   CALCIUM  9.8  8.6     Recent Labs      04/19/18   1702   INR  1.04     Recent Labs      04/19/18   1455  04/19/18   1702  04/20/18   0257   ASTSGOT  17   --   14   ALTSGPT  10   --   7   TBILIRUBIN  0.5   --   0.6   ALKPHOSPHAT  85   --   68   GLOBULIN  3.4   --   2.6   INR   --   1.04   --        Radiology:  CT-ABDOMEN-PELVIS W/O   Final Result      1.  Findings are consistent with worsening of distal mechanical small bowel obstruction since the prior examination.      2.  No free fluid or free air is identified.      3.  Extensive calcific atherosclerosis and vascular grafts  again identified.      CT-RENAL COLIC EVALUATION(A/P W/O)   Final Result      1.  Findings consistent with early or partial distal small bowel obstruction.   2.  No evidence for bowel perforation.   3.  No renal stone or hydronephrosis.   4.  Postoperative changes as described.          Assessment: This is a 72 y.o. female with multiple medical problems and history of multiple intra-abdominal operations who has chronic obstructive symptoms likely related to adhesions. She is currently hemodynamically stable with normal vital signs other than hypertension (chronic), and a benign exam once NG tube was placed.      Recommendations:   -Strict nothing by mouth  -NG tube to low wall suction continuously. If the tube stops draining or becomes clogged, need to upsize it to an 18 Vietnamese  -Serial abdominal exams  -We discussed the indications for surgical intervention, which would include worsening pain, clinical deterioration, persistent inability to pass gas or have bowel movement. She would prefer not to have an operation if it is avoidable. At this time there is no clear indication for surgery, but this could change.  -I agree with my partner, Dr. Huffman that colonoscopy is necessary. At this time it does not need to be done as an inpatient.  -I will continue to follow. Please contact me with any changes in her clinical status, updates or concerns.    Thank you for this consultation.    Ziyad Leyva M.D.  [facility]  [phone]

## 2018-04-22 NOTE — PROGRESS NOTES
Awake, A&O, VSS, HUNTER, =.  Nauseated with emesis.  Unable to tolerate diet.  MD updated and patient placed NPO.  Medicated for nausea per mar.  Medicated for pain per mar.  IVF infusing.  Ambulates independently with a steady gait.  Placed on O2 at 2lpm, pt does remove as needed.  POC discussed, pr agrees and verbalizes understanding.  Hourly rounding in place, call light is within reach.  Assessment completed as charted.

## 2018-04-22 NOTE — PROGRESS NOTES
"Progress Note:  4/22/2018, 12:25 PM    S: No acute events. Much more awake today, no pain or nausea with NGT in.  Had large BM today    O:  Blood pressure 133/76, pulse 100, temperature 36.3 °C (97.3 °F), resp. rate 16, height 1.549 m (5' 1\"), weight 42.1 kg (92 lb 13 oz), last menstrual period 03/01/2002, SpO2 97 %.    NAD, awake and alert  Breathing stable  Abdomen soft, nondistended, nontender      A:   Active Hospital Problems    Diagnosis   • SBO (small bowel obstruction) [K56.609]   • Hyponatremia [E87.1]   • Opiate dependence (CMS-HCC) [F11.20]   • COPD (chronic obstructive pulmonary disease) (CMS-HCC) [J44.9]   • Peripheral vascular disease (CMS-HCC) [I73.9]     9 months, may relate to PVD     • Tobacco dependence [F17.200]   • Essential hypertension [I10]         P:   -Continue NGT another day to allow bowel distention to resolve  -Likely advance diet tomorrow  -No acute surgical indication at this time.  Pt was undergoing outpatient workup with Dr. Huffman.  I will help re-engage her into the clinic    Ziyad Leyva M.D.  [facility]  [phone]    "

## 2018-04-22 NOTE — PROGRESS NOTES
[facility] Hospitalist Progress Note    Date of Service: 2018    Chief Complaint  72 y.o. female with past medical history of hypertension , chronic back pain secondary to spinal stenosis on chronic Norco admitted 2018 with CT concerning for small bowel obstruction, nausea vomiting and abdominal pain.    Interval Problem Update  Patient advised of CT results  She was very anxious about NG tube but was agreeable if she was given Ativan  Discussed with RN  Discussed with surgery      Consultants/Specialty  Ziyad Leyva MD    Disposition  TBD        Review of Systems   Constitutional: Positive for malaise/fatigue. Negative for chills and fever.   HENT: Negative for sore throat.    Respiratory: Negative for cough and shortness of breath.    Cardiovascular: Negative for chest pain.   Gastrointestinal: Positive for abdominal pain, nausea and vomiting. Negative for constipation (no BM or gas since yesterday).   Musculoskeletal: Positive for back pain.   Neurological: Negative for dizziness and headaches.   Psychiatric/Behavioral: The patient is nervous/anxious.       Physical Exam  Laboratory/Imaging   Hemodynamics  Temp (24hrs), Av.8 °C (98.2 °F), Min:36.6 °C (97.9 °F), Max:36.9 °C (98.5 °F)   Temperature: 36.9 °C (98.5 °F)  Pulse  Av.3  Min: 68  Max: 117    Blood Pressure : (!) 184/74      Respiratory      Respiration: 16, Pulse Oximetry: 92 %        RLL Breath Sounds: Diminished, LLL Breath Sounds: Diminished    Fluids    Intake/Output Summary (Last 24 hours) at 18 1901  Last data filed at 18 1732   Gross per 24 hour   Intake              500 ml   Output             1000 ml   Net             -500 ml       Nutrition  Orders Placed This Encounter   Procedures   • DIET NPO     Standing Status:   Standing     Number of Occurrences:   1     Order Specific Question:   Restrict to:     Answer:   Ice Chips [2]     Physical Exam   Constitutional: She is oriented to person, place, and time. She appears  well-developed and well-nourished. No distress.   HENT:   Head: Normocephalic and atraumatic.   Mouth/Throat: Oropharynx is clear and moist.   Eyes: EOM are normal. Pupils are equal, round, and reactive to light. Right eye exhibits no discharge. Left eye exhibits no discharge.   Neck: Neck supple.   Cardiovascular: Normal rate and regular rhythm.    Pulmonary/Chest: Effort normal and breath sounds normal.   Abdominal: Soft. She exhibits distension (she is distended similarly to yesterday but is more firm-there are no appreciable bowel sounds at this time). There is no tenderness.   Musculoskeletal: She exhibits no edema or tenderness.   Neurological: She is alert and oriented to person, place, and time. No cranial nerve deficit.   Skin: Skin is warm and dry. She is not diaphoretic.   Psychiatric:   Anxious   Nursing note and vitals reviewed.      Recent Labs      04/19/18   1455  04/20/18   0257   WBC  9.8  9.1   RBC  5.32  4.53   HEMOGLOBIN  15.4  12.6   HEMATOCRIT  44.6  39.4   MCV  83.8  87.0   MCH  28.9  27.8   MCHC  34.5  32.0*   RDW  46.4  48.0   PLATELETCT  282  230   MPV  9.2  9.2     Recent Labs      04/19/18   1455  04/20/18   0257   SODIUM  131*  133*   POTASSIUM  4.3  3.9   CHLORIDE  93*  98   CO2  30  27   GLUCOSE  107*  71   BUN  30*  32*   CREATININE  0.89  0.99   CALCIUM  9.8  8.6     Recent Labs      04/19/18   1702   INR  1.04         Recent Labs      04/20/18   0257   TRIGLYCERIDE  78   HDL  26*   LDL  30          Assessment/Plan     SBO (small bowel obstruction)- (present on admission)   Assessment & Plan    Most of stool gone except for sm amount in rectum  Bowel distended with fluid proximal to apparent obstruction in the pelvic area.  Surgery consult  Will place NG tube  Hold Plavix for now        Hyponatremia- (present on admission)   Assessment & Plan    Likely 2/2 HCTZ- hold for now        Partial small bowel obstruction   Assessment & Plan    Patient will partial small bowel obstruction  noted on CT  Will keep nothing by mouth she has had multiple of these in the past  Antiemetics and IV fluids  Advance diet as clinically appropriate  Consider general surgery consultation if no improvement        Opiate dependence (CMS-HCC)- (present on admission)   Assessment & Plan    Advised patient to seek alternate options to manage her chronic back pain such as Voltaren gel        COPD (chronic obstructive pulmonary disease) (CMS-HCC)- (present on admission)   Assessment & Plan    W/o exacerbation- RT protocol        Peripheral vascular disease (CMS-HCC)- (present on admission)   Assessment & Plan    ASA, statin- will hold plavix as may need surgery        Tobacco dependence- (present on admission)   Assessment & Plan    Tobacco cessation counseling        Essential hypertension- (present on admission)   Assessment & Plan    At goal, monitor w/ IVF          Quality-Core Measures   Reviewed items::  Labs reviewed, Medications reviewed and Radiology images reviewed  Resendiz catheter::  No Resendiz  DVT prophylaxis pharmacological::  Heparin  Ulcer Prophylaxis::  Not indicated  Assessed for rehabilitation services:  Patient returned to prior level of function, rehabilitation not indicated at this time

## 2018-04-23 LAB
ALBUMIN SERPL BCP-MCNC: 3.3 G/DL (ref 3.2–4.9)
ALBUMIN/GLOB SERPL: 1.2 G/DL
ALP SERPL-CCNC: 73 U/L (ref 30–99)
ALT SERPL-CCNC: 14 U/L (ref 2–50)
ANION GAP SERPL CALC-SCNC: 6 MMOL/L (ref 0–11.9)
AST SERPL-CCNC: 20 U/L (ref 12–45)
BILIRUB SERPL-MCNC: 0.5 MG/DL (ref 0.1–1.5)
BUN SERPL-MCNC: 18 MG/DL (ref 8–22)
CALCIUM SERPL-MCNC: 9.1 MG/DL (ref 8.5–10.5)
CHLORIDE SERPL-SCNC: 101 MMOL/L (ref 96–112)
CO2 SERPL-SCNC: 36 MMOL/L (ref 20–33)
CREAT SERPL-MCNC: 0.58 MG/DL (ref 0.5–1.4)
ERYTHROCYTE [DISTWIDTH] IN BLOOD BY AUTOMATED COUNT: 47.8 FL (ref 35.9–50)
GLOBULIN SER CALC-MCNC: 2.7 G/DL (ref 1.9–3.5)
GLUCOSE SERPL-MCNC: 92 MG/DL (ref 65–99)
HCT VFR BLD AUTO: 38.4 % (ref 37–47)
HGB BLD-MCNC: 12.7 G/DL (ref 12–16)
MCH RBC QN AUTO: 28.5 PG (ref 27–33)
MCHC RBC AUTO-ENTMCNC: 33.1 G/DL (ref 33.6–35)
MCV RBC AUTO: 86.3 FL (ref 81.4–97.8)
PLATELET # BLD AUTO: 207 K/UL (ref 164–446)
PMV BLD AUTO: 9.7 FL (ref 9–12.9)
POTASSIUM SERPL-SCNC: 3.4 MMOL/L (ref 3.6–5.5)
PROT SERPL-MCNC: 6 G/DL (ref 6–8.2)
RBC # BLD AUTO: 4.45 M/UL (ref 4.2–5.4)
SODIUM SERPL-SCNC: 143 MMOL/L (ref 135–145)
WBC # BLD AUTO: 5.9 K/UL (ref 4.8–10.8)

## 2018-04-23 PROCEDURE — 80053 COMPREHEN METABOLIC PANEL: CPT

## 2018-04-23 PROCEDURE — 85027 COMPLETE CBC AUTOMATED: CPT

## 2018-04-23 PROCEDURE — A9270 NON-COVERED ITEM OR SERVICE: HCPCS | Performed by: SURGERY

## 2018-04-23 PROCEDURE — 700102 HCHG RX REV CODE 250 W/ 637 OVERRIDE(OP): Performed by: HOSPITALIST

## 2018-04-23 PROCEDURE — 700102 HCHG RX REV CODE 250 W/ 637 OVERRIDE(OP): Performed by: SURGERY

## 2018-04-23 PROCEDURE — 700111 HCHG RX REV CODE 636 W/ 250 OVERRIDE (IP): Performed by: HOSPITALIST

## 2018-04-23 PROCEDURE — 770006 HCHG ROOM/CARE - MED/SURG/GYN SEMI*

## 2018-04-23 PROCEDURE — 700111 HCHG RX REV CODE 636 W/ 250 OVERRIDE (IP): Performed by: INTERNAL MEDICINE

## 2018-04-23 PROCEDURE — 36415 COLL VENOUS BLD VENIPUNCTURE: CPT

## 2018-04-23 PROCEDURE — 99233 SBSQ HOSP IP/OBS HIGH 50: CPT | Performed by: INTERNAL MEDICINE

## 2018-04-23 PROCEDURE — A9270 NON-COVERED ITEM OR SERVICE: HCPCS | Performed by: HOSPITALIST

## 2018-04-23 RX ORDER — FAMOTIDINE 20 MG/1
20 TABLET, FILM COATED ORAL 2 TIMES DAILY
Status: DISCONTINUED | OUTPATIENT
Start: 2018-04-23 | End: 2018-04-24 | Stop reason: HOSPADM

## 2018-04-23 RX ORDER — POLYETHYLENE GLYCOL 3350 17 G/17G
1 POWDER, FOR SOLUTION ORAL 2 TIMES DAILY
Status: DISCONTINUED | OUTPATIENT
Start: 2018-04-23 | End: 2018-04-24 | Stop reason: HOSPADM

## 2018-04-23 RX ADMIN — POLYETHYLENE GLYCOL 3350 1 PACKET: 17 POWDER, FOR SOLUTION ORAL at 10:43

## 2018-04-23 RX ADMIN — FAMOTIDINE 20 MG: 20 TABLET, FILM COATED ORAL at 20:48

## 2018-04-23 RX ADMIN — LORAZEPAM 0.25 MG: 2 INJECTION INTRAMUSCULAR; INTRAVENOUS at 09:24

## 2018-04-23 RX ADMIN — ATORVASTATIN CALCIUM 80 MG: 40 TABLET, FILM COATED ORAL at 20:49

## 2018-04-23 RX ADMIN — LORAZEPAM 0.25 MG: 2 INJECTION INTRAMUSCULAR; INTRAVENOUS at 02:53

## 2018-04-23 RX ADMIN — HEPARIN SODIUM 5000 UNITS: 5000 INJECTION, SOLUTION INTRAVENOUS; SUBCUTANEOUS at 20:48

## 2018-04-23 RX ADMIN — FAMOTIDINE 20 MG: 20 TABLET, FILM COATED ORAL at 10:44

## 2018-04-23 RX ADMIN — LORAZEPAM 0.25 MG: 2 INJECTION INTRAMUSCULAR; INTRAVENOUS at 18:51

## 2018-04-23 RX ADMIN — ASPIRIN 81 MG: 81 TABLET, COATED ORAL at 20:49

## 2018-04-23 RX ADMIN — POLYETHYLENE GLYCOL 3350 1 PACKET: 17 POWDER, FOR SOLUTION ORAL at 20:48

## 2018-04-23 RX ADMIN — HEPARIN SODIUM 5000 UNITS: 5000 INJECTION, SOLUTION INTRAVENOUS; SUBCUTANEOUS at 05:41

## 2018-04-23 ASSESSMENT — ENCOUNTER SYMPTOMS
NERVOUS/ANXIOUS: 1
CONSTIPATION: 0
VOMITING: 0
SORE THROAT: 0
ABDOMINAL PAIN: 0
COUGH: 0
HEADACHES: 0
CHILLS: 0
BACK PAIN: 1
DIZZINESS: 0
FEVER: 0
SHORTNESS OF BREATH: 0
NAUSEA: 0

## 2018-04-23 ASSESSMENT — PAIN SCALES - GENERAL
PAINLEVEL_OUTOF10: 0

## 2018-04-23 NOTE — PROGRESS NOTES
Awake A&O, VSS, HUNTER, =.  Up to BSC, able to have a medium sized BM with formed stool.  NG tube intact to right nare, intermittent suction in place with moderate gastric drainage present.  IVF infusing.  Denies pain.  Medicated for anxiety per mar.  POC discussed, pt agrees and verbalizes understanding.  Hourly rounding in place, call light is within reach.

## 2018-04-23 NOTE — DISCHARGE PLANNING
Transitional Care Navigator:    Per chart review patient has utilized transitional care services with [facility] in the past. Patient is still pending medical work up. TCN following to assist with DC planning as needed.

## 2018-04-23 NOTE — PROGRESS NOTES
"Progress Note:  4/23/2018, 9:53 AM    S: No acute events. had 2BM's.  No pain, nausea or emesis    O:  Blood pressure (!) 166/85, pulse (!) 110, temperature 37.1 °C (98.8 °F), resp. rate 18, height 1.549 m (5' 1\"), weight 42.1 kg (92 lb 13 oz), last menstrual period 03/01/2002, SpO2 94 %.    NAD, awake and alert  Breathing ok, but has rhonchi and cough  Abdomen soft, nondistended, nontender      A:   Active Hospital Problems    Diagnosis   • Anxiety [F41.9]   • SBO (small bowel obstruction) [K56.609]   • Hyponatremia [E87.1]   • Opiate dependence (CMS-HCC) [F11.20]   • COPD (chronic obstructive pulmonary disease) (CMS-HCC) [J44.9]   • Peripheral vascular disease (CMS-HCC) [I73.9]     9 months, may relate to PVD     • Tobacco dependence [F17.200]   • Essential hypertension [I10]     -UGI was normal 9 months ago  -outpatient Colonoscopy still pending, as my colorectal partner though obstruction may be related to colonic stricture secondary to prior sigmoid resection  -Also, pt stopped taking miralax a few weeks ago, but says she was fine for 9 months (since her previous admission) while taking miralax  -NG output still fairly high over last 24hrs but gastric appearing now, and had 2BM's.    P:   -NG removed by me  -Pepcid ordered due to minimal coffee ground appearance  -Clear liquids started  -Miralax ordered BID  -Must ambulate QID  -IS & IS teaching ordered  -If tolerates clears, may advance to GI soft tomorrow  -please call me with questions    Ziyad Leyva M.D.  [facility]  [phone]    "

## 2018-04-23 NOTE — PROGRESS NOTES
"Pt requested to go on a walk. Pt wanted to go to the kitchen to drink a cup of coffee. This CNA reminded patient that she is NPO. Pt stated \"I will just get up and get it myself if you do not hand me it.\" This CNA attempted to redirect the patient but she refused and continued to grab coffee and drink it.  "

## 2018-04-23 NOTE — PROGRESS NOTES
Patient discussed wanting to have a soda and stated that she was going to have her son bring her one, explained to patient that she cannot have soda, patient stated she did not care.  When I went into room a little bit later I found empty soda bottle in trash can and a full cannister from her NG tube cannister.  Cannister was changed.  I asked patient if she drank soda and she stated that she did.  This RN informed Dr. Gaines.  Output from  charted.

## 2018-04-23 NOTE — CARE PLAN
Problem: Safety  Goal: Will remain free from falls    Intervention: Assess risk factors for falls  Fall risk assessment completed.       Problem: Venous Thromboembolism (VTW)/Deep Vein Thrombosis (DVT) Prevention:  Goal: Patient will participate in Venous Thrombosis (VTE)/Deep Vein Thrombosis (DVT)Prevention Measures    Intervention: Assess and monitor for anticoagulation complications  SCD's to BLE

## 2018-04-23 NOTE — CARE PLAN
Problem: Safety  Goal: Will remain free from falls  Outcome: PROGRESSING AS EXPECTED    Intervention: Implement fall precautions  Bed in low position, wheels locked, call light within reach, hourly rounding in place.      Problem: Bowel/Gastric:  Goal: Normal bowel function is maintained or improved  Outcome: PROGRESSING AS EXPECTED  Patient had a medium sized BM 4/22.

## 2018-04-24 VITALS
HEIGHT: 61 IN | SYSTOLIC BLOOD PRESSURE: 178 MMHG | TEMPERATURE: 97.5 F | DIASTOLIC BLOOD PRESSURE: 89 MMHG | HEART RATE: 112 BPM | WEIGHT: 92.81 LBS | BODY MASS INDEX: 17.52 KG/M2 | RESPIRATION RATE: 18 BRPM | OXYGEN SATURATION: 94 %

## 2018-04-24 PROCEDURE — A9270 NON-COVERED ITEM OR SERVICE: HCPCS | Performed by: SURGERY

## 2018-04-24 PROCEDURE — 700102 HCHG RX REV CODE 250 W/ 637 OVERRIDE(OP): Performed by: SURGERY

## 2018-04-24 PROCEDURE — 99239 HOSP IP/OBS DSCHRG MGMT >30: CPT | Performed by: HOSPITALIST

## 2018-04-24 RX ORDER — POLYETHYLENE GLYCOL 3350 17 G/17G
17 POWDER, FOR SOLUTION ORAL 2 TIMES DAILY
Qty: 30 EACH | Refills: 3 | Status: SHIPPED | OUTPATIENT
Start: 2018-04-24 | End: 2018-05-09

## 2018-04-24 RX ADMIN — POLYETHYLENE GLYCOL 3350 1 PACKET: 17 POWDER, FOR SOLUTION ORAL at 08:50

## 2018-04-24 RX ADMIN — FAMOTIDINE 20 MG: 20 TABLET, FILM COATED ORAL at 08:50

## 2018-04-24 NOTE — DISCHARGE SUMMARY
CHIEF COMPLAINT ON ADMISSION  Chief Complaint   Patient presents with   • Abdominal Pain     hx sbo, feels like the same.  small bm this am       CODE STATUS  Full Code    HPI & HOSPITAL COURSE  This is a 72 y.o. female here with abdominal pain. The patient had a small bowel obstruction. The patient was given IV fluids and nothing by mouth status. However the patient and started eating by herself. The patient was then advanced on her diet as the patient was tolerating it. Today the patient is passing gas as well as having bowel movements she no longer has any pain and she's back to ambulating independently and eating well. At this point patient can be discharged home with outpatient follow-ups and monitoring and she will need at this point no pain management patient denies any further pain.    The patient met 2-midnight criteria for an inpatient stay at the time of discharge.    Therefore, she is discharged in good and stable condition with close outpatient follow-up.    SPECIFIC OUTPATIENT FOLLOW-UP  Follow-up with surgery in 1-2 weeks.    DISCHARGE PROBLEM LIST  Active Problems:    Essential hypertension POA: Yes    Tobacco dependence POA: Yes    Peripheral vascular disease (CMS-[facility]) POA: Yes      Overview: 9 months, may relate to PVD    COPD (chronic obstructive pulmonary disease) (CMS-[facility]) POA: Yes    Opiate dependence (CMS-[facility]) POA: Yes    Hyponatremia POA: Yes    SBO (small bowel obstruction) POA: Yes    Anxiety POA: Yes  Resolved Problems:    Abdominal aortic aneurysm (CMS-[facility]) POA: Yes      FOLLOW UP  No future appointments.  No follow-up provider specified.    MEDICATIONS ON DISCHARGE   Karen Jaureguih   Home Medication Instructions SHANTANU:[id number]    Printed on:04/24/18 2707   Medication Information                      albuterol (PROAIR HFA) 108 (90 Base) MCG/ACT Aero Soln inhalation aerosol  Inhale 2 Puffs by mouth every 6 hours as needed for Shortness of Breath.             aspirin EC (ECOTRIN) 81  MG Tablet Delayed Response  Take 81 mg by mouth every evening.             atorvastatin (LIPITOR) 80 MG tablet  Take 80 mg by mouth every bedtime.             clopidogrel (PLAVIX) 75 MG Tab  Take 75 mg by mouth every evening.             fluticasone (FLONASE) 50 MCG/ACT nasal spray  Spray 1 Spray in nose every day.             HYDROcodone-acetaminophen (NORCO) 5-325 MG Tab per tablet  Take 0.5 Tabs by mouth every 8 hours as needed (Pain).             lisinopril-hydrochlorothiazide (PRINZIDE, ZESTORETIC) 20-25 MG per tablet  Take 0.5 Tabs by mouth every bedtime.             polyethylene glycol/lytes (MIRALAX) Pack  Take 1 Packet by mouth 2 Times a Day.                 DIET  Orders Placed This Encounter   Procedures   • DIET ORDER     Standing Status:   Standing     Number of Occurrences:   1     Order Specific Question:   Diet:     Answer:   Low Fiber(GI Soft) [2]       ACTIVITY  As tolerated.  Weight bearing as tolerated      CONSULTATIONS  Surgery    PROCEDURES  None    LABORATORY  Lab Results   Component Value Date/Time    SODIUM 143 04/23/2018 02:29 AM    POTASSIUM 3.4 (L) 04/23/2018 02:29 AM    CHLORIDE 101 04/23/2018 02:29 AM    CO2 36 (H) 04/23/2018 02:29 AM    GLUCOSE 92 04/23/2018 02:29 AM    BUN 18 04/23/2018 02:29 AM    CREATININE 0.58 04/23/2018 02:29 AM    CREATININE 0.8 08/25/2008 05:40 PM        Lab Results   Component Value Date/Time    WBC 5.9 04/23/2018 02:29 AM    HEMOGLOBIN 12.7 04/23/2018 02:29 AM    HEMATOCRIT 38.4 04/23/2018 02:29 AM    PLATELETCT 207 04/23/2018 02:29 AM        Total time of the discharge process exceeds 40 minutes

## 2018-04-24 NOTE — CARE PLAN
Problem: Nutritional:  Goal: Achieve adequate nutritional intake  Patient will consume >50% of meals/supplements     Outcome: PROGRESSING AS EXPECTED  PO 50-75% for most recent meal.  Pt was advanced to Low Fiber (GI soft) diet this morning.

## 2018-04-24 NOTE — PROGRESS NOTES
[facility]    Date of Service: 2018    Chief Complaint  72 y.o. female with past medical history of hypertension , chronic back pain secondary to spinal stenosis on chronic Norco admitted 2018 with CT concerning for small bowel obstruction, nausea vomiting and abdominal pain.    Interval Problem Update  Discussed with Dr. Leyva  Patient was noncompliant with nothing by mouth all day yesterday and this morning.  He has discontinued NG tube and started clears this morning. She is tolerating them, however her abdomen is mildly distended. She is eager to advance diet but this happened earlier in her stay as well. She has poor insight.  Per Dr. Leyva if tolerates full liquids, can discharge on GI soft.    Consultants/Specialty  Ziyad Leyva MD    Disposition  TBD        Review of Systems   Constitutional: Negative for chills, fever and malaise/fatigue.   HENT: Negative for sore throat.    Respiratory: Negative for cough and shortness of breath.    Cardiovascular: Negative for chest pain.   Gastrointestinal: Negative for abdominal pain, constipation (small BM), nausea and vomiting.        High NG output   Musculoskeletal: Positive for back pain (chronic).   Neurological: Negative for dizziness and headaches.   Psychiatric/Behavioral: The patient is nervous/anxious (ongoing).       Physical Exam  Laboratory/Imaging   Hemodynamics  Temp (24hrs), Av.1 °C (98.8 °F), Min:37.1 °C (98.7 °F), Max:37.2 °C (99 °F)   Temperature: 37.1 °C (98.8 °F)  Pulse  Av.2  Min: 68  Max: 117    Blood Pressure : (!) 166/85      Respiratory      Respiration: 18, Pulse Oximetry: 94 %        RUL Breath Sounds: Clear, RML Breath Sounds: Clear, RLL Breath Sounds: Diminished, KISHA Breath Sounds: Clear, LLL Breath Sounds: Diminished    Fluids    Intake/Output Summary (Last 24 hours) at 18 1713  Last data filed at 18 1000   Gross per 24 hour   Intake                0 ml   Output             3000 ml    Net            -3000 ml       Nutrition  Orders Placed This Encounter   Procedures   • DIET ORDER     Standing Status:   Standing     Number of Occurrences:   1     Order Specific Question:   Diet:     Answer:   Full Liquid [11]     Physical Exam   Constitutional: She is oriented to person, place, and time. She appears well-developed and well-nourished. No distress.   HENT:   Head: Normocephalic and atraumatic.   Mouth/Throat: Oropharynx is clear and moist.   Cloudy Poncho NG output   Eyes: EOM are normal. Pupils are equal, round, and reactive to light. Right eye exhibits no discharge. Left eye exhibits no discharge.   Neck: Neck supple.   Cardiovascular: Regular rhythm.  Tachycardia present.    Mildly tachy   Pulmonary/Chest: Effort normal and breath sounds normal.   Abdominal: Soft. Bowel sounds are normal. She exhibits distension (mild but not firm). There is no tenderness.   Musculoskeletal: She exhibits no edema or tenderness.   Neurological: She is alert and oriented to person, place, and time. No cranial nerve deficit.   Skin: Skin is warm and dry. She is not diaphoretic.   Psychiatric:   Anxious/restless   Nursing note and vitals reviewed.      Recent Labs      04/23/18   0229   WBC  5.9   RBC  4.45   HEMOGLOBIN  12.7   HEMATOCRIT  38.4   MCV  86.3   MCH  28.5   MCHC  33.1*   RDW  47.8   PLATELETCT  207   MPV  9.7     Recent Labs      04/23/18 0229   SODIUM  143   POTASSIUM  3.4*   CHLORIDE  101   CO2  36*   GLUCOSE  92   BUN  18   CREATININE  0.58   CALCIUM  9.1                      Assessment/Plan     Anxiety- (present on admission)   Assessment & Plan    Prn ativan        SBO (small bowel obstruction)- (present on admission)   Assessment & Plan    Noncompliant with nothing by mouth  Tolerating clears today but abdomen is mildly distended  Will advance to full liquids  If tolerates surgery recommends GI soft at DC and follow-up with Dr. Dakota Huffman.  Patient has poor insight and will need to be  monitored carefully before discharge for recurrent symptoms          Hyponatremia- (present on admission)   Assessment & Plan    Likely 2/2 HCTZ-  Resolved off med        Partial small bowel obstruction   Assessment & Plan    Patient will partial small bowel obstruction noted on CT  Will keep nothing by mouth she has had multiple of these in the past  Antiemetics and IV fluids  Advance diet as clinically appropriate  Consider general surgery consultation if no improvement        Opiate dependence (CMS-HCC)- (present on admission)   Assessment & Plan    Advised patient to seek alternate options to manage her chronic back pain such as Voltaren gel        COPD (chronic obstructive pulmonary disease) (CMS-HCC)- (present on admission)   Assessment & Plan    W/o exacerbation- RT protocol        Peripheral vascular disease (CMS-HCC)- (present on admission)   Assessment & Plan    ASA, statin- will hold plavix as may need surgery  Resume at DC        Tobacco dependence- (present on admission)   Assessment & Plan    Tobacco cessation counseling        Essential hypertension- (present on admission)   Assessment & Plan    labile          Quality-Core Measures   Reviewed items::  Labs reviewed and Medications reviewed  Resendiz catheter::  No Resendiz  DVT prophylaxis pharmacological::  Heparin  Ulcer Prophylaxis::  Not indicated  Assessed for rehabilitation services:  Patient returned to prior level of function, rehabilitation not indicated at this time

## 2018-04-24 NOTE — CARE PLAN
Problem: Safety  Goal: Will remain free from injury  Outcome: PROGRESSING AS EXPECTED  Patient has remained free from further injury this shift. Call light is within reach, bed is locked in lowest position, and patient is rounded on hourly    Problem: Bowel/Gastric:  Goal: Normal bowel function is maintained or improved  Outcome: PROGRESSING AS EXPECTED  Patient bowel sounds are normoactive in all four quadrants. Alexsi is not longer experiencing any nausea or abdominal pain, and she reports having bowel movements today. Patient has adhered to her full liquid diet this shift

## 2018-04-24 NOTE — PROGRESS NOTES
Hourly rounding, call bell within reach. Patient educated about plan of care, pain management, call bell use, and mobility. Patient tolerating diet, denies nausea. Son at bedside participating in care. Patient ready for d/c per md. Hypertension this am, to continue home blood pressure medications for discharge. Discharge and diet instructions reviewed with patient and son, questions answered, verbalized understanding. IV removed. Discharged via wheelchair with Rn.

## 2018-04-24 NOTE — DISCHARGE INSTRUCTIONS
Diet regular with 9-13 servings of fruits and vegetables   Activities as tolerated   Follow ups with PCP in 7-10 days, call for appointment   Meds per med rec sheet   No smoking, no alcohol, no caffeine   Wear seat belt in motorized vehicle   Take medications as perscribed   Keep appointments   If symptoms worsen call PCP, 911 or urgent care.      Food Choices for Gastroesophageal Reflux Disease, Adult  When you have gastroesophageal reflux disease (GERD), the foods you eat and your eating habits are very important. Choosing the right foods can help ease the discomfort of GERD.  WHAT GENERAL GUIDELINES DO I NEED TO FOLLOW?  · Choose fruits, vegetables, whole grains, low-fat dairy products, and low-fat meat, fish, and poultry.  · Limit fats such as oils, salad dressings, butter, nuts, and avocado.  · Keep a food diary to identify foods that cause symptoms.  · Avoid foods that cause reflux. These may be different for different people.  · Eat frequent small meals instead of three large meals each day.  · Eat your meals slowly, in a relaxed setting.  · Limit fried foods.  · Cook foods using methods other than frying.  · Avoid drinking alcohol.  · Avoid drinking large amounts of liquids with your meals.  · Avoid bending over or lying down until 2-3 hours after eating.  WHAT FOODS ARE NOT RECOMMENDED?  The following are some foods and drinks that may worsen your symptoms:  Vegetables  Tomatoes. Tomato juice. Tomato and spaghetti sauce. Chili peppers. Onion and garlic. Horseradish.  Fruits  Oranges, grapefruit, and lemon (fruit and juice).  Meats  High-fat meats, fish, and poultry. This includes hot dogs, ribs, ham, sausage, salami, and christian.  Dairy  Whole milk and chocolate milk. Sour cream. Cream. Butter. Ice cream. Cream cheese.   Beverages  Coffee and tea, with or without caffeine. Carbonated beverages or energy drinks.  Condiments  Hot sauce. Barbecue sauce.   Sweets/Desserts  Chocolate and cocoa. Donuts. Peppermint  and spearmint.  Fats and Oils  High-fat foods, including French fries and potato chips.  Other  Vinegar. Strong spices, such as black pepper, white pepper, red pepper, cayenne, quick powder, cloves, ginger, and chili powder.  The items listed above may not be a complete list of foods and beverages to avoid. Contact your dietitian for more information.     This information is not intended to replace advice given to you by your health care provider. Make sure you discuss any questions you have with your health care provider.     Document Released: 12/18/2006 Document Revised: 01/08/2016 Document Reviewed: 10/22/2014  [employer] Interactive Patient Education ©2016 [employer]  Discharge Instructions    Discharged to home by car with relative. Discharged via walking, hospital escort: Yes.  Special equipment needed: Not Applicable    Be sure to schedule a follow-up appointment with your primary care doctor or any specialists as instructed.     Discharge Plan:   Diet Plan: Discussed  Activity Level: Discussed  Smoking Cessation Offered: Patient Counseled  Confirmed Follow up Appointment: Patient to Call and Schedule Appointment  Confirmed Symptoms Management: Discussed  Medication Reconciliation Updated: Yes  Influenza Vaccine Indication: Not indicated: Previously immunized this influenza season and > 8 years of age    I understand that a diet low in cholesterol, fat, and sodium is recommended for good health. Unless I have been given specific instructions below for another diet, I accept this instruction as my diet prescription.   Other diet: regular    Special Instructions: None    · Is patient discharged on Warfarin / Coumadin?   No     Depression / Suicide Risk    As you are discharged from this [facility] facility, it is important to learn how to keep safe from harming yourself.    Recognize the warning signs:  · Abrupt changes in personality, positive or negative- including increase in energy   · Giving away  possessions  · Change in eating patterns- significant weight changes-  positive or negative  · Change in sleeping patterns- unable to sleep or sleeping all the time   · Unwillingness or inability to communicate  · Depression  · Unusual sadness, discouragement and loneliness  · Talk of wanting to die  · Neglect of personal appearance   · Rebelliousness- reckless behavior  · Withdrawal from people/activities they love  · Confusion- inability to concentrate     If you or a loved one observes any of these behaviors or has concerns about self-harm, here's what you can do:  · Talk about it- your feelings and reasons for harming yourself  · Remove any means that you might use to hurt yourself (examples: pills, rope, extension cords, firearm)  · Get professional help from the community (Mental Health, Substance Abuse, psychological counseling)  · Do not be alone:Call your Safe Contact- someone whom you trust who will be there for you.  · Call your local CRISIS HOTLINE 413-6473 or 064-253-6859  · Call your local Children's Mobile Crisis Response Team [place] (031) 713-4294 or www.SergeMD  · Call the toll free National Suicide Prevention Hotlines   · National Suicide Prevention Lifeline 728-261-JXYI (5196)  · National Hope Line Network 800-SUICIDE (732-1628)

## 2018-04-24 NOTE — PROGRESS NOTES
"Progress Note:  4/24/2018, 7:14 AM    S: No acute events, tolerating liquids, had BM yesterday, passing flatus.  No nausea or vomiting.    O:  Blood pressure 107/58, pulse 92, temperature 36.6 °C (97.9 °F), resp. rate 16, height 1.549 m (5' 1\"), weight 42.1 kg (92 lb 13 oz), last menstrual period 03/01/2002, SpO2 92 %.    NAD, awake and alert  Breathing nonlabored  abd soft, nontender, nondistended      A:   Active Hospital Problems    Diagnosis   • Anxiety [F41.9]   • SBO (small bowel obstruction) [K56.609]   • Hyponatremia [E87.1]   • Opiate dependence (CMS-[facility]) [F11.20]   • COPD (chronic obstructive pulmonary disease) (CMS-[facility]) [J44.9]   • Peripheral vascular disease (CMS-[facility]) [I73.9]     9 months, may relate to PVD     • Tobacco dependence [F17.200]   • Essential hypertension [I10]         P:   -GI soft diet ordered.  If tolerates can go home on this diet and BID Miralax from my standpoint  -Pt will call our office and set up appointment with Dr. Huffman in 2-3 weeks  -Please contact me with questions or concerns    Ziyad Leyva M.D.  [facility]  [phone]    "

## 2018-04-25 ENCOUNTER — PATIENT OUTREACH (OUTPATIENT)
Dept: HEALTH INFORMATION MANAGEMENT | Facility: OTHER | Age: 73
End: 2018-04-25

## 2018-04-30 ENCOUNTER — OFFICE VISIT (OUTPATIENT)
Dept: MEDICAL GROUP | Facility: CLINIC | Age: 73
End: 2018-04-30
Payer: MEDICARE

## 2018-04-30 VITALS
HEIGHT: 61 IN | HEART RATE: 102 BPM | RESPIRATION RATE: 14 BRPM | DIASTOLIC BLOOD PRESSURE: 58 MMHG | SYSTOLIC BLOOD PRESSURE: 104 MMHG | WEIGHT: 97.1 LBS | OXYGEN SATURATION: 97 % | BODY MASS INDEX: 18.33 KG/M2 | TEMPERATURE: 99 F

## 2018-04-30 DIAGNOSIS — K56.609 SMALL BOWEL OBSTRUCTION (HCC): ICD-10-CM

## 2018-04-30 DIAGNOSIS — R11.0 NAUSEA IN ADULT PATIENT: ICD-10-CM

## 2018-04-30 PROCEDURE — 99214 OFFICE O/P EST MOD 30 MIN: CPT | Performed by: FAMILY MEDICINE

## 2018-04-30 RX ORDER — PROMETHAZINE HYDROCHLORIDE 25 MG/1
25 TABLET ORAL EVERY 6 HOURS PRN
Qty: 30 TAB | Refills: 0 | Status: SHIPPED | OUTPATIENT
Start: 2018-04-30 | End: 2018-05-09

## 2018-04-30 RX ORDER — PROMETHAZINE HYDROCHLORIDE 25 MG/1
25 SUPPOSITORY RECTAL EVERY 6 HOURS PRN
Qty: 12 SUPPOSITORY | Refills: 0 | Status: SHIPPED | OUTPATIENT
Start: 2018-04-30 | End: 2018-05-09

## 2018-04-30 NOTE — PROGRESS NOTES
Chief Complaint   Patient presents with   • Bowel Obstruction   • Nausea   • Fatigue       Subjective:     HPI:   Karen Jauregui presents today with the followin. Small bowel obstruction (HCC)  She was recently hospitalized for small bowel obstruction. Surgery was not needed. Unclear why she had the obstruction. Patient feels it is because she had stopped drinking as much water and had reduced her fiber.  Patient has very extensive atherosclerotic disease and there is a possibility that there was an ischemic component but this was unclear. Patient states she is feeling much better overall. She is gradually increasing her fiber and is being very careful to drink adequate water. She is drinking enough water that her urine is almost colorless. I have asked her to continue with that at this time.    2. Nausea in adult patient  Patient is having some nausea.  She does not get much help from ondansetron. She would like Phenergan tablets and suppositories as those have worked well for her in the past. Those are renewed as I feel that is a very good way to go.    There is a bone density order already in place.    Patient Active Problem List    Diagnosis Date Noted   • History of diverticulitis of colon 2011     Priority: High   • Anxiety 2018   • SBO (small bowel obstruction) ([facility]) 2018   • Partial small bowel obstruction ([facility]) 2018   • Hyponatremia 2018   • Osteopenia of lumbar spine 2018   • Colonic stricture ([facility]) 2017   • History of ischemic colitis 2017   • PVD (peripheral vascular disease) ([facility]) 2017   • Opiate dependence ([facility]) 2017   • Insomnia disorder 2017   • History of COPD 2017   • History of small bowel obstruction 2017   • Chronic use of opiate for therapeutic purpose 2016   • Eosinophilic colitis 2015   • Family history of nonmelanoma skin cancer    • COPD (chronic obstructive pulmonary disease) ([facility])    •  Constipated 05/26/2012   • Subclavian artery stenosis, left (HCC) 03/27/2012   • Herniated nucleus pulposus, L5-S1, left 09/08/2011   • Carotid atherosclerosis 06/15/2011   • Abdominal aortic aneurysm greater than 39 mm in diameter (HCC)    • Peripheral vascular disease (HCC)    • Essential hypertension    • Dyslipidemia    • Tobacco dependence    • Spinal stenosis of lumbar region    • Arteriosclerosis of arteries of extremities (HCC)        Current medicines (including changes today)  Current Outpatient Prescriptions   Medication Sig Dispense Refill   • promethazine (PHENERGAN) 25 MG Tab Take 1 Tab by mouth every 6 hours as needed for Nausea/Vomiting. 30 Tab 0   • promethazine (PHENERGAN) 25 MG Suppos Insert 1 Suppository in rectum every 6 hours as needed for Nausea/Vomiting. 12 Suppository 0   • polyethylene glycol/lytes (MIRALAX) Pack Take 1 Packet by mouth 2 Times a Day. 30 Each 3   • HYDROcodone-acetaminophen (NORCO) 5-325 MG Tab per tablet Take 0.5 Tabs by mouth every 8 hours as needed (Pain).     • lisinopril-hydrochlorothiazide (PRINZIDE, ZESTORETIC) 20-25 MG per tablet Take 0.5 Tabs by mouth every bedtime.     • albuterol (PROAIR HFA) 108 (90 Base) MCG/ACT Aero Soln inhalation aerosol Inhale 2 Puffs by mouth every 6 hours as needed for Shortness of Breath. 8.5 g 2   • fluticasone (FLONASE) 50 MCG/ACT nasal spray Spray 1 Spray in nose every day. 16 g 0   • aspirin EC (ECOTRIN) 81 MG Tablet Delayed Response Take 81 mg by mouth every evening.     • clopidogrel (PLAVIX) 75 MG Tab Take 75 mg by mouth every evening.     • atorvastatin (LIPITOR) 80 MG tablet Take 80 mg by mouth every bedtime.       No current facility-administered medications for this visit.        Allergies   Allergen Reactions   • Amoxicillin Diarrhea     Diarrhea   • Ciprofloxacin Unspecified     Unspecified       ROS: As per HPI       Objective:     Blood pressure 104/58, pulse (!) 102, temperature 37.2 °C (99 °F), resp. rate 14, height 1.549  "m (5' 1\"), weight 44 kg (97 lb 1.6 oz), last menstrual period 03/01/2002, SpO2 97 %, not currently breastfeeding. Body mass index is 18.35 kg/m².    Physical Exam:  Constitutional: Well-developed and well-nourished. Not diaphoretic. No distress. Lucid and fluent.  Skin: Skin is warm and dry. No rash noted.  Head: Atraumatic without lesions.  Eyes: Conjunctivae and extraocular motions are normal. Pupils are equal, round, and reactive to light. No scleral icterus.   Mouth/Throat: Tongue normal. Oropharynx is clear and moist. Posterior pharynx without erythema or exudates.  Neck: Supple, trachea midline. No thyromegaly present. No cervical or supraclavicular lymphadenopathy. No JVD or carotid bruits appreciated  Cardiovascular: Regular rate and rhythm.  Normal S1, S2 without murmur appreciated.  Chest: Effort normal. Clear to auscultation throughout. No adventitious sounds.   Abdomen: Soft, non tender, and with moderate soft distention. Active bowel sounds in all four quadrants. No rebound, guarding, masses or hepatosplenomegaly.  Extremities: No cyanosis, clubbing, erythema, nor edema.   Neurological: Alert and oriented x 3. No cranial nerve deficit. 5/5 myotomes. Sensation intact.   Psychiatric:  Behavior, mood, and affect are appropriate.    Hospital, imaging and testing reviewed and discussed.  Marked vascular atherosclerotic involvement     Assessment and Plan:     72 y.o. female with the following issues:    1. Small bowel obstruction (HCC)     2. Nausea in adult patient  promethazine (PHENERGAN) 25 MG Tab    promethazine (PHENERGAN) 25 MG Suppos         Followup: Return in about 3 months (around 7/30/2018), or if symptoms worsen or fail to improve.  "

## 2018-05-08 ENCOUNTER — APPOINTMENT (OUTPATIENT)
Dept: RADIOLOGY | Facility: MEDICAL CENTER | Age: 73
DRG: 871 | End: 2018-05-08
Attending: EMERGENCY MEDICINE
Payer: MEDICARE

## 2018-05-08 ENCOUNTER — HOSPITAL ENCOUNTER (INPATIENT)
Facility: MEDICAL CENTER | Age: 73
LOS: 2 days | DRG: 871 | End: 2018-05-10
Attending: EMERGENCY MEDICINE | Admitting: HOSPITALIST
Payer: MEDICARE

## 2018-05-08 DIAGNOSIS — R91.8 LUNG MASS: ICD-10-CM

## 2018-05-08 DIAGNOSIS — E78.5 DYSLIPIDEMIA: ICD-10-CM

## 2018-05-08 DIAGNOSIS — R11.2 NON-INTRACTABLE VOMITING WITH NAUSEA, UNSPECIFIED VOMITING TYPE: ICD-10-CM

## 2018-05-08 DIAGNOSIS — D72.829 LEUKOCYTOSIS, UNSPECIFIED TYPE: ICD-10-CM

## 2018-05-08 DIAGNOSIS — E86.0 DEHYDRATION: ICD-10-CM

## 2018-05-08 DIAGNOSIS — I10 ESSENTIAL HYPERTENSION: ICD-10-CM

## 2018-05-08 DIAGNOSIS — K56.609 SBO (SMALL BOWEL OBSTRUCTION) (HCC): ICD-10-CM

## 2018-05-08 DIAGNOSIS — R10.9 ACUTE ABDOMINAL PAIN: ICD-10-CM

## 2018-05-08 DIAGNOSIS — F17.200 TOBACCO DEPENDENCE: ICD-10-CM

## 2018-05-08 DIAGNOSIS — J42 CHRONIC BRONCHITIS, UNSPECIFIED CHRONIC BRONCHITIS TYPE (HCC): ICD-10-CM

## 2018-05-08 LAB
ALBUMIN SERPL BCP-MCNC: 3.3 G/DL (ref 3.2–4.9)
ALBUMIN/GLOB SERPL: 0.9 G/DL
ALP SERPL-CCNC: 79 U/L (ref 30–99)
ALT SERPL-CCNC: 33 U/L (ref 2–50)
ANION GAP SERPL CALC-SCNC: 11 MMOL/L (ref 0–11.9)
APPEARANCE UR: CLEAR
AST SERPL-CCNC: 42 U/L (ref 12–45)
BACTERIA #/AREA URNS HPF: NEGATIVE /HPF
BASOPHILS # BLD AUTO: 0.8 % (ref 0–1.8)
BASOPHILS # BLD: 0.13 K/UL (ref 0–0.12)
BILIRUB SERPL-MCNC: 0.5 MG/DL (ref 0.1–1.5)
BILIRUB UR QL STRIP.AUTO: NEGATIVE
BUN SERPL-MCNC: 24 MG/DL (ref 8–22)
CALCIUM SERPL-MCNC: 9.4 MG/DL (ref 8.5–10.5)
CHLORIDE SERPL-SCNC: 92 MMOL/L (ref 96–112)
CO2 SERPL-SCNC: 27 MMOL/L (ref 20–33)
COLOR UR: YELLOW
CREAT SERPL-MCNC: 0.94 MG/DL (ref 0.5–1.4)
EOSINOPHIL # BLD AUTO: 0.13 K/UL (ref 0–0.51)
EOSINOPHIL NFR BLD: 0.8 % (ref 0–6.9)
EPI CELLS #/AREA URNS HPF: ABNORMAL /HPF
ERYTHROCYTE [DISTWIDTH] IN BLOOD BY AUTOMATED COUNT: 47.5 FL (ref 35.9–50)
GLOBULIN SER CALC-MCNC: 3.6 G/DL (ref 1.9–3.5)
GLUCOSE SERPL-MCNC: 108 MG/DL (ref 65–99)
GLUCOSE UR STRIP.AUTO-MCNC: NEGATIVE MG/DL
GRAN CASTS #/AREA URNS LPF: ABNORMAL /LPF
HCT VFR BLD AUTO: 38.1 % (ref 37–47)
HGB BLD-MCNC: 12.7 G/DL (ref 12–16)
HYALINE CASTS #/AREA URNS LPF: ABNORMAL /LPF
IMM GRANULOCYTES # BLD AUTO: 0.1 K/UL (ref 0–0.11)
IMM GRANULOCYTES NFR BLD AUTO: 0.6 % (ref 0–0.9)
KETONES UR STRIP.AUTO-MCNC: NEGATIVE MG/DL
LACTATE BLD-SCNC: 1.8 MMOL/L (ref 0.5–2)
LEUKOCYTE ESTERASE UR QL STRIP.AUTO: NEGATIVE
LIPASE SERPL-CCNC: 35 U/L (ref 11–82)
LYMPHOCYTES # BLD AUTO: 1.46 K/UL (ref 1–4.8)
LYMPHOCYTES NFR BLD: 9.1 % (ref 22–41)
MCH RBC QN AUTO: 27.9 PG (ref 27–33)
MCHC RBC AUTO-ENTMCNC: 33.3 G/DL (ref 33.6–35)
MCV RBC AUTO: 83.7 FL (ref 81.4–97.8)
MICRO URNS: ABNORMAL
MONOCYTES # BLD AUTO: 0.87 K/UL (ref 0–0.85)
MONOCYTES NFR BLD AUTO: 5.4 % (ref 0–13.4)
NEUTROPHILS # BLD AUTO: 13.31 K/UL (ref 2–7.15)
NEUTROPHILS NFR BLD: 83.3 % (ref 44–72)
NITRITE UR QL STRIP.AUTO: NEGATIVE
NRBC # BLD AUTO: 0 K/UL
NRBC BLD-RTO: 0 /100 WBC
PH UR STRIP.AUTO: 5.5 [PH]
PLATELET # BLD AUTO: 370 K/UL (ref 164–446)
PMV BLD AUTO: 9.4 FL (ref 9–12.9)
POTASSIUM SERPL-SCNC: 3.4 MMOL/L (ref 3.6–5.5)
PROT SERPL-MCNC: 6.9 G/DL (ref 6–8.2)
PROT UR QL STRIP: 30 MG/DL
RBC # BLD AUTO: 4.55 M/UL (ref 4.2–5.4)
RBC # URNS HPF: ABNORMAL /HPF
RBC UR QL AUTO: ABNORMAL
SODIUM SERPL-SCNC: 130 MMOL/L (ref 135–145)
SP GR UR REFRACTOMETRY: >1.045
UROBILINOGEN UR STRIP.AUTO-MCNC: 0.2 MG/DL
WBC # BLD AUTO: 16 K/UL (ref 4.8–10.8)
WBC #/AREA URNS HPF: ABNORMAL /HPF

## 2018-05-08 PROCEDURE — 770006 HCHG ROOM/CARE - MED/SURG/GYN SEMI*

## 2018-05-08 PROCEDURE — 85730 THROMBOPLASTIN TIME PARTIAL: CPT

## 2018-05-08 PROCEDURE — 80053 COMPREHEN METABOLIC PANEL: CPT

## 2018-05-08 PROCEDURE — 96365 THER/PROPH/DIAG IV INF INIT: CPT

## 2018-05-08 PROCEDURE — 74177 CT ABD & PELVIS W/CONTRAST: CPT

## 2018-05-08 PROCEDURE — 36415 COLL VENOUS BLD VENIPUNCTURE: CPT

## 2018-05-08 PROCEDURE — 96361 HYDRATE IV INFUSION ADD-ON: CPT

## 2018-05-08 PROCEDURE — 99223 1ST HOSP IP/OBS HIGH 75: CPT | Mod: AI | Performed by: HOSPITALIST

## 2018-05-08 PROCEDURE — 96375 TX/PRO/DX INJ NEW DRUG ADDON: CPT

## 2018-05-08 PROCEDURE — 87040 BLOOD CULTURE FOR BACTERIA: CPT

## 2018-05-08 PROCEDURE — 71045 X-RAY EXAM CHEST 1 VIEW: CPT

## 2018-05-08 PROCEDURE — 700117 HCHG RX CONTRAST REV CODE 255: Performed by: EMERGENCY MEDICINE

## 2018-05-08 PROCEDURE — 84443 ASSAY THYROID STIM HORMONE: CPT

## 2018-05-08 PROCEDURE — 304561 HCHG STAT O2

## 2018-05-08 PROCEDURE — 83605 ASSAY OF LACTIC ACID: CPT

## 2018-05-08 PROCEDURE — 99285 EMERGENCY DEPT VISIT HI MDM: CPT

## 2018-05-08 PROCEDURE — 700111 HCHG RX REV CODE 636 W/ 250 OVERRIDE (IP): Performed by: EMERGENCY MEDICINE

## 2018-05-08 PROCEDURE — 700105 HCHG RX REV CODE 258: Performed by: EMERGENCY MEDICINE

## 2018-05-08 PROCEDURE — 83036 HEMOGLOBIN GLYCOSYLATED A1C: CPT

## 2018-05-08 PROCEDURE — 83690 ASSAY OF LIPASE: CPT

## 2018-05-08 PROCEDURE — 85610 PROTHROMBIN TIME: CPT

## 2018-05-08 PROCEDURE — 87086 URINE CULTURE/COLONY COUNT: CPT

## 2018-05-08 PROCEDURE — 85025 COMPLETE CBC W/AUTO DIFF WBC: CPT

## 2018-05-08 PROCEDURE — 81001 URINALYSIS AUTO W/SCOPE: CPT

## 2018-05-08 PROCEDURE — 700101 HCHG RX REV CODE 250: Performed by: EMERGENCY MEDICINE

## 2018-05-08 RX ORDER — SODIUM CHLORIDE 9 MG/ML
30 INJECTION, SOLUTION INTRAVENOUS ONCE
Status: COMPLETED | OUTPATIENT
Start: 2018-05-08 | End: 2018-05-08

## 2018-05-08 RX ORDER — ONDANSETRON 2 MG/ML
4 INJECTION INTRAMUSCULAR; INTRAVENOUS ONCE
Status: COMPLETED | OUTPATIENT
Start: 2018-05-08 | End: 2018-05-08

## 2018-05-08 RX ORDER — CEFTRIAXONE 1 G/1
1 INJECTION, POWDER, FOR SOLUTION INTRAMUSCULAR; INTRAVENOUS ONCE
Status: COMPLETED | OUTPATIENT
Start: 2018-05-08 | End: 2018-05-08

## 2018-05-08 RX ORDER — SODIUM CHLORIDE 9 MG/ML
30 INJECTION, SOLUTION INTRAVENOUS
Status: DISCONTINUED | OUTPATIENT
Start: 2018-05-08 | End: 2018-05-10 | Stop reason: HOSPADM

## 2018-05-08 RX ORDER — SODIUM CHLORIDE 9 MG/ML
500 INJECTION, SOLUTION INTRAVENOUS
Status: DISCONTINUED | OUTPATIENT
Start: 2018-05-08 | End: 2018-05-10 | Stop reason: HOSPADM

## 2018-05-08 RX ORDER — BISACODYL 10 MG
10 SUPPOSITORY, RECTAL RECTAL
Status: DISCONTINUED | OUTPATIENT
Start: 2018-05-08 | End: 2018-05-10 | Stop reason: HOSPADM

## 2018-05-08 RX ORDER — MORPHINE SULFATE 4 MG/ML
2 INJECTION, SOLUTION INTRAMUSCULAR; INTRAVENOUS ONCE
Status: COMPLETED | OUTPATIENT
Start: 2018-05-08 | End: 2018-05-08

## 2018-05-08 RX ORDER — SODIUM CHLORIDE 9 MG/ML
INJECTION, SOLUTION INTRAVENOUS CONTINUOUS
Status: DISCONTINUED | OUTPATIENT
Start: 2018-05-09 | End: 2018-05-10 | Stop reason: HOSPADM

## 2018-05-08 RX ORDER — CLOPIDOGREL BISULFATE 75 MG/1
75 TABLET ORAL EVERY EVENING
Status: DISCONTINUED | OUTPATIENT
Start: 2018-05-09 | End: 2018-05-10 | Stop reason: HOSPADM

## 2018-05-08 RX ORDER — LISINOPRIL AND HYDROCHLOROTHIAZIDE 25; 20 MG/1; MG/1
0.5 TABLET ORAL
Status: DISCONTINUED | OUTPATIENT
Start: 2018-05-09 | End: 2018-05-09

## 2018-05-08 RX ORDER — AMOXICILLIN 250 MG
2 CAPSULE ORAL 2 TIMES DAILY
Status: DISCONTINUED | OUTPATIENT
Start: 2018-05-09 | End: 2018-05-10 | Stop reason: HOSPADM

## 2018-05-08 RX ORDER — ATORVASTATIN CALCIUM 40 MG/1
80 TABLET, FILM COATED ORAL
Status: DISCONTINUED | OUTPATIENT
Start: 2018-05-09 | End: 2018-05-10 | Stop reason: HOSPADM

## 2018-05-08 RX ORDER — ONDANSETRON 2 MG/ML
4 INJECTION INTRAMUSCULAR; INTRAVENOUS EVERY 4 HOURS PRN
Status: DISCONTINUED | OUTPATIENT
Start: 2018-05-08 | End: 2018-05-10 | Stop reason: HOSPADM

## 2018-05-08 RX ORDER — POLYETHYLENE GLYCOL 3350 17 G/17G
1 POWDER, FOR SOLUTION ORAL
Status: DISCONTINUED | OUTPATIENT
Start: 2018-05-08 | End: 2018-05-10 | Stop reason: HOSPADM

## 2018-05-08 RX ORDER — ONDANSETRON 4 MG/1
4 TABLET, ORALLY DISINTEGRATING ORAL EVERY 4 HOURS PRN
Status: DISCONTINUED | OUTPATIENT
Start: 2018-05-08 | End: 2018-05-10 | Stop reason: HOSPADM

## 2018-05-08 RX ADMIN — IOHEXOL 100 ML: 350 INJECTION, SOLUTION INTRAVENOUS at 21:25

## 2018-05-08 RX ADMIN — METRONIDAZOLE 500 MG: 500 SOLUTION INTRAVENOUS at 22:39

## 2018-05-08 RX ADMIN — ONDANSETRON 4 MG: 2 INJECTION INTRAMUSCULAR; INTRAVENOUS at 21:43

## 2018-05-08 RX ADMIN — SODIUM CHLORIDE 1185 ML: 9 INJECTION, SOLUTION INTRAVENOUS at 21:42

## 2018-05-08 RX ADMIN — CEFTRIAXONE SODIUM 1 G: 1 INJECTION, POWDER, FOR SOLUTION INTRAMUSCULAR; INTRAVENOUS at 22:36

## 2018-05-08 RX ADMIN — MORPHINE SULFATE 2 MG: 4 INJECTION INTRAVENOUS at 21:47

## 2018-05-08 ASSESSMENT — PAIN SCALES - GENERAL
PAINLEVEL_OUTOF10: 4
PAINLEVEL_OUTOF10: 8
PAINLEVEL_OUTOF10: 0

## 2018-05-09 ENCOUNTER — APPOINTMENT (OUTPATIENT)
Dept: RADIOLOGY | Facility: MEDICAL CENTER | Age: 73
DRG: 871 | End: 2018-05-09
Attending: INTERNAL MEDICINE
Payer: MEDICARE

## 2018-05-09 PROBLEM — J98.4 PNEUMONITIS: Status: ACTIVE | Noted: 2018-05-09

## 2018-05-09 PROBLEM — A41.9 SEPSIS (HCC): Status: ACTIVE | Noted: 2018-05-09

## 2018-05-09 PROBLEM — N39.0 SEPSIS SECONDARY TO UTI (HCC): Status: ACTIVE | Noted: 2018-05-09

## 2018-05-09 LAB
APTT PPP: 34.6 SEC (ref 24.7–36)
EST. AVERAGE GLUCOSE BLD GHB EST-MCNC: 123 MG/DL
HBA1C MFR BLD: 5.9 % (ref 0–5.6)
INR PPP: 1.14 (ref 0.87–1.13)
LACTATE BLD-SCNC: 1.2 MMOL/L (ref 0.5–2)
PROCALCITONIN SERPL-MCNC: 0.6 NG/ML
PROTHROMBIN TIME: 14.3 SEC (ref 12–14.6)
TSH SERPL DL<=0.005 MIU/L-ACNC: 1.74 UIU/ML (ref 0.38–5.33)

## 2018-05-09 PROCEDURE — 700102 HCHG RX REV CODE 250 W/ 637 OVERRIDE(OP): Performed by: HOSPITALIST

## 2018-05-09 PROCEDURE — 36415 COLL VENOUS BLD VENIPUNCTURE: CPT

## 2018-05-09 PROCEDURE — 94760 N-INVAS EAR/PLS OXIMETRY 1: CPT

## 2018-05-09 PROCEDURE — 700111 HCHG RX REV CODE 636 W/ 250 OVERRIDE (IP): Performed by: HOSPITALIST

## 2018-05-09 PROCEDURE — A9270 NON-COVERED ITEM OR SERVICE: HCPCS | Performed by: HOSPITALIST

## 2018-05-09 PROCEDURE — 74250 X-RAY XM SM INT 1CNTRST STD: CPT

## 2018-05-09 PROCEDURE — 700117 HCHG RX CONTRAST REV CODE 255: Performed by: INTERNAL MEDICINE

## 2018-05-09 PROCEDURE — 700101 HCHG RX REV CODE 250: Performed by: HOSPITALIST

## 2018-05-09 PROCEDURE — 700105 HCHG RX REV CODE 258: Performed by: HOSPITALIST

## 2018-05-09 PROCEDURE — 99233 SBSQ HOSP IP/OBS HIGH 50: CPT | Performed by: INTERNAL MEDICINE

## 2018-05-09 PROCEDURE — 84145 PROCALCITONIN (PCT): CPT

## 2018-05-09 PROCEDURE — 83605 ASSAY OF LACTIC ACID: CPT

## 2018-05-09 PROCEDURE — 770006 HCHG ROOM/CARE - MED/SURG/GYN SEMI*

## 2018-05-09 RX ORDER — HYDROCHLOROTHIAZIDE 12.5 MG/1
12.5 TABLET ORAL EVERY EVENING
Status: DISCONTINUED | OUTPATIENT
Start: 2018-05-09 | End: 2018-05-10 | Stop reason: HOSPADM

## 2018-05-09 RX ORDER — KETOROLAC TROMETHAMINE 30 MG/ML
15 INJECTION, SOLUTION INTRAMUSCULAR; INTRAVENOUS EVERY 6 HOURS PRN
Status: DISCONTINUED | OUTPATIENT
Start: 2018-05-09 | End: 2018-05-10 | Stop reason: HOSPADM

## 2018-05-09 RX ORDER — LISINOPRIL 10 MG/1
10 TABLET ORAL
Status: DISCONTINUED | OUTPATIENT
Start: 2018-05-09 | End: 2018-05-10 | Stop reason: HOSPADM

## 2018-05-09 RX ADMIN — METRONIDAZOLE 500 MG: 500 INJECTION, SOLUTION INTRAVENOUS at 12:54

## 2018-05-09 RX ADMIN — LISINOPRIL 10 MG: 10 TABLET ORAL at 21:13

## 2018-05-09 RX ADMIN — SODIUM CHLORIDE: 9 INJECTION, SOLUTION INTRAVENOUS at 10:07

## 2018-05-09 RX ADMIN — KETOROLAC TROMETHAMINE 15 MG: 30 INJECTION, SOLUTION INTRAMUSCULAR at 06:10

## 2018-05-09 RX ADMIN — CEFTRIAXONE 2 G: 2 INJECTION, POWDER, FOR SOLUTION INTRAMUSCULAR; INTRAVENOUS at 10:07

## 2018-05-09 RX ADMIN — METRONIDAZOLE 500 MG: 500 INJECTION, SOLUTION INTRAVENOUS at 21:13

## 2018-05-09 RX ADMIN — SODIUM CHLORIDE: 9 INJECTION, SOLUTION INTRAVENOUS at 01:37

## 2018-05-09 RX ADMIN — HYDROCHLOROTHIAZIDE 12.5 MG: 12.5 TABLET ORAL at 21:12

## 2018-05-09 RX ADMIN — ASPIRIN 81 MG: 81 TABLET, COATED ORAL at 21:12

## 2018-05-09 RX ADMIN — METRONIDAZOLE 500 MG: 500 INJECTION, SOLUTION INTRAVENOUS at 06:10

## 2018-05-09 RX ADMIN — KETOROLAC TROMETHAMINE 15 MG: 30 INJECTION, SOLUTION INTRAMUSCULAR at 12:54

## 2018-05-09 RX ADMIN — IOHEXOL 200 ML: 350 INJECTION, SOLUTION INTRAVENOUS at 16:30

## 2018-05-09 RX ADMIN — CLOPIDOGREL 75 MG: 75 TABLET, FILM COATED ORAL at 21:12

## 2018-05-09 RX ADMIN — ATORVASTATIN CALCIUM 80 MG: 40 TABLET, FILM COATED ORAL at 21:12

## 2018-05-09 ASSESSMENT — ENCOUNTER SYMPTOMS
ABDOMINAL PAIN: 1
LOSS OF CONSCIOUSNESS: 0
HEARTBURN: 0
FLANK PAIN: 0
SPEECH CHANGE: 0
DIZZINESS: 0
SENSORY CHANGE: 0
COUGH: 0
HEADACHES: 0
CONSTIPATION: 0
WHEEZING: 0
FALLS: 0
EYE REDNESS: 0
CHILLS: 0
SHORTNESS OF BREATH: 0
PALPITATIONS: 0
ABDOMINAL PAIN: 0
DEPRESSION: 0
BLURRED VISION: 0
FOCAL WEAKNESS: 0
TREMORS: 0
DIARRHEA: 0
WEAKNESS: 0
EYE PAIN: 0
BRUISES/BLEEDS EASILY: 0
NERVOUS/ANXIOUS: 0
FEVER: 0
INSOMNIA: 0
DOUBLE VISION: 0
VOMITING: 1
NAUSEA: 1
SEIZURES: 0
HALLUCINATIONS: 0
HEMOPTYSIS: 0
EYE DISCHARGE: 0
MYALGIAS: 0
BLOOD IN STOOL: 0

## 2018-05-09 ASSESSMENT — PATIENT HEALTH QUESTIONNAIRE - PHQ9
1. LITTLE INTEREST OR PLEASURE IN DOING THINGS: NOT AT ALL
1. LITTLE INTEREST OR PLEASURE IN DOING THINGS: NOT AT ALL
SUM OF ALL RESPONSES TO PHQ9 QUESTIONS 1 AND 2: 0
2. FEELING DOWN, DEPRESSED, IRRITABLE, OR HOPELESS: NOT AT ALL
2. FEELING DOWN, DEPRESSED, IRRITABLE, OR HOPELESS: NOT AT ALL
SUM OF ALL RESPONSES TO PHQ9 QUESTIONS 1 AND 2: 0

## 2018-05-09 ASSESSMENT — PAIN SCALES - WONG BAKER: WONGBAKER_NUMERICALRESPONSE: DOESN'T HURT AT ALL

## 2018-05-09 ASSESSMENT — LIFESTYLE VARIABLES
EVER_SMOKED: YES
SUBSTANCE_ABUSE: 0

## 2018-05-09 ASSESSMENT — PAIN SCALES - GENERAL
PAINLEVEL_OUTOF10: 2
PAINLEVEL_OUTOF10: 0
PAINLEVEL_OUTOF10: 8

## 2018-05-09 ASSESSMENT — COPD QUESTIONNAIRES
DURING THE PAST 4 WEEKS HOW MUCH DID YOU FEEL SHORT OF BREATH: SOME OF THE TIME
COPD SCREENING SCORE: 7
DO YOU EVER COUGH UP ANY MUCUS OR PHLEGM?: YES, A FEW DAYS A WEEK OR MONTH
HAVE YOU SMOKED AT LEAST 100 CIGARETTES IN YOUR ENTIRE LIFE: YES

## 2018-05-09 NOTE — PROGRESS NOTES
[facility] Hospitalist Progress Note    Date of Service: 2018    Chief Complaint  72 y.o. female admitted 2018 with N/V (x5 days)      Interval Problem Update  Wants to eat now. Awaiting UGI    Consultants/Specialty  Dr. Perez, Surgery    Disposition  PT/OT to be ordered        Review of Systems   Constitutional: Negative for chills and fever.   HENT: Negative for congestion, hearing loss and nosebleeds.    Eyes: Negative for pain and redness.   Respiratory: Negative for cough, hemoptysis, shortness of breath and wheezing.    Cardiovascular: Negative for chest pain and palpitations.   Gastrointestinal: Positive for nausea and vomiting. Negative for abdominal pain, blood in stool, constipation and diarrhea.   Genitourinary: Negative for dysuria, frequency and hematuria.   Musculoskeletal: Negative for falls, joint pain and myalgias.   Skin: Negative for rash.   Neurological: Negative for dizziness, tremors, focal weakness, seizures, loss of consciousness, weakness and headaches.   Psychiatric/Behavioral: The patient is not nervous/anxious and does not have insomnia.    All other systems reviewed and are negative.     Physical Exam  Laboratory/Imaging   Hemodynamics  Temp (24hrs), Av.8 °C (98.3 °F), Min:36.4 °C (97.5 °F), Max:37.6 °C (99.6 °F)   Temperature: 36.4 °C (97.6 °F)  Pulse  Av.1  Min: 80  Max: 125 Heart Rate (Monitored): (!) 107  Blood Pressure : (!) 97/55, NIBP: 104/54      Respiratory      Respiration: 16, Pulse Oximetry: 98 %, O2 Daily Delivery Respiratory : Silicone Nasal Cannula     Work Of Breathing / Effort: Mild  RUL Breath Sounds: Clear, RML Breath Sounds: Diminished, RLL Breath Sounds: Diminished, KISHA Breath Sounds: Clear, LLL Breath Sounds: Diminished    Fluids    Intake/Output Summary (Last 24 hours) at 18 1633  Last data filed at 18 1121   Gross per 24 hour   Intake              150 ml   Output              300 ml   Net             -150 ml       Nutrition  Orders Placed  This Encounter   Procedures   • Diet NPO     Standing Status:   Standing     Number of Occurrences:   1     Order Specific Question:   Restrict to:     Answer:   Strict [1]     Physical Exam   Constitutional: She appears well-developed and well-nourished.   HENT:   Head: Normocephalic and atraumatic.   Eyes: Conjunctivae and EOM are normal. No scleral icterus.   Neck: Normal range of motion. Neck supple.   Cardiovascular: Normal rate and regular rhythm.  Exam reveals no gallop and no friction rub.    No murmur heard.  Pulmonary/Chest: Effort normal and breath sounds normal. No respiratory distress. She has no wheezes. She has no rales.   Abdominal: Soft. Bowel sounds are normal. She exhibits no distension. There is no tenderness. There is no rebound and no guarding.   Musculoskeletal: She exhibits no edema or tenderness.   Neurological: She is alert.   Skin: Skin is warm.   Psychiatric: She has a normal mood and affect. Her behavior is normal.       Recent Labs      05/08/18   1851   WBC  16.0*   RBC  4.55   HEMOGLOBIN  12.7   HEMATOCRIT  38.1   MCV  83.7   MCH  27.9   MCHC  33.3*   RDW  47.5   PLATELETCT  370   MPV  9.4     Recent Labs      05/08/18   1851   SODIUM  130*   POTASSIUM  3.4*   CHLORIDE  92*   CO2  27   GLUCOSE  108*   BUN  24*   CREATININE  0.94   CALCIUM  9.4     Recent Labs      05/08/18   0036   APTT  34.6   INR  1.14*                  Assessment/Plan     * Partial small bowel obstruction (HCC)- (present on admission)   Assessment & Plan    Patient presents with nausea vomiting abdominal pain consistent with partial small bowel obstruction  General surgery has been consulted  IV fluids. Minimize opiate pain medications as this is the likely cause of this patient's recurrent small bowel obstructions.  Surgery recommended UGI, then advancing diet slowly. Currently she declines surgical intervention.  Discussed plan with patient and family.          Sepsis secondary to UTI (HCC)   Assessment & Plan     "This is sepsis (without associated acute organ dysfunction).   Patient presents with signs and symptoms of sepsis likely source urinary tract infection  Started on IV antibiotics  Cultures ordered  I inherited her care 5/9. Reviewed chart. U/A to me not suggestive of UTI. She does however have a leukocytosis and per Dr. Desai, symptoms of sepsis. For now I will continue Rocephin for a 3 day duration only.          Pneumonitis   Assessment & Plan    Noted on CT scan without any symptoms  On antibiotics for UTI  Ordered procalcitonin. To add doxycycline if elevated        COPD (chronic obstructive pulmonary disease) (HCC)- (present on admission)   Assessment & Plan    Not in acute exacerbation respiratory care protocol ordered        Peripheral vascular disease (HCC)- (present on admission)   Assessment & Plan    Resume atorvastatin, asa, plavix        Tobacco dependence- (present on admission)   Assessment & Plan    Smoking cessation encouraged.        Dyslipidemia- (present on admission)   Assessment & Plan    \"Recheck lipid panel\"  Had lipid panel one month ago and other than low HDL, rest of panel was normal        Essential hypertension- (present on admission)   Assessment & Plan    Continue home blood pressure medications        I spent 37 minutes, reviewing the chart, notes, vitals, labs, imaging, ordering labs, evaluating Karen Jauregui for assessment, enacting the plan above. 50% of the time was spent in counseling Karen Jauregui and family answering questions. Discussed with RN. Reassurance. Time was devoted to counseling and coordinating care including review of records, pertinent lab data and studies, as well as discussing diagnostic evaluation and work up, planned therapeutic interventions and future disposition of care. Where indicated, the assessment and plan reflect discussion of patient with consultants, other healthcare providers, family members, and additional research needed to obtain " further information in formulating the plan of care for Karen Jauregui.     Quality-Core Measures   DVT prophylaxis - mechanical:  SCDs

## 2018-05-09 NOTE — PROGRESS NOTES
TWO RN SKIN CHECK    Behind ears - intact  Left elbow- redness, blanching  Right elbow intact  Sacral pink and warm  Shoulders pink  Right medial foot red spot- blanching  Heels-intact

## 2018-05-09 NOTE — H&P
Hospital Medicine History and Physical    Date of Service  5/8/2018    Chief Complaint  Chief Complaint   Patient presents with   • N/V     x5 days       History of Presenting Illness  72 y.o. female who presented 5/8/2018 with abdominal pain associated with nausea and vomiting. Nausea vomiting started about 2 weeks ago progressively worsening in severity. She's also had fever for the last 2 days measuring 100.9. Now resolved. She is continued watery bowel movements and some flatulence. Denies any hematochezia or hematemesis. She had small bowel obstruction about 2 weeks ago recently discharged. Since the time of discharge she is worsening nausea and vomiting with consistent liquid stools. Now she has abdominal pain. No alleviating or exacerbating factors.     Primary Care Physician  Baltazar Julio M.D.    Consultants  General surgery    Code Status  full    Review of Systems  Review of Systems   Constitutional: Negative for chills and fever.   HENT: Negative for congestion, hearing loss and tinnitus.    Eyes: Negative for blurred vision, double vision and discharge.   Respiratory: Negative for cough, hemoptysis and shortness of breath.    Cardiovascular: Negative for chest pain, palpitations and leg swelling.   Gastrointestinal: Positive for abdominal pain, nausea and vomiting. Negative for heartburn.   Genitourinary: Negative for dysuria and flank pain.   Musculoskeletal: Negative for joint pain and myalgias.   Skin: Negative for rash.   Neurological: Negative for dizziness, sensory change, speech change, focal weakness and weakness.   Endo/Heme/Allergies: Negative for environmental allergies. Does not bruise/bleed easily.   Psychiatric/Behavioral: Negative for depression, hallucinations and substance abuse.        Past Medical History  Past Medical History:   Diagnosis Date   • PVD (posterior vitreous detachment), left eye 1/13/2015   • Abdominal aortic aneurysm (HCC) 11/2010    3.6 cm 8/2014   • Angina      with stress test   • Arthritis     thumbs   • Atherosclerosis of arteries of the extremities     two stents in the groin, Dr. Pickett   • Bowel habit changes    • Carotid atherosclerosis     Dr. Pickett   • COPD (chronic obstructive pulmonary disease) (HCC)    • Diverticulitis     Dx 11/25/11   • Diverticulosis of colon    • Dyslipidemia    • Emphysema of lung (HCC)    • Eosinophilic colitis    • Family history of nonmelanoma skin cancer    • Hypertension    • Pain    • Peripheral vascular disease (HCC)     9 months, may relate to PVD   • Snoring    • Spinal stenosis of lumbar region     Dr. Navarro   • Unspecified hemorrhagic conditions     asa/plavix, bruises easily       Surgical History  Past Surgical History:   Procedure Laterality Date   • AAA WITH STENT GRAFT N/A 3/31/2017    Procedure: AAA WITH STENT GRAFT - 5-CM INFRARENAL;  Surgeon: Spring Pemberton M.D.;  Location: SURGERY [facility];  Service:    • FEMORAL FEMORAL BYPASS N/A 3/31/2017    Procedure: FEMORAL FEMORAL BYPASS - POSS;  Surgeon: Spring Pemberton M.D.;  Location: SURGERY [facility];  Service:    • COLONOSCOPY WITH BIOPSY  3/6/2015    Performed by Pranav THOMPSON M.D. at ENDOSCOPY [facility]   • COLON RESECTION LAPAROSCOPIC  8/5/2013    Performed by Spring Pemberton M.D. at SURGERY [facility]   • OTHER CARDIAC SURGERY  2005    cardiac stents   • GYN SURGERY  2002    hysterectomy, tubal, fibroids, ovaries gone   • GYN SURGERY  1975    ectopic pregnacy   • COLONOSCOPY  3/1/2009, 4/2012, 7/2/13    normal, normal, normal but heavy diverticulosis   • OTHER      LLE /RLEstent, common iliac, Dr. Pickett       Medications  No current facility-administered medications on file prior to encounter.      Current Outpatient Prescriptions on File Prior to Encounter   Medication Sig Dispense Refill   • promethazine (PHENERGAN) 25 MG Tab Take 1 Tab by mouth every 6 hours as needed for Nausea/Vomiting. 30 Tab 0   • promethazine (PHENERGAN) 25 MG  Suppos Insert 1 Suppository in rectum every 6 hours as needed for Nausea/Vomiting. 12 Suppository 0   • polyethylene glycol/lytes (MIRALAX) Pack Take 1 Packet by mouth 2 Times a Day. 30 Each 3   • HYDROcodone-acetaminophen (NORCO) 5-325 MG Tab per tablet Take 0.5 Tabs by mouth every 8 hours as needed (Pain).     • lisinopril-hydrochlorothiazide (PRINZIDE, ZESTORETIC) 20-25 MG per tablet Take 0.5 Tabs by mouth every bedtime.     • albuterol (PROAIR HFA) 108 (90 Base) MCG/ACT Aero Soln inhalation aerosol Inhale 2 Puffs by mouth every 6 hours as needed for Shortness of Breath. 8.5 g 2   • fluticasone (FLONASE) 50 MCG/ACT nasal spray Spray 1 Spray in nose every day. 16 g 0   • aspirin EC (ECOTRIN) 81 MG Tablet Delayed Response Take 81 mg by mouth every evening.     • clopidogrel (PLAVIX) 75 MG Tab Take 75 mg by mouth every evening.     • atorvastatin (LIPITOR) 80 MG tablet Take 80 mg by mouth every bedtime.         Family History  Family History   Problem Relation Age of Onset   • Hyperlipidemia Sister    • Hypertension Sister    • Non-contributory Sister      carotid atherosclerosis   • Hypertension Mother    • Hyperlipidemia Mother    • Heart Disease Mother 82     congestive heart failure, AAA   • Heart Disease Father 55     multiple heart issues   • Hypertension Father    • Hyperlipidemia Father    • Cancer Sister      sin cancer       Social History  Social History   Substance Use Topics   • Smoking status: Current Every Day Smoker     Packs/day: 0.50     Years: 45.00     Types: Cigarettes   • Smokeless tobacco: Never Used      Comment: on nicotine patches, smokes off and on, discussed stopping, trying to   • Alcohol use No       Allergies  Allergies   Allergen Reactions   • Amoxicillin Diarrhea     Diarrhea   • Ciprofloxacin Unspecified     Unspecified        Physical Exam  Laboratory   Hemodynamics  Temp (24hrs), Av.4 °C (97.6 °F), Min:36.4 °C (97.6 °F), Max:36.4 °C (97.6 °F)   Temperature: 36.4 °C (97.6  °F)  Pulse  Av  Min: 121  Max: 125 Heart Rate (Monitored): (!) 107  Blood Pressure : (!) 98/58, NIBP: 104/54      Respiratory      Respiration: 17, Pulse Oximetry: 100 %             Physical Exam   Constitutional: She is oriented to person, place, and time. She appears well-developed and well-nourished. No distress.   HENT:   Head: Normocephalic and atraumatic.   Eyes: Conjunctivae and EOM are normal. Pupils are equal, round, and reactive to light.   Neck: Normal range of motion. Neck supple. No JVD present.   Cardiovascular: Normal rate, regular rhythm, normal heart sounds and intact distal pulses.    No murmur heard.  Pulmonary/Chest: Effort normal and breath sounds normal. No respiratory distress. She has no wheezes.   Abdominal: Soft. Bowel sounds are normal. She exhibits distension. There is tenderness.   Musculoskeletal: Normal range of motion. She exhibits no edema.   Neurological: She is alert and oriented to person, place, and time. She exhibits normal muscle tone.   Skin: Skin is warm and dry.   Psychiatric: She has a normal mood and affect. Her behavior is normal. Judgment and thought content normal.   Nursing note and vitals reviewed.      Recent Labs      18   1851   WBC  16.0*   RBC  4.55   HEMOGLOBIN  12.7   HEMATOCRIT  38.1   MCV  83.7   MCH  27.9   MCHC  33.3*   RDW  47.5   PLATELETCT  370   MPV  9.4     Recent Labs      18   1851   SODIUM  130*   POTASSIUM  3.4*   CHLORIDE  92*   CO2  27   GLUCOSE  108*   BUN  24*   CREATININE  0.94   CALCIUM  9.4     Recent Labs      18   1851   ALTSGPT  33   ASTSGOT  42   ALKPHOSPHAT  79   TBILIRUBIN  0.5   LIPASE  35   GLUCOSE  108*                 Lab Results   Component Value Date    TROPONINI <0.01 2018     Urinalysis:    Lab Results  Component Value Date/Time   SPECGRAVITY >1.045 2018   GLUCOSEUR Negative 2018   KETONES Negative 2018   NITRITE Negative 2018   WBCURINE 20-50 (A)  05/08/2018 2205   RBCURINE 20-50 (A) 05/08/2018 2205   BACTERIA Negative 05/08/2018 2205   EPITHELCELL Few 05/08/2018 2205        Imaging  CT-ABDOMEN-PELVIS WITH [[id number]] Resulted: 05/08/18 2230   Order Status: Completed Updated: 05/08/18 2232   Narrative:       5/8/2018 9:03 PM    HISTORY/REASON FOR EXAM:  Nausea vomiting and abdominal pain      TECHNIQUE/EXAM DESCRIPTION:  CT scan of the abdomen and pelvis with contrast.    Contrast-enhanced helical scanning was obtained from the diaphragmatic domes through the pubic symphysis following the bolus administration of 100 mL of nonionic contrast without complication.    Low dose optimization technique was utilized for this CT exam including automated exposure control and adjustment of the mA and/or kV according to patient size.    COMPARISON: 4/21/2018.    FINDINGS:  There is a 2.4 x 1 cm irregular mass in the left medial lung base. This is evolved since previous exam. There is a 5 mm ovoid calcification in the right lung base posteriorly unchanged from previous exam    CT Abdomen: The liver has a normal size appearance and attenuation. The spleen, pancreas, and gallbladder are unremarkable in appearance.  Adrenal glands and kidneys are unremarkable appearance. There is a small 1 cm left upper pole renal cyst. There is   no ascites or lymphadenopathy. There is been previous placement of an aortic stent graft for treatment of an infrarenal abdominal aortic aneurysm. The native sac measures 3.9 cm in greatest diameter and this is unchanged from previous exam. There is   diffuse calcification of the abdominal aorta and iliac arteries.    There are several dilated loops of small bowel in the left upper quadrant which measures 3 cm in greatest diameter. There are several mildly dilated loops of small bowel with air-fluid levels noted in the pelvis. There is also evidence of scattered   air-fluid levels in the descending and transverse colon.    CT Pelvis: Visualized  pelvic structures are unremarkable.  There is no lymphadenopathy.     Impression:         1. Several dilated loops of small bowel in the left upper quadrant which may represent a partial or evolving small bowel obstruction. The remainder of the small bowel and colon have the appearance of an adynamic ileus.    2. Rapid development of a 2.4 x 1 cm irregular mass in the left medial lung base which likely represents an area of pneumonitis or parenchymal scarring, malignancy would be less likely secondary to the rapid change since previous exam of 4/21/2018.    3. Stable small calcified granuloma in right lung base.    4. Stable native aneurysmal sac.    5. Previous placement of aortic endograft.    6. Diffuse atherosclerotic changes of the abdominal aorta and iliac arteries.   DX-CHEST-PORTABLE (1 VIEW) [[id number]] Resulted: 05/08/18 2201   Order Status: Completed Updated: 05/08/18 2203   Narrative:       5/8/2018 9:31 PM    HISTORY/REASON FOR EXAM:  Shortness of breath.      TECHNIQUE/EXAM DESCRIPTION AND NUMBER OF VIEWS:  Single portable view of the chest.    COMPARISON: 12/1/2017    FINDINGS:    There is a moderate pectus excavatum deformity.  Heart size is within normal limits.  There is increased interstitial markings throughout the lung fields. Further there is hyperinflation of lung fields. There is small calcified granuloma in the right lung base unchanged from previous exam.  No pleural abnormalities are noted.   Impression:       1.  Pectus excavatum deformity.    2.  COPD.    3.  Mild fluid overload.    4.  Small calcified granuloma in right lung base.        Assessment/Plan     I anticipate this patient will require at least two midnights for appropriate medical management, necessitating inpatient admission.    * Partial small bowel obstruction (HCC)- (present on admission)   Assessment & Plan    Patient presents with nausea vomiting abdominal pain consistent with partial small bowel obstruction  Will keep  nothing by mouth  General surgery has been consulted  IV fluids  Minimize opiate pain medications as this is the likely cause of this patient's recurrent small bowel obstructions          Sepsis (HCC)   Assessment & Plan    This is sepsis (without associated acute organ dysfunction).   Patient presents with signs and symptoms of sepsis likely source urinary tract infection  Started on IV antibiotics  Cultures ordered          Pneumonitis   Assessment & Plan    Noted on CT scan without any symptoms        COPD (chronic obstructive pulmonary disease) ([facility])- (present on admission)   Assessment & Plan    Not in acute exacerbation respiratory care protocol ordered        Peripheral vascular disease (HCC)- (present on admission)   Assessment & Plan    Resume atorvastatin, asa, plavix        Tobacco dependence- (present on admission)   Assessment & Plan    Patient needs smoking cessation counseling        Dyslipidemia- (present on admission)   Assessment & Plan    Recheck lipid panel        Essential hypertension- (present on admission)   Assessment & Plan    Continue home blood pressure medications            VTE prophylaxis: SCD

## 2018-05-09 NOTE — ED TRIAGE NOTES
Pt to triage after being sent by MD for eval of N/V and gas pain. Pt recently discharge for SBO. Last BM today. Pt states she is not drinking or eat well due to N/V. (+) Weakness.  Dizziness with ambulating.  Pt advised to return to triage nurse for any changes or concerns.

## 2018-05-09 NOTE — ASSESSMENT & PLAN NOTE
Patient presents with nausea vomiting abdominal pain consistent with partial small bowel obstruction  General surgery has been consulted  IV fluids. Minimize opiate pain medications as this is the likely cause of this patient's recurrent small bowel obstructions.  Surgery recommended UGI, then advancing diet slowly. Currently she declines surgical intervention.  Discussed plan with patient and family.

## 2018-05-09 NOTE — CARE PLAN
Problem: Fluid Volume:  Goal: Will maintain balanced intake and output  Outcome: PROGRESSING AS EXPECTED  Patient is dehydrated and receiving IV fluid, patient has a hat in bathroom to measure output.      Problem: Respiratory:  Goal: Respiratory status will improve  Outcome: PROGRESSING AS EXPECTED  Patient given IS and educated on how to use it.  Patient also instructed on splinting  with pillow during deep coughs.  Patient observed sleeping, patient breaths thru nose.

## 2018-05-09 NOTE — CONSULTS
Surgical History and Physical    Date of Service: 5/9/2018    Requesting Physician: Rodrick Gardner MD -     PCP: Baltazar Julio M.D.    CC: Nausea and vomiting    HPI: This is a 72 y.o. female who is presenting with persistent nausea.  She was recently admitted in late April with a CT and clinical exam consistent with bowel obstruction.  This was manage non-operatively by Dr. Leyva.  Since discharging she states she has been doing well.  That was up until 2 days ago when the nausea and vomiting recurred.  She had no associated abdominal pain.  She was passing gas and her last bowel movement was 24 hours prior to presentation.  A CT was performed and suggested early obstruction.    Past Medical History:  Past Medical History:   Diagnosis Date   • Abdominal aortic aneurysm (HCC) 11/2010    3.6 cm 8/2014   • Angina     with stress test   • Arthritis     thumbs   • Atherosclerosis of arteries of the extremities     two stents in the groin, Dr. Pickett   • Bowel habit changes    • Carotid atherosclerosis     Dr. Pickett   • COPD (chronic obstructive pulmonary disease) (HCC)    • Diverticulitis     Dx 11/25/11   • Diverticulosis of colon    • Dyslipidemia    • Emphysema of lung (HCC)    • Eosinophilic colitis    • Family history of nonmelanoma skin cancer    • Hypertension    • Pain    • Peripheral vascular disease (HCC)     9 months, may relate to PVD   • PVD (posterior vitreous detachment), left eye 1/13/2015   • Snoring    • Spinal stenosis of lumbar region     Dr. Navarro   • Unspecified hemorrhagic conditions     asa/plavix, bruises easily       Past Surgical History:  Past Surgical History:   Procedure Laterality Date   • AAA WITH STENT GRAFT N/A 3/31/2017    Procedure: AAA WITH STENT GRAFT - 5-CM INFRARENAL;  Surgeon: Spring Pemberton M.D.;  Location: SURGERY [facility];  Service:    • FEMORAL FEMORAL BYPASS N/A 3/31/2017    Procedure: FEMORAL FEMORAL BYPASS - POSS;  Surgeon: Spring Pemberton M.D.;   Location: SURGERY [facility];  Service:    • COLONOSCOPY WITH BIOPSY  3/6/2015    Performed by Pranav THOMPSON M.D. at ENDOSCOPY [facility]   • COLON RESECTION LAPAROSCOPIC  8/5/2013    Performed by Spring Pemberton M.D. at SURGERY [facility]   • OTHER CARDIAC SURGERY  2005    cardiac stents   • GYN SURGERY  2002    hysterectomy, tubal, fibroids, ovaries gone   • GYN SURGERY  1975    ectopic pregnacy   • COLONOSCOPY  3/1/2009, 4/2012, 7/2/13    normal, normal, normal but heavy diverticulosis   • OTHER      LLE /RLEstent, common iliac, Dr. Pickett       Medications:  Current Facility-Administered Medications   Medication Dose Route Frequency Provider Last Rate Last Dose   • lisinopril (PRINIVIL) 10 MG tablet 10 mg  10 mg Oral QHS Emile Desai M.D.        And   • hydroCHLOROthiazide (HYDRODIURIL) tablet 12.5 mg  12.5 mg Oral Q EVENING Emile Desai M.D.       • ketorolac (TORADOL) injection 15 mg  15 mg Intravenous Q6HRS PRN Emile Desai M.D.   15 mg at 05/09/18 0610   • Respiratory Care per Protocol   Nebulization Continuous RT Emile Desai M.D.       • NS infusion 1,185 mL  30 mL/kg Intravenous Once PRN Emile Desai M.D.       • NS (BOLUS) infusion 500 mL  500 mL Intravenous Once PRN Emile Desai M.D.       • cefTRIAXone (ROCEPHIN) syringe 2 g  2 g Intravenous Q24HRS Emile Desai M.D.       • metroNIDAZOLE (FLAGYL) IVPB 500 mg  500 mg Intravenous Q8HRS Emile Desai M.D.   Stopped at 05/09/18 0710   • senna-docusate (PERICOLACE or SENOKOT S) 8.6-50 MG per tablet 2 Tab  2 Tab Oral BID Emile Desai M.D.        And   • polyethylene glycol/lytes (MIRALAX) PACKET 1 Packet  1 Packet Oral QDAY PRN Emile Desai M.D.        And   • magnesium hydroxide (MILK OF MAGNESIA) suspension 30 mL  30 mL Oral QDAY PRN Emile Desai M.D.        And   • bisacodyl (DULCOLAX) suppository 10 mg  10 mg Rectal QDAY PRN Emile Desai M.D.       • NS infusion   Intravenous  Continuous Emile Desai M.D. 100 mL/hr at 05/09/18 0137     • ondansetron (ZOFRAN) syringe/vial injection 4 mg  4 mg Intravenous Q4HRS PRN Emile Desai M.D.       • ondansetron (ZOFRAN ODT) dispertab 4 mg  4 mg Oral Q4HRS PRN Emile Desai M.D.       • aspirin EC (ECOTRIN) tablet 81 mg  81 mg Oral Q EVENING Emile Desai M.D.       • atorvastatin (LIPITOR) tablet 80 mg  80 mg Oral QHS Emile Desai M.D.       • clopidogrel (PLAVIX) tablet 75 mg  75 mg Oral Q EVENING Emile Desai M.D.           Social History:  Social History     Social History   • Marital status:      Spouse name: N/A   • Number of children: N/A   • Years of education: N/A     Occupational History   • stocking [employer]     Social History Main Topics   • Smoking status: Current Every Day Smoker     Packs/day: 0.50     Years: 45.00     Types: Cigarettes   • Smokeless tobacco: Never Used      Comment: on nicotine patches, smokes off and on, discussed stopping, trying to   • Alcohol use No   • Drug use: No   • Sexual activity: Not on file     Other Topics Concern   • Not on file     Social History Narrative   • No narrative on file       Family History:  Family History   Problem Relation Age of Onset   • Hyperlipidemia Sister    • Hypertension Sister    • Non-contributory Sister      carotid atherosclerosis   • Hypertension Mother    • Hyperlipidemia Mother    • Heart Disease Mother 82     congestive heart failure, AAA   • Heart Disease Father 55     multiple heart issues   • Hypertension Father    • Hyperlipidemia Father    • Cancer Sister      sin cancer       Allergies:  Amoxicillin and Ciprofloxacin    Review of Systems:  Constitutional: Negative for fever, chills, weight loss, malaise/fatigue and diaphoresis.   HENT: Negative for hearing loss, ear pain, nosebleeds, congestion, sore throat, neck pain, tinnitus and ear discharge.    Eyes: Negative for blurred vision, double vision, photophobia, pain,  discharge and redness.   Respiratory: Negative for cough, hemoptysis, sputum production, shortness of breath, wheezing and stridor.    Cardiovascular: Negative for chest pain, palpitations, orthopnea, claudication, leg swelling and PND.   Gastrointestinal: Negative for heartburn, nausea, vomiting, abdominal pain, diarrhea, constipation, blood in stool and melena.   Genitourinary: Negative for dysuria, urgency, frequency, hematuria and flank pain.   Musculoskeletal: Negative for myalgias, back pain, joint pain and falls.   Skin: Negative for itching and rash.  Neurological: Negative for dizziness, tingling, tremors, sensory change, speech change, focal weakness, seizures, loss of consciousness, weakness and headaches.   Endo/Heme/Allergies: Negative for environmental allergies and polydipsia. Does not bruise/bleed easily.   Psychiatric/Behavioral: Negative for depression, suicidal ideas, hallucinations, memory loss and substance abuse. The patient is not nervous/anxious and does not have insomnia.    Physical Exam:  Blood pressure (!) 96/55, pulse 82, temperature 36.4 °C (97.5 °F), resp. rate 16, weight 49.5 kg (109 lb 2 oz), last menstrual period 03/01/2002, SpO2 98 %, not currently breastfeeding.    GENERAL:  The patient is elderly appearing and in no acute distress  HEENT:  Atraumatic, normocephalic.  Normal pinna bilaterally.  External auditory canals are without discharge.  Conjunctivae and sclerae are clear. Extraocular movements are full. Pupils are equal, round, and reactive to light.  Oral mucosa is moist.  NECK:  Soft and supple without lymphadenopathy. No masses are noted.  Trachea is midline.  CHEST:  Lungs are clear to auscultation bilaterally.  No masses, lesions, or signs of trauma were noted.    CARDIOVASCULAR:  Regular rate and rhythm.  No murmurs appreciated.  No JVD.  Palpable pulses present in all four extremities.    ABDOMEN:  Soft, non-tender, non-distended.  Non-tympanitic.  No hepatomegaly or  splenomegaly.  No incisional, umbilical, or inguinal hernias were appreciated.  Well healed midline scar.  A fem-fem bypass graft is present over the lower abdomen.  GENITOURINARY:  The patient has normal external reproductive anatomy.  SKIN:  Warm and well perfused. No rashes.  NEUROLOGIC:  Alert and oriented. Cranial nerves II through XII are grossly intact. Motor and sensory exams are normal in all four extremities. Motor and sensory reflexes are 2+ and symmetric with bilateral plantar responses.  PSYCHIATRIC: Affect and mood is appropriate for age and condition.    Labs:  Recent Labs      05/08/18   1851   WBC  16.0*   RBC  4.55   HEMOGLOBIN  12.7   HEMATOCRIT  38.1   MCV  83.7   MCH  27.9   MCHC  33.3*   RDW  47.5   PLATELETCT  370   MPV  9.4     Recent Labs      05/08/18   1851   SODIUM  130*   POTASSIUM  3.4*   CHLORIDE  92*   CO2  27   GLUCOSE  108*   BUN  24*   CREATININE  0.94   CALCIUM  9.4     Recent Labs      05/08/18   0036   APTT  34.6   INR  1.14*     Recent Labs      05/08/18   0036  05/08/18   1851   ASTSGOT   --   42   ALTSGPT   --   33   TBILIRUBIN   --   0.5   ALKPHOSPHAT   --   79   GLOBULIN   --   3.6*   INR  1.14*   --        Radiology:  CT-ABDOMEN-PELVIS WITH   Final Result         1. Several dilated loops of small bowel in the left upper quadrant which may represent a partial or evolving small bowel obstruction. The remainder of the small bowel and colon have the appearance of an adynamic ileus.      2. Rapid development of a 2.4 x 1 cm irregular mass in the left medial lung base which likely represents an area of pneumonitis or parenchymal scarring, malignancy would be less likely secondary to the rapid change since previous exam of 4/21/2018.      3. Stable small calcified granuloma in right lung base.      4. Stable native aneurysmal sac.      5. Previous placement of aortic endograft.      6. Diffuse atherosclerotic changes of the abdominal aorta and iliac arteries.       DX-CHEST-PORTABLE (1 VIEW)   Final Result      1.  Pectus excavatum deformity.      2.  COPD.      3.  Mild fluid overload.      4.  Small calcified granuloma in right lung base.          Assessment:      Active Hospital Problems    Diagnosis   • Partial small bowel obstruction (HCC) [K56.600]     Priority: High   • Sepsis secondary to UTI (HCC) [A41.9, N39.0]     Priority: Medium   • Pneumonitis [J18.9]   • COPD (chronic obstructive pulmonary disease) (HCC) [J44.9]   • Peripheral vascular disease (HCC) [I73.9]     9 months, may relate to PVD     • Essential hypertension [I10]   • Dyslipidemia [E78.5]   • Tobacco dependence [F17.200]     The CT was reviewed personally at the time I was consulted.  There are some mildly dilated loops of intestine, but there is air through out the colon, she has no abdominal pain subjectively or objectively, and she has been passing gas as of this morning.  I have no indications for operative intervention at this time.    The patient has been scheduled to see Dr. Dakota Huffman, but has not been able to make it.  She would like to talk with him as she was under the impression that he would like her to have a colonoscopy.  I discussed the case with Dr. Huffman and he will visit her tomorrow.    Plan    Diet as tolerated  May benefit from UGI with small bowel follow through if obstructive symptoms recur.    Thank you very much for allowing me to participate in the care of this patient.      ____________________________________   Aggregated care time spent evaluating, reviewing documentation, providing care, and managing this patient exclusive of procedures: 40 minutes    Lucio Anderson MD    DATE OF SERVICE: 5/9/2018       CONNOR / ABDIEL     DD: 5/9/2018   DT: 9:34 AM

## 2018-05-09 NOTE — PROGRESS NOTES
Patient is A&Ox4   Admit profile is complete  Two RN Skin check is complete    Patient is on 3L O2 nasal canula.    SCD's on  Patient educated about [facility] routine and patient resources.    Patient educated about welcome folder and how to call for assistance.    Patient is NPO (Strict) at this time.      Patient call light is with in reach, bed is in the lowest and locked position, upper side rails are up.  Belongings are within reach.      18g rt AC running 100ml/hr NS

## 2018-05-09 NOTE — RESPIRATORY CARE
COPD EDUCATION by COPD CLINICAL EDUCATOR  5/9/2018 at 7:00 AM by Senait Salmeron     Patient reviewed by COPD education team. Patient does not qualify for COPD program.

## 2018-05-09 NOTE — ASSESSMENT & PLAN NOTE
"\"Recheck lipid panel\"  Had lipid panel one month ago and other than low HDL, rest of panel was normal  "

## 2018-05-09 NOTE — ASSESSMENT & PLAN NOTE
Noted on CT scan without any symptoms  On antibiotics for UTI  Ordered procalcitonin. To add doxycycline if elevated  Procalcitonin elevated. Add doxycycline.

## 2018-05-09 NOTE — ED NOTES
Patient ambulatory to Triage 4 for reassessment, denies N/V, denies pain at this time, patient agreeable to protocol but does not want to stay and wait for a room, tachycardic, encouraged to stay, updated on POC, apologized for wait times, patient agreeable to stay and wait for room at this time, blood drawn and sent to lab, patient to senior lounge with son.

## 2018-05-09 NOTE — DOCUMENTATION QUERY
DOCUMENTATION QUERY    PROVIDERS: Please select “Cosign w/ note”to reply to query.    To better represent the severity of illness of your patient, please review the following information and exercise your independent professional judgment in responding to this query.     Na 130 is noted in the Lab Results . Based upon the clinical findings, risk factors, and treatment, can a diagnosis be provided to support this finding?    • Hyponatremia  • Findings of no clinical significance   • Other explanation of clinical findings  • Unable to determine (no explanation for clinical findings)      The medical record reflects the following:   Clinical Findings  5/8 Na 130   Treatment  IV NS   Risk Factors  nausea/ vomiting, SBO   Location within medical record  History and Physical, Lab Results  and MAR     Thank you,   Denis Colmenares RN, BSN  Clinical   [phone]

## 2018-05-09 NOTE — ED NOTES
Pt ambulated to restroom, ambulatory. Gait steady. Pt back to bed without incident. Urine obtained and sent. Water given per request.

## 2018-05-09 NOTE — ED PROVIDER NOTES
ED PROVIDER NOTE     Scribed for Rodrick Gardner M.D. by Sharri Crawley. 5/8/2018, 8:45 PM.    CHIEF COMPLAINT  Chief Complaint   Patient presents with   • N/V     x5 days       HPI    Primary care provider: Baltazar Julio M.D.  Means of arrival: Walk in  History obtained from: Patient  History limited by: None     Karen Jauregui is a 72 y.o. female with chronic back pain who presents with for nausea and vomiting onset 2 weeks ago that has progressively worsened in severity. She claims to have had an associated fever 2 days ago that was measured at 100.9 °F, but states it has now resolved. The patient reports she has continued to have bowel movements and flatulence. She denies any hematochezia or hematemesis. The patient reports she recently was treated for a small bowel obstruction 2 weeks ago. She required an NG tube at that time. The patient additionally claims to have had 3 other prior episodes of bowel obstruction. She reports medical history of COPD and currently has a mild productive cough that she states is baseline. The patient is negative for dysuria, chest pain, fever, shortness of breath, diarrhea, constipation, or swelling. Positive flatus and stool today.     REVIEW OF SYSTEMS  Constitutional: Negative for fever or chills (prior fever resolved).  Respiratory: Positive for cough. Negative for shortness of breath.    Cardiovascular: Negative for chest pain or swelling.   Gastrointestinal: Positive for nausea, vomiting, diarrhea, or constipation.   Genitourinary: Negative for dysuria or flank pain.   Musculoskeletal: Positive for chronic back pain.  All other systems reviewed and are negative.  C.    PAST MEDICAL HISTORY   has a past medical history of Abdominal aortic aneurysm (HCC) (11/2010); Angina; Arthritis; Atherosclerosis of arteries of the extremities; Bowel habit changes; Carotid atherosclerosis; COPD (chronic obstructive pulmonary disease) ([facility]); Diverticulitis; Diverticulosis of colon;  Dyslipidemia; Emphysema of lung (HCC); Eosinophilic colitis; Family history of nonmelanoma skin cancer; Hypertension; Pain; Peripheral vascular disease (HCC); PVD (posterior vitreous detachment), left eye (1/13/2015); Snoring; Spinal stenosis of lumbar region; and Unspecified hemorrhagic conditions.    PAST FAMILY HISTORY  Family History   Problem Relation Age of Onset   • Hyperlipidemia Sister    • Hypertension Sister    • Non-contributory Sister      carotid atherosclerosis   • Hypertension Mother    • Hyperlipidemia Mother    • Heart Disease Mother 82     congestive heart failure, AAA   • Heart Disease Father 55     multiple heart issues   • Hypertension Father    • Hyperlipidemia Father    • Cancer Sister      sin cancer       SOCIAL HISTORY  Social History     Social History Main Topics   • Smoking status: Current Every Day Smoker     Packs/day: 0.50     Years: 45.00     Types: Cigarettes   • Smokeless tobacco: Never Used      Comment: on nicotine patches, smokes off and on, discussed stopping, trying to   • Alcohol use No   • Drug use: No   • Sexual activity: None noted       SURGICAL HISTORY   has a past surgical history that includes colonoscopy (3/1/2009, 4/2012, 7/2/13); gyn surgery (2002); gyn surgery (1975); other; colon resection laparoscopic (8/5/2013); colonoscopy with biopsy (3/6/2015); other cardiac surgery (2005); aaa with stent graft (N/A, 3/31/2017); and femoral femoral bypass (N/A, 3/31/2017).    CURRENT MEDICATIONS  Home Medications     Reviewed by Ivis England R.N. (Registered Nurse) on 05/09/18 at 0405  Med List Status: Complete   Medication Last Dose Status   aspirin EC (ECOTRIN) 81 MG Tablet Delayed Response 5/7/2018 Active   atorvastatin (LIPITOR) 80 MG tablet 5/7/2018 Active   clopidogrel (PLAVIX) 75 MG Tab 5/7/2018 Active   lisinopril-hydrochlorothiazide (PRINZIDE, ZESTORETIC) 20-25 MG per tablet 5/7/2018 Active                ALLERGIES  Allergies   Allergen Reactions   • Amoxicillin  Diarrhea     Diarrhea   • Ciprofloxacin Unspecified     Unspecified       PHYSICAL EXAM  VITAL SIGNS: BP (!) 98/58   Pulse (!) 125   Temp 36.4 °C (97.6 °F)   Resp 16   Wt 39.5 kg (87 lb)   LMP 03/01/2002   SpO2 95%   BMI 16.44 kg/m²    Pulse ox interpretation: On room air, I interpret this pulse ox as normal.  Constitutional: Chronically ill appearing, thin, moderate distress.   HEENT: Normocephalic, atraumatic. Posterior pharynx clear, mucous membranes dry.   Eyes:  EOMI. Pale conjunctivae.   Neck: Supple, Full range of motion, nontender.  Chest/Pulmonary: Distant lung sounds but no focal wheezes.  Cardiovascular: Tachycardic rate and regular rhythm, no murmur.   Abdomen: Mild abdominal distention with diffuse mild tenderness. Hyperactive bowel sounds. Small bruise below umbilicus.   Back: No CVA tenderness, nontender midline, no step offs.  Musculoskeletal: No deformity, no edema.  Neuro: Clear speech, normal coordination, cranial nerves II-XII grossly intact.  Psych: Normal mood and affect.  Skin: No rashes, warm and dry.    DIAGNOSTIC STUDIES / PROCEDURES    LABS & EKG  Results for orders placed or performed during the hospital encounter of 05/08/18   CBC WITH DIFFERENTIAL   Result Value Ref Range    WBC 16.0 (H) 4.8 - 10.8 K/uL    RBC 4.55 4.20 - 5.40 M/uL    Hemoglobin 12.7 12.0 - 16.0 g/dL    Hematocrit 38.1 37.0 - 47.0 %    MCV 83.7 81.4 - 97.8 fL    MCH 27.9 27.0 - 33.0 pg    MCHC 33.3 (L) 33.6 - 35.0 g/dL    RDW 47.5 35.9 - 50.0 fL    Platelet Count 370 164 - 446 K/uL    MPV 9.4 9.0 - 12.9 fL    Neutrophils-Polys 83.30 (H) 44.00 - 72.00 %    Lymphocytes 9.10 (L) 22.00 - 41.00 %    Monocytes 5.40 0.00 - 13.40 %    Eosinophils 0.80 0.00 - 6.90 %    Basophils 0.80 0.00 - 1.80 %    Immature Granulocytes 0.60 0.00 - 0.90 %    Nucleated RBC 0.00 /100 WBC    Neutrophils (Absolute) 13.31 (H) 2.00 - 7.15 K/uL    Lymphs (Absolute) 1.46 1.00 - 4.80 K/uL    Monos (Absolute) 0.87 (H) 0.00 - 0.85 K/uL    Eos  (Absolute) 0.13 0.00 - 0.51 K/uL    Baso (Absolute) 0.13 (H) 0.00 - 0.12 K/uL    Immature Granulocytes (abs) 0.10 0.00 - 0.11 K/uL    NRBC (Absolute) 0.00 K/uL   COMP METABOLIC PANEL   Result Value Ref Range    Sodium 130 (L) 135 - 145 mmol/L    Potassium 3.4 (L) 3.6 - 5.5 mmol/L    Chloride 92 (L) 96 - 112 mmol/L    Co2 27 20 - 33 mmol/L    Anion Gap 11.0 0.0 - 11.9    Glucose 108 (H) 65 - 99 mg/dL    Bun 24 (H) 8 - 22 mg/dL    Creatinine 0.94 0.50 - 1.40 mg/dL    Calcium 9.4 8.5 - 10.5 mg/dL    AST(SGOT) 42 12 - 45 U/L    ALT(SGPT) 33 2 - 50 U/L    Alkaline Phosphatase 79 30 - 99 U/L    Total Bilirubin 0.5 0.1 - 1.5 mg/dL    Albumin 3.3 3.2 - 4.9 g/dL    Total Protein 6.9 6.0 - 8.2 g/dL    Globulin 3.6 (H) 1.9 - 3.5 g/dL    A-G Ratio 0.9 g/dL   LIPASE   Result Value Ref Range    Lipase 35 11 - 82 U/L   ESTIMATED GFR   Result Value Ref Range    GFR If African American >60 >60 mL/min/1.73 m 2    GFR If Non African American 59 (A) >60 mL/min/1.73 m 2   LACTIC ACID   Result Value Ref Range    Lactic Acid 1.8 0.5 - 2.0 mmol/L   BLOOD CULTURE x2   Result Value Ref Range    Significant Indicator NEG     Source BLD     Site PERIPHERAL     Blood Culture       No Growth    Note: Blood cultures are incubated for 5 days and  are monitored continuously.Positive blood cultures  are called to the RN and reported as soon as  they are identified.     BLOOD CULTURE x2   Result Value Ref Range    Significant Indicator NEG     Source BLD     Site PERIPHERAL     Blood Culture       No Growth    Note: Blood cultures are incubated for 5 days and  are monitored continuously.Positive blood cultures  are called to the RN and reported as soon as  they are identified.     URINALYSIS   Result Value Ref Range    Micro Urine Req Microscopic     Color Yellow     Character Clear     Ph 5.5 5.0 - 8.0    Glucose Negative Negative mg/dL    Ketones Negative Negative mg/dL    Protein 30 (A) Negative mg/dL    Bilirubin Negative Negative     Urobilinogen, Urine 0.2 Negative    Nitrite Negative Negative    Leukocyte Esterase Negative Negative    Occult Blood Moderate (A) Negative   REFRACTOMETER SG   Result Value Ref Range    Specific Gravity >1.045    URINE MICROSCOPIC (W/UA)   Result Value Ref Range    WBC 20-50 (A) /hpf    RBC 20-50 (A) /hpf    Bacteria Negative None /hpf    Epithelial Cells Few /hpf    Hyaline Cast 3-5 (A) /lpf    Granular Casts 0-2 /lpf   Prothrombin time (INR)   Result Value Ref Range    PT 14.3 12.0 - 14.6 sec    INR 1.14 (H) 0.87 - 1.13   APTT   Result Value Ref Range    APTT 34.6 24.7 - 36.0 sec   Lactic Acid -STAT Once   Result Value Ref Range    Lactic Acid 1.2 0.5 - 2.0 mmol/L   TSH (Thyroid Stimulating Hormone)   Result Value Ref Range    TSH 1.740 0.380 - 5.330 uIU/mL   PROCALCITONIN   Result Value Ref Range    Procalcitonin 0.60 (H) <0.25 ng/mL       RADIOLOGY  CT-ABDOMEN-PELVIS WITH   Final Result         1. Several dilated loops of small bowel in the left upper quadrant which may represent a partial or evolving small bowel obstruction. The remainder of the small bowel and colon have the appearance of an adynamic ileus.      2. Rapid development of a 2.4 x 1 cm irregular mass in the left medial lung base which likely represents an area of pneumonitis or parenchymal scarring, malignancy would be less likely secondary to the rapid change since previous exam of 4/21/2018.      3. Stable small calcified granuloma in right lung base.      4. Stable native aneurysmal sac.      5. Previous placement of aortic endograft.      6. Diffuse atherosclerotic changes of the abdominal aorta and iliac arteries.      DX-CHEST-PORTABLE (1 VIEW)   Final Result      1.  Pectus excavatum deformity.      2.  COPD.      3.  Mild fluid overload.      4.  Small calcified granuloma in right lung base.      DX-UPPER GI-SMALL BOWEL FOLLOW THRU    (Results Pending)       COURSE & MEDICAL DECISION MAKING    This is a 72 y.o. female who presents with  abdominal pain, nausea/vomiting, recent SBO.    Differential Diagnosis includes but is not limited to:  sepsis, obstruction, diverticulitis, hepatobiliary disease, abscess, vascular disease.     ED Course:  8:46 PM - Patient seen and evaluated at bedside. Ordered Urine Culture, Urinalysis, Blood Culture, Lactic Acid, Estimated GFR, Lipase, CMP, CBC, POC Urinalysis, POC Urine Pregnancy, CT-Abdomen, and DX-Chest to evaluate symptoms. Patient will receive Flagyl 500 mg, Rocephing 1 g, and Morphine 2 mg. She will additionally receive 1 L NS for tachycardia and dry mucous membranes with concern for dehydration.     10:49 PM - Reviewed patient's lab and radiology results as shown above.  White blood cell count is elevated, she is hyponatremic likely secondary to hypovolemia.  CT scan shows probable SBO with distal ileus.    11:00 PM - Patient reevaluated. She was updated on plan of care that includes admission. Patient informed of lab and radiology results.     11:14 PM - Paged General Surgery. Dr. Anderson consulted, we both agree that since she is not actively vomiting and this may only be a partial SBO if her pain is well controlled without significant distention on CT scan she merits admission but does not need gastric decompression with an NG tube at this time.  However if she develops any worsening pain or vomiting she will indeed need an NG tube to be placed on possible surgery.  He requests I admit the patient to hospitalist medicine, and discussed with Dr. Desai, who will kindly admit the patient for further workup and treatment.  Patient was notified of her left lung mass which may just be pneumonitis, may need further workup.  Her symptoms are well controlled, her vitals are improved after aggressive IV fluid rehydration, and I feel like she has had a very positive response to IV fluids.  She is feeling better, IV antibiotics have been given in case there is an occult enteric infection, she is stable for  admission to the medical floor.    Medications   NS infusion 1,185 mL (not administered)   NS (BOLUS) infusion 500 mL (not administered)   metroNIDAZOLE (FLAGYL) IVPB 500 mg (0 mg Intravenous Stopped 5/9/18 0710)   senna-docusate (PERICOLACE or SENOKOT S) 8.6-50 MG per tablet 2 Tab (0 Tabs Oral Held 5/9/18 0900)     And   polyethylene glycol/lytes (MIRALAX) PACKET 1 Packet (not administered)     And   magnesium hydroxide (MILK OF MAGNESIA) suspension 30 mL (not administered)     And   bisacodyl (DULCOLAX) suppository 10 mg (not administered)   NS infusion ( Intravenous New Bag 5/9/18 1007)   ondansetron (ZOFRAN) syringe/vial injection 4 mg (not administered)   ondansetron (ZOFRAN ODT) dispertab 4 mg (not administered)   aspirin EC (ECOTRIN) tablet 81 mg (81 mg Oral Missed 5/9/18 0015)   atorvastatin (LIPITOR) tablet 80 mg (80 mg Oral Missed 5/9/18 0015)   clopidogrel (PLAVIX) tablet 75 mg (75 mg Oral Missed 5/9/18 0015)   lisinopril (PRINIVIL) 10 MG tablet 10 mg (10 mg Oral Missed 5/9/18 0115)     And   hydroCHLOROthiazide (HYDRODIURIL) tablet 12.5 mg (12.5 mg Oral Missed 5/9/18 0115)   ketorolac (TORADOL) injection 15 mg (15 mg Intravenous Given 5/9/18 0610)   Respiratory Care per Protocol (not administered)   cefTRIAXone (ROCEPHIN) syringe 1 g (not administered)   morphine (pf) 4 mg/ml injection 2 mg (2 mg Intravenous Given 5/8/18 2147)   NS infusion 1,185 mL (0 mL Intravenous Stopped 5/8/18 2317)   cefTRIAXone (ROCEPHIN) injection 1 g (1 g Intravenous Given 5/8/18 2236)   metroNIDAZOLE (FLAGYL) IVPB 500 mg (0 mg Intravenous Stopped 5/8/18 2339)   ondansetron (ZOFRAN) syringe/vial injection 4 mg (4 mg Intravenous Given 5/8/18 2143)   iohexol (OMNIPAQUE) 350 mg/mL (100 mL Intravenous Given 5/8/18 2125)       FINAL IMPRESSION  1. SBO (small bowel obstruction) (HCC)    2. Dehydration    3. Leukocytosis, unspecified type    4. Non-intractable vomiting with nausea, unspecified vomiting type    5. Acute abdominal pain     6. Lung mass    7. Chronic bronchitis, unspecified chronic bronchitis type (HCC)    8. Essential hypertension    9. Dyslipidemia    10. Tobacco dependence          -ADMIT-     Pertinent Labs & Imaging studies reviewed and verified by myself, as well as nursing notes and the patient's past medical, family, and social histories (See chart for details).    Results, exam findings, clinical impression, presumed diagnosis, treatment options, and plan for admission were discussed with the patient, and they verbalized understanding, agreed with, and appreciated the plan of care.    Sharri WILKS (Valerie), am scribing for, and in the presence of, Rodrick Gardner M.D..    Electronically signed by: Sharri Quezada), 5/8/2018    Rodrick WILKS M.D. personally performed the services described in this documentation, as scribed by Sharri Crawley in my presence, and it is both accurate and complete.    Portions of this record were made with voice recognition software.  Despite my review, spelling/grammar/context errors may still remain.  Interpretation of this chart should be taken in this context.    The note accurately reflects work and decisions made by me.  Rodrick Gardner  5/9/2018  11:53 AM

## 2018-05-09 NOTE — ASSESSMENT & PLAN NOTE
This is sepsis (without associated acute organ dysfunction).   Patient presents with signs and symptoms of sepsis likely source urinary tract infection  Started on IV antibiotics  Cultures ordered  I inherited her care 5/9. Reviewed chart. U/A to me not suggestive of UTI. She does however have a leukocytosis and per Dr. Desai, symptoms of sepsis. For now I will continue Rocephin for a 3 day duration only.

## 2018-05-10 ENCOUNTER — TELEPHONE (OUTPATIENT)
Dept: MEDICAL GROUP | Facility: CLINIC | Age: 73
End: 2018-05-10

## 2018-05-10 VITALS
SYSTOLIC BLOOD PRESSURE: 110 MMHG | OXYGEN SATURATION: 98 % | TEMPERATURE: 98.3 F | WEIGHT: 109.13 LBS | RESPIRATION RATE: 18 BRPM | BODY MASS INDEX: 20.62 KG/M2 | DIASTOLIC BLOOD PRESSURE: 68 MMHG | HEART RATE: 93 BPM

## 2018-05-10 PROBLEM — J18.9 COMMUNITY ACQUIRED PNEUMONIA: Status: ACTIVE | Noted: 2018-05-09

## 2018-05-10 LAB
ANION GAP SERPL CALC-SCNC: 3 MMOL/L (ref 0–11.9)
BUN SERPL-MCNC: 20 MG/DL (ref 8–22)
CALCIUM SERPL-MCNC: 8.8 MG/DL (ref 8.5–10.5)
CHLORIDE SERPL-SCNC: 103 MMOL/L (ref 96–112)
CO2 SERPL-SCNC: 28 MMOL/L (ref 20–33)
CREAT SERPL-MCNC: 0.63 MG/DL (ref 0.5–1.4)
ERYTHROCYTE [DISTWIDTH] IN BLOOD BY AUTOMATED COUNT: 46.7 FL (ref 35.9–50)
GLUCOSE SERPL-MCNC: 135 MG/DL (ref 65–99)
HCT VFR BLD AUTO: 32.5 % (ref 37–47)
HGB BLD-MCNC: 11 G/DL (ref 12–16)
MCH RBC QN AUTO: 28.7 PG (ref 27–33)
MCHC RBC AUTO-ENTMCNC: 33.8 G/DL (ref 33.6–35)
MCV RBC AUTO: 84.9 FL (ref 81.4–97.8)
PLATELET # BLD AUTO: 311 K/UL (ref 164–446)
PMV BLD AUTO: 9.4 FL (ref 9–12.9)
POTASSIUM SERPL-SCNC: 3.8 MMOL/L (ref 3.6–5.5)
RBC # BLD AUTO: 3.83 M/UL (ref 4.2–5.4)
SODIUM SERPL-SCNC: 134 MMOL/L (ref 135–145)
WBC # BLD AUTO: 8.7 K/UL (ref 4.8–10.8)

## 2018-05-10 PROCEDURE — 700102 HCHG RX REV CODE 250 W/ 637 OVERRIDE(OP): Performed by: INTERNAL MEDICINE

## 2018-05-10 PROCEDURE — 700105 HCHG RX REV CODE 258: Performed by: HOSPITALIST

## 2018-05-10 PROCEDURE — 99239 HOSP IP/OBS DSCHRG MGMT >30: CPT | Performed by: INTERNAL MEDICINE

## 2018-05-10 PROCEDURE — 85027 COMPLETE CBC AUTOMATED: CPT

## 2018-05-10 PROCEDURE — 80048 BASIC METABOLIC PNL TOTAL CA: CPT

## 2018-05-10 PROCEDURE — 36415 COLL VENOUS BLD VENIPUNCTURE: CPT

## 2018-05-10 PROCEDURE — 700101 HCHG RX REV CODE 250: Performed by: HOSPITALIST

## 2018-05-10 PROCEDURE — A9270 NON-COVERED ITEM OR SERVICE: HCPCS | Performed by: INTERNAL MEDICINE

## 2018-05-10 PROCEDURE — 700111 HCHG RX REV CODE 636 W/ 250 OVERRIDE (IP): Performed by: HOSPITALIST

## 2018-05-10 RX ORDER — DOXYCYCLINE 100 MG/1
100 TABLET ORAL EVERY 12 HOURS
Status: DISCONTINUED | OUTPATIENT
Start: 2018-05-10 | End: 2018-05-10 | Stop reason: HOSPADM

## 2018-05-10 RX ORDER — NICOTINE 14MG/24HR
1 PATCH, TRANSDERMAL 24 HOURS TRANSDERMAL 2 TIMES DAILY WITH MEALS
Qty: 14 CAP | Refills: 0 | Status: SHIPPED | OUTPATIENT
Start: 2018-05-10 | End: 2019-01-22

## 2018-05-10 RX ORDER — ONDANSETRON 4 MG/1
4 TABLET, ORALLY DISINTEGRATING ORAL EVERY 4 HOURS PRN
Qty: 10 TAB | Refills: 0 | Status: SHIPPED | OUTPATIENT
Start: 2018-05-10 | End: 2019-01-22

## 2018-05-10 RX ORDER — POLYETHYLENE GLYCOL 3350 17 G/17G
POWDER, FOR SOLUTION ORAL
Refills: 3 | COMMUNITY
Start: 2018-05-10 | End: 2019-03-25

## 2018-05-10 RX ORDER — CEFUROXIME AXETIL 500 MG/1
500 TABLET ORAL EVERY 12 HOURS
Qty: 8 TAB | Refills: 0 | Status: SHIPPED | OUTPATIENT
Start: 2018-05-10 | End: 2018-05-14

## 2018-05-10 RX ORDER — METRONIDAZOLE 500 MG/1
500 TABLET ORAL EVERY 8 HOURS
Status: DISCONTINUED | OUTPATIENT
Start: 2018-05-10 | End: 2018-05-10 | Stop reason: HOSPADM

## 2018-05-10 RX ORDER — CEFUROXIME AXETIL 500 MG/1
500 TABLET ORAL EVERY 12 HOURS
Status: DISCONTINUED | OUTPATIENT
Start: 2018-05-10 | End: 2018-05-10 | Stop reason: HOSPADM

## 2018-05-10 RX ORDER — DOXYCYCLINE 100 MG/1
100 TABLET ORAL EVERY 12 HOURS
Qty: 8 TAB | Refills: 0 | Status: SHIPPED | OUTPATIENT
Start: 2018-05-10 | End: 2018-05-14

## 2018-05-10 RX ORDER — METRONIDAZOLE 500 MG/1
500 TABLET ORAL EVERY 8 HOURS
Qty: 12 TAB | Refills: 0 | Status: SHIPPED | OUTPATIENT
Start: 2018-05-10 | End: 2018-05-14

## 2018-05-10 RX ORDER — AMOXICILLIN 250 MG
2 CAPSULE ORAL 2 TIMES DAILY PRN
COMMUNITY
Start: 2018-05-10 | End: 2019-03-25

## 2018-05-10 RX ADMIN — METRONIDAZOLE 500 MG: 500 INJECTION, SOLUTION INTRAVENOUS at 05:42

## 2018-05-10 RX ADMIN — CEFUROXIME AXETIL 500 MG: 500 TABLET ORAL at 10:11

## 2018-05-10 RX ADMIN — SODIUM CHLORIDE: 9 INJECTION, SOLUTION INTRAVENOUS at 05:42

## 2018-05-10 RX ADMIN — DOXYCYCLINE 100 MG: 100 TABLET ORAL at 10:06

## 2018-05-10 RX ADMIN — KETOROLAC TROMETHAMINE 15 MG: 30 INJECTION, SOLUTION INTRAMUSCULAR at 05:47

## 2018-05-10 ASSESSMENT — PATIENT HEALTH QUESTIONNAIRE - PHQ9
2. FEELING DOWN, DEPRESSED, IRRITABLE, OR HOPELESS: NOT AT ALL
1. LITTLE INTEREST OR PLEASURE IN DOING THINGS: NOT AT ALL
SUM OF ALL RESPONSES TO PHQ9 QUESTIONS 1 AND 2: 0

## 2018-05-10 NOTE — PROGRESS NOTES
No events today    VSS    Clear liquids started after UGI   Advance as tolerated per MD Barrios    Pt having multiple diarrhea BM's - on Flagyl     Pt is refusing any type of surgery at this time

## 2018-05-10 NOTE — TELEPHONE ENCOUNTER
Completed patient's FMLA form today. Patient originally in hospital April 19 and saw me in follow-up on April 30. She still had some nausea and bloating though at the time she felt she was getting a little better. However, subsequent to this she continued to have symptoms which began to escalate. She was admitted again to the hospital on May 8 and has just been discharged today. Patient has not had surgery. Symptoms and findings are consistent with probably some diverticulitis as well as small bowel obstruction. There is concern for a urinary infection though urinalysis does not show bacteria. Culture is still pending. Patient is being discharged from the hospital on an antibiotic regimen consistent with treatment of both urinary tract infection and diverticulitis. I have completed her paperwork with a return to work date July 2. Patient still has a portion she needs to complete on that paperwork.

## 2018-05-10 NOTE — CARE PLAN
Problem: Communication  Goal: The ability to communicate needs accurately and effectively will improve  Outcome: PROGRESSING AS EXPECTED  Pt is able to effectively communicate all needs.    Problem: Safety  Goal: Will remain free from falls  Outcome: PROGRESSING AS EXPECTED  Pt calls appropriately, does not attempt to ambulate without assistance.

## 2018-05-10 NOTE — THERAPY
OT orders received. Per RN, pt is up byself and does not require acute OT services at this time. Will cancel OT order.    Acacia Church, OTR/L  Pager: [phone]

## 2018-05-10 NOTE — PROGRESS NOTES
Pt aox4. No visible signs of distress. VSS. No complaints of SOB. Weaned off of O2. No complaints of nausea, no emesis. Pt reporting abdominal cramps, and having frequent urges for BM. Call light within reach and able to make all needs be known. Will continue to monitor.

## 2018-05-10 NOTE — PROGRESS NOTES
Surgery progress note  This is a patient well known to me from the clinic setting who has recurrent partial small bowel obstructions that resolved spontaneously.  She was scheduled for repeat colonoscopy for a history of a sigmoid colectomy 5 years ago.she canceled her colonoscopy due to other medical issues.  On this admission her CT imaging revealed an irregular left medial lung base mass and dilated small bowel consistent with previous examinations.I small bowel follow-through has not been read but appears to have no evidence of obstruction.  She is tolerating meals and having diet.  She will follow up with me in the outpatient setting.  Dakota Huffman MD PhD  [facility]  Colon and Rectal Surgeon  [phone]

## 2018-05-10 NOTE — DISCHARGE INSTRUCTIONS
Discharge Instructions    Discharged to home by car with relative. Discharged via wheelchair, hospital escort: Yes.  Special equipment needed: Not Applicable    Be sure to schedule a follow-up appointment with your primary care doctor or any specialists as instructed.     Discharge Plan:   Smoking Cessation Offered: Patient Counseled  Pneumococcal Vaccine Administered/Refused: Not given - Patient refused pneumococcal vaccine  Influenza Vaccine Indication: Patient Refuses    I understand that a diet low in cholesterol, fat, and sodium is recommended for good health. Unless I have been given specific instructions below for another diet, I accept this instruction as my diet prescription.   Other diet: regular diet as tolerated    Special Instructions:  Follow up with Baltazar Julio M.D. In 1- 2 weeks   Follow up with Dr. Huffman in 1-2 weeks     Instruct to return to the ER in the event of worsening symptoms. I have counseled the patient on the importance of compliance and the patient has agreed to proceed with all medical recommendations and follow up plan indicated above.   The patient understands that all medications come with benefits and risks. Risks may include permanent injury or death and these risks can be minimized with close reassessment and monitoring.      · Is patient discharged on Warfarin / Coumadin?   No     Depression / Suicide Risk    As you are discharged from this [facility] facility, it is important to learn how to keep safe from harming yourself.    Recognize the warning signs:  · Abrupt changes in personality, positive or negative- including increase in energy   · Giving away possessions  · Change in eating patterns- significant weight changes-  positive or negative  · Change in sleeping patterns- unable to sleep or sleeping all the time   · Unwillingness or inability to communicate  · Depression  · Unusual sadness, discouragement and loneliness  · Talk of wanting to die  · Neglect of  personal appearance   · Rebelliousness- reckless behavior  · Withdrawal from people/activities they love  · Confusion- inability to concentrate     If you or a loved one observes any of these behaviors or has concerns about self-harm, here's what you can do:  · Talk about it- your feelings and reasons for harming yourself  · Remove any means that you might use to hurt yourself (examples: pills, rope, extension cords, firearm)  · Get professional help from the community (Mental Health, Substance Abuse, psychological counseling)  · Do not be alone:Call your Safe Contact- someone whom you trust who will be there for you.  · Call your local CRISIS HOTLINE 553-6095 or 794-509-1030  · Call your local Children's Mobile Crisis Response Team [place] (304) 599-1961 or www.[place]  · Call the toll free National Suicide Prevention Hotlines   · National Suicide Prevention Lifeline 393-277-DUXY (0941)  · [employer] Line Network 800-SUICIDE (550-3321)      Small Bowel Obstruction  A small bowel obstruction means that something is blocking the small bowel. The small bowel is also called the small intestine. It is the long tube that connects the stomach to the colon. An obstruction will stop food and fluids from passing through the small bowel. Treatment depends on what is causing the problem and how bad the problem is.  Follow these instructions at home:  · Get a lot of rest.  · Follow your diet as told by your doctor. You may need to:  ¨ Only drink clear liquids until you start to get better.  ¨ Avoid solid foods as told by your doctor.  · Take over-the-counter and prescription medicines only as told by your doctor.  · Keep all follow-up visits as told by your doctor. This is important.  Contact a doctor if:  · You have a fever.  · You have chills.  Get help right away if:  · You have pain or cramps that get worse.  · You throw up (vomit) blood.  · You have a feeling of being sick to your stomach (nausea) that  does not go away.  · You cannot stop throwing up.  · You cannot drink fluids.  · You feel confused.  · You feel dry or thirsty (dehydrated).  · Your belly gets more bloated.  · You feel weak or you pass out (faint).  This information is not intended to replace advice given to you by your health care provider. Make sure you discuss any questions you have with your health care provider.  Document Released: 01/25/2006 Document Revised: 08/14/2017 Document Reviewed: 02/11/2016  [employer] Interactive Patient Education © 2017 [employer]

## 2018-05-10 NOTE — PROGRESS NOTES
Trauma / Surgical Progress Note  Interval Events:  No nausea overnight  No abdominal pain  Brisk urine output  2 BMs post ingestion of PO contrast for SBFT  Images from SBFT reviewed  Overall, the patient is feeling much better    -OK for discharge if tolerates a regular lunch  -Follow up in place with Dr. Nathanael DIGGS    Vitals:  Blood pressure 110/68, pulse 93, temperature 36.8 °C (98.3 °F), resp. rate 18, weight 49.5 kg (109 lb 2 oz), last menstrual period 2002, SpO2 98 %, not currently breastfeeding.  Temp (24hrs), Av.6 °C (97.8 °F), Min:36.2 °C (97.1 °F), Max:36.9 °C (98.4 °F)      IO:       Exam:  Respiratory: unlabored respirations, no intercostal retractions or accessory muscle use  Neuro: no focal deficits noted  Cardiovascular: regular rate and rhythm  GI: abdomen is soft and non-tender    Labs:  No results found for this or any previous visit (from the past 24 hour(s)).    Problem and Plan:  Active Hospital Problems    Diagnosis   • Partial small bowel obstruction (HCC) [K56.600]     Priority: High   • Sepsis secondary to UTI (HCC) [A41.9, N39.0]     Priority: Medium   • Community acquired pneumonia [J18.9]   • COPD (chronic obstructive pulmonary disease) (HCC) [J44.9]   • Peripheral vascular disease (HCC) [I73.9]     9 months, may relate to PVD     • Essential hypertension [I10]   • Dyslipidemia [E78.5]   • Tobacco dependence [F17.200]       Core Measures & Quality Metrics:  Core Measures & Quality Metrics     Aggregated care time spent evaluating, reviewing documentation, providing care, and managing this patient exclusive of procedures: 20 minutes    Lucio Anderson MD    DATE OF SERVICE: 5/10/2018

## 2018-05-10 NOTE — TELEPHONE ENCOUNTER
1. Caller Name: Karen Jauregui                      Call Back Number: [phone] (home) [phone] (work)    2. Message: Per pt she wants to go back on 7/2/18, she is back in the hospital for the same thing. Please complete her FMLA form and send to [employer] today. Thank you     3. Patient approves office to leave a detailed voicemail/MyChart message: yes

## 2018-05-10 NOTE — PROGRESS NOTES
Pt tolerating regular diet  Denies nausea / vomiting   Abdomen soft and not distended     Pt states she had a formed bowel movement     No oxygen requirements   Pt is ambulating self without assistance   -No need for PT / OT     Questions asked with no further concerns     Vitals stable  DC home with family

## 2018-05-11 ENCOUNTER — PATIENT OUTREACH (OUTPATIENT)
Dept: HEALTH INFORMATION MANAGEMENT | Facility: OTHER | Age: 73
End: 2018-05-11

## 2018-05-11 LAB
BACTERIA UR CULT: ABNORMAL
BACTERIA UR CULT: ABNORMAL
SIGNIFICANT IND 70042: ABNORMAL
SITE SITE: ABNORMAL
SOURCE SOURCE: ABNORMAL

## 2018-05-13 LAB
BACTERIA BLD CULT: NORMAL
BACTERIA BLD CULT: NORMAL
SIGNIFICANT IND 70042: NORMAL
SIGNIFICANT IND 70042: NORMAL
SITE SITE: NORMAL
SITE SITE: NORMAL
SOURCE SOURCE: NORMAL
SOURCE SOURCE: NORMAL

## 2018-05-16 ENCOUNTER — OFFICE VISIT (OUTPATIENT)
Dept: MEDICAL GROUP | Facility: CLINIC | Age: 73
End: 2018-05-16
Payer: MEDICARE

## 2018-05-16 VITALS
OXYGEN SATURATION: 95 % | SYSTOLIC BLOOD PRESSURE: 108 MMHG | RESPIRATION RATE: 13 BRPM | TEMPERATURE: 99.3 F | HEART RATE: 53 BPM | BODY MASS INDEX: 17.27 KG/M2 | DIASTOLIC BLOOD PRESSURE: 64 MMHG | WEIGHT: 91.5 LBS | HEIGHT: 61 IN

## 2018-05-16 DIAGNOSIS — K56.600 PARTIAL SMALL BOWEL OBSTRUCTION (HCC): ICD-10-CM

## 2018-05-16 DIAGNOSIS — J18.9 COMMUNITY ACQUIRED PNEUMONIA OF LEFT LOWER LOBE OF LUNG: ICD-10-CM

## 2018-05-16 PROBLEM — A41.9 SEPSIS SECONDARY TO UTI (HCC): Status: RESOLVED | Noted: 2018-05-09 | Resolved: 2018-05-16

## 2018-05-16 PROBLEM — N39.0 SEPSIS SECONDARY TO UTI (HCC): Status: RESOLVED | Noted: 2018-05-09 | Resolved: 2018-05-16

## 2018-05-16 PROCEDURE — 99214 OFFICE O/P EST MOD 30 MIN: CPT | Performed by: FAMILY MEDICINE

## 2018-05-16 NOTE — PROGRESS NOTES
Chief Complaint   Patient presents with   • GI Problem     Hospital follow up from last week   • Pneumonia       Subjective:     HPI:   Karen Jauregui presents today with the followin. Community acquired pneumonia of left lower lobe of lung (HCC)  She has been on six days of high dose antibiotics, finished on 18.  She is finally feeling better yesterday and today.  Appetite is improving.   Had left sided pneumonia when admitted to the hospital on 18.  Still very wet cough.    2. Partial small bowel obstruction (HCC)  This is better.  Slowly advancing food.  Taking supplemental drinks twice daily.  Going to start low dose benefiber as well, has tolerated in the past.     Has been taking her pain medication only intermittently.    Patient Active Problem List    Diagnosis Date Noted   • Partial small bowel obstruction (HCC) 2018     Priority: High   • History of diverticulitis of colon 2011     Priority: High   • Community acquired pneumonia 2018   • Anxiety 2018   • SBO (small bowel obstruction) ([facility]) 2018   • Hyponatremia 2018   • Osteopenia of lumbar spine 2018   • Colonic stricture ([facility]) 2017   • History of ischemic colitis 2017   • PVD (peripheral vascular disease) ([facility]) 2017   • Opiate dependence ([facility]) 2017   • Insomnia disorder 2017   • History of COPD 2017   • History of small bowel obstruction 2017   • Chronic use of opiate for therapeutic purpose 2016   • Eosinophilic colitis 2015   • Family history of nonmelanoma skin cancer    • COPD (chronic obstructive pulmonary disease) ([facility])    • Constipated 2012   • Subclavian artery stenosis, left ([facility]) 2012   • Herniated nucleus pulposus, L5-S1, left 2011   • Carotid atherosclerosis 06/15/2011   • Abdominal aortic aneurysm greater than 39 mm in diameter ([facility])    • Peripheral vascular disease ([facility])    • Essential hypertension    •  "Dyslipidemia    • Tobacco dependence    • Spinal stenosis of lumbar region    • Arteriosclerosis of arteries of extremities (HCC)        Current medicines (including changes today)  Current Outpatient Prescriptions   Medication Sig Dispense Refill   • ondansetron (ZOFRAN ODT) 4 MG TABLET DISPERSIBLE Take 1 Tab by mouth every four hours as needed (give PO if IV route is unavailable. May give per feeding tube.). 10 Tab 0   • senna-docusate (PERICOLACE OR SENOKOT S) 8.6-50 MG Tab Take 2 Tabs by mouth 2 times a day as needed for Constipation.     • polyethylene glycol/lytes (MIRALAX) Pack Take  by mouth 1 time daily as needed (if sennosides and docusate ineffective after 24 hours).  3   • Saccharomyces boulardii (PROBIOTIC) 250 MG Cap Take 1 Cap by mouth 2 times a day, with meals. 14 Cap 0   • lisinopril-hydrochlorothiazide (PRINZIDE, ZESTORETIC) 20-25 MG per tablet Take 0.5 Tabs by mouth every bedtime.     • aspirin EC (ECOTRIN) 81 MG Tablet Delayed Response Take 81 mg by mouth every evening.     • clopidogrel (PLAVIX) 75 MG Tab Take 75 mg by mouth every evening.     • atorvastatin (LIPITOR) 80 MG tablet Take 80 mg by mouth every bedtime.       No current facility-administered medications for this visit.        Allergies   Allergen Reactions   • Amoxicillin Diarrhea     Diarrhea   • Ciprofloxacin Unspecified     Unspecified       ROS: As per HPI       Objective:     Blood pressure 108/64, pulse (!) 53, temperature 37.4 °C (99.3 °F), resp. rate 13, height 1.549 m (5' 1\"), weight 41.5 kg (91 lb 8 oz), last menstrual period 03/01/2002, SpO2 95 %. Body mass index is 17.29 kg/m².    Physical Exam:  Constitutional: Well-developed and well-nourished. Not diaphoretic. No distress. Lucid and fluent.  Wet cough.  Skin: Skin is warm and dry. No rash noted.  Head: Atraumatic without lesions.  Eyes: Conjunctivae and extraocular motions are normal. Pupils are equal, round, and reactive to light. No scleral icterus.   Neck: Supple, " trachea midline. No thyromegaly present. No cervical or supraclavicular lymphadenopathy. No JVD or carotid bruits appreciated  Cardiovascular: Regular rate and rhythm.  Normal S1, S2 without murmur appreciated.  Chest: Effort normal. Still some rales and rhonchi left posterio-lateral melva   Abdomen: Soft, non tender, with moderate distention. Active bowel sounds in all four quadrants. No rebound, guarding, masses or hepatosplenomegaly.  Extremities: No cyanosis, clubbing, erythema, nor edema.   Neurological: Alert and oriented x 3.   Psychiatric:  Behavior, mood, and affect are appropriate.    Hospital imaging and labs reviewed and discussed.     Assessment and Plan:     72 y.o. female with the following issues:    1. Community acquired pneumonia of left lower lobe of lung (HCC)     2. Partial small bowel obstruction (HCC)           Followup: Return in about 6 weeks (around 6/27/2018), or if symptoms worsen or fail to improve.

## 2018-05-18 ENCOUNTER — HOSPITAL ENCOUNTER (OUTPATIENT)
Facility: MEDICAL CENTER | Age: 73
End: 2018-05-18
Attending: SURGERY
Payer: MEDICARE

## 2018-05-18 PROCEDURE — 87046 STOOL CULTR AEROBIC BACT EA: CPT

## 2018-05-18 PROCEDURE — 87045 FECES CULTURE AEROBIC BACT: CPT

## 2018-05-18 PROCEDURE — 87899 AGENT NOS ASSAY W/OPTIC: CPT

## 2018-05-19 ENCOUNTER — HOSPITAL ENCOUNTER (OUTPATIENT)
Facility: MEDICAL CENTER | Age: 73
End: 2018-05-19
Attending: SURGERY
Payer: MEDICARE

## 2018-05-19 LAB
C DIFF DNA SPEC QL NAA+PROBE: NEGATIVE
C DIFF TOX GENS STL QL NAA+PROBE: NEGATIVE

## 2018-05-19 PROCEDURE — 87493 C DIFF AMPLIFIED PROBE: CPT

## 2018-05-20 LAB
E COLI SXT1+2 STL IA: NORMAL
SIGNIFICANT IND 70042: NORMAL
SITE SITE: NORMAL
SOURCE SOURCE: NORMAL

## 2018-05-21 ENCOUNTER — HOSPITAL ENCOUNTER (OUTPATIENT)
Dept: LAB | Facility: MEDICAL CENTER | Age: 73
End: 2018-05-21
Attending: SURGERY
Payer: MEDICARE

## 2018-05-21 ENCOUNTER — HOSPITAL ENCOUNTER (OUTPATIENT)
Dept: RADIOLOGY | Facility: MEDICAL CENTER | Age: 73
End: 2018-05-21
Attending: SURGERY
Payer: MEDICARE

## 2018-05-21 DIAGNOSIS — K59.00 CONSTIPATION, UNSPECIFIED CONSTIPATION TYPE: ICD-10-CM

## 2018-05-21 DIAGNOSIS — R10.9 STOMACH ACHE: ICD-10-CM

## 2018-05-21 DIAGNOSIS — R19.7 DIARRHEA, UNSPECIFIED TYPE: ICD-10-CM

## 2018-05-21 PROCEDURE — 74018 RADEX ABDOMEN 1 VIEW: CPT

## 2018-05-22 LAB
BACTERIA STL CULT: NORMAL
E COLI SXT1+2 STL IA: NORMAL
SIGNIFICANT IND 70042: NORMAL
SITE SITE: NORMAL
SOURCE SOURCE: NORMAL

## 2018-06-11 ENCOUNTER — PATIENT OUTREACH (OUTPATIENT)
Dept: HEALTH INFORMATION MANAGEMENT | Facility: OTHER | Age: 73
End: 2018-06-11

## 2018-06-12 ENCOUNTER — HOSPITAL ENCOUNTER (OUTPATIENT)
Dept: RADIOLOGY | Facility: MEDICAL CENTER | Age: 73
End: 2018-06-12
Attending: SURGERY
Payer: MEDICARE

## 2018-06-12 DIAGNOSIS — I65.23 BILATERAL CAROTID ARTERY STENOSIS: ICD-10-CM

## 2018-06-12 DIAGNOSIS — I73.9 PERIPHERAL VASCULAR DISEASE, UNSPECIFIED (HCC): ICD-10-CM

## 2018-06-12 PROCEDURE — 93925 LOWER EXTREMITY STUDY: CPT | Mod: 26 | Performed by: SURGERY

## 2018-06-12 PROCEDURE — 93922 UPR/L XTREMITY ART 2 LEVELS: CPT | Mod: 26 | Performed by: SURGERY

## 2018-06-12 PROCEDURE — 93922 UPR/L XTREMITY ART 2 LEVELS: CPT

## 2018-06-12 PROCEDURE — 93925 LOWER EXTREMITY STUDY: CPT

## 2018-06-13 NOTE — PROGRESS NOTES
Patient Karen Welch discharged 5/10. [facility] assisted patient with multiple discharge orders including reminding patient to schedule her Gastroenterology appointment with Dr. Santos, which was kept. The patient also kept scheduled follow up appointments with her PCP but she declined assistance scheduling with Surgeon Dr. Huffman. She was instructed to return to the ER after her Gastroenterology appointment and she was ultimately readmitted.   The patient discharged home 5/10 and patient kept follow up appointments with PCP, Surgeon Dr. Huffman, and Gastroenterology Dr. Mendez. The patient expressed difficulty filling her probiotics after discharge so [facility]'s Patient Advocate contacted the patient’s pharmacy to resolve the issue and the patient successfully obtained her probiotics OTC. The patient currently has a future appointment scheduled with Gastroenterology 6/29.  PPS Score 80%

## 2018-06-15 ENCOUNTER — PATIENT OUTREACH (OUTPATIENT)
Dept: HEALTH INFORMATION MANAGEMENT | Facility: OTHER | Age: 73
End: 2018-06-15

## 2018-06-15 NOTE — PROGRESS NOTES
1. Attempt #: 1    2. HealthConnect Verified: no    3. Verify PCP: yes    4. Care Team Updated:       •   DME Company (gait device, O2, CPAP, etc.): YES       •   Other Specialists (eye doctor, derm, GYN, cardiology, endo, etc): YES    5.  Reviewed/Updated the following with patient:       •   Communication Preference Obtained? YES       •   Preferred Pharmacy? YES       •   Preferred Lab? YES       •   Family History (document living status of immediate family members and if + hx of cancer, diabetes, hypertension, hyperlipidemia, heart attack, stroke) YES. Was Abstract Encounter opened and chart updated? YES    6. [employer]: already active    7. [employer] Evelia: no    8. Annual Wellness Visit Scheduling  Scheduling Status:Scheduled      9. Care Gap Scheduling (Attempt to Schedule EACH Overdue Care Gap!)     Health Maintenance Due   Topic Date Due   • BONE DENSITY  11/30/2017   • Annual Wellness Visit  12/09/2017   • URINE DRUG SCREEN  06/22/2018        Scheduled patient for Annual Wellness Visit    10. Patient was advised: “This is a free wellness visit. The provider will screen for medical conditions to help you stay healthy. If you have other concerns to address you may be asked to discuss these at a separate visit or there may be an additional fee.”     11. Patient was informed to arrive 15 min prior to their scheduled appointment and bring in their medication bottles.

## 2018-06-23 NOTE — DISCHARGE SUMMARY
Discharge Summary    CHIEF COMPLAINT ON ADMISSION  Chief Complaint   Patient presents with   • N/V     x5 days       Reason for Admission  SENT BY MD DEHYDRATION     Admission Date  5/8/2018    CODE STATUS  Prior    HPI & HOSPITAL COURSE  This is a 73 y.o. female here with N/V (x5 days)  Please review Dr. Emile Desai M.D. notes for further details of history of present illness, past medical/social/family histories, allergies and medications. Please review Dr. Perez, Dr. Huffman Surgery consultation notes.  Presents with nausea vomiting abdominal pain consistent with partial small bowel obstruction  General surgery has been consulted.Received IV fluids. Pain control though here obstructions seem to be related to narcotics. Surgery recommended UGI, then advancing diet slowly. She and her family declines surgical intervention. She improved, tolerated diet and wanted to go home. UGI showed no obstruction. Surgery cleared her to be discharged.  She has UTI and was treated with antibiotics, she improved. She also had CT evidence of pneumonia and was treated with antibiotics. She will go home on Ceftin and doxy. Blood pressures stable. Advise Karen Jauregui to check blood pressure at home at least twice a day and have a log for primary provider to review. She will continue her cardiac medications. Smoking cessation encouraged. She will follow up with primary provider regarding lipid panel checks.     At discharge date, Karen Jauregui afebrile and hemodynamically stable, tolerated food, ambulated.  Karen Jauregui wanted to be discharged today.    Discharge Physical Exam  Constitutional: She appears well-developed and well-nourished.   HENT:   Head: Normocephalic and atraumatic.   Eyes: Conjunctivae and EOM are normal. No scleral icterus.   Neck: Normal range of motion. Neck supple.   Cardiovascular: Normal rate and regular rhythm.  Exam reveals no gallop and no friction rub.    No murmur  heard.  Pulmonary/Chest: Effort normal and breath sounds normal. No respiratory distress. She has no wheezes. She has no rales.   Abdominal: Soft. Bowel sounds are normal. She exhibits no distension. There is no tenderness. There is no rebound and no guarding.   Musculoskeletal: She exhibits no edema or tenderness.   Neurological: She is alert.   Skin: Skin is warm.   Psychiatric: She has a normal mood and affect. Her behavior is normal.                          Therefore, she is discharged in good and stable condition to home with close outpatient follow-up.    The patient met 2-midnight criteria for an inpatient stay at the time of discharge.    Discharge Date  5/10/2018    FOLLOW UP ITEMS POST DISCHARGE      DISCHARGE DIAGNOSES  Principal Problem:    Partial small bowel obstruction (HCC) POA: Yes  Active Problems:    Essential hypertension POA: Yes    Dyslipidemia POA: Yes    Tobacco dependence POA: Yes    Peripheral vascular disease (HCC) POA: Yes      Overview: 9 months, may relate to PVD    COPD (chronic obstructive pulmonary disease) (HCC) POA: Yes    Community acquired pneumonia POA: Unknown    Sepsis secondary to UTI (HCC) POA: Unknown      FOLLOW UP  Future Appointments  Date Time Provider Department Center   7/6/2018 3:20 PM KELL Gasca [facility][note 3]   7/9/2018 3:00 PM Baltazar Julio M.D. [facility]     Baltazar Julio M.D.  [address]  [address]  [place] [address]  [phone]    Schedule an appointment as soon as possible for a visit in 1 week  For follow up    Dakota Huffman M.D.  [address]  [address]  [place] [facility] [address] [phone]    Schedule an appointment as soon as possible for a visit in 1 week  For follow up      MEDICATIONS ON DISCHARGE     Medication List      START taking these medications      Instructions   ondansetron 4 MG Tbdp  Commonly known as:  ZOFRAN ODT   Take 1 Tab by mouth every four hours as needed (give PO if IV route is unavailable. May give per feeding  tube.).  Dose:  4 mg     polyethylene glycol/lytes Pack  Commonly known as:  MIRALAX   Take  by mouth 1 time daily as needed (if sennosides and docusate ineffective after 24 hours).     Probiotic 250 MG Caps   Take 1 Cap by mouth 2 times a day, with meals.  Dose:  1 Cap     senna-docusate 8.6-50 MG Tabs  Commonly known as:  PERICOLACE or SENOKOT S   Take 2 Tabs by mouth 2 times a day as needed for Constipation.  Dose:  2 Tab        CONTINUE taking these medications      Instructions   aspirin EC 81 MG Tbec  Commonly known as:  ECOTRIN   Take 81 mg by mouth every evening.  Dose:  81 mg     atorvastatin 80 MG tablet  Commonly known as:  LIPITOR   Take 80 mg by mouth every bedtime.  Dose:  80 mg     clopidogrel 75 MG Tabs  Commonly known as:  PLAVIX   Take 75 mg by mouth every evening.  Dose:  75 mg     lisinopril-hydrochlorothiazide 20-25 MG per tablet  Commonly known as:  PRINZIDE, ZESTORETIC   Take 0.5 Tabs by mouth every bedtime.  Dose:  0.5 Tab        ASK your doctor about these medications      Instructions   cefUROXime 500 MG Tabs  Commonly known as:  CEFTIN  Ask about: Should I take this medication?   Take 1 Tab by mouth every 12 hours for 4 days.  Dose:  500 mg     doxycycline monohydrate 100 MG tablet  Commonly known as:  ADOXA  Ask about: Should I take this medication?   Take 1 Tab by mouth every 12 hours for 4 days.  Dose:  100 mg     metroNIDAZOLE 500 MG Tabs  Commonly known as:  FLAGYL  Ask about: Should I take this medication?   Take 1 Tab by mouth every 8 hours for 4 days.  Dose:  500 mg            Allergies  Allergies   Allergen Reactions   • Amoxicillin Diarrhea     Diarrhea   • Ciprofloxacin Unspecified     Unspecified       DIET  No orders of the defined types were placed in this encounter.      ACTIVITY  No heavy lifting   No strenuous activity    CONSULTATIONS  Surgery    PROCEDURES  UGI revieiwed    LABORATORY  Lab Results   Component Value Date    SODIUM 134 (L) 05/10/2018    POTASSIUM 3.8  05/10/2018    CHLORIDE 103 05/10/2018    CO2 28 05/10/2018    GLUCOSE 135 (H) 05/10/2018    BUN 20 05/10/2018    CREATININE 0.63 05/10/2018    CREATININE 0.8 08/25/2008        Lab Results   Component Value Date    WBC 8.7 05/10/2018    HEMOGLOBIN 11.0 (L) 05/10/2018    HEMATOCRIT 32.5 (L) 05/10/2018    PLATELETCT 311 05/10/2018        Total time of the discharge process exceeds 35 minutes.

## 2018-06-23 NOTE — ADDENDUM NOTE
Encounter addended by: Guanaco Barrios M.D. on: 6/23/2018  9:12 AM<BR>    Actions taken: Sign clinical note

## 2018-08-15 ENCOUNTER — OFFICE VISIT (OUTPATIENT)
Dept: MEDICAL GROUP | Facility: MEDICAL CENTER | Age: 73
End: 2018-08-15
Payer: MEDICARE

## 2018-08-15 VITALS
RESPIRATION RATE: 14 BRPM | TEMPERATURE: 99.3 F | WEIGHT: 90.6 LBS | SYSTOLIC BLOOD PRESSURE: 152 MMHG | HEIGHT: 61 IN | DIASTOLIC BLOOD PRESSURE: 90 MMHG | HEART RATE: 94 BPM | OXYGEN SATURATION: 97 % | BODY MASS INDEX: 17.11 KG/M2

## 2018-08-15 DIAGNOSIS — Z79.891 CHRONIC USE OF OPIATE FOR THERAPEUTIC PURPOSE: ICD-10-CM

## 2018-08-15 DIAGNOSIS — F33.2 ENDOGENOUS DEPRESSION (HCC): ICD-10-CM

## 2018-08-15 DIAGNOSIS — I73.9 PERIPHERAL VASCULAR DISEASE (HCC): ICD-10-CM

## 2018-08-15 DIAGNOSIS — M48.062 SPINAL STENOSIS OF LUMBAR REGION WITH NEUROGENIC CLAUDICATION: ICD-10-CM

## 2018-08-15 PROBLEM — K56.609 SBO (SMALL BOWEL OBSTRUCTION) (HCC): Status: RESOLVED | Noted: 2018-04-20 | Resolved: 2018-08-15

## 2018-08-15 PROCEDURE — 99213 OFFICE O/P EST LOW 20 MIN: CPT | Performed by: FAMILY MEDICINE

## 2018-08-15 RX ORDER — HYDROCODONE BITARTRATE AND ACETAMINOPHEN 5; 325 MG/1; MG/1
1 TABLET ORAL EVERY 8 HOURS PRN
Qty: 90 TAB | Refills: 0 | Status: SHIPPED | OUTPATIENT
Start: 2018-09-14 | End: 2018-08-15 | Stop reason: SDUPTHER

## 2018-08-15 RX ORDER — HYDROCODONE BITARTRATE AND ACETAMINOPHEN 5; 325 MG/1; MG/1
1 TABLET ORAL EVERY 8 HOURS PRN
Qty: 90 TAB | Refills: 0 | Status: SHIPPED | OUTPATIENT
Start: 2018-10-14 | End: 2019-01-22 | Stop reason: SDUPTHER

## 2018-08-15 RX ORDER — ALBUTEROL SULFATE 90 UG/1
2 AEROSOL, METERED RESPIRATORY (INHALATION) EVERY 4 HOURS PRN
COMMUNITY
Start: 2018-07-13

## 2018-08-15 RX ORDER — HYDROCODONE BITARTRATE AND ACETAMINOPHEN 5; 325 MG/1; MG/1
1 TABLET ORAL EVERY 8 HOURS PRN
Qty: 90 TAB | Refills: 0 | Status: SHIPPED | OUTPATIENT
Start: 2018-08-15 | End: 2018-08-15 | Stop reason: SDUPTHER

## 2018-08-15 RX ORDER — ESCITALOPRAM OXALATE 10 MG/1
10 TABLET ORAL DAILY
Qty: 30 TAB | Refills: 4 | Status: SHIPPED | OUTPATIENT
Start: 2018-08-15 | End: 2019-02-20

## 2018-08-15 ASSESSMENT — PATIENT HEALTH QUESTIONNAIRE - PHQ9
5. POOR APPETITE OR OVEREATING: 1
7. TROUBLE CONCENTRATING ON THINGS, SUCH AS READING THE NEWSPAPER OR WATCHING TELEVISION: 2
3. TROUBLE FALLING OR STAYING ASLEEP OR SLEEPING TOO MUCH: 0
SUM OF ALL RESPONSES TO PHQ QUESTIONS 1-9: 8
8. MOVING OR SPEAKING SO SLOWLY THAT OTHER PEOPLE COULD HAVE NOTICED. OR THE OPPOSITE, BEING SO FIGETY OR RESTLESS THAT YOU HAVE BEEN MOVING AROUND A LOT MORE THAN USUAL: 0
6. FEELING BAD ABOUT YOURSELF - OR THAT YOU ARE A FAILURE OR HAVE LET YOURSELF OR YOUR FAMILY DOWN: 0
4. FEELING TIRED OR HAVING LITTLE ENERGY: 1
2. FEELING DOWN, DEPRESSED, IRRITABLE, OR HOPELESS: 2
SUM OF ALL RESPONSES TO PHQ9 QUESTIONS 1 AND 2: 4
9. THOUGHTS THAT YOU WOULD BE BETTER OFF DEAD, OR OF HURTING YOURSELF: 0
1. LITTLE INTEREST OR PLEASURE IN DOING THINGS: 2

## 2018-08-15 NOTE — PROGRESS NOTES
Chief Complaint   Patient presents with   • Chronic Opiate Therapy     refill    • Depression     for couple of months        Subjective:     HPI:   Karen Jauregui presents today with the followin. Spinal stenosis of lumbar region with neurogenic claudication  Patient has long-standing multilevel mixed degenerative change including lumbar disc disease, arthritis, ligamentous thickening.  Patient is using stretching and PT exercises to treat the radicular component.  The current regimen of hydrocodone APAP is helping the pain bringing it from a level 9 to a level 7 and sometimes 6.  The regimen is improving activity. The regimen allows the patient to complete her ADLs.  Patient denies somnolence, confusion, balance problems or severe constipation from the regimen. Patient notes side effect of dry mouth.  Patient denies new or worsening urine or stool incontinence. Patient denies leg weakness and balance problems.     2. Peripheral vascular disease (HCC)  Patient has long-standing peripheral vascular disease.  She has claudication and this is a source of chronic pain.  Patient continues to smoke and does not think she will ever stop.  She did have hip surgery for this associated pain which did not help the pain at all.  She continues her Plavix.      3. Chronic use of opiate for therapeutic purpose  No aberrant or addictive use of medications has been observed.  No adverse events have been reported.  Patient counseled to not add Tylenol to current regimen.  Patient counseled to keep medications locked up or under personal control. [facility] board of pharmacy interface is reviewed.  No inconsistencies are found.  Her average MME is 0, active is 0, max active is 15.  This is within CDC guideline for chronic pain prescribing by primary care.    4. Endogenous depression (HCC)  She notes not trigger, is mildly depressed, cannot get things accomplished.  She is very tired of the heat. Would like to start a low  dose antidepressant as she has had some help from this many years ago.  Denies SI or plan, morbid thoughts or thoughts of self harm.  Discussed how the medication works and what to expect.  She will keep me updated via e-mail.        Patient Active Problem List    Diagnosis Date Noted   • Partial small bowel obstruction (HCC) 04/19/2018     Priority: High   • History of diverticulitis of colon 11/16/2011     Priority: High   • Endogenous depression ([facility]) 08/15/2018   • Community acquired pneumonia 05/09/2018   • Anxiety 04/22/2018   • Hyponatremia 04/19/2018   • Osteopenia of lumbar spine 02/14/2018   • Colonic stricture ([facility]) 06/27/2017   • History of ischemic colitis 06/27/2017   • PVD (peripheral vascular disease) ([facility]) 06/02/2017   • Opiate dependence ([facility]) 06/02/2017   • Insomnia disorder 02/20/2017   • History of COPD 02/20/2017   • History of small bowel obstruction 02/17/2017   • Chronic use of opiate for therapeutic purpose 07/07/2016   • Eosinophilic colitis 07/21/2015   • Family history of nonmelanoma skin cancer    • COPD (chronic obstructive pulmonary disease) ([facility])    • Constipated 05/26/2012   • Subclavian artery stenosis, left ([facility]) 03/27/2012   • Herniated nucleus pulposus, L5-S1, left 09/08/2011   • Carotid atherosclerosis 06/15/2011   • Abdominal aortic aneurysm greater than 39 mm in diameter ([facility])    • Peripheral vascular disease ([facility])    • Essential hypertension    • Dyslipidemia    • Tobacco dependence    • Spinal stenosis of lumbar region    • Arteriosclerosis of arteries of extremities ([facility])        Current medicines (including changes today)  Current Outpatient Prescriptions   Medication Sig Dispense Refill   • escitalopram (LEXAPRO) 10 MG Tab Take 1 Tab by mouth every day. 30 Tab 4   • [START ON 10/14/2018] HYDROcodone-acetaminophen (NORCO) 5-325 MG Tab per tablet Take 1 Tab by mouth every 8 hours as needed (sciatica and leg pain/hip pain) for up to 30 days. 90 Tab 0   • VENTOLIN  (90  "Base) MCG/ACT Aero Soln inhalation aerosol      • ondansetron (ZOFRAN ODT) 4 MG TABLET DISPERSIBLE Take 1 Tab by mouth every four hours as needed (give PO if IV route is unavailable. May give per feeding tube.). 10 Tab 0   • senna-docusate (PERICOLACE OR SENOKOT S) 8.6-50 MG Tab Take 2 Tabs by mouth 2 times a day as needed for Constipation.     • polyethylene glycol/lytes (MIRALAX) Pack Take  by mouth 1 time daily as needed (if sennosides and docusate ineffective after 24 hours).  3   • Saccharomyces boulardii (PROBIOTIC) 250 MG Cap Take 1 Cap by mouth 2 times a day, with meals. 14 Cap 0   • lisinopril-hydrochlorothiazide (PRINZIDE, ZESTORETIC) 20-25 MG per tablet Take 0.5 Tabs by mouth every bedtime.     • aspirin EC (ECOTRIN) 81 MG Tablet Delayed Response Take 81 mg by mouth every evening.     • clopidogrel (PLAVIX) 75 MG Tab Take 75 mg by mouth every evening.     • atorvastatin (LIPITOR) 80 MG tablet Take 80 mg by mouth every bedtime.       No current facility-administered medications for this visit.        Allergies   Allergen Reactions   • Amoxicillin Diarrhea     Diarrhea   • Ciprofloxacin Unspecified     Unspecified       ROS: As per HPI       Objective:     Blood pressure 152/90, pulse 94, temperature 37.4 °C (99.3 °F), resp. rate 14, height 1.549 m (5' 1\"), weight 41.1 kg (90 lb 9.6 oz), last menstrual period 03/01/2002, SpO2 97 %, not currently breastfeeding. Body mass index is 17.12 kg/m².    Physical Exam:  Constitutional: Well-developed and well-nourished. Not diaphoretic. No distress. Lucid and fluent.  Skin: Skin is warm and dry. No rash noted.  Head: Atraumatic without lesions.  Eyes: Conjunctivae and extraocular motions are normal. Pupils are equal, round, and reactive to light. No scleral icterus.   Mouth/Throat: Tongue normal. Oropharynx is clear and moist. Posterior pharynx without erythema or exudates.  Neck: Supple, trachea midline. No thyromegaly present. No cervical or supraclavicular " lymphadenopathy. No JVD or carotid bruits appreciated  Cardiovascular: Regular rate and rhythm.  Normal S1, S2 without murmur appreciated.  Chest: Effort normal. Clear to auscultation throughout. No adventitious sounds.   Abdomen: Soft, non tender, and without distention. Active bowel sounds in all four quadrants. No rebound, guarding, masses or hepatosplenomegaly.  Extremities: No cyanosis, clubbing, erythema, nor edema.   Neurological: Alert and oriented x 3. No tremor noted.  Psychiatric:  Behavior, mood, and affect are appropriate.    Depression Screen (PHQ-2/PHQ-9) 5/9/2018 5/10/2018 8/15/2018   PHQ-2 Total Score - - 4   PHQ-2 Total Score 0 0 -   PHQ-2 Total Score - - -   PHQ-2 Total Score - - -   PHQ-9 Total Score - - 8   PHQ-9 Total Score - - -   PHQ-9 Total Score - - -       Interpretation of PHQ-9 Total Score   Score Severity   1-4 No Depression   5-9 Mild Depression   10-14 Moderate Depression   15-19 Moderately Severe Depression   20-27 Severe Depression     Assessment and Plan:     73 y.o. female with the following issues:    1. Spinal stenosis of lumbar region with neurogenic claudication  HYDROcodone-acetaminophen (NORCO) 5-325 MG Tab per tablet    DISCONTINUED: HYDROcodone-acetaminophen (NORCO) 5-325 MG Tab per tablet    DISCONTINUED: HYDROcodone-acetaminophen (NORCO) 5-325 MG Tab per tablet   2. Peripheral vascular disease (HCC)  HYDROcodone-acetaminophen (NORCO) 5-325 MG Tab per tablet    DISCONTINUED: HYDROcodone-acetaminophen (NORCO) 5-325 MG Tab per tablet    DISCONTINUED: HYDROcodone-acetaminophen (NORCO) 5-325 MG Tab per tablet   3. Chronic use of opiate for therapeutic purpose  [facility] PAIN MANAGEMENT SCREEN    CONSENT FOR OPIATE PRESCRIPTION   4. Endogenous depression (HCC)  escitalopram (LEXAPRO) 10 MG Tab         Followup: Return in about 4 months (around 12/15/2018), or if symptoms worsen or fail to improve.

## 2018-08-28 ENCOUNTER — OFFICE VISIT (OUTPATIENT)
Dept: URGENT CARE | Facility: PHYSICIAN GROUP | Age: 73
End: 2018-08-28
Payer: MEDICARE

## 2018-08-28 VITALS
RESPIRATION RATE: 18 BRPM | HEIGHT: 61 IN | DIASTOLIC BLOOD PRESSURE: 70 MMHG | HEART RATE: 116 BPM | TEMPERATURE: 98.6 F | SYSTOLIC BLOOD PRESSURE: 112 MMHG | WEIGHT: 92 LBS | BODY MASS INDEX: 17.37 KG/M2 | OXYGEN SATURATION: 96 %

## 2018-08-28 DIAGNOSIS — J22 LRTI (LOWER RESPIRATORY TRACT INFECTION): ICD-10-CM

## 2018-08-28 DIAGNOSIS — J02.9 SORE THROAT: ICD-10-CM

## 2018-08-28 LAB
INT CON NEG: NEGATIVE
INT CON POS: POSITIVE
S PYO AG THROAT QL: NORMAL

## 2018-08-28 PROCEDURE — 99214 OFFICE O/P EST MOD 30 MIN: CPT | Performed by: PHYSICIAN ASSISTANT

## 2018-08-28 PROCEDURE — 87880 STREP A ASSAY W/OPTIC: CPT | Performed by: PHYSICIAN ASSISTANT

## 2018-08-28 RX ORDER — DOXYCYCLINE HYCLATE 100 MG
100 TABLET ORAL 2 TIMES DAILY
Qty: 14 TAB | Refills: 0 | Status: SHIPPED | OUTPATIENT
Start: 2018-08-28 | End: 2018-09-04

## 2018-08-28 RX ORDER — BENZONATATE 200 MG/1
200 CAPSULE ORAL 3 TIMES DAILY PRN
Qty: 30 CAP | Refills: 0 | Status: SHIPPED | OUTPATIENT
Start: 2018-08-28 | End: 2018-10-10 | Stop reason: SDUPTHER

## 2018-08-28 ASSESSMENT — ENCOUNTER SYMPTOMS
SPUTUM PRODUCTION: 1
SHORTNESS OF BREATH: 0
SINUS PAIN: 1
SORE THROAT: 0
TINGLING: 0
HEADACHES: 0
VOMITING: 0
NAUSEA: 0
FOCAL WEAKNESS: 0
MYALGIAS: 0
SENSORY CHANGE: 0
COUGH: 1
SWOLLEN GLANDS: 0
PALPITATIONS: 0
CHILLS: 0
FEVER: 0
ABDOMINAL PAIN: 0
HOARSE VOICE: 0

## 2018-08-28 NOTE — PROGRESS NOTES
Subjective:      Karen Jauregui is a 73 y.o. female who presents with Sore Throat (cough, headache, ear pain X 2 days )            Pharyngitis    This is a new problem. Episode onset: 2-3 days  The problem has been gradually worsening (worsening  cough). There has been no fever. Associated symptoms include congestion and coughing (productive cough, green mucous  ). Pertinent negatives include no abdominal pain, ear discharge, ear pain, headaches, hoarse voice, plugged ear sensation, shortness of breath, swollen glands or vomiting. She has tried nothing for the symptoms.     Patient has history COPD, recurrent bronchitis and pneumonia   She uses a rescue inhaler prn.    Past Medical History:   Diagnosis Date   • Abdominal aortic aneurysm (HCC) 11/2010    3.6 cm 8/2014   • Angina     with stress test   • Arthritis     thumbs   • Atherosclerosis of arteries of the extremities     two stents in the groin, Dr. Pickett   • Bowel habit changes    • Carotid atherosclerosis     Dr. Pickett   • COPD (chronic obstructive pulmonary disease) ([facility])    • Diverticulitis     Dx 11/25/11   • Diverticulosis of colon    • Dyslipidemia    • Emphysema of lung (HCC)    • Eosinophilic colitis    • Family history of nonmelanoma skin cancer    • Hypertension    • Pain    • Peripheral vascular disease (HCC)     9 months, may relate to PVD   • PVD (posterior vitreous detachment), left eye 1/13/2015   • Snoring    • Spinal stenosis of lumbar region     Dr. Navarro   • Unspecified hemorrhagic conditions     asa/plavix, bruises easily       Past Surgical History:   Procedure Laterality Date   • AAA WITH STENT GRAFT N/A 3/31/2017    Procedure: AAA WITH STENT GRAFT - 5-CM INFRARENAL;  Surgeon: Spring Pemberton M.D.;  Location: SURGERY [facility];  Service:    • FEMORAL FEMORAL BYPASS N/A 3/31/2017    Procedure: FEMORAL FEMORAL BYPASS - POSS;  Surgeon: Spring Pemberton M.D.;  Location: SURGERY [facility];  Service:    • COLONOSCOPY  WITH BIOPSY  3/6/2015    Performed by Pranav THOMPSON M.D. at ENDOSCOPY [facility] ORS   • COLON RESECTION LAPAROSCOPIC  8/5/2013    Performed by Spring Pemberton M.D. at SURGERY [facility] ORS   • OTHER CARDIAC SURGERY  2005    cardiac stents   • GYN SURGERY  2002    hysterectomy, tubal, fibroids, ovaries gone   • GYN SURGERY  1975    ectopic pregnacy   • COLONOSCOPY  3/1/2009, 4/2012, 7/2/13    normal, normal, normal but heavy diverticulosis   • OTHER      LLE /RLEstent, common iliac, Dr. Pickett       Family History   Problem Relation Age of Onset   • Hyperlipidemia Sister    • Hypertension Sister    • Stroke Sister    • Heart Disease Sister         heart attack   • Non-contributory Sister         carotid atherosclerosis   • Hypertension Mother    • Hyperlipidemia Mother    • Heart Disease Mother 82        congestive heart failure, AAA   • Heart Disease Father 55        multiple heart issues   • Hypertension Father    • Hyperlipidemia Father    • Cancer Sister         sin cancer   • Heart Disease Brother         heart attack       Allergies   Allergen Reactions   • Amoxicillin Diarrhea     Diarrhea   • Ciprofloxacin Unspecified     Unspecified       Medications, Allergies, and current problem list reviewed today in Epic    Review of Systems   Constitutional: Negative for chills, fever and malaise/fatigue.   HENT: Positive for congestion and sinus pain. Negative for ear discharge, ear pain, hoarse voice and sore throat.    Respiratory: Positive for cough (productive cough, green mucous  ) and sputum production. Negative for shortness of breath.    Cardiovascular: Negative for chest pain, palpitations and leg swelling.   Gastrointestinal: Negative for abdominal pain, nausea and vomiting.   Musculoskeletal: Negative for myalgias.   Skin: Negative for rash.   Neurological: Negative for tingling, sensory change, focal weakness and headaches.     All other systems reviewed and are negative.        Objective:     BP  "112/70   Pulse (!) 116   Temp 37 °C (98.6 °F)   Resp 18   Ht 1.549 m (5' 1\")   Wt 41.7 kg (92 lb)   LMP 03/01/2002   SpO2 96%   BMI 17.38 kg/m²      Physical Exam   Constitutional: She is oriented to person, place, and time. She appears well-developed and well-nourished. No distress.   HENT:   Head: Normocephalic and atraumatic.   Right Ear: Tympanic membrane, external ear and ear canal normal.   Left Ear: Tympanic membrane, external ear and ear canal normal.   Nose: Mucosal edema and rhinorrhea present. Right sinus exhibits maxillary sinus tenderness. Left sinus exhibits maxillary sinus tenderness.   Mouth/Throat: Uvula is midline, oropharynx is clear and moist and mucous membranes are normal.   Neck: Neck supple.   Cardiovascular: Normal rate, regular rhythm and normal heart sounds.  Exam reveals no gallop and no friction rub.    No murmur heard.  Pulmonary/Chest: Effort normal. No respiratory distress.   Wheezes and rhonchi    Lymphadenopathy:     She has no cervical adenopathy.   Neurological: She is alert and oriented to person, place, and time. No cranial nerve deficit.   Skin: Skin is warm and dry. No rash noted.   Psychiatric: She has a normal mood and affect. Her behavior is normal. Judgment and thought content normal.               Assessment/Plan:     1. LRTI (lower respiratory tract infection)    - doxycycline (VIBRAMYCIN) 100 MG Tab; Take 1 Tab by mouth 2 times a day for 7 days.  Dispense: 14 Tab; Refill: 0    - benzonatate (TESSALON) 200 MG capsule; Take 1 Cap by mouth 3 times a day as needed for Cough.  Dispense: 30 Cap; Refill: 0    2. Sore throat    - POCT Rapid Strep A- negative     Differential diagnoses, Supportive care, and indications for immediate follow-up discussed with patient.   Instructed to return to clinic or nearest emergency department for any change in condition, further concerns, or worsening of symptoms.    The patient demonstrated a good understanding and agreed with the " treatment plan.    Jessi Fay P.A.-C.

## 2018-09-10 ENCOUNTER — OFFICE VISIT (OUTPATIENT)
Dept: MEDICAL GROUP | Facility: MEDICAL CENTER | Age: 73
End: 2018-09-10
Payer: MEDICARE

## 2018-09-10 VITALS
WEIGHT: 91.6 LBS | DIASTOLIC BLOOD PRESSURE: 76 MMHG | TEMPERATURE: 98.1 F | OXYGEN SATURATION: 98 % | HEIGHT: 61 IN | BODY MASS INDEX: 17.29 KG/M2 | SYSTOLIC BLOOD PRESSURE: 128 MMHG | HEART RATE: 99 BPM

## 2018-09-10 DIAGNOSIS — R53.83 OTHER FATIGUE: ICD-10-CM

## 2018-09-10 DIAGNOSIS — F17.210 CIGARETTE NICOTINE DEPENDENCE WITHOUT COMPLICATION: ICD-10-CM

## 2018-09-10 DIAGNOSIS — J41.0 SIMPLE CHRONIC BRONCHITIS (HCC): ICD-10-CM

## 2018-09-10 PROCEDURE — 99213 OFFICE O/P EST LOW 20 MIN: CPT | Performed by: NURSE PRACTITIONER

## 2018-09-10 RX ORDER — TIOTROPIUM BROMIDE 18 UG/1
18 CAPSULE ORAL; RESPIRATORY (INHALATION) DAILY
Qty: 30 CAP | Refills: 3 | Status: SHIPPED | OUTPATIENT
Start: 2018-09-10 | End: 2020-02-28 | Stop reason: SDUPTHER

## 2018-09-10 NOTE — PROGRESS NOTES
"cc:  Follow up recent urgent care for copd exacerbation      Subjective:     HPI:     Karen Jauregui is a 73 y.o. female here to discuss the evaluation and management of:    COPD  Patient states that she has chronic bronchitis.  Patient states that she recently went to the urgent care on August 28.  States was given doxycycline for a respiratory infection and subsequently had diarrhea and headache from this.  States she took it for a few days and then had stopped for a few days and then restarted taking the medication and her diarrhea came back.  Patient states that she did not finish the medication.  States that now she feels better just \"sluggish\", she did work all week.  Denies any chest pain, fever, chills, or diarrhea at this time.  States last diarrhea stool was on Thursday last week.  States she is urinating without difficulty.  She does report that she is staying hydrated.  She does have a cough which is normal for her and she continues to have a clear mucus.  She also states the Tessalon Perles are helping.  Denies feeling short of breath, states sometimes it is hard to get a deep breath in.  Not taking any inhalers at this time on a regular basis. Patient states she is curious to see if she is having low oxygen at nighttime.  States that she would like to be tested for this.    Tobacco use  Still uses tobacco products.       ROS:  Denies any Headache, Blurred Vision, Confusion, Chest pain,  Shortness of breath,  Abdominal pain, Changes of bowel or bladder, Lower ext edema, Fevers, Nights sweats, Weight Changes, Focal weakness or numbness.  All other systems are negative.  Cough, fatigue        Current Outpatient Prescriptions:   •  tiotropium (SPIRIVA) 18 MCG Cap, Inhale 1 Cap by mouth every day., Disp: 30 Cap, Rfl: 3  •  benzonatate (TESSALON) 200 MG capsule, Take 1 Cap by mouth 3 times a day as needed for Cough., Disp: 30 Cap, Rfl: 0  •  VENTOLIN  (90 Base) MCG/ACT Aero Soln inhalation " aerosol, , Disp: , Rfl:   •  escitalopram (LEXAPRO) 10 MG Tab, Take 1 Tab by mouth every day., Disp: 30 Tab, Rfl: 4  •  [START ON 10/14/2018] HYDROcodone-acetaminophen (NORCO) 5-325 MG Tab per tablet, Take 1 Tab by mouth every 8 hours as needed (sciatica and leg pain/hip pain) for up to 30 days., Disp: 90 Tab, Rfl: 0  •  ondansetron (ZOFRAN ODT) 4 MG TABLET DISPERSIBLE, Take 1 Tab by mouth every four hours as needed (give PO if IV route is unavailable. May give per feeding tube.)., Disp: 10 Tab, Rfl: 0  •  senna-docusate (PERICOLACE OR SENOKOT S) 8.6-50 MG Tab, Take 2 Tabs by mouth 2 times a day as needed for Constipation., Disp: , Rfl:   •  polyethylene glycol/lytes (MIRALAX) Pack, Take  by mouth 1 time daily as needed (if sennosides and docusate ineffective after 24 hours)., Disp: , Rfl: 3  •  Saccharomyces boulardii (PROBIOTIC) 250 MG Cap, Take 1 Cap by mouth 2 times a day, with meals., Disp: 14 Cap, Rfl: 0  •  lisinopril-hydrochlorothiazide (PRINZIDE, ZESTORETIC) 20-25 MG per tablet, Take 0.5 Tabs by mouth every bedtime., Disp: , Rfl:   •  aspirin EC (ECOTRIN) 81 MG Tablet Delayed Response, Take 81 mg by mouth every evening., Disp: , Rfl:   •  clopidogrel (PLAVIX) 75 MG Tab, Take 75 mg by mouth every evening., Disp: , Rfl:   •  atorvastatin (LIPITOR) 80 MG tablet, Take 80 mg by mouth every bedtime., Disp: , Rfl:     Allergies   Allergen Reactions   • Doxycycline Diarrhea     None stop diarrhea   • Amoxicillin Diarrhea     Diarrhea   • Ciprofloxacin Unspecified     Unspecified       Past Medical History:   Diagnosis Date   • Abdominal aortic aneurysm (HCC) 11/2010    3.6 cm 8/2014   • Angina     with stress test   • Arthritis     thumbs   • Atherosclerosis of arteries of the extremities     two stents in the groin, Dr. Pickett   • Bowel habit changes    • Carotid atherosclerosis     Dr. Pickett   • COPD (chronic obstructive pulmonary disease) (HCC)    • Diverticulitis     Dx 11/25/11   • Diverticulosis of colon     • Dyslipidemia    • Emphysema of lung (HCC)    • Eosinophilic colitis    • Family history of nonmelanoma skin cancer    • Hypertension    • Pain    • Peripheral vascular disease (HCC)     9 months, may relate to PVD   • PVD (posterior vitreous detachment), left eye 1/13/2015   • Snoring    • Spinal stenosis of lumbar region     Dr. Navarro   • Unspecified hemorrhagic conditions     asa/plavix, bruises easily     Past Surgical History:   Procedure Laterality Date   • AAA WITH STENT GRAFT N/A 3/31/2017    Procedure: AAA WITH STENT GRAFT - 5-CM INFRARENAL;  Surgeon: Spring Pemberton M.D.;  Location: SURGERY [facility];  Service:    • FEMORAL FEMORAL BYPASS N/A 3/31/2017    Procedure: FEMORAL FEMORAL BYPASS - POSS;  Surgeon: Spring Pemberton M.D.;  Location: SURGERY [facility];  Service:    • COLONOSCOPY WITH BIOPSY  3/6/2015    Performed by Pranav THOMPSON M.D. at ENDOSCOPY [facility]   • COLON RESECTION LAPAROSCOPIC  8/5/2013    Performed by Spring Pemberton M.D. at SURGERY [facility]   • OTHER CARDIAC SURGERY  2005    cardiac stents   • GYN SURGERY  2002    hysterectomy, tubal, fibroids, ovaries gone   • GYN SURGERY  1975    ectopic pregnacy   • COLONOSCOPY  3/1/2009, 4/2012, 7/2/13    normal, normal, normal but heavy diverticulosis   • OTHER      LLE /RLEstent, common iliac, Dr. Pickett     Family History   Problem Relation Age of Onset   • Hyperlipidemia Sister    • Hypertension Sister    • Stroke Sister    • Heart Disease Sister         heart attack   • Non-contributory Sister         carotid atherosclerosis   • Hypertension Mother    • Hyperlipidemia Mother    • Heart Disease Mother 82        congestive heart failure, AAA   • Heart Disease Father 55        multiple heart issues   • Hypertension Father    • Hyperlipidemia Father    • Cancer Sister         sin cancer   • Heart Disease Brother         heart attack     Social History     Social History   • Marital status:      Spouse name:  "N/A   • Number of children: N/A   • Years of education: N/A     Occupational History   • 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[employer] [address]     Social History Main Topics   • Smoking status: Current Every Day Smoker     Packs/day: 1.00     Years: 45.00     Types: Cigarettes   • Smokeless tobacco: Never Used      Comment: doubts she will stop   • Alcohol use No   • Drug use: No   • Sexual activity: Not on file     Other Topics Concern   • Not on file     Social History Narrative   • No narrative on file       Objective:     Vitals: /76   Pulse 99   Temp 36.7 °C (98.1 °F)   Ht 1.549 m (5' 1\")   Wt 41.5 kg (91 lb 9.6 oz)   LMP 03/01/2002   SpO2 98%   BMI 17.31 kg/m²    General: Alert, pleasant, NAD  HEENT: Normocephalic.    Heart: Irregular pause after 8-12 beats.  S1 and S2 normal.  No murmurs appreciated.  Respiratory: Shallow, slightly diminished in the bases, no rhonchi, no wheezing, no crackles auscultated  Skin: Warm, dry, no rashes.  Musculoskeletal: Gait is normal.  Moves all extremities well.  Extremities: No leg edema  Neurological: No tremors, sensation grossly intact, gait is normal,   Psych:  Affect/mood is normal, judgement is good, memory is intact, grooming is appropriate.    Assessment/Plan:     Karen was seen today for shortness of breath and bronchitis.    Diagnoses and all orders for this visit:    Simple chronic bronchitis (HCC)  Recent exacerbation is resolved despite her not completing her antibiotics due to GI discomfort.  Oxygen saturation above 95% in clinic today, afebrile, lungs without any crackles, rhonchi appreciated.  Has not been using her rescue inhaler daily.  States that she feels like she has a lot of secretions when she coughs, clear in color.  Will trial her on Spiriva and she will follow back up in a month with her PCP to assess effectiveness.  Will order overnight sleep oximetry.  -     tiotropium (SPIRIVA) 18 MCG Cap; Inhale 1 Cap by mouth every day.    Cigarette nicotine dependence " without complication  Discussed with patient the importance of reducing tobacco consumption. Educated patient regarding the effects of tobacco use on cardiovascular system.               Health care Maintenance:     Return in about 4 weeks (around 10/8/2018) for follow up copd, Med Check.          Bianca CERDA

## 2018-09-10 NOTE — PATIENT INSTRUCTIONS
Tiotropium inhalation powder  What is this medicine?  TIOTROPIUM (hodan oh TRO pee um) is a bronchodilator. It helps open up the airways in your lungs to make it easier to breathe. This medicine is used to treat chronic obstructive pulmonary disease (COPD), including emphysema and chronic bronchitis. Do not use this medicine for an acute attack.  This medicine may be used for other purposes; ask your health care provider or pharmacist if you have questions.  COMMON BRAND NAME(S): Spiriva HandiHaler  What should I tell my health care provider before I take this medicine?  They need to know if you have any of these conditions:  -bladder problems or difficulty passing urine  -glaucoma  -kidney disease  -prostate trouble  -an unusual or allergic reaction to tiotropium, ipratropium, atropine, other medicines, lactose, foods, dyes, or preservatives  -pregnant or trying to get pregnant  -breast-feeding  How should I use this medicine?  This medicine is used in a special inhaler. Do NOT swallow the capsules. Do NOT use a spacer device. Follow the directions on the prescription label. Take your medicine at regular intervals. Do not take it more often than directed. Do not stop taking except on your doctor's advice. Make sure that you are using your inhaler correctly. Ask your doctor or health care provider if you have any questions. Small pieces of the capsule may get in your mouth or throat when you breathe in your medicine. This is normal and should not hurt you.  Talk to your pediatrician regarding the use of this medicine in children. Special care may be needed.  Overdosage: If you think you have taken too much of this medicine contact a poison control center or emergency room at once.  NOTE: This medicine is only for you. Do not share this medicine with others.  What if I miss a dose?  If you miss a dose, use it as soon as you can. If it is almost time for your next dose, use only that dose and continue with your regular  schedule. Do not use double or extra doses.  What may interact with this medicine?  This medicine may also interact with the following medications:  -atropine  -antihistamines for allergy, cough and cold  -certain medicines for bladder problems like oxybutynin, tolterodine  -certain medicines for stomach problems like dicyclomine, hyoscyamine  -certain medicines for travel sickness like scopolamine  -certain medicines for Parkinson's disease like benztropine, trihexyphenidyl  -ipratropium  This list may not describe all possible interactions. Give your health care provider a list of all the medicines, herbs, non-prescription drugs, or dietary supplements you use. Also tell them if you smoke, drink alcohol, or use illegal drugs. Some items may interact with your medicine.  What should I watch for while using this medicine?  Visit your doctor or health care professional for regular checks on your progress. Tell your doctor if your symptoms do not improve. Do not use more medicine than directed. If your symptoms get worse while you are using this medicine, call your doctor right away.  Do not get the this medicine in your eyes. It can cause irritation, pain, or blurred vision.  You may get dizzy. Do not drive, use machinery, or do anything that needs mental alertness until you know how this medicine affects you. Do not stand or sit up quickly, especially if you are an older patient. This reduces the risk of dizzy or fainting spells.  Clean the inhaler as directed in the patient information sheet that comes with this medicine.  What side effects may I notice from receiving this medicine?  Side effects that you should report to your doctor or health care professional as soon as possible:  -allergic reactions like skin rash or hives, swelling of the face, lips, or tongue  -breathing problems  -changes in vision  -chest pain  -fast heartbeat  -infection or flu-like symptoms  -trouble passing urine or change in the amount  of urine  Side effects that usually do not require medical attention (report to your doctor or health care professional if they continue or are bothersome):  -constipation  -cough  -dizziness  -dry mouth  -headache  -muscle pain  -sore throat  -stomach upset  This list may not describe all possible side effects. Call your doctor for medical advice about side effects. You may report side effects to FDA at 7-275-UPB-6703.  Where should I keep my medicine?  Keep out of the reach of children.  Store at room temperature between 15 and 30 degrees C (59 and 86 degrees F). Protect from humidity. Keep capsules in the foil pack until you are ready to use. Throw away any unused medicine after the expiration date.  NOTE: This sheet is a summary. It may not cover all possible information. If you have questions about this medicine, talk to your doctor, pharmacist, or health care provider.  © 2018 [employer] (2015-09-30 13:19:07)

## 2018-10-10 ENCOUNTER — OFFICE VISIT (OUTPATIENT)
Dept: MEDICAL GROUP | Facility: MEDICAL CENTER | Age: 73
End: 2018-10-10
Payer: MEDICARE

## 2018-10-10 VITALS
WEIGHT: 93.4 LBS | RESPIRATION RATE: 14 BRPM | HEIGHT: 61 IN | OXYGEN SATURATION: 96 % | BODY MASS INDEX: 17.64 KG/M2 | SYSTOLIC BLOOD PRESSURE: 142 MMHG | TEMPERATURE: 99.2 F | HEART RATE: 97 BPM | DIASTOLIC BLOOD PRESSURE: 82 MMHG

## 2018-10-10 DIAGNOSIS — Z23 NEED FOR IMMUNIZATION AGAINST INFLUENZA: ICD-10-CM

## 2018-10-10 DIAGNOSIS — I73.9 PVD (PERIPHERAL VASCULAR DISEASE) (HCC): ICD-10-CM

## 2018-10-10 DIAGNOSIS — I77.1 SUBCLAVIAN ARTERY STENOSIS, LEFT (HCC): ICD-10-CM

## 2018-10-10 DIAGNOSIS — Z87.19 HISTORY OF ISCHEMIC COLITIS: ICD-10-CM

## 2018-10-10 DIAGNOSIS — E78.5 DYSLIPIDEMIA, GOAL LDL BELOW 100: ICD-10-CM

## 2018-10-10 DIAGNOSIS — Z23 NEED FOR DIPHTHERIA-TETANUS-PERTUSSIS (TDAP) VACCINE: ICD-10-CM

## 2018-10-10 DIAGNOSIS — J41.0 SIMPLE CHRONIC BRONCHITIS (HCC): ICD-10-CM

## 2018-10-10 PROBLEM — F11.20 OPIATE DEPENDENCE (HCC): Status: RESOLVED | Noted: 2017-06-02 | Resolved: 2018-10-10

## 2018-10-10 PROCEDURE — 99214 OFFICE O/P EST MOD 30 MIN: CPT | Mod: 25 | Performed by: FAMILY MEDICINE

## 2018-10-10 PROCEDURE — G0008 ADMIN INFLUENZA VIRUS VAC: HCPCS | Performed by: FAMILY MEDICINE

## 2018-10-10 PROCEDURE — 90662 IIV NO PRSV INCREASED AG IM: CPT | Performed by: FAMILY MEDICINE

## 2018-10-10 RX ORDER — ATORVASTATIN CALCIUM 80 MG/1
80 TABLET, FILM COATED ORAL
Qty: 90 TAB | Refills: 3 | Status: SHIPPED
Start: 2018-10-10 | End: 2020-02-06

## 2018-10-10 RX ORDER — BENZONATATE 200 MG/1
200 CAPSULE ORAL 3 TIMES DAILY PRN
Qty: 30 CAP | Refills: 2 | Status: SHIPPED | OUTPATIENT
Start: 2018-10-10 | End: 2019-04-26 | Stop reason: SDUPTHER

## 2018-10-10 RX ORDER — CLOPIDOGREL BISULFATE 75 MG/1
75 TABLET ORAL EVERY EVENING
Qty: 90 TAB | Refills: 3 | Status: SHIPPED | OUTPATIENT
Start: 2018-10-10 | End: 2019-07-15

## 2018-10-10 NOTE — PROGRESS NOTES
Chief Complaint   Patient presents with   • Immunizations   • Hyperlipidemia   • Aortic Atherosclerosis       Subjective:     HPI:   Karen Jauregui presents today with the followin. Need for immunization against influenza  Administered today    2. Need for diphtheria-tetanus-pertussis (Tdap) vaccine  Order written for her to take to pharmacy    3. Dyslipidemia, goal LDL below 100  Patient denies chest pain, chest pressure, palpitations or exertional shortness of breath. Patient denies muscle aches or muscle weakness from the atorvastatin medication. Patient is a current daily smoker.  She has been unable to quit.  Patient takes 81 mg aspirin daily. Patient has a history of carotid atherosclerosis, abdominal aortic aneurysm and ischemic colitis.  Patient does have peripheral vascular disease.  No history of stroke or myocardial infarction.  Lab orders are discussed and placed.    4. History of ischemic colitis  Has been taking her metamucil and eating regularly.  Gained a couple of pounds, feels it is under control.  Lab orders are discussed and placed.    5. PVD (peripheral vascular disease) ([facility])/Subclavian artery stenosis, left (HCC)  Patient has severe peripheral vascular disease and subclavian artery stenosis.  She is on chronic clopidogrel for this problem.  She will continue to follow with cardiology.  The medication is renewed.  Denies claudication pain or ulceration at this time.  Unfortunately, patient is unable to stop smoking.    6. Chronic bronchitis  Patient has long-term chronic bronchitis.  She has been a smoker for many years.  She has a continued wet cough today and every day.  Auscultation of the lungs today is actually quite good.  No significant problems and fairly good air movement.  States the benzonatate Perles continue to be quite helpful for cough.  This is renewed.  Lab orders are discussed and placed.        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   • Community acquired pneumonia 05/09/2018   • Anxiety 04/22/2018   • Hyponatremia 04/19/2018   • Osteopenia of lumbar spine 02/14/2018   • Colonic stricture ([facility]) 06/27/2017   • History of ischemic colitis 06/27/2017   • PVD (peripheral vascular disease) ([facility]) 06/02/2017   • Insomnia disorder 02/20/2017   • History of COPD 02/20/2017   • History of small bowel obstruction 02/17/2017   • Chronic use of opiate for therapeutic purpose 07/07/2016   • Eosinophilic colitis 07/21/2015   • Family history of nonmelanoma skin cancer    • COPD (chronic obstructive pulmonary disease) ([facility])    • Constipated 05/26/2012   • Subclavian artery stenosis, left ([facility]) 03/27/2012   • Herniated nucleus pulposus, L5-S1, left 09/08/2011   • Carotid atherosclerosis 06/15/2011   • Abdominal aortic aneurysm greater than 39 mm in diameter ([facility])    • Peripheral vascular disease ([facility])    • Essential hypertension    • Dyslipidemia, goal LDL below 100    • Tobacco dependence    • Spinal stenosis of lumbar region    • Arteriosclerosis of arteries of extremities ([facility])        Current medicines (including changes today)  Current Outpatient Prescriptions   Medication Sig Dispense Refill   • tetanus-dipth-acell pertussis (ADACEL) 5-2-15.5 LF-MCG/0.5 Suspension 0.5 mL by Intramuscular route Once PRN for up to 1 dose. 0.5 mL 0   • atorvastatin (LIPITOR) 80 MG tablet Take 1 Tab by mouth every bedtime. 90 Tab 3   • clopidogrel (PLAVIX) 75 MG Tab Take 1 Tab by mouth every evening. 90 Tab 3   • benzonatate (TESSALON) 200 MG capsule Take 1 Cap by mouth 3 times a day as needed for Cough. 30 Cap 2   • tiotropium (SPIRIVA) 18 MCG Cap Inhale 1 Cap by mouth every day. 30 Cap 3   • VENTOLIN  (90 Base) MCG/ACT Aero Soln inhalation aerosol      • escitalopram (LEXAPRO) 10 MG Tab Take 1 Tab  "by mouth every day. 30 Tab 4   • [START ON 10/14/2018] HYDROcodone-acetaminophen (NORCO) 5-325 MG Tab per tablet Take 1 Tab by mouth every 8 hours as needed (sciatica and leg pain/hip pain) for up to 30 days. 90 Tab 0   • ondansetron (ZOFRAN ODT) 4 MG TABLET DISPERSIBLE Take 1 Tab by mouth every four hours as needed (give PO if IV route is unavailable. May give per feeding tube.). 10 Tab 0   • senna-docusate (PERICOLACE OR SENOKOT S) 8.6-50 MG Tab Take 2 Tabs by mouth 2 times a day as needed for Constipation.     • polyethylene glycol/lytes (MIRALAX) Pack Take  by mouth 1 time daily as needed (if sennosides and docusate ineffective after 24 hours).  3   • Saccharomyces boulardii (PROBIOTIC) 250 MG Cap Take 1 Cap by mouth 2 times a day, with meals. 14 Cap 0   • lisinopril-hydrochlorothiazide (PRINZIDE, ZESTORETIC) 20-25 MG per tablet Take 0.5 Tabs by mouth every bedtime.     • aspirin EC (ECOTRIN) 81 MG Tablet Delayed Response Take 81 mg by mouth every evening.       No current facility-administered medications for this visit.        Allergies   Allergen Reactions   • Doxycycline Diarrhea     None stop diarrhea   • Amoxicillin Diarrhea     Diarrhea   • Ciprofloxacin Unspecified     Unspecified       ROS: As per HPI       Objective:     Blood pressure 142/82, pulse 97, temperature 37.3 °C (99.2 °F), resp. rate 14, height 1.549 m (5' 1\"), weight 42.4 kg (93 lb 6.4 oz), last menstrual period 03/01/2002, SpO2 96 %, not currently breastfeeding. Body mass index is 17.65 kg/m².    Physical Exam:  Constitutional: Well-developed and well-nourished. Not diaphoretic. No distress. Lucid and fluent.  Skin: Skin is warm and dry. No rash noted.  Head: Atraumatic without lesions.  Eyes: Conjunctivae and extraocular motions are normal. Pupils are equal, round, and reactive to light. No scleral icterus.   Mouth/Throat: Tongue normal. Oropharynx is clear and moist. Posterior pharynx without erythema or exudates.  Neck: Supple, trachea " midline. No thyromegaly present. No cervical or supraclavicular lymphadenopathy. No JVD or carotid bruits appreciated  Cardiovascular: Regular rate and rhythm.  Normal S1, S2 without murmur appreciated.  Chest: Effort normal. Clear to auscultation throughout. No adventitious sounds.   Abdomen: Soft, non tender, and without distention. Active bowel sounds in all four quadrants. No rebound, guarding, masses or hepatosplenomegaly.  Extremities: No cyanosis, clubbing, erythema, nor edema.  Feet showed no ulcerations or lesions.  Neurological: Alert and oriented x 3.  Gait is normal, movements are strong and symmetric.  Psychiatric:  Behavior, mood, and affect are appropriate.       Assessment and Plan:     73 y.o. female with the following issues:    1. Need for immunization against influenza  Influenza Vaccine, High Dose (65+ Only)   2. Need for diphtheria-tetanus-pertussis (Tdap) vaccine  tetanus-dipth-acell pertussis (ADACEL) 5-2-15.5 LF-MCG/0.5 Suspension   3. Dyslipidemia, goal LDL below 100  atorvastatin (LIPITOR) 80 MG tablet    COMP METABOLIC PANEL    LIPID PROFILE    CBC WITHOUT DIFFERENTIAL   4. History of ischemic colitis  clopidogrel (PLAVIX) 75 MG Tab    COMP METABOLIC PANEL    CBC WITHOUT DIFFERENTIAL   5. PVD (peripheral vascular disease) (HCC)  clopidogrel (PLAVIX) 75 MG Tab    COMP METABOLIC PANEL    LIPID PROFILE   6. Subclavian artery stenosis, left (HCC)  clopidogrel (PLAVIX) 75 MG Tab   7. Simple chronic bronchitis (HCC)  benzonatate (TESSALON) 200 MG capsule    CBC WITHOUT DIFFERENTIAL         Followup: Return in about 4 months (around 2/10/2019), or if symptoms worsen or fail to improve.

## 2018-11-13 DIAGNOSIS — R11.0 NAUSEA IN ADULT PATIENT: ICD-10-CM

## 2018-11-13 RX ORDER — PROMETHAZINE HYDROCHLORIDE 25 MG/1
TABLET ORAL
Qty: 30 TAB | Refills: 0 | Status: SHIPPED | OUTPATIENT
Start: 2018-11-13 | End: 2019-04-29 | Stop reason: SDUPTHER

## 2018-12-10 ENCOUNTER — APPOINTMENT (OUTPATIENT)
Dept: URGENT CARE | Facility: PHYSICIAN GROUP | Age: 73
End: 2018-12-10
Payer: MEDICARE

## 2018-12-11 ENCOUNTER — OFFICE VISIT (OUTPATIENT)
Dept: URGENT CARE | Facility: PHYSICIAN GROUP | Age: 73
End: 2018-12-11
Payer: MEDICARE

## 2018-12-11 VITALS
RESPIRATION RATE: 20 BRPM | BODY MASS INDEX: 18.69 KG/M2 | TEMPERATURE: 98.9 F | DIASTOLIC BLOOD PRESSURE: 88 MMHG | WEIGHT: 99 LBS | OXYGEN SATURATION: 96 % | SYSTOLIC BLOOD PRESSURE: 148 MMHG | HEIGHT: 61 IN | HEART RATE: 96 BPM

## 2018-12-11 DIAGNOSIS — R30.0 DYSURIA: ICD-10-CM

## 2018-12-11 DIAGNOSIS — N30.00 ACUTE CYSTITIS WITHOUT HEMATURIA: Primary | ICD-10-CM

## 2018-12-11 LAB
APPEARANCE UR: ABNORMAL
BILIRUB UR STRIP-MCNC: ABNORMAL MG/DL
COLOR UR AUTO: YELLOW
GLUCOSE UR STRIP.AUTO-MCNC: ABNORMAL MG/DL
KETONES UR STRIP.AUTO-MCNC: ABNORMAL MG/DL
LEUKOCYTE ESTERASE UR QL STRIP.AUTO: ABNORMAL
NITRITE UR QL STRIP.AUTO: ABNORMAL
PH UR STRIP.AUTO: 6.5 [PH] (ref 5–8)
PROT UR QL STRIP: ABNORMAL MG/DL
RBC UR QL AUTO: ABNORMAL
SP GR UR STRIP.AUTO: 1.02
UROBILINOGEN UR STRIP-MCNC: 0.2 MG/DL

## 2018-12-11 PROCEDURE — 81002 URINALYSIS NONAUTO W/O SCOPE: CPT | Performed by: PHYSICIAN ASSISTANT

## 2018-12-11 PROCEDURE — 99212 OFFICE O/P EST SF 10 MIN: CPT | Performed by: PHYSICIAN ASSISTANT

## 2018-12-11 RX ORDER — SULFAMETHOXAZOLE AND TRIMETHOPRIM 800; 160 MG/1; MG/1
1 TABLET ORAL 2 TIMES DAILY
Qty: 14 TAB | Refills: 0 | Status: SHIPPED | OUTPATIENT
Start: 2018-12-11 | End: 2018-12-18

## 2018-12-11 NOTE — PROGRESS NOTES
Subjective:      Karen Jauregui is a 73 y.o. female who presents with Dysuria (2 days)    PMH:  has a past medical history of Abdominal aortic aneurysm (HCC) (11/2010); Angina; Arthritis; Atherosclerosis of arteries of the extremities; Bowel habit changes; Carotid atherosclerosis; COPD (chronic obstructive pulmonary disease) (HCC); Diverticulitis; Diverticulosis of colon; Dyslipidemia; Emphysema of lung (HCC); Eosinophilic colitis; Family history of nonmelanoma skin cancer; Hypertension; Pain; Peripheral vascular disease (HCC); PVD (posterior vitreous detachment), left eye (1/13/2015); Snoring; Spinal stenosis of lumbar region; and Unspecified hemorrhagic conditions.  MEDS:   Current Outpatient Prescriptions:   •  atorvastatin (LIPITOR) 80 MG tablet, Take 1 Tab by mouth every bedtime., Disp: 90 Tab, Rfl: 3  •  clopidogrel (PLAVIX) 75 MG Tab, Take 1 Tab by mouth every evening., Disp: 90 Tab, Rfl: 3  •  tiotropium (SPIRIVA) 18 MCG Cap, Inhale 1 Cap by mouth every day., Disp: 30 Cap, Rfl: 3  •  VENTOLIN  (90 Base) MCG/ACT Aero Soln inhalation aerosol, , Disp: , Rfl:   •  escitalopram (LEXAPRO) 10 MG Tab, Take 1 Tab by mouth every day., Disp: 30 Tab, Rfl: 4  •  ondansetron (ZOFRAN ODT) 4 MG TABLET DISPERSIBLE, Take 1 Tab by mouth every four hours as needed (give PO if IV route is unavailable. May give per feeding tube.)., Disp: 10 Tab, Rfl: 0  •  senna-docusate (PERICOLACE OR SENOKOT S) 8.6-50 MG Tab, Take 2 Tabs by mouth 2 times a day as needed for Constipation., Disp: , Rfl:   •  polyethylene glycol/lytes (MIRALAX) Pack, Take  by mouth 1 time daily as needed (if sennosides and docusate ineffective after 24 hours)., Disp: , Rfl: 3  •  Saccharomyces boulardii (PROBIOTIC) 250 MG Cap, Take 1 Cap by mouth 2 times a day, with meals., Disp: 14 Cap, Rfl: 0  •  lisinopril-hydrochlorothiazide (PRINZIDE, ZESTORETIC) 20-25 MG per tablet, Take 0.5 Tabs by mouth every bedtime., Disp: , Rfl:   •  aspirin EC (ECOTRIN)  81 MG Tablet Delayed Response, Take 81 mg by mouth every evening., Disp: , Rfl:   •  promethazine (PHENERGAN) 25 MG Tab, TAKE ONE TABLET BY MOUTH EVERY 6 HOURS AS NEEDED FOR NAUSEA AND VOMITING, Disp: 30 Tab, Rfl: 0  •  tetanus-dipth-acell pertussis (ADACEL) 5-2-15.5 LF-MCG/0.5 Suspension, 0.5 mL by Intramuscular route Once PRN for up to 1 dose., Disp: 0.5 mL, Rfl: 0  •  benzonatate (TESSALON) 200 MG capsule, Take 1 Cap by mouth 3 times a day as needed for Cough., Disp: 30 Cap, Rfl: 2  ALLERGIES:   Allergies   Allergen Reactions   • Doxycycline Diarrhea     None stop diarrhea   • Amoxicillin Diarrhea     Diarrhea   • Ciprofloxacin Unspecified     Unspecified     SURGHX:   Past Surgical History:   Procedure Laterality Date   • AAA WITH STENT GRAFT N/A 3/31/2017    Procedure: AAA WITH STENT GRAFT - 5-CM INFRARENAL;  Surgeon: Spring Pemberton M.D.;  Location: SURGERY [facility];  Service:    • FEMORAL FEMORAL BYPASS N/A 3/31/2017    Procedure: FEMORAL FEMORAL BYPASS - POSS;  Surgeon: Spring Pemberton M.D.;  Location: SURGERY [facility];  Service:    • COLONOSCOPY WITH BIOPSY  3/6/2015    Performed by Pranav THOMPSON M.D. at ENDOSCOPY [facility]   • COLON RESECTION LAPAROSCOPIC  8/5/2013    Performed by Spring Pemberton M.D. at SURGERY [facility]   • OTHER CARDIAC SURGERY  2005    cardiac stents   • GYN SURGERY  2002    hysterectomy, tubal, fibroids, ovaries gone   • GYN SURGERY  1975    ectopic pregnacy   • COLONOSCOPY  3/1/2009, 4/2012, 7/2/13    normal, normal, normal but heavy diverticulosis   • OTHER      LLE /RLEstent, common iliac, Dr. Pickett     SOCHX:  reports that she has been smoking Cigarettes.  She has a 45.00 pack-year smoking history. She has never used smokeless tobacco. She reports that she does not drink alcohol or use drugs.  FH: Reviewed with patient, not pertinent to this visit.           Pt co dysuria x 2 days        Dysuria    This is a new problem. The current episode started  "yesterday. The problem occurs every urination. The problem has been gradually worsening. The quality of the pain is described as burning. The pain is moderate. There has been no fever. There is no history of pyelonephritis. Associated symptoms include chills, frequency and urgency. Pertinent negatives include no discharge, flank pain, hematuria or possible pregnancy. She has tried increased fluids and home medications for the symptoms. The treatment provided mild relief.       Review of Systems   Constitutional: Positive for chills. Negative for fever.   Gastrointestinal: Negative for abdominal pain.   Genitourinary: Positive for dysuria, frequency and urgency. Negative for flank pain and hematuria.   All other systems reviewed and are negative.         Objective:     /88 (BP Location: Left arm, Patient Position: Sitting, BP Cuff Size: Adult)   Pulse 96   Temp 37.2 °C (98.9 °F) (Temporal)   Resp 20   Ht 1.549 m (5' 1\")   Wt 44.9 kg (99 lb)   LMP 03/01/2002   SpO2 96%   BMI 18.71 kg/m²      Physical Exam   Constitutional: She is oriented to person, place, and time. She appears well-developed and well-nourished. No distress.   HENT:   Head: Normocephalic and atraumatic.   Nose: Nose normal.   Mouth/Throat: Oropharynx is clear and moist.   Eyes: Pupils are equal, round, and reactive to light. Conjunctivae and EOM are normal.   Neck: Normal range of motion. Neck supple.   Cardiovascular: Normal rate, regular rhythm and normal heart sounds.    Pulmonary/Chest: Effort normal and breath sounds normal.   Abdominal: Soft. Bowel sounds are normal. There is no rebound and no guarding.   Musculoskeletal: Normal range of motion.   Neurological: She is alert and oriented to person, place, and time. Gait normal.   Skin: Skin is warm and dry. Capillary refill takes less than 2 seconds.   Psychiatric: She has a normal mood and affect.   Nursing note and vitals reviewed.         UA results interpreted by me: +LE, neg " nitrites     Assessment/Plan:     1. Acute cystitis without hematuria  sulfamethoxazole-trimethoprim (BACTRIM DS) 800-160 MG tablet   2. Dysuria  POCT Urinalysis    sulfamethoxazole-trimethoprim (BACTRIM DS) 800-160 MG tablet     PT can continue OTC medications, increase fluids and rest until symptoms improve.     PT should follow up with PCP in 1-2 days for re-evaluation if symptoms have not improved.  Discussed red flags and reasons to return to UC or ED.  Pt and/or family verbalized understanding of diagnosis and follow up instructions and was offered informational handout on diagnosis.  PT discharged.

## 2018-12-16 ASSESSMENT — ENCOUNTER SYMPTOMS
ABDOMINAL PAIN: 0
FEVER: 0
CHILLS: 1
FLANK PAIN: 0

## 2018-12-31 ENCOUNTER — HOSPITAL ENCOUNTER (OUTPATIENT)
Dept: RADIOLOGY | Facility: MEDICAL CENTER | Age: 73
End: 2018-12-31
Attending: SURGERY
Payer: MEDICARE

## 2018-12-31 DIAGNOSIS — I71.40 ABDOMINAL AORTIC ANEURYSM WITHOUT RUPTURE (HCC): ICD-10-CM

## 2018-12-31 DIAGNOSIS — I65.23 BILATERAL CAROTID ARTERY STENOSIS: ICD-10-CM

## 2018-12-31 PROCEDURE — 93978 VASCULAR STUDY: CPT

## 2018-12-31 PROCEDURE — 93978 VASCULAR STUDY: CPT | Mod: 26 | Performed by: SURGERY

## 2018-12-31 PROCEDURE — 93922 UPR/L XTREMITY ART 2 LEVELS: CPT

## 2018-12-31 PROCEDURE — 93922 UPR/L XTREMITY ART 2 LEVELS: CPT | Mod: 26 | Performed by: SURGERY

## 2018-12-31 PROCEDURE — 93880 EXTRACRANIAL BILAT STUDY: CPT | Mod: 26 | Performed by: SURGERY

## 2018-12-31 PROCEDURE — 93925 LOWER EXTREMITY STUDY: CPT

## 2018-12-31 PROCEDURE — 93880 EXTRACRANIAL BILAT STUDY: CPT

## 2019-01-17 ENCOUNTER — TELEPHONE (OUTPATIENT)
Dept: MEDICAL GROUP | Facility: MEDICAL CENTER | Age: 74
End: 2019-01-17

## 2019-01-17 ENCOUNTER — TELEPHONE (OUTPATIENT)
Dept: VASCULAR LAB | Facility: MEDICAL CENTER | Age: 74
End: 2019-01-17

## 2019-01-17 DIAGNOSIS — M48.061 SPINAL STENOSIS OF LUMBAR REGION WITHOUT NEUROGENIC CLAUDICATION: ICD-10-CM

## 2019-01-17 DIAGNOSIS — M51.27 HERNIATED NUCLEUS PULPOSUS, L5-S1, LEFT: ICD-10-CM

## 2019-01-17 RX ORDER — TRAMADOL HYDROCHLORIDE 50 MG/1
50 TABLET ORAL EVERY 6 HOURS PRN
Qty: 120 TAB | Refills: 0 | Status: SHIPPED
Start: 2019-01-17 | End: 2019-01-22 | Stop reason: SINTOL

## 2019-01-17 NOTE — TELEPHONE ENCOUNTER
This prescription is a request for a controlled substance refill.  This substance can only be written face to face.

## 2019-01-17 NOTE — TELEPHONE ENCOUNTER
Was the patient seen in the last year in this department? Yes    Does patient have an active prescription for medications requested? Yes    Received Request Via: Patient     Patient called requesting a refill on her Norco

## 2019-01-18 NOTE — TELEPHONE ENCOUNTER
Aortoiliac duplex reviewed in accordance with [facility] guideline    endograft appears stable with no endoleak and small residual aneurysm sac.    Will defer further management to pcp and vascular surgery unless otherwise requested.    Anticipate repeat duplex in one year per guidelines    Michael Bloch, MD  [facility] Care

## 2019-01-22 ENCOUNTER — OFFICE VISIT (OUTPATIENT)
Dept: MEDICAL GROUP | Facility: MEDICAL CENTER | Age: 74
End: 2019-01-22
Payer: MEDICARE

## 2019-01-22 VITALS
WEIGHT: 95 LBS | SYSTOLIC BLOOD PRESSURE: 112 MMHG | HEART RATE: 111 BPM | DIASTOLIC BLOOD PRESSURE: 78 MMHG | TEMPERATURE: 98.2 F | RESPIRATION RATE: 16 BRPM | BODY MASS INDEX: 17.94 KG/M2 | OXYGEN SATURATION: 94 % | HEIGHT: 61 IN

## 2019-01-22 DIAGNOSIS — M25.552 CHRONIC PAIN OF BOTH HIPS: ICD-10-CM

## 2019-01-22 DIAGNOSIS — M54.5 CHRONIC BILATERAL LOW BACK PAIN, WITH SCIATICA PRESENCE UNSPECIFIED: ICD-10-CM

## 2019-01-22 DIAGNOSIS — G89.29 CHRONIC BILATERAL LOW BACK PAIN, WITH SCIATICA PRESENCE UNSPECIFIED: ICD-10-CM

## 2019-01-22 DIAGNOSIS — G89.29 CHRONIC PAIN OF BOTH HIPS: ICD-10-CM

## 2019-01-22 DIAGNOSIS — M48.062 SPINAL STENOSIS OF LUMBAR REGION WITH NEUROGENIC CLAUDICATION: ICD-10-CM

## 2019-01-22 DIAGNOSIS — F17.200 SMOKER: ICD-10-CM

## 2019-01-22 DIAGNOSIS — I73.9 PERIPHERAL VASCULAR DISEASE (HCC): ICD-10-CM

## 2019-01-22 DIAGNOSIS — Z79.891 CHRONIC PRESCRIPTION OPIATE USE: ICD-10-CM

## 2019-01-22 DIAGNOSIS — M25.551 CHRONIC PAIN OF BOTH HIPS: ICD-10-CM

## 2019-01-22 PROCEDURE — 99214 OFFICE O/P EST MOD 30 MIN: CPT | Performed by: NURSE PRACTITIONER

## 2019-01-22 RX ORDER — HYDROCODONE BITARTRATE AND ACETAMINOPHEN 5; 325 MG/1; MG/1
1 TABLET ORAL EVERY 8 HOURS PRN
Qty: 90 TAB | Refills: 0 | Status: SHIPPED | OUTPATIENT
Start: 2019-01-22 | End: 2019-01-22 | Stop reason: SDUPTHER

## 2019-01-22 RX ORDER — HYDROCODONE BITARTRATE AND ACETAMINOPHEN 5; 325 MG/1; MG/1
1 TABLET ORAL EVERY 8 HOURS PRN
Qty: 90 TAB | Refills: 0 | Status: SHIPPED | OUTPATIENT
Start: 2019-03-23 | End: 2019-02-20

## 2019-01-22 RX ORDER — HYDROCODONE BITARTRATE AND ACETAMINOPHEN 5; 325 MG/1; MG/1
1 TABLET ORAL EVERY 8 HOURS PRN
Qty: 90 TAB | Refills: 0 | Status: SHIPPED | OUTPATIENT
Start: 2019-02-21 | End: 2019-01-22 | Stop reason: SDUPTHER

## 2019-01-22 NOTE — PROGRESS NOTES
CC: Medication Refill (Theresa; patient reports allergy to tramadol (vomiting /nausea))        HPI:     Karen presents today for the followin. Spinal stenosis of lumbar region with neurogenic claudication/Peripheral vascular disease (HCC)/ Chronic prescription opiate use/Chronic pain of both hips/ Chronic bilateral low back pain, with sciatica presence unspecified  Chronic pain recheck for:  nausea with tramadol, wants to go back on norco  Last dose of controlled substance: 3 months ago or more-didn't have an active RX for it (had to be seen in the office for refills of this so in the interim she was put on tramadol.  Tramadol makes her nauseous.  Wants to go back on Norco.  Chronic pain treated with up to 3 times a day as needed  She  reports that she does not drink alcohol.  She  reports that she does not use drugs.    Interval history:no  Any major change in health since last appointment? No    Consequences of Chronic Opiate therapy:  (5 A's)  Analgesia: Compared to no treatment or prior treatment, pain is currently improved  Activity: improved  Adverse Events:CAPHAYDER@ denies constipation, dry mouth, itchy skin, nausea and sedation  Aberrant Behaviors: She reports she is taking medication as prescribed and is not veering from agreed treatment regimen or provider recommendations. There have been no inappropriate refills or lost/stolen meds reported.  Affect/Mood: Pain is not impacting patient's mood.  Patient denies depression/anxiety.    Nonnarcotic treatments that are being used: walking. Reduced her schedule at work    Last imaging: per PCP    Opioid Risk Score: 0    Interpretation of Opioid Risk Score   Score 0-3 = Low risk of abuse. Do UDS at least once per year.  Score 4-7 = Moderate risk of abuse. Do UDS 1-4 times per year.  Score 8+ = High risk of abuse. Refer to specialist.    Last order of CONTROLLED SUBSTANCE TREATMENT AGREEMENT was found on 2017 from Office Visit on 2017     UDS Summary   "              URINE DRUG SCREEN Next Due 8/10/2019      Done 8/15/2018 [facility] PAIN MANAGEMENT SCREEN     Patient has more history with this topic...        Most recent UDS reviewed today and is consistent with prescribed medications.     I have reviewed the medical records, the Prescription Monitoring Program and I have determined that controlled substance treatment is medically indicated.       6. Smoker  Did try the past before and has a few at home.  Her insurance does not cover the patch.  She does want to quit however \"I am afraid to\"    Current Outpatient Prescriptions   Medication Sig Dispense Refill   • [START ON 3/23/2019] HYDROcodone-acetaminophen (NORCO) 5-325 MG Tab per tablet Take 1 Tab by mouth every 8 hours as needed (sciatica and leg pain/hip pain) for up to 30 days. 90 Tab 0   • promethazine (PHENERGAN) 25 MG Tab TAKE ONE TABLET BY MOUTH EVERY 6 HOURS AS NEEDED FOR NAUSEA AND VOMITING 30 Tab 0   • atorvastatin (LIPITOR) 80 MG tablet Take 1 Tab by mouth every bedtime. 90 Tab 3   • clopidogrel (PLAVIX) 75 MG Tab Take 1 Tab by mouth every evening. 90 Tab 3   • benzonatate (TESSALON) 200 MG capsule Take 1 Cap by mouth 3 times a day as needed for Cough. 30 Cap 2   • tiotropium (SPIRIVA) 18 MCG Cap Inhale 1 Cap by mouth every day. 30 Cap 3   • VENTOLIN  (90 Base) MCG/ACT Aero Soln inhalation aerosol      • escitalopram (LEXAPRO) 10 MG Tab Take 1 Tab by mouth every day. 30 Tab 4   • senna-docusate (PERICOLACE OR SENOKOT S) 8.6-50 MG Tab Take 2 Tabs by mouth 2 times a day as needed for Constipation.     • polyethylene glycol/lytes (MIRALAX) Pack Take  by mouth 1 time daily as needed (if sennosides and docusate ineffective after 24 hours).  3   • lisinopril-hydrochlorothiazide (PRINZIDE, ZESTORETIC) 20-25 MG per tablet Take 0.5 Tabs by mouth every bedtime.     • aspirin EC (ECOTRIN) 81 MG Tablet Delayed Response Take 81 mg by mouth every evening.       No current facility-administered medications " "for this visit.      Social History   Substance Use Topics   • Smoking status: Current Every Day Smoker     Packs/day: 1.00     Years: 45.00     Types: Cigarettes   • Smokeless tobacco: Never Used      Comment: doubts she will stop   • Alcohol use No     I reviewed patients allergies, problem list and medications today in EPIC.    ROS: Any/all pertinent positives listed in the HPI, otherwise all others reviewed are negative today.      /78 (BP Location: Left arm, Patient Position: Sitting, BP Cuff Size: Adult)   Pulse (!) 111   Temp 36.8 °C (98.2 °F) (Temporal)   Resp 16   Ht 1.549 m (5' 1\")   Wt 43.1 kg (95 lb)   LMP 03/01/2002   SpO2 94%   BMI 17.95 kg/m²      Exam:    Gen: Alert and oriented, No apparent distress. WDWN  Psych: A+Ox3, normal affect and mood  Skin: Warm, dry and intact. Good turgor   No rashes in visible areas.  Eye: Conjunctiva clear, lids normal  ENMT: Lips without lesions, good dentition  Lungs: Clear to auscultation bilaterally, no rales or rhonchi   Unlabored respiratory effort.   CV: Regular rate and rhythm, S1, S2. No murmurs.   No Edema        Assessment and Plan.   73 y.o. female with the following issues.    1. Spinal stenosis of lumbar region with neurogenic claudication/Peripheral vascular disease (HCC)/ Chronic prescription opiate use/Chronic pain of both hips/Chronic bilateral low back pain, with sciatica presence unspecified  Clinically stable. No reports of new symptoms. Well-controlled on current medication.  obtained/reviewed from state pharmacy database.  Medications found to be medically necessary/appropriate.  3 months of medication supply at this visit. Followup in the office for further refills.  I did dispose of her tramadol in the office.  Patient witnessed me do so.  We will restart her on hydrocodone.  New opiate prescription signed.  We will not do a drug screen currently as she is not due as well as the fact that she has not had this medication for " several months  - Consent for Opiate Prescription  - HYDROcodone-acetaminophen (NORCO) 5-325 MG Tab per tablet; Take 1 Tab by mouth every 8 hours as needed (sciatica and leg pain/hip pain) for up to 30 days.  Dispense: 90 Tab; Refill: 0    6. Smoker  We discussed options of Chantix and bupropion, she wishes to continue with past.  She was also given information on smoking cessation classes

## 2019-02-12 ENCOUNTER — OFFICE VISIT (OUTPATIENT)
Dept: URGENT CARE | Facility: PHYSICIAN GROUP | Age: 74
End: 2019-02-12
Payer: MEDICARE

## 2019-02-12 VITALS
OXYGEN SATURATION: 93 % | BODY MASS INDEX: 17.94 KG/M2 | HEART RATE: 119 BPM | TEMPERATURE: 99 F | SYSTOLIC BLOOD PRESSURE: 198 MMHG | DIASTOLIC BLOOD PRESSURE: 100 MMHG | WEIGHT: 95 LBS | HEIGHT: 61 IN

## 2019-02-12 DIAGNOSIS — R42 LIGHT HEADEDNESS: ICD-10-CM

## 2019-02-12 DIAGNOSIS — R03.0 ELEVATED BLOOD PRESSURE READING: ICD-10-CM

## 2019-02-12 PROCEDURE — 99213 OFFICE O/P EST LOW 20 MIN: CPT | Performed by: PHYSICIAN ASSISTANT

## 2019-02-12 ASSESSMENT — ENCOUNTER SYMPTOMS
DIZZINESS: 1
CHILLS: 0
DIARRHEA: 0
PALPITATIONS: 0
VOMITING: 0
WEAKNESS: 1
FEVER: 0
CONSTIPATION: 0
BLURRED VISION: 0
SPUTUM PRODUCTION: 0
FLANK PAIN: 0
EYE PAIN: 0
HEADACHES: 0
PHOTOPHOBIA: 0
SHORTNESS OF BREATH: 0
NAUSEA: 0
EYE REDNESS: 0
MYALGIAS: 0
DOUBLE VISION: 0
COUGH: 0
EYE DISCHARGE: 0
SORE THROAT: 0
ABDOMINAL PAIN: 0

## 2019-02-12 NOTE — PROGRESS NOTES
Subjective:   Karen Jauregui is a 73 y.o. female who presents for Blood Pressure Problem (High blood pressure,weak, lighheaded, pt states she cant stand for to long, x5 days )       Patient presents today with weakness, light-headedness for 5 days. She states that she thought her BP was low as her home BP cuff showed her BP around 102/86 the last couple of days, so she did not take her lisinopril/HCTZ for the last two days. She denies headache, worsening blurry vision, chest pain, SOB, abdominal pain, nausea, vomiting, diarrhea, body aches.       Review of Systems   Constitutional: Negative for chills and fever.   HENT: Negative for congestion and sore throat.    Eyes: Negative for blurred vision, double vision, photophobia, pain, discharge and redness.   Respiratory: Negative for cough, sputum production and shortness of breath.    Cardiovascular: Negative for chest pain and palpitations.   Gastrointestinal: Negative for abdominal pain, constipation, diarrhea, nausea and vomiting.   Genitourinary: Negative for dysuria, flank pain, frequency and urgency.   Musculoskeletal: Negative for myalgias.   Neurological: Positive for dizziness and weakness. Negative for headaches.   All other systems reviewed and are negative.      PMH:  has a past medical history of Abdominal aortic aneurysm ([facility]) (11/2010); Angina; Arthritis; Atherosclerosis of arteries of the extremities; Bowel habit changes; Carotid atherosclerosis; COPD (chronic obstructive pulmonary disease) ([facility]); Diverticulitis; Diverticulosis of colon; Dyslipidemia; Emphysema of lung ([facility]); Eosinophilic colitis; Family history of nonmelanoma skin cancer; Hypertension; Pain; Peripheral vascular disease ([facility]); PVD (posterior vitreous detachment), left eye (1/13/2015); Snoring; Spinal stenosis of lumbar region; and Unspecified hemorrhagic conditions.    MEDS:   Current Outpatient Prescriptions:   •  atorvastatin (LIPITOR) 80 MG tablet, Take 1 Tab by mouth every  bedtime., Disp: 90 Tab, Rfl: 3  •  clopidogrel (PLAVIX) 75 MG Tab, Take 1 Tab by mouth every evening., Disp: 90 Tab, Rfl: 3  •  benzonatate (TESSALON) 200 MG capsule, Take 1 Cap by mouth 3 times a day as needed for Cough., Disp: 30 Cap, Rfl: 2  •  [START ON 3/23/2019] HYDROcodone-acetaminophen (NORCO) 5-325 MG Tab per tablet, Take 1 Tab by mouth every 8 hours as needed (sciatica and leg pain/hip pain) for up to 30 days. (Patient not taking: Reported on 2/12/2019), Disp: 90 Tab, Rfl: 0  •  promethazine (PHENERGAN) 25 MG Tab, TAKE ONE TABLET BY MOUTH EVERY 6 HOURS AS NEEDED FOR NAUSEA AND VOMITING (Patient not taking: Reported on 2/12/2019), Disp: 30 Tab, Rfl: 0  •  tiotropium (SPIRIVA) 18 MCG Cap, Inhale 1 Cap by mouth every day., Disp: 30 Cap, Rfl: 3  •  VENTOLIN  (90 Base) MCG/ACT Aero Soln inhalation aerosol, , Disp: , Rfl:   •  escitalopram (LEXAPRO) 10 MG Tab, Take 1 Tab by mouth every day. (Patient not taking: Reported on 2/12/2019), Disp: 30 Tab, Rfl: 4  •  senna-docusate (PERICOLACE OR SENOKOT S) 8.6-50 MG Tab, Take 2 Tabs by mouth 2 times a day as needed for Constipation., Disp: , Rfl:   •  polyethylene glycol/lytes (MIRALAX) Pack, Take  by mouth 1 time daily as needed (if sennosides and docusate ineffective after 24 hours)., Disp: , Rfl: 3  •  lisinopril-hydrochlorothiazide (PRINZIDE, ZESTORETIC) 20-25 MG per tablet, Take 0.5 Tabs by mouth every bedtime., Disp: , Rfl:   •  aspirin EC (ECOTRIN) 81 MG Tablet Delayed Response, Take 81 mg by mouth every evening., Disp: , Rfl:     ALLERGIES:   Allergies   Allergen Reactions   • Doxycycline Diarrhea     None stop diarrhea   • Tramadol Vomiting   • Amoxicillin Diarrhea     Diarrhea   • Ciprofloxacin Unspecified     Unspecified       SURGHX:   Past Surgical History:   Procedure Laterality Date   • AAA WITH STENT GRAFT N/A 3/31/2017    Procedure: AAA WITH STENT GRAFT - 5-CM INFRARENAL;  Surgeon: Spring Pemberton M.D.;  Location: SURGERY [facility];   "Service:    • FEMORAL FEMORAL BYPASS N/A 3/31/2017    Procedure: FEMORAL FEMORAL BYPASS - POSS;  Surgeon: Spring Pemberton M.D.;  Location: SURGERY [facility];  Service:    • COLONOSCOPY WITH BIOPSY  3/6/2015    Performed by Pranav THOMPSON M.D. at ENDOSCOPY [facility]   • COLON RESECTION LAPAROSCOPIC  8/5/2013    Performed by Spring Pemberton M.D. at SURGERY [facility]   • OTHER CARDIAC SURGERY  2005    cardiac stents   • GYN SURGERY  2002    hysterectomy, tubal, fibroids, ovaries gone   • GYN SURGERY  1975    ectopic pregnacy   • COLONOSCOPY  3/1/2009, 4/2012, 7/2/13    normal, normal, normal but heavy diverticulosis   • OTHER      LLE /RLEstent, common iliac, Dr. Pickett       SOCHX:  reports that she has been smoking Cigarettes.  She has a 45.00 pack-year smoking history. She has never used smokeless tobacco. She reports that she does not drink alcohol or use drugs.    FH: Reviewed with patient, not pertinent to this visit.     Objective:   BP (!) 198/100 (BP Location: Right arm, Patient Position: Sitting, BP Cuff Size: Adult)   Pulse (!) 119   Temp 37.2 °C (99 °F) (Temporal)   Ht 1.549 m (5' 1\")   Wt 43.1 kg (95 lb)   LMP 03/01/2002   SpO2 93%   BMI 17.95 kg/m²   Physical Exam   Constitutional: She is oriented to person, place, and time. She appears well-developed and well-nourished. No distress.   HENT:   Head: Normocephalic and atraumatic.   Eyes: Conjunctivae and EOM are normal.   Neck: Normal range of motion. No tracheal deviation present.   Cardiovascular: Regular rhythm and normal heart sounds.  Tachycardia present.    Pulmonary/Chest: Effort normal. No tachypnea. No respiratory distress. She has wheezes. She has no rhonchi. She has no rales.   Musculoskeletal:   ROM normal all four extremities   Neurological: She is alert and oriented to person, place, and time.   Skin: Skin is warm and dry.   Psychiatric: She has a normal mood and affect. Her behavior is normal. Judgment and thought " content normal.     - EKG   Impression: sinus tachycardia, no acute ST changes per my read    Assessment/Plan:   1. Light headedness  - EKG - Clinic Performed    2. Elevated blood pressure reading  - EKG - Clinic Performed    - Due to patient's elevated BP, tachycardia, health history, advised patient to go to ED for further evaluation. She states that her son drove her to  and he can drive her to ED. Report given to ED case manager.    Differential diagnosis, natural history, supportive care, and indications for immediate follow-up discussed.

## 2019-02-20 ENCOUNTER — OFFICE VISIT (OUTPATIENT)
Dept: MEDICAL GROUP | Facility: MEDICAL CENTER | Age: 74
End: 2019-02-20
Payer: MEDICARE

## 2019-02-20 VITALS
HEIGHT: 61 IN | TEMPERATURE: 99.1 F | SYSTOLIC BLOOD PRESSURE: 158 MMHG | RESPIRATION RATE: 12 BRPM | BODY MASS INDEX: 17.75 KG/M2 | DIASTOLIC BLOOD PRESSURE: 90 MMHG | HEART RATE: 105 BPM | WEIGHT: 94 LBS | OXYGEN SATURATION: 100 %

## 2019-02-20 DIAGNOSIS — I71.40 ABDOMINAL AORTIC ANEURYSM GREATER THAN 39 MM IN DIAMETER (HCC): ICD-10-CM

## 2019-02-20 DIAGNOSIS — F17.200 TOBACCO DEPENDENCE: ICD-10-CM

## 2019-02-20 DIAGNOSIS — J41.0 SIMPLE CHRONIC BRONCHITIS (HCC): ICD-10-CM

## 2019-02-20 DIAGNOSIS — I73.9 PERIPHERAL VASCULAR DISEASE (HCC): ICD-10-CM

## 2019-02-20 DIAGNOSIS — R06.02 SHORTNESS OF BREATH: ICD-10-CM

## 2019-02-20 DIAGNOSIS — R53.82 CHRONIC FATIGUE: ICD-10-CM

## 2019-02-20 DIAGNOSIS — I10 ESSENTIAL HYPERTENSION: ICD-10-CM

## 2019-02-20 DIAGNOSIS — M48.061 SPINAL STENOSIS OF LUMBAR REGION WITHOUT NEUROGENIC CLAUDICATION: ICD-10-CM

## 2019-02-20 DIAGNOSIS — I70.209 ARTERIOSCLEROSIS OF ARTERIES OF EXTREMITIES (HCC): ICD-10-CM

## 2019-02-20 PROBLEM — J18.9 COMMUNITY ACQUIRED PNEUMONIA: Status: RESOLVED | Noted: 2018-05-09 | Resolved: 2019-02-20

## 2019-02-20 PROCEDURE — 99214 OFFICE O/P EST MOD 30 MIN: CPT | Performed by: FAMILY MEDICINE

## 2019-02-20 NOTE — LETTER
February 20, 2019        Patient: Karen Jauregui   YOB: 1945   Date of Visit: 2/20/2019           To Whom It May Concern:    It is my medical opinion that Karen Jauregui has current illness and needs to be off work 2/20/19-4/3/19 due to medical problems    If you have any questions or concerns, please don't hesitate to call.    Sincerely,          Baltazar Julio M.D.    [address] [id number] [phone] (Phone)  [phone] (Fax)

## 2019-02-20 NOTE — PROGRESS NOTES
Chief Complaint   Patient presents with   • Shortness of Breath   • COPD   • Fatigue       Subjective:     HPI:   Karen Jauregui presents today with the followin. Simple chronic bronchitis (HCC)  Patient has long-standing COPD with simple chronic bronchitis.  She denies increasing cough or phlegm.  She states she is having episodes of shortness of breath.  She is using her Ventolin inhaler for rescue and is also using her Spiriva.  She feels these are somewhat helpful.  She complains of fatigue and shortness of breath today even though her respiratory rate is not high and her oxygen level is excellent.  Have discussed with the patient that smoking cessation would be recommended.  Patient has tried several times it does not feel she is able to quit.  Have communicated to the patient my concern that the clinical picture with her COPD does not explain all her symptoms.  I have requested that she get a chest x-ray today.    2. Chronic fatigue  I am concerned that she may have anemia.  She has a CBC order at the lab that she states she will complete on Monday.  I am also concerned about liver enzymes, kidney function etc. and she does have a CMP ordered as well.      3. Shortness of breath  I have asked her to get a chest x-ray today.  Her last chest x-ray in May showed no mass but there was some fluid overload at the time.  I do not feel her extreme fatigue and shortness of breath are well explained by her current diagnoses.  Lab orders are discussed and placed as well as the chest x-ray order.    4. Tobacco dependence  Patient has not been able to stop smoking.  She has tried several different times.    5. Essential hypertension  HTN - Chronic condition stable. Currently taking all meds as directed.   She is taking baby aspirin daily.   She is monitoring BP at home.  States her pressures have been better generally in the 130s-140s over 80s-90s.  Occasionally this is high and pretty labile but generally  she feels it is better overall.  Denies symptoms low BP: light-headed, tunnel-vision, unusual fatigue.   Denies symptoms high BP:pounding headache, visual changes, palpitations, flushed face.   Denies medicine side effects: unusual fatigue, slow heartbeat, foot/leg swelling, cough.    6. Abdominal aortic aneurysm greater than 39 mm in diameter ([facility])  She has been following with her vascular surgeon.  Recent imaging in December showed a patent endograft.  Diameter of the aneurysm is stable.  She has been doing quite well and she will continue follow-up as directed by her vascular surgeon.    7. Arteriosclerosis of arteries of extremities ([facility])/Peripheral vascular disease ([facility])  She follows with vascular surgery and has been stable.  She has unfortunately been unable to stop smoking.    8. Spinal stenosis of lumbar region without neurogenic claudication  Patient has chronic lower back pain but has had a great increase in her lumbar pain.  It is quite severe for her today.  I have asked her to get a lumbar spine x-ray as well.  Patient was prescribed some narcotic pills a few months ago but does not want to use those at all.  In view of her shortness of breath I think that is wise.  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   • Anxiety 04/22/2018   • Hyponatremia 04/19/2018   • Osteopenia of lumbar spine 02/14/2018   • Colonic stricture ([facility]) 06/27/2017   • History of ischemic colitis 06/27/2017   • PVD (peripheral vascular disease) ([facility]) 06/02/2017   • Insomnia disorder 02/20/2017   • History of COPD 02/20/2017   • History of small bowel obstruction 02/17/2017   • Chronic use of opiate for therapeutic purpose 07/07/2016   • Eosinophilic colitis 07/21/2015   • Family history of  nonmelanoma skin cancer    • COPD (chronic obstructive pulmonary disease) ([facility])    • Constipated 05/26/2012   • Subclavian artery stenosis, left ([facility]) 03/27/2012   • Herniated nucleus pulposus, L5-S1, left 09/08/2011   • Carotid atherosclerosis 06/15/2011   • Abdominal aortic aneurysm greater than 39 mm in diameter ([facility])    • Peripheral vascular disease ([facility])    • Essential hypertension    • Dyslipidemia, goal LDL below 100    • Tobacco dependence    • Spinal stenosis of lumbar region    • Arteriosclerosis of arteries of extremities ([facility])        Current medicines (including changes today)  Current Outpatient Prescriptions   Medication Sig Dispense Refill   • promethazine (PHENERGAN) 25 MG Tab TAKE ONE TABLET BY MOUTH EVERY 6 HOURS AS NEEDED FOR NAUSEA AND VOMITING (Patient not taking: Reported on 2/12/2019) 30 Tab 0   • atorvastatin (LIPITOR) 80 MG tablet Take 1 Tab by mouth every bedtime. 90 Tab 3   • clopidogrel (PLAVIX) 75 MG Tab Take 1 Tab by mouth every evening. 90 Tab 3   • benzonatate (TESSALON) 200 MG capsule Take 1 Cap by mouth 3 times a day as needed for Cough. 30 Cap 2   • tiotropium (SPIRIVA) 18 MCG Cap Inhale 1 Cap by mouth every day. 30 Cap 3   • VENTOLIN  (90 Base) MCG/ACT Aero Soln inhalation aerosol      • senna-docusate (PERICOLACE OR SENOKOT S) 8.6-50 MG Tab Take 2 Tabs by mouth 2 times a day as needed for Constipation.     • polyethylene glycol/lytes (MIRALAX) Pack Take  by mouth 1 time daily as needed (if sennosides and docusate ineffective after 24 hours).  3   • lisinopril-hydrochlorothiazide (PRINZIDE, ZESTORETIC) 20-25 MG per tablet Take 0.5 Tabs by mouth every bedtime.     • aspirin EC (ECOTRIN) 81 MG Tablet Delayed Response Take 81 mg by mouth every evening.       No current facility-administered medications for this visit.        Allergies   Allergen Reactions   • Doxycycline Diarrhea     None stop diarrhea   • Tramadol Vomiting   • Amoxicillin Diarrhea     Diarrhea   •  "Ciprofloxacin Unspecified     Unspecified       ROS: As per HPI       Objective:     Blood pressure 158/90, pulse (!) 105, temperature 37.3 °C (99.1 °F), resp. rate 12, height 1.549 m (5' 1\"), weight 42.6 kg (94 lb), last menstrual period 03/01/2002, SpO2 100 %, not currently breastfeeding. Body mass index is 17.76 kg/m².    Physical Exam:  Constitutional: Well-developed and well-nourished. Not diaphoretic. No distress. Lucid and fluent.  Wet cough.  Skin: Skin is warm and dry. No rash noted.  Head: Atraumatic without lesions.  Eyes: Conjunctivae and extraocular motions are normal. Pupils are equal, round, and reactive to light. No scleral icterus.   Mouth/Throat: Tongue normal. Oropharynx is clear and moist. Posterior pharynx without erythema or exudates.  Neck: Supple, trachea midline. No thyromegaly present. No cervical or supraclavicular lymphadenopathy. No JVD or carotid bruits appreciated  Cardiovascular: Regular rate and rhythm.  Normal S1, S2 without murmur appreciated.  Chest: Effort normal. Clear to auscultation throughout. No adventitious sounds.   Abdomen: Soft, non tender, and without distention. Active bowel sounds in all four quadrants. No rebound, guarding, masses or hepatosplenomegaly.  Extremities: No cyanosis, clubbing, erythema, nor edema.  Lower back tenderness.  Neurological: Alert and oriented x 3. No tremor.    Psychiatric:  Behavior, mood, and affect are appropriate.       Assessment and Plan:     73 y.o. female with the following issues:    1. Simple chronic bronchitis (HCC)     2. Chronic fatigue     3. Shortness of breath  DX-CHEST-2 VIEWS   4. Tobacco dependence     5. Essential hypertension     6. Abdominal aortic aneurysm greater than 39 mm in diameter (HCC)     7. Arteriosclerosis of arteries of extremities (HCC)     8. Peripheral vascular disease (HCC)     9. Spinal stenosis of lumbar region without neurogenic claudication  DX-LUMBAR SPINE-2 OR 3 VIEWS     I have asked her to " complete the lab orders that I placed in October.  She states she will do this on Monday.    Followup: Return in about 6 weeks (around 4/3/2019), or if symptoms worsen or fail to improve.

## 2019-03-04 ENCOUNTER — TELEPHONE (OUTPATIENT)
Dept: MEDICAL GROUP | Facility: MEDICAL CENTER | Age: 74
End: 2019-03-04

## 2019-03-04 DIAGNOSIS — J41.0 SIMPLE CHRONIC BRONCHITIS (HCC): ICD-10-CM

## 2019-03-04 DIAGNOSIS — J44.1 COPD WITH ACUTE EXACERBATION (HCC): ICD-10-CM

## 2019-03-04 RX ORDER — PREDNISONE 10 MG/1
10 TABLET ORAL 2 TIMES DAILY WITH MEALS
Qty: 10 TAB | Refills: 0 | Status: SHIPPED | OUTPATIENT
Start: 2019-03-04 | End: 2019-03-09

## 2019-03-04 RX ORDER — DOXYCYCLINE HYCLATE 100 MG
100 TABLET ORAL 2 TIMES DAILY
Qty: 14 TAB | Refills: 0 | Status: SHIPPED | OUTPATIENT
Start: 2019-03-04 | End: 2019-03-11

## 2019-03-04 NOTE — TELEPHONE ENCOUNTER
Seen today when she appeared at the  asking about her paperwork.  She had not received my messages.  She is very tired, coughing deeply.  RR 14.  Wet rhonchi all lung fields.  Has a COPD exacerbation.  Will continue her albuterol inhaler, instructed to increase to qid.  Add doxycycline bid for 7 days and prednisone 10 bid for 5.  Unable to stop smoking.  Off work six weeks, paperwork completed.

## 2019-03-05 ENCOUNTER — APPOINTMENT (OUTPATIENT)
Dept: RADIOLOGY | Facility: IMAGING CENTER | Age: 74
End: 2019-03-05
Attending: FAMILY MEDICINE
Payer: MEDICARE

## 2019-03-05 DIAGNOSIS — R06.02 SHORTNESS OF BREATH: ICD-10-CM

## 2019-03-05 DIAGNOSIS — M48.061 SPINAL STENOSIS OF LUMBAR REGION WITHOUT NEUROGENIC CLAUDICATION: ICD-10-CM

## 2019-03-05 PROCEDURE — 72100 X-RAY EXAM L-S SPINE 2/3 VWS: CPT | Mod: TC | Performed by: PHYSICIAN ASSISTANT

## 2019-03-05 PROCEDURE — 71046 X-RAY EXAM CHEST 2 VIEWS: CPT | Mod: TC | Performed by: PHYSICIAN ASSISTANT

## 2019-03-07 ENCOUNTER — DOCUMENTATION (OUTPATIENT)
Dept: MEDICAL GROUP | Facility: MEDICAL CENTER | Age: 74
End: 2019-03-07

## 2019-03-14 ENCOUNTER — HOSPITAL ENCOUNTER (OUTPATIENT)
Dept: LAB | Facility: MEDICAL CENTER | Age: 74
End: 2019-03-14
Attending: INTERNAL MEDICINE
Payer: MEDICARE

## 2019-03-14 LAB
ALBUMIN SERPL BCP-MCNC: 3.7 G/DL (ref 3.2–4.9)
ALBUMIN/GLOB SERPL: 1.3 G/DL
ALP SERPL-CCNC: 92 U/L (ref 30–99)
ALT SERPL-CCNC: 15 U/L (ref 2–50)
ANION GAP SERPL CALC-SCNC: 0 MMOL/L (ref 0–11.9)
AST SERPL-CCNC: 16 U/L (ref 12–45)
BASOPHILS # BLD AUTO: 1 % (ref 0–1.8)
BASOPHILS # BLD: 0.11 K/UL (ref 0–0.12)
BILIRUB SERPL-MCNC: 0.3 MG/DL (ref 0.1–1.5)
BUN SERPL-MCNC: 31 MG/DL (ref 8–22)
CALCIUM SERPL-MCNC: 10 MG/DL (ref 8.5–10.5)
CHLORIDE SERPL-SCNC: 96 MMOL/L (ref 96–112)
CHOLEST SERPL-MCNC: 103 MG/DL (ref 100–199)
CO2 SERPL-SCNC: 31 MMOL/L (ref 20–33)
CREAT SERPL-MCNC: 1.07 MG/DL (ref 0.5–1.4)
EOSINOPHIL # BLD AUTO: 0.17 K/UL (ref 0–0.51)
EOSINOPHIL NFR BLD: 1.5 % (ref 0–6.9)
ERYTHROCYTE [DISTWIDTH] IN BLOOD BY AUTOMATED COUNT: 47 FL (ref 35.9–50)
GLOBULIN SER CALC-MCNC: 2.9 G/DL (ref 1.9–3.5)
GLUCOSE SERPL-MCNC: 102 MG/DL (ref 65–99)
HCT VFR BLD AUTO: 44.5 % (ref 37–47)
HDLC SERPL-MCNC: 45 MG/DL
HGB BLD-MCNC: 14.6 G/DL (ref 12–16)
IMM GRANULOCYTES # BLD AUTO: 0.05 K/UL (ref 0–0.11)
IMM GRANULOCYTES NFR BLD AUTO: 0.4 % (ref 0–0.9)
LDLC SERPL CALC-MCNC: 45 MG/DL
LYMPHOCYTES # BLD AUTO: 2.4 K/UL (ref 1–4.8)
LYMPHOCYTES NFR BLD: 21.2 % (ref 22–41)
MCH RBC QN AUTO: 26.6 PG (ref 27–33)
MCHC RBC AUTO-ENTMCNC: 32.8 G/DL (ref 33.6–35)
MCV RBC AUTO: 81.1 FL (ref 81.4–97.8)
MONOCYTES # BLD AUTO: 0.9 K/UL (ref 0–0.85)
MONOCYTES NFR BLD AUTO: 7.9 % (ref 0–13.4)
NEUTROPHILS # BLD AUTO: 7.71 K/UL (ref 2–7.15)
NEUTROPHILS NFR BLD: 68 % (ref 44–72)
NRBC # BLD AUTO: 0 K/UL
NRBC BLD-RTO: 0 /100 WBC
PLATELET # BLD AUTO: 309 K/UL (ref 164–446)
PMV BLD AUTO: 9.4 FL (ref 9–12.9)
POTASSIUM SERPL-SCNC: 4.2 MMOL/L (ref 3.6–5.5)
PROT SERPL-MCNC: 6.6 G/DL (ref 6–8.2)
RBC # BLD AUTO: 5.49 M/UL (ref 4.2–5.4)
SODIUM SERPL-SCNC: 127 MMOL/L (ref 135–145)
TRIGL SERPL-MCNC: 63 MG/DL (ref 0–149)
TSH SERPL DL<=0.005 MIU/L-ACNC: 1.34 UIU/ML (ref 0.38–5.33)
WBC # BLD AUTO: 11.3 K/UL (ref 4.8–10.8)

## 2019-03-14 PROCEDURE — 36415 COLL VENOUS BLD VENIPUNCTURE: CPT

## 2019-03-14 PROCEDURE — 84443 ASSAY THYROID STIM HORMONE: CPT

## 2019-03-14 PROCEDURE — 85025 COMPLETE CBC W/AUTO DIFF WBC: CPT

## 2019-03-14 PROCEDURE — 80053 COMPREHEN METABOLIC PANEL: CPT

## 2019-03-14 PROCEDURE — 80061 LIPID PANEL: CPT

## 2019-03-25 ENCOUNTER — OFFICE VISIT (OUTPATIENT)
Dept: MEDICAL GROUP | Facility: MEDICAL CENTER | Age: 74
End: 2019-03-25
Payer: MEDICARE

## 2019-03-25 VITALS
WEIGHT: 92 LBS | DIASTOLIC BLOOD PRESSURE: 82 MMHG | HEART RATE: 104 BPM | OXYGEN SATURATION: 100 % | TEMPERATURE: 98.9 F | HEIGHT: 61 IN | BODY MASS INDEX: 17.37 KG/M2 | SYSTOLIC BLOOD PRESSURE: 140 MMHG | RESPIRATION RATE: 12 BRPM

## 2019-03-25 DIAGNOSIS — M48.061 SPINAL STENOSIS OF LUMBAR REGION WITHOUT NEUROGENIC CLAUDICATION: ICD-10-CM

## 2019-03-25 DIAGNOSIS — M51.27 HERNIATED NUCLEUS PULPOSUS, L5-S1, LEFT: ICD-10-CM

## 2019-03-25 DIAGNOSIS — M79.606 ARM AND LEG PAIN: ICD-10-CM

## 2019-03-25 DIAGNOSIS — M79.603 ARM AND LEG PAIN: ICD-10-CM

## 2019-03-25 PROCEDURE — 99213 OFFICE O/P EST LOW 20 MIN: CPT | Performed by: FAMILY MEDICINE

## 2019-03-25 RX ORDER — LOSARTAN POTASSIUM 100 MG/1
TABLET ORAL
COMMUNITY
Start: 2019-03-06 | End: 2019-07-15

## 2019-03-25 RX ORDER — GABAPENTIN 100 MG/1
100 CAPSULE ORAL 3 TIMES DAILY
Qty: 90 CAP | Refills: 6 | Status: SHIPPED | OUTPATIENT
Start: 2019-03-25 | End: 2019-07-15

## 2019-03-25 RX ORDER — METOPROLOL SUCCINATE 25 MG/1
TABLET, EXTENDED RELEASE ORAL
COMMUNITY
Start: 2019-03-06 | End: 2019-09-10

## 2019-03-25 NOTE — PROGRESS NOTES
Chief Complaint   Patient presents with   • Back Pain   • Leg Pain   • Arm Pain       Subjective:     HPI:   Karen Jauregui presents today with the followin. Arm and leg pain  Severe arm and leg pain with difficulty using her muscles occurred two days ago.  It was very severe, may not have been well hydrated.  The hydrocodone did not work at all.  Discussed trial of gabapentin for this pain if needed.  Discussed improving hydration and diet.  She was hyponatremic.  She will be seeing her cardiologist soon.    2. Spinal stenosis of lumbar region without neurogenic claudication/Herniated nucleus pulposus, L5-S1, left  She use the hydrocodone/apap as needed.  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  States she has plenty left for now.       Her breathing is improving.  She feels she will be able to return to work in a couple weeks, as planned.      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   • Anxiety 2018   • Hyponatremia 2018   • Osteopenia of lumbar spine 2018   • Colonic stricture ([facility]) 2017   • History of ischemic colitis 2017   • PVD (peripheral vascular disease) ([facility]) 2017   • Insomnia disorder 2017   • History of COPD 2017   • History of small bowel obstruction 2017   • Chronic use of opiate for therapeutic purpose 2016   • Eosinophilic colitis 2015   • Family history of nonmelanoma skin cancer    • COPD (chronic obstructive pulmonary disease) ([facility])    • Constipated 2012   • Subclavian artery stenosis, left ([facility]) 2012   • Herniated nucleus pulposus, L5-S1, left 2011   • Carotid atherosclerosis 06/15/2011   • Abdominal aortic aneurysm greater than 39 mm in diameter ([facility])    • Peripheral vascular disease ([facility])    • Essential  "hypertension    • Dyslipidemia, goal LDL below 100    • Tobacco dependence    • Spinal stenosis of lumbar region    • Arteriosclerosis of arteries of extremities (HCC)        Current medicines (including changes today)  Current Outpatient Prescriptions   Medication Sig Dispense Refill   • gabapentin (NEURONTIN) 100 MG Cap Take 1 Cap by mouth 3 times a day. 90 Cap 6   • losartan (COZAAR) 100 MG Tab      • metoprolol SR (TOPROL XL) 25 MG TABLET SR 24 HR      • promethazine (PHENERGAN) 25 MG Tab TAKE ONE TABLET BY MOUTH EVERY 6 HOURS AS NEEDED FOR NAUSEA AND VOMITING (Patient not taking: Reported on 2/12/2019) 30 Tab 0   • atorvastatin (LIPITOR) 80 MG tablet Take 1 Tab by mouth every bedtime. 90 Tab 3   • clopidogrel (PLAVIX) 75 MG Tab Take 1 Tab by mouth every evening. 90 Tab 3   • benzonatate (TESSALON) 200 MG capsule Take 1 Cap by mouth 3 times a day as needed for Cough. 30 Cap 2   • tiotropium (SPIRIVA) 18 MCG Cap Inhale 1 Cap by mouth every day. 30 Cap 3   • VENTOLIN  (90 Base) MCG/ACT Aero Soln inhalation aerosol      • aspirin EC (ECOTRIN) 81 MG Tablet Delayed Response Take 81 mg by mouth every evening.       No current facility-administered medications for this visit.        Allergies   Allergen Reactions   • Doxycycline Diarrhea     None stop diarrhea   • Tramadol Vomiting   • Amoxicillin Diarrhea     Diarrhea   • Ciprofloxacin Unspecified     Unspecified       ROS: As per HPI       Objective:     Blood pressure 140/82, pulse (!) 104, temperature 37.2 °C (98.9 °F), resp. rate 12, height 1.549 m (5' 1\"), weight 41.7 kg (92 lb), last menstrual period 03/01/2002, SpO2 100 %, not currently breastfeeding. Body mass index is 17.38 kg/m².    Physical Exam:  Constitutional: Well-developed and well-nourished. Not diaphoretic. No distress. Lucid and fluent.  Skin: Skin is warm and dry. No rash noted.  Head: Atraumatic without lesions.  Eyes: Conjunctivae and extraocular motions are normal. Pupils are equal, " round, and reactive to light. No scleral icterus.   Mouth/Throat: Tongue normal. Oropharynx is clear and moist. Posterior pharynx without erythema or exudates.  Neck: Supple, trachea midline. No thyromegaly present. No cervical or supraclavicular lymphadenopathy. No JVD or carotid bruits appreciated  Cardiovascular: Regular rate and rhythm.  Normal S1, S2 without murmur appreciated.  Chest: Effort normal. Clear to auscultation throughout. No adventitious sounds.   Extremities: No cyanosis, clubbing, erythema, nor edema.  Muscles non tender today though a little weak.  Neurological: Alert and oriented x 3.   Psychiatric:  Behavior, mood, and affect are appropriate.       Assessment and Plan:     73 y.o. female with the following issues:    1. Arm and leg pain  gabapentin (NEURONTIN) 100 MG Cap   2. Spinal stenosis of lumbar region without neurogenic claudication     3. Herniated nucleus pulposus, L5-S1, left           Followup: Return in about 6 weeks (around 5/6/2019), or if symptoms worsen or fail to improve.

## 2019-04-02 ENCOUNTER — DOCUMENTATION (OUTPATIENT)
Dept: MEDICAL GROUP | Facility: MEDICAL CENTER | Age: 74
End: 2019-04-02

## 2019-04-02 NOTE — PROGRESS NOTES
Zee Aguirre called asking for me to fax over the chart notes from 3/25/19 at [phone] to keep patient's disability going.

## 2019-04-26 ENCOUNTER — OFFICE VISIT (OUTPATIENT)
Dept: MEDICAL GROUP | Facility: MEDICAL CENTER | Age: 74
End: 2019-04-26
Payer: MEDICARE

## 2019-04-26 VITALS
BODY MASS INDEX: 16.99 KG/M2 | HEART RATE: 104 BPM | HEIGHT: 61 IN | WEIGHT: 90 LBS | RESPIRATION RATE: 12 BRPM | TEMPERATURE: 98.8 F | SYSTOLIC BLOOD PRESSURE: 120 MMHG | DIASTOLIC BLOOD PRESSURE: 78 MMHG | OXYGEN SATURATION: 94 %

## 2019-04-26 DIAGNOSIS — M25.552 CHRONIC PAIN OF BOTH HIPS: ICD-10-CM

## 2019-04-26 DIAGNOSIS — Z79.891 CHRONIC USE OF OPIATE FOR THERAPEUTIC PURPOSE: ICD-10-CM

## 2019-04-26 DIAGNOSIS — M54.5 CHRONIC BILATERAL LOW BACK PAIN, WITH SCIATICA PRESENCE UNSPECIFIED: ICD-10-CM

## 2019-04-26 DIAGNOSIS — M25.551 CHRONIC PAIN OF BOTH HIPS: ICD-10-CM

## 2019-04-26 DIAGNOSIS — G89.29 CHRONIC BILATERAL LOW BACK PAIN, WITH SCIATICA PRESENCE UNSPECIFIED: ICD-10-CM

## 2019-04-26 DIAGNOSIS — G89.29 CHRONIC PAIN OF BOTH HIPS: ICD-10-CM

## 2019-04-26 DIAGNOSIS — J41.0 SIMPLE CHRONIC BRONCHITIS (HCC): ICD-10-CM

## 2019-04-26 DIAGNOSIS — M48.062 SPINAL STENOSIS OF LUMBAR REGION WITH NEUROGENIC CLAUDICATION: ICD-10-CM

## 2019-04-26 DIAGNOSIS — I73.9 PERIPHERAL VASCULAR DISEASE (HCC): ICD-10-CM

## 2019-04-26 DIAGNOSIS — F33.2 ENDOGENOUS DEPRESSION (HCC): ICD-10-CM

## 2019-04-26 PROCEDURE — 99213 OFFICE O/P EST LOW 20 MIN: CPT | Performed by: FAMILY MEDICINE

## 2019-04-26 RX ORDER — HYDROCODONE BITARTRATE AND ACETAMINOPHEN 5; 325 MG/1; MG/1
1 TABLET ORAL EVERY 8 HOURS PRN
Qty: 90 TAB | Refills: 0 | Status: SHIPPED | OUTPATIENT
Start: 2019-06-17 | End: 2019-04-26 | Stop reason: SDUPTHER

## 2019-04-26 RX ORDER — BENZONATATE 200 MG/1
200 CAPSULE ORAL 3 TIMES DAILY PRN
Qty: 30 CAP | Refills: 2 | Status: SHIPPED | OUTPATIENT
Start: 2019-04-26 | End: 2020-04-09 | Stop reason: SDUPTHER

## 2019-04-26 RX ORDER — ESCITALOPRAM OXALATE 10 MG/1
10 TABLET ORAL DAILY
Qty: 30 TAB | Refills: 6 | Status: SHIPPED | OUTPATIENT
Start: 2019-04-26 | End: 2019-07-15

## 2019-04-26 RX ORDER — HYDROCODONE BITARTRATE AND ACETAMINOPHEN 5; 325 MG/1; MG/1
1 TABLET ORAL EVERY 8 HOURS PRN
Qty: 90 TAB | Refills: 0 | Status: SHIPPED | OUTPATIENT
Start: 2019-05-18 | End: 2019-04-26 | Stop reason: SDUPTHER

## 2019-04-26 RX ORDER — HYDROCODONE BITARTRATE AND ACETAMINOPHEN 5; 325 MG/1; MG/1
1 TABLET ORAL EVERY 8 HOURS PRN
Qty: 90 TAB | Refills: 0 | Status: SHIPPED | OUTPATIENT
Start: 2019-07-17 | End: 2019-05-30 | Stop reason: SDUPTHER

## 2019-04-26 ASSESSMENT — PATIENT HEALTH QUESTIONNAIRE - PHQ9
5. POOR APPETITE OR OVEREATING: 1 - SEVERAL DAYS
SUM OF ALL RESPONSES TO PHQ QUESTIONS 1-9: 8
CLINICAL INTERPRETATION OF PHQ2 SCORE: 2

## 2019-04-26 NOTE — PROGRESS NOTES
Chief Complaint   Patient presents with   • Back Pain   • Arthritis       Subjective:     HPI:   Karen Jauregui presents today with the followin. Spinal stenosis of lumbar region with neurogenic claudication/Chronic bilateral low back pain, with sciatica presence unspecified  Patient has long-standing multilevel mixed degenerative change including lumbar disc disease, arthritis, ligamentous thickening and degenerative change on recent x-ray.  Patient is using gabapentin to treat the radicular component.  The current regimen of hydrocodone APAP, heat and gabapentin is helping the pain bringing it from a level 7 or 8 to a level 5.  The regimen is improving activity. The regimen allows the patient to work part-time and complete her ADLs.  Patient denies somnolence, confusion, balance problems or severe constipation from the regimen. Patient notes side effect of dry mouth.  Patient denies new or worsening urine or stool incontinence. Patient denies leg weakness and balance problems.     2. Peripheral vascular disease (HCC)  Patient does have known peripheral vascular disease.  She continues to take the atorvastatin.  Unfortunately she also continues to smoke.  She is on chronic anticoagulation with clopidogrel and aspirin.  She does have chronic pain.  She cannot take anti-inflammatory drugs due to her chronic anticoagulation and cardiovascular disease    3. Chronic pain of both hips  Patient has bilateral hip pain which appears to be primarily degenerative.  As stated above she cannot take nonsteroidal anti-inflammatory drugs and uses the hydrocodone for rescue as needed    4. Chronic use of opiate for therapeutic purpose  No aberrant or addictive use of medications has been observed.  No adverse events have been reported.  Patient counseled to not add Tylenol to current regimen.  Patient counseled to keep medications locked up or under personal control.  [facility] board of pharmacy interface is reviewed.   No inconsistencies are found.  Her maximum MME is 15.  This is within CDC guideline for chronic pain prescribing by primary care.    5. 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  She states it was helpful in the past and is not clear why she stopped taking it.    6. Simple chronic bronchitis (HCC)  She finds the Tessalon Perles to be quite helpful.  She uses these as needed only.  She needs a renewal.  This is sent to the pharmacy.        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[facility]) 08/15/2018   • Anxiety 04/22/2018   • Hyponatremia 04/19/2018   • Osteopenia of lumbar spine 02/14/2018   • Colonic stricture ([facility]) 06/27/2017   • History of ischemic colitis 06/27/2017   • PVD (peripheral vascular disease) ([facility]) 06/02/2017   • Insomnia disorder 02/20/2017   • History of COPD 02/20/2017   • History of small bowel obstruction 02/17/2017   • Chronic use of opiate for therapeutic purpose 07/07/2016   • Eosinophilic colitis 07/21/2015   • Family history of nonmelanoma skin cancer    • COPD (chronic obstructive pulmonary disease) ([facility])    • Constipated 05/26/2012   • Subclavian artery stenosis, left ([facility]) 03/27/2012   • Herniated nucleus pulposus, L5-S1, left 09/08/2011   • Carotid atherosclerosis 06/15/2011   • Abdominal aortic aneurysm greater than 39 mm in diameter ([facility])    • Peripheral vascular disease ([facility])    • Essential hypertension    • Dyslipidemia, goal LDL below 100    • Tobacco dependence    • Spinal stenosis of lumbar region    • Arteriosclerosis of arteries of extremities ([facility])        Current medicines (including changes today)  Current Outpatient Prescriptions   Medication Sig Dispense Refill   • escitalopram (LEXAPRO) 10 MG Tab Take 1 Tab by mouth every day. 30 Tab 6   • [START ON 7/17/2019]  "HYDROcodone-acetaminophen (NORCO) 5-325 MG Tab per tablet Take 1 Tab by mouth every 8 hours as needed (sciatica and leg pain/hip pain) for up to 30 days. 90 Tab 0   • benzonatate (TESSALON) 200 MG capsule Take 1 Cap by mouth 3 times a day as needed for Cough. 30 Cap 2   • losartan (COZAAR) 100 MG Tab      • metoprolol SR (TOPROL XL) 25 MG TABLET SR 24 HR      • gabapentin (NEURONTIN) 100 MG Cap Take 1 Cap by mouth 3 times a day. 90 Cap 6   • promethazine (PHENERGAN) 25 MG Tab TAKE ONE TABLET BY MOUTH EVERY 6 HOURS AS NEEDED FOR NAUSEA AND VOMITING (Patient not taking: Reported on 2/12/2019) 30 Tab 0   • atorvastatin (LIPITOR) 80 MG tablet Take 1 Tab by mouth every bedtime. 90 Tab 3   • clopidogrel (PLAVIX) 75 MG Tab Take 1 Tab by mouth every evening. 90 Tab 3   • tiotropium (SPIRIVA) 18 MCG Cap Inhale 1 Cap by mouth every day. 30 Cap 3   • VENTOLIN  (90 Base) MCG/ACT Aero Soln inhalation aerosol      • aspirin EC (ECOTRIN) 81 MG Tablet Delayed Response Take 81 mg by mouth every evening.       No current facility-administered medications for this visit.        Allergies   Allergen Reactions   • Doxycycline Diarrhea     None stop diarrhea   • Tramadol Vomiting   • Amoxicillin Diarrhea     Diarrhea   • Ciprofloxacin Unspecified     Unspecified       ROS: As per HPI       Objective:     /78   Pulse (!) 104   Temp 37.1 °C (98.8 °F)   Resp 12   Ht 1.549 m (5' 1\")   Wt 40.8 kg (90 lb)   SpO2 94%  Body mass index is 17.01 kg/m².    Physical Exam:  Constitutional: Well-developed.  Weight continues to drop. Not diaphoretic. No distress. Lucid and fluent.  Skin: Skin is warm and dry. No rash noted.  Head: Atraumatic without lesions.  Eyes: Conjunctivae and extraocular motions are normal. Pupils are equal, round, and reactive to light. No scleral icterus.   Ears:  External ears unremarkable. Tympanic membranes clear and intact.  Dry external canals.  Nose: Nares patent. Mucosa without edema or erythema. No " discharge. No facial tenderness.  Mouth/Throat: Tongue normal. Oropharynx is clear and moist. Posterior pharynx without erythema or exudates.  Neck: Supple, trachea midline. No thyromegaly present. No cervical or supraclavicular lymphadenopathy. No JVD or carotid bruits appreciated  Cardiovascular: Regular rate and rhythm.  Normal S1, S2 without murmur appreciated.  Chest: Effort normal. Clear to auscultation throughout. No adventitious sounds.  Distant breath sounds but actually very clear.  Abdomen: Soft, non tender, and without distention. Active bowel sounds in all four quadrants. No rebound, guarding, masses or hepatosplenomegaly.  Extremities: No cyanosis, clubbing, erythema, nor edema.   Neurological: Alert and oriented x 3.   Psychiatric:  Behavior, mood, and affect are appropriate but subdued.       Assessment and Plan:     73 y.o. female with the following issues:    1. Spinal stenosis of lumbar region with neurogenic claudication  HYDROcodone-acetaminophen (NORCO) 5-325 MG Tab per tablet    DISCONTINUED: HYDROcodone-acetaminophen (NORCO) 5-325 MG Tab per tablet    DISCONTINUED: HYDROcodone-acetaminophen (NORCO) 5-325 MG Tab per tablet   2. Chronic bilateral low back pain, with sciatica presence unspecified     3. Peripheral vascular disease (HCC)  HYDROcodone-acetaminophen (NORCO) 5-325 MG Tab per tablet    DISCONTINUED: HYDROcodone-acetaminophen (NORCO) 5-325 MG Tab per tablet    DISCONTINUED: HYDROcodone-acetaminophen (NORCO) 5-325 MG Tab per tablet   4. Chronic pain of both hips  HYDROcodone-acetaminophen (NORCO) 5-325 MG Tab per tablet    DISCONTINUED: HYDROcodone-acetaminophen (NORCO) 5-325 MG Tab per tablet    DISCONTINUED: HYDROcodone-acetaminophen (NORCO) 5-325 MG Tab per tablet   5. Chronic use of opiate for therapeutic purpose  Consent for Opiate Prescription   6. Endogenous depression (HCC)  escitalopram (LEXAPRO) 10 MG Tab   7. Simple chronic bronchitis (HCC)  benzonatate (TESSALON) 200 MG  capsule     Chronic pain recheck for: Chronic back pain which is primarily degenerative with sciatica.  Chronic hip pain which is also degenerative.  Chronic peripheral vascular disease pain  Last dose of controlled substance: This noontime  Chronic pain treated with hydrocodone/APAP taken 2-3 times a day    She  reports that she does not drink alcohol.  She  reports that she does not use drugs.    Interval history: Patient has recovered from her lung infection.  Her depression appears to be a little better but is still present.  Have urged patient to resume her antidepressant  Any major change in health since last appointment? No    Consequences of Chronic Opiate therapy:  (5 A's)  Analgesia: Compared to no treatment or prior treatment, pain is currently improved  Activity: improved  Adverse Events:CAPHE@ denies constipation, itchy skin, nausea and sedation  Aberrant Behaviors: She reports she is taking medication as prescribed and is not veering from agreed treatment regimen or provider recommendations. There have been no inappropriate refills or lost/stolen meds reported.  Affect/Mood: Pain is impacting patient's mood at times.  Patient reports overall stable depression/anxiety.  Took herself off the antidepressant, encouraged to resume.    Nonnarcotic treatments that are being used: Gabapentin, SSRI/SNRI, heat and Cannot take nonsteroidal anti-inflammatory drugs as she is on clopidogrel and take.  Anticoagulation    Last imaging: March 5 this year    Opioid Risk Score: 1    Interpretation of Opioid Risk Score   Score 0-3 = Low risk of abuse. Do UDS at least once per year.  Score 4-7 = Moderate risk of abuse. Do UDS 1-4 times per year.  Score 8+ = High risk of abuse. Refer to specialist.    Last order of CONTROLLED SUBSTANCE TREATMENT AGREEMENT was found on 8/23/2017 from Office Visit on 8/23/2017     UDS Summary                URINE DRUG SCREEN Next Due 8/10/2019      Done 8/15/2018 [facility] PAIN MANAGEMENT  SCREEN     Patient has more history with this topic...        Most recent UDS reviewed today and is consistent with prescribed medications.     I have reviewed the medical records, the Prescription Monitoring Program and I have determined that controlled substance treatment is medically indicated.       Followup: Return in about 3 months (around 7/26/2019), or if symptoms worsen or fail to improve.

## 2019-04-29 DIAGNOSIS — R11.0 NAUSEA IN ADULT PATIENT: ICD-10-CM

## 2019-04-30 RX ORDER — PROMETHAZINE HYDROCHLORIDE 25 MG/1
TABLET ORAL
Qty: 30 TAB | Refills: 4 | Status: ON HOLD
Start: 2019-04-30 | End: 2020-05-13

## 2019-05-01 ENCOUNTER — TELEPHONE (OUTPATIENT)
Dept: MEDICAL GROUP | Facility: MEDICAL CENTER | Age: 74
End: 2019-05-01

## 2019-05-01 NOTE — TELEPHONE ENCOUNTER
Christiano from [facility] called asking how long the 15 lb weight restriction in her return to work letter will be in effect for regarding this patient.

## 2019-05-30 ENCOUNTER — DOCUMENTATION (OUTPATIENT)
Dept: MEDICAL GROUP | Facility: MEDICAL CENTER | Age: 74
End: 2019-05-30

## 2019-05-30 ENCOUNTER — OFFICE VISIT (OUTPATIENT)
Dept: MEDICAL GROUP | Facility: MEDICAL CENTER | Age: 74
End: 2019-05-30
Payer: MEDICARE

## 2019-05-30 VITALS
RESPIRATION RATE: 12 BRPM | WEIGHT: 90 LBS | DIASTOLIC BLOOD PRESSURE: 54 MMHG | OXYGEN SATURATION: 96 % | SYSTOLIC BLOOD PRESSURE: 122 MMHG | TEMPERATURE: 97.8 F | HEIGHT: 61 IN | BODY MASS INDEX: 16.99 KG/M2 | HEART RATE: 130 BPM

## 2019-05-30 DIAGNOSIS — J41.0 SIMPLE CHRONIC BRONCHITIS (HCC): ICD-10-CM

## 2019-05-30 DIAGNOSIS — M48.062 SPINAL STENOSIS OF LUMBAR REGION WITH NEUROGENIC CLAUDICATION: ICD-10-CM

## 2019-05-30 DIAGNOSIS — G89.29 CHRONIC PAIN OF BOTH HIPS: ICD-10-CM

## 2019-05-30 DIAGNOSIS — Z79.891 CHRONIC USE OF OPIATE FOR THERAPEUTIC PURPOSE: ICD-10-CM

## 2019-05-30 DIAGNOSIS — M25.552 CHRONIC PAIN OF BOTH HIPS: ICD-10-CM

## 2019-05-30 DIAGNOSIS — F17.200 TOBACCO DEPENDENCE: ICD-10-CM

## 2019-05-30 DIAGNOSIS — M51.27 HERNIATED NUCLEUS PULPOSUS, L5-S1, LEFT: ICD-10-CM

## 2019-05-30 DIAGNOSIS — M25.551 CHRONIC PAIN OF BOTH HIPS: ICD-10-CM

## 2019-05-30 DIAGNOSIS — I73.9 PERIPHERAL VASCULAR DISEASE (HCC): ICD-10-CM

## 2019-05-30 PROBLEM — Z87.09 HISTORY OF COPD: Status: RESOLVED | Noted: 2017-02-20 | Resolved: 2019-05-30

## 2019-05-30 PROCEDURE — 99213 OFFICE O/P EST LOW 20 MIN: CPT | Performed by: FAMILY MEDICINE

## 2019-05-30 RX ORDER — HYDROCODONE BITARTRATE AND ACETAMINOPHEN 5; 325 MG/1; MG/1
1 TABLET ORAL EVERY 8 HOURS PRN
Qty: 90 TAB | Refills: 0 | Status: SHIPPED | OUTPATIENT
Start: 2019-05-30 | End: 2019-05-30 | Stop reason: SDUPTHER

## 2019-05-30 RX ORDER — HYDROCODONE BITARTRATE AND ACETAMINOPHEN 5; 325 MG/1; MG/1
1 TABLET ORAL EVERY 8 HOURS PRN
Qty: 90 TAB | Refills: 0 | Status: SHIPPED | OUTPATIENT
Start: 2019-06-29 | End: 2019-05-30 | Stop reason: SDUPTHER

## 2019-05-30 RX ORDER — HYDROCODONE BITARTRATE AND ACETAMINOPHEN 5; 325 MG/1; MG/1
1 TABLET ORAL EVERY 8 HOURS PRN
Qty: 90 TAB | Refills: 0 | Status: SHIPPED | OUTPATIENT
Start: 2019-07-29 | End: 2019-09-10 | Stop reason: SDUPTHER

## 2019-05-30 NOTE — PROGRESS NOTES
Chief Complaint   Patient presents with   • COPD   • Back Pain   • Arthritis       Subjective:     HPI:   Karen Jauregui presents today with the followin. Simple chronic bronchitis (HCC)  Patient continues to have problems with her coughing and shortness of breath.  She did see pulmonology.  Has PFTs ordered by Dr. Mcgill.  That was done around a month ago.  Patient states these were borderline for COPD.  I do not have those records.  She is having continued problems with her breathing.  She will be doing a stress test soon.  Return to work date is now moved to 19 as she is having continued trouble with shortness of breath and walking.    2. Tobacco dependence  Patient continues to smoke and has currently no plans to stop though she states she is smoking a little bit less.    3. Herniated nucleus pulposus, L5-S1, left/Spinal stenosis of lumbar region with neurogenic claudication  Patient has long-standing low back pain.  She was seen just a couple weeks ago and the hydrocodone was renewed.  Unfortunately she did not turn them into her pharmacy.  These are now renewed again.  Review of  again shows that patient is using this as needed as she states.    4. Chronic pain of both hips  Please see note from May 18, the chronic pain in the hips is unfortunately stable and may be both arthritic and vascular.    5. Peripheral vascular disease (HCC)  Patient does indeed have peripheral vascular disease.  There is likely to be an element of claudication and her long-standing pain.  Patient is not interested in pursuing this with vascular surgery or in smoking cessation.    6. Chronic use of opiate for therapeutic purpose  No aberrant or addictive use of medications has been observed.  No adverse events have been reported.  Patient counseled to not add Tylenol to current regimen.  Patient counseled to keep medications locked up or under personal control.  [facility] board of pharmacy interface is reviewed.  No  inconsistencies are found.  Her maximum MME is 20.  This is within CDC guideline for chronic pain prescribing by primary care.    Neglected to turn in the prescriptions that I wrote on 5/18/19.  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[facility]) 08/15/2018   • Anxiety 04/22/2018   • Hyponatremia 04/19/2018   • Osteopenia of lumbar spine 02/14/2018   • Colonic stricture ([facility]) 06/27/2017   • History of ischemic colitis 06/27/2017   • PVD (peripheral vascular disease) ([facility]) 06/02/2017   • Insomnia disorder 02/20/2017   • History of small bowel obstruction 02/17/2017   • Chronic use of opiate for therapeutic purpose 07/07/2016   • Eosinophilic colitis 07/21/2015   • Family history of nonmelanoma skin cancer    • COPD (chronic obstructive pulmonary disease) ([facility])    • Constipated 05/26/2012   • Subclavian artery stenosis, left ([facility]) 03/27/2012   • Herniated nucleus pulposus, L5-S1, left 09/08/2011   • Carotid atherosclerosis 06/15/2011   • Abdominal aortic aneurysm greater than 39 mm in diameter ([facility])    • Peripheral vascular disease ([facility])    • Essential hypertension    • Dyslipidemia, goal LDL below 100    • Tobacco dependence    • Spinal stenosis of lumbar region    • Arteriosclerosis of arteries of extremities ([facility])        Current medicines (including changes today)  Current Outpatient Prescriptions   Medication Sig Dispense Refill   • [START ON 7/29/2019] HYDROcodone-acetaminophen (NORCO) 5-325 MG Tab per tablet Take 1 Tab by mouth every 8 hours as needed (sciatica and leg pain/hip pain) for up to 30 days. 90 Tab 0   • promethazine (PHENERGAN) 25 MG Tab TAKE ONE TABLET BY MOUTH EVERY 6 HOURS AS NEEDED FOR NAUSEA AND VOMITING 30 Tab 4   • escitalopram (LEXAPRO) 10 MG Tab Take 1 Tab by mouth every day. 30 Tab 6   • benzonatate  "(TESSALON) 200 MG capsule Take 1 Cap by mouth 3 times a day as needed for Cough. 30 Cap 2   • losartan (COZAAR) 100 MG Tab      • metoprolol SR (TOPROL XL) 25 MG TABLET SR 24 HR      • gabapentin (NEURONTIN) 100 MG Cap Take 1 Cap by mouth 3 times a day. 90 Cap 6   • atorvastatin (LIPITOR) 80 MG tablet Take 1 Tab by mouth every bedtime. 90 Tab 3   • clopidogrel (PLAVIX) 75 MG Tab Take 1 Tab by mouth every evening. 90 Tab 3   • tiotropium (SPIRIVA) 18 MCG Cap Inhale 1 Cap by mouth every day. 30 Cap 3   • VENTOLIN  (90 Base) MCG/ACT Aero Soln inhalation aerosol      • aspirin EC (ECOTRIN) 81 MG Tablet Delayed Response Take 81 mg by mouth every evening.       No current facility-administered medications for this visit.        Allergies   Allergen Reactions   • Doxycycline Diarrhea     None stop diarrhea   • Tramadol Vomiting   • Amoxicillin Diarrhea     Diarrhea   • Ciprofloxacin Unspecified     Unspecified       ROS: As per HPI       Objective:     /54   Pulse (!) 130   Temp 36.6 °C (97.8 °F)   Resp 12   Ht 1.549 m (5' 1\")   Wt 40.8 kg (90 lb)   SpO2 96%  Body mass index is 17.01 kg/m².    Physical Exam:  Constitutional: Well-developed and well-nourished. Not diaphoretic. No distress. Lucid and fluent.  Skin: Skin is warm and dry. No rash noted.  Head: Atraumatic without lesions.  Eyes: Conjunctivae and extraocular motions are normal. Pupils are equal, round, and reactive to light. No scleral icterus.   Ears:  External ears unremarkable. Tympanic membranes clear and intact.  Nose: Nares patent. Mucosa without edema or erythema. No discharge. No facial tenderness.  Mouth/Throat: Tongue normal. Oropharynx is clear and moist. Posterior pharynx without erythema or exudates.  Neck: Supple, trachea midline. No thyromegaly present. No cervical or supraclavicular lymphadenopathy. No JVD or carotid bruits appreciated  Cardiovascular: Regular rate and rhythm.  Normal S1, S2 without murmur appreciated.  Chest: " Effort normal. Clear to auscultation throughout. No adventitious sounds.   Back marked spinous process tenderness and loss of lordotic curve.    Extremities: No cyanosis, clubbing, erythema, nor edema.   Neurological: Alert and oriented x 3.   Psychiatric:  Behavior, mood, and affect are appropriate.       Assessment and Plan:     74 y.o. female with the following issues:    1. Simple chronic bronchitis (HCC)     2. Tobacco dependence     3. Herniated nucleus pulposus, L5-S1, left     4. Spinal stenosis of lumbar region with neurogenic claudication  HYDROcodone-acetaminophen (NORCO) 5-325 MG Tab per tablet    DISCONTINUED: HYDROcodone-acetaminophen (NORCO) 5-325 MG Tab per tablet    DISCONTINUED: HYDROcodone-acetaminophen (NORCO) 5-325 MG Tab per tablet   5. Chronic pain of both hips  HYDROcodone-acetaminophen (NORCO) 5-325 MG Tab per tablet    DISCONTINUED: HYDROcodone-acetaminophen (NORCO) 5-325 MG Tab per tablet    DISCONTINUED: HYDROcodone-acetaminophen (NORCO) 5-325 MG Tab per tablet   6. Peripheral vascular disease (HCC)  HYDROcodone-acetaminophen (NORCO) 5-325 MG Tab per tablet    DISCONTINUED: HYDROcodone-acetaminophen (NORCO) 5-325 MG Tab per tablet    DISCONTINUED: HYDROcodone-acetaminophen (NORCO) 5-325 MG Tab per tablet   7. Chronic use of opiate for therapeutic purpose       Chronic pain recheck for: Chronic back pain which is primarily degenerative with sciatica.  Chronic hip pain which is also degenerative.  Chronic peripheral vascular disease pain  Last dose of controlled substance: Yesterday evening  Chronic pain treated with hydrocodone APAP taken 2-3 times a day    She  reports that she does not drink alcohol.  She  reports that she does not use drugs.    Interval history: Patient states she has not fallen but she is having increased difficulty with her back pain and leg weakness.  Any major change in health since last appointment? No breathing remains a major issue.    Consequences of Chronic  Opiate therapy:  (5 A's)  Analgesia: Compared to no treatment or prior treatment, pain is currently improved  Activity: improved  Adverse Events: She denies constipation, itchy skin, nausea and sedation  Aberrant Behaviors: She reports she is taking medication as prescribed and is not veering from agreed treatment regimen or provider recommendations. There have been no inappropriate refills or lost/stolen meds reported.  Affect/Mood: Pain is impacting patient's mood.  Patient reports stable depression/anxiety.    Nonnarcotic treatments that are being used: Gabapentin, SSRI/SNRI and Patient cannot take nonsteroidal anti-inflammatory drugs due to gastrointestinal and cardiovascular problems..     Last imaging: March of this year    Opioid Risk Score: 1    Interpretation of Opioid Risk Score   Score 0-3 = Low risk of abuse. Do UDS at least once per year.  Score 4-7 = Moderate risk of abuse. Do UDS 1-4 times per year.  Score 8+ = High risk of abuse. Refer to specialist.    Last order of CONTROLLED SUBSTANCE TREATMENT AGREEMENT was found on 8/23/2017 from Office Visit on 8/23/2017     UDS Summary                URINE DRUG SCREEN Next Due 8/10/2019      Done 8/15/2018 [facility] PAIN MANAGEMENT SCREEN     Patient has more history with this topic...        Most recent UDS reviewed today and is consistent with prescribed medications.     I have reviewed the medical records, the Prescription Monitoring Program and I have determined that controlled substance treatment is medically indicated.     Off work papers for eric completed, moved RTW date to 8/5/19    Followup: Return in about 4 months (around 9/30/2019), or if symptoms worsen or fail to improve.

## 2019-06-10 ENCOUNTER — DOCUMENTATION (OUTPATIENT)
Dept: MEDICAL GROUP | Facility: MEDICAL CENTER | Age: 74
End: 2019-06-10

## 2019-07-15 ENCOUNTER — DOCUMENTATION (OUTPATIENT)
Dept: MEDICAL GROUP | Facility: MEDICAL CENTER | Age: 74
End: 2019-07-15

## 2019-07-15 ENCOUNTER — OFFICE VISIT (OUTPATIENT)
Dept: MEDICAL GROUP | Facility: MEDICAL CENTER | Age: 74
End: 2019-07-15
Payer: MEDICARE

## 2019-07-15 VITALS
WEIGHT: 87 LBS | DIASTOLIC BLOOD PRESSURE: 70 MMHG | OXYGEN SATURATION: 96 % | HEART RATE: 114 BPM | SYSTOLIC BLOOD PRESSURE: 126 MMHG | TEMPERATURE: 99.6 F | HEIGHT: 61 IN | BODY MASS INDEX: 16.42 KG/M2 | RESPIRATION RATE: 12 BRPM

## 2019-07-15 DIAGNOSIS — F17.200 TOBACCO DEPENDENCE: ICD-10-CM

## 2019-07-15 DIAGNOSIS — J41.0 SIMPLE CHRONIC BRONCHITIS (HCC): ICD-10-CM

## 2019-07-15 DIAGNOSIS — R68.81 EARLY SATIETY: ICD-10-CM

## 2019-07-15 DIAGNOSIS — E87.6 HYPOKALEMIA: ICD-10-CM

## 2019-07-15 PROCEDURE — 99214 OFFICE O/P EST MOD 30 MIN: CPT | Performed by: FAMILY MEDICINE

## 2019-07-15 NOTE — PROGRESS NOTES
Scanned and faxed Disability Insurance. Medical records from [facility]

## 2019-07-15 NOTE — PROGRESS NOTES
Chief Complaint   Patient presents with   • Hypokalemia   • COPD   • Weight Loss       Subjective:     HPI:   Karen Jauregui presents today with the followin. Hypokalemia  Patient was seen at [facility] on .  She was very weak and her family brought her in.  Apparently she was told she was in kidney failure and had low potassium.  Patient has been prescribed potassium in the past but has not taken it.  She has stopped her antidepressant and her clopidogrel.  She continues aspirin and her blood pressure medication.  She does states she has been taking her cholesterol-lowering medication but her son does not think so.  She has a small bottle of her potassium pills at home she says.  She has been taking them she says.  It is unclear what dose she is on.  She has a very old bottle that she will be throwing away.  I have urged her to do that.  The smaller bottle that she has has been off for about 2 weeks.  I will get the results from [facility] and send the prescription to her pharmacy.  I would like her to recheck labs in 1 month to be sure we are at the right dosing and the kidneys have improved.    2. Simple chronic bronchitis (HCC)/Tobacco dependence  Continues to smoke, cannot get away.  Feels very fatigued.  Have discussed smoking cessation in the past.  I strongly encourage this.    3. Early satiety  She is under very severe stress.  She has sold her home and it closes on the  of this month.  She and her son have not yet found a place to live.  She is very frustrated by this and as per her son been spending a lot of time in bed.  She does not want to consider antidepressants.  It is possible that the early satiety anxiety related but I am very concerned about her weight loss.  Denies any blood in the stool vomiting, nausea or bloody vomitus.  States CBC and CMP were performed at [facility].  However, do not have those results even though the ER told her  to follow-up with me.        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   • Anxiety 04/22/2018   • Hyponatremia 04/19/2018   • Osteopenia of lumbar spine 02/14/2018   • Colonic stricture ([facility]) 06/27/2017   • History of ischemic colitis 06/27/2017   • PVD (peripheral vascular disease) ([facility]) 06/02/2017   • Insomnia disorder 02/20/2017   • History of small bowel obstruction 02/17/2017   • Chronic use of opiate for therapeutic purpose 07/07/2016   • Eosinophilic colitis 07/21/2015   • Family history of nonmelanoma skin cancer    • COPD (chronic obstructive pulmonary disease) ([facility])    • Constipated 05/26/2012   • Subclavian artery stenosis, left ([facility]) 03/27/2012   • Herniated nucleus pulposus, L5-S1, left 09/08/2011   • Carotid atherosclerosis 06/15/2011   • Abdominal aortic aneurysm greater than 39 mm in diameter ([facility])    • Peripheral vascular disease ([facility])    • Essential hypertension    • Dyslipidemia, goal LDL below 100    • Tobacco dependence    • Spinal stenosis of lumbar region    • Arteriosclerosis of arteries of extremities ([facility])        Current medicines (including changes today)  Current Outpatient Prescriptions   Medication Sig Dispense Refill   • [START ON 7/29/2019] HYDROcodone-acetaminophen (NORCO) 5-325 MG Tab per tablet Take 1 Tab by mouth every 8 hours as needed (sciatica and leg pain/hip pain) for up to 30 days. 90 Tab 0   • promethazine (PHENERGAN) 25 MG Tab TAKE ONE TABLET BY MOUTH EVERY 6 HOURS AS NEEDED FOR NAUSEA AND VOMITING 30 Tab 4   • benzonatate (TESSALON) 200 MG capsule Take 1 Cap by mouth 3 times a day as needed for Cough. 30 Cap 2   • metoprolol SR (TOPROL XL) 25 MG TABLET SR 24 HR      • atorvastatin (LIPITOR) 80 MG tablet Take 1 Tab by mouth every bedtime. 90 Tab 3   • tiotropium (SPIRIVA) 18 MCG Cap  "Inhale 1 Cap by mouth every day. 30 Cap 3   • VENTOLIN  (90 Base) MCG/ACT Aero Soln inhalation aerosol      • aspirin EC (ECOTRIN) 81 MG Tablet Delayed Response Take 81 mg by mouth every evening.       No current facility-administered medications for this visit.        Allergies   Allergen Reactions   • Doxycycline Diarrhea     None stop diarrhea   • Tramadol Vomiting   • Amoxicillin Diarrhea     Diarrhea   • Ciprofloxacin Unspecified     Unspecified       ROS: As per HPI  Denies visible blood in the stool or diarrhea.       Objective:     /70   Pulse (!) 114   Temp 37.6 °C (99.6 °F)   Resp 12   Ht 1.549 m (5' 1\")   Wt 39.5 kg (87 lb)   SpO2 96%  Body mass index is 16.44 kg/m².    Physical Exam:  Constitutional: Well-developed and well-nourished. Not diaphoretic. No distress. Lucid and fluent.  Skin: Skin is warm and dry. No rash noted.  Head: Atraumatic without lesions.  Eyes: Conjunctivae and extraocular motions are normal. Pupils are equal, round, and reactive to light. No scleral icterus.   Mouth/Throat: Tongue normal. Oropharynx is clear and moist. Posterior pharynx without erythema or exudates.  Neck: Supple, trachea midline. No thyromegaly present. No cervical or supraclavicular lymphadenopathy. No JVD or carotid bruits appreciated  Cardiovascular: Regular rate and rhythm.  Normal S1, S2 without murmur appreciated.  Chest: Effort normal. Clear to auscultation throughout. No adventitious sounds.   Abdomen: Soft, non tender, and without distention. Active bowel sounds in all four quadrants. No rebound, guarding, masses or hepatosplenomegaly.  Extremities: No cyanosis, clubbing, erythema, nor edema.   Neurological: Alert and oriented x 3.   Psychiatric:  Behavior, mood, and affect are appropriate.    I have requested the lab results from [facility].  I did not receive any communication from them.     Assessment and Plan:     74 y.o. female with the following issues:    1. " Hypokalemia  Basic Metabolic Panel   2. Simple chronic bronchitis (HCC)     3. Tobacco dependence     4. Early satiety           Followup: Return in about 6 weeks (around 8/26/2019), or if symptoms worsen or fail to improve.

## 2019-07-16 DIAGNOSIS — E87.6 HYPOKALEMIA: ICD-10-CM

## 2019-07-16 DIAGNOSIS — E87.1 HYPONATREMIA: ICD-10-CM

## 2019-07-16 RX ORDER — POTASSIUM CHLORIDE 750 MG/1
10 TABLET, FILM COATED, EXTENDED RELEASE ORAL DAILY
Qty: 30 TAB | Refills: 6 | Status: SHIPPED | OUTPATIENT
Start: 2019-07-16 | End: 2020-02-28

## 2019-07-16 NOTE — PROGRESS NOTES
Left message on patient's voicemail concerning her lab results.  She is still hyponatremic and was indeed significantly low on her potassium.  I have sent a potassium prescription to [facility] and I urged her to take this daily.  I urged her to eat a little more frequently and drink much more fluid as her lab test did show dehydration.  Creatinine was somewhat higher than her usual but I do believe that was dehydration related.  Patient and son state that a chest x-ray was done at [facility] and it was normal.  I do not have that result.

## 2019-09-10 ENCOUNTER — HOSPITAL ENCOUNTER (OUTPATIENT)
Facility: MEDICAL CENTER | Age: 74
End: 2019-09-10
Attending: FAMILY MEDICINE
Payer: MEDICARE

## 2019-09-10 ENCOUNTER — OFFICE VISIT (OUTPATIENT)
Dept: MEDICAL GROUP | Facility: MEDICAL CENTER | Age: 74
End: 2019-09-10
Payer: MEDICARE

## 2019-09-10 VITALS
SYSTOLIC BLOOD PRESSURE: 96 MMHG | OXYGEN SATURATION: 94 % | BODY MASS INDEX: 16.62 KG/M2 | DIASTOLIC BLOOD PRESSURE: 52 MMHG | HEIGHT: 61 IN | HEART RATE: 96 BPM | WEIGHT: 88 LBS | RESPIRATION RATE: 12 BRPM | TEMPERATURE: 99.2 F

## 2019-09-10 DIAGNOSIS — N30.00 ACUTE CYSTITIS WITHOUT HEMATURIA: ICD-10-CM

## 2019-09-10 DIAGNOSIS — G89.29 CHRONIC PAIN OF BOTH HIPS: ICD-10-CM

## 2019-09-10 DIAGNOSIS — Z79.891 CHRONIC USE OF OPIATE FOR THERAPEUTIC PURPOSE: ICD-10-CM

## 2019-09-10 DIAGNOSIS — I10 ESSENTIAL HYPERTENSION: ICD-10-CM

## 2019-09-10 DIAGNOSIS — M25.551 CHRONIC PAIN OF BOTH HIPS: ICD-10-CM

## 2019-09-10 DIAGNOSIS — M25.552 CHRONIC PAIN OF BOTH HIPS: ICD-10-CM

## 2019-09-10 DIAGNOSIS — M48.062 SPINAL STENOSIS OF LUMBAR REGION WITH NEUROGENIC CLAUDICATION: ICD-10-CM

## 2019-09-10 DIAGNOSIS — I73.9 PERIPHERAL VASCULAR DISEASE (HCC): ICD-10-CM

## 2019-09-10 LAB
APPEARANCE UR: NORMAL
BILIRUB UR STRIP-MCNC: NORMAL MG/DL
COLOR UR AUTO: NORMAL
GLUCOSE UR STRIP.AUTO-MCNC: NEGATIVE MG/DL
KETONES UR STRIP.AUTO-MCNC: NORMAL MG/DL
LEUKOCYTE ESTERASE UR QL STRIP.AUTO: NEGATIVE
NITRITE UR QL STRIP.AUTO: NEGATIVE
PH UR STRIP.AUTO: 6 [PH] (ref 5–8)
PROT UR QL STRIP: 30 MG/DL
RBC UR QL AUTO: NEGATIVE
SP GR UR STRIP.AUTO: 1.03
UROBILINOGEN UR STRIP-MCNC: 0.2 MG/DL

## 2019-09-10 PROCEDURE — 87086 URINE CULTURE/COLONY COUNT: CPT

## 2019-09-10 PROCEDURE — 99000 SPECIMEN HANDLING OFFICE-LAB: CPT | Performed by: FAMILY MEDICINE

## 2019-09-10 PROCEDURE — 81002 URINALYSIS NONAUTO W/O SCOPE: CPT | Performed by: FAMILY MEDICINE

## 2019-09-10 PROCEDURE — 87077 CULTURE AEROBIC IDENTIFY: CPT

## 2019-09-10 PROCEDURE — 87186 SC STD MICRODIL/AGAR DIL: CPT

## 2019-09-10 PROCEDURE — 99213 OFFICE O/P EST LOW 20 MIN: CPT | Performed by: FAMILY MEDICINE

## 2019-09-10 RX ORDER — SULFAMETHOXAZOLE AND TRIMETHOPRIM 800; 160 MG/1; MG/1
1 TABLET ORAL 2 TIMES DAILY
Qty: 14 TAB | Refills: 0 | Status: SHIPPED | OUTPATIENT
Start: 2019-09-10 | End: 2019-09-17

## 2019-09-10 RX ORDER — LISINOPRIL AND HYDROCHLOROTHIAZIDE 12.5; 1 MG/1; MG/1
1 TABLET ORAL DAILY
Qty: 90 TAB | Refills: 3 | Status: ON HOLD
Start: 2019-09-10 | End: 2020-05-15

## 2019-09-10 RX ORDER — HYDROCODONE BITARTRATE AND ACETAMINOPHEN 5; 325 MG/1; MG/1
1 TABLET ORAL EVERY 8 HOURS PRN
Qty: 90 TAB | Refills: 0 | Status: SHIPPED | OUTPATIENT
Start: 2019-10-10 | End: 2019-09-10 | Stop reason: SDUPTHER

## 2019-09-10 RX ORDER — HYDROCODONE BITARTRATE AND ACETAMINOPHEN 5; 325 MG/1; MG/1
1 TABLET ORAL EVERY 8 HOURS PRN
Qty: 90 TAB | Refills: 0 | Status: SHIPPED | OUTPATIENT
Start: 2019-09-10 | End: 2019-09-10 | Stop reason: SDUPTHER

## 2019-09-10 RX ORDER — HYDROCODONE BITARTRATE AND ACETAMINOPHEN 5; 325 MG/1; MG/1
1 TABLET ORAL EVERY 8 HOURS PRN
Qty: 90 TAB | Refills: 0 | Status: SHIPPED | OUTPATIENT
Start: 2019-11-09 | End: 2019-11-22

## 2019-09-10 NOTE — PROGRESS NOTES
Chief Complaint   Patient presents with   • Dysuria   • Hypertension   • Back Pain   • Arthritis       Subjective:     HPI:   Karen Jauregui presents today with the followin. Spinal stenosis of lumbar region with neurogenic claudication  Patient has long-standing multilevel mixed degenerative change including lumbar disc disease, arthritis, ligamentous thickening.  Patient is using stretching and walking to treat the radicular component.  The current regimen of hydrocodone/APAP is helping the pain bringing it from a level 7 or 8 to a level 5.  The regimen is improving activity. The regimen allows the patient to complete her ADLs and help around the house.  She is no longer able to work as she finds that standing at work flares the pain so much that she has to take medication not only before work but during work which makes her not function well.  She therefore has quit.  Patient denies somnolence, confusion, balance problems or severe constipation from the regimen. Patient notes side effect of dry mouth.  Patient denies new or worsening urine or stool incontinence. Patient denies leg weakness and balance problems.     2. Chronic pain of both hips  She has chronic pain of both hips that is felt to be at least partly vascular.  Patient continues to smoke and is pre-contemplative.    3. Peripheral vascular disease (HCC)  Patient has severe vascular disease.  She is status post AAA with stent graft, femoral bypasses and lower extremity stents and common iliac stents.  Patient has pain on ambulation.  She again declines smoking cessation.  States the hydrocodone is sometimes helpful to help her in that pain recovery.    4. Chronic use of opiate for therapeutic purpose  No aberrant or addictive use of medications has been observed.  No adverse events have been reported.  Patient counseled to not add Tylenol to current regimen.  Patient counseled to keep medications locked up or under personal control.   [facility] board of pharmacy interface is reviewed.  No inconsistencies are found.  The patient's maximum MME is 15.  This is within CDC guideline for chronic pain prescribing by primary care.    5. Essential hypertension  HTN - Chronic condition stable. Currently taking all meds as directed.   She is taking baby aspirin daily.   She is monitoring BP at home.  Her blood pressure is running low.  It was running extremely low on the metoprolol and patient discontinued.  She went back to taking lisinopril HCT.  She had not realized that the lisinopril HCT she has is 20 mg/12.5.  Her pressure is still a little low today and I have reduced her to the lisinopril 10 mg/12.5  Denies symptoms low BP: light-headed, tunnel-vision, unusual fatigue.   Denies symptoms high BP:pounding headache, visual changes, palpitations, flushed face.   Denies medicine side effects: unusual fatigue, slow heartbeat, foot/leg swelling, cough.    6. Acute cystitis without hematuria  Patient has dysuria and lower pelvic pain which began 2 days ago.  She is getting burning on urination.  She is getting some chills but denies fever.        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   • Anxiety 04/22/2018   • Hyponatremia 04/19/2018   • Osteopenia of lumbar spine 02/14/2018   • Colonic stricture ([facility]) 06/27/2017   • History of ischemic colitis 06/27/2017   • PVD (peripheral vascular disease) ([facility]) 06/02/2017   • Insomnia disorder 02/20/2017   • History of small bowel obstruction 02/17/2017   • Chronic use of opiate for therapeutic purpose 07/07/2016   • Eosinophilic colitis 07/21/2015   • Family history of nonmelanoma skin cancer    • COPD (chronic obstructive pulmonary disease) ([facility])    • Constipated 05/26/2012   • Subclavian artery  stenosis, left (HCC) 03/27/2012   • Herniated nucleus pulposus, L5-S1, left 09/08/2011   • Carotid atherosclerosis 06/15/2011   • Abdominal aortic aneurysm greater than 39 mm in diameter (HCC)    • Peripheral vascular disease (HCC)    • Essential hypertension    • Dyslipidemia, goal LDL below 100    • Tobacco dependence    • Spinal stenosis of lumbar region    • Arteriosclerosis of arteries of extremities (HCC)        Current medicines (including changes today)  Current Outpatient Medications   Medication Sig Dispense Refill   • [START ON 11/9/2019] HYDROcodone-acetaminophen (NORCO) 5-325 MG Tab per tablet Take 1 Tab by mouth every 8 hours as needed (sciatica and leg pain/hip pain) for up to 30 days. 90 Tab 0   • lisinopril-hydrochlorothiazide (PRINZIDE, ZESTORETIC) 10-12.5 MG per tablet Take 1 Tab by mouth every day. 90 Tab 3   • sulfamethoxazole-trimethoprim (BACTRIM DS) 800-160 MG tablet Take 1 Tab by mouth 2 times a day for 7 days. 14 Tab 0   • potassium chloride ER (KLOR-CON) 10 MEQ tablet Take 1 Tab by mouth every day. 30 Tab 6   • promethazine (PHENERGAN) 25 MG Tab TAKE ONE TABLET BY MOUTH EVERY 6 HOURS AS NEEDED FOR NAUSEA AND VOMITING 30 Tab 4   • benzonatate (TESSALON) 200 MG capsule Take 1 Cap by mouth 3 times a day as needed for Cough. 30 Cap 2   • atorvastatin (LIPITOR) 80 MG tablet Take 1 Tab by mouth every bedtime. 90 Tab 3   • tiotropium (SPIRIVA) 18 MCG Cap Inhale 1 Cap by mouth every day. 30 Cap 3   • VENTOLIN  (90 Base) MCG/ACT Aero Soln inhalation aerosol      • aspirin EC (ECOTRIN) 81 MG Tablet Delayed Response Take 81 mg by mouth every evening.       No current facility-administered medications for this visit.        Allergies   Allergen Reactions   • Doxycycline Diarrhea     None stop diarrhea   • Tramadol Vomiting   • Amoxicillin Diarrhea     Diarrhea   • Ciprofloxacin Unspecified     Unspecified       ROS: As per HPI       Objective:     BP (!) 96/52   Pulse 96   Temp 37.3 °C (99.2  "°F)   Resp 12   Ht 1.549 m (5' 1\")   Wt 39.9 kg (88 lb)   SpO2 94%  Body mass index is 16.63 kg/m².    Physical Exam:  Constitutional: Well-developed and well-nourished. Not diaphoretic. No distress. Lucid and fluent.  Skin: Skin is warm and dry. No rash noted.  Head: Atraumatic without lesions.  Eyes: Conjunctivae and extraocular motions are normal. Pupils are equal, round, and reactive to light. No scleral icterus.   Mouth/Throat: Tongue normal. Oropharynx is clear and moist. Posterior pharynx without erythema or exudates.  Neck: Supple, trachea midline. No thyromegaly present. No cervical or supraclavicular lymphadenopathy. No JVD or carotid bruits appreciated  Cardiovascular: Regular rate and rhythm.  Normal S1, S2 without murmur appreciated.  Chest: Effort normal. Clear to auscultation throughout. No adventitious sounds.   Back: marked sacroiliac and lumbar spinous process tenderness, unchanged.  Flattened lordotic curve.  Extremities: No cyanosis, clubbing, erythema, nor edema.   Neurological: Alert and oriented x 3. Gait stable but slow.  Psychiatric:  Behavior, mood, and affect are appropriate.       Lab Results   Component Value Date/Time    POCCOLOR ORANGE 09/10/2019 05:55 PM    POCAPPEAR CLOUDY 09/10/2019 05:55 PM    POCLEUKEST NEGATIVE 09/10/2019 05:55 PM    POCNITRITE NEGATIVE 09/10/2019 05:55 PM    POCUROBILIGE 0.2 09/10/2019 05:55 PM    POCPROTEIN 30 09/10/2019 05:55 PM    POCURPH 6.0 09/10/2019 05:55 PM    POCBLOOD NEGATIVE 09/10/2019 05:55 PM    POCSPGRV 1.030 09/10/2019 05:55 PM    POCKETONES TRACE 09/10/2019 05:55 PM    POCBILIRUBIN SMALL 09/10/2019 05:55 PM    POCGLUCUA NEGATIVE 09/10/2019 05:55 PM      A       Assessment and Plan:     74 y.o. female with the following issues:    1. Spinal stenosis of lumbar region with neurogenic claudication  HYDROcodone-acetaminophen (NORCO) 5-325 MG Tab per tablet    DISCONTINUED: HYDROcodone-acetaminophen (NORCO) 5-325 MG Tab per tablet    DISCONTINUED: " HYDROcodone-acetaminophen (NORCO) 5-325 MG Tab per tablet   2. Chronic pain of both hips  HYDROcodone-acetaminophen (NORCO) 5-325 MG Tab per tablet    DISCONTINUED: HYDROcodone-acetaminophen (NORCO) 5-325 MG Tab per tablet    DISCONTINUED: HYDROcodone-acetaminophen (NORCO) 5-325 MG Tab per tablet   3. Peripheral vascular disease (HCC)  HYDROcodone-acetaminophen (NORCO) 5-325 MG Tab per tablet    DISCONTINUED: HYDROcodone-acetaminophen (NORCO) 5-325 MG Tab per tablet    DISCONTINUED: HYDROcodone-acetaminophen (NORCO) 5-325 MG Tab per tablet   4. Essential hypertension  lisinopril-hydrochlorothiazide (PRINZIDE, ZESTORETIC) 10-12.5 MG per tablet   5. Chronic use of opiate for therapeutic purpose  Pain Management Screen    Controlled Substance Treatment Agreement   6. Acute cystitis without hematuria  sulfamethoxazole-trimethoprim (BACTRIM DS) 800-160 MG tablet    URINE CULTURE(NEW)    POCT Urinalysis     Chronic pain recheck for: chronic degenerative back pain and hip pain.  Claudication symptoms.    Last dose of controlled substance: took a half very early this morning  Chronic pain treated with hydrocodone/apap taken 2-3 times a day    She  reports that she does not drink alcohol.  She  reports that she does not use drugs.    Interval history: feels overall the pain is in better control since she quit her job.  She was having to stand a lot.  Any major change in health since last appointment? Yes had an episode of dehydration and was seen [facility]    Consequences of Chronic Opiate therapy:  (5 A's)  Analgesia: Compared to no treatment or prior treatment, pain is currently improved  Activity: improved  Adverse Events: She denies constipation, itchy skin, nausea and sedation  Aberrant Behaviors: She reports she is taking medication as prescribed and is not veering from agreed treatment regimen or provider recommendations. There have been no inappropriate refills or lost/stolen meds  reported.  Affect/Mood: Pain is impacting patient's mood.  Patient denies depression/anxiety.    Nonnarcotic treatments that are being used: topical agents, ice, heat and abdominal aortic aneurysm.     Last imaging: March of this year    Opioid Risk Score: 1    Interpretation of Opioid Risk Score   Score 0-3 = Low risk of abuse. Do UDS at least once per year.  Score 4-7 = Moderate risk of abuse. Do UDS 1-4 times per year.  Score 8+ = High risk of abuse. Refer to specialist.    Last order of CONTROLLED SUBSTANCE TREATMENT AGREEMENT was found on 8/23/2017 from Office Visit on 8/23/2017   Controlled substance treatment agreement renewed today    UDS Summary                URINE DRUG SCREEN Overdue 8/10/2019      Done 8/15/2018 [facility] PAIN MANAGEMENT SCREEN     Patient has more history with this topic...        Most recent UDS reviewed today and is consistent with prescribed medications.  UDS obtained today    I have reviewed the medical records, the Prescription Monitoring Program and I have determined that controlled substance treatment is medically indicated.       Followup: Return in about 3 months (around 12/10/2019), or if symptoms worsen or fail to improve.

## 2019-09-11 LAB
AMBIGUOUS DTTM AMBI4: NORMAL
SIGNIFICANT IND 70042: NORMAL
SITE SITE: NORMAL
SOURCE SOURCE: NORMAL

## 2019-09-13 DIAGNOSIS — Z79.891 CHRONIC USE OF OPIATE FOR THERAPEUTIC PURPOSE: ICD-10-CM

## 2019-11-20 ENCOUNTER — TELEPHONE (OUTPATIENT)
Dept: MEDICAL GROUP | Facility: MEDICAL CENTER | Age: 74
End: 2019-11-20

## 2019-11-20 ENCOUNTER — DOCUMENTATION (OUTPATIENT)
Dept: MEDICAL GROUP | Facility: MEDICAL CENTER | Age: 74
End: 2019-11-20

## 2019-11-20 DIAGNOSIS — N34.3 DYSURIA-FREQUENCY SYNDROME: ICD-10-CM

## 2019-11-20 RX ORDER — SULFAMETHOXAZOLE AND TRIMETHOPRIM 800; 160 MG/1; MG/1
1 TABLET ORAL 2 TIMES DAILY
Qty: 14 TAB | Refills: 0 | Status: SHIPPED | OUTPATIENT
Start: 2019-11-20 | End: 2019-11-27

## 2019-11-22 ENCOUNTER — OFFICE VISIT (OUTPATIENT)
Dept: MEDICAL GROUP | Facility: MEDICAL CENTER | Age: 74
End: 2019-11-22
Payer: MEDICARE

## 2019-11-22 VITALS
RESPIRATION RATE: 14 BRPM | TEMPERATURE: 98.5 F | HEIGHT: 61 IN | SYSTOLIC BLOOD PRESSURE: 138 MMHG | WEIGHT: 91 LBS | DIASTOLIC BLOOD PRESSURE: 64 MMHG | BODY MASS INDEX: 17.18 KG/M2 | OXYGEN SATURATION: 92 % | HEART RATE: 96 BPM

## 2019-11-22 DIAGNOSIS — M25.552 CHRONIC PAIN OF BOTH HIPS: ICD-10-CM

## 2019-11-22 DIAGNOSIS — G89.29 CHRONIC PAIN OF BOTH HIPS: ICD-10-CM

## 2019-11-22 DIAGNOSIS — N30.00 ACUTE CYSTITIS WITHOUT HEMATURIA: ICD-10-CM

## 2019-11-22 DIAGNOSIS — I73.9 PERIPHERAL VASCULAR DISEASE (HCC): ICD-10-CM

## 2019-11-22 DIAGNOSIS — N39.0 RECURRENT URINARY TRACT INFECTION: ICD-10-CM

## 2019-11-22 DIAGNOSIS — F43.21 SITUATIONAL DEPRESSION: ICD-10-CM

## 2019-11-22 DIAGNOSIS — M25.551 CHRONIC PAIN OF BOTH HIPS: ICD-10-CM

## 2019-11-22 DIAGNOSIS — M48.062 SPINAL STENOSIS OF LUMBAR REGION WITH NEUROGENIC CLAUDICATION: ICD-10-CM

## 2019-11-22 DIAGNOSIS — Z79.891 CHRONIC USE OF OPIATE FOR THERAPEUTIC PURPOSE: ICD-10-CM

## 2019-11-22 DIAGNOSIS — Z23 NEED FOR IMMUNIZATION AGAINST INFLUENZA: ICD-10-CM

## 2019-11-22 PROCEDURE — 99213 OFFICE O/P EST LOW 20 MIN: CPT | Mod: 25 | Performed by: FAMILY MEDICINE

## 2019-11-22 PROCEDURE — G0008 ADMIN INFLUENZA VIRUS VAC: HCPCS | Performed by: FAMILY MEDICINE

## 2019-11-22 PROCEDURE — 90662 IIV NO PRSV INCREASED AG IM: CPT | Performed by: FAMILY MEDICINE

## 2019-11-22 RX ORDER — FLUOXETINE HYDROCHLORIDE 20 MG/1
20 CAPSULE ORAL DAILY
Qty: 30 CAP | Refills: 6 | Status: SHIPPED
Start: 2019-11-22 | End: 2020-02-28

## 2019-11-22 RX ORDER — HYDROCODONE BITARTRATE AND ACETAMINOPHEN 5; 325 MG/1; MG/1
1 TABLET ORAL EVERY 8 HOURS PRN
Qty: 90 TAB | Refills: 0 | Status: SHIPPED | OUTPATIENT
Start: 2020-02-01 | End: 2020-02-28 | Stop reason: SDUPTHER

## 2019-11-22 RX ORDER — HYDROCODONE BITARTRATE AND ACETAMINOPHEN 5; 325 MG/1; MG/1
1 TABLET ORAL EVERY 8 HOURS PRN
Qty: 90 TAB | Refills: 0 | Status: SHIPPED | OUTPATIENT
Start: 2020-01-02 | End: 2019-11-22 | Stop reason: SDUPTHER

## 2019-11-22 RX ORDER — HYDROCODONE BITARTRATE AND ACETAMINOPHEN 5; 325 MG/1; MG/1
1 TABLET ORAL EVERY 8 HOURS PRN
Qty: 90 TAB | Refills: 0 | Status: SHIPPED | OUTPATIENT
Start: 2019-12-03 | End: 2019-11-22 | Stop reason: SDUPTHER

## 2019-11-22 NOTE — PROGRESS NOTES
Chief Complaint   Patient presents with   • Back Pain   • Arthritis       Subjective:     HPI:   Karen Jauregui presents today with the followin. Recurrent urinary tract infection/Acute cystitis without hematuria  Patient had a UTI in September.  Feels the antibiotics helped quite a bit but it may never have cleared completely.  Over the last week she has been having some dysuria and urinary urgency.  She is now on Bactrim DS.  I have asked her to complete her course.  I have also asked her to obtain a urinalysis with culture if indicated 1 week after she finishes her antibiotics.    2. Spinal stenosis of lumbar region with neurogenic claudication  Patient has long-standing multilevel mixed degenerative change including lumbar disc disease, arthritis, ligamentous thickening.  Patient is using stretching and walking to treat the radicular component.  She tried gabapentin earlier this year but stopped it as she felt it was sedating to her.  The current regimen of hydrocodone/APAP 2 to 3/day is helping the pain bringing it from a level 7 or 8 to a level 6 and sometimes better.  The regimen is improving activity. The regimen allows the patient to complete her ADLs.  Patient denies somnolence, confusion, balance problems or severe constipation from the regimen. Patient notes side effect of dry mouth and mild constipation sometimes.  Patient denies new or worsening urine or stool incontinence. Patient denies leg weakness and balance problems.     3. Chronic pain of both hips  Her hip pain is mostly sacroiliac.  She does have bilateral hip joint pain from degenerative changes of osteoarthritis.  Patient has known vascular disease which makes her a more questionable candidate for hip replacement.  Patient states she does not want to do that anyway.  She states the hydrocodone continues to be helpful to control the pain.  She cannot take nonsteroidal anti-inflammatory medications due to severe atherosclerotic  disease including aortic stent.    4. Peripheral vascular disease (HCC)  Patient has significant peripheral vascular disease.  She has had a stent graft to an abdominal aortic aneurysm.  She has also had a femorofemoral bypass.  She has some claudication symptoms but is not choosing to seek treatment at this time.  Patient continues to smoke and feels that she will never stop.  She is not interested in smoking cessation classes or treatments at this time.    5. Chronic use of opiate for therapeutic purpose  No aberrant or addictive use of medications has been observed.  No adverse events have been reported.  Patient counseled to not add Tylenol to current regimen.  Patient counseled to keep medications locked up or under personal control.  [facility] of pharmacy interface is reviewed.  No inconsistencies are found.  The patient's maximum MME is 15.  This is within CDC guideline for chronic pain prescribing by primary care.    6. Situational depression  Patient has situational depression and frustration.  She can no longer work or walk very much.  She finds she is having difficulty with motivation.  She is also having some tearfulness and sadness.  Denies suicidal ideation or thoughts of self-harm.  Denies hallucinations or unusual thoughts.  I did prescribe antidepressants to her quite a while ago but patient never picked up the prescription.  She would like to try an antidepressant now.  Discussed fluoxetine pros and cons.  We will start on a moderate dose.  Patient is advised to take this in the morning with food.  Warned that it can be agitating.  Warned that it can take several weeks to kick in well.  Patient voices understanding.    7. 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   • Anxiety 04/22/2018   • Hyponatremia 04/19/2018   • Osteopenia of lumbar spine 02/14/2018   • Colonic stricture ([facility]) 06/27/2017   • History of ischemic colitis 06/27/2017   • PVD (peripheral vascular disease) ([facility]) 06/02/2017   • Insomnia disorder 02/20/2017   • History of small bowel obstruction 02/17/2017   • Chronic use of opiate for therapeutic purpose 07/07/2016   • Eosinophilic colitis 07/21/2015   • Family history of nonmelanoma skin cancer    • COPD (chronic obstructive pulmonary disease) ([facility])    • Constipated 05/26/2012   • Subclavian artery stenosis, left ([facility]) 03/27/2012   • Herniated nucleus pulposus, L5-S1, left 09/08/2011   • Carotid atherosclerosis 06/15/2011   • Abdominal aortic aneurysm greater than 39 mm in diameter ([facility])    • Peripheral vascular disease ([facility])    • Essential hypertension    • Dyslipidemia, goal LDL below 100    • Tobacco dependence    • Spinal stenosis of lumbar region    • Arteriosclerosis of arteries of extremities ([facility])        Current medicines (including changes today)  Current Outpatient Medications   Medication Sig Dispense Refill   • [START ON 2/1/2020] HYDROcodone-acetaminophen (NORCO) 5-325 MG Tab per tablet Take 1 Tab by mouth every 8 hours as needed (sciatica and leg pain/hip pain) for up to 30 days. 90 Tab 0   • FLUoxetine (PROZAC) 20 MG Cap Take 1 Cap by mouth every day. 30 Cap 6   • sulfamethoxazole-trimethoprim (BACTRIM DS) 800-160 MG tablet Take 1 Tab by mouth 2 times a day for 7 days. 14 Tab 0   • lisinopril-hydrochlorothiazide (PRINZIDE, ZESTORETIC) 10-12.5 MG per tablet Take 1 Tab by mouth every day. 90 Tab 3   • potassium chloride ER (KLOR-CON) 10 MEQ tablet Take 1 Tab by mouth every day. 30 Tab 6   • promethazine (PHENERGAN) 25 MG Tab TAKE ONE TABLET BY MOUTH EVERY 6 HOURS AS NEEDED FOR NAUSEA AND VOMITING 30 Tab 4   • benzonatate (TESSALON) 200 MG capsule  "Take 1 Cap by mouth 3 times a day as needed for Cough. 30 Cap 2   • atorvastatin (LIPITOR) 80 MG tablet Take 1 Tab by mouth every bedtime. 90 Tab 3   • tiotropium (SPIRIVA) 18 MCG Cap Inhale 1 Cap by mouth every day. 30 Cap 3   • VENTOLIN  (90 Base) MCG/ACT Aero Soln inhalation aerosol      • aspirin EC (ECOTRIN) 81 MG Tablet Delayed Response Take 81 mg by mouth every evening.       No current facility-administered medications for this visit.        Allergies   Allergen Reactions   • Doxycycline Diarrhea     None stop diarrhea   • Tramadol Vomiting   • Amoxicillin Diarrhea     Diarrhea   • Ciprofloxacin Unspecified     Unspecified       ROS: As per HPI       Objective:     /64   Pulse 96   Temp 36.9 °C (98.5 °F)   Resp 14   Ht 1.549 m (5' 1\")   Wt 41.3 kg (91 lb)   SpO2 92%  Body mass index is 17.19 kg/m².    Physical Exam:  Constitutional: Well-developed and well-nourished. Not diaphoretic. No distress. Lucid and fluent.  Skin: Skin is warm and dry. No rash noted.  Head: Atraumatic without lesions.  Eyes: Conjunctivae and extraocular motions are normal. Pupils are equal, round, and reactive to light. No scleral icterus.   Mouth/Throat: Tongue normal. Oropharynx is clear and moist. Posterior pharynx without erythema or exudates.  Neck: Supple, trachea midline. No thyromegaly present. No cervical or supraclavicular lymphadenopathy. No JVD or carotid bruits appreciated  Cardiovascular: Regular rate and rhythm.  Normal S1, S2 without murmur appreciated.  Chest: Effort normal. Clear to auscultation throughout. No adventitious sounds.   Back: Back exam is basically unchanged.  Her back is quite stiff in motion.  There is tenderness to palpation lumbar spinous processes and sacroiliac regions..  Extremities: No cyanosis, clubbing, erythema, nor edema.   Neurological: Alert and oriented x 3.  Patient's movements are symmetric.  She is clearly in discomfort when standing up from a seated position.  Her " gait is slow and she is moderately kyphotic.  Psychiatric:  Behavior, mood, and affect are appropriate.       Assessment and Plan:     74 y.o. female with the following issues:    1. Recurrent urinary tract infection  URINALYSIS,CULTURE IF INDICATED   2. Acute cystitis without hematuria  URINALYSIS,CULTURE IF INDICATED   3. Spinal stenosis of lumbar region with neurogenic claudication  HYDROcodone-acetaminophen (NORCO) 5-325 MG Tab per tablet    DISCONTINUED: HYDROcodone-acetaminophen (NORCO) 5-325 MG Tab per tablet    DISCONTINUED: HYDROcodone-acetaminophen (NORCO) 5-325 MG Tab per tablet   4. Chronic pain of both hips  HYDROcodone-acetaminophen (NORCO) 5-325 MG Tab per tablet    DISCONTINUED: HYDROcodone-acetaminophen (NORCO) 5-325 MG Tab per tablet    DISCONTINUED: HYDROcodone-acetaminophen (NORCO) 5-325 MG Tab per tablet   5. Peripheral vascular disease (HCC)  HYDROcodone-acetaminophen (NORCO) 5-325 MG Tab per tablet    DISCONTINUED: HYDROcodone-acetaminophen (NORCO) 5-325 MG Tab per tablet    DISCONTINUED: HYDROcodone-acetaminophen (NORCO) 5-325 MG Tab per tablet   6. Chronic use of opiate for therapeutic purpose     7. Situational depression  FLUoxetine (PROZAC) 20 MG Cap   8. Need for immunization against influenza  INFLUENZA VACCINE, HIGH DOSE (65+ ONLY)     Chronic pain recheck for: Chronic pain of the low back and hips, mostly arthritic and degenerative.  There is an element of muscle stiffness.  Patient did try the tizanidine but felt it was sedating and disorienting and stopped it.  She does have peripheral vascular disease which causes pain.  Last dose of controlled substance: Today  Chronic pain treated with hydrocodone/APAP taken 3 times a day    She  reports no history of alcohol use.  She  reports no history of drug use.    Interval history: Patient feels she has been medically stable.  She continues to have shortness of breath and known COPD.  She continues to smoke and states she will not be  stopping.  Any major change in health since last appointment? No    Consequences of Chronic Opiate therapy:  (5 A's)  Analgesia: Compared to no treatment or prior treatment, pain is currently improved  Activity: improved  Adverse Events: She denies constipation, itchy skin, nausea and sedation  Aberrant Behaviors: She reports she is taking medication as prescribed and is not veering from agreed treatment regimen or provider recommendations. There have been no inappropriate refills or lost/stolen meds reported.  Affect/Mood: Pain is impacting patient's mood.  Patient reports depression/anxiety.  Please see above, treated today.    Nonnarcotic treatments that are being used: topical agents, heat and Does take low-dose aspirin but cannot take nonsteroidal anti-inflammatory drugs due to vascular disease.     Last imaging: March of this year    Opioid Risk Score: 1    Interpretation of Opioid Risk Score   Score 0-3 = Low risk of abuse. Do UDS at least once per year.  Score 4-7 = Moderate risk of abuse. Do UDS 1-4 times per year.  Score 8+ = High risk of abuse. Refer to specialist.    Last order of CONTROLLED SUBSTANCE TREATMENT AGREEMENT was found on 9/10/2019 from Office Visit on 9/10/2019     UDS Summary                URINE DRUG SCREEN Next Due 9/4/2020      Done 9/10/2019 PAIN MANAGEMENT SCREEN     Patient has more history with this topic...        Most recent UDS reviewed today and is consistent with prescribed medications.     I have reviewed the medical records, the Prescription Monitoring Program and I have determined that controlled substance treatment is medically indicated.       Followup: Return in about 3 months (around 2/22/2020), or if symptoms worsen or fail to improve.

## 2020-01-12 ENCOUNTER — APPOINTMENT (OUTPATIENT)
Dept: RADIOLOGY | Facility: MEDICAL CENTER | Age: 75
End: 2020-01-12
Attending: EMERGENCY MEDICINE
Payer: MEDICARE

## 2020-01-12 ENCOUNTER — HOSPITAL ENCOUNTER (EMERGENCY)
Facility: MEDICAL CENTER | Age: 75
End: 2020-01-12
Attending: EMERGENCY MEDICINE
Payer: MEDICARE

## 2020-01-12 VITALS
DIASTOLIC BLOOD PRESSURE: 68 MMHG | OXYGEN SATURATION: 93 % | SYSTOLIC BLOOD PRESSURE: 146 MMHG | BODY MASS INDEX: 17.94 KG/M2 | WEIGHT: 95 LBS | RESPIRATION RATE: 20 BRPM | HEART RATE: 107 BPM | TEMPERATURE: 98.7 F | HEIGHT: 61 IN

## 2020-01-12 DIAGNOSIS — R53.1 GENERALIZED WEAKNESS: ICD-10-CM

## 2020-01-12 DIAGNOSIS — N30.00 ACUTE CYSTITIS WITHOUT HEMATURIA: ICD-10-CM

## 2020-01-12 DIAGNOSIS — A41.9 SEPSIS WITHOUT ACUTE ORGAN DYSFUNCTION, DUE TO UNSPECIFIED ORGANISM (HCC): ICD-10-CM

## 2020-01-12 LAB
ALBUMIN SERPL BCP-MCNC: 3.8 G/DL (ref 3.2–4.9)
ALBUMIN/GLOB SERPL: 1.4 G/DL
ALP SERPL-CCNC: 78 U/L (ref 30–99)
ALT SERPL-CCNC: 14 U/L (ref 2–50)
ANION GAP SERPL CALC-SCNC: 14 MMOL/L (ref 0–11.9)
APPEARANCE UR: CLEAR
AST SERPL-CCNC: 21 U/L (ref 12–45)
BACTERIA #/AREA URNS HPF: NEGATIVE /HPF
BASOPHILS # BLD AUTO: 0.8 % (ref 0–1.8)
BASOPHILS # BLD: 0.1 K/UL (ref 0–0.12)
BILIRUB SERPL-MCNC: 0.4 MG/DL (ref 0.1–1.5)
BILIRUB UR QL STRIP.AUTO: NEGATIVE
BUN SERPL-MCNC: 39 MG/DL (ref 8–22)
CALCIUM SERPL-MCNC: 9.3 MG/DL (ref 8.5–10.5)
CHLORIDE SERPL-SCNC: 93 MMOL/L (ref 96–112)
CO2 SERPL-SCNC: 23 MMOL/L (ref 20–33)
COLOR UR: ABNORMAL
CREAT SERPL-MCNC: 1.27 MG/DL (ref 0.5–1.4)
EOSINOPHIL # BLD AUTO: 0.13 K/UL (ref 0–0.51)
EOSINOPHIL NFR BLD: 1.1 % (ref 0–6.9)
EPI CELLS #/AREA URNS HPF: NEGATIVE /HPF
ERYTHROCYTE [DISTWIDTH] IN BLOOD BY AUTOMATED COUNT: 43.9 FL (ref 35.9–50)
GLOBULIN SER CALC-MCNC: 2.8 G/DL (ref 1.9–3.5)
GLUCOSE SERPL-MCNC: 88 MG/DL (ref 65–99)
GLUCOSE UR STRIP.AUTO-MCNC: NEGATIVE MG/DL
HCT VFR BLD AUTO: 50.5 % (ref 37–47)
HGB BLD-MCNC: 17.1 G/DL (ref 12–16)
HYALINE CASTS #/AREA URNS LPF: ABNORMAL /LPF
IMM GRANULOCYTES # BLD AUTO: 0.09 K/UL (ref 0–0.11)
IMM GRANULOCYTES NFR BLD AUTO: 0.7 % (ref 0–0.9)
KETONES UR STRIP.AUTO-MCNC: NEGATIVE MG/DL
LACTATE BLD-SCNC: 1.1 MMOL/L (ref 0.5–2)
LEUKOCYTE ESTERASE UR QL STRIP.AUTO: ABNORMAL
LYMPHOCYTES # BLD AUTO: 2.01 K/UL (ref 1–4.8)
LYMPHOCYTES NFR BLD: 16.5 % (ref 22–41)
MCH RBC QN AUTO: 29.2 PG (ref 27–33)
MCHC RBC AUTO-ENTMCNC: 33.9 G/DL (ref 33.6–35)
MCV RBC AUTO: 86.3 FL (ref 81.4–97.8)
MICRO URNS: ABNORMAL
MONOCYTES # BLD AUTO: 0.78 K/UL (ref 0–0.85)
MONOCYTES NFR BLD AUTO: 6.4 % (ref 0–13.4)
NEUTROPHILS # BLD AUTO: 9.06 K/UL (ref 2–7.15)
NEUTROPHILS NFR BLD: 74.5 % (ref 44–72)
NITRITE UR QL STRIP.AUTO: NEGATIVE
NRBC # BLD AUTO: 0 K/UL
NRBC BLD-RTO: 0 /100 WBC
PH UR STRIP.AUTO: 6.5 [PH] (ref 5–8)
PLATELET # BLD AUTO: 292 K/UL (ref 164–446)
PMV BLD AUTO: 9.1 FL (ref 9–12.9)
POTASSIUM SERPL-SCNC: 3.6 MMOL/L (ref 3.6–5.5)
PROT SERPL-MCNC: 6.6 G/DL (ref 6–8.2)
PROT UR QL STRIP: 30 MG/DL
RBC # BLD AUTO: 5.85 M/UL (ref 4.2–5.4)
RBC # URNS HPF: ABNORMAL /HPF
RBC UR QL AUTO: ABNORMAL
SODIUM SERPL-SCNC: 130 MMOL/L (ref 135–145)
SP GR UR STRIP.AUTO: 1.02
UROBILINOGEN UR STRIP.AUTO-MCNC: 1 MG/DL
WBC # BLD AUTO: 12.2 K/UL (ref 4.8–10.8)
WBC #/AREA URNS HPF: ABNORMAL /HPF

## 2020-01-12 PROCEDURE — 71045 X-RAY EXAM CHEST 1 VIEW: CPT

## 2020-01-12 PROCEDURE — 36415 COLL VENOUS BLD VENIPUNCTURE: CPT

## 2020-01-12 PROCEDURE — 87040 BLOOD CULTURE FOR BACTERIA: CPT

## 2020-01-12 PROCEDURE — 81001 URINALYSIS AUTO W/SCOPE: CPT

## 2020-01-12 PROCEDURE — 99284 EMERGENCY DEPT VISIT MOD MDM: CPT

## 2020-01-12 PROCEDURE — 700102 HCHG RX REV CODE 250 W/ 637 OVERRIDE(OP): Performed by: EMERGENCY MEDICINE

## 2020-01-12 PROCEDURE — A9270 NON-COVERED ITEM OR SERVICE: HCPCS | Performed by: EMERGENCY MEDICINE

## 2020-01-12 PROCEDURE — 87086 URINE CULTURE/COLONY COUNT: CPT

## 2020-01-12 PROCEDURE — 85025 COMPLETE CBC W/AUTO DIFF WBC: CPT

## 2020-01-12 PROCEDURE — 83605 ASSAY OF LACTIC ACID: CPT

## 2020-01-12 PROCEDURE — 80053 COMPREHEN METABOLIC PANEL: CPT

## 2020-01-12 RX ORDER — AZITHROMYCIN 500 MG/1
500 INJECTION, POWDER, LYOPHILIZED, FOR SOLUTION INTRAVENOUS ONCE
Status: DISCONTINUED | OUTPATIENT
Start: 2020-01-12 | End: 2020-01-12

## 2020-01-12 RX ORDER — AZITHROMYCIN 250 MG/1
500 TABLET, FILM COATED ORAL ONCE
Status: COMPLETED | OUTPATIENT
Start: 2020-01-12 | End: 2020-01-12

## 2020-01-12 RX ORDER — AZITHROMYCIN 250 MG/1
TABLET, FILM COATED ORAL
Qty: 6 TAB | Refills: 0 | Status: SHIPPED | OUTPATIENT
Start: 2020-01-12 | End: 2020-02-06

## 2020-01-12 RX ORDER — HYDROCODONE BITARTRATE AND ACETAMINOPHEN 5; 325 MG/1; MG/1
0.5 TABLET ORAL ONCE
Status: COMPLETED | OUTPATIENT
Start: 2020-01-12 | End: 2020-01-12

## 2020-01-12 RX ORDER — CEFDINIR 300 MG/1
300 CAPSULE ORAL ONCE
Status: COMPLETED | OUTPATIENT
Start: 2020-01-12 | End: 2020-01-12

## 2020-01-12 RX ORDER — CEFDINIR 300 MG/1
300 CAPSULE ORAL 2 TIMES DAILY
Qty: 20 CAP | Refills: 0 | Status: SHIPPED | OUTPATIENT
Start: 2020-01-12 | End: 2020-01-19

## 2020-01-12 RX ADMIN — CEFDINIR 300 MG: 300 CAPSULE ORAL at 17:55

## 2020-01-12 RX ADMIN — AZITHROMYCIN 500 MG: 250 TABLET, FILM COATED ORAL at 17:55

## 2020-01-12 RX ADMIN — HYDROCODONE BITARTRATE AND ACETAMINOPHEN 0.5 TABLET: 5; 325 TABLET ORAL at 15:46

## 2020-01-12 ASSESSMENT — LIFESTYLE VARIABLES: DO YOU DRINK ALCOHOL: NO

## 2020-01-12 NOTE — ED TRIAGE NOTES
Pt arrived via ems.     Chief Complaint   Patient presents with   • Weakness     treated for uti for past week, increased weakness today, hypotensive and tacy with ems, ems gave fluid and 4mg iv zofran     Pt oriented to ed. Second piv started, blood sent to lab. Protocol orders placed for sepsis.

## 2020-01-12 NOTE — ED PROVIDER NOTES
ED Provider Note    CHIEF COMPLAINT  Chief Complaint   Patient presents with   • Weakness     treated for uti for past week, increased weakness today, hypotensive and tacy with ems, ems gave fluid and 4mg iv zofran       HPI  Karen Jauregui is a 74 y.o. female who presents for evaluation of generalized weakness and shortness of breath that began today, she is in the middle of a course of antibiotics for a UTI with ongoing dysuria.  She called EMS with her symptoms, was found to be hypotensive and tachycardic by EMS, both improved upon administration of IV fluids.  The patient states that she has had this happen in the past and at that time she was dehydrated and her symptoms also improved with IV fluids.  She has not had chest pain, she has no abdominal pain or back pain, no fever.  She offers no other specific complaints at this time.    REVIEW OF SYSTEMS  Negative for fever, rash, chest pain, abdominal pain, nausea, vomiting, diarrhea, headache, focal weakness, focal numbness, focal tingling, back pain. All other systems are negative.     PAST MEDICAL HISTORY  Past Medical History:   Diagnosis Date   • Abdominal aortic aneurysm (HCC) 11/2010    3.6 cm 8/2014   • Angina     with stress test   • Arthritis     thumbs   • Atherosclerosis of arteries of the extremities     two stents in the groin, Dr. Pickett   • Bowel habit changes    • Carotid atherosclerosis     Dr. Pickett   • COPD (chronic obstructive pulmonary disease) ([facility])    • Diverticulitis     Dx 11/25/11   • Diverticulosis of colon    • Dyslipidemia    • Emphysema of lung ([facility])    • Eosinophilic colitis    • Family history of nonmelanoma skin cancer    • Hypertension    • Pain    • Peripheral vascular disease (HCC)     9 months, may relate to PVD   • PVD (posterior vitreous detachment), left eye 1/13/2015   • Snoring    • Spinal stenosis of lumbar region     Dr. Navarro   • Unspecified hemorrhagic conditions     asa/plavix, bruises easily       FAMILY  HISTORY  Family History   Problem Relation Age of Onset   • Hyperlipidemia Sister    • Hypertension Sister    • Stroke Sister    • Heart Disease Sister         heart attack   • Non-contributory Sister         carotid atherosclerosis   • Hypertension Mother    • Hyperlipidemia Mother    • Heart Disease Mother 82        congestive heart failure, AAA   • Heart Disease Father 55        multiple heart issues   • Hypertension Father    • Hyperlipidemia Father    • Cancer Sister         sin cancer   • Heart Disease Brother         heart attack       SOCIAL HISTORY  Social History     Tobacco Use   • Smoking status: Current Every Day Smoker     Packs/day: 1.00     Years: 45.00     Pack years: 45.00     Types: Cigarettes   • Smokeless tobacco: Never Used   • Tobacco comment: doubts she will stop   Substance Use Topics   • Alcohol use: No     Alcohol/week: 0.0 oz   • Drug use: No       SURGICAL HISTORY  Past Surgical History:   Procedure Laterality Date   • AAA WITH STENT GRAFT N/A 3/31/2017    Procedure: AAA WITH STENT GRAFT - 5-CM INFRARENAL;  Surgeon: Spring Pemberton M.D.;  Location: SURGERY [facility];  Service:    • FEMORAL FEMORAL BYPASS N/A 3/31/2017    Procedure: FEMORAL FEMORAL BYPASS - POSS;  Surgeon: Spring Pemberton M.D.;  Location: SURGERY [facility];  Service:    • COLONOSCOPY WITH BIOPSY  3/6/2015    Performed by Pranav THOMPSON M.D. at ENDOSCOPY [facility]   • COLON RESECTION LAPAROSCOPIC  8/5/2013    Performed by Spring Pemberton M.D. at SURGERY [facility]   • OTHER CARDIAC SURGERY  2005    cardiac stents   • GYN SURGERY  2002    hysterectomy, tubal, fibroids, ovaries gone   • GYN SURGERY  1975    ectopic pregnacy   • COLONOSCOPY  3/1/2009, 4/2012, 7/2/13    normal, normal, normal but heavy diverticulosis   • OTHER      LLE /RLEstent, common iliac, Dr. Pickett       CURRENT MEDICATIONS  I personally reviewed the medication list in the charting documentation.     ALLERGIES  Allergies  "  Allergen Reactions   • Doxycycline Diarrhea     None stop diarrhea   • Tramadol Vomiting   • Amoxicillin Diarrhea     Diarrhea   • Ciprofloxacin Unspecified     Unspecified       MEDICAL RECORD  I have reviewed patient's medical record and pertinent results are listed above.      PHYSICAL EXAM  VITAL SIGNS: /59   Pulse 91   Temp (!) 35.7 °C (96.2 °F) (Oral)   Resp 20   Ht 1.549 m (5' 1\")   Wt 43.1 kg (95 lb)   LMP 03/01/2002   BMI 17.95 kg/m²    Constitutional: Elderly, frail-appearing, appears somewhat ill  HENT: Mucus membranes dry.    Eyes: No scleral icterus. Normal conjunctiva   Neck: Supple, comfortable, nonpainful range of motion.   Cardiovascular: Regular heart rate and rhythm.   Thorax & Lungs: Chest is nontender.  Lungs are clear to auscultation with good air movement bilaterally.  No wheeze, rhonchi, nor rales.   Abdomen: Soft, with no tenderness, rebound nor guarding.  No mass or pulsatile mass appreciated.  Skin: Warm, dry. No rash appreciated  Extremities/Musculoskeletal: No sign of trauma. No asymmetric calf tenderness, erythema or edema. Normal range of motion   Neurologic: Alert & oriented. No focal deficits observed.   Psychiatric: Normal affect appropriate for the clinical situation.    DIAGNOSTIC STUDIES / PROCEDURES    LABS/EKGs  Results for orders placed or performed during the hospital encounter of 01/12/20   Lactic acid (lactate)   Result Value Ref Range    Lactic Acid 1.1 0.5 - 2.0 mmol/L   CBC WITH DIFFERENTIAL   Result Value Ref Range    WBC 12.2 (H) 4.8 - 10.8 K/uL    RBC 5.85 (H) 4.20 - 5.40 M/uL    Hemoglobin 17.1 (H) 12.0 - 16.0 g/dL    Hematocrit 50.5 (H) 37.0 - 47.0 %    MCV 86.3 81.4 - 97.8 fL    MCH 29.2 27.0 - 33.0 pg    MCHC 33.9 33.6 - 35.0 g/dL    RDW 43.9 35.9 - 50.0 fL    Platelet Count 292 164 - 446 K/uL    MPV 9.1 9.0 - 12.9 fL    Neutrophils-Polys 74.50 (H) 44.00 - 72.00 %    Lymphocytes 16.50 (L) 22.00 - 41.00 %    Monocytes 6.40 0.00 - 13.40 %    " Eosinophils 1.10 0.00 - 6.90 %    Basophils 0.80 0.00 - 1.80 %    Immature Granulocytes 0.70 0.00 - 0.90 %    Nucleated RBC 0.00 /100 WBC    Neutrophils (Absolute) 9.06 (H) 2.00 - 7.15 K/uL    Lymphs (Absolute) 2.01 1.00 - 4.80 K/uL    Monos (Absolute) 0.78 0.00 - 0.85 K/uL    Eos (Absolute) 0.13 0.00 - 0.51 K/uL    Baso (Absolute) 0.10 0.00 - 0.12 K/uL    Immature Granulocytes (abs) 0.09 0.00 - 0.11 K/uL    NRBC (Absolute) 0.00 K/uL   COMP METABOLIC PANEL   Result Value Ref Range    Sodium 130 (L) 135 - 145 mmol/L    Potassium 3.6 3.6 - 5.5 mmol/L    Chloride 93 (L) 96 - 112 mmol/L    Co2 23 20 - 33 mmol/L    Anion Gap 14.0 (H) 0.0 - 11.9    Glucose 88 65 - 99 mg/dL    Bun 39 (H) 8 - 22 mg/dL    Creatinine 1.27 0.50 - 1.40 mg/dL    Calcium 9.3 8.5 - 10.5 mg/dL    AST(SGOT) 21 12 - 45 U/L    ALT(SGPT) 14 2 - 50 U/L    Alkaline Phosphatase 78 30 - 99 U/L    Total Bilirubin 0.4 0.1 - 1.5 mg/dL    Albumin 3.8 3.2 - 4.9 g/dL    Total Protein 6.6 6.0 - 8.2 g/dL    Globulin 2.8 1.9 - 3.5 g/dL    A-G Ratio 1.4 g/dL   URINALYSIS   Result Value Ref Range    Color DK Yellow     Character Clear     Specific Gravity 1.018 <1.035    Ph 6.5 5.0 - 8.0    Glucose Negative Negative mg/dL    Ketones Negative Negative mg/dL    Protein 30 (A) Negative mg/dL    Bilirubin Negative Negative    Urobilinogen, Urine 1.0 Negative    Nitrite Negative Negative    Leukocyte Esterase Large (A) Negative    Occult Blood Trace (A) Negative    Micro Urine Req Microscopic    ESTIMATED GFR   Result Value Ref Range    GFR If African American 50 (A) >60 mL/min/1.73 m 2    GFR If Non  41 (A) >60 mL/min/1.73 m 2   URINE MICROSCOPIC (W/UA)   Result Value Ref Range    WBC  (A) /hpf    RBC 5-10 (A) /hpf    Bacteria Negative None /hpf    Epithelial Cells Negative /hpf    Hyaline Cast 0-2 /lpf        RADIOLOGY  DX-CHEST-PORTABLE (1 VIEW)   Final Result      Stable hyperinflation with mild increased right upper lung zone opacity which could  indicate pneumonia            COURSE & MEDICAL DECISION MAKING  I have reviewed any medical record information, laboratory studies and radiographic results as noted above.  Differential diagnoses includes: UTI, pneumonia, sepsis, dehydration, electrolyte abnormalities, anemia    Encounter Summary: This is a 74 y.o. female with shortness of breath and generalized weakness at all began today, currently being treated for UTI with Bactrim, called EMS and was found to be hypotensive and tachycardic, both of those improved with IV fluids prior to arrival she is now normotensive with a heart rate in the 90s.  Her temperature is on the low side, this combined with a heart rate greater than 90 is concerning for sepsis, will obtain blood work, x-ray and urinalysis and reevaluate, IV fluids will be infused ------- blood work reveals evidence of dehydration with an elevated BUN to creatinine ratio and prerenal azotemia, she also has a leukocytosis.  Urinalysis is concerning for infection.  The x-ray also reveals possible pneumonia.  At this point IV antibiotics have been ordered and the patient meets multiple markers of sepsis and should be admitted to the hospital.  She is adamant however that she will not be admitted to the hospital, she will go home and follow-up tomorrow with her PCP, she states that she sees her PCP often and will have no problem getting an appointment for tomorrow.  The patient also refuses a dose of IV antibiotics here in the emergency department stating she wants to go home now.  I am concerned about her outpatient failure regarding her urinary tract infection although she admits that she has not been compliant with her antibiotic stating that she has not had a dose in a few days.  At this point I will treat her with Omnicef for her urinary tract infection and azithromycin for the possible pneumonia.  First dose orally is given here in the emergency department, she will follow-up tomorrow with her  primary care provider and she is being discharged home despite my recommendations, strict return instructions have been discussed    HYDRATION: Based on the patient's presentation of Dehydration, Hypotension and Sepsis the patient was given IV fluids. IV Hydration was used because oral hydration was not adequate alone. Upon recheck following hydration, the patient was Improving.        DISPOSITION: Discharged home in guarded condition      FINAL IMPRESSION  1. Sepsis without acute organ dysfunction, due to unspecified organism (HCC)    2. Acute cystitis without hematuria    3. Generalized weakness           This dictation was created using voice recognition software. The accuracy of the dictation is limited to the abilities of the software. I expect there may be some errors of grammar and possibly content. The nursing notes were reviewed and certain aspects of this information were incorporated into this note.    Electronically signed by: Deven Daugherty, 1/12/2020 2:06 PM

## 2020-01-12 NOTE — ED NOTES
Attempted to get pt to provide urine sample. Pt sat at side of bed and reports she is too weak to get up to pee. Bedside commode in room. Pt refusing to attempt to use bedpan or straight cath for urine.

## 2020-01-14 LAB
BACTERIA UR CULT: NORMAL
SIGNIFICANT IND 70042: NORMAL
SITE SITE: NORMAL
SOURCE SOURCE: NORMAL

## 2020-02-06 ENCOUNTER — OFFICE VISIT (OUTPATIENT)
Dept: MEDICAL GROUP | Facility: MEDICAL CENTER | Age: 75
End: 2020-02-06
Payer: MEDICARE

## 2020-02-06 VITALS
TEMPERATURE: 98.4 F | HEIGHT: 61 IN | RESPIRATION RATE: 14 BRPM | BODY MASS INDEX: 15.11 KG/M2 | WEIGHT: 80 LBS | OXYGEN SATURATION: 94 % | DIASTOLIC BLOOD PRESSURE: 100 MMHG | SYSTOLIC BLOOD PRESSURE: 176 MMHG | HEART RATE: 124 BPM

## 2020-02-06 DIAGNOSIS — R68.81 EARLY SATIETY: ICD-10-CM

## 2020-02-06 DIAGNOSIS — F33.2 ENDOGENOUS DEPRESSION (HCC): ICD-10-CM

## 2020-02-06 DIAGNOSIS — R63.4 WEIGHT LOSS, UNINTENTIONAL: ICD-10-CM

## 2020-02-06 DIAGNOSIS — J41.0 SIMPLE CHRONIC BRONCHITIS (HCC): ICD-10-CM

## 2020-02-06 PROCEDURE — 99214 OFFICE O/P EST MOD 30 MIN: CPT | Performed by: FAMILY MEDICINE
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
## 2020-02-28 ENCOUNTER — OFFICE VISIT (OUTPATIENT)
Dept: MEDICAL GROUP | Facility: MEDICAL CENTER | Age: 75
End: 2020-02-28
Payer: MEDICARE

## 2020-02-28 VITALS
OXYGEN SATURATION: 94 % | WEIGHT: 85.4 LBS | SYSTOLIC BLOOD PRESSURE: 170 MMHG | RESPIRATION RATE: 12 BRPM | DIASTOLIC BLOOD PRESSURE: 102 MMHG | BODY MASS INDEX: 16.12 KG/M2 | HEIGHT: 61 IN | TEMPERATURE: 98.8 F | HEART RATE: 120 BPM

## 2020-02-28 DIAGNOSIS — Z79.891 CHRONIC USE OF OPIATE FOR THERAPEUTIC PURPOSE: ICD-10-CM

## 2020-02-28 DIAGNOSIS — I73.9 PERIPHERAL VASCULAR DISEASE (HCC): ICD-10-CM

## 2020-02-28 DIAGNOSIS — M25.551 CHRONIC PAIN OF BOTH HIPS: ICD-10-CM

## 2020-02-28 DIAGNOSIS — G89.29 CHRONIC PAIN OF BOTH HIPS: ICD-10-CM

## 2020-02-28 DIAGNOSIS — M25.552 CHRONIC PAIN OF BOTH HIPS: ICD-10-CM

## 2020-02-28 DIAGNOSIS — M48.062 SPINAL STENOSIS OF LUMBAR REGION WITH NEUROGENIC CLAUDICATION: ICD-10-CM

## 2020-02-28 DIAGNOSIS — J41.0 SIMPLE CHRONIC BRONCHITIS (HCC): ICD-10-CM

## 2020-02-28 DIAGNOSIS — E87.6 HYPOKALEMIA: ICD-10-CM

## 2020-02-28 PROCEDURE — 99213 OFFICE O/P EST LOW 20 MIN: CPT | Performed by: FAMILY MEDICINE

## 2020-02-28 RX ORDER — HYDROCODONE BITARTRATE AND ACETAMINOPHEN 5; 325 MG/1; MG/1
1 TABLET ORAL EVERY 8 HOURS PRN
Qty: 90 TAB | Refills: 0 | Status: SHIPPED | OUTPATIENT
Start: 2020-04-28 | End: 2020-04-09 | Stop reason: SDUPTHER

## 2020-02-28 RX ORDER — HYDROCODONE BITARTRATE AND ACETAMINOPHEN 5; 325 MG/1; MG/1
1 TABLET ORAL EVERY 8 HOURS PRN
Qty: 90 TAB | Refills: 0 | Status: SHIPPED | OUTPATIENT
Start: 2020-03-29 | End: 2020-02-28 | Stop reason: SDUPTHER

## 2020-02-28 RX ORDER — HYDROCODONE BITARTRATE AND ACETAMINOPHEN 5; 325 MG/1; MG/1
1 TABLET ORAL EVERY 8 HOURS PRN
Qty: 90 TAB | Refills: 0 | Status: SHIPPED | OUTPATIENT
Start: 2020-02-28 | End: 2020-02-28 | Stop reason: SDUPTHER

## 2020-02-28 RX ORDER — TIOTROPIUM BROMIDE 18 UG/1
18 CAPSULE ORAL; RESPIRATORY (INHALATION) DAILY
Qty: 30 CAP | Refills: 6 | Status: ON HOLD
Start: 2020-02-28 | End: 2020-05-13

## 2020-02-28 RX ORDER — POTASSIUM CHLORIDE 750 MG/1
10 TABLET, FILM COATED, EXTENDED RELEASE ORAL DAILY
Qty: 90 TAB | Refills: 3 | Status: ON HOLD
Start: 2020-02-28 | End: 2020-06-11

## 2020-02-28 ASSESSMENT — FIBROSIS 4 INDEX: FIB4 SCORE: 1.403121520040228019
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
## 2020-04-06 ENCOUNTER — DOCUMENTATION (OUTPATIENT)
Dept: MEDICAL GROUP | Facility: MEDICAL CENTER | Age: 75
End: 2020-04-06

## 2020-04-06 NOTE — PROGRESS NOTES
I sent a [employer] message about changing her appt this week to either phone/video visit. Waiting for a response.

## 2020-04-09 ENCOUNTER — OFFICE VISIT (OUTPATIENT)
Dept: MEDICAL GROUP | Facility: MEDICAL CENTER | Age: 75
End: 2020-04-09
Payer: MEDICARE

## 2020-04-09 ENCOUNTER — DOCUMENTATION (OUTPATIENT)
Dept: MEDICAL GROUP | Facility: MEDICAL CENTER | Age: 75
End: 2020-04-09

## 2020-04-09 VITALS
BODY MASS INDEX: 16.05 KG/M2 | DIASTOLIC BLOOD PRESSURE: 82 MMHG | HEART RATE: 124 BPM | RESPIRATION RATE: 12 BRPM | WEIGHT: 85 LBS | OXYGEN SATURATION: 94 % | TEMPERATURE: 99.9 F | HEIGHT: 61 IN | SYSTOLIC BLOOD PRESSURE: 136 MMHG

## 2020-04-09 DIAGNOSIS — M25.551 CHRONIC PAIN OF BOTH HIPS: ICD-10-CM

## 2020-04-09 DIAGNOSIS — G89.29 CHRONIC PAIN OF BOTH HIPS: ICD-10-CM

## 2020-04-09 DIAGNOSIS — I73.9 PERIPHERAL VASCULAR DISEASE (HCC): ICD-10-CM

## 2020-04-09 DIAGNOSIS — J41.0 SIMPLE CHRONIC BRONCHITIS (HCC): ICD-10-CM

## 2020-04-09 DIAGNOSIS — M48.062 SPINAL STENOSIS OF LUMBAR REGION WITH NEUROGENIC CLAUDICATION: ICD-10-CM

## 2020-04-09 DIAGNOSIS — M25.552 CHRONIC PAIN OF BOTH HIPS: ICD-10-CM

## 2020-04-09 DIAGNOSIS — Z79.891 CHRONIC USE OF OPIATE FOR THERAPEUTIC PURPOSE: ICD-10-CM

## 2020-04-09 PROCEDURE — 99213 OFFICE O/P EST LOW 20 MIN: CPT | Performed by: FAMILY MEDICINE

## 2020-04-09 RX ORDER — HYDROCODONE BITARTRATE AND ACETAMINOPHEN 5; 325 MG/1; MG/1
1 TABLET ORAL EVERY 8 HOURS PRN
Qty: 90 TAB | Refills: 0 | Status: SHIPPED | OUTPATIENT
Start: 2020-04-09 | End: 2020-04-09 | Stop reason: SDUPTHER

## 2020-04-09 RX ORDER — BENZONATATE 200 MG/1
200 CAPSULE ORAL 3 TIMES DAILY PRN
Qty: 30 CAP | Refills: 2 | Status: ON HOLD
Start: 2020-04-09 | End: 2020-05-13

## 2020-04-09 RX ORDER — HYDROCODONE BITARTRATE AND ACETAMINOPHEN 5; 325 MG/1; MG/1
1 TABLET ORAL EVERY 8 HOURS PRN
Qty: 90 TAB | Refills: 0 | Status: ON HOLD | OUTPATIENT
Start: 2020-06-08 | End: 2020-06-17

## 2020-04-09 RX ORDER — HYDROCODONE BITARTRATE AND ACETAMINOPHEN 5; 325 MG/1; MG/1
1 TABLET ORAL EVERY 8 HOURS PRN
Qty: 90 TAB | Refills: 0 | Status: SHIPPED | OUTPATIENT
Start: 2020-05-09 | End: 2020-04-09 | Stop reason: SDUPTHER

## 2020-04-09 ASSESSMENT — FIBROSIS 4 INDEX: FIB4 SCORE: 1.403121520040228019

## 2020-04-09 NOTE — PROGRESS NOTES
Chief Complaint   Patient presents with   • Back Pain       Subjective:     HPI:   Karen Jauregui presents today with the following: We tried to reach her for telephone visit but unfortunately the numbers we had were not working.  Those have been updated.    1. Spinal stenosis of lumbar region with neurogenic claudication  Patient has chronic back pain.  She would like to renew her hydrocodone.  Unfortunately she failed to turn in her last 2 prescriptions.  She last filled this in February and has tried to eat this out.  Since she did not turn in her prescriptions I am just rewriting all 3 of them.  She states the pain medication is helpful bringing the pain from an 8 down to around a 5 or 6 which allows her to complete her ADLs with assistance from her family.  She typically makes her prescriptions last 35 to 40 days.  She will see me again in 4 months.    2. Chronic pain of both hips  Patient has chronic hip pain from degenerative disease, primarily arthritic.  Patient is not supposed to take nonsteroidal anti-inflammatory drugs due to atherosclerosis, peripheral vascular disease and GI issues.  When she does take anti-inflammatories her eosinophilic colitis gets much worse.    3. Peripheral vascular disease (HCC)  Unfortunately patient continues to smoke and is adamantly pre-contemplative.  She does have known peripheral vascular disease with atherosclerosis.  She has history of stents in the iliac arteries along with femoral-popliteal bypass and has a stent graft in her abdominal aortic aneurysm.  This is occasionally a source of intermittent pain.    4. Chronic use of opiate for therapeutic purpose  No aberrant or addictive use of medications has been observed.  No adverse events have been reported.  Patient counseled to not add Tylenol to current regimen.  Patient counseled to keep medications locked up or under personal control.  [facility] board of pharmacy interface is reviewed.  No inconsistencies are  found.  The patient's maximum MME is 15.  This is within CDC guideline for chronic pain prescribing by primary care.    5. Simple chronic bronchitis (HCC)  She continues to be tachycardic, this has been stable.  She has chronic cough which has not worsened.  She and her family are living all 6 together in one house.  None of them are currently working outside the home.  They have been very careful and everyone has been well.  Denies increase in shortness of breath or increased productive cough.        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   • Anxiety 04/22/2018   • Hyponatremia 04/19/2018   • Osteopenia of lumbar spine 02/14/2018   • Colonic stricture ([facility]) 06/27/2017   • History of ischemic colitis 06/27/2017   • PVD (peripheral vascular disease) ([facility]) 06/02/2017   • Insomnia disorder 02/20/2017   • History of small bowel obstruction 02/17/2017   • Chronic use of opiate for therapeutic purpose 07/07/2016   • Eosinophilic colitis 07/21/2015   • Family history of nonmelanoma skin cancer    • COPD (chronic obstructive pulmonary disease) ([facility])    • Constipated 05/26/2012   • Subclavian artery stenosis, left ([facility]) 03/27/2012   • Herniated nucleus pulposus, L5-S1, left 09/08/2011   • Carotid atherosclerosis 06/15/2011   • Abdominal aortic aneurysm greater than 39 mm in diameter ([facility])    • Peripheral vascular disease ([facility])    • Essential hypertension    • Dyslipidemia, goal LDL below 100    • Tobacco dependence    • Spinal stenosis of lumbar region    • Arteriosclerosis of arteries of extremities ([facility])        Current medicines (including changes today)  Current Outpatient Medications   Medication Sig Dispense Refill   • benzonatate (TESSALON) 200 MG capsule Take 1 Cap by mouth 3 times a day as needed  "for Cough. 30 Cap 2   • [START ON 6/8/2020] HYDROcodone-acetaminophen (NORCO) 5-325 MG Tab per tablet Take 1 Tab by mouth every 8 hours as needed (sciatica and leg pain/hip pain) for up to 30 days. 90 Tab 0   • potassium chloride ER (KLOR-CON) 10 MEQ tablet Take 1 Tab by mouth every day. 90 Tab 3   • tiotropium (SPIRIVA) 18 MCG Cap Inhale 1 Cap by mouth every day. 30 Cap 6   • lisinopril-hydrochlorothiazide (PRINZIDE, ZESTORETIC) 10-12.5 MG per tablet Take 1 Tab by mouth every day. 90 Tab 3   • promethazine (PHENERGAN) 25 MG Tab TAKE ONE TABLET BY MOUTH EVERY 6 HOURS AS NEEDED FOR NAUSEA AND VOMITING 30 Tab 4   • VENTOLIN  (90 Base) MCG/ACT Aero Soln inhalation aerosol      • aspirin EC (ECOTRIN) 81 MG Tablet Delayed Response Take 81 mg by mouth every evening.       No current facility-administered medications for this visit.        Allergies   Allergen Reactions   • Doxycycline Diarrhea     None stop diarrhea   • Tramadol Vomiting   • Amoxicillin Diarrhea     Diarrhea   • Ciprofloxacin Unspecified     Unspecified       ROS: As per HPI       Objective:     /82   Pulse (!) 124   Temp 37.7 °C (99.9 °F)   Resp 12   Ht 1.549 m (5' 1\")   Wt 38.6 kg (85 lb)   SpO2 94%  Body mass index is 16.06 kg/m².    Physical Exam:  Constitutional: Well-developed and well-nourished. Not diaphoretic. No distress. Lucid and fluent.  Skin: Skin is warm and dry. No rash noted.  Head: Atraumatic without lesions.  Eyes: Conjunctivae and extraocular motions are normal. Pupils are equal, round, and reactive to light. No scleral icterus.   Neck: Supple, trachea midline. No thyromegaly present. No cervical or supraclavicular lymphadenopathy. No JVD or carotid bruits appreciated  Cardiovascular: Regular rate and rhythm.  Normal S1, S2 without murmur appreciated.  Chest: Effort normal. Clear to auscultation throughout.  No rhonchi appreciated today.  Air movement is somewhat distant but symmetric.  No retractions " appreciated.  Abdomen: Soft, non tender, and without distention. Active bowel sounds in all four quadrants. No rebound, guarding, masses or hepatosplenomegaly.  Extremities: No cyanosis, clubbing, erythema, nor edema.   Neurological: Alert and oriented x 3.  No tremor appreciated.  Psychiatric:  Behavior, mood, and affect are appropriate.       Assessment and Plan:     74 y.o. female with the following issues:    1. Spinal stenosis of lumbar region with neurogenic claudication  HYDROcodone-acetaminophen (NORCO) 5-325 MG Tab per tablet    DISCONTINUED: HYDROcodone-acetaminophen (NORCO) 5-325 MG Tab per tablet    DISCONTINUED: HYDROcodone-acetaminophen (NORCO) 5-325 MG Tab per tablet   2. Chronic pain of both hips  HYDROcodone-acetaminophen (NORCO) 5-325 MG Tab per tablet    DISCONTINUED: HYDROcodone-acetaminophen (NORCO) 5-325 MG Tab per tablet    DISCONTINUED: HYDROcodone-acetaminophen (NORCO) 5-325 MG Tab per tablet   3. Peripheral vascular disease (HCC)  HYDROcodone-acetaminophen (NORCO) 5-325 MG Tab per tablet    DISCONTINUED: HYDROcodone-acetaminophen (NORCO) 5-325 MG Tab per tablet    DISCONTINUED: HYDROcodone-acetaminophen (NORCO) 5-325 MG Tab per tablet   4. Chronic use of opiate for therapeutic purpose     5. Simple chronic bronchitis (HCC)  benzonatate (TESSALON) 200 MG capsule     Chronic pain recheck for: Chronic back pain and other joint arthritic pain.  Patient also intermittently has pain from peripheral vascular disease.  Last dose of controlled substance: Today  Chronic pain treated with hydrocodone/APAP taken 2-3 times a day    She  reports no history of alcohol use.  She  reports no history of drug use.    Interval history: Patient feels she has done fairly well  Any major change in health since last appointment? No    Consequences of Chronic Opiate therapy:  (5 A's)  Analgesia: Compared to no treatment or prior treatment, pain is currently improved  Activity: improved  Adverse Events: She denies  constipation, itchy skin, nausea and sedation  Aberrant Behaviors: She reports she is taking medication as prescribed and is not veering from agreed treatment regimen or provider recommendations. There have been no inappropriate refills or lost/stolen meds reported.  Affect/Mood: Pain is impacting patient's mood.  Patient reports stable depression/anxiety.  She is not taking the Prozac as she feels she does not need it at this time.  She states she is having very good support from her family.    Nonnarcotic treatments that are being used: topical agents, heat and Patient cannot take large doses of nonsteroidal anti-inflammatory drugs due to chronic vascular disease..     Last imaging: March last year    Opioid Risk Score: 1    Interpretation of Opioid Risk Score   Score 0-3 = Low risk of abuse. Do UDS at least once per year.  Score 4-7 = Moderate risk of abuse. Do UDS 1-4 times per year.  Score 8+ = High risk of abuse. Refer to specialist.    Last order of CONTROLLED SUBSTANCE TREATMENT AGREEMENT was found on 9/10/2019 from Office Visit on 9/10/2019     UDS Summary                URINE DRUG SCREEN Next Due 9/4/2020      Done 9/10/2019 PAIN MANAGEMENT SCREEN     Patient has more history with this topic...        Most recent UDS reviewed today and is consistent with prescribed medications.     I have reviewed the medical records, the Prescription Monitoring Program and I have determined that controlled substance treatment is medically indicated.       Followup: Return in about 4 months (around 8/9/2020), or if symptoms worsen or fail to improve.

## 2020-04-09 NOTE — PROGRESS NOTES
Called the home/cell phone, Not a working #. Daughter's #s aren't working either. Johnny's phone is not accepting calls. I cannot reach the patient.

## 2020-05-07 ENCOUNTER — TELEPHONE (OUTPATIENT)
Dept: MEDICAL GROUP | Facility: MEDICAL CENTER | Age: 75
End: 2020-05-07

## 2020-05-07 DIAGNOSIS — R60.9 PERIPHERAL EDEMA: ICD-10-CM

## 2020-05-07 RX ORDER — SPIRONOLACTONE 25 MG/1
25 TABLET ORAL
Qty: 30 TAB | Refills: 2 | Status: ON HOLD | OUTPATIENT
Start: 2020-05-07 | End: 2020-06-17 | Stop reason: SDUPTHER

## 2020-05-07 NOTE — TELEPHONE ENCOUNTER
That may be due to not walking around as much.  I am seeing that a lot with this shut down.  I will add an aldactone diuretic prescription that she can take as needed.  I have sent that prescription to [facility].

## 2020-05-13 ENCOUNTER — APPOINTMENT (OUTPATIENT)
Dept: CARDIOLOGY | Facility: MEDICAL CENTER | Age: 75
DRG: 291 | End: 2020-05-13
Attending: INTERNAL MEDICINE
Payer: MEDICARE

## 2020-05-13 ENCOUNTER — APPOINTMENT (OUTPATIENT)
Dept: RADIOLOGY | Facility: MEDICAL CENTER | Age: 75
DRG: 291 | End: 2020-05-13
Attending: EMERGENCY MEDICINE
Payer: MEDICARE

## 2020-05-13 ENCOUNTER — HOSPITAL ENCOUNTER (INPATIENT)
Facility: MEDICAL CENTER | Age: 75
LOS: 2 days | DRG: 291 | End: 2020-05-15
Attending: EMERGENCY MEDICINE | Admitting: INTERNAL MEDICINE
Payer: MEDICARE

## 2020-05-13 DIAGNOSIS — J41.0 SIMPLE CHRONIC BRONCHITIS (HCC): ICD-10-CM

## 2020-05-13 DIAGNOSIS — R79.89 ELEVATED TROPONIN: ICD-10-CM

## 2020-05-13 DIAGNOSIS — E87.1 HYPONATREMIA: ICD-10-CM

## 2020-05-13 DIAGNOSIS — Z87.09 HISTORY OF COPD: ICD-10-CM

## 2020-05-13 DIAGNOSIS — R91.8 LEFT LOWER LOBE PULMONARY INFILTRATE: ICD-10-CM

## 2020-05-13 DIAGNOSIS — D72.829 LEUKOCYTOSIS, UNSPECIFIED TYPE: ICD-10-CM

## 2020-05-13 DIAGNOSIS — R07.9 ACUTE CHEST PAIN: ICD-10-CM

## 2020-05-13 PROBLEM — I21.4 NSTEMI (NON-ST ELEVATED MYOCARDIAL INFARCTION) (HCC): Status: ACTIVE | Noted: 2020-05-13

## 2020-05-13 PROBLEM — I50.9 ACUTE EXACERBATION OF CHF (CONGESTIVE HEART FAILURE) (HCC): Status: ACTIVE | Noted: 2020-05-13

## 2020-05-13 LAB
ALBUMIN SERPL BCP-MCNC: 3.4 G/DL (ref 3.2–4.9)
ALBUMIN/GLOB SERPL: 1.1 G/DL
ALP SERPL-CCNC: 81 U/L (ref 30–99)
ALT SERPL-CCNC: 18 U/L (ref 2–50)
ANION GAP SERPL CALC-SCNC: 13 MMOL/L (ref 7–16)
APTT PPP: 33 SEC (ref 24.7–36)
AST SERPL-CCNC: 21 U/L (ref 12–45)
BASOPHILS # BLD AUTO: 0.4 % (ref 0–1.8)
BASOPHILS # BLD: 0.06 K/UL (ref 0–0.12)
BILIRUB SERPL-MCNC: 0.5 MG/DL (ref 0.1–1.5)
BUN SERPL-MCNC: 26 MG/DL (ref 8–22)
CALCIUM SERPL-MCNC: 9.2 MG/DL (ref 8.5–10.5)
CHLORIDE SERPL-SCNC: 88 MMOL/L (ref 96–112)
CHOLEST SERPL-MCNC: 115 MG/DL (ref 100–199)
CO2 SERPL-SCNC: 24 MMOL/L (ref 20–33)
COVID ORDER STATUS COVID19: NORMAL
CREAT SERPL-MCNC: 0.85 MG/DL (ref 0.5–1.4)
D DIMER PPP IA.FEU-MCNC: 2.23 UG/ML (FEU) (ref 0–0.5)
EKG IMPRESSION: NORMAL
EOSINOPHIL # BLD AUTO: 0.09 K/UL (ref 0–0.51)
EOSINOPHIL NFR BLD: 0.6 % (ref 0–6.9)
ERYTHROCYTE [DISTWIDTH] IN BLOOD BY AUTOMATED COUNT: 40.2 FL (ref 35.9–50)
EST. AVERAGE GLUCOSE BLD GHB EST-MCNC: 120 MG/DL
GLOBULIN SER CALC-MCNC: 3.1 G/DL (ref 1.9–3.5)
GLUCOSE SERPL-MCNC: 118 MG/DL (ref 65–99)
HBA1C MFR BLD: 5.8 % (ref 0–5.6)
HCT VFR BLD AUTO: 44.5 % (ref 37–47)
HDLC SERPL-MCNC: 41 MG/DL
HGB BLD-MCNC: 15.5 G/DL (ref 12–16)
IMM GRANULOCYTES # BLD AUTO: 0.04 K/UL (ref 0–0.11)
IMM GRANULOCYTES NFR BLD AUTO: 0.3 % (ref 0–0.9)
INR PPP: 1.06 (ref 0.87–1.13)
LDLC SERPL CALC-MCNC: 61 MG/DL
LIPASE SERPL-CCNC: 26 U/L (ref 11–82)
LV EJECT FRACT  99904: 30
LV EJECT FRACT MOD 2C 99903: 24.13
LV EJECT FRACT MOD 4C 99902: 17.57
LV EJECT FRACT MOD BP 99901: 19.49
LYMPHOCYTES # BLD AUTO: 1.52 K/UL (ref 1–4.8)
LYMPHOCYTES NFR BLD: 10.1 % (ref 22–41)
MCH RBC QN AUTO: 29.6 PG (ref 27–33)
MCHC RBC AUTO-ENTMCNC: 34.8 G/DL (ref 33.6–35)
MCV RBC AUTO: 84.9 FL (ref 81.4–97.8)
MONOCYTES # BLD AUTO: 1.1 K/UL (ref 0–0.85)
MONOCYTES NFR BLD AUTO: 7.3 % (ref 0–13.4)
NEUTROPHILS # BLD AUTO: 12.29 K/UL (ref 2–7.15)
NEUTROPHILS NFR BLD: 81.3 % (ref 44–72)
NRBC # BLD AUTO: 0 K/UL
NRBC BLD-RTO: 0 /100 WBC
NT-PROBNP SERPL IA-MCNC: ABNORMAL PG/ML (ref 0–125)
PLATELET # BLD AUTO: 269 K/UL (ref 164–446)
PMV BLD AUTO: 9.1 FL (ref 9–12.9)
POTASSIUM SERPL-SCNC: 4.3 MMOL/L (ref 3.6–5.5)
PROCALCITONIN SERPL-MCNC: <0.05 NG/ML
PROT SERPL-MCNC: 6.5 G/DL (ref 6–8.2)
PROTHROMBIN TIME: 14 SEC (ref 12–14.6)
RBC # BLD AUTO: 5.24 M/UL (ref 4.2–5.4)
SARS-COV-2 RNA RESP QL NAA+PROBE: NOTDETECTED
SODIUM SERPL-SCNC: 125 MMOL/L (ref 135–145)
SPECIMEN SOURCE: NORMAL
TRIGL SERPL-MCNC: 67 MG/DL (ref 0–149)
TROPONIN T SERPL-MCNC: 35 NG/L (ref 6–19)
TROPONIN T SERPL-MCNC: 35 NG/L (ref 6–19)
TROPONIN T SERPL-MCNC: 39 NG/L (ref 6–19)
TSH SERPL DL<=0.005 MIU/L-ACNC: 1.85 UIU/ML (ref 0.38–5.33)
WBC # BLD AUTO: 15.1 K/UL (ref 4.8–10.8)

## 2020-05-13 PROCEDURE — 93005 ELECTROCARDIOGRAM TRACING: CPT | Performed by: INTERNAL MEDICINE

## 2020-05-13 PROCEDURE — 85610 PROTHROMBIN TIME: CPT

## 2020-05-13 PROCEDURE — 85025 COMPLETE CBC W/AUTO DIFF WBC: CPT

## 2020-05-13 PROCEDURE — 83036 HEMOGLOBIN GLYCOSYLATED A1C: CPT

## 2020-05-13 PROCEDURE — 93306 TTE W/DOPPLER COMPLETE: CPT

## 2020-05-13 PROCEDURE — 700105 HCHG RX REV CODE 258: Performed by: INTERNAL MEDICINE

## 2020-05-13 PROCEDURE — 80053 COMPREHEN METABOLIC PANEL: CPT

## 2020-05-13 PROCEDURE — 96366 THER/PROPH/DIAG IV INF ADDON: CPT

## 2020-05-13 PROCEDURE — 93306 TTE W/DOPPLER COMPLETE: CPT | Mod: 26 | Performed by: INTERNAL MEDICINE

## 2020-05-13 PROCEDURE — 700111 HCHG RX REV CODE 636 W/ 250 OVERRIDE (IP): Performed by: INTERNAL MEDICINE

## 2020-05-13 PROCEDURE — 94760 N-INVAS EAR/PLS OXIMETRY 1: CPT

## 2020-05-13 PROCEDURE — A9270 NON-COVERED ITEM OR SERVICE: HCPCS | Performed by: INTERNAL MEDICINE

## 2020-05-13 PROCEDURE — 96375 TX/PRO/DX INJ NEW DRUG ADDON: CPT

## 2020-05-13 PROCEDURE — 700102 HCHG RX REV CODE 250 W/ 637 OVERRIDE(OP): Performed by: FAMILY MEDICINE

## 2020-05-13 PROCEDURE — U0004 COV-19 TEST NON-CDC HGH THRU: HCPCS

## 2020-05-13 PROCEDURE — A9270 NON-COVERED ITEM OR SERVICE: HCPCS | Performed by: FAMILY MEDICINE

## 2020-05-13 PROCEDURE — 700111 HCHG RX REV CODE 636 W/ 250 OVERRIDE (IP): Performed by: PHYSICIAN ASSISTANT

## 2020-05-13 PROCEDURE — 93005 ELECTROCARDIOGRAM TRACING: CPT | Performed by: EMERGENCY MEDICINE

## 2020-05-13 PROCEDURE — 700117 HCHG RX CONTRAST REV CODE 255: Performed by: EMERGENCY MEDICINE

## 2020-05-13 PROCEDURE — 700102 HCHG RX REV CODE 250 W/ 637 OVERRIDE(OP): Performed by: INTERNAL MEDICINE

## 2020-05-13 PROCEDURE — 83690 ASSAY OF LIPASE: CPT

## 2020-05-13 PROCEDURE — 87150 DNA/RNA AMPLIFIED PROBE: CPT

## 2020-05-13 PROCEDURE — 99285 EMERGENCY DEPT VISIT HI MDM: CPT

## 2020-05-13 PROCEDURE — 84145 PROCALCITONIN (PCT): CPT

## 2020-05-13 PROCEDURE — 84443 ASSAY THYROID STIM HORMONE: CPT

## 2020-05-13 PROCEDURE — 96365 THER/PROPH/DIAG IV INF INIT: CPT

## 2020-05-13 PROCEDURE — 85379 FIBRIN DEGRADATION QUANT: CPT

## 2020-05-13 PROCEDURE — 99223 1ST HOSP IP/OBS HIGH 75: CPT | Performed by: INTERNAL MEDICINE

## 2020-05-13 PROCEDURE — 84484 ASSAY OF TROPONIN QUANT: CPT

## 2020-05-13 PROCEDURE — 87040 BLOOD CULTURE FOR BACTERIA: CPT

## 2020-05-13 PROCEDURE — G2023 SPECIMEN COLLECT COVID-19: HCPCS | Performed by: INTERNAL MEDICINE

## 2020-05-13 PROCEDURE — 71275 CT ANGIOGRAPHY CHEST: CPT

## 2020-05-13 PROCEDURE — 770020 HCHG ROOM/CARE - TELE (206)

## 2020-05-13 PROCEDURE — 93010 ELECTROCARDIOGRAM REPORT: CPT | Performed by: INTERNAL MEDICINE

## 2020-05-13 PROCEDURE — 85730 THROMBOPLASTIN TIME PARTIAL: CPT

## 2020-05-13 PROCEDURE — 83880 ASSAY OF NATRIURETIC PEPTIDE: CPT

## 2020-05-13 PROCEDURE — 80061 LIPID PANEL: CPT

## 2020-05-13 PROCEDURE — A9270 NON-COVERED ITEM OR SERVICE: HCPCS | Performed by: PHYSICIAN ASSISTANT

## 2020-05-13 PROCEDURE — 99223 1ST HOSP IP/OBS HIGH 75: CPT | Mod: AI | Performed by: INTERNAL MEDICINE

## 2020-05-13 PROCEDURE — 71045 X-RAY EXAM CHEST 1 VIEW: CPT

## 2020-05-13 PROCEDURE — 700102 HCHG RX REV CODE 250 W/ 637 OVERRIDE(OP): Performed by: PHYSICIAN ASSISTANT

## 2020-05-13 PROCEDURE — 87077 CULTURE AEROBIC IDENTIFY: CPT

## 2020-05-13 PROCEDURE — 36415 COLL VENOUS BLD VENIPUNCTURE: CPT

## 2020-05-13 RX ORDER — LABETALOL HYDROCHLORIDE 5 MG/ML
10 INJECTION, SOLUTION INTRAVENOUS ONCE
Status: DISCONTINUED | OUTPATIENT
Start: 2020-05-13 | End: 2020-05-13

## 2020-05-13 RX ORDER — POLYETHYLENE GLYCOL 3350 17 G/17G
1 POWDER, FOR SOLUTION ORAL
Status: DISCONTINUED | OUTPATIENT
Start: 2020-05-13 | End: 2020-05-15 | Stop reason: HOSPADM

## 2020-05-13 RX ORDER — BISACODYL 10 MG
10 SUPPOSITORY, RECTAL RECTAL
Status: DISCONTINUED | OUTPATIENT
Start: 2020-05-13 | End: 2020-05-15 | Stop reason: HOSPADM

## 2020-05-13 RX ORDER — HEPARIN SODIUM 1000 [USP'U]/ML
3500 INJECTION, SOLUTION INTRAVENOUS; SUBCUTANEOUS ONCE
Status: COMPLETED | OUTPATIENT
Start: 2020-05-13 | End: 2020-05-13

## 2020-05-13 RX ORDER — ONDANSETRON 2 MG/ML
4 INJECTION INTRAMUSCULAR; INTRAVENOUS EVERY 4 HOURS PRN
Status: DISCONTINUED | OUTPATIENT
Start: 2020-05-13 | End: 2020-05-15 | Stop reason: HOSPADM

## 2020-05-13 RX ORDER — AZITHROMYCIN 250 MG/1
500 TABLET, FILM COATED ORAL DAILY
Status: DISCONTINUED | OUTPATIENT
Start: 2020-05-13 | End: 2020-05-14

## 2020-05-13 RX ORDER — SPIRONOLACTONE 25 MG/1
25 TABLET ORAL DAILY
Status: DISCONTINUED | OUTPATIENT
Start: 2020-05-13 | End: 2020-05-15 | Stop reason: HOSPADM

## 2020-05-13 RX ORDER — NITROGLYCERIN 0.1MG/HR
1 PATCH, TRANSDERMAL 24 HOURS TRANSDERMAL ONCE
Status: DISCONTINUED | OUTPATIENT
Start: 2020-05-13 | End: 2020-05-13

## 2020-05-13 RX ORDER — ACETAMINOPHEN 325 MG/1
650 TABLET ORAL EVERY 6 HOURS PRN
Status: DISCONTINUED | OUTPATIENT
Start: 2020-05-13 | End: 2020-05-15 | Stop reason: HOSPADM

## 2020-05-13 RX ORDER — HEPARIN SODIUM 1000 [USP'U]/ML
1800 INJECTION, SOLUTION INTRAVENOUS; SUBCUTANEOUS PRN
Status: DISCONTINUED | OUTPATIENT
Start: 2020-05-13 | End: 2020-05-13

## 2020-05-13 RX ORDER — CARVEDILOL 6.25 MG/1
6.25 TABLET ORAL 2 TIMES DAILY WITH MEALS
Status: DISCONTINUED | OUTPATIENT
Start: 2020-05-13 | End: 2020-05-15 | Stop reason: HOSPADM

## 2020-05-13 RX ORDER — AMOXICILLIN 250 MG
2 CAPSULE ORAL 2 TIMES DAILY
Status: DISCONTINUED | OUTPATIENT
Start: 2020-05-13 | End: 2020-05-15 | Stop reason: HOSPADM

## 2020-05-13 RX ORDER — ALBUTEROL SULFATE 90 UG/1
2 AEROSOL, METERED RESPIRATORY (INHALATION) EVERY 4 HOURS PRN
Status: DISCONTINUED | OUTPATIENT
Start: 2020-05-13 | End: 2020-05-14

## 2020-05-13 RX ORDER — NICOTINE 21 MG/24HR
21 PATCH, TRANSDERMAL 24 HOURS TRANSDERMAL
Status: DISCONTINUED | OUTPATIENT
Start: 2020-05-13 | End: 2020-05-15 | Stop reason: HOSPADM

## 2020-05-13 RX ORDER — LABETALOL HYDROCHLORIDE 5 MG/ML
10 INJECTION, SOLUTION INTRAVENOUS EVERY 4 HOURS PRN
Status: DISCONTINUED | OUTPATIENT
Start: 2020-05-13 | End: 2020-05-15 | Stop reason: HOSPADM

## 2020-05-13 RX ORDER — FUROSEMIDE 10 MG/ML
20 INJECTION INTRAMUSCULAR; INTRAVENOUS
Status: DISCONTINUED | OUTPATIENT
Start: 2020-05-13 | End: 2020-05-13

## 2020-05-13 RX ORDER — ONDANSETRON 4 MG/1
4 TABLET, ORALLY DISINTEGRATING ORAL EVERY 4 HOURS PRN
Status: DISCONTINUED | OUTPATIENT
Start: 2020-05-13 | End: 2020-05-15 | Stop reason: HOSPADM

## 2020-05-13 RX ORDER — LOSARTAN POTASSIUM 25 MG/1
25 TABLET ORAL
Status: DISCONTINUED | OUTPATIENT
Start: 2020-05-13 | End: 2020-05-15 | Stop reason: HOSPADM

## 2020-05-13 RX ORDER — HEPARIN SODIUM 5000 [USP'U]/100ML
INJECTION, SOLUTION INTRAVENOUS CONTINUOUS
Status: DISCONTINUED | OUTPATIENT
Start: 2020-05-13 | End: 2020-05-13

## 2020-05-13 RX ORDER — LABETALOL HYDROCHLORIDE 5 MG/ML
10 INJECTION, SOLUTION INTRAVENOUS ONCE
Status: DISPENSED | OUTPATIENT
Start: 2020-05-13 | End: 2020-05-14

## 2020-05-13 RX ADMIN — HEPARIN SODIUM 3500 UNITS: 1000 INJECTION, SOLUTION INTRAVENOUS; SUBCUTANEOUS at 05:02

## 2020-05-13 RX ADMIN — IOHEXOL 40 ML: 350 INJECTION, SOLUTION INTRAVENOUS at 05:25

## 2020-05-13 RX ADMIN — AZITHROMYCIN MONOHYDRATE 500 MG: 250 TABLET ORAL at 06:52

## 2020-05-13 RX ADMIN — NICOTINE TRANSDERMAL SYSTEM 21 MG: 21 PATCH, EXTENDED RELEASE TRANSDERMAL at 16:10

## 2020-05-13 RX ADMIN — ENOXAPARIN SODIUM 30 MG: 30 INJECTION SUBCUTANEOUS at 12:23

## 2020-05-13 RX ADMIN — HEPARIN SODIUM 750 UNITS/HR: 5000 INJECTION, SOLUTION INTRAVENOUS at 05:04

## 2020-05-13 RX ADMIN — METOPROLOL TARTRATE 25 MG: 25 TABLET ORAL at 06:57

## 2020-05-13 RX ADMIN — LOSARTAN POTASSIUM 25 MG: 25 TABLET, FILM COATED ORAL at 14:12

## 2020-05-13 RX ADMIN — SPIRONOLACTONE 25 MG: 25 TABLET ORAL at 12:23

## 2020-05-13 RX ADMIN — ASPIRIN 81 MG: 81 TABLET, COATED ORAL at 09:54

## 2020-05-13 RX ADMIN — CEFTRIAXONE SODIUM 1 G: 1 INJECTION, POWDER, FOR SOLUTION INTRAMUSCULAR; INTRAVENOUS at 09:53

## 2020-05-13 RX ADMIN — FUROSEMIDE 20 MG: 10 INJECTION, SOLUTION INTRAMUSCULAR; INTRAVENOUS at 06:58

## 2020-05-13 RX ADMIN — CARVEDILOL 6.25 MG: 6.25 TABLET, FILM COATED ORAL at 17:57

## 2020-05-13 ASSESSMENT — ENCOUNTER SYMPTOMS
MYALGIAS: 0
BACK PAIN: 1
BRUISES/BLEEDS EASILY: 0
NAUSEA: 0
PALPITATIONS: 0
VOMITING: 0
NECK PAIN: 0
BACK PAIN: 0
DIAPHORESIS: 0
CONSTIPATION: 0
SHORTNESS OF BREATH: 1
HEADACHES: 0
SEIZURES: 0
FLANK PAIN: 0
COUGH: 1
DIARRHEA: 0
SORE THROAT: 0
BLURRED VISION: 0
CHOKING: 1
DIZZINESS: 0
FEVER: 0
WHEEZING: 0
BLOOD IN STOOL: 0
AGITATION: 0
CONFUSION: 0
ABDOMINAL PAIN: 0
FOCAL WEAKNESS: 0
CHILLS: 0
SPUTUM PRODUCTION: 1

## 2020-05-13 ASSESSMENT — LIFESTYLE VARIABLES
EVER HAD A DRINK FIRST THING IN THE MORNING TO STEADY YOUR NERVES TO GET RID OF A HANGOVER: NO
EVER_SMOKED: YES
ALCOHOL_USE: NO
AVERAGE NUMBER OF DAYS PER WEEK YOU HAVE A DRINK CONTAINING ALCOHOL: 0
HAVE PEOPLE ANNOYED YOU BY CRITICIZING YOUR DRINKING: NO
TOTAL SCORE: 0
ON A TYPICAL DAY WHEN YOU DRINK ALCOHOL HOW MANY DRINKS DO YOU HAVE: 0
TOTAL SCORE: 0
HAVE PEOPLE ANNOYED YOU BY CRITICIZING YOUR DRINKING: NO
ALCOHOL_USE: NO
HAVE YOU EVER FELT YOU SHOULD CUT DOWN ON YOUR DRINKING: NO
EVER FELT BAD OR GUILTY ABOUT YOUR DRINKING: NO
CONSUMPTION TOTAL: INCOMPLETE
ALCOHOL_USE: NO
DOES PATIENT WANT TO STOP DRINKING: NO
TOTAL SCORE: 0
TOTAL SCORE: 0
EVER FELT BAD OR GUILTY ABOUT YOUR DRINKING: NO
EVER HAD A DRINK FIRST THING IN THE MORNING TO STEADY YOUR NERVES TO GET RID OF A HANGOVER: NO
HAVE YOU EVER FELT YOU SHOULD CUT DOWN ON YOUR DRINKING: NO
EVER FELT BAD OR GUILTY ABOUT YOUR DRINKING: NO
DOES PATIENT WANT TO STOP DRINKING: NO
DOES PATIENT WANT TO STOP DRINKING: NO
TOTAL SCORE: 0
TOTAL SCORE: 0
CONSUMPTION TOTAL: NEGATIVE
TOTAL SCORE: 0
TOTAL SCORE: 0
HOW MANY TIMES IN THE PAST YEAR HAVE YOU HAD 5 OR MORE DRINKS IN A DAY: 0
CONSUMPTION TOTAL: INCOMPLETE
EVER HAD A DRINK FIRST THING IN THE MORNING TO STEADY YOUR NERVES TO GET RID OF A HANGOVER: NO
TOTAL SCORE: 0
HAVE PEOPLE ANNOYED YOU BY CRITICIZING YOUR DRINKING: NO
HAVE YOU EVER FELT YOU SHOULD CUT DOWN ON YOUR DRINKING: NO

## 2020-05-13 ASSESSMENT — COPD QUESTIONNAIRES
DURING THE PAST 4 WEEKS HOW MUCH DID YOU FEEL SHORT OF BREATH: SOME OF THE TIME
IN THE PAST 12 MONTHS DO YOU DO LESS THAN YOU USED TO BECAUSE OF YOUR BREATHING PROBLEMS: DISAGREE/UNSURE
HAVE YOU SMOKED AT LEAST 100 CIGARETTES IN YOUR ENTIRE LIFE: YES
HAVE YOU SMOKED AT LEAST 100 CIGARETTES IN YOUR ENTIRE LIFE: YES
COPD SCREENING SCORE: 7
DO YOU EVER COUGH UP ANY MUCUS OR PHLEGM?: YES, A FEW DAYS A WEEK OR MONTH
DURING THE PAST 4 WEEKS HOW MUCH DID YOU FEEL SHORT OF BREATH: SOME OF THE TIME
COPD SCREENING SCORE: 7
DO YOU EVER COUGH UP ANY MUCUS OR PHLEGM?: YES, EVERY DAY

## 2020-05-13 ASSESSMENT — COGNITIVE AND FUNCTIONAL STATUS - GENERAL
TOILETING: A LITTLE
WALKING IN HOSPITAL ROOM: A LITTLE
MOVING FROM LYING ON BACK TO SITTING ON SIDE OF FLAT BED: A LITTLE
MOBILITY SCORE: 20
STANDING UP FROM CHAIR USING ARMS: A LITTLE
CLIMB 3 TO 5 STEPS WITH RAILING: A LITTLE
DAILY ACTIVITIY SCORE: 23
SUGGESTED CMS G CODE MODIFIER MOBILITY: CJ
SUGGESTED CMS G CODE MODIFIER DAILY ACTIVITY: CI

## 2020-05-13 ASSESSMENT — PATIENT HEALTH QUESTIONNAIRE - PHQ9
1. LITTLE INTEREST OR PLEASURE IN DOING THINGS: NOT AT ALL
SUM OF ALL RESPONSES TO PHQ9 QUESTIONS 1 AND 2: 0
2. FEELING DOWN, DEPRESSED, IRRITABLE, OR HOPELESS: NOT AT ALL

## 2020-05-13 ASSESSMENT — FIBROSIS 4 INDEX
FIB4 SCORE: 1.52
FIB4 SCORE: 1.52
FIB4 SCORE: 1.34

## 2020-05-13 NOTE — H&P
Hospital Medicine History & Physical Note    Date of Service  5/13/2020    Primary Care Physician  Baltazar Julio M.D.    Code Status  Full Code    Chief Complaint  Chief Complaint   Patient presents with   • Chest Pain       History of Presenting Illness  74 y.o. female with a past medical history of COPD, hypertension, dyslipidemia, AAA, PVD who presented 5/13/2020 with chest pain that started yesterday night prior to arrival.  The patient developed left lower chest pain which she describes as sharp and nonradiating.  Reports mild shortness of breath and a productive cough that has been ongoing for the past week.  She also reports bilateral lower extremity edema.  She denies any fevers or chills.  She denies any exposure to Covid positive contacts.  EMS activated code STEMI for a left bundle branch block.  Her chest pain had mostly resolved by the time she was evaluated in the ER.  She was evaluated by cardiology in the ER and code STEMI was deactivated.  At this time she denies any chest pain.    EKG interpreted by me reveals sinus tachycardia with left anterior fascicular block, intraventricular conduction delay, LVH with repolarization abnormalities  Chest x-ray interpreted by me reveals minimal left basilar opacity    Review of Systems  Review of Systems   Constitutional: Positive for malaise/fatigue. Negative for chills, diaphoresis and fever.   HENT: Negative for hearing loss and sore throat.    Eyes: Negative for blurred vision.   Respiratory: Positive for cough, sputum production and shortness of breath. Negative for wheezing.    Cardiovascular: Positive for chest pain. Negative for palpitations and leg swelling.   Gastrointestinal: Negative for abdominal pain, blood in stool, diarrhea, nausea and vomiting.   Genitourinary: Negative for dysuria, flank pain and urgency.   Musculoskeletal: Negative for back pain, joint pain, myalgias and neck pain.   Skin: Negative for rash.   Neurological: Negative for  dizziness, focal weakness, seizures and headaches.   Endo/Heme/Allergies: Does not bruise/bleed easily.   Psychiatric/Behavioral: Negative for suicidal ideas.   All other systems reviewed and are negative.      Past Medical History   has a past medical history of Abdominal aortic aneurysm (HCC) (11/2010), Angina, Arthritis, Atherosclerosis of arteries of the extremities, Bowel habit changes, Carotid atherosclerosis, COPD (chronic obstructive pulmonary disease) (HCC), Diverticulitis, Diverticulosis of colon, Dyslipidemia, Emphysema of lung (HCC), Eosinophilic colitis, Family history of nonmelanoma skin cancer, Hypertension, Pain, Peripheral vascular disease (HCC), PVD (posterior vitreous detachment), left eye (1/13/2015), Snoring, Spinal stenosis of lumbar region, and Unspecified hemorrhagic conditions.    Surgical History   has a past surgical history that includes colonoscopy (3/1/2009, 4/2012, 7/2/13); gyn surgery (2002); gyn surgery (1975); other; colon resection laparoscopic (8/5/2013); colonoscopy with biopsy (3/6/2015); other cardiac surgery (2005); aaa with stent graft (N/A, 3/31/2017); and femoral femoral bypass (N/A, 3/31/2017).     Family History  family history includes Cancer in her sister; Heart Disease in her brother and sister; Heart Disease (age of onset: 55) in her father; Heart Disease (age of onset: 82) in her mother; Hyperlipidemia in her father, mother, and sister; Hypertension in her father, mother, and sister; Non-contributory in her sister; Stroke in her sister.     Social History   reports that she has been smoking cigarettes. She has a 45.00 pack-year smoking history. She has never used smokeless tobacco. She reports that she does not drink alcohol or use drugs.    Allergies  Allergies   Allergen Reactions   • Doxycycline Diarrhea     None stop diarrhea   • Tramadol Vomiting   • Amoxicillin Diarrhea     Diarrhea   • Ciprofloxacin Unspecified     Unspecified       Medications  Prior to  Admission Medications   Prescriptions Last Dose Informant Patient Reported? Taking?   HYDROcodone-acetaminophen (NORCO) 5-325 MG Tab per tablet   No No   Sig: Take 1 Tab by mouth every 8 hours as needed (sciatica and leg pain/hip pain) for up to 30 days.   VENTOLIN  (90 Base) MCG/ACT Aero Soln inhalation aerosol   Yes No   aspirin EC (ECOTRIN) 81 MG Tablet Delayed Response  Patient Yes No   Sig: Take 81 mg by mouth every evening.   benzonatate (TESSALON) 200 MG capsule   No No   Sig: Take 1 Cap by mouth 3 times a day as needed for Cough.   lisinopril-hydrochlorothiazide (PRINZIDE, ZESTORETIC) 10-12.5 MG per tablet   No No   Sig: Take 1 Tab by mouth every day.   potassium chloride ER (KLOR-CON) 10 MEQ tablet   No No   Sig: Take 1 Tab by mouth every day.   promethazine (PHENERGAN) 25 MG Tab   No No   Sig: TAKE ONE TABLET BY MOUTH EVERY 6 HOURS AS NEEDED FOR NAUSEA AND VOMITING   spironolactone (ALDACTONE) 25 MG Tab   No No   Sig: Take 1 Tab by mouth 1 time daily as needed (swelling).   tiotropium (SPIRIVA) 18 MCG Cap   No No   Sig: Inhale 1 Cap by mouth every day.      Facility-Administered Medications: None       Physical Exam  Temp:  [36.9 °C (98.4 °F)] 36.9 °C (98.4 °F)  Pulse:  [] 103  Resp:  [14-31] 14  BP: (126-156)/(67-84) 155/83  SpO2:  [89 %-100 %] 100 %    Physical Exam  Vitals signs and nursing note reviewed.   Constitutional:       General: She is not in acute distress.     Appearance: Normal appearance.   HENT:      Head: Normocephalic and atraumatic.      Nose: Nose normal.      Mouth/Throat:      Mouth: Mucous membranes are moist.   Eyes:      Extraocular Movements: Extraocular movements intact.      Conjunctiva/sclera: Conjunctivae normal.      Pupils: Pupils are equal, round, and reactive to light.   Neck:      Musculoskeletal: Normal range of motion and neck supple.   Cardiovascular:      Rate and Rhythm: Regular rhythm. Tachycardia present.      Pulses: Normal pulses.      Heart sounds:  Normal heart sounds.   Pulmonary:      Effort: No respiratory distress.      Breath sounds: Rales (Left basilar) present. No wheezing or rhonchi.      Comments: Diminished breath sounds  Abdominal:      General: Bowel sounds are normal. There is no distension.      Palpations: Abdomen is soft.      Tenderness: There is no abdominal tenderness.   Musculoskeletal: Normal range of motion.         General: No tenderness.      Right lower leg: Edema present.      Left lower leg: Edema present.   Lymphadenopathy:      Cervical: No cervical adenopathy.   Skin:     General: Skin is warm.      Coloration: Skin is not jaundiced.      Findings: No rash.   Neurological:      General: No focal deficit present.      Mental Status: She is alert and oriented to person, place, and time.      Cranial Nerves: No cranial nerve deficit.      Motor: No weakness.   Psychiatric:         Mood and Affect: Mood normal.         Behavior: Behavior normal.         Laboratory:  Recent Labs     05/13/20 0319   WBC 15.1*   RBC 5.24   HEMOGLOBIN 15.5   HEMATOCRIT 44.5   MCV 84.9   MCH 29.6   MCHC 34.8   RDW 40.2   PLATELETCT 269   MPV 9.1     Recent Labs     05/13/20 0319   SODIUM 125*   POTASSIUM 4.3   CHLORIDE 88*   CO2 24   GLUCOSE 118*   BUN 26*   CREATININE 0.85   CALCIUM 9.2     Recent Labs     05/13/20 0319   ALTSGPT 18   ASTSGOT 21   ALKPHOSPHAT 81   TBILIRUBIN 0.5   LIPASE 26   GLUCOSE 118*     Recent Labs     05/13/20 0319   APTT 33.0   INR 1.06     Recent Labs     05/13/20 0319   NTPROBNP 95617*         Recent Labs     05/13/20 0319   TROPONINT 35*       Imaging:  CT-CTA CHEST PULMONARY ARTERY W/ RECONS   Final Result      1.  No evidence of pulmonary embolism.      2.  Asymmetric interstitial opacity in the left lower lobe which represent interstitial edema, pneumonia, or interstitial fibrotic change.      3.  Diffuse centrilobular emphysematous changes of the chest.      4.  No pleural effusion.            DX-CHEST-PORTABLE (1  VIEW)   Final Result      No acute cardiac or pulmonary abnormality is noted.            Assessment/Plan:  I anticipate the patient will require greater than 2 midnights of inpatient medical management    Community acquired pneumonia of left lower lobe of lung- (present on admission)  Assessment & Plan  Patient has been started on IV Ceftriaxone and azithromycin  Check pro calcitonin, if within normal limits we'll consider de-escalating antibiotics  RT protocol  Continue supplemental oxygen, wean as tolerated  Follow blood cultures  Rule out COVID-19  Droplet/contact/eye precautions    Chest pain- (present on admission)  Assessment & Plan  Likely secondary to pneumonia.  Rule out ACS  CTA chest with IV contrast negative for PE  Continuous cardiac monitoring with serial EKG and troponin  Check 2D echo  Patient has been given full dose of aspirin  Cardiology has been consulted  Patient has been started on IV heparin, monitor APTT  Check lipid panel, TSH and hemoglobin A1c  Nitro when necessary for chest pain      Acute exacerbation of CHF (congestive heart failure) ([facility])- (present on admission)  Assessment & Plan  Dyspnea, elevated BNP and bilateral lower extremity edema  Started on IV Lasix 20 mg twice daily  Check 2D echo  Fluid and salt restriction    COPD (chronic obstructive pulmonary disease) ([facility])- (present on admission)  Assessment & Plan  No active wheezing  Continue home regimen of inhalers  RT protocol    Essential hypertension- (present on admission)  Assessment & Plan  Uncontrolled  Started on metoprolol 25 mg twice daily  Continue lisinopril  IV labetalol PRN for SBP greater than 160    DVT prophylaxis: Heparin

## 2020-05-13 NOTE — PROGRESS NOTES
Pt transported to [place] [address] bed 2, placed heart monitor on pt, called to ensure pt I son the monitor. VS stable. No C/O chest pain at this time, will continue to monitor.

## 2020-05-13 NOTE — ASSESSMENT & PLAN NOTE
Initially started on IV Ceftriaxone and azithromycin  pro calcitonin negative  CT consolidation, possibly edema/PNA/fibrotic changes  DC antibiotics   RT protocol  Continue supplemental oxygen, wean as tolerated  COVID-19 negative

## 2020-05-13 NOTE — ASSESSMENT & PLAN NOTE
CTA chest with IV contrast negative for PE  Continuous cardiac monitoring with serial EKG and troponin  Patient has been given full dose of aspirin  Cardiology has been consulted  Patient has been started on IV heparin, monitor APTT  TSH unremarkable  Nitro when necessary for chest pain

## 2020-05-13 NOTE — ED TRIAGE NOTES
"Pt c/o pain between shoulder blades all day yesterday.  Pt relates being woke from sleep about an hour ago with left sided CP.  Pt denies LOC, SOB, ABD pain , N/V, fever, cough.  EMS relates giving pt 243mg ASA, NTGx2, and 50mcg Fentanyl en route.  Pt relates pain now about \"2/10\"  "

## 2020-05-13 NOTE — ED PROVIDER NOTES
ED Provider Note    CHIEF COMPLAINT  Chief Complaint   Patient presents with   • Chest Pain        [facility]    Primary care provider: Baltazar Julio M.D.   History obtained from: Patient and EMS  History limited by: None     Karen Jauregui is a 74 y.o. female who presents to the ED by EMS and was seen upon arrival as a STEMI activation.  Patient was seen in conjunction with Dr. Perkins, cardiologist.  Appreciate his assistance in the care of this patient.  Patient reports sudden onset of sharp left lower chest pain waking her from sleep about 2 hours prior to arrival without radiation and denies pain anywhere else.  She denies diaphoresis, dizziness, nausea or vomiting.  She has chronic shortness of breath due to COPD which is unchanged.  She denies fever and states that she has been self isolating due to the COVID-19 pandemic.  She has had a cough for about a week and some generalized weakness and also noticed bilateral lower extremity swelling.  Patient was given aspirin, nitroglycerin and fentanyl by EMS and reports that her chest pain is much better.    REVIEW OF SYSTEMS  Please see HPI for pertinent positives/negatives.  All other systems reviewed and are negative.     PAST MEDICAL HISTORY  Past Medical History:   Diagnosis Date   • PVD (posterior vitreous detachment), left eye 1/13/2015   • Abdominal aortic aneurysm (HCC) 11/2010    3.6 cm 8/2014   • Angina     with stress test   • Arthritis     thumbs   • Atherosclerosis of arteries of the extremities     two stents in the groin, Dr. Pickett   • Bowel habit changes    • Carotid atherosclerosis     Dr. Pickett   • COPD (chronic obstructive pulmonary disease) ([facility])    • Diverticulitis     Dx 11/25/11   • Diverticulosis of colon    • Dyslipidemia    • Emphysema of lung (HCC)    • Eosinophilic colitis    • Family history of nonmelanoma skin cancer    • Hypertension    • Pain    • Peripheral vascular disease (HCC)     9 months, may relate to PVD   • Snoring     • Spinal stenosis of lumbar region     Dr. Navarro   • Unspecified hemorrhagic conditions     asa/plavix, bruises easily        SURGICAL HISTORY  Past Surgical History:   Procedure Laterality Date   • AAA WITH STENT GRAFT N/A 3/31/2017    Procedure: AAA WITH STENT GRAFT - 5-CM INFRARENAL;  Surgeon: Spring Pemberton M.D.;  Location: SURGERY [facility];  Service:    • FEMORAL FEMORAL BYPASS N/A 3/31/2017    Procedure: FEMORAL FEMORAL BYPASS - POSS;  Surgeon: Spring Pemberton M.D.;  Location: SURGERY [facility];  Service:    • COLONOSCOPY WITH BIOPSY  3/6/2015    Performed by Pranav THOMPSON M.D. at ENDOSCOPY [facility]   • COLON RESECTION LAPAROSCOPIC  8/5/2013    Performed by Spring Pemberton M.D. at SURGERY [facility]   • OTHER CARDIAC SURGERY  2005    cardiac stents   • GYN SURGERY  2002    hysterectomy, tubal, fibroids, ovaries gone   • GYN SURGERY  1975    ectopic pregnacy   • COLONOSCOPY  3/1/2009, 4/2012, 7/2/13    normal, normal, normal but heavy diverticulosis   • OTHER      LLE /RLEstent, common iliac, Dr. Pickett        SOCIAL HISTORY  Social History     Tobacco Use   • Smoking status: Current Every Day Smoker     Packs/day: 1.00     Years: 45.00     Pack years: 45.00     Types: Cigarettes   • Smokeless tobacco: Never Used   • Tobacco comment: refuses to consider stopping   Substance and Sexual Activity   • Alcohol use: No     Alcohol/week: 0.0 oz   • Drug use: No   • Sexual activity: Not Currently        FAMILY HISTORY  Family History   Problem Relation Age of Onset   • Hyperlipidemia Sister    • Hypertension Sister    • Stroke Sister    • Heart Disease Sister         heart attack   • Non-contributory Sister         carotid atherosclerosis   • Hypertension Mother    • Hyperlipidemia Mother    • Heart Disease Mother 82        congestive heart failure, AAA   • Heart Disease Father 55        multiple heart issues   • Hypertension Father    • Hyperlipidemia Father    • Cancer Sister         " sin cancer   • Heart Disease Brother         heart attack        CURRENT MEDICATIONS  Home Medications     Reviewed by James Lawrence (Pharmacy Tech) on 05/13/20 at 0721  Med List Status: Partial   Medication Last Dose Status   aspirin EC (ECOTRIN) 81 MG Tablet Delayed Response  Active   benzonatate (TESSALON) 200 MG capsule  Active   HYDROcodone-acetaminophen (NORCO) 5-325 MG Tab per tablet  Active   lisinopril-hydrochlorothiazide (PRINZIDE, ZESTORETIC) 10-12.5 MG per tablet  Active   potassium chloride ER (KLOR-CON) 10 MEQ tablet  Active   promethazine (PHENERGAN) 25 MG Tab  Active   spironolactone (ALDACTONE) 25 MG Tab  Active   tiotropium (SPIRIVA) 18 MCG Cap  Active   VENTOLIN  (90 Base) MCG/ACT Aero Soln inhalation aerosol  Active                 ALLERGIES  Allergies   Allergen Reactions   • Doxycycline Diarrhea     None stop diarrhea   • Tramadol Vomiting   • Amoxicillin Diarrhea     Diarrhea   • Ciprofloxacin Unspecified     Unspecified        PHYSICAL EXAM  VITAL SIGNS: BP (!) 172/95   Pulse (!) 109   Temp 36.9 °C (98.4 °F) (Temporal)   Resp 15   Ht 1.549 m (5' 1\")   Wt 43.1 kg (95 lb)   LMP 03/01/2002   SpO2 100%   BMI 17.95 kg/m²  @NANCY[[id number]::@     Pulse ox interpretation: 100% I interpret this pulse ox as normal     Cardiac monitor interpretation: Sinus rhythm with heart rate in the 100s as interpreted by me.  The patient presented with chest pain and cardiac monitor was ordered to monitor for dysrhythmia.    Constitutional: Thin patient, alert in no apparent distress, nontoxic appearance    HENT: No external signs of trauma, normocephalic  Eyes: PERRL, conjunctiva without erythema, no discharge, no icterus    Neck: Soft and supple, trachea midline, no stridor, no tenderness, no LAD, no JVD, good ROM    Cardiovascular: Cardiac, no murmurs/rubs/gallops, good distal pulses and good perfusion    Thorax & Lungs: No respiratory distress, trace coarse breath sounds " bilaterally  Abdomen: Soft, nontender, nondistended, no guarding, no rebound, normal BS    Extremities: No cyanosis, mild bilateral lower extremity edema, no gross deformity, good ROM, no tenderness, intact distal pulses with brisk cap refill    Skin: Warm, dry, no pallor/cyanosis, no rash noted except chronic appearing bilateral lower extremity skin changes  Lymphatic: No lymphadenopathy noted    Neuro: A/O times 3, no focal deficits noted    Psychiatric: Cooperative, normal judgement      DIAGNOSTIC STUDIES / PROCEDURES    EKG  12 Lead EKG obtained at 0308 and interpreted by me:   Rate: 122   Rhythm: Sinus tachycardia  Ectopy: None  Intervals: Normal   Axis: LAD  QRS: LVH    Clinical Impression: Sinus tachycardia with LVH and repolarization abnormalities       LABS  All labs reviewed by me.     Results for orders placed or performed during the hospital encounter of 05/13/20   CBC WITH DIFFERENTIAL   Result Value Ref Range    WBC 15.1 (H) 4.8 - 10.8 K/uL    RBC 5.24 4.20 - 5.40 M/uL    Hemoglobin 15.5 12.0 - 16.0 g/dL    Hematocrit 44.5 37.0 - 47.0 %    MCV 84.9 81.4 - 97.8 fL    MCH 29.6 27.0 - 33.0 pg    MCHC 34.8 33.6 - 35.0 g/dL    RDW 40.2 35.9 - 50.0 fL    Platelet Count 269 164 - 446 K/uL    MPV 9.1 9.0 - 12.9 fL    Neutrophils-Polys 81.30 (H) 44.00 - 72.00 %    Lymphocytes 10.10 (L) 22.00 - 41.00 %    Monocytes 7.30 0.00 - 13.40 %    Eosinophils 0.60 0.00 - 6.90 %    Basophils 0.40 0.00 - 1.80 %    Immature Granulocytes 0.30 0.00 - 0.90 %    Nucleated RBC 0.00 /100 WBC    Neutrophils (Absolute) 12.29 (H) 2.00 - 7.15 K/uL    Lymphs (Absolute) 1.52 1.00 - 4.80 K/uL    Monos (Absolute) 1.10 (H) 0.00 - 0.85 K/uL    Eos (Absolute) 0.09 0.00 - 0.51 K/uL    Baso (Absolute) 0.06 0.00 - 0.12 K/uL    Immature Granulocytes (abs) 0.04 0.00 - 0.11 K/uL    NRBC (Absolute) 0.00 K/uL   COMP METABOLIC PANEL   Result Value Ref Range    Sodium 125 (L) 135 - 145 mmol/L    Potassium 4.3 3.6 - 5.5 mmol/L    Chloride 88 (L) 96 -  112 mmol/L    Co2 24 20 - 33 mmol/L    Anion Gap 13.0 7.0 - 16.0    Glucose 118 (H) 65 - 99 mg/dL    Bun 26 (H) 8 - 22 mg/dL    Creatinine 0.85 0.50 - 1.40 mg/dL    Calcium 9.2 8.5 - 10.5 mg/dL    AST(SGOT) 21 12 - 45 U/L    ALT(SGPT) 18 2 - 50 U/L    Alkaline Phosphatase 81 30 - 99 U/L    Total Bilirubin 0.5 0.1 - 1.5 mg/dL    Albumin 3.4 3.2 - 4.9 g/dL    Total Protein 6.5 6.0 - 8.2 g/dL    Globulin 3.1 1.9 - 3.5 g/dL    A-G Ratio 1.1 g/dL   LIPASE   Result Value Ref Range    Lipase 26 11 - 82 U/L   TROPONIN   Result Value Ref Range    Troponin T 35 (H) 6 - 19 ng/L   D-DIMER   Result Value Ref Range    D-Dimer Screen 2.23 (H) 0.00 - 0.50 ug/mL (FEU)   Prothrombin Time   Result Value Ref Range    PT 14.0 12.0 - 14.6 sec    INR 1.06 0.87 - 1.13   APTT   Result Value Ref Range    APTT 33.0 24.7 - 36.0 sec   proBrain Natriuretic Peptide, NT   Result Value Ref Range    NT-proBNP 50827 (H) 0 - 125 pg/mL   ESTIMATED GFR   Result Value Ref Range    GFR If African American >60 >60 mL/min/1.73 m 2    GFR If Non African American >60 >60 mL/min/1.73 m 2   COVID/SARS CoV-2    Specimen: Nasopharyngeal; Respirate   Result Value Ref Range    COVID Order Status Received    Procalcitonin   Result Value Ref Range    Procalcitonin <0.05 <0.25 ng/mL   Troponin in two (2) hours   Result Value Ref Range    Troponin T 35 (H) 6 - 19 ng/L   SARS-CoV-2, PCR (In-House)   Result Value Ref Range    SARS-CoV-2 Source NP Swab    EKG (NOW)   Result Value Ref Range    Report       [facility] Emergency Dept.    Test Date:  2020  Pt Name:    AIDE TONY             Department: ER  MRN:        [id number]                      Room:       TRAUMA - EXAM 1  Gender:     Female                       Technician: [id number]  :        1945                   Requested By:BRANDEN BAILEY  Order #:    [id number]                    Reading MD:    Measurements  Intervals                                Axis  Rate:       122                           P:          81  GA:         152                          QRS:        -74  QRSD:       110                          T:          118  QT:         324  QTc:        462    Interpretive Statements  SINUS TACHYCARDIA  JODEE, CONSIDER BIATRIAL ABNORMALITIES  LVH WITH IVCD, LAD AND SECONDARY REPOL ABNRM  INFERIOR INFARCT, AGE INDETERMINATE  Compared to ECG 2017 04:48:10  Intraventricular conduction delay now present  Early repolarization now present  Myocardial infarct finding still present     EKG - STAT Once   Result Value Ref Range    Report       [facility] Emergency Dept.    Test Date:  2020  Pt Name:    AIDE TONY             Department: ER  MRN:        [id number]                      Room:       [facility]  Gender:     Female                       Technician: [id number]  :        1945                   Requested By:CIERRA ROTHMAN  Order #:    [id number]                    Reading MD:    Measurements  Intervals                                Axis  Rate:       100                          P:          90  GA:         176                          QRS:        -79  QRSD:       116                          T:          106  QT:         388  QTc:        501    Interpretive Statements  SINUS TACHYCARDIA  BIATRIAL ABNORMALITIES  LVH WITH IVCD, LAD AND SECONDARY REPOL ABNRM  INFERIOR INFARCT, ACUTE  Compared to ECG 2020 03:08:43  No significant changes          RADIOLOGY  The radiologist's interpretation of all radiological studies have been reviewed by me.     CT-CTA CHEST PULMONARY ARTERY W/ RECONS   Final Result      1.  No evidence of pulmonary embolism.      2.  Asymmetric interstitial opacity in the left lower lobe which represent interstitial edema, pneumonia, or interstitial fibrotic change.      3.  Diffuse centrilobular emphysematous changes of the chest.      4.  No pleural effusion.            DX-CHEST-PORTABLE (1 VIEW)   Final Result      No acute cardiac or pulmonary  abnormality is noted.             COURSE & MEDICAL DECISION MAKING  Nursing notes, VS, PMSFHx reviewed in chart.     Review of past medical records shows the patient was last seen in this ED January 12, 2024 weakness.      Differential diagnoses considered include but are not limited to: AMI, dissection, PE, CHF/pulm edema, pericardial effusion/tamponade, pericarditis, pneumonia, pleural effusion, pleurisy, costochondritis, esophageal spasm, GERD, gastritis, PUD, hiatal hernia, pancreatitis, muscle strain, neuropathy      Pt risk-stratified as intermediate risk for MACE in the next 6 weeks by HEART Score: 5    HISTORY  Highly suspicious +2  Moderately suspicious +1  Slightly suspicious 0    EKG  Significant ST depression +2  Nonspecific repolarization disturbance +1  Normal 0    AGE  ? 65 +2  45-65 +1  < 45 0    RISK FACTORS  Hypercholesterolemia, HTN, DM, Cigarette smoking, positive family history, obesity  ? 3 risk factors or history of atherosclerotic disease +2  1-2 risk factors +1  No risk factors known 0    TROPONIN  ? 3× normal limit +2  1-3× normal limit +1  ? normal limit 0       History and physical exam as above.  Patient was seen upon arrival due to prehospital STEMI activation.  Patient was seen in conjunction with Dr. Perkins who did not recommend immediate Cath Lab activation.  He recommended admission for medical treatment.  Chest x-ray without evidence for acute abnormality.  Patient subsequently underwent CTA chest due to her elevated d-dimer with findings as above.  Patient with slight elevation of troponin as well as elevation of BNP.  Labs also showed leukocytosis and hyponatremia.  Findings discussed with the patient and she is agreeable to admission.  Discussed with Dr. Matias, hospitalist, who graciously agreed to admit patient for further care.      CRITICAL CARE NOTE:  Total critical care time spent on this patient was 30 minutes exclusive of separately billable procedures.  This may  include direct bedside patient care, speaking with family members, review of past medical records, reviewing the results of laboratory/diagnostic studies, consulting with other physicians, as well as evaluating the response to the therapy instituted.        FINAL IMPRESSION  1. Acute chest pain Acute   2. Elevated troponin Active   3. Hyponatremia Active   4. Leukocytosis, unspecified type Active   5. Left lower lobe pulmonary infiltrate Active          DISPOSITION  Patient will be admitted by hospitalist for further care      Electronically signed by: Cecilio Rodriguez D.O., 5/13/2020 3:24 AM      Portions of this record were made with voice recognition software.  Despite my review, spelling/grammar/context errors may still remain.  Interpretation of this chart should be taken in this context.

## 2020-05-13 NOTE — ASSESSMENT & PLAN NOTE
Uncontrolled  Started on metoprolol 25 mg twice daily  Continue lisinopril  IV labetalol PRN for SBP greater than 160

## 2020-05-13 NOTE — ED NOTES
Called pts cell number @ [phone], per son (Johnny) answered.  Pts son reports that pt did use her inhaler and ASPIRIN 81MG 4 tablets today (5/13/2020) @ 0300.  Per son was not sure what medications pt is taking.  Pts son reports that pt only goes to [facility].  Will call [facility] when they open.

## 2020-05-13 NOTE — ED NOTES
MD Mendez paged in regards to medication orders. Per MD will call back to update whether heparin should be d/c (cardiology notes state they would like IV heparin infusion). Ok to give 81 mg PO ASA.

## 2020-05-13 NOTE — DISCHARGE PLANNING
Medical Social Work    Referral: STEMI    Intervention: Pt is a 74 year old female brought in by DANDRE from home for chest pain.  Pt is Karen Jauregui (: 1945).  Per medics pt's  was at home with her and will be calling the hospital to check on pt later.  Per chart pt's emergency contact is her son, Johnny ([phone]).  Pt is alert and oriented at this time.    Plan: SW will follow as needed.

## 2020-05-13 NOTE — ED NOTES
Pt refuses another IV.  Pt explained that she needs meds and the other line is for heparin.  Pt still refuses.

## 2020-05-13 NOTE — CONSULTS
"Cardiology Initial Consultation    Date of Service  5/13/2020    Referring Physician  Cecilio Rodriguez D.O.    Reason for Consultation  STEMI    History of Presenting Illness  Karen Jauregui is a 74 y.o. female with a past medical history of hypertension, abdominal aortic aneurysm endograft repair, PVD, dyslipidemia, COPD, chronic tobacco use and chronic opioid use who presented 5/13/2020 with chest pain.    The patient states that approximately 2 hours prior to presentation she was awakened with a left lower anterior sharp achy nonradiating chest pain with no associated worsening shortness of breath, nausea, vomiting, diaphoresis.  Paramedics were contacted.  EKG in the field was abnormal and a STEMI was called.  Currently the patient states that her chest pain has improved to down to a \"1/10\"    The patient has no prior history of heart disease including angina pectoris or myocardial infarction.  She reports progressive weakness and fatigue with exertional dyspnea and a productive cough. Over the past week she is developed some lower extremity edema and was started on some diuretics by her PCP Radha Gross.  She is quite sedentary, remains at home with her son caring for her.    Review of Systems  Review of Systems   Constitutional: Negative for chills and fever.   HENT: Negative for congestion and nosebleeds.    Respiratory: Positive for choking and shortness of breath.    Cardiovascular: Positive for leg swelling. Negative for chest pain and palpitations.   Gastrointestinal: Negative for abdominal pain, constipation, diarrhea, nausea and vomiting.   Genitourinary: Negative for dysuria, hematuria and urgency.   Musculoskeletal: Positive for back pain. Negative for myalgias.   Skin: Negative for rash.   Neurological: Negative for dizziness.   Psychiatric/Behavioral: Negative for agitation and confusion.       Past Medical History   has a past medical history of Abdominal aortic aneurysm (HCC) (11/2010), " Angina, Arthritis, Atherosclerosis of arteries of the extremities, Bowel habit changes, Carotid atherosclerosis, COPD (chronic obstructive pulmonary disease) (HCC), Diverticulitis, Diverticulosis of colon, Dyslipidemia, Emphysema of lung (HCC), Eosinophilic colitis, Family history of nonmelanoma skin cancer, Hypertension, Pain, Peripheral vascular disease (HCC), PVD (posterior vitreous detachment), left eye (1/13/2015), Snoring, Spinal stenosis of lumbar region, and Unspecified hemorrhagic conditions.    Surgical History   has a past surgical history that includes colonoscopy (3/1/2009, 4/2012, 7/2/13); gyn surgery (2002); gyn surgery (1975); other; colon resection laparoscopic (8/5/2013); colonoscopy with biopsy (3/6/2015); other cardiac surgery (2005); aaa with stent graft (N/A, 3/31/2017); and femoral femoral bypass (N/A, 3/31/2017).    Family History  family history includes Cancer in her sister; Heart Disease in her brother and sister; Heart Disease (age of onset: 55) in her father; Heart Disease (age of onset: 82) in her mother; Hyperlipidemia in her father, mother, and sister; Hypertension in her father, mother, and sister; Non-contributory in her sister; Stroke in her sister.    Social History   reports that she has been smoking cigarettes. She has a 45.00 pack-year smoking history. She has never used smokeless tobacco. She reports that she does not drink alcohol or use drugs.    Medications  Prior to Admission Medications   Prescriptions Last Dose Informant Patient Reported? Taking?   HYDROcodone-acetaminophen (NORCO) 5-325 MG Tab per tablet   No No   Sig: Take 1 Tab by mouth every 8 hours as needed (sciatica and leg pain/hip pain) for up to 30 days.   VENTOLIN  (90 Base) MCG/ACT Aero Soln inhalation aerosol   Yes No   aspirin EC (ECOTRIN) 81 MG Tablet Delayed Response  Patient Yes No   Sig: Take 81 mg by mouth every evening.   benzonatate (TESSALON) 200 MG capsule   No No   Sig: Take 1 Cap by mouth  3 times a day as needed for Cough.   lisinopril-hydrochlorothiazide (PRINZIDE, ZESTORETIC) 10-12.5 MG per tablet   No No   Sig: Take 1 Tab by mouth every day.   potassium chloride ER (KLOR-CON) 10 MEQ tablet   No No   Sig: Take 1 Tab by mouth every day.   promethazine (PHENERGAN) 25 MG Tab   No No   Sig: TAKE ONE TABLET BY MOUTH EVERY 6 HOURS AS NEEDED FOR NAUSEA AND VOMITING   spironolactone (ALDACTONE) 25 MG Tab   No No   Sig: Take 1 Tab by mouth 1 time daily as needed (swelling).   tiotropium (SPIRIVA) 18 MCG Cap   No No   Sig: Inhale 1 Cap by mouth every day.      Facility-Administered Medications: None       Allergies  Allergies   Allergen Reactions   • Doxycycline Diarrhea     None stop diarrhea   • Tramadol Vomiting   • Amoxicillin Diarrhea     Diarrhea   • Ciprofloxacin Unspecified     Unspecified       Vital signs in last 24 hours  Temp:  [36.9 °C (98.4 °F)] 36.9 °C (98.4 °F)  Pulse:  [108] 108  Resp:  [25] 25  BP: (148)/(83) 148/83  SpO2:  [89 %] 89 %    Physical Exam  Physical Exam  Constitutional:       General: She is not in acute distress.     Comments: Elderly, frail, cachectic, slightly tachypneic.   HENT:      Head: Normocephalic.   Eyes:      General: No scleral icterus.     Conjunctiva/sclera: Conjunctivae normal.      Pupils: Pupils are equal, round, and reactive to light.   Neck:      Thyroid: No thyromegaly.      Vascular: No carotid bruit.      Comments: Jugular venous pressure appears slightly elevated  Cardiovascular:      Rate and Rhythm: Normal rate and regular rhythm.      Pulses:           Carotid pulses are 1+ on the right side and 1+ on the left side.       Radial pulses are 1+ on the right side and 1+ on the left side.      Heart sounds: S1 normal and S2 normal. No murmur. No friction rub. No gallop.       Comments: No femoral bruits.  Pulmonary:      Effort: Pulmonary effort is normal.      Breath sounds: Rhonchi present. No wheezing or rales.   Chest:      Chest wall: Tenderness  present.      Comments: Kyphosis.  Abdominal:      General: Abdomen is flat. Bowel sounds are normal. There is no abdominal bruit.      Palpations: Abdomen is soft. There is no mass or pulsatile mass.      Tenderness: There is no abdominal tenderness.      Comments:   Abdominal scar.   Musculoskeletal:         General: Swelling present.      Right lower leg: Edema present.      Left lower leg: Edema present.   Lymphadenopathy:      Cervical: No cervical adenopathy.   Skin:     General: Skin is warm and dry.      Nails: There is no clubbing.     Neurological:      Mental Status: She is alert and oriented to person, place, and time.      Gait: Gait normal.   Psychiatric:         Behavior: Behavior normal.         Lab Review  Lab Results   Component Value Date/Time    WBC 15.1 (H) 05/13/2020 03:19 AM    RBC 5.24 05/13/2020 03:19 AM    HEMOGLOBIN 15.5 05/13/2020 03:19 AM    HEMATOCRIT 44.5 05/13/2020 03:19 AM    MCV 84.9 05/13/2020 03:19 AM    MCH 29.6 05/13/2020 03:19 AM    MCHC 34.8 05/13/2020 03:19 AM    MPV 9.1 05/13/2020 03:19 AM      Lab Results   Component Value Date/Time    SODIUM 130 (L) 01/12/2020 01:37 PM    POTASSIUM 3.6 01/12/2020 01:37 PM    CHLORIDE 93 (L) 01/12/2020 01:37 PM    CO2 23 01/12/2020 01:37 PM    GLUCOSE 88 01/12/2020 01:37 PM    BUN 39 (H) 01/12/2020 01:37 PM    CREATININE 1.27 01/12/2020 01:37 PM    CREATININE 0.8 08/25/2008 05:40 PM      Lab Results   Component Value Date/Time    ASTSGOT 21 01/12/2020 01:37 PM    ALTSGPT 14 01/12/2020 01:37 PM     Lab Results   Component Value Date/Time    CHOLSTRLTOT 103 03/14/2019 10:22 AM    LDL 45 03/14/2019 10:22 AM    HDL 45 03/14/2019 10:22 AM    TRIGLYCERIDE 63 03/14/2019 10:22 AM       No results for input(s): NTPROBNP in the last 72 hours.    Cardiac Imaging and Procedures Review  EKG:  My personal interpretation of the EKG dated 05/13/2020 is sinus tachycardia, rate 122, left anterior fascicular block, intraventricular conduction delay, left  ventricular hypertrophy with probable repolarization abnormalities, cannot completely exclude anterior transmural ischemia    Imaging  Chest X-Ray: Overinflation.  Cardiomegaly.    Assessment/Plan  Acute chest pain.  Hypertensive urgency, uncontrolled based on outpatient blood pressure readings.  Abnormal EKG with new intraventricular conduction delay and repolarization abnormalities though cannot completely exclude transmural ischemia.  COPD, severe with probable exacerbation.  General debility and frailty.  Lower extremity edema with clinical suspicion for congestive heart failure.  Continued chronic tobacco use.  AAA repair endovascular.  Profound frailty and functional debility.  Chronic pain syndrome on chronic opioid therapy.  S/P laparotomy, left hemicolectomy, sigmoidectomy for diverticulitis.    Recommendation Discussion  1.  I do not feel the patient should undergo an emergent cardiac catheterization due to her profound frailty and respiratory status and should be treated medically at this time which can be reconsidered if subsequently thought to be indicated  2.  IV labetalol.  3.  Nitropaste.  4.  Patient is on chronic aspirin received additional aspirin.  5.  Serial troponin levels and EKGs.  6.  Stat echocardiogram.  7.  Resume preadmission medications and would add metoprolol 25 mg twice daily.  8.  Initiate IV heparin.  9.  Have discussed treatment plan with Dr. Cecilio Rodriguez, DO, ER physician    Thank you for allowing me to participate in the care of this patient.    Please contact me with any questions.    Johnny Perkins M.D.   Cardiologist, [facility]  [phone]

## 2020-05-13 NOTE — PROGRESS NOTES
Limited Cardiology Progress Note    Dr. Perkins was consulted on patient overnight, see his note this AM for complete details.    Patient has PMH of HTN, AAA s/p endograft repair, HLD, PVD, COPD and tobacco use.  Presented 5/13/20 with acute CP and shortness of breath.   Cardiology consulted due to concerns for STEMI in the setting of new IVCD in LBBB pattern and CP.  Dr. Perkins cancelled STEMI as patient is quite frail and respiratory status was poor.  She was started on heparin ggt, recommended for medical mgmt.     I checked on her this afternoon. She is feeling better.  Less shortness of breath and no further chest pain.    Echo 5/13/20:  CONCLUSIONS  Severely reduced left ventricular systolic function. Left ventricular   ejection fraction is visually estimated to be 30%.  Global hypokinesis.   No evidence of valvular abnormality based on Doppler evaluation.   Compared to prior study on 03/06/2015, there has been new reduction in   left ventricular systolic function.      Assessement/Plan:    1   Chest pain, unlikely cardiac.  Ruled out for ACS, heparin discontinued.    2   New LBBB    3   New cardiomyopathy, EF 30% with global hypokinesis.  Does not appear acutely decompensated.      -  Will hold further lasix.  -  D/c metoprolol tartrate, start carvedilol 6.25 BID this evening.  -  Start losartan 25 mg.    -  Continue spironolactone 25 - monitor K, may have to reduce.    4   Hyponatremia. Na 125.  Would recommend discontinuation of HCTZ.    5   Likely LLL pneumonia. Seen on CT, + leukocytosis.  On azithromyacin.    6   Atherosclerosis with extensive calcifications of the thoracic aorta and coronary arteries on CT.  Continue to treat medically at this time.  ASA, statin, and BB.     7   Hypertension.   adjustments made to regimen as above.    8   HLD.  LDL 61.  Continue atorvastatin 40 mg.    Will follow.    Tati Morales PA-C  [facility]  5/13/2020

## 2020-05-13 NOTE — PROGRESS NOTES
2 RN Skin Check    2 RN skin check complete.   Devices in place:Silicone Nasal Cannula.  Skin assessed under devices: yes.  Confirmed pressure ulcers found on: N/A.  New potential pressure ulcers noted on sacrum. Wound consult placed Yes.  The following interventions in place moisturizer, pillows, waffle.    BL ears intact.  BL elbows red and slow to ang.   Back intact.  Sacrum purple, red and slow to ang.  BL heels boggy.

## 2020-05-13 NOTE — ED NOTES
Attending Hospitalist is Dr Mendez starting at 0700. Please contact this physician for orders, updates or questions today.

## 2020-05-13 NOTE — PROGRESS NOTES
Med rec updated and complete  Allergies reviewed, per son  Called Radha @ [phone] to verify medications.  Pts pharmacy reports no antibiotics in the last 2 weeks

## 2020-05-14 PROBLEM — J18.1 LEFT LOWER LOBE CONSOLIDATION (HCC): Status: ACTIVE | Noted: 2018-05-09

## 2020-05-14 PROBLEM — R78.81 POSITIVE BLOOD CULTURE: Status: ACTIVE | Noted: 2020-05-14

## 2020-05-14 LAB
ANION GAP SERPL CALC-SCNC: 10 MMOL/L (ref 7–16)
BASOPHILS # BLD AUTO: 1 % (ref 0–1.8)
BASOPHILS # BLD: 0.08 K/UL (ref 0–0.12)
BUN SERPL-MCNC: 27 MG/DL (ref 8–22)
CALCIUM SERPL-MCNC: 9.5 MG/DL (ref 8.5–10.5)
CHLORIDE SERPL-SCNC: 90 MMOL/L (ref 96–112)
CO2 SERPL-SCNC: 28 MMOL/L (ref 20–33)
CREAT SERPL-MCNC: 1.04 MG/DL (ref 0.5–1.4)
EOSINOPHIL # BLD AUTO: 0.16 K/UL (ref 0–0.51)
EOSINOPHIL NFR BLD: 2 % (ref 0–6.9)
ERYTHROCYTE [DISTWIDTH] IN BLOOD BY AUTOMATED COUNT: 41.6 FL (ref 35.9–50)
GLUCOSE SERPL-MCNC: 90 MG/DL (ref 65–99)
HCT VFR BLD AUTO: 43.3 % (ref 37–47)
HGB BLD-MCNC: 14.6 G/DL (ref 12–16)
IMM GRANULOCYTES # BLD AUTO: 0.04 K/UL (ref 0–0.11)
IMM GRANULOCYTES NFR BLD AUTO: 0.5 % (ref 0–0.9)
LYMPHOCYTES # BLD AUTO: 1.27 K/UL (ref 1–4.8)
LYMPHOCYTES NFR BLD: 16 % (ref 22–41)
MCH RBC QN AUTO: 29.5 PG (ref 27–33)
MCHC RBC AUTO-ENTMCNC: 33.7 G/DL (ref 33.6–35)
MCV RBC AUTO: 87.5 FL (ref 81.4–97.8)
MONOCYTES # BLD AUTO: 0.82 K/UL (ref 0–0.85)
MONOCYTES NFR BLD AUTO: 10.3 % (ref 0–13.4)
NEUTROPHILS # BLD AUTO: 5.59 K/UL (ref 2–7.15)
NEUTROPHILS NFR BLD: 70.2 % (ref 44–72)
NRBC # BLD AUTO: 0 K/UL
NRBC BLD-RTO: 0 /100 WBC
PLATELET # BLD AUTO: 249 K/UL (ref 164–446)
PMV BLD AUTO: 9.8 FL (ref 9–12.9)
POTASSIUM SERPL-SCNC: 4.9 MMOL/L (ref 3.6–5.5)
RBC # BLD AUTO: 4.95 M/UL (ref 4.2–5.4)
SODIUM SERPL-SCNC: 128 MMOL/L (ref 135–145)
WBC # BLD AUTO: 8 K/UL (ref 4.8–10.8)

## 2020-05-14 PROCEDURE — 700102 HCHG RX REV CODE 250 W/ 637 OVERRIDE(OP): Performed by: NURSE PRACTITIONER

## 2020-05-14 PROCEDURE — 700111 HCHG RX REV CODE 636 W/ 250 OVERRIDE (IP): Performed by: PHYSICIAN ASSISTANT

## 2020-05-14 PROCEDURE — 94760 N-INVAS EAR/PLS OXIMETRY 1: CPT

## 2020-05-14 PROCEDURE — 700111 HCHG RX REV CODE 636 W/ 250 OVERRIDE (IP): Performed by: INTERNAL MEDICINE

## 2020-05-14 PROCEDURE — 700105 HCHG RX REV CODE 258: Performed by: INTERNAL MEDICINE

## 2020-05-14 PROCEDURE — A9270 NON-COVERED ITEM OR SERVICE: HCPCS | Performed by: INTERNAL MEDICINE

## 2020-05-14 PROCEDURE — 700102 HCHG RX REV CODE 250 W/ 637 OVERRIDE(OP): Performed by: FAMILY MEDICINE

## 2020-05-14 PROCEDURE — 87040 BLOOD CULTURE FOR BACTERIA: CPT

## 2020-05-14 PROCEDURE — 99232 SBSQ HOSP IP/OBS MODERATE 35: CPT | Performed by: INTERNAL MEDICINE

## 2020-05-14 PROCEDURE — 99232 SBSQ HOSP IP/OBS MODERATE 35: CPT | Performed by: HOSPITALIST

## 2020-05-14 PROCEDURE — 700102 HCHG RX REV CODE 250 W/ 637 OVERRIDE(OP): Performed by: PHYSICIAN ASSISTANT

## 2020-05-14 PROCEDURE — A9270 NON-COVERED ITEM OR SERVICE: HCPCS | Performed by: PHYSICIAN ASSISTANT

## 2020-05-14 PROCEDURE — A9270 NON-COVERED ITEM OR SERVICE: HCPCS | Performed by: FAMILY MEDICINE

## 2020-05-14 PROCEDURE — A9270 NON-COVERED ITEM OR SERVICE: HCPCS | Performed by: HOSPITALIST

## 2020-05-14 PROCEDURE — A9270 NON-COVERED ITEM OR SERVICE: HCPCS | Performed by: NURSE PRACTITIONER

## 2020-05-14 PROCEDURE — 36415 COLL VENOUS BLD VENIPUNCTURE: CPT

## 2020-05-14 PROCEDURE — 700102 HCHG RX REV CODE 250 W/ 637 OVERRIDE(OP): Performed by: HOSPITALIST

## 2020-05-14 PROCEDURE — 85025 COMPLETE CBC W/AUTO DIFF WBC: CPT

## 2020-05-14 PROCEDURE — 80048 BASIC METABOLIC PNL TOTAL CA: CPT

## 2020-05-14 PROCEDURE — 700102 HCHG RX REV CODE 250 W/ 637 OVERRIDE(OP): Performed by: INTERNAL MEDICINE

## 2020-05-14 PROCEDURE — 770020 HCHG ROOM/CARE - TELE (206)

## 2020-05-14 RX ORDER — ALBUTEROL SULFATE 90 UG/1
2 AEROSOL, METERED RESPIRATORY (INHALATION)
Status: DISCONTINUED | OUTPATIENT
Start: 2020-05-14 | End: 2020-05-15 | Stop reason: HOSPADM

## 2020-05-14 RX ORDER — ATORVASTATIN CALCIUM 40 MG/1
40 TABLET, FILM COATED ORAL EVERY EVENING
Status: DISCONTINUED | OUTPATIENT
Start: 2020-05-14 | End: 2020-05-15 | Stop reason: HOSPADM

## 2020-05-14 RX ORDER — GUAIFENESIN 600 MG/1
600 TABLET, EXTENDED RELEASE ORAL EVERY 12 HOURS
Status: DISCONTINUED | OUTPATIENT
Start: 2020-05-14 | End: 2020-05-15 | Stop reason: HOSPADM

## 2020-05-14 RX ADMIN — CARVEDILOL 6.25 MG: 6.25 TABLET, FILM COATED ORAL at 04:28

## 2020-05-14 RX ADMIN — ATORVASTATIN CALCIUM 40 MG: 40 TABLET, FILM COATED ORAL at 17:59

## 2020-05-14 RX ADMIN — AZITHROMYCIN MONOHYDRATE 500 MG: 250 TABLET ORAL at 04:26

## 2020-05-14 RX ADMIN — CEFTRIAXONE SODIUM 1 G: 1 INJECTION, POWDER, FOR SOLUTION INTRAMUSCULAR; INTRAVENOUS at 04:26

## 2020-05-14 RX ADMIN — SPIRONOLACTONE 25 MG: 25 TABLET ORAL at 04:29

## 2020-05-14 RX ADMIN — NICOTINE TRANSDERMAL SYSTEM 21 MG: 21 PATCH, EXTENDED RELEASE TRANSDERMAL at 04:26

## 2020-05-14 RX ADMIN — ENOXAPARIN SODIUM 30 MG: 30 INJECTION SUBCUTANEOUS at 04:25

## 2020-05-14 RX ADMIN — ACETAMINOPHEN 650 MG: 325 TABLET, FILM COATED ORAL at 17:59

## 2020-05-14 RX ADMIN — GUAIFENESIN 600 MG: 600 TABLET, EXTENDED RELEASE ORAL at 09:47

## 2020-05-14 RX ADMIN — NICOTINE POLACRILEX 2 MG: 2 GUM, CHEWING BUCCAL at 17:59

## 2020-05-14 RX ADMIN — NICOTINE POLACRILEX 2 MG: 2 GUM, CHEWING BUCCAL at 09:47

## 2020-05-14 RX ADMIN — LOSARTAN POTASSIUM 25 MG: 25 TABLET, FILM COATED ORAL at 04:29

## 2020-05-14 RX ADMIN — SENNOSIDES-DOCUSATE SODIUM TAB 8.6-50 MG 2 TABLET: 8.6-5 TAB at 17:59

## 2020-05-14 RX ADMIN — CARVEDILOL 6.25 MG: 6.25 TABLET, FILM COATED ORAL at 17:59

## 2020-05-14 RX ADMIN — ASPIRIN 81 MG: 81 TABLET, COATED ORAL at 04:29

## 2020-05-14 RX ADMIN — GUAIFENESIN 600 MG: 600 TABLET, EXTENDED RELEASE ORAL at 17:59

## 2020-05-14 RX ADMIN — SENNOSIDES-DOCUSATE SODIUM TAB 8.6-50 MG 2 TABLET: 8.6-5 TAB at 04:29

## 2020-05-14 ASSESSMENT — ENCOUNTER SYMPTOMS
NAUSEA: 0
FEVER: 0
CHEST TIGHTNESS: 0
VOMITING: 0
COUGH: 0
CONSTIPATION: 0
CHOKING: 0
DIARRHEA: 0
CHILLS: 0
COUGH: 1
STRIDOR: 0
APNEA: 0
SHORTNESS OF BREATH: 1
HEADACHES: 0
SPUTUM PRODUCTION: 1
WHEEZING: 0

## 2020-05-14 ASSESSMENT — FIBROSIS 4 INDEX: FIB4 SCORE: 1.45

## 2020-05-14 ASSESSMENT — PATIENT HEALTH QUESTIONNAIRE - PHQ9
1. LITTLE INTEREST OR PLEASURE IN DOING THINGS: NOT AT ALL
2. FEELING DOWN, DEPRESSED, IRRITABLE, OR HOPELESS: NOT AT ALL
SUM OF ALL RESPONSES TO PHQ9 QUESTIONS 1 AND 2: 0

## 2020-05-14 NOTE — WOUND TEAM
Wound consult received for sacrum. Patient seen in T824, pt was able to turned to left side, sacrococcygeal area assessed, pt noted to be pink but blanching. Sacral mepilex applied. Discussed w/ bedside RN to place waffle overlay to mattress in place. RN agreeable. No advanced wound care needs at this time.

## 2020-05-14 NOTE — PROGRESS NOTES
Cardiology Follow Up Progress Note    Date of Service  5/14/2020    Attending Physician  Kwasi William M.D.    Chief Complaint     Chest pain      HPI  Karen Jauregui is a 74 y.o. female admitted 5/13/2020 with acute onset of chest pain, EKG revealed new intraventricular conduction delay, repolarization abnormality, ischemia cannot be ruled out, STEMI call was canceled due to patient's profound frailty and respiratory status.    Past medical history of COPD, chronic tobacco use, chronic opioid, hypertension, dyslipidemia, AAA s/p endograft repair, peripheral vascular disease.      Sodium 128  Potassium 4.9  Creatinine 1  Troponin flat 39. 35  NT proBNP 20,000  COVID negative        Blood pressure 130/79  Rhythm sinus    Interim Events  5/14/2020 no overnight cardiac events, sinus rhythm, denies angina, dyspnea improved, on 2 L of oxygen, normally does not wear oxygen at home, needs to be evaluated for home O2, denies angina, patient follows with [facility] cardiology.    Review of Systems  Review of Systems   Respiratory: Positive for shortness of breath. Negative for apnea, cough, choking, chest tightness, wheezing and stridor.         Dyspnea improved   Cardiovascular: Negative for chest pain and leg swelling.       Vital signs in last 24 hours  Temp:  [36.4 °C (97.6 °F)-37.9 °C (100.3 °F)] 37.9 °C (100.3 °F)  Pulse:  [] 87  Resp:  [16-18] 16  BP: (106-153)/(67-91) 131/79  SpO2:  [96 %-100 %] 99 %    Physical Exam  Physical Exam  Cardiovascular:      Rate and Rhythm: Normal rate and regular rhythm.   Pulmonary:      Breath sounds: No wheezing.   Abdominal:      General: Abdomen is flat.   Skin:     General: Skin is warm.   Neurological:      General: No focal deficit present.      Mental Status: She is alert.   Psychiatric:         Mood and Affect: Mood normal.         Lab Review  Lab Results   Component Value Date/Time    WBC 8.0 05/14/2020 02:56 AM    RBC 4.95 05/14/2020 02:56 AM    HEMOGLOBIN  14.6 05/14/2020 02:56 AM    HEMATOCRIT 43.3 05/14/2020 02:56 AM    MCV 87.5 05/14/2020 02:56 AM    MCH 29.5 05/14/2020 02:56 AM    MCHC 33.7 05/14/2020 02:56 AM    MPV 9.8 05/14/2020 02:56 AM      Lab Results   Component Value Date/Time    SODIUM 128 (L) 05/14/2020 02:56 AM    POTASSIUM 4.9 05/14/2020 02:56 AM    CHLORIDE 90 (L) 05/14/2020 02:56 AM    CO2 28 05/14/2020 02:56 AM    GLUCOSE 90 05/14/2020 02:56 AM    BUN 27 (H) 05/14/2020 02:56 AM    CREATININE 1.04 05/14/2020 02:56 AM    CREATININE 0.8 08/25/2008 05:40 PM      Lab Results   Component Value Date/Time    ASTSGOT 21 05/13/2020 03:19 AM    ALTSGPT 18 05/13/2020 03:19 AM     Lab Results   Component Value Date/Time    CHOLSTRLTOT 115 05/13/2020 09:54 AM    LDL 61 05/13/2020 09:54 AM    HDL 41 05/13/2020 09:54 AM    TRIGLYCERIDE 67 05/13/2020 09:54 AM    TROPONINT 39 (H) 05/13/2020 09:54 AM       Recent Labs     05/13/20  0319   NTPROBNP 35533*       Cardiac Imaging and Procedures Review  EKG: Sinus rhythm, left bundle    Echocardiogram: 5/13/2020, LVEF 30%, no evidence of valvular abnormalities compared to the study on 3/6/2015 there has been a new reduction in LV systolic function, global hypokinesis    Cardiac Catheterization: Not applicable    Imaging  Chest X-Ray: No acute cardiac or pulmonary abnormalities noted    Stress Test:  N/A    Assessment/Plan    Community-acquired pneumonia  -Started on IV Rocephin and azithromycin  -IV antibiotics stopped, procalcitonin negative, RT protocol with supplemental oxygen      Chest pain  -CTA chest negative for PE  -Continue with aspirin 81, carvedilol 6.25 mg BID  -Consider statins  -Denies angina  -Troponin flat      Cardiomyopathy, LVEF 30%  -Continue with optimal guided medical therapy  -Carvedilol, losartan, spironolactone  -Appears euvolemic despite elevated NT proBNP  -Dyspnea persist, ?  Angina equivalents  -Discussed left heart cath with the patient, patient declined, she follows up with [facility]  cardiologist, she would like to follow-up with them and discuss [facility] as out patient  -We will continue with optimal guided medical therapy, aspirin, statin, patient already on carvedilol and losartan, in addition to spironolactone.      Low-sodium  -Sodium 128, mild improvement, HCTZ was discontinued  -Repeat BMP      COPD  -Currently smokes a pack and a half of cigarettes every day  -On supplemental oxygen  -No wheezing  -Dyspnea persists  -Needs evaluation for home oxygen        Hypertension  -Well-controlled on carvedilol and losartan      Positive blood cultures possibly contamination, repeat blood culture pending.    Cardiology will sign off  Please have patient sign a release of medical records to [facility][1] cardiology group.  Close follow-up with cardiology for outpatient left heart cath.      Please contact me with any questions.    DAMIAN Land.   Cardiologist, [facility]  [phone]

## 2020-05-14 NOTE — HEART FAILURE PROGRAM
New HFrEF presented with CP.     Please see below for measures that must be addressed prior to discharge.     Daily Nurse: please begin to fill out the HF checklist (pink sheet in hard chart) and use it to guide your daily care.    Discharge Nurse: please ensure completeness of the HF checklist (pink sheet in hard chart) and have it co-signed by the charge RN before the patient leaves the hospital.    Thank you, Jessi, Cardiovascular Nurse Navigator, RN, [facility] [phone]    HF Measures:  1. Documentation of LV systolic function (echo or cath) PTA, during this hospitalization, or plan to assess post discharge or reason for not assessing documented  2. Documentation of fluid intake and urine output every nursing shift  3. 2 hour post diuretic assessment documented 2 hours after diuretic given  4. HF Patient Education using the Living Well With Heart Failure Booklet and Symptom Tracker documented every nursing shift  5. Nutrition consult for diet education  6. Daily weights (one weight documented every 24 hours) on a standing scale unless standing is contraindicated in which case bed scale can be used - have patient write weight on symptom tracker  7. For LVEF less than or equal to 40%, ACE-I, ARNI or ARB prescribed at discharge   8. For LVEF less than or equal to 40%, an Evidence Based Beta Blocker (bisoprolol, carvedilol, toprol xl) must be prescribed at discharge  9. For LVEF less than or equal to 35% aldosterone blockade prescribed at discharge  10. The combination of hydralazine and isosorbide dinitrate is recommended to reduce morbidity and mortality for patients self-described  Americans with NYHA class III-IV HFrEF (EF 40% or less), receiving optimal therapy with ACE inhibitors and beta blockers, unless contraindicated (Class I, BRONWYN: A).  11. If a HF patient is diabetic or is newly diagnosed with DM: prescribed diabetes treatment at discharge in the form of glycemic control (diet or anti-hyperglycemic  medication) or f/u appointment for diabetes management scheduled at discharge.  12. If a HF patient has diabetes: prescribed lipid lowering medication at discharge  13. Documented smoking cessation advice or counseling  14. If a HF patient has a-fib: anticoagulation is prescribed upon discharge or contraindication is documented  15. Screening for and administering immunizations as long as no contraindications: Pneumonia (regardless of age) and Influenza  16. Written discharge instructions include:  ? Daily weights  ? Record weight on tracker  ? Bring tracker to appointments  ? Call MD for weight gain of 3lb /day or 5lb/week  ? HF medication teaching  ? Low sodium diet  ? Follow up appointment within seven calendar days of d/c must include: date, time and location  ? Activity  ? Worsening symptoms    What if any of the above HF measures are contraindicated?  ? Request that the discharging provider document the medication/intervention and the contraindication specifically in a progress note  ? For example: “no CHF meds due to hypotension” is not enough. It needs to say: “No ACE-I, ARNI, ARB due to hypotension”; “No Beta Blockade due to bradycardia”…

## 2020-05-14 NOTE — PROGRESS NOTES
[facility] Medicine Daily Progress Note    Date of Service  5/14/2020    Chief Complaint  74 y.o. female admitted 5/13/2020 with SOB, elev DDimer    Interval Problem Update  5/14: Pt comfortable and eager to go home. 1/2 tube BC positive for GPC, possible contaminant. Repeat BC pending.     Disposition  Likely dc home tomm if remaining cultures negative.     Review of Systems  Review of Systems   Constitutional: Negative for chills and fever.   Respiratory: Positive for cough, sputum production and shortness of breath. Negative for wheezing.    Cardiovascular: Negative for chest pain.   Gastrointestinal: Negative for constipation, diarrhea, nausea and vomiting.   Genitourinary: Negative for dysuria.   Neurological: Negative for headaches.        Physical Exam  Temp:  [36.4 °C (97.6 °F)-37.9 °C (100.3 °F)] 37.9 °C (100.3 °F)  Pulse:  [] 87  Resp:  [16-18] 16  BP: (106-153)/(67-91) 131/79  SpO2:  [96 %-100 %] 99 %    Physical Exam  Constitutional:       Appearance: She is well-developed.   HENT:      Head: Normocephalic.   Cardiovascular:      Rate and Rhythm: Normal rate.      Heart sounds: No gallop.    Pulmonary:      Effort: No respiratory distress.      Breath sounds: Rhonchi and rales present. No wheezing.   Abdominal:      General: There is no distension.      Tenderness: There is no abdominal tenderness.   Skin:     General: Skin is warm.   Neurological:      Mental Status: She is alert. Mental status is at baseline.         Fluids    Intake/Output Summary (Last 24 hours) at 5/14/2020 1405  Last data filed at 5/14/2020 0500  Gross per 24 hour   Intake 240 ml   Output 450 ml   Net -210 ml       Laboratory  Recent Labs     05/13/20 0319 05/14/20  0256   WBC 15.1* 8.0   RBC 5.24 4.95   HEMOGLOBIN 15.5 14.6   HEMATOCRIT 44.5 43.3   MCV 84.9 87.5   MCH 29.6 29.5   MCHC 34.8 33.7   RDW 40.2 41.6   PLATELETCT 269 249   MPV 9.1 9.8     Recent Labs     05/13/20  0319 05/14/20  0256   SODIUM 125* 128*   POTASSIUM  4.3 4.9   CHLORIDE 88* 90*   CO2 24 28   GLUCOSE 118* 90   BUN 26* 27*   CREATININE 0.85 1.04   CALCIUM 9.2 9.5     Recent Labs     05/13/20  0319   APTT 33.0   INR 1.06         Recent Labs     05/13/20  0954   TRIGLYCERIDE 67   HDL 41   LDL 61       Imaging  CT-CTA CHEST PULMONARY ARTERY W/ RECONS   Final Result      1.  No evidence of pulmonary embolism.      2.  Asymmetric interstitial opacity in the left lower lobe which represent interstitial edema, pneumonia, or interstitial fibrotic change.      3.  Diffuse centrilobular emphysematous changes of the chest.      4.  No pleural effusion.            EC-ECHOCARDIOGRAM COMPLETE W/O CONT   Final Result      DX-CHEST-PORTABLE (1 VIEW)   Final Result      No acute cardiac or pulmonary abnormality is noted.           Assessment/Plan  Left lower lobe consolidation (HCC)- (present on admission)  Assessment & Plan  Initially started on IV Ceftriaxone and azithromycin  pro calcitonin negative  CT consolidation, possibly edema/PNA/fibrotic changes  DC antibiotics   RT protocol  Continue supplemental oxygen, wean as tolerated  COVID-19 negative    Chest pain- (present on admission)  Assessment & Plan  CTA chest with IV contrast negative for PE  Continuous cardiac monitoring with serial EKG and troponin  Patient has been given full dose of aspirin  Cardiology has been consulted  Patient has been started on IV heparin, monitor APTT  TSH unremarkable  Nitro when necessary for chest pain      Acute exacerbation of CHF (congestive heart failure) (HCC)- (present on admission)  Assessment & Plan  Dyspnea, elevated BNP and bilateral lower extremity edema  Started on IV Lasix 20 mg twice daily  Echo EF 30%  Fluid and salt restriction  Cards Maddie consulted    Positive blood culture  Assessment & Plan  Possible contamination 1/2 GPC 5/13  Repeat 5/14 PENDING  No antibiotics for now    COPD (chronic obstructive pulmonary disease) (HCC)- (present on admission)  Assessment &  Plan  No active wheezing  Continue home regimen of inhalers  RT protocol    Essential hypertension- (present on admission)  Assessment & Plan  Uncontrolled  Started on metoprolol 25 mg twice daily  Continue lisinopril  IV labetalol PRN for SBP greater than 160       VTE prophylaxis: enoxaparin

## 2020-05-14 NOTE — RESPIRATORY CARE
COPD EDUCATION by COPD CLINICAL EDUCATOR  5/14/2020 at 3:48 PM by Senait Salmeron, RRT     Patient interviewed by COPD education team. Patient refused COPD program at this time.

## 2020-05-14 NOTE — PROGRESS NOTES
This is a 74 years old female who has past medical history of COPD, history of AAA status post endograft repair, history of peripheral vascular disease, hypertension, and hyperlipidemia comes in with shortness of breath and chest pain.  STEMI code was called in the ER.  Cardiology consulted.  [place] that patient is a frail and poor candidate for any cardiac cath.  Echocardiogram showed severely reduced left ventricular systolic function with estimated ejection fraction of 30%.  Not on medical management.  Also CT scan showed possible left lower lobe pneumonia and was started on IV antibiotics.  COVID-19 PCR came back negative.  Patient has low risk of COVID-19 with no history of recent travel, direct or contact with COVID positive individuals.  She has a chronic cough from COPD.  Shortness of breath and hypoxia is due to combination of cardiomyopathy with volume overload and pneumonia.  Further recommendations to follow.

## 2020-05-14 NOTE — PROGRESS NOTES
VSS. Pt lying in bed resting. No signs of distress noted. PERRL, drowsy but arouses easily. Confused. Oriented x3, disoriented to time. Follows commands. Respirations equal and unlabored on 3L NC. Denies pain or needs at this time. Bed locked, in the low position, call light within reach, will continue to monitor

## 2020-05-15 VITALS
HEART RATE: 75 BPM | DIASTOLIC BLOOD PRESSURE: 61 MMHG | HEIGHT: 61 IN | RESPIRATION RATE: 18 BRPM | OXYGEN SATURATION: 98 % | BODY MASS INDEX: 17.73 KG/M2 | WEIGHT: 93.92 LBS | SYSTOLIC BLOOD PRESSURE: 100 MMHG | TEMPERATURE: 98.6 F

## 2020-05-15 LAB
ALBUMIN SERPL BCP-MCNC: 3 G/DL (ref 3.2–4.9)
ALBUMIN/GLOB SERPL: 1.2 G/DL
ALP SERPL-CCNC: 64 U/L (ref 30–99)
ALT SERPL-CCNC: 15 U/L (ref 2–50)
ANION GAP SERPL CALC-SCNC: 10 MMOL/L (ref 7–16)
AST SERPL-CCNC: 19 U/L (ref 12–45)
BACTERIA BLD CULT: ABNORMAL
BACTERIA BLD CULT: ABNORMAL
BASOPHILS # BLD AUTO: 1.1 % (ref 0–1.8)
BASOPHILS # BLD: 0.08 K/UL (ref 0–0.12)
BILIRUB SERPL-MCNC: 0.2 MG/DL (ref 0.1–1.5)
BUN SERPL-MCNC: 25 MG/DL (ref 8–22)
CALCIUM SERPL-MCNC: 9.2 MG/DL (ref 8.5–10.5)
CHLORIDE SERPL-SCNC: 92 MMOL/L (ref 96–112)
CO2 SERPL-SCNC: 27 MMOL/L (ref 20–33)
CREAT SERPL-MCNC: 0.89 MG/DL (ref 0.5–1.4)
EOSINOPHIL # BLD AUTO: 0.18 K/UL (ref 0–0.51)
EOSINOPHIL NFR BLD: 2.4 % (ref 0–6.9)
ERYTHROCYTE [DISTWIDTH] IN BLOOD BY AUTOMATED COUNT: 41.9 FL (ref 35.9–50)
GLOBULIN SER CALC-MCNC: 2.5 G/DL (ref 1.9–3.5)
GLUCOSE SERPL-MCNC: 100 MG/DL (ref 65–99)
HCT VFR BLD AUTO: 41.9 % (ref 37–47)
HGB BLD-MCNC: 13.9 G/DL (ref 12–16)
IMM GRANULOCYTES # BLD AUTO: 0.03 K/UL (ref 0–0.11)
IMM GRANULOCYTES NFR BLD AUTO: 0.4 % (ref 0–0.9)
LYMPHOCYTES # BLD AUTO: 1.55 K/UL (ref 1–4.8)
LYMPHOCYTES NFR BLD: 20.8 % (ref 22–41)
MCH RBC QN AUTO: 29.3 PG (ref 27–33)
MCHC RBC AUTO-ENTMCNC: 33.2 G/DL (ref 33.6–35)
MCV RBC AUTO: 88.2 FL (ref 81.4–97.8)
MONOCYTES # BLD AUTO: 0.68 K/UL (ref 0–0.85)
MONOCYTES NFR BLD AUTO: 9.1 % (ref 0–13.4)
NEUTROPHILS # BLD AUTO: 4.94 K/UL (ref 2–7.15)
NEUTROPHILS NFR BLD: 66.2 % (ref 44–72)
NRBC # BLD AUTO: 0 K/UL
NRBC BLD-RTO: 0 /100 WBC
PLATELET # BLD AUTO: 243 K/UL (ref 164–446)
PMV BLD AUTO: 9.2 FL (ref 9–12.9)
POTASSIUM SERPL-SCNC: 4.6 MMOL/L (ref 3.6–5.5)
PROCALCITONIN SERPL-MCNC: <0.05 NG/ML
PROT SERPL-MCNC: 5.5 G/DL (ref 6–8.2)
RBC # BLD AUTO: 4.75 M/UL (ref 4.2–5.4)
SIGNIFICANT IND 70042: ABNORMAL
SITE SITE: ABNORMAL
SODIUM SERPL-SCNC: 129 MMOL/L (ref 135–145)
SOURCE SOURCE: ABNORMAL
WBC # BLD AUTO: 7.5 K/UL (ref 4.8–10.8)

## 2020-05-15 PROCEDURE — 80053 COMPREHEN METABOLIC PANEL: CPT

## 2020-05-15 PROCEDURE — 99239 HOSP IP/OBS DSCHRG MGMT >30: CPT | Performed by: HOSPITALIST

## 2020-05-15 PROCEDURE — A9270 NON-COVERED ITEM OR SERVICE: HCPCS | Performed by: FAMILY MEDICINE

## 2020-05-15 PROCEDURE — 85025 COMPLETE CBC W/AUTO DIFF WBC: CPT

## 2020-05-15 PROCEDURE — 700102 HCHG RX REV CODE 250 W/ 637 OVERRIDE(OP): Performed by: FAMILY MEDICINE

## 2020-05-15 PROCEDURE — 700111 HCHG RX REV CODE 636 W/ 250 OVERRIDE (IP): Performed by: INTERNAL MEDICINE

## 2020-05-15 PROCEDURE — 84145 PROCALCITONIN (PCT): CPT

## 2020-05-15 PROCEDURE — A9270 NON-COVERED ITEM OR SERVICE: HCPCS | Performed by: HOSPITALIST

## 2020-05-15 PROCEDURE — 36415 COLL VENOUS BLD VENIPUNCTURE: CPT

## 2020-05-15 PROCEDURE — 700102 HCHG RX REV CODE 250 W/ 637 OVERRIDE(OP): Performed by: HOSPITALIST

## 2020-05-15 PROCEDURE — 700102 HCHG RX REV CODE 250 W/ 637 OVERRIDE(OP): Performed by: INTERNAL MEDICINE

## 2020-05-15 PROCEDURE — 700102 HCHG RX REV CODE 250 W/ 637 OVERRIDE(OP): Performed by: PHYSICIAN ASSISTANT

## 2020-05-15 PROCEDURE — 700111 HCHG RX REV CODE 636 W/ 250 OVERRIDE (IP): Performed by: PHYSICIAN ASSISTANT

## 2020-05-15 PROCEDURE — A9270 NON-COVERED ITEM OR SERVICE: HCPCS | Performed by: PHYSICIAN ASSISTANT

## 2020-05-15 PROCEDURE — 94760 N-INVAS EAR/PLS OXIMETRY 1: CPT

## 2020-05-15 PROCEDURE — A9270 NON-COVERED ITEM OR SERVICE: HCPCS | Performed by: INTERNAL MEDICINE

## 2020-05-15 RX ORDER — ATORVASTATIN CALCIUM 40 MG/1
40 TABLET, FILM COATED ORAL EVERY EVENING
Qty: 30 TAB | Refills: 1 | Status: ON HOLD | OUTPATIENT
Start: 2020-05-15 | End: 2020-06-17 | Stop reason: SDUPTHER

## 2020-05-15 RX ORDER — CARVEDILOL 6.25 MG/1
6.25 TABLET ORAL 2 TIMES DAILY WITH MEALS
Qty: 60 TAB | Refills: 1 | Status: ON HOLD | OUTPATIENT
Start: 2020-05-15 | End: 2020-06-17 | Stop reason: SDUPTHER

## 2020-05-15 RX ORDER — LOSARTAN POTASSIUM 25 MG/1
25 TABLET ORAL DAILY
Qty: 30 TAB | Refills: 1 | Status: ON HOLD | OUTPATIENT
Start: 2020-05-16 | End: 2020-06-17 | Stop reason: SDUPTHER

## 2020-05-15 RX ORDER — HYDROCODONE BITARTRATE AND ACETAMINOPHEN 5; 325 MG/1; MG/1
1 TABLET ORAL ONCE
Status: COMPLETED | OUTPATIENT
Start: 2020-05-15 | End: 2020-05-15

## 2020-05-15 RX ORDER — GUAIFENESIN 600 MG/1
600 TABLET, EXTENDED RELEASE ORAL EVERY 12 HOURS
Qty: 28 TAB | Refills: 0 | Status: ON HOLD
Start: 2020-05-15 | End: 2020-06-08

## 2020-05-15 RX ADMIN — HYDROCODONE BITARTRATE AND ACETAMINOPHEN 1 TABLET: 5; 325 TABLET ORAL at 09:10

## 2020-05-15 RX ADMIN — ACETAMINOPHEN 650 MG: 325 TABLET, FILM COATED ORAL at 00:45

## 2020-05-15 RX ADMIN — HYDROCODONE BITARTRATE AND ACETAMINOPHEN 1 TABLET: 5; 325 TABLET ORAL at 03:17

## 2020-05-15 RX ADMIN — GUAIFENESIN 600 MG: 600 TABLET, EXTENDED RELEASE ORAL at 04:34

## 2020-05-15 RX ADMIN — ONDANSETRON 4 MG: 4 TABLET, ORALLY DISINTEGRATING ORAL at 03:17

## 2020-05-15 RX ADMIN — SENNOSIDES-DOCUSATE SODIUM TAB 8.6-50 MG 2 TABLET: 8.6-5 TAB at 04:34

## 2020-05-15 RX ADMIN — CARVEDILOL 6.25 MG: 6.25 TABLET, FILM COATED ORAL at 08:32

## 2020-05-15 RX ADMIN — NICOTINE TRANSDERMAL SYSTEM 21 MG: 21 PATCH, EXTENDED RELEASE TRANSDERMAL at 04:34

## 2020-05-15 RX ADMIN — SPIRONOLACTONE 25 MG: 25 TABLET ORAL at 04:34

## 2020-05-15 RX ADMIN — ACETAMINOPHEN 650 MG: 325 TABLET, FILM COATED ORAL at 08:32

## 2020-05-15 RX ADMIN — LOSARTAN POTASSIUM 25 MG: 25 TABLET, FILM COATED ORAL at 04:34

## 2020-05-15 RX ADMIN — ENOXAPARIN SODIUM 30 MG: 30 INJECTION SUBCUTANEOUS at 04:33

## 2020-05-15 RX ADMIN — ASPIRIN 81 MG: 81 TABLET, COATED ORAL at 04:34

## 2020-05-15 NOTE — DISCHARGE SUMMARY
Hospital Medicine Discharge Note     Admit Date:  5/13/2020       Discharge Date:   5/15/2020    Attending Physician:  Kwasi William M.D.     Diagnoses (includes active and resolved):   Active Problems:    Left lower lobe consolidation (HCC) POA: Yes    Acute exacerbation of CHF (congestive heart failure) (HCC) POA: Yes    Chest pain POA: Yes    Essential hypertension POA: Yes    COPD (chronic obstructive pulmonary disease) (HCC) POA: Yes    Positive blood culture POA: Unknown  Resolved Problems:    * No resolved hospital problems. *      Hospital Summary (Brief Narrative):       74 y.o. female with a past medical history of COPD, hypertension, dyslipidemia, AAA, PVD who presented 5/13/2020 with chest pain that started yesterday night prior to arrival.  The patient developed left lower chest pain which she describes as sharp and nonradiating.  Reports mild shortness of breath and a productive cough that has been ongoing for the past week.  She also reports bilateral lower extremity edema.  She denies any fevers or chills.  She denies any exposure to Covid positive contacts.  EMS activated code STEMI for a left bundle branch block.  Her chest pain had mostly resolved by the time she was evaluated in the ER.  She was evaluated by cardiology in the ER and code STEMI was deactivated.  She had an elevated DDimer so a CTA was done and showed no clot. She had LLL consolidation but negative pro calcitonin so pneumonia was unlikely. Differential includes edema vs fibrosis. She then had 1/2 tubes positive blood culture for GPC, likely contaminant, as another set was drawn and did not grow anything. She was seen by cardiology for ischemic cardiomyopathy and systolic heart failure. Cardiology wanted to cath her but pt preferred to f/u outpatient with her cardiologist Dr. Landin. Home O2 was ordered for her and she was discharged with new BP meds.      The patient met 2-midnight criteria for an inpatient stay at the time of  discharge.     Consultants:      Mtathias Perkins    Procedures:        None    Discharge Medications:           Medication List      START taking these medications      Instructions   atorvastatin 40 MG Tabs  Commonly known as:  LIPITOR   Take 1 Tab by mouth every evening.  Dose:  40 mg     carvedilol 6.25 MG Tabs  Commonly known as:  COREG   Take 1 Tab by mouth 2 times a day, with meals.  Dose:  6.25 mg     guaiFENesin  MG Tb12  Commonly known as:  MUCINEX   Take 1 Tab by mouth every 12 hours.  Dose:  600 mg     losartan 25 MG Tabs  Start taking on:  May 16, 2020  Commonly known as:  COZAAR   Take 1 Tab by mouth every day.  Dose:  25 mg        CONTINUE taking these medications      Instructions   aspirin EC 81 MG Tbec  Commonly known as:  ECOTRIN   Take 324 mg by mouth as needed (For cheat pain).  Dose:  324 mg     HYDROcodone-acetaminophen 5-325 MG Tabs per tablet  Start taking on:  June 8, 2020  Commonly known as:  NORCO   Doctor's comments:  Seen 4/9/2020, renewal of chronic medication.  30 day prescription.  Do not fill until 6/8/2020  Take 1 Tab by mouth every 8 hours as needed (sciatica and leg pain/hip pain) for up to 30 days.  Dose:  1 Tab     potassium chloride ER 10 MEQ tablet  Commonly known as:  KLOR-CON   Take 1 Tab by mouth every day.  Dose:  10 mEq     spironolactone 25 MG Tabs  Commonly known as:  ALDACTONE   Take 1 Tab by mouth 1 time daily as needed (swelling).  Dose:  25 mg     Ventolin  (90 Base) MCG/ACT Aers inhalation aerosol  Generic drug:  albuterol   Inhale 2 Puffs by mouth every four hours as needed for Shortness of Breath.  Dose:  2 Puff        STOP taking these medications    lisinopril-hydrochlorothiazide 10-12.5 MG per tablet  Commonly known as:  PRINZIDE          Disposition:   Discharge home    Activity:   As tolerated    Code status:   Full code    Primary Care Provider:    Baltazar Julio M.D.    Follow up appointment details :      PCP in 2 weeks  Nathanael Landin  M.D.  [address]  [address]  [place] [facility] [address]  [phone]    Schedule an appointment as soon as possible for a visit      Future Appointments   Date Time Provider Department Center   5/21/2020  1:20 PM Baltazar Julio M.D. [address]   6/1/2020  8:45 AM Jered Moser M.D. [facility] None   6/30/2020  4:00 PM Baltazar Julio M.D. [address]       Pending Studies:        Outpatient cath    Time spent on discharge day patient visit: 44 minutes    #################################################    Interval history/exam for day of discharge:    Vitals:    05/15/20 0400 05/15/20 0731 05/15/20 0755 05/15/20 1150   BP: 135/85  122/84 100/61   Pulse: 96 82 86 75   Resp: 16 16 18 18   Temp: 37.1 °C (98.8 °F)  36.4 °C (97.6 °F) 37 °C (98.6 °F)   TempSrc: Temporal  Temporal Temporal   SpO2: 94% 99% 100% 98%   Weight:       Height:         Weight/BMI: Body mass index is 17.75 kg/m².  Pulse Oximetry: 98 %, O2 (LPM): 3, O2 Delivery Device: Silicone Nasal Cannula    General:  NAD  CVS:  RRR  PULM:  CTAB, no respiratory distress    Most Recent Labs:    Lab Results   Component Value Date/Time    WBC 7.5 05/15/2020 08:28 AM    RBC 4.75 05/15/2020 08:28 AM    HEMOGLOBIN 13.9 05/15/2020 08:28 AM    HEMATOCRIT 41.9 05/15/2020 08:28 AM    MCV 88.2 05/15/2020 08:28 AM    MCH 29.3 05/15/2020 08:28 AM    MCHC 33.2 (L) 05/15/2020 08:28 AM    MPV 9.2 05/15/2020 08:28 AM    NEUTSPOLYS 66.20 05/15/2020 08:28 AM    LYMPHOCYTES 20.80 (L) 05/15/2020 08:28 AM    MONOCYTES 9.10 05/15/2020 08:28 AM    EOSINOPHILS 2.40 05/15/2020 08:28 AM    BASOPHILS 1.10 05/15/2020 08:28 AM    HYPOCHROMIA 1+ 08/10/2013 04:03 AM      Lab Results   Component Value Date/Time    SODIUM 129 (L) 05/15/2020 08:28 AM    POTASSIUM 4.6 05/15/2020 08:28 AM    CHLORIDE 92 (L) 05/15/2020 08:28 AM    CO2 27 05/15/2020 08:28 AM    GLUCOSE 100 (H) 05/15/2020 08:28 AM    BUN 25 (H) 05/15/2020 08:28 AM    CREATININE 0.89 05/15/2020 08:28 AM     CREATININE 0.8 08/25/2008 05:40 PM      Lab Results   Component Value Date/Time    ALTSGPT 15 05/15/2020 08:28 AM    ASTSGOT 19 05/15/2020 08:28 AM    ALKPHOSPHAT 64 05/15/2020 08:28 AM    TBILIRUBIN 0.2 05/15/2020 08:28 AM    DBILIRUBIN <0.1 03/18/2014 09:43 AM    LIPASE 26 05/13/2020 03:19 AM    ALBUMIN 3.0 (L) 05/15/2020 08:28 AM    GLOBULIN 2.5 05/15/2020 08:28 AM    INR 1.06 05/13/2020 03:19 AM     Lab Results   Component Value Date/Time    PROTHROMBTM 14.0 05/13/2020 03:19 AM    INR 1.06 05/13/2020 03:19 AM        Imaging/ Testing:      CT-CTA CHEST PULMONARY ARTERY W/ RECONS   Final Result      1.  No evidence of pulmonary embolism.      2.  Asymmetric interstitial opacity in the left lower lobe which represent interstitial edema, pneumonia, or interstitial fibrotic change.      3.  Diffuse centrilobular emphysematous changes of the chest.      4.  No pleural effusion.            EC-ECHOCARDIOGRAM COMPLETE W/O CONT   Final Result      DX-CHEST-PORTABLE (1 VIEW)   Final Result      No acute cardiac or pulmonary abnormality is noted.          Instructions:      The patient was instructed to return to the ER in the event of worsening symptoms. I have counseled the patient on the importance of compliance and the patient has agreed to proceed with all medical recommendations and follow up plan indicated above.   The patient understands that all medications come with benefits and risks. Risks may include permanent injury or death and these risks can be minimized with close reassessment and monitoring.

## 2020-05-15 NOTE — DISCHARGE PLANNING
Received Choice form at 3515  Agency/Facility Name: [facility]  Referral sent per Choice form @ [phone]

## 2020-05-15 NOTE — PROGRESS NOTES
Assumed care of pt, beside report received from [place]. Updated on POC, call light within reach all fall precautions within place. Bed locked and lowered. Pt instructed to call for assistance before getting up. All questions answered, no other needs at this time.

## 2020-05-15 NOTE — DISCHARGE INSTRUCTIONS
Discharge Instructions    Discharged to home by car with relative. Discharged via wheelchair, hospital escort: Yes.  Special equipment needed: Oxygen    Be sure to schedule a follow-up appointment with your primary care doctor or any specialists as instructed.     Discharge Plan:        I understand that a diet low in cholesterol, fat, and sodium is recommended for good health. Unless I have been given specific instructions below for another diet, I accept this instruction as my diet prescription.   Other diet: Heart Healthy    Special Instructions:   HF Patient Discharge Instructions  · Monitor your weight daily, and maintain a weight chart, to track your weight changes.   · Activity as tolerated, unless your Doctor has ordered otherwise. Other activity order: return to previous activity level.  · Follow a low fat, low cholesterol, low salt diet unless instructed otherwise by your Doctor. Read the labels on the back of food products and track your intake of fat, cholesterol and salt.   · Fluid Restriction No. If a Fluid Restriction has been ordered by your Doctor, measure fluids with a measuring cup to ensure that you are not exceeding the restriction.   · No smoking.  · Oxygen Yes. If your Doctor has ordered that you wear Oxygen at home, it is important to wear it as ordered.  · Did you receive an explanation from staff on the importance of taking each of your medications and why it is necessary to keep taking them unless your doctor says to stop? Yes  · Were all of your questions answered about how to manage your heart failure and what to do if you have increased signs and symptoms after you go home? Yes  · Do you feel like your heart failure care team involved you in the care treatment plan and allowed you to make decisions regarding your care while in the hospital and addressed any discharge needs you might have? Yes    See the educational handout provided at discharge for more information on monitoring your  daily weight, activity and diet. This also explains more about Heart Failure, symptoms of a flare-up and some of the tests that you have undergone.     Warning Signs of a Flare-Up include:  · Swelling in the ankles or lower legs.  · Shortness of breath, while at rest, or while doing normal activities.   · Shortness of breath at night when in bed, or coughing in bed.   · Requiring more pillows to sleep at night, or needing to sit up at night to sleep.  · Feeling weak, dizzy or fatigued.     When to call your Doctor:  · Call Handseeing Information seven days a week from 8:00 a.m. to 8:00 p.m. for medical questions [phone].  · Call your Primary Care Physician or Cardiologist if:   1. You experience any pain radiating to your jaw or neck.  2. You have any difficulty breathing.  3. You experience weight gain of 3 lbs in a day or 5 lbs in a week.   4. You feel any palpitations or irregular heartbeats.  5. You become dizzy or lose consciousness.   If you have had an angiogram or had a pacemaker or AICD placed, and experience:  1. Bleeding, drainage or swelling at the surgical / puncture site.  2. Fever greater than 100.0 F  3. Shock from internal defibrillator.  4. Cool and / or numb extremities.      Discharge Instructions per Kwasi William M.D:     Make sure to follow up with cardiologist Dr. Mushtaq jean baptiste. Take these new meds in the meantime and show them to him.      Return to ER if severe or new type of chest pain    · Is patient discharged on Warfarin / Coumadin?   No       Heart Failure  Heart failure means your heart has trouble pumping blood. This makes it hard for your body to work well. Heart failure is usually a long-term (chronic) condition. You must take good care of yourself and follow your doctor's treatment plan.  HOME CARE  · Take your heart medicine as told by your doctor.  ¨ Do not stop taking medicine unless your doctor tells you to.  ¨ Do not skip any dose of medicine.  ¨ Refill your medicines  before they run out.  ¨ Take other medicines only as told by your doctor or pharmacist.  · Stay active if told by your doctor. The elderly and people with severe heart failure should talk with a doctor about physical activity.  · Eat heart-healthy foods. Choose foods that are without trans fat and are low in saturated fat, cholesterol, and salt (sodium). This includes fresh or frozen fruits and vegetables, fish, lean meats, fat-free or low-fat dairy foods, whole grains, and high-fiber foods. Lentils and dried peas and beans (legumes) are also good choices.  · Limit salt if told by your doctor.  · Cook in a healthy way. Roast, grill, broil, bake, poach, steam, or stir-aguillon foods.  · Limit fluids as told by your doctor.  · Weigh yourself every morning. Do this after you pee (urinate) and before you eat breakfast. Write down your weight to give to your doctor.  · Take your blood pressure and write it down if your doctor tells you to.  · Ask your doctor how to check your pulse. Check your pulse as told.  · Lose weight if told by your doctor.  · Stop smoking or chewing tobacco. Do not use gum or patches that help you quit without your doctor's approval.  · Schedule and go to doctor visits as told.  · Nonpregnant women should have no more than 1 drink a day. Men should have no more than 2 drinks a day. Talk to your doctor about drinking alcohol.  · Stop illegal drug use.  · Stay current with shots (immunizations).  · Manage your health conditions as told by your doctor.  · Learn to manage your stress.  · Rest when you are tired.  · If it is really hot outside:  ¨ Avoid intense activities.  ¨ Use air conditioning or fans, or get in a cooler place.  ¨ Avoid caffeine and alcohol.  ¨ Wear loose-fitting, lightweight, and light-colored clothing.  · If it is really cold outside:  ¨ Avoid intense activities.  ¨ Layer your clothing.  ¨ Wear mittens or gloves, a hat, and a scarf when going outside.  ¨ Avoid alcohol.  · Learn about  heart failure and get support as needed.  · Get help to maintain or improve your quality of life and your ability to care for yourself as needed.  GET HELP IF:   · You gain weight quickly.  · You are more short of breath than usual.  · You cannot do your normal activities.  · You tire easily.  · You cough more than normal, especially with activity.  · You have any or more puffiness (swelling) in areas such as your hands, feet, ankles, or belly (abdomen).  · You cannot sleep because it is hard to breathe.  · You feel like your heart is beating fast (palpitations).  · You get dizzy or light-headed when you stand up.  GET HELP RIGHT AWAY IF:   · You have trouble breathing.  · There is a change in mental status, such as becoming less alert or not being able to focus.  · You have chest pain or discomfort.  · You faint.  MAKE SURE YOU:   · Understand these instructions.  · Will watch your condition.  · Will get help right away if you are not doing well or get worse.  This information is not intended to replace advice given to you by your health care provider. Make sure you discuss any questions you have with your health care provider.  Document Released: 09/26/2009 Document Revised: 01/08/2016 Document Reviewed: 02/03/2014  [employer] Interactive Patient Education © 2017 [employer]    Oxygen Use at Home  Oxygen can be prescribed for home use. The prescription will show the flow rate. This is how much oxygen is to be used per minute. This will be listed in liters per minute (LPM or L/M). A liter is a metric measurement of volume.  You will use oxygen therapy as directed. It can be used while exercising, sleeping, or at rest. You may need oxygen continuously. Your health care provider may order a blood oxygen test (arterial blood gas or pulse oximetry test) that will show what your oxygen level is. Your health care provider will use these measurements to learn about your needs and follow your progress.  Home oxygen therapy  is commonly used on patients with various lung (pulmonary) related conditions. Some of these conditions include:  · Asthma.  · Lung cancer.  · Pneumonia.  · Emphysema.  · Chronic bronchitis.  · Cystic fibrosis.  · Other lung diseases.  · Pulmonary fibrosis.  · Occupational lung disease.  · Heart failure.  · Chronic obstructive pulmonary disease (COPD).  3 COMMON WAYS OF PROVIDING OXYGEN THERAPY  · Gas: The gas form of oxygen is put into variously sized cylinders or tanks. The cylinders or oxygen tanks contain compressed oxygen. The cylinder is equipped with a regulator that controls the flow rate. Because the flow of oxygen out of the cylinder is constant, an oxygen conserving device may be attached to the system to avoid waste. This device releases the gas only when you inhale and cuts it off when you exhale. Oxygen can be provided in a small cylinder that can be carried with you. Large tanks are heavy and are only for stationary use. After use, empty tanks must be exchanged for full tanks.  · Liquid: The liquid form of oxygen is put into a container similar to a thermos. When released, the liquid converts to a gas and you breathe it in just like the compressed gas. This storage method takes up less space than the compressed gas cylinder, and you can transfer the liquid to a small, portable vessel at home. Liquid oxygen is more expensive than the compressed gas, and the vessel vents when not in use. An oxygen conserving device may be built into the vessel to conserve the oxygen. Liquid oxygen is very cold, around 297° below zero.  · Oxygen concentrator: This medical device filters oxygen from room air and gives almost 100% oxygen to the patient. Oxygen concentrators are powered by electricity. Benefits of this system are:  ¨ It does not need to be resupplied.  ¨ It is not as costly as liquid oxygen.  ¨ Extra tubing permits the user to move around easier.  There are several types of small, portable oxygen systems  "available which can help you remain active and mobile. You must have a cylinder of oxygen as a backup in the event of a power failure. Advise your electric power company that you are on oxygen therapy in order to get priority service when there is a power failure.  OXYGEN DELIVERY DEVICES  There are 3 common ways to deliver oxygen to your body.  · Nasal cannula. This is a 2-pronged device inserted in the nostrils that is connected to tubing carrying the oxygen. The tubing can rest on the ears or be attached to the frame of eyeglasses.  · Mask. People who need a high flow of oxygen generally use a mask.  · Transtracheal catheter. Transtracheal oxygen therapy requires the insertion of a small, flexible tube (catheter) in the windpipe (trachea). This catheter is held in place by a necklace. Since transtracheal oxygen bypasses the mouth, nose, and throat, a humidifier is absolutely required at flow rates of 1 LPM or greater.  OXYGEN USE SAFETY TIPS  · Never smoke while using oxygen. Oxygen does not burn or explode, but flammable materials will burn faster in the presence of oxygen.  · Keep a fire extinguisher close by. Let your fire department know that you have oxygen in your home.  · Warn visitors not to smoke near you when you are using oxygen. Put up \"no smoking\" signs in your home where you most often use the oxygen.  · When you go to a restaurant with your portable oxygen source, ask to be seated in the nonsmoking section.  · Stay at least 5 feet away from gas stoves, candles, lighted fireplaces, or other heat sources.  · Do not use materials that burn easily (flammable) while using your oxygen.  · If you use an oxygen cylinder, make sure it is secured to some fixed object or in a stand. If you use liquid oxygen, make sure the vessel is kept upright to keep the oxygen from pouring out. Liquid oxygen is so cold it can hurt your skin.  · If you use an oxygen concentrator, call your electric company so you will be " given priority service if your power goes out. Avoid using extension cords, if possible.  · Regularly test your smoke detectors at home to make sure they work. If you receive care in your home from a nurse or other health care provider, he or she may also check to make sure your smoke detectors work.  GUIDELINES FOR CLEANING YOUR EQUIPMENT  · Wash the nasal prongs with a liquid soap. Thoroughly rinse them once or twice a week.  · Replace the prongs every 2 to 4 weeks. If you have an infection (cold, pneumonia) change them when you are well.  · Your health care provider will give you instructions on how to clean your transtracheal catheter.  · The humidifier bottle should be washed with soap and warm water and rinsed thoroughly between each refill. Air-dry the bottle before filling it with sterile or distilled water. The bottle and its top should be disinfected after they are cleaned.  · If you use an oxygen concentrator, unplug the unit. Then wipe down the cabinet with a damp cloth and dry it daily. The air filter should be cleaned at least twice a week.  · Follow your home medical equipment and service company's directions for cleaning the compressor filter.  HOME CARE INSTRUCTIONS   · Do not change the flow of oxygen unless directed by your health care provider.  · Do not use alcohol or other sedating drugs unless instructed. They slow your breathing rate.  · Do not use materials that burn easily (flammable) while using your oxygen.  · Always keep a spare tank of oxygen. Plan ahead for holidays when you may not be able to get a prescription filled.  · Use water-based lubricants on your lips or nostrils. Do not use an oil-based product like petroleum jelly.  · To prevent your cheeks or the skin behind your ears from becoming irritated, tuck some gauze under the tubing.  · If you have persistent redness under your nose, call your health care provider.  · When you no longer need oxygen, your doctor will have the  oxygen discontinued. Oxygen is not addicting or habit forming.  · Use the oxygen as instructed. Too much oxygen can be harmful and too little will not give you the benefit you need.  · Shortness of breath is not always from a lack of oxygen. If your oxygen level is not the cause of your shortness of breath, taking oxygen will not help.  SEEK MEDICAL CARE IF:   · You have frequent headaches.  · You have shortness of breath or a lasting cough.  · You have anxiety.  · You are confused.  · You are drowsy or sleepy all the time.  · You develop an illness which aggravates your breathing.  · You cannot exercise.  · You are restless.  · You have blue lips or fingernails.  · You have difficult or irregular breathing and it is getting worse.  · You have a fever.     This information is not intended to replace advice given to you by your health care provider. Make sure you discuss any questions you have with your health care provider.     Document Released: 03/09/2005 Document Revised: 01/08/2016 Document Reviewed: 07/30/2014  [employer] Interactive Patient Education ©2016 [employer]        Atorvastatin tablets  What is this medicine?  ATORVASTATIN (a TORE va sta tin) is known as a HMG-CoA reductase inhibitor or 'statin'. It lowers the level of cholesterol and triglycerides in the blood. This drug may also reduce the risk of heart attack, stroke, or other health problems in patients with risk factors for heart disease. Diet and lifestyle changes are often used with this drug.  This medicine may be used for other purposes; ask your health care provider or pharmacist if you have questions.  COMMON BRAND NAME(S): Lipitor  What should I tell my health care provider before I take this medicine?  They need to know if you have any of these conditions:  -frequently drink alcoholic beverages  -history of stroke, TIA  -kidney disease  -liver disease  -muscle aches or weakness  -other medical condition  -an unusual or allergic reaction  to atorvastatin, other medicines, foods, dyes, or preservatives  -pregnant or trying to get pregnant  -breast-feeding  How should I use this medicine?  Take this medicine by mouth with a glass of water. Follow the directions on the prescription label. You can take this medicine with or without food. Take your doses at regular intervals. Do not take your medicine more often than directed.  Talk to your pediatrician regarding the use of this medicine in children. While this drug may be prescribed for children as young as 10 years old for selected conditions, precautions do apply.  Overdosage: If you think you have taken too much of this medicine contact a poison control center or emergency room at once.  NOTE: This medicine is only for you. Do not share this medicine with others.  What if I miss a dose?  If you miss a dose, take it as soon as you can. If it is almost time for your next dose, take only that dose. Do not take double or extra doses.  What may interact with this medicine?  Do not take this medicine with any of the following medications:  -red yeast rice  -telaprevir  -telithromycin  -voriconazole  This medicine may also interact with the following medications:  -alcohol  -antiviral medicines for HIV or AIDS  -boceprevir  -certain antibiotics like clarithromycin, erythromycin, troleandomycin  -certain medicines for cholesterol like fenofibrate or gemfibrozil  -cimetidine  -clarithromycin  -colchicine  -cyclosporine  -digoxin  -female hormones, like estrogens or progestins and birth control pills  -grapefruit juice  -medicines for fungal infections like fluconazole, itraconazole, ketoconazole  -niacin  -rifampin  -spironolactone  This list may not describe all possible interactions. Give your health care provider a list of all the medicines, herbs, non-prescription drugs, or dietary supplements you use. Also tell them if you smoke, drink alcohol, or use illegal drugs. Some items may interact with your  medicine.  What should I watch for while using this medicine?  Visit your doctor or health care professional for regular check-ups. You may need regular tests to make sure your liver is working properly.  Tell your doctor or health care professional right away if you get any unexplained muscle pain, tenderness, or weakness, especially if you also have a fever and tiredness. Your doctor or health care professional may tell you to stop taking this medicine if you develop muscle problems. If your muscle problems do not go away after stopping this medicine, contact your health care professional.  This drug is only part of a total heart-health program. Your doctor or a dietician can suggest a low-cholesterol and low-fat diet to help. Avoid alcohol and smoking, and keep a proper exercise schedule.  Do not use this drug if you are pregnant or breast-feeding. Serious side effects to an unborn child or to an infant are possible. Talk to your doctor or pharmacist for more information.  This medicine may affect blood sugar levels. If you have diabetes, check with your doctor or health care professional before you change your diet or the dose of your diabetic medicine.  If you are going to have surgery tell your health care professional that you are taking this drug.  What side effects may I notice from receiving this medicine?  Side effects that you should report to your doctor or health care professional as soon as possible:  -allergic reactions like skin rash, itching or hives, swelling of the face, lips, or tongue  -dark urine  -fever  -joint pain  -muscle cramps, pain  -redness, blistering, peeling or loosening of the skin, including inside the mouth  -trouble passing urine or change in the amount of urine  -unusually weak or tired  -yellowing of eyes or skin  Side effects that usually do not require medical attention (report to your doctor or health care professional if they continue or are  bothersome):  -constipation  -heartburn  -stomach gas, pain, upset  This list may not describe all possible side effects. Call your doctor for medical advice about side effects. You may report side effects to FDA at 7-382-FDA-8383.  Where should I keep my medicine?  Keep out of the reach of children.  Store at room temperature between 20 to 25 degrees C (68 to 77 degrees F). Throw away any unused medicine after the expiration date.  NOTE: This sheet is a summary. It may not cover all possible information. If you have questions about this medicine, talk to your doctor, pharmacist, or health care provider.  © 2018 [employer] (2012-11-06 09:18:24)    Carvedilol tablets  What is this medicine?  CARVEDILOL (MARIILN ve dil ol) is a beta-blocker. Beta-blockers reduce the workload on the heart and help it to beat more regularly. This medicine is used to treat high blood pressure and heart failure.  This medicine may be used for other purposes; ask your health care provider or pharmacist if you have questions.  COMMON BRAND NAME(S): Coreg  What should I tell my health care provider before I take this medicine?  They need to know if you have any of these conditions:  -circulation problems  -diabetes  -history of heart attack or heart disease  -liver disease  -lung or breathing disease, like asthma or emphysema  -pheochromocytoma  -slow or irregular heartbeat  -thyroid disease  -an unusual or allergic reaction to carvedilol, other beta-blockers, medicines, foods, dyes, or preservatives  -pregnant or trying to get pregnant  -breast-feeding  How should I use this medicine?  Take this medicine by mouth with a glass of water. Follow the directions on the prescription label. It is best to take the tablets with food. Take your doses at regular intervals. Do not take your medicine more often than directed. Do not stop taking except on the advice of your doctor or health care professional.  Talk to your pediatrician regarding  the use of this medicine in children. Special care may be needed.  Overdosage: If you think you have taken too much of this medicine contact a poison control center or emergency room at once.  NOTE: This medicine is only for you. Do not share this medicine with others.  What if I miss a dose?  If you miss a dose, take it as soon as you can. If it is almost time for your next dose, take only that dose. Do not take double or extra doses.  What may interact with this medicine?  This medicine may interact with the following medications:  -certain medicines for blood pressure, heart disease, irregular heart beat  -certain medicines for depression, like fluoxetine or paroxetine  -certain medicines for diabetes, like glipizide or glyburide  -cimetidine  -clonidine  -cyclosporine  -digoxin  -MAOIs like Carbex, Eldepryl, Marplan, Nardil, and Parnate  -reserpine  -rifampin  This list may not describe all possible interactions. Give your health care provider a list of all the medicines, herbs, non-prescription drugs, or dietary supplements you use. Also tell them if you smoke, drink alcohol, or use illegal drugs. Some items may interact with your medicine.  What should I watch for while using this medicine?  Check your heart rate and blood pressure regularly while you are taking this medicine. Ask your doctor or health care professional what your heart rate and blood pressure should be, and when you should contact him or her. Do not stop taking this medicine suddenly. This could lead to serious heart-related effects.  Contact your doctor or health care professional if you have difficulty breathing while taking this drug.  Check your weight daily. Ask your doctor or health care professional when you should notify him/her of any weight gain.  You may get drowsy or dizzy. Do not drive, use machinery, or do anything that requires mental alertness until you know how this medicine affects you. To reduce the risk of dizzy or fainting  spells, do not sit or stand up quickly. Alcohol can make you more drowsy, and increase flushing and rapid heartbeats. Avoid alcoholic drinks.  If you have diabetes, check your blood sugar as directed. Tell your doctor if you have changes in your blood sugar while you are taking this medicine.  If you are going to have surgery, tell your doctor or health care professional that you are taking this medicine.  What side effects may I notice from receiving this medicine?  Side effects that you should report to your doctor or health care professional as soon as possible:  -allergic reactions like skin rash, itching or hives, swelling of the face, lips, or tongue  -breathing problems  -dark urine  -irregular heartbeat  -swollen legs or ankles  -vomiting  -yellowing of the eyes or skin  Side effects that usually do not require medical attention (report to your doctor or health care professional if they continue or are bothersome):  -change in sex drive or performance  -diarrhea  -dry eyes (especially if wearing contact lenses)  -dry, itching skin  -headache  -nausea  -unusually tired  This list may not describe all possible side effects. Call your doctor for medical advice about side effects. You may report side effects to FDA at 0-928-FDA-3300.  Where should I keep my medicine?  Keep out of the reach of children.  Store at room temperature below 30 degrees C (86 degrees F). Protect from moisture. Keep container tightly closed. Throw away any unused medicine after the expiration date.  NOTE: This sheet is a summary. It may not cover all possible information. If you have questions about this medicine, talk to your doctor, pharmacist, or health care provider.  © 2018 Elsevier/Gold Standard (2014-08-24 14:12:02)    Guaifenesin oral ER tablets  What is this medicine?  GUAIFENESIN (gwye FEN e sin) is an expectorant. It helps to thin mucous and make coughs more productive. This medicine is used to treat coughs caused by colds or  the flu. It is not intended to treat chronic cough caused by smoking, asthma, emphysema, or heart failure.  This medicine may be used for other purposes; ask your health care provider or pharmacist if you have questions.  COMMON BRAND NAME(S): Agustina, Mucinex  What should I tell my health care provider before I take this medicine?  They need to know if you have any of these conditions:  -fever  -kidney disease  -an unusual or allergic reaction to guaifenesin, other medicines, foods, dyes, or preservatives  -pregnant or trying to get pregnant  -breast-feeding  How should I use this medicine?  Take this medicine by mouth with a full glass of water. Follow the directions on the prescription label. Do not break, chew or crush this medicine. You may take with food or on an empty stomach. Take your medicine at regular intervals. Do not take your medicine more often than directed.  Talk to your pediatrician regarding the use of this medicine in children. While this drug may be prescribed for children as young as 12 years old for selected conditions, precautions do apply.  Overdosage: If you think you have taken too much of this medicine contact a poison control center or emergency room at once.  NOTE: This medicine is only for you. Do not share this medicine with others.  What if I miss a dose?  If you miss a dose, take it as soon as you can. If it is almost time for your next dose, take only that dose. Do not take double or extra doses.  What may interact with this medicine?  Interactions are not expected.  This list may not describe all possible interactions. Give your health care provider a list of all the medicines, herbs, non-prescription drugs, or dietary supplements you use. Also tell them if you smoke, drink alcohol, or use illegal drugs. Some items may interact with your medicine.  What should I watch for while using this medicine?  Do not treat a cough for more than 1 week without consulting your doctor or  health care professional. If you also have a high fever, skin rash, continuing headache, or sore throat, see your doctor.  For best results, drink 6 to 8 glasses water daily while you are taking this medicine.  What side effects may I notice from receiving this medicine?  Side effects that you should report to your doctor or health care professional as soon as possible:  -allergic reactions like skin rash, itching or hives, swelling of the face, lips, or tongue  Side effects that usually do not require medical attention (report to your doctor or health care professional if they continue or are bothersome):  -dizziness  -headache  -stomach upset  This list may not describe all possible side effects. Call your doctor for medical advice about side effects. You may report side effects to FDA at 1-427-FDA-2486.  Where should I keep my medicine?  Keep out of the reach of children.  Store at room temperature between 20 and 25 degrees C (68 and 77 degrees F). Keep container tightly closed. Throw away any unused medicine after the expiration date.  NOTE: This sheet is a summary. It may not cover all possible information. If you have questions about this medicine, talk to your doctor, pharmacist, or health care provider.  © 2018 Elsevier/Gold Standard (2009-04-29 12:14:14)    Losartan tablets  What is this medicine?  LOSARTAN (chel KRISTA tan) is used to treat high blood pressure and to reduce the risk of stroke in certain patients. This drug also slows the progression of kidney disease in patients with diabetes.  This medicine may be used for other purposes; ask your health care provider or pharmacist if you have questions.  COMMON BRAND NAME(S): Cozaar  What should I tell my health care provider before I take this medicine?  They need to know if you have any of these conditions:  -heart failure  -kidney or liver disease  -an unusual or allergic reaction to losartan, other medicines, foods, dyes, or preservatives  -pregnant or  trying to get pregnant  -breast-feeding  How should I use this medicine?  Take this medicine by mouth with a glass of water. Follow the directions on the prescription label. This medicine can be taken with or without food. Take your doses at regular intervals. Do not take your medicine more often than directed.  Talk to your pediatrician regarding the use of this medicine in children. Special care may be needed.  Overdosage: If you think you have taken too much of this medicine contact a poison control center or emergency room at once.  NOTE: This medicine is only for you. Do not share this medicine with others.  What if I miss a dose?  If you miss a dose, take it as soon as you can. If it is almost time for your next dose, take only that dose. Do not take double or extra doses.  What may interact with this medicine?  -blood pressure medicines  -diuretics, especially triamterene, spironolactone, or amiloride  -fluconazole  -NSAIDs, medicines for pain and inflammation, like ibuprofen or naproxen  -potassium salts or potassium supplements  -rifampin  This list may not describe all possible interactions. Give your health care provider a list of all the medicines, herbs, non-prescription drugs, or dietary supplements you use. Also tell them if you smoke, drink alcohol, or use illegal drugs. Some items may interact with your medicine.  What should I watch for while using this medicine?  Visit your doctor or health care professional for regular checks on your progress. Check your blood pressure as directed. Ask your doctor or health care professional what your blood pressure should be and when you should contact him or her. Call your doctor or health care professional if you notice an irregular or fast heart beat.  Women should inform their doctor if they wish to become pregnant or think they might be pregnant. There is a potential for serious side effects to an unborn child, particularly in the second or third trimester.  Talk to your health care professional or pharmacist for more information.  You may get drowsy or dizzy. Do not drive, use machinery, or do anything that needs mental alertness until you know how this drug affects you. Do not stand or sit up quickly, especially if you are an older patient. This reduces the risk of dizzy or fainting spells. Alcohol can make you more drowsy and dizzy. Avoid alcoholic drinks.  Avoid salt substitutes unless you are told otherwise by your doctor or health care professional.  Do not treat yourself for coughs, colds, or pain while you are taking this medicine without asking your doctor or health care professional for advice. Some ingredients may increase your blood pressure.  What side effects may I notice from receiving this medicine?  Side effects that you should report to your doctor or health care professional as soon as possible:  -confusion, dizziness, light headedness or fainting spells  -decreased amount of urine passed  -difficulty breathing or swallowing, hoarseness, or tightening of the throat  -fast or irregular heart beat, palpitations, or chest pain  -skin rash, itching  -swelling of your face, lips, tongue, hands, or feet  Side effects that usually do not require medical attention (report to your doctor or health care professional if they continue or are bothersome):  -cough  -decreased sexual function or desire  -headache  -nasal congestion or stuffiness  -nausea or stomach pain  -sore or cramping muscles  This list may not describe all possible side effects. Call your doctor for medical advice about side effects. You may report side effects to FDA at 6-002-FDA-7599.  Where should I keep my medicine?  Keep out of the reach of children.  Store at room temperature between 15 and 30 degrees C (59 and 86 degrees F). Protect from light. Keep container tightly closed. Throw away any unused medicine after the expiration date.  NOTE: This sheet is a summary. It may not cover all  possible information. If you have questions about this medicine, talk to your doctor, pharmacist, or health care provider.  © 2018 Elsevier/Gold Standard (2009-02-27 16:42:18)        Depression / Suicide Risk    As you are discharged from this [facility] facility, it is important to learn how to keep safe from harming yourself.    Recognize the warning signs:  · Abrupt changes in personality, positive or negative- including increase in energy   · Giving away possessions  · Change in eating patterns- significant weight changes-  positive or negative  · Change in sleeping patterns- unable to sleep or sleeping all the time   · Unwillingness or inability to communicate  · Depression  · Unusual sadness, discouragement and loneliness  · Talk of wanting to die  · Neglect of personal appearance   · Rebelliousness- reckless behavior  · Withdrawal from people/activities they love  · Confusion- inability to concentrate     If you or a loved one observes any of these behaviors or has concerns about self-harm, here's what you can do:  · Talk about it- your feelings and reasons for harming yourself  · Remove any means that you might use to hurt yourself (examples: pills, rope, extension cords, firearm)  · Get professional help from the community (Mental Health, Substance Abuse, psychological counseling)  · Do not be alone:Call your Safe Contact- someone whom you trust who will be there for you.  · Call your local CRISIS HOTLINE 618-4427 or 327-507-4079  · Call your local Children's Mobile Crisis Response Team [place] (161) 167-3581 or www.[employer]  · Call the toll free National Suicide Prevention Hotlines   · National Suicide Prevention Lifeline 779-106-PELR (9021)  · National Hope Line Network 800-SUICIDE (889-1967)    .

## 2020-05-15 NOTE — CARE PLAN
Problem: Communication  Goal: The ability to communicate needs accurately and effectively will improve  Outcome: PROGRESSING AS EXPECTED     Problem: Safety  Goal: Will remain free from injury  Outcome: PROGRESSING AS EXPECTED     Problem: Knowledge Deficit  Goal: Knowledge of disease process/condition, treatment plan, diagnostic tests, and medications will improve  Outcome: PROGRESSING AS EXPECTED  Goal: Knowledge of the prescribed therapeutic regimen will improve  Outcome: PROGRESSING AS EXPECTED     Problem: Pain Management  Goal: Pain level will decrease to patient's comfort goal  Outcome: PROGRESSING AS EXPECTED

## 2020-05-15 NOTE — RESPIRATORY CARE
Oxygen Rounds      Patient found on    O2 L/m:  3  Oxygen device:  Nasal Cannula  Spo2: 99%      Said O2 decreases when sleeping.    Respiratory device skin site inspection completed.

## 2020-05-15 NOTE — PROGRESS NOTES
Pt dc'd home. IV and monitor removed; monitor room notified. Pt left unit via wheelchair with RN. Personal belongings with pt when leaving unit. Patient given HF education packet, reviewed packet extensively. Patient verbalizes understanding and was able to correct answer questions about HF management when questioned. Patient given scale prior to discharge. HF checklist completed prior to discharge.  Pt given discharge instructions prior to leaving unit including prescriptions, new medication education, and when to visit with physician; verbalizes understanding. Copy of discharge instructions with pt and in the chart.

## 2020-05-15 NOTE — DISCHARGE PLANNING
Anticipated Discharge Disposition: home with [facility] home oxygen    Action: Per Tati at [facility], patient's oxygen will be delivered soon. RN AUSTIN updated patient and bedside RNElenita.    Barriers to Discharge: oxygen tank delivered    Plan: Case coordination available for discharge needs

## 2020-05-15 NOTE — DISCHARGE PLANNING
Anticipated Discharge Disposition: Home with new oxygen    Action: RN CM spoke with patient via phone to discuss oxygen as ordered by MD. Patient gave verbal consent to send referral to [facility], [facility]. Choice faxed to [facility].    Barriers to Discharge: oxygen set up and delivery    Plan: Case coordination to f/u with oxygen referral    Care Transition Team Assessment                             NOK is son, Johnny, [phone]   RN CM spoke with patient via phone for assessment. Patient verified information on facesheet and added her sister as an emergency contact. Patient recently sold her home so has been staying with her sister in a mobile home. She uses no DME at baseline and was independent with ADL/IADL's prior to admit. She does get rides from her son.   She uses [facility] on [facility] [facility] and has no issue obtaining medications with her Medicare and [employer] insurance.   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    Patient says that her son can be ride home at discharge.       Information Source  Orientation : Oriented x 4  Information Given By: Patient  Informant's Name: Karen  Who is responsible for making decisions for patient? : Patient    Readmission Evaluation  Is this a readmission?: No    Elopement Risk  Legal Hold: No  Ambulatory or Self Mobile in Wheelchair: Yes  Disoriented: No  Psychiatric Symptoms: None  History of Wandering: No  Elopement this Admit: No  Vocalizing Wanting to Leave: No  Displays Behaviors, Body Language Wanting to Leave: No-Not at Risk for Elopement  Elopement Risk: Not at Risk for Elopement    Interdisciplinary Discharge Planning  Does Admitting Nurse Feel This Could be a Complex Discharge?: No  Primary Care Physician: Baltazar rhoades  Lives with - Patient's Self Care Capacity: Other (Comments)(sister)  Patient or legal guardian wants to designate a caregiver (see row info): No  Support Systems: Family Member(s), Children  Housing /  Facility: Mobile Home  Do You Take your Prescribed Medications Regularly: Yes  Able to Return to Previous ADL's: Yes  Mobility Issues: No  Prior Services: None  Patient Expects to be Discharged to:: sister's home  Assistance Needed: No  Durable Medical Equipment: Not Applicable    Discharge Preparedness  What is your plan after discharge?: Home with help  What are your discharge supports?: Child, Sibling  Prior Functional Level: Ambulatory, Independent with Activities of Daily Living, Independent with Medication Management    Functional Assesment  Prior Functional Level: Ambulatory, Independent with Activities of Daily Living, Independent with Medication Management    Finances  Financial Barriers to Discharge: No  Prescription Coverage: Yes    Vision / Hearing Impairment  Vision Impairment : No  Hearing Impairment : No    Domestic Abuse  Have you ever been the victim of abuse or violence?: No  Physical Abuse or Sexual Abuse: No  Verbal Abuse or Emotional Abuse: No  Possible Abuse Reported to:: Not Applicable    Anticipated Discharge Information  Anticipated discharge disposition: Home  Discharge Address: [address] [place] [address], [place] [address]  Discharge Contact Phone Number: [phone]

## 2020-05-15 NOTE — CARE PLAN
Problem: Communication  Goal: The ability to communicate needs accurately and effectively will improve  Outcome: PROGRESSING AS EXPECTED  Intervention: [address] patient and significant other/support system to call light to alert staff of needs  Flowsheets (Taken 5/14/2020 1951)  Oriented to:: All of the Following : Location of Bathroom, Visiting Policy, Unit Routine, Call Light and Bedside Controls, Bedside Rail Policy, Smoking Policy, Rights and Responsibilities, Bedside Report, and Patient Education Notebook  Note: Pt educated on POC and medications. Pt verbalized understanding.      Problem: Safety  Goal: Will remain free from injury  Outcome: PROGRESSING AS EXPECTED  Intervention: Provide assistance with mobility  Flowsheets (Taken 5/14/2020 0700 by Christiano Cuevas R.N.)  Assistance: Assistance of One  Ambulation Tolerance: Tolerates Well  Note: Pt bedside table and call-light are within reach, bed in lowest position and locked. Treaded socks on and pt has been educated on call light use and asked to call before getting up.

## 2020-05-15 NOTE — PROGRESS NOTES
Assumed care of patient at bedside report from NOC RN. Updated on POC. Patient currently A & O x 4; on 3 L O2 nasal canula; up x1 handheld assist; with complaints of acute pain. Medicated per MAR. AM labs indicated low sodium, will discuss with MD during rounds. Call light within reach. Whiteboard updated. Fall precautions in place. Bed locked and in lowest position. All questions answered. No other needs indicated at this time.

## 2020-05-15 NOTE — FACE TO FACE
"Face to Face Note  -  Durable Medical Equipment    Kwasi William M.D. - NPI: [id number]  I certify that this patient is under my care and that they had a durable medical equipment(DME)face to face encounter by myself that meets the physician DME face-to-face encounter requirements with this patient on:    Date of encounter:   Patient:                    MRN:                       YOB: 2019  Karen Jauregui  [id number]  1945     The encounter with the patient was in whole, or in part, for the following medical condition, which is the primary reason for durable medical equipment:  CHF and COPD    I certify that, based on my findings, the following durable medical equipment is medically necessary:  Oxygen.    HOME O2 Saturation Measurements:(Values must be present for Home Oxygen orders)  Room air sat at rest: 88  Room air sat with amb: 82  With liters of O2: 3, O2 sat at rest with O2: 90  With Liters of O2: 3, O2 sat with amb with O2 : 90  Is the patient mobile?: Yes    My Clinical findings support the need for the above equipment due to:  Hypoxia    Supporting Symptoms: The patient requires supplemental oxygen, as the following interventions have been tried with limited or no improvement: \"Ambulation with oximetry    "

## 2020-05-16 NOTE — DOCUMENTATION QUERY
[facility]                                                                       Query Response Note      PATIENT:               AIDE TONY  ACCT #:                  [id number]  MRN:                     [id number]  :                      1945  ADMIT DATE:       2020 3:11 AM  DISCH DATE:        5/15/2020 4:16 PM  RESPONDING  PROVIDER #:        [id number]           QUERY TEXT:    Acute Congestive Heart Failure is documented in the Medical Record. Please document the type and acuity (includes probable or suspected).     NOTE:  If an appropriate response is not listed below, please respond with a new note.    The patient's Clinical Indicators include:  Troponin T: 35 and NT-proBNP: 15992 on admission. U/S Cardiac ECHO on 20 noted severely reduced left ventricular systolic function, global hypokinesis, and an LVEF of 30%.     Treatments include: Lasix, Metoprolol, and U/S Cardiac ECHO.    Risk factors include: dx AECOPD w/LLL PNA, hx HTN + CKD IV + CHF, and Advanced Age.  Options provided:   -- Acute Systolic heart failure   -- Acute on Chronic Systolic heart failure   -- Acute Diastolic heart failure   -- Acute on Chronic Diastolic heart failure   -- Acute Systolic and Diastolic heart failure   -- Acute on Chronic Systolic and diastolic heart failure   -- Unable to determine      Query created by: Alexander Xiong on 2020 10:43 AM    RESPONSE TEXT:    Acute Systolic heart failure          Electronically signed by:  CIERRA ROTHMAN MD 2020 2:47 AM

## 2020-05-18 ENCOUNTER — PATIENT OUTREACH (OUTPATIENT)
Dept: MEDICAL GROUP | Facility: MEDICAL CENTER | Age: 75
End: 2020-05-18

## 2020-05-18 LAB
BACTERIA BLD CULT: NORMAL
SIGNIFICANT IND 70042: NORMAL
SITE SITE: NORMAL
SOURCE SOURCE: NORMAL

## 2020-05-18 NOTE — PROGRESS NOTES
Nurse AUSTIN post discharge outreach call.  CM spoke to patient and reviewed discharge instructions. Pt reports she is wearing oxygen intermittently. Pt not able to tell nurse CM how many liters she is wearing oxygen. Pt states she didn't think she needed to wear it all the time.  Per discharge order pt to wear oxygen at 3 liters. CM reviewed with patient. Pt states she has not checked her weight today.  Education reinforced on importance of daily weights.  Pt verbalized understanding. Pt is scheduled to see PCP this week on 5/21/20. Pt declines needing PCP to order home care at this time. Pt doesn't currently have any questions related to discharge States she has good family support at home.

## 2020-05-21 ENCOUNTER — TELEPHONE (OUTPATIENT)
Dept: MEDICAL GROUP | Facility: MEDICAL CENTER | Age: 75
End: 2020-05-21

## 2020-05-21 NOTE — TELEPHONE ENCOUNTER
She is feeling poorly.  Feels she has not recovered from the hospital yet.  We are rescheduling her appointment to 1:20.  Please cancel today's appointment for 1:20.  States she does not want to try to come in today.

## 2020-06-07 ENCOUNTER — HOSPITAL ENCOUNTER (INPATIENT)
Facility: MEDICAL CENTER | Age: 75
LOS: 3 days | DRG: 389 | End: 2020-06-11
Attending: EMERGENCY MEDICINE | Admitting: HOSPITALIST
Payer: MEDICARE

## 2020-06-07 DIAGNOSIS — R53.1 WEAKNESS: ICD-10-CM

## 2020-06-07 DIAGNOSIS — J41.0 SIMPLE CHRONIC BRONCHITIS (HCC): ICD-10-CM

## 2020-06-07 LAB — LACTATE BLD-SCNC: 1 MMOL/L (ref 0.5–2)

## 2020-06-07 PROCEDURE — 99285 EMERGENCY DEPT VISIT HI MDM: CPT

## 2020-06-07 PROCEDURE — 93005 ELECTROCARDIOGRAM TRACING: CPT | Performed by: EMERGENCY MEDICINE

## 2020-06-07 PROCEDURE — 85025 COMPLETE CBC W/AUTO DIFF WBC: CPT

## 2020-06-07 PROCEDURE — 83690 ASSAY OF LIPASE: CPT

## 2020-06-07 PROCEDURE — 80053 COMPREHEN METABOLIC PANEL: CPT

## 2020-06-07 PROCEDURE — 83605 ASSAY OF LACTIC ACID: CPT

## 2020-06-07 PROCEDURE — 84484 ASSAY OF TROPONIN QUANT: CPT

## 2020-06-08 ENCOUNTER — APPOINTMENT (OUTPATIENT)
Dept: RADIOLOGY | Facility: MEDICAL CENTER | Age: 75
DRG: 389 | End: 2020-06-08
Attending: EMERGENCY MEDICINE
Payer: MEDICARE

## 2020-06-08 ENCOUNTER — HOSPITAL ENCOUNTER (OUTPATIENT)
Dept: RADIOLOGY | Facility: MEDICAL CENTER | Age: 75
End: 2020-06-08
Attending: EMERGENCY MEDICINE
Payer: MEDICARE

## 2020-06-08 ENCOUNTER — PATIENT OUTREACH (OUTPATIENT)
Dept: HEALTH INFORMATION MANAGEMENT | Facility: OTHER | Age: 75
End: 2020-06-08

## 2020-06-08 PROBLEM — R79.89 ELEVATED TROPONIN: Status: ACTIVE | Noted: 2020-06-08

## 2020-06-08 PROBLEM — R79.9 ELEVATED BUN: Status: ACTIVE | Noted: 2020-06-08

## 2020-06-08 PROBLEM — N28.0 RENAL INFARCT (HCC): Status: ACTIVE | Noted: 2020-06-08

## 2020-06-08 PROBLEM — D72.829 LEUKOCYTOSIS: Status: ACTIVE | Noted: 2020-06-08

## 2020-06-08 PROBLEM — D75.1 POLYCYTHEMIA: Status: ACTIVE | Noted: 2020-06-08

## 2020-06-08 PROBLEM — I74.5 ILIAC ARTERY OCCLUSION (HCC): Status: ACTIVE | Noted: 2020-06-08

## 2020-06-08 PROBLEM — R91.1 PULMONARY NODULE: Status: ACTIVE | Noted: 2020-06-08

## 2020-06-08 LAB
ALBUMIN SERPL BCP-MCNC: 2.9 G/DL (ref 3.2–4.9)
ALBUMIN/GLOB SERPL: 1 G/DL
ALP SERPL-CCNC: 79 U/L (ref 30–99)
ALT SERPL-CCNC: 17 U/L (ref 2–50)
ANION GAP SERPL CALC-SCNC: 14 MMOL/L (ref 7–16)
APPEARANCE UR: CLEAR
APTT PPP: 102.2 SEC (ref 24.7–36)
APTT PPP: 29.9 SEC (ref 24.7–36)
AST SERPL-CCNC: 24 U/L (ref 12–45)
BACTERIA #/AREA URNS HPF: NEGATIVE /HPF
BASOPHILS # BLD AUTO: 0.4 % (ref 0–1.8)
BASOPHILS # BLD: 0.06 K/UL (ref 0–0.12)
BILIRUB SERPL-MCNC: 0.3 MG/DL (ref 0.1–1.5)
BILIRUB UR QL STRIP.AUTO: NEGATIVE
BUN SERPL-MCNC: 42 MG/DL (ref 8–22)
CALCIUM SERPL-MCNC: 7.6 MG/DL (ref 8.5–10.5)
CHLORIDE SERPL-SCNC: 98 MMOL/L (ref 96–112)
CO2 SERPL-SCNC: 22 MMOL/L (ref 20–33)
COLOR UR: YELLOW
CREAT SERPL-MCNC: 0.76 MG/DL (ref 0.5–1.4)
EKG IMPRESSION: NORMAL
EOSINOPHIL # BLD AUTO: 0.12 K/UL (ref 0–0.51)
EOSINOPHIL NFR BLD: 0.9 % (ref 0–6.9)
EPI CELLS #/AREA URNS HPF: NEGATIVE /HPF
ERYTHROCYTE [DISTWIDTH] IN BLOOD BY AUTOMATED COUNT: 42.5 FL (ref 35.9–50)
GLOBULIN SER CALC-MCNC: 2.9 G/DL (ref 1.9–3.5)
GLUCOSE SERPL-MCNC: 120 MG/DL (ref 65–99)
GLUCOSE UR STRIP.AUTO-MCNC: NEGATIVE MG/DL
HCT VFR BLD AUTO: 48 % (ref 37–47)
HGB BLD-MCNC: 16.2 G/DL (ref 12–16)
HYALINE CASTS #/AREA URNS LPF: ABNORMAL /LPF
IMM GRANULOCYTES # BLD AUTO: 0.06 K/UL (ref 0–0.11)
IMM GRANULOCYTES NFR BLD AUTO: 0.4 % (ref 0–0.9)
KETONES UR STRIP.AUTO-MCNC: NEGATIVE MG/DL
LEUKOCYTE ESTERASE UR QL STRIP.AUTO: NEGATIVE
LIPASE SERPL-CCNC: 23 U/L (ref 11–82)
LYMPHOCYTES # BLD AUTO: 1.18 K/UL (ref 1–4.8)
LYMPHOCYTES NFR BLD: 8.6 % (ref 22–41)
MCH RBC QN AUTO: 28.9 PG (ref 27–33)
MCHC RBC AUTO-ENTMCNC: 33.8 G/DL (ref 33.6–35)
MCV RBC AUTO: 85.6 FL (ref 81.4–97.8)
MICRO URNS: ABNORMAL
MONOCYTES # BLD AUTO: 0.71 K/UL (ref 0–0.85)
MONOCYTES NFR BLD AUTO: 5.2 % (ref 0–13.4)
NEUTROPHILS # BLD AUTO: 11.65 K/UL (ref 2–7.15)
NEUTROPHILS NFR BLD: 84.5 % (ref 44–72)
NITRITE UR QL STRIP.AUTO: NEGATIVE
NRBC # BLD AUTO: 0 K/UL
NRBC BLD-RTO: 0 /100 WBC
PH UR STRIP.AUTO: 5 [PH] (ref 5–8)
PLATELET # BLD AUTO: 266 K/UL (ref 164–446)
PMV BLD AUTO: 10.2 FL (ref 9–12.9)
POTASSIUM SERPL-SCNC: 3.9 MMOL/L (ref 3.6–5.5)
PROT SERPL-MCNC: 5.8 G/DL (ref 6–8.2)
PROT UR QL STRIP: 30 MG/DL
RBC # BLD AUTO: 5.61 M/UL (ref 4.2–5.4)
RBC # URNS HPF: ABNORMAL /HPF
RBC UR QL AUTO: ABNORMAL
SODIUM SERPL-SCNC: 134 MMOL/L (ref 135–145)
SP GR UR REFRACTOMETRY: >1.05
TROPONIN T SERPL-MCNC: 34 NG/L (ref 6–19)
TROPONIN T SERPL-MCNC: 38 NG/L (ref 6–19)
TROPONIN T SERPL-MCNC: 41 NG/L (ref 6–19)
UROBILINOGEN UR STRIP.AUTO-MCNC: 0.2 MG/DL
WBC # BLD AUTO: 13.8 K/UL (ref 4.8–10.8)
WBC #/AREA URNS HPF: ABNORMAL /HPF

## 2020-06-08 PROCEDURE — 71045 X-RAY EXAM CHEST 1 VIEW: CPT

## 2020-06-08 PROCEDURE — 700105 HCHG RX REV CODE 258: Performed by: EMERGENCY MEDICINE

## 2020-06-08 PROCEDURE — 81001 URINALYSIS AUTO W/SCOPE: CPT

## 2020-06-08 PROCEDURE — 85730 THROMBOPLASTIN TIME PARTIAL: CPT

## 2020-06-08 PROCEDURE — 700111 HCHG RX REV CODE 636 W/ 250 OVERRIDE (IP): Performed by: HOSPITALIST

## 2020-06-08 PROCEDURE — 770020 HCHG ROOM/CARE - TELE (206)

## 2020-06-08 PROCEDURE — 700117 HCHG RX CONTRAST REV CODE 255: Performed by: EMERGENCY MEDICINE

## 2020-06-08 PROCEDURE — 36415 COLL VENOUS BLD VENIPUNCTURE: CPT

## 2020-06-08 PROCEDURE — 74175 CTA ABDOMEN W/CONTRAST: CPT

## 2020-06-08 PROCEDURE — 94760 N-INVAS EAR/PLS OXIMETRY 1: CPT

## 2020-06-08 PROCEDURE — 99407 BEHAV CHNG SMOKING > 10 MIN: CPT | Performed by: HOSPITALIST

## 2020-06-08 PROCEDURE — 700101 HCHG RX REV CODE 250: Performed by: INTERNAL MEDICINE

## 2020-06-08 PROCEDURE — 700105 HCHG RX REV CODE 258: Performed by: HOSPITALIST

## 2020-06-08 PROCEDURE — 84484 ASSAY OF TROPONIN QUANT: CPT | Mod: 91

## 2020-06-08 PROCEDURE — A9270 NON-COVERED ITEM OR SERVICE: HCPCS | Performed by: HOSPITALIST

## 2020-06-08 PROCEDURE — 700102 HCHG RX REV CODE 250 W/ 637 OVERRIDE(OP): Performed by: HOSPITALIST

## 2020-06-08 PROCEDURE — 99223 1ST HOSP IP/OBS HIGH 75: CPT | Mod: AI,25 | Performed by: HOSPITALIST

## 2020-06-08 RX ORDER — LABETALOL HYDROCHLORIDE 5 MG/ML
10 INJECTION, SOLUTION INTRAVENOUS EVERY 4 HOURS PRN
Status: DISCONTINUED | OUTPATIENT
Start: 2020-06-08 | End: 2020-06-11 | Stop reason: HOSPADM

## 2020-06-08 RX ORDER — ATORVASTATIN CALCIUM 40 MG/1
40 TABLET, FILM COATED ORAL EVERY EVENING
Status: DISCONTINUED | OUTPATIENT
Start: 2020-06-08 | End: 2020-06-11 | Stop reason: HOSPADM

## 2020-06-08 RX ORDER — POLYETHYLENE GLYCOL 3350 17 G/17G
1 POWDER, FOR SOLUTION ORAL
Status: DISCONTINUED | OUTPATIENT
Start: 2020-06-08 | End: 2020-06-11 | Stop reason: HOSPADM

## 2020-06-08 RX ORDER — ONDANSETRON 2 MG/ML
4 INJECTION INTRAMUSCULAR; INTRAVENOUS EVERY 4 HOURS PRN
Status: DISCONTINUED | OUTPATIENT
Start: 2020-06-08 | End: 2020-06-11 | Stop reason: HOSPADM

## 2020-06-08 RX ORDER — CARVEDILOL 6.25 MG/1
6.25 TABLET ORAL 2 TIMES DAILY WITH MEALS
Status: DISCONTINUED | OUTPATIENT
Start: 2020-06-08 | End: 2020-06-11 | Stop reason: HOSPADM

## 2020-06-08 RX ORDER — LOSARTAN POTASSIUM 25 MG/1
25 TABLET ORAL DAILY
Status: DISCONTINUED | OUTPATIENT
Start: 2020-06-08 | End: 2020-06-11 | Stop reason: HOSPADM

## 2020-06-08 RX ORDER — ONDANSETRON 4 MG/1
4 TABLET, ORALLY DISINTEGRATING ORAL EVERY 4 HOURS PRN
Status: DISCONTINUED | OUTPATIENT
Start: 2020-06-08 | End: 2020-06-11 | Stop reason: HOSPADM

## 2020-06-08 RX ORDER — AMOXICILLIN 250 MG
2 CAPSULE ORAL 2 TIMES DAILY
Status: DISCONTINUED | OUTPATIENT
Start: 2020-06-08 | End: 2020-06-11 | Stop reason: HOSPADM

## 2020-06-08 RX ORDER — HEPARIN SODIUM 5000 [USP'U]/100ML
INJECTION, SOLUTION INTRAVENOUS CONTINUOUS
Status: DISCONTINUED | OUTPATIENT
Start: 2020-06-08 | End: 2020-06-08

## 2020-06-08 RX ORDER — OXYCODONE HYDROCHLORIDE 5 MG/1
5 TABLET ORAL
Status: DISCONTINUED | OUTPATIENT
Start: 2020-06-08 | End: 2020-06-11 | Stop reason: HOSPADM

## 2020-06-08 RX ORDER — HYDROMORPHONE HYDROCHLORIDE 1 MG/ML
0.25 INJECTION, SOLUTION INTRAMUSCULAR; INTRAVENOUS; SUBCUTANEOUS
Status: DISCONTINUED | OUTPATIENT
Start: 2020-06-08 | End: 2020-06-11 | Stop reason: HOSPADM

## 2020-06-08 RX ORDER — SODIUM CHLORIDE 9 MG/ML
1000 INJECTION, SOLUTION INTRAVENOUS ONCE
Status: COMPLETED | OUTPATIENT
Start: 2020-06-08 | End: 2020-06-08

## 2020-06-08 RX ORDER — NICOTINE 21 MG/24HR
14 PATCH, TRANSDERMAL 24 HOURS TRANSDERMAL
Status: DISCONTINUED | OUTPATIENT
Start: 2020-06-08 | End: 2020-06-11 | Stop reason: HOSPADM

## 2020-06-08 RX ORDER — HEPARIN SODIUM 1000 [USP'U]/ML
3500 INJECTION, SOLUTION INTRAVENOUS; SUBCUTANEOUS ONCE
Status: COMPLETED | OUTPATIENT
Start: 2020-06-08 | End: 2020-06-08

## 2020-06-08 RX ORDER — OXYCODONE HYDROCHLORIDE 5 MG/1
2.5 TABLET ORAL
Status: DISCONTINUED | OUTPATIENT
Start: 2020-06-08 | End: 2020-06-11 | Stop reason: HOSPADM

## 2020-06-08 RX ORDER — SPIRONOLACTONE 25 MG/1
25 TABLET ORAL
Status: DISCONTINUED | OUTPATIENT
Start: 2020-06-08 | End: 2020-06-11 | Stop reason: HOSPADM

## 2020-06-08 RX ORDER — SODIUM CHLORIDE 9 MG/ML
INJECTION, SOLUTION INTRAVENOUS CONTINUOUS
Status: DISCONTINUED | OUTPATIENT
Start: 2020-06-08 | End: 2020-06-09

## 2020-06-08 RX ORDER — BISACODYL 10 MG
10 SUPPOSITORY, RECTAL RECTAL
Status: DISCONTINUED | OUTPATIENT
Start: 2020-06-08 | End: 2020-06-11 | Stop reason: HOSPADM

## 2020-06-08 RX ORDER — HEPARIN SODIUM 1000 [USP'U]/ML
1800 INJECTION, SOLUTION INTRAVENOUS; SUBCUTANEOUS PRN
Status: DISCONTINUED | OUTPATIENT
Start: 2020-06-08 | End: 2020-06-08

## 2020-06-08 RX ADMIN — SODIUM CHLORIDE: 9 INJECTION, SOLUTION INTRAVENOUS at 04:06

## 2020-06-08 RX ADMIN — IOHEXOL 91 ML: 350 INJECTION, SOLUTION INTRAVENOUS at 01:30

## 2020-06-08 RX ADMIN — ATORVASTATIN CALCIUM 40 MG: 40 TABLET, FILM COATED ORAL at 17:30

## 2020-06-08 RX ADMIN — SODIUM CHLORIDE 1000 ML: 9 INJECTION, SOLUTION INTRAVENOUS at 03:05

## 2020-06-08 RX ADMIN — OXYCODONE 5 MG: 5 TABLET ORAL at 17:30

## 2020-06-08 RX ADMIN — NICOTINE 14 MG: 14 PATCH TRANSDERMAL at 05:42

## 2020-06-08 RX ADMIN — LABETALOL HYDROCHLORIDE 10 MG: 5 INJECTION, SOLUTION INTRAVENOUS at 12:58

## 2020-06-08 RX ADMIN — OXYCODONE 2.5 MG: 5 TABLET ORAL at 06:39

## 2020-06-08 RX ADMIN — OXYCODONE 5 MG: 5 TABLET ORAL at 23:30

## 2020-06-08 RX ADMIN — HEPARIN SODIUM 3500 UNITS: 1000 INJECTION, SOLUTION INTRAVENOUS; SUBCUTANEOUS at 03:34

## 2020-06-08 RX ADMIN — LOSARTAN POTASSIUM 25 MG: 25 TABLET, FILM COATED ORAL at 05:42

## 2020-06-08 RX ADMIN — HYDROMORPHONE HYDROCHLORIDE 0.25 MG: 1 INJECTION, SOLUTION INTRAMUSCULAR; INTRAVENOUS; SUBCUTANEOUS at 08:20

## 2020-06-08 RX ADMIN — CARVEDILOL 6.25 MG: 6.25 TABLET, FILM COATED ORAL at 17:30

## 2020-06-08 RX ADMIN — HYDROMORPHONE HYDROCHLORIDE 0.25 MG: 1 INJECTION, SOLUTION INTRAMUSCULAR; INTRAVENOUS; SUBCUTANEOUS at 11:53

## 2020-06-08 RX ADMIN — SODIUM CHLORIDE: 9 INJECTION, SOLUTION INTRAVENOUS at 23:27

## 2020-06-08 RX ADMIN — DOCUSATE SODIUM 50 MG AND SENNOSIDES 8.6 MG 2 TABLET: 8.6; 5 TABLET, FILM COATED ORAL at 17:30

## 2020-06-08 RX ADMIN — HEPARIN SODIUM 750 UNITS/HR: 5000 INJECTION, SOLUTION INTRAVENOUS at 03:34

## 2020-06-08 SDOH — ECONOMIC STABILITY: FOOD INSECURITY: WITHIN THE PAST 12 MONTHS, THE FOOD YOU BOUGHT JUST DIDN'T LAST AND YOU DIDN'T HAVE MONEY TO GET MORE.: NEVER TRUE

## 2020-06-08 SDOH — ECONOMIC STABILITY: INCOME INSECURITY: HOW HARD IS IT FOR YOU TO PAY FOR THE VERY BASICS LIKE FOOD, HOUSING, MEDICAL CARE, AND HEATING?: NOT HARD AT ALL

## 2020-06-08 SDOH — ECONOMIC STABILITY: FOOD INSECURITY: WITHIN THE PAST 12 MONTHS, YOU WORRIED THAT YOUR FOOD WOULD RUN OUT BEFORE YOU GOT MONEY TO BUY MORE.: NEVER TRUE

## 2020-06-08 SDOH — ECONOMIC STABILITY: TRANSPORTATION INSECURITY
IN THE PAST 12 MONTHS, HAS THE LACK OF TRANSPORTATION KEPT YOU FROM MEDICAL APPOINTMENTS OR FROM GETTING MEDICATIONS?: NO

## 2020-06-08 SDOH — ECONOMIC STABILITY: TRANSPORTATION INSECURITY
IN THE PAST 12 MONTHS, HAS LACK OF TRANSPORTATION KEPT YOU FROM MEETINGS, WORK, OR FROM GETTING THINGS NEEDED FOR DAILY LIVING?: NO

## 2020-06-08 ASSESSMENT — COGNITIVE AND FUNCTIONAL STATUS - GENERAL
MOVING FROM LYING ON BACK TO SITTING ON SIDE OF FLAT BED: A LITTLE
SUGGESTED CMS G CODE MODIFIER DAILY ACTIVITY: CK
CLIMB 3 TO 5 STEPS WITH RAILING: A LITTLE
WALKING IN HOSPITAL ROOM: A LITTLE
EATING MEALS: A LITTLE
DAILY ACTIVITIY SCORE: 18
TOILETING: A LITTLE
SUGGESTED CMS G CODE MODIFIER MOBILITY: CJ
HELP NEEDED FOR BATHING: A LITTLE
DRESSING REGULAR LOWER BODY CLOTHING: A LITTLE
MOBILITY SCORE: 20
STANDING UP FROM CHAIR USING ARMS: A LITTLE
PERSONAL GROOMING: A LITTLE
DRESSING REGULAR UPPER BODY CLOTHING: A LITTLE

## 2020-06-08 ASSESSMENT — ENCOUNTER SYMPTOMS
COUGH: 0
SPEECH CHANGE: 0
BRUISES/BLEEDS EASILY: 0
FLANK PAIN: 0
DIZZINESS: 0
MYALGIAS: 0
DEPRESSION: 0
VOMITING: 1
HEARTBURN: 0
FEVER: 0
PALPITATIONS: 0
DOUBLE VISION: 0
FOCAL WEAKNESS: 0
BLURRED VISION: 0
EYE DISCHARGE: 0
NAUSEA: 1
SHORTNESS OF BREATH: 0
CHILLS: 0
HEMOPTYSIS: 0
SENSORY CHANGE: 0
HALLUCINATIONS: 0
ABDOMINAL PAIN: 0
WEAKNESS: 0

## 2020-06-08 ASSESSMENT — LIFESTYLE VARIABLES
TOTAL SCORE: 0
EVER_SMOKED: YES
HAVE YOU EVER FELT YOU SHOULD CUT DOWN ON YOUR DRINKING: NO
ALCOHOL_USE: NO
AVERAGE NUMBER OF DAYS PER WEEK YOU HAVE A DRINK CONTAINING ALCOHOL: 0
HOW MANY TIMES IN THE PAST YEAR HAVE YOU HAD 5 OR MORE DRINKS IN A DAY: 0
CONSUMPTION TOTAL: NEGATIVE
TOTAL SCORE: 0
ON A TYPICAL DAY WHEN YOU DRINK ALCOHOL HOW MANY DRINKS DO YOU HAVE: 0
TOTAL SCORE: 0
EVER HAD A DRINK FIRST THING IN THE MORNING TO STEADY YOUR NERVES TO GET RID OF A HANGOVER: NO
DOES PATIENT WANT TO STOP DRINKING: NO
EVER FELT BAD OR GUILTY ABOUT YOUR DRINKING: NO
HAVE PEOPLE ANNOYED YOU BY CRITICIZING YOUR DRINKING: NO
SUBSTANCE_ABUSE: 0

## 2020-06-08 ASSESSMENT — FIBROSIS 4 INDEX
FIB4 SCORE: 1.62
FIB4 SCORE: 1.49

## 2020-06-08 NOTE — ASSESSMENT & PLAN NOTE
Not in acute exacerbation   resp care protocol as appropriate  Added pulmicort  IS/flutter valve.   o2 eval

## 2020-06-08 NOTE — ED TRIAGE NOTES
"Chief Complaint   Patient presents with   • Abdominal Pain     started at 0930 on 6-7, reports nausea associated with it as well       Pt brought in by EMS for above. EMS called STEMI pre-alert, was given 50 fentanyl, 4 zofran, 324 ASA and 0.4 SL nitro. Code STEMI cancelled by ERP dr. Reyez    /69   Pulse (!) 110   Temp 36.9 °C (98.4 °F) (Temporal)   Resp 14   Ht 1.549 m (5' 1\")   Wt 42.6 kg (93 lb 14.7 oz)   LMP 03/01/2002   SpO2 98%   BMI 17.75 kg/m²   "

## 2020-06-08 NOTE — DISCHARGE PLANNING
Anticipated Discharge Disposition: TBD    Action: Pt discussed during IDT rounds. Pt still pending workup. No known needs at this time.     Barriers to Discharge: None    Plan: LSW to attend IDT rounds, LSW to assist as needed

## 2020-06-08 NOTE — ASSESSMENT & PLAN NOTE
Bilateral iliac artery occlusion  Vascular surgery has been consulted for evaluation Dr. Cuevas   Chronic no need for heparin drip.

## 2020-06-08 NOTE — PROGRESS NOTES
Bedside report received, assumed pt care@4070. Pt A&Ox4. Pt c/o 8/10 pain in abdomen; medicated pt per mar. Pt strict NPO. POC discussed, pt verbalized understanding. Bed in lowest position with bed alarm on. Call light within reach.

## 2020-06-08 NOTE — PROGRESS NOTES
Pt arrived to unit via gurney  at 0200. Pt oriented to room, unit, and plan of care. Tele-monitor placed and monitor room notified. All questions answered at this time. Call light within reach; fall precautions in place.

## 2020-06-08 NOTE — H&P
Hospital Medicine History & Physical Note    Date of Service  6/8/2020    Primary Care Physician  Baltazar Julio M.D.    Code Status  Full Code    Chief Complaint  Chief Complaint   Patient presents with   • Abdominal Pain     started at 0930 on 6-7, reports nausea associated with it as well       History of Presenting Illness  75 y.o. female who presented 6/7/2020 with past medical history of abdominal aortic aneurysm, COPD, hypertension, dyslipidemia, peripheral vascular disease who presents with abdominal pain.  This patient initially presented to the hospital where she had code STEMI activated for possible left bundle branch block.  At time of examination she was complaining of epigastric and mid abdominal pain.  Currently code STEMI was called off.  This patient was also having nausea with some mild vomiting.  Otherwise no known alleviating or exacerbating factors to her symptoms.  In the emergency department she is found to have fluid-filled distended small bowel concerning for possible obstructing bowel obstruction.    Review of Systems  Review of Systems   Constitutional: Positive for malaise/fatigue. Negative for chills and fever.   HENT: Negative for congestion, hearing loss and tinnitus.    Eyes: Negative for blurred vision, double vision and discharge.   Respiratory: Negative for cough, hemoptysis and shortness of breath.    Cardiovascular: Negative for chest pain, palpitations and leg swelling.   Gastrointestinal: Positive for nausea and vomiting. Negative for abdominal pain and heartburn.   Genitourinary: Negative for dysuria and flank pain.   Musculoskeletal: Negative for joint pain and myalgias.   Skin: Negative for rash.   Neurological: Negative for dizziness, sensory change, speech change, focal weakness and weakness.   Endo/Heme/Allergies: Negative for environmental allergies. Does not bruise/bleed easily.   Psychiatric/Behavioral: Negative for depression, hallucinations and substance abuse.        Past Medical History   has a past medical history of Abdominal aortic aneurysm (HCC) (11/2010), Angina, Arthritis, Atherosclerosis of arteries of the extremities, Bowel habit changes, Carotid atherosclerosis, COPD (chronic obstructive pulmonary disease) (HCC), Diverticulitis, Diverticulosis of colon, Dyslipidemia, Emphysema of lung (HCC), Eosinophilic colitis, Family history of nonmelanoma skin cancer, Hypertension, Pain, Peripheral vascular disease (HCC), PVD (posterior vitreous detachment), left eye (1/13/2015), Snoring, Spinal stenosis of lumbar region, and Unspecified hemorrhagic conditions.    Surgical History   has a past surgical history that includes colonoscopy (3/1/2009, 4/2012, 7/2/13); gyn surgery (2002); gyn surgery (1975); other; colon resection laparoscopic (8/5/2013); colonoscopy with biopsy (3/6/2015); other cardiac surgery (2005); aaa with stent graft (N/A, 3/31/2017); and femoral femoral bypass (N/A, 3/31/2017).     Family History  family history includes Cancer in her sister; Heart Disease in her brother and sister; Heart Disease (age of onset: 55) in her father; Heart Disease (age of onset: 82) in her mother; Hyperlipidemia in her father, mother, and sister; Hypertension in her father, mother, and sister; Non-contributory in her sister; Stroke in her sister.     Social History   reports that she has been smoking cigarettes. She has a 45.00 pack-year smoking history. She has never used smokeless tobacco. She reports that she does not drink alcohol or use drugs.    Allergies  Allergies   Allergen Reactions   • Doxycycline Diarrhea     None stop diarrhea   • Tramadol Vomiting   • Amoxicillin Diarrhea     Diarrhea   • Ciprofloxacin Unspecified     Unspecified       Medications  Prior to Admission Medications   Prescriptions Last Dose Informant Patient Reported? Taking?   HYDROcodone-acetaminophen (NORCO) 5-325 MG Tab per tablet  Patient's Home Pharmacy No No   Sig: Take 1 Tab by mouth every 8  hours as needed (sciatica and leg pain/hip pain) for up to 30 days.   VENTOLIN  (90 Base) MCG/ACT Aero Soln inhalation aerosol  Family Member Yes No   Sig: Inhale 2 Puffs by mouth every four hours as needed for Shortness of Breath.   aspirin EC (ECOTRIN) 81 MG Tablet Delayed Response  Family Member Yes No   Sig: Take 324 mg by mouth as needed (For cheat pain).   atorvastatin (LIPITOR) 40 MG Tab   No No   Sig: Take 1 Tab by mouth every evening.   carvedilol (COREG) 6.25 MG Tab   No No   Sig: Take 1 Tab by mouth 2 times a day, with meals.   guaiFENesin ER (MUCINEX) 600 MG TABLET SR 12 HR   No No   Sig: Take 1 Tab by mouth every 12 hours.   losartan (COZAAR) 25 MG Tab   No No   Sig: Take 1 Tab by mouth every day.   potassium chloride ER (KLOR-CON) 10 MEQ tablet  Patient's Home Pharmacy No No   Sig: Take 1 Tab by mouth every day.   spironolactone (ALDACTONE) 25 MG Tab  Patient's Home Pharmacy No No   Sig: Take 1 Tab by mouth 1 time daily as needed (swelling).      Facility-Administered Medications: None       Physical Exam  Temp:  [36.9 °C (98.4 °F)] 36.9 °C (98.4 °F)  Pulse:  [] 96  Resp:  [14-19] 19  BP: (105-119)/(58-69) 112/58  SpO2:  [98 %-100 %] 100 %    Physical Exam    Laboratory:  Recent Labs     06/07/20  2352   WBC 13.8*   RBC 5.61*   HEMOGLOBIN 16.2*   HEMATOCRIT 48.0*   MCV 85.6   MCH 28.9   MCHC 33.8   RDW 42.5   PLATELETCT 266   MPV 10.2     Recent Labs     06/07/20  2352   SODIUM 134*   POTASSIUM 3.9   CHLORIDE 98   CO2 22   GLUCOSE 120*   BUN 42*   CREATININE 0.76   CALCIUM 7.6*     Recent Labs     06/07/20  2352   ALTSGPT 17   ASTSGOT 24   ALKPHOSPHAT 79   TBILIRUBIN 0.3   LIPASE 23   GLUCOSE 120*         No results for input(s): NTPROBNP in the last 72 hours.      Recent Labs     06/07/20  2352   TROPONINT 34*       Imaging:  CT-CTA COMPLETE THORACOABDOMINAL AORTA   Final Result         1.  Fluid-filled distended small bowel with reactive mucosal pattern, appearance suggests ileus or  enteritis, evolving obstructive changes not excluded. Recommend radiographic follow-up to resolution.   2.  Nonenhancement of the right kidney and area of nonenhancement of the lower pole of the left kidney, appearance compatible with renal infarct. 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   3.  No visualized aortic dissection, intra-abdominal aortic aneurysm with stent in place.   4.  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   5.  Occlusion of the bilateral internal iliac arteries. Line atherosclerosis and atherosclerotic coronary artery disease   6.  Extensive emphysema   7.  Pectus excavatum deformity is seen.   8.  2 6.5 mm lingular pulmonary nodules, see nodule follow-up recommendations below.      Fleischner Society pulmonary nodule recommendations:   Low Risk: CT at 3-6 months, then consider CT at 18-24 months      High Risk: CT at 3-6 months, then at 18-24 months      Comments: Use most suspicious nodule as guide to management. Follow-up intervals may vary according to size and risk.      Low Risk - Minimal or absent history of smoking and of other known risk factors.      High Risk - History of smoking or of other known risk factors.      Note: These recommendations do not apply to lung cancer screening, patients with immunosuppression, or patients with known primary cancer.      Fleischner Society 2017 Guidelines for Management of Incidentally Detected Pulmonary Nodules in Adults         DX-CHEST-PORTABLE (1 VIEW)   Final Result         1.  Interstitial pulmonary parenchymal prominence compatible with chronic underlying lung disease, component of interstitial edema and/or infiltrates not excluded.   2.  Changes of COPD   3.  Atherosclerosis            Assessment/Plan:  Anticipate greater than 2 midnight hsopital stay    * Partial small bowel obstruction (HCC)- (present on admission)  Assessment &  Plan  Suspect partial bowel obstruction vs complete   Cont with IVF   Anti emetics, pain control   NPO for now   Dr. De Oliveira to be consulted by ERP     Pulmonary nodule  Assessment & Plan  Needs outpatient follow up    Leukocytosis  Assessment & Plan  Reactive from vomiting, cont with IVF and trend    Elevated BUN  Assessment & Plan  Cont with IVF and trend appears hypovolemic     Polycythemia  Assessment & Plan  Due to tobacco abuse   Cont to montior    Elevated troponin  Assessment & Plan  Admit to telemetry   Serial troponin   Cardiac monitoring     Iliac artery occlusion ([facility])  Assessment & Plan  Bilateral iliac artery occlusion  Cont with heparin ggt   Vascular surgery has been consulted for evaluation Dr. 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  Cont with heparin ggt for now   Pain control   Vascular surgery consulted Dr. Cuevas     Hyponatremia- (present on admission)  Assessment & Plan  Hypovolemic   Cont with IVF and trend    PVD (peripheral vascular disease) ([facility])- (present on admission)  Assessment & Plan  Chronic known history of on statin and asa as outpatient     COPD (chronic obstructive pulmonary disease) ([facility])- (present on admission)  Assessment & Plan  Not in acute exacerbation   resp care protocol as appropriate    Tobacco dependence- (present on admission)  Assessment & Plan  Greater than 10 minutes spent with patient smoking cessation counseling. Discussed cardiovascular risk factors of smoking. Nicotine patch provided to patient.     Dyslipidemia, goal LDL below 100- (present on admission)  Assessment & Plan  Statin therapy     Essential hypertension- (present on admission)  Assessment & Plan  Resume home anti hypertensive medications    Ppx; Heparin

## 2020-06-08 NOTE — PROGRESS NOTES
Accepted patient admitted by my colleague  Spoke with vascular surgery, PAD is chronic, stop heparin gtt  Belly exam with visible graft, scaphoid (patient claims her weight has been stable), generalized mild TTP, but no peritoneal signs, good bowel sounds and distention noted.  Had normal BM yesterday, no infectious like symptoms either  No NG tube for now  Start sips with medications and sips of water  Surgery to evaluate patient later today  -minimize narcotic usage  -consider advancing diet CLD later today

## 2020-06-08 NOTE — CONSULTS
Vascular Surgery Consult Note  -------------------------------------------------------------------------------------------------  Date: 6/8/2020    Consulting Physician: Agusto Cuevas M.D. [facility]  -------------------------------------------------------------------------------------------------    Reason for consultation:  Abdominal pain and concern for renal infarct    HPI:  This is a 75 y.o. female who is presenting with abdominal pain. CT shows small bowel distention and concern for possible new left renal infarct.  CT angiogram does not show any acute changes with the aorta or its branches.  The right common iliac artery and bilateral internal iliac artery occlusions are known to be chronic.  She also has a known chronic right renal artery occlusion with infarction and atrophy of the right kidney.  The patient's femorofemoral bypass is patent and she is not having any lower extremity pain symptoms.  Her abdominal pain is almost certainly coming from the distended small bowel which could be either enteritis or bowel obstruction.  When I came to see her she was alert and conversant and she was having moderate abdominal pain diffusely.  She reports no bowel movement for 2 or 3 days.  She reports intermittent nausea but no vomiting.     Patient reports she has been following with Dr. Pemberton as an outpatient and she is due for her next surveillance noninvasive arterial study of her lower extremities    Past Medical History:   Diagnosis Date   • Abdominal aortic aneurysm (HCC) 11/2010    3.6 cm 8/2014   • Angina     with stress test   • Arthritis     thumbs   • Atherosclerosis of arteries of the extremities     two stents in the groin, Dr. Pickett   • Bowel habit changes    • Carotid atherosclerosis     Dr. Pickett   • COPD (chronic obstructive pulmonary disease) (HCC)    • Diverticulitis     Dx 11/25/11   • Diverticulosis of colon    • Dyslipidemia    • Emphysema of lung (HCC)    • Eosinophilic  colitis    • Family history of nonmelanoma skin cancer    • Hypertension    • Pain    • Peripheral vascular disease (HCC)     9 months, may relate to PVD   • PVD (posterior vitreous detachment), left eye 1/13/2015   • Snoring    • Spinal stenosis of lumbar region     Dr. Navarro   • Unspecified hemorrhagic conditions     asa/plavix, bruises easily       Past Surgical History:   Procedure Laterality Date   • AAA WITH STENT GRAFT N/A 3/31/2017    Procedure: AAA WITH STENT GRAFT - 5-CM INFRARENAL;  Surgeon: Spring Pemberton M.D.;  Location: SURGERY [facility];  Service:    • FEMORAL FEMORAL BYPASS N/A 3/31/2017    Procedure: FEMORAL FEMORAL BYPASS - POSS;  Surgeon: Spring Pemberton M.D.;  Location: SURGERY [facility];  Service:    • COLONOSCOPY WITH BIOPSY  3/6/2015    Performed by Pranav THOMPSON M.D. at ENDOSCOPY [facility]   • COLON RESECTION LAPAROSCOPIC  8/5/2013    Performed by Spring Pemberton M.D. at SURGERY [facility]   • OTHER CARDIAC SURGERY  2005    cardiac stents   • GYN SURGERY  2002    hysterectomy, tubal, fibroids, ovaries gone   • GYN SURGERY  1975    ectopic pregnacy   • COLONOSCOPY  3/1/2009, 4/2012, 7/2/13    normal, normal, normal but heavy diverticulosis   • OTHER      LLE /RLEstent, common iliac, Dr. Pickett       Current Facility-Administered Medications   Medication Dose Route Frequency Provider Last Rate Last Dose   • atorvastatin (LIPITOR) tablet 40 mg  40 mg Oral Q EVENING Emile Desai M.D.       • carvedilol (COREG) tablet 6.25 mg  6.25 mg Oral BID WITH MEALS Emile Desai M.D.   Stopped at 06/08/20 0830   • losartan (COZAAR) tablet 25 mg  25 mg Oral DAILY Emile Desai M.D.   25 mg at 06/08/20 0542   • spironolactone (ALDACTONE) tablet 25 mg  25 mg Oral QDAY PRN Emile Desai M.D.       • senna-docusate (PERICOLACE or SENOKOT S) 8.6-50 MG per tablet 2 Tab  2 Tab Oral BID Emile Desai M.D.        And   • polyethylene glycol/lytes (MIRALAX) PACKET 1  Packet  1 Packet Oral QDAY PRN Emile Desai M.D.        And   • magnesium hydroxide (MILK OF MAGNESIA) suspension 30 mL  30 mL Oral QDAY PRN Emile Desai M.D.        And   • bisacodyl (DULCOLAX) suppository 10 mg  10 mg Rectal QDAY PRN Emile Desai M.D.       • NS infusion   Intravenous Continuous Emile Desai M.D. 50 mL/hr at 06/08/20 0406     • ondansetron (ZOFRAN) syringe/vial injection 4 mg  4 mg Intravenous Q4HRS PRN Emile Desai M.D.       • ondansetron (ZOFRAN ODT) dispertab 4 mg  4 mg Oral Q4HRS PRN Emile Desai M.D.       • Pharmacy Consult Request ...Pain Management Review 1 Each  1 Each Other PHARMACY TO DOSE Emile Desai M.D.        And   • oxyCODONE immediate-release (ROXICODONE) tablet 2.5 mg  2.5 mg Oral Q3HRS PRN Emile Desai M.D.   2.5 mg at 06/08/20 0639    And   • oxyCODONE immediate-release (ROXICODONE) tablet 5 mg  5 mg Oral Q3HRS PRN Emile Desai M.D.        And   • HYDROmorphone pf (DILAUDID) injection 0.25 mg  0.25 mg Intravenous Q3HRS PRN Emile Desai M.D.   0.25 mg at 06/08/20 1153   • nicotine (NICODERM) 14 MG/24HR 14 mg  14 mg Transdermal Daily-0600 Emile Desai M.D.   14 mg at 06/08/20 0542   • labetalol (NORMODYNE/TRANDATE) injection 10 mg  10 mg Intravenous Q4HRS PRN Rahat Tracy M.D.   10 mg at 06/08/20 1258       Social History     Socioeconomic History   • Marital status:      Spouse name: Not on file   • Number of children: Not on file   • Years of education: Not on file   • Highest education level: Not on file   Occupational History   • Occupation: stocking     Employer: [employer]   Social Needs   • Financial resource strain: Not on file   • Food insecurity     Worry: Not on file     Inability: Not on file   • Transportation needs     Medical: Not on file     Non-medical: Not on file   Tobacco Use   • Smoking status: Current Every Day Smoker     Packs/day: 1.00     Years: 45.00     Pack years: 45.00      "Types: Cigarettes   • Smokeless tobacco: Never Used   • Tobacco comment: refuses to consider stopping   Substance and Sexual Activity   • Alcohol use: No     Alcohol/week: 0.0 oz   • Drug use: No   • Sexual activity: Not Currently   Lifestyle   • Physical activity     Days per week: Not on file     Minutes per session: Not on file   • Stress: Not on file   Relationships   • Social connections     Talks on phone: Not on file     Gets together: Not on file     Attends [employer] service: Not on file     Active member of club or organization: Not on file     Attends meetings of clubs or organizations: Not on file     Relationship status: Not on file   • Intimate partner violence     Fear of current or ex partner: Not on file     Emotionally abused: Not on file     Physically abused: Not on file     Forced sexual activity: Not on file   Other Topics Concern   • Not on file   Social History Narrative   • Not on file       Family History   Problem Relation Age of Onset   • Hyperlipidemia Sister    • Hypertension Sister    • Stroke Sister    • Heart Disease Sister         heart attack   • Non-contributory Sister         carotid atherosclerosis   • Hypertension Mother    • Hyperlipidemia Mother    • Heart Disease Mother 82        congestive heart failure, AAA   • Heart Disease Father 55        multiple heart issues   • Hypertension Father    • Hyperlipidemia Father    • Cancer Sister         sin cancer   • Heart Disease Brother         heart attack       Allergies:  Doxycycline; Tramadol; Amoxicillin; and Ciprofloxacin    Review of Systems:  Noncontributory except as per HPI    Physical Exam:  BP (!) 180/106   Pulse (!) 107   Temp 36.5 °C (97.7 °F) (Temporal)   Resp 17   Ht 1.549 m (5' 1\")   Wt 39.8 kg (87 lb 11.9 oz)   SpO2 97%     Constitutional: Alert, oriented, no acute distress  HEENT:  Normocephalic and atraumatic, EOMI  Neck:   Supple, no JVD,   Cardiovascular: Regular rate and rhythm,   Pulmonary:  Good air " entry bilaterally,    Abdominal:  Soft, moderately distended, mildly tender, no rebound or guarding  Musculoskeletal: No edema, no tenderness  Neurological:  CN II-XII grossly intact, no focal deficits  Skin:   Skin is warm and dry. No rash noted.  Psychiatric:  Normal mood and affect.    Labs:  Recent Labs     06/07/20  2352   WBC 13.8*   RBC 5.61*   HEMOGLOBIN 16.2*   HEMATOCRIT 48.0*   MCV 85.6   MCH 28.9   MCHC 33.8   RDW 42.5   PLATELETCT 266   MPV 10.2     Recent Labs     06/07/20  2352   SODIUM 134*   POTASSIUM 3.9   CHLORIDE 98   CO2 22   GLUCOSE 120*   BUN 42*   CREATININE 0.76   CALCIUM 7.6*     Recent Labs     06/08/20  0257 06/08/20  0941   APTT 29.9 102.2*     Recent Labs     06/07/20  2352   ASTSGOT 24   ALTSGPT 17   TBILIRUBIN 0.3   ALKPHOSPHAT 79   GLOBULIN 2.9       Radiology:  CT scan of the abdomen and pelvis shows stent graft in good position with good patency of the left renal artery and the endograft limb and also the femoral to femoral bypass.  There is however significant small bowel distention with fluid suggestive of enteritis or bowel obstruction.    Assessment/Plan:  -Small bowel obstruction  -COPD  -Hypertension  -Dyslipidemia  -Peripheral arterial occlusive disease  -History of abdominal aortic aneurysm    The patient's abdominal pain appears to be due to a small bowel obstruction.  I am recommending she stay n.p.o. for today we monitor for improvement in symptoms.  If she gets worse she will need an NG tube and potentially further imaging and/or exploration.    Regarding her vasculature, the CT scan supports only chronic findings with no compromise of the left renal artery and nothing to explain the left renal infarct.  Her lower extremity perfusion appears to be intact but she is due for updated surveillance arterial testing which I will put an order in for that.    Following along      Agusto Cuevas MD  [facility] (General and Vascular Surgery)  Cell: [phone]  (text or call is fine, if you don't reach me please try my office)  Office: [phone]    6/8/2020    2:01 PM  ___________________________________________________________________  Patient:Karen Jauregui   MRN:[id number]   CSN:[id number]

## 2020-06-08 NOTE — DISCHARGE PLANNING
Medical Social Work    Referral: STEMI    Intervention: Pt is a 75 year old female brought in by DANDRE from home for abdominal pain/possible STEMI.  Pt is Karen Jauregui (: 1945).  Per STEVENSONSA pt's family remained home and will call the hospital in about an hour for an update.  Per chart pt's emergency contact is her son, Johnny ([phone]).  Pt is not going to cath lab at this time.  Pt to [address]03.    Plan: SW will follow as needed.

## 2020-06-08 NOTE — CARE PLAN
Problem: Nutritional:  Goal: Achieve adequate nutritional intake  Description: Initiate diet and advance beyond clears when medically feasible. Patient will consume >50% of meals  Outcome: NOT MET

## 2020-06-08 NOTE — ED PROVIDER NOTES
ED Provider Note    CHIEF COMPLAINT  No chief complaint on file.      HPI  Karen Jauregui is a 75 y.o. female who presents with chief complaint of nausea and abdominal pain.  Patient reports abdominal pain is her mid lower abdomen and left lower quadrant.  She reports associated nausea with one episode of nonbloody nonbilious emesis.  She continues to pass stool.  She denies any diarrhea melena or hematochezia.  Upon EMS arrival patient was complaining of nausea so per protocol they checked an EKG which showed questionable ST elevation and therefore patient was brought in as a code STEMI.  Patient denies any associated chest pain shortness of breath or diaphoresis.  Patient denies any history of heart disease.  She does have a known abdominal aortic aneurysm that was repaired by Dr. Pemberton, additionally patient has a history of bowel obstructions and diverticulitis.  Patient continues to pass stool and flatus.  She denies any back pain.    REVIEW OF SYSTEMS  ROS    See HPI for further details. All other systems are negative.     PAST MEDICAL HISTORY   has a past medical history of Abdominal aortic aneurysm (HCC) (11/2010), Angina, Arthritis, Atherosclerosis of arteries of the extremities, Bowel habit changes, Carotid atherosclerosis, COPD (chronic obstructive pulmonary disease) (HCC), Diverticulitis, Diverticulosis of colon, Dyslipidemia, Emphysema of lung (HCC), Eosinophilic colitis, Family history of nonmelanoma skin cancer, Hypertension, Pain, Peripheral vascular disease (HCC), PVD (posterior vitreous detachment), left eye (1/13/2015), Snoring, Spinal stenosis of lumbar region, and Unspecified hemorrhagic conditions.    SOCIAL HISTORY  Social History     Tobacco Use   • Smoking status: Current Every Day Smoker     Packs/day: 1.00     Years: 45.00     Pack years: 45.00     Types: Cigarettes   • Smokeless tobacco: Never Used   • Tobacco comment: refuses to consider stopping   Substance and Sexual Activity    • Alcohol use: No     Alcohol/week: 0.0 oz   • Drug use: No   • Sexual activity: Not Currently       SURGICAL HISTORY   has a past surgical history that includes colonoscopy (3/1/2009, 4/2012, 7/2/13); gyn surgery (2002); gyn surgery (1975); other; colon resection laparoscopic (8/5/2013); colonoscopy with biopsy (3/6/2015); other cardiac surgery (2005); aaa with stent graft (N/A, 3/31/2017); and femoral femoral bypass (N/A, 3/31/2017).    CURRENT MEDICATIONS  Home Medications    **Home medications have not yet been reviewed for this encounter**         ALLERGIES  Allergies   Allergen Reactions   • Doxycycline Diarrhea     None stop diarrhea   • Tramadol Vomiting   • Amoxicillin Diarrhea     Diarrhea   • Ciprofloxacin Unspecified     Unspecified       PHYSICAL EXAM  Physical Exam   Constitutional: She is oriented to person, place, and time. She appears well-developed and well-nourished.   HENT:   Head: Normocephalic and atraumatic.   Eyes: Conjunctivae are normal.   Neck: Normal range of motion. Neck supple.   Cardiovascular: Normal rate and regular rhythm.   Pulmonary/Chest: Effort normal and breath sounds normal.   Abdominal: Soft. Bowel sounds are normal. She exhibits no distension.   Left lower quadrant tenderness to palpation   Musculoskeletal:      Comments: Pulses present and PT and DP though mildly diminished bilaterally, no asymmetry, sensation intact throughout.   Neurological: She is alert and oriented to person, place, and time.   Skin: Skin is warm and dry. No rash noted.   Psychiatric: She has a normal mood and affect. Her behavior is normal.     rads  CT-CTA COMPLETE THORACOABDOMINAL AORTA   Final Result         1.  Fluid-filled distended small bowel with reactive mucosal pattern, appearance suggests ileus or enteritis, evolving obstructive changes not excluded. Recommend radiographic follow-up to resolution.   2.  Nonenhancement of the right kidney and area of nonenhancement of the lower pole of the  left kidney, appearance compatible with renal infarct. 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   3.  No visualized aortic dissection, intra-abdominal aortic aneurysm with stent in place.   4.  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   5.  Occlusion of the bilateral internal iliac arteries. Line atherosclerosis and atherosclerotic coronary artery disease   6.  Extensive emphysema   7.  Pectus excavatum deformity is seen.   8.  2 6.5 mm lingular pulmonary nodules, see nodule follow-up recommendations below.      Fleischner Society pulmonary nodule recommendations:   Low Risk: CT at 3-6 months, then consider CT at 18-24 months      High Risk: CT at 3-6 months, then at 18-24 months      Comments: Use most suspicious nodule as guide to management. Follow-up intervals may vary according to size and risk.      Low Risk - Minimal or absent history of smoking and of other known risk factors.      High Risk - History of smoking or of other known risk factors.      Note: These recommendations do not apply to lung cancer screening, patients with immunosuppression, or patients with known primary cancer.      Fleischner Society 2017 Guidelines for Management of Incidentally Detected Pulmonary Nodules in Adults         DX-CHEST-PORTABLE (1 VIEW)   Final Result         1.  Interstitial pulmonary parenchymal prominence compatible with chronic underlying lung disease, component of interstitial edema and/or infiltrates not excluded.   2.  Changes of COPD   3.  Atherosclerosis            DIAGNOSTIC STUDIES / PROCEDURES    EKG  EKG reveals mildly prolonged QRS which is chronic, consistent with left anterior fascicular block, ST elevation in V2 and V3 with associated ST depression in V5 and V6 with associated T wave inversion here, these are chronic changes that were present on EKG done May of this year.  Patient also with  voltage criteria for right atrial enlargement.    LABS  Results for orders placed or performed during the hospital encounter of 06/07/20   CBC WITH DIFFERENTIAL   Result Value Ref Range    WBC 13.8 (H) 4.8 - 10.8 K/uL    RBC 5.61 (H) 4.20 - 5.40 M/uL    Hemoglobin 16.2 (H) 12.0 - 16.0 g/dL    Hematocrit 48.0 (H) 37.0 - 47.0 %    MCV 85.6 81.4 - 97.8 fL    MCH 28.9 27.0 - 33.0 pg    MCHC 33.8 33.6 - 35.0 g/dL    RDW 42.5 35.9 - 50.0 fL    Platelet Count 266 164 - 446 K/uL    MPV 10.2 9.0 - 12.9 fL    Neutrophils-Polys 84.50 (H) 44.00 - 72.00 %    Lymphocytes 8.60 (L) 22.00 - 41.00 %    Monocytes 5.20 0.00 - 13.40 %    Eosinophils 0.90 0.00 - 6.90 %    Basophils 0.40 0.00 - 1.80 %    Immature Granulocytes 0.40 0.00 - 0.90 %    Nucleated RBC 0.00 /100 WBC    Neutrophils (Absolute) 11.65 (H) 2.00 - 7.15 K/uL    Lymphs (Absolute) 1.18 1.00 - 4.80 K/uL    Monos (Absolute) 0.71 0.00 - 0.85 K/uL    Eos (Absolute) 0.12 0.00 - 0.51 K/uL    Baso (Absolute) 0.06 0.00 - 0.12 K/uL    Immature Granulocytes (abs) 0.06 0.00 - 0.11 K/uL    NRBC (Absolute) 0.00 K/uL   CMP   Result Value Ref Range    Sodium 134 (L) 135 - 145 mmol/L    Potassium 3.9 3.6 - 5.5 mmol/L    Chloride 98 96 - 112 mmol/L    Co2 22 20 - 33 mmol/L    Anion Gap 14.0 7.0 - 16.0    Glucose 120 (H) 65 - 99 mg/dL    Bun 42 (H) 8 - 22 mg/dL    Creatinine 0.76 0.50 - 1.40 mg/dL    Calcium 7.6 (L) 8.5 - 10.5 mg/dL    AST(SGOT) 24 12 - 45 U/L    ALT(SGPT) 17 2 - 50 U/L    Alkaline Phosphatase 79 30 - 99 U/L    Total Bilirubin 0.3 0.1 - 1.5 mg/dL    Albumin 2.9 (L) 3.2 - 4.9 g/dL    Total Protein 5.8 (L) 6.0 - 8.2 g/dL    Globulin 2.9 1.9 - 3.5 g/dL    A-G Ratio 1.0 g/dL   LIPASE   Result Value Ref Range    Lipase 23 11 - 82 U/L   TROPONIN   Result Value Ref Range    Troponin T 34 (H) 6 - 19 ng/L   LACTIC ACID   Result Value Ref Range    Lactic Acid 1.0 0.5 - 2.0 mmol/L   ESTIMATED GFR   Result Value Ref Range    GFR If African American >60 >60 mL/min/1.73 m 2    GFR If  Non African American >60 >60 mL/min/1.73 m 2   EKG   Result Value Ref Range    Report       [facility]    Test Date:  2020  Pt Name:    AIDE TONY             Department: ER  MRN:        [id number]                      Room:       RD 03  Gender:     Female                       Technician: [id number]  :        [id number]                   Requested By:JULIO WELLS  Order #:    [id number]                    Reading MD: Dereje Velasquez MD    Measurements  Intervals                                Axis  Rate:       111                          P:          82  WY:         160                          QRS:        -59  QRSD:       106                          T:          121  QT:         352  QTc:        479    Interpretive Statements  EKG reveals mildly prolonged QRS which is chronic, consistent with left  anterior  fascicular block, ST elevation in V2 and V3 with associated ST depression in  V5  and V6 with associated T wave inversion here, these are chronic changes that  were present on EKG done May of this year.  Patient also with volt age  criteria  for right atrial enlargement.  Electronically Signed On 2020 0:05:34 PDT by Dereje Velasquez MD           COURSE & MEDICAL DECISION MAKING  Pertinent Labs & Imaging studies reviewed. (See chart for details)    Patient here with tenderness to palpation in her left lower quadrant, known history of diverticulitis, and aortic aneurysm status post repair.  She does have mildly decreased bilateral pulses in her lower extremity but is unclear if this is chronic as patient's tissues are warm well perfused.  Will check CTA to ensure patient's pain is not secondary to problem with aortic aneurysm, or secondary to a bowel obstruction.  I remain with a high clinical suspicion of diverticulitis.  As patient was brought in as a code STEMI, she will have a troponin checked.  Her EKG is unchanged from prior, the ST elevation appears to be  chronic and a repolarization abnormality with her associated left anterior fascicular block.  Cardiology did evaluate the EKGs and are not concerned.  Patient symptoms will be highly atypical of ACS.  CT consistent with SBO versus ileus  Considerable vascular disease, patient with multiple thrombus in her grafts, patient does have dopplerable biphasic signal in DP and PT.  She does not have any leg pain that she has a normal lactic acid, I believe acute thrombus is unlikely.  Patient without any CVA tenderness on my exam, creatinine is reassuring.  I believe that acute renal infarct is also less likely.  I have discussed this with hospitalist, patient will be started on heparin in the interim.we will discussed the case with vascular surgery.  Will discuss case hospitalist vascular surgery and general surgery   patient will be admitted and treated medically as patient has not vomited well defer NG at this point.  IVF for support given pt SBO        FINAL IMPRESSION  1.  Abdominal pain, nausea vomiting, leukocytosis    Electronically signed by: Ron Reyez M.D., 6/8/2020 12:00 AM

## 2020-06-08 NOTE — ED NOTES
Received report on patient. Patient resting in bed, has no complaints of pain and no indications of distress. Provided pillow and updated on POC. No other needs at this time.

## 2020-06-08 NOTE — DIETARY
"Nutrition services: Day 0 of admit.  Karen Jauregui is a 75 y.o. female with admitting DX of Abdominal pain. Dx includes partial small bowel obstruction and hyponatremia, Hx includes abdominal aortic aneurysm, COPD, HTN, dyslipidemia. peripheral vascular disease.     Consult received for low BMI and MST score of 2 on nutrition screen due to report of unsure wt loss PTA. No report of poor PO.       Assessment:  Height: 154.9 cm (5' 1\")  Weight: 39.8 kg (87 lb 11.9 oz)(standing scale)  Body mass index is 16.58 kg/m²., BMI classification: underweight  Diet/Intake: NPO    Evaluation:   1. Weights reviewed in Epic:   1. 4/9/20=38.6 kg  2. 11/22/19=41.3 kg  3. 4/26/19=40.8 kg  No significant weight changes noted.   2. Labs: 6/7/20: sodium=134 (L but improving), glucose=120 (H)  3. IV fluids: NS @ 50 ml/hour    Malnutrition Risk: Criteria not met    Recommendations/Plan:   1. Initiate diet and advance beyond clears when medically feasible.   2. Consider order for appropriate supplement when diet is ordered.  3. Encourage intake of >50%  4. Document intake of all meals  as % taken in ADL's to provide interdisciplinary communication across all shifts.   5. Monitor weight.  6. Nutrition rep will continue to see patient for ongoing meal and snack preferences.     RD will follow.        "

## 2020-06-08 NOTE — ASSESSMENT & PLAN NOTE
Chronic known history of on statin and asa as outpatient   Vascular surgery consulted.  Per vascular surgery no need for acute intervention.   Needs f/u as outpatient.

## 2020-06-08 NOTE — DISCHARGE PLANNING
Patient is eligible for Meds to Beds at discharge. This service is provided through [facility] ([facility]) if orders are received prior to 4 p.m. Monday through Friday, excluding holidays. Please call [phone] prior to discharge.

## 2020-06-08 NOTE — PROGRESS NOTES
LIAT Rivera met with pt in hospital to introduce CCM services. We completed inpatient intake and SDOH screening. Pt sees PCP Sumanth and has an appt scheduled for 6/30/2020. Pt reports no trouble paying for resources and lives in a mobile home with her sister. Pt reports that her son drives her to medical appts. Pt states that she is confident in managing her own health after d/c and did not express any other concerns/needs from CCM at this time. Pt would like a f/u phone call after d/c.     Community Health Worker Intake  • Social determinates of health intake completed  • Identified barriers to none  • Contact information provided to Karen  • Accepted Meds-To-Beds.     Plan:  I will f/u with pt via phone call after d/c.

## 2020-06-08 NOTE — PROGRESS NOTES
2 RN skin check complete with Nemesio  Devices in place nasal canula oxygen tubing  Skin assessed under devices nasal canula oxygen tubing  Confirmed pressure ulcers found on n/a.  New potential pressure ulcers noted on n/a. Wound consult placed n/a.  The following interventions in place gray foam nasal oxygen tubing, mepilex on sacrum, moisturizer, pillows.     BL ears intact  BL elbows intact  Spine intact  Sacrum red and blanching  BL heels intact   no abrasions, no jaundice, no lesions, no pruritis, and no rashes.

## 2020-06-08 NOTE — PROGRESS NOTES
Tech from Lab called with critical result of .2 at 1030. Critical lab result read back to Tech.   This critical lab result is within parameters established by heparin protocol for this patient

## 2020-06-09 ENCOUNTER — APPOINTMENT (OUTPATIENT)
Dept: RADIOLOGY | Facility: MEDICAL CENTER | Age: 75
DRG: 389 | End: 2020-06-09
Attending: SURGERY
Payer: MEDICARE

## 2020-06-09 LAB
ALBUMIN SERPL BCP-MCNC: 2.7 G/DL (ref 3.2–4.9)
ALBUMIN/GLOB SERPL: 1.1 G/DL
ALP SERPL-CCNC: 68 U/L (ref 30–99)
ALT SERPL-CCNC: 13 U/L (ref 2–50)
ANION GAP SERPL CALC-SCNC: 8 MMOL/L (ref 7–16)
AST SERPL-CCNC: 17 U/L (ref 12–45)
BASOPHILS # BLD AUTO: 0.8 % (ref 0–1.8)
BASOPHILS # BLD: 0.08 K/UL (ref 0–0.12)
BILIRUB SERPL-MCNC: 0.4 MG/DL (ref 0.1–1.5)
BUN SERPL-MCNC: 37 MG/DL (ref 8–22)
CALCIUM SERPL-MCNC: 8.5 MG/DL (ref 8.5–10.5)
CHLORIDE SERPL-SCNC: 96 MMOL/L (ref 96–112)
CO2 SERPL-SCNC: 25 MMOL/L (ref 20–33)
CREAT SERPL-MCNC: 0.81 MG/DL (ref 0.5–1.4)
EOSINOPHIL # BLD AUTO: 0.22 K/UL (ref 0–0.51)
EOSINOPHIL NFR BLD: 2.2 % (ref 0–6.9)
ERYTHROCYTE [DISTWIDTH] IN BLOOD BY AUTOMATED COUNT: 44 FL (ref 35.9–50)
GLOBULIN SER CALC-MCNC: 2.4 G/DL (ref 1.9–3.5)
GLUCOSE SERPL-MCNC: 77 MG/DL (ref 65–99)
HCT VFR BLD AUTO: 41.7 % (ref 37–47)
HGB BLD-MCNC: 13.8 G/DL (ref 12–16)
IMM GRANULOCYTES # BLD AUTO: 0.05 K/UL (ref 0–0.11)
IMM GRANULOCYTES NFR BLD AUTO: 0.5 % (ref 0–0.9)
LYMPHOCYTES # BLD AUTO: 1.24 K/UL (ref 1–4.8)
LYMPHOCYTES NFR BLD: 12.6 % (ref 22–41)
MCH RBC QN AUTO: 29 PG (ref 27–33)
MCHC RBC AUTO-ENTMCNC: 33.1 G/DL (ref 33.6–35)
MCV RBC AUTO: 87.6 FL (ref 81.4–97.8)
MONOCYTES # BLD AUTO: 0.62 K/UL (ref 0–0.85)
MONOCYTES NFR BLD AUTO: 6.3 % (ref 0–13.4)
NEUTROPHILS # BLD AUTO: 7.67 K/UL (ref 2–7.15)
NEUTROPHILS NFR BLD: 77.6 % (ref 44–72)
NRBC # BLD AUTO: 0 K/UL
NRBC BLD-RTO: 0 /100 WBC
PLATELET # BLD AUTO: 247 K/UL (ref 164–446)
PMV BLD AUTO: 9.1 FL (ref 9–12.9)
POTASSIUM SERPL-SCNC: 5 MMOL/L (ref 3.6–5.5)
PROT SERPL-MCNC: 5.1 G/DL (ref 6–8.2)
RBC # BLD AUTO: 4.76 M/UL (ref 4.2–5.4)
SODIUM SERPL-SCNC: 129 MMOL/L (ref 135–145)
SODIUM SERPL-SCNC: 130 MMOL/L (ref 135–145)
WBC # BLD AUTO: 9.9 K/UL (ref 4.8–10.8)

## 2020-06-09 PROCEDURE — 36415 COLL VENOUS BLD VENIPUNCTURE: CPT

## 2020-06-09 PROCEDURE — 94760 N-INVAS EAR/PLS OXIMETRY 1: CPT

## 2020-06-09 PROCEDURE — 700102 HCHG RX REV CODE 250 W/ 637 OVERRIDE(OP): Performed by: HOSPITALIST

## 2020-06-09 PROCEDURE — 94640 AIRWAY INHALATION TREATMENT: CPT

## 2020-06-09 PROCEDURE — A9270 NON-COVERED ITEM OR SERVICE: HCPCS | Performed by: HOSPITALIST

## 2020-06-09 PROCEDURE — 700111 HCHG RX REV CODE 636 W/ 250 OVERRIDE (IP): Performed by: HOSPITALIST

## 2020-06-09 PROCEDURE — 770020 HCHG ROOM/CARE - TELE (206)

## 2020-06-09 PROCEDURE — 85025 COMPLETE CBC W/AUTO DIFF WBC: CPT

## 2020-06-09 PROCEDURE — 700101 HCHG RX REV CODE 250: Performed by: HOSPITALIST

## 2020-06-09 PROCEDURE — 97161 PT EVAL LOW COMPLEX 20 MIN: CPT

## 2020-06-09 PROCEDURE — 80053 COMPREHEN METABOLIC PANEL: CPT

## 2020-06-09 PROCEDURE — 84295 ASSAY OF SERUM SODIUM: CPT

## 2020-06-09 PROCEDURE — 93922 UPR/L XTREMITY ART 2 LEVELS: CPT

## 2020-06-09 PROCEDURE — 99233 SBSQ HOSP IP/OBS HIGH 50: CPT | Performed by: HOSPITALIST

## 2020-06-09 RX ORDER — HEPARIN SODIUM 5000 [USP'U]/ML
5000 INJECTION, SOLUTION INTRAVENOUS; SUBCUTANEOUS EVERY 8 HOURS
Status: DISCONTINUED | OUTPATIENT
Start: 2020-06-09 | End: 2020-06-10

## 2020-06-09 RX ORDER — BUDESONIDE 0.5 MG/2ML
0.5 INHALANT ORAL
Status: DISCONTINUED | OUTPATIENT
Start: 2020-06-09 | End: 2020-06-11 | Stop reason: HOSPADM

## 2020-06-09 RX ORDER — IPRATROPIUM BROMIDE AND ALBUTEROL SULFATE 2.5; .5 MG/3ML; MG/3ML
3 SOLUTION RESPIRATORY (INHALATION)
Status: DISCONTINUED | OUTPATIENT
Start: 2020-06-09 | End: 2020-06-10

## 2020-06-09 RX ADMIN — OXYCODONE 5 MG: 5 TABLET ORAL at 17:16

## 2020-06-09 RX ADMIN — CARVEDILOL 6.25 MG: 6.25 TABLET, FILM COATED ORAL at 17:12

## 2020-06-09 RX ADMIN — OXYCODONE 5 MG: 5 TABLET ORAL at 14:19

## 2020-06-09 RX ADMIN — HEPARIN SODIUM 5000 UNITS: 5000 INJECTION, SOLUTION INTRAVENOUS; SUBCUTANEOUS at 00:15

## 2020-06-09 RX ADMIN — NICOTINE 14 MG: 14 PATCH TRANSDERMAL at 05:48

## 2020-06-09 RX ADMIN — LOSARTAN POTASSIUM 25 MG: 25 TABLET, FILM COATED ORAL at 05:48

## 2020-06-09 RX ADMIN — IPRATROPIUM BROMIDE AND ALBUTEROL SULFATE 3 ML: .5; 3 SOLUTION RESPIRATORY (INHALATION) at 18:48

## 2020-06-09 RX ADMIN — HEPARIN SODIUM 5000 UNITS: 5000 INJECTION, SOLUTION INTRAVENOUS; SUBCUTANEOUS at 17:12

## 2020-06-09 RX ADMIN — CARVEDILOL 6.25 MG: 6.25 TABLET, FILM COATED ORAL at 05:47

## 2020-06-09 RX ADMIN — ATORVASTATIN CALCIUM 40 MG: 40 TABLET, FILM COATED ORAL at 17:12

## 2020-06-09 ASSESSMENT — ENCOUNTER SYMPTOMS
MYALGIAS: 0
DIZZINESS: 0
NAUSEA: 0
CLAUDICATION: 0
SHORTNESS OF BREATH: 1
BLURRED VISION: 0
BACK PAIN: 0
NECK PAIN: 0
CHILLS: 0
VOMITING: 0
HEMOPTYSIS: 0
SINUS PAIN: 0
BRUISES/BLEEDS EASILY: 0
DOUBLE VISION: 0
HEARTBURN: 0
FEVER: 0
HEADACHES: 0
WHEEZING: 0
DEPRESSION: 0
COUGH: 1
PND: 0
PALPITATIONS: 0

## 2020-06-09 ASSESSMENT — LIFESTYLE VARIABLES
EVER_SMOKED: YES
SUBSTANCE_ABUSE: 0

## 2020-06-09 ASSESSMENT — COGNITIVE AND FUNCTIONAL STATUS - GENERAL
CLIMB 3 TO 5 STEPS WITH RAILING: A LITTLE
MOVING FROM LYING ON BACK TO SITTING ON SIDE OF FLAT BED: A LITTLE
SUGGESTED CMS G CODE MODIFIER MOBILITY: CJ
MOBILITY SCORE: 20
WALKING IN HOSPITAL ROOM: A LITTLE
STANDING UP FROM CHAIR USING ARMS: A LITTLE

## 2020-06-09 ASSESSMENT — GAIT ASSESSMENTS
ASSISTIVE DEVICE: FRONT WHEEL WALKER
DEVIATION: BRADYKINETIC
GAIT LEVEL OF ASSIST: SUPERVISED
DISTANCE (FEET): 100

## 2020-06-09 ASSESSMENT — COPD QUESTIONNAIRES
DURING THE PAST 4 WEEKS HOW MUCH DID YOU FEEL SHORT OF BREATH: SOME OF THE TIME
HAVE YOU SMOKED AT LEAST 100 CIGARETTES IN YOUR ENTIRE LIFE: YES
COPD SCREENING SCORE: 7
DO YOU EVER COUGH UP ANY MUCUS OR PHLEGM?: YES, EVERY DAY

## 2020-06-09 ASSESSMENT — FIBROSIS 4 INDEX: FIB4 SCORE: 1.41

## 2020-06-09 NOTE — PROGRESS NOTES
Rec'd report from Sigrid PARIS regarding prior 12 hours. Pt awake in bed; A&Ox4. POC reviewed with pt. Pt denies any pain at this time. All questions answered. Bed locked in lowest position. Call light within reach. Will continue to monitor.

## 2020-06-09 NOTE — THERAPY
Physical Therapy   Initial Evaluation     Patient Name: Karen Jauregui  Age:  75 y.o., Sex:  female  Medical Record #: [id number]  Today's Date: 6/9/2020          Assessment  Patient is 75 y.o. female with a diagnosis of SBO, admitted for abdominal pain. Pt reports she lives with her sister and her family in a mobile home with 4-step entry. Pt reports IND with all mobility PTA. Pt presents today mildly weak and deconditioned due to bedrest and inactivity.  PT will follow pt during acute stay. Anticipate pt will be appropriate for discharge to home with family assist. Pt refusing HH PT at this time, but would benefit from the service.     Plan    Recommend Physical Therapy 4 times per week until therapy goals are met for the following treatments:  Gait Training, Neuro Re-Education / Balance, Stair Training, Therapeutic Activities and Therapeutic Exercises    Discharge recommendations:  Recommend home health transitional care for continued physical therapy services.       Subjective    Pt agreeable to PT.     Objective       06/09/20 1357   Prior Living Situation   Prior Services None   Housing / Facility Mobile Home   Steps Into Home 4   Steps In Home 0   Rail Both Rail (Steps into Home)   Equipment Owned Front-Wheel Walker;Single Point Cane;4-Wheel Walker   Lives with - Patient's Self Care Capacity Other (Comments)  (Sister and her family)   Comments Pt reports she is living with her sister temporarily   Prior Level of Functional Mobility   Bed Mobility Independent   Transfer Status Independent   Ambulation Independent   Distance Ambulation (Feet) 500   Assistive Devices Used None   Stairs Independent   Comments Pt reports she has been more sedentary lately   History of Falls   History of Falls No   Cognition    Cognition / Consciousness WDL   Level of Consciousness Alert   Passive ROM Lower Body   Passive ROM Lower Body WDL   Active ROM Lower Body    Active ROM Lower Body  WDL   Strength Lower Body   Gross  Strength Generalized Weakness, Equal Bilaterally   Comments B LEs grossly 4-/5 t/o   Sensation Lower Body   Lower Extremity Sensation   WDL   Lower Body Muscle Tone   Lower Body Muscle Tone  WDL   Coordination Lower Body    Coordination Lower Body  WDL   Balance Assessment   Standing Balance (Static) Fair +   Standing Balance (Dynamic) Fair   Weight Shift Standing Fair   Comments with FWW   Gait Analysis   Gait Level Of Assist Supervised   Assistive Device Front Wheel Walker   Distance (Feet) 100   # of Times Distance was Traveled 2   Deviation Bradykinetic  (flexed posture)   Weight Bearing Status FWB   Skilled Intervention Postural Facilitation;Verbal Cuing   Comments cues to stay closer to walker   Bed Mobility    Supine to Sit Supervised   Sit to Supine Supervised   Scooting Supervised   Rolling Supervised   Skilled Intervention Facilitation;Verbal Cuing   Functional Mobility   Sit to Stand Minimal Assist   Bed, Chair, Wheelchair Transfer Minimal Assist   Transfer Method Stand Step   Mobility gait in schafer   Patient / Family Goals    Patient / Family Goal #1 Home    Short Term Goals    Short Term Goal # 1 Pt will be SPV for gait >300' with FWW to improve functional mobility.   Short Term Goal # 2 Pt will be SPV for up/down 4 steps with B rails to be able to access her home.   Short Term Goal # 3 Pt will be SPV for all transfers with FWW to improve fucntional mobiility.   Education Group   Exercise - Standing Patient Response Patient;Acceptance;Demonstration;Explanation;Action Demonstration;Verbal Demonstration   Problem List    Problems Impaired Transfers;Impaired Ambulation;Functional Strength Deficit;Impaired Balance;Decreased Activity Tolerance   Anticipated Discharge Equipment   DC Equipment None

## 2020-06-09 NOTE — PROGRESS NOTES
Bedside report received, assumed pt care@5312. Pt A&Ox4. Pt denies any pain at this time. Pt had 2 large loose bm's this am. POC discussed, pt verbalized understanding. Bed in lowest position with bed alarm on. Call light within reach.

## 2020-06-09 NOTE — PROGRESS NOTES
Vascular    No acute changes overnight.  Patient did start having bowel movements overnight and she reports having multiple loose bowel movements and is overall feeling better.  Patient reports no abdominal pain today.    Abdominal exam is benign    Start full liquid diet this morning and advance as tolerated to regular.  Once she is tolerating regular diet with no adverse symptoms she can be discharged from surgical standpoint and she can follow-up with Dr. Pemberton as an outpatient for ongoing vascular surveillance.  I did order surveillance arterial testing but if that is not done before she is cleared for discharge then she can have that done as an outpatient.    Agusto Cuevas MD  [facility] (General and Vascular Surgery)  Cell: [phone] (text or call is fine, if you don't reach me please try my office)  Office: [phone]  __________________________________________________________________  Patient:Karen Jauregui   MRN:[id number]   CSN:[id number]    6/9/2020    10:48 AM

## 2020-06-09 NOTE — PROGRESS NOTES
[facility] Medicine Daily Progress Note    Date of Service  6/9/2020    Chief Complaint  75 y.o. female admitted 6/7/2020 with SBO      Interval Problem Update  Patient in bed still feels weak, no fever, continues requiring o2 by nc, started on cld today, had some diarrhea no abdominal pain, passing gas.    Consultants/Specialty  Vascular sx    Code Status  ful code    Disposition  Tbd.     Review of Systems  Review of Systems   Constitutional: Negative for chills and fever.   HENT: Negative for congestion, nosebleeds and sinus pain.    Eyes: Negative for blurred vision and double vision.   Respiratory: Positive for cough and shortness of breath. Negative for hemoptysis and wheezing.    Cardiovascular: Negative for chest pain, palpitations, claudication, leg swelling and PND.   Gastrointestinal: Negative for heartburn, nausea and vomiting.   Genitourinary: Negative for frequency, hematuria and urgency.   Musculoskeletal: Negative for back pain, myalgias and neck pain.   Skin: Negative for rash.   Neurological: Negative for dizziness and headaches.   Endo/Heme/Allergies: Does not bruise/bleed easily.   Psychiatric/Behavioral: Negative for depression, substance abuse and suicidal ideas.        Physical Exam  Temp:  [36.9 °C (98.5 °F)-37.2 °C (99 °F)] 37.2 °C (98.9 °F)  Pulse:  [90-98] 94  Resp:  [16-19] 19  BP: (104-132)/(69-75) 121/74  SpO2:  [91 %-98 %] 98 %    Physical Exam  Vitals signs and nursing note reviewed.   Constitutional:       General: She is not in acute distress.     Appearance: Normal appearance. She is not ill-appearing.   HENT:      Head: Normocephalic and atraumatic.      Nose: Nose normal. No congestion or rhinorrhea.      Mouth/Throat:      Mouth: Mucous membranes are moist.      Pharynx: Oropharynx is clear.   Eyes:      General: No scleral icterus.        Right eye: No discharge.         Left eye: No discharge.      Extraocular Movements: Extraocular movements intact.      Conjunctiva/sclera:  Conjunctivae normal.      Pupils: Pupils are equal, round, and reactive to light.   Neck:      Musculoskeletal: Normal range of motion and neck supple. No neck rigidity or muscular tenderness.   Cardiovascular:      Rate and Rhythm: Normal rate and regular rhythm.      Pulses: Normal pulses.      Heart sounds: Normal heart sounds.   Pulmonary:      Effort: Pulmonary effort is normal.      Breath sounds: Wheezing and rales present.   Abdominal:      General: Bowel sounds are normal. There is no distension.      Palpations: Abdomen is soft.      Tenderness: There is no abdominal tenderness. There is no guarding.   Musculoskeletal: Normal range of motion.   Skin:     General: Skin is warm.      Capillary Refill: Capillary refill takes less than 2 seconds.      Coloration: Skin is not jaundiced.   Neurological:      General: No focal deficit present.      Mental Status: She is alert and oriented to person, place, and time.      Cranial Nerves: No cranial nerve deficit.   Psychiatric:         Mood and Affect: Mood normal.         Fluids    Intake/Output Summary (Last 24 hours) at 6/9/2020 1558  Last data filed at 6/9/2020 1500  Gross per 24 hour   Intake 1335 ml   Output --   Net 1335 ml       Laboratory  Recent Labs     06/07/20  2352 06/09/20  0216   WBC 13.8* 9.9   RBC 5.61* 4.76   HEMOGLOBIN 16.2* 13.8   HEMATOCRIT 48.0* 41.7   MCV 85.6 87.6   MCH 28.9 29.0   MCHC 33.8 33.1*   RDW 42.5 44.0   PLATELETCT 266 247   MPV 10.2 9.1     Recent Labs     06/07/20  2352 06/09/20  0216   SODIUM 134* 129*   POTASSIUM 3.9 5.0   CHLORIDE 98 96   CO2 22 25   GLUCOSE 120* 77   BUN 42* 37*   CREATININE 0.76 0.81   CALCIUM 7.6* 8.5     Recent Labs     06/08/20  0257 06/08/20  0941   APTT 29.9 102.2*               Imaging  CT-CTA COMPLETE THORACOABDOMINAL AORTA   Final Result         1.  Fluid-filled distended small bowel with reactive mucosal pattern, appearance suggests ileus or enteritis, evolving obstructive changes not excluded.  Recommend radiographic follow-up to resolution.   2.  Nonenhancement of the right kidney and area of nonenhancement of the lower pole of the left kidney, appearance compatible with renal infarct. 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   3.  No visualized aortic dissection, intra-abdominal aortic aneurysm with stent in place.   4.  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   5.  Occlusion of the bilateral internal iliac arteries. Line atherosclerosis and atherosclerotic coronary artery disease   6.  Extensive emphysema   7.  Pectus excavatum deformity is seen.   8.  2 6.5 mm lingular pulmonary nodules, see nodule follow-up recommendations below.      Fleischner Society pulmonary nodule recommendations:   Low Risk: CT at 3-6 months, then consider CT at 18-24 months      High Risk: CT at 3-6 months, then at 18-24 months      Comments: Use most suspicious nodule as guide to management. Follow-up intervals may vary according to size and risk.      Low Risk - Minimal or absent history of smoking and of other known risk factors.      High Risk - History of smoking or of other known risk factors.      Note: These recommendations do not apply to lung cancer screening, patients with immunosuppression, or patients with known primary cancer.      Fleischner Society 2017 Guidelines for Management of Incidentally Detected Pulmonary Nodules in Adults         DX-CHEST-PORTABLE (1 VIEW)   Final Result         1.  Interstitial pulmonary parenchymal prominence compatible with chronic underlying lung disease, component of interstitial edema and/or infiltrates not excluded.   2.  Changes of COPD   3.  Atherosclerosis      US-EXTREMITY ARTERY LOWER BILAT W/VARSHA (COMBO)    (Results Pending)        Assessment/Plan  * Partial small bowel obstruction (HCC)- (present on admission)  Assessment & Plan  Suspect partial bowel obstruction  vs complete   Started on CLD and advance as tolerated by surgery.     Pulmonary nodule  Assessment & Plan  Needs outpatient follow up    Leukocytosis  Assessment & Plan  Reactive from vomiting,resolved.     Elevated BUN  Assessment & Plan  Gently ivf  Improved.     Polycythemia  Assessment & Plan  Due to tobacco abuse   Cont to montior    Elevated troponin  Assessment & Plan  Stable no chest pain    Iliac artery occlusion (HCC)  Assessment & Plan  Bilateral iliac artery occlusion  Vascular surgery has been consulted for evaluation Dr. Cuevas   Chronic no need for heparin drip.     Renal infarct (HCC)  Assessment & Plan  chronic vascular consulted recommended to stop heparin ggt.        Hyponatremia- (present on admission)  Assessment & Plan  Na 129 will repeat bmp today, continue monitoring.     PVD (peripheral vascular disease) ([facility])- (present on admission)  Assessment & Plan  Chronic known history of on statin and asa as outpatient   Vascular surgery consulted.  US vascular today.     COPD (chronic obstructive pulmonary disease) ([facility])- (present on admission)  Assessment & Plan  Not in acute exacerbation   resp care protocol as appropriate  Add pulmicort  IS/flutter valve.     Tobacco dependence- (present on admission)  Assessment & Plan  Needs to quit.    Dyslipidemia, goal LDL below 100- (present on admission)  Assessment & Plan  Statin therapy     Essential hypertension- (present on admission)  Assessment & Plan  Continue monitoring  Continue on coreg, losartan and spironolactone.          VTE prophylaxis: heparin    Case and plan of care discussed with nurse staff  Case and plan of care discussed during multidisciplinary rounds.

## 2020-06-09 NOTE — CARE PLAN
Problem: Communication  Goal: The ability to communicate needs accurately and effectively will improve  Outcome: PROGRESSING AS EXPECTED  Note: Pt communicates needs effectively with staff.        Problem: Safety  Goal: Will remain free from injury  Outcome: PROGRESSING AS EXPECTED  Goal: Will remain free from falls  Outcome: PROGRESSING AS EXPECTED     Problem: Knowledge Deficit  Goal: Knowledge of disease process/condition, treatment plan, diagnostic tests, and medications will improve  Outcome: PROGRESSING AS EXPECTED

## 2020-06-10 ENCOUNTER — APPOINTMENT (OUTPATIENT)
Dept: RADIOLOGY | Facility: MEDICAL CENTER | Age: 75
DRG: 389 | End: 2020-06-10
Attending: SURGERY
Payer: MEDICARE

## 2020-06-10 LAB
ANION GAP SERPL CALC-SCNC: 3 MMOL/L (ref 7–16)
BASOPHILS # BLD AUTO: 0.7 % (ref 0–1.8)
BASOPHILS # BLD: 0.05 K/UL (ref 0–0.12)
BUN SERPL-MCNC: 31 MG/DL (ref 8–22)
CALCIUM SERPL-MCNC: 9.1 MG/DL (ref 8.5–10.5)
CHLORIDE SERPL-SCNC: 95 MMOL/L (ref 96–112)
CO2 SERPL-SCNC: 30 MMOL/L (ref 20–33)
CREAT SERPL-MCNC: 0.73 MG/DL (ref 0.5–1.4)
EOSINOPHIL # BLD AUTO: 0.25 K/UL (ref 0–0.51)
EOSINOPHIL NFR BLD: 3.6 % (ref 0–6.9)
ERYTHROCYTE [DISTWIDTH] IN BLOOD BY AUTOMATED COUNT: 43.6 FL (ref 35.9–50)
GLUCOSE SERPL-MCNC: 91 MG/DL (ref 65–99)
HCT VFR BLD AUTO: 39.7 % (ref 37–47)
HGB BLD-MCNC: 12.9 G/DL (ref 12–16)
IMM GRANULOCYTES # BLD AUTO: 0.02 K/UL (ref 0–0.11)
IMM GRANULOCYTES NFR BLD AUTO: 0.3 % (ref 0–0.9)
LYMPHOCYTES # BLD AUTO: 1.38 K/UL (ref 1–4.8)
LYMPHOCYTES NFR BLD: 19.7 % (ref 22–41)
MCH RBC QN AUTO: 28.5 PG (ref 27–33)
MCHC RBC AUTO-ENTMCNC: 32.5 G/DL (ref 33.6–35)
MCV RBC AUTO: 87.6 FL (ref 81.4–97.8)
MONOCYTES # BLD AUTO: 0.62 K/UL (ref 0–0.85)
MONOCYTES NFR BLD AUTO: 8.9 % (ref 0–13.4)
NEUTROPHILS # BLD AUTO: 4.68 K/UL (ref 2–7.15)
NEUTROPHILS NFR BLD: 66.8 % (ref 44–72)
NRBC # BLD AUTO: 0 K/UL
NRBC BLD-RTO: 0 /100 WBC
PLATELET # BLD AUTO: 251 K/UL (ref 164–446)
PMV BLD AUTO: 9.3 FL (ref 9–12.9)
POTASSIUM SERPL-SCNC: 5 MMOL/L (ref 3.6–5.5)
RBC # BLD AUTO: 4.53 M/UL (ref 4.2–5.4)
SODIUM SERPL-SCNC: 128 MMOL/L (ref 135–145)
WBC # BLD AUTO: 7 K/UL (ref 4.8–10.8)

## 2020-06-10 PROCEDURE — 97165 OT EVAL LOW COMPLEX 30 MIN: CPT

## 2020-06-10 PROCEDURE — 36415 COLL VENOUS BLD VENIPUNCTURE: CPT

## 2020-06-10 PROCEDURE — 700101 HCHG RX REV CODE 250: Performed by: HOSPITALIST

## 2020-06-10 PROCEDURE — 93925 LOWER EXTREMITY STUDY: CPT

## 2020-06-10 PROCEDURE — 700111 HCHG RX REV CODE 636 W/ 250 OVERRIDE (IP): Performed by: HOSPITALIST

## 2020-06-10 PROCEDURE — 770020 HCHG ROOM/CARE - TELE (206)

## 2020-06-10 PROCEDURE — A9270 NON-COVERED ITEM OR SERVICE: HCPCS | Performed by: HOSPITALIST

## 2020-06-10 PROCEDURE — 700102 HCHG RX REV CODE 250 W/ 637 OVERRIDE(OP): Performed by: HOSPITALIST

## 2020-06-10 PROCEDURE — 99232 SBSQ HOSP IP/OBS MODERATE 35: CPT | Performed by: HOSPITALIST

## 2020-06-10 PROCEDURE — 85025 COMPLETE CBC W/AUTO DIFF WBC: CPT

## 2020-06-10 PROCEDURE — 94640 AIRWAY INHALATION TREATMENT: CPT

## 2020-06-10 PROCEDURE — 80048 BASIC METABOLIC PNL TOTAL CA: CPT

## 2020-06-10 PROCEDURE — 94760 N-INVAS EAR/PLS OXIMETRY 1: CPT

## 2020-06-10 RX ORDER — ALPRAZOLAM 0.25 MG/1
0.25 TABLET ORAL 3 TIMES DAILY PRN
Status: DISCONTINUED | OUTPATIENT
Start: 2020-06-10 | End: 2020-06-11 | Stop reason: HOSPADM

## 2020-06-10 RX ADMIN — BUDESONIDE 0.5 MG: 0.5 SUSPENSION RESPIRATORY (INHALATION) at 20:35

## 2020-06-10 RX ADMIN — LOSARTAN POTASSIUM 25 MG: 25 TABLET, FILM COATED ORAL at 05:56

## 2020-06-10 RX ADMIN — IPRATROPIUM BROMIDE AND ALBUTEROL SULFATE 3 ML: .5; 3 SOLUTION RESPIRATORY (INHALATION) at 10:37

## 2020-06-10 RX ADMIN — ALPRAZOLAM 0.25 MG: 0.25 TABLET ORAL at 17:21

## 2020-06-10 RX ADMIN — BUDESONIDE 0.5 MG: 0.5 SUSPENSION RESPIRATORY (INHALATION) at 07:08

## 2020-06-10 RX ADMIN — IPRATROPIUM BROMIDE AND ALBUTEROL SULFATE 3 ML: .5; 3 SOLUTION RESPIRATORY (INHALATION) at 04:32

## 2020-06-10 RX ADMIN — NICOTINE 14 MG: 14 PATCH TRANSDERMAL at 05:57

## 2020-06-10 RX ADMIN — ATORVASTATIN CALCIUM 40 MG: 40 TABLET, FILM COATED ORAL at 17:17

## 2020-06-10 RX ADMIN — ENOXAPARIN SODIUM 30 MG: 30 INJECTION SUBCUTANEOUS at 15:03

## 2020-06-10 RX ADMIN — CARVEDILOL 6.25 MG: 6.25 TABLET, FILM COATED ORAL at 05:56

## 2020-06-10 RX ADMIN — CARVEDILOL 6.25 MG: 6.25 TABLET, FILM COATED ORAL at 17:17

## 2020-06-10 ASSESSMENT — ENCOUNTER SYMPTOMS
NAUSEA: 0
PALPITATIONS: 0
BACK PAIN: 0
BRUISES/BLEEDS EASILY: 0
FEVER: 0
DIZZINESS: 0
HEMOPTYSIS: 0
CHILLS: 0
SINUS PAIN: 0
BLURRED VISION: 0
DOUBLE VISION: 0
MYALGIAS: 0
HEADACHES: 0
HEARTBURN: 0
SHORTNESS OF BREATH: 0
WHEEZING: 0
VOMITING: 0
DEPRESSION: 0
NECK PAIN: 0
COUGH: 0

## 2020-06-10 ASSESSMENT — COGNITIVE AND FUNCTIONAL STATUS - GENERAL
HELP NEEDED FOR BATHING: A LITTLE
SUGGESTED CMS G CODE MODIFIER DAILY ACTIVITY: CI
DAILY ACTIVITIY SCORE: 23

## 2020-06-10 ASSESSMENT — ACTIVITIES OF DAILY LIVING (ADL): TOILETING: INDEPENDENT

## 2020-06-10 ASSESSMENT — FIBROSIS 4 INDEX
FIB4 SCORE: 1.39
FIB4 SCORE: 1.39

## 2020-06-10 ASSESSMENT — LIFESTYLE VARIABLES: SUBSTANCE_ABUSE: 0

## 2020-06-10 NOTE — THERAPY
Occupational Therapy   Initial Evaluation     Patient Name: Karen Jauregui  Age:  75 y.o., Sex:  female  Medical Record #: [id number]  Today's Date: 6/10/2020       Precautions: Fall Risk  Comments: SOB with activity, poor activity tolerance    Assessment  Patient is 75 y.o. female with a diagnosis of SBO.  Additional factors influencing patient status / progress: Pt reports hx of COPD & has become more SOB with activity.  Pt currently able to perform basic ADL's with supervision but has poor tolerance for OOB ADL's  & becomes more SOB.  Pt did state her sister will be able to assist if needed upon D/C.    Plan    Recommend Occupational Therapy 3 times per week until therapy goals are met for the following treatments:  Adaptive Equipment, Self Care/Activities of Daily Living, Therapeutic Activities and Therapeutic Exercises.    Discharge recommendations:  Recommend home health for continued occupational therapy services.     Subjective    I need to lay down & rest     Objective       06/10/20 0948   Prior Living Situation   Prior Services None   Housing / Facility Mobile Home   Steps Into Home 4   Equipment Owned Front-Wheel Walker;Single Point Cane;4-Wheel Walker   Lives with - Patient's Self Care Capacity Other (Comments)  (sister)   Comments pt reports she just sold her home & is staying with her sister   Prior Level of ADL Function   Self Feeding Independent   Grooming / Hygiene Independent   Bathing Independent   Dressing Independent   Toileting Independent   Balance Assessment   Sitting Balance (Static) Good   Sitting Balance (Dynamic) Fair +   Standing Balance (Static) Fair +   Standing Balance (Dynamic) Fair +   Weight Shift Sitting Good   Weight Shift Standing Good   Bed Mobility    Supine to Sit Supervised   Sit to Supine Supervised   Scooting Supervised   Rolling Modified Independent   ADL Assessment   Eating Modified Independent   Grooming Supervision;Standing   Upper Body Dressing Supervision    Lower Body Dressing Supervision   Toileting Supervision   Functional Mobility   Sit to Stand Supervised   Bed, Chair, Wheelchair Transfer Supervised   Toilet Transfers Supervised   Transfer Method Stand Pivot   Patient / Family Goals   Patient / Family Goal #1 I need to rest   Short Term Goals   Short Term Goal # 1 Pt will be Mod I with ADl transfers   Short Term Goal # 2 Pt will have good activity toelrance to complete basic ADL's

## 2020-06-10 NOTE — PROGRESS NOTES
Vascular    Feeling better  ABIs reviewed, consistent with known chronic disease  No further testing or intervention needed from surgical standpoint  Discharge when cleared by medicine  FU with Dr. Pemberton outpatient  Please notify me if new concerns arise.    Agusto Cuevas MD  [facility] (General and Vascular Surgery)  Cell: [phone] (text or call is fine, if you don't reach me please try my office)  Office: [phone]  __________________________________________________________________  Patient:Karenthad Jauregui   MRN:[id number]   CSN:[id number]    6/10/2020    8:55 AM

## 2020-06-10 NOTE — PROGRESS NOTES
Rec'd report from Sigrid PARIS regarding prior 12 hours. Pt awake in bed; A&Ox4. POC reviewed with pt. Pt denies any pain at this time. All questions answered. Bed locked in lowest position. Bed alarm on. Call light within reach. Will continue to monitor.

## 2020-06-10 NOTE — FACE TO FACE
Face to Face Supporting Documentation - Home Health    The encounter with this patient was in whole or in part the primary reason for home health admission.    Date of encounter:   Patient:                    MRN:                       YOB: 2019  Karen Jauregui  [id number]  1945     Home health to see patient for:  Skilled Nursing care for assessment, interventions & education    Skilled need for:  Comment: PT/OT    Skilled nursing interventions to include:  Comment: PT/OT    Homebound status evidenced by:  Needs the assistance of another person in order to leave the home. Leaving home requires a considerable and taxing effort. There is a normal inability to leave the home.    Community Physician to provide follow up care: Baltazar Julio M.D.     Optional Interventions? No      I certify the face to face encounter for this home health care referral meets the CMS requirements and the encounter/clinical assessment with the patient was, in whole, or in part, for the medical condition(s) listed above, which is the primary reason for home health care. Based on my clinical findings: the service(s) are medically necessary, support the need for home health care, and the homebound criteria are met.  I certify that this patient has had a face to face encounter by myself.  Cezar Silva M.D. - NPI: [id number]

## 2020-06-10 NOTE — PROGRESS NOTES
Report received from WELLINGTON Grubbs. Patient complains of pain in her tailbone but refuses intervention at this time. Bed is locked in lowest position with call light within reach. POC discussed and white board updated. RN will continue to monitor.

## 2020-06-10 NOTE — DISCHARGE PLANNING
Hospital Care Management Note        Action:   · Discussed patient in IDT rounds. Per Dr. Holman, patient may be medically cleared tomorrow.   · Per chart review, PT recommending HHC and pt requiring 2L O2 via NC. Please place corresponding orders and F2F if patient is a candidate for either of these services prior to discharge.   · The HCM team will continue to follow throughout patient's hospital stay.         Thank you for allowing me the pleasure of participating in this patient's care coordination and discharge planning.        For further assistance please contact the assigned .

## 2020-06-10 NOTE — PROGRESS NOTES
Single lumen PICC line assessed, able to flush but not able to draw back. WELLINGTON Grubbs called hospitalist and order obtained for [facility]. Waiting for pharmacy to bring to bedside.

## 2020-06-10 NOTE — CARE PLAN
Problem: Nutritional:  Goal: Achieve adequate nutritional intake  Description: Initiate diet and advance beyond clears when medically feasible. Patient will consume >50% of meals  Outcome: MET     Diet advanced to regular. Recorded PO intake has been adequate at % of meals. RD will follow weekly or PRN.

## 2020-06-10 NOTE — PROGRESS NOTES
[facility] Medicine Daily Progress Note    Date of Service  6/10/2020    Chief Complaint  75 y.o. female admitted 6/7/2020 with SBO      Interval Problem Update  Feeling better, tolerating diet, passing gas and having bm, no abdominal pain.   Pt/ot recommending hhc.    Consultants/Specialty  Vascular sx    Code Status  ful code    Disposition  Home with hhc in am .     Review of Systems  Review of Systems   Constitutional: Negative for chills and fever.   HENT: Negative for congestion, nosebleeds and sinus pain.    Eyes: Negative for blurred vision and double vision.   Respiratory: Negative for cough, hemoptysis, shortness of breath and wheezing.    Cardiovascular: Negative for chest pain and palpitations.   Gastrointestinal: Negative for heartburn, nausea and vomiting.   Genitourinary: Negative for frequency, hematuria and urgency.   Musculoskeletal: Negative for back pain, myalgias and neck pain.   Skin: Negative for rash.   Neurological: Negative for dizziness and headaches.   Endo/Heme/Allergies: Does not bruise/bleed easily.   Psychiatric/Behavioral: Negative for depression, substance abuse and suicidal ideas.        Physical Exam  Temp:  [36.3 °C (97.4 °F)-37.1 °C (98.7 °F)] 36.3 °C (97.4 °F)  Pulse:  [76-91] 82  Resp:  [16-18] 18  BP: (108-161)/(61-82) 128/74  SpO2:  [94 %-99 %] 97 %    Physical Exam  Vitals signs and nursing note reviewed.   Constitutional:       General: She is not in acute distress.     Appearance: Normal appearance. She is not ill-appearing.   HENT:      Head: Normocephalic and atraumatic.      Nose: Nose normal. No congestion or rhinorrhea.      Mouth/Throat:      Mouth: Mucous membranes are moist.      Pharynx: Oropharynx is clear.   Eyes:      General: No scleral icterus.     Extraocular Movements: Extraocular movements intact.      Conjunctiva/sclera: Conjunctivae normal.      Pupils: Pupils are equal, round, and reactive to light.   Neck:      Musculoskeletal: Normal range of motion  and neck supple. No neck rigidity or muscular tenderness.   Cardiovascular:      Rate and Rhythm: Normal rate and regular rhythm.      Pulses: Normal pulses.      Heart sounds: Normal heart sounds.   Pulmonary:      Effort: Pulmonary effort is normal.      Breath sounds: Rales present. No wheezing.   Abdominal:      General: Bowel sounds are normal. There is no distension.      Palpations: Abdomen is soft.      Tenderness: There is no abdominal tenderness. There is no guarding.   Musculoskeletal: Normal range of motion.   Skin:     General: Skin is warm.      Capillary Refill: Capillary refill takes less than 2 seconds.      Coloration: Skin is not jaundiced.   Neurological:      General: No focal deficit present.      Mental Status: She is alert and oriented to person, place, and time.      Cranial Nerves: No cranial nerve deficit.   Psychiatric:         Mood and Affect: Mood normal.         Fluids    Intake/Output Summary (Last 24 hours) at 6/10/2020 1642  Last data filed at 6/10/2020 0900  Gross per 24 hour   Intake 240 ml   Output --   Net 240 ml       Laboratory  Recent Labs     06/07/20  2352 06/09/20  0216 06/10/20  0348   WBC 13.8* 9.9 7.0   RBC 5.61* 4.76 4.53   HEMOGLOBIN 16.2* 13.8 12.9   HEMATOCRIT 48.0* 41.7 39.7   MCV 85.6 87.6 87.6   MCH 28.9 29.0 28.5   MCHC 33.8 33.1* 32.5*   RDW 42.5 44.0 43.6   PLATELETCT 266 247 251   MPV 10.2 9.1 9.3     Recent Labs     06/07/20  2352 06/09/20  0216 06/09/20  1721 06/10/20  0348   SODIUM 134* 129* 130* 128*   POTASSIUM 3.9 5.0  --  5.0   CHLORIDE 98 96  --  95*   CO2 22 25  --  30   GLUCOSE 120* 77  --  91   BUN 42* 37*  --  31*   CREATININE 0.76 0.81  --  0.73   CALCIUM 7.6* 8.5  --  9.1     Recent Labs     06/08/20  0257 06/08/20  0941   APTT 29.9 102.2*               Imaging  US-VARSHA SINGLE LEVEL BILAT   Final Result      CT-CTA COMPLETE THORACOABDOMINAL AORTA   Final Result         1.  Fluid-filled distended small bowel with reactive mucosal pattern,  appearance suggests ileus or enteritis, evolving obstructive changes not excluded. Recommend radiographic follow-up to resolution.   2.  Nonenhancement of the right kidney and area of nonenhancement of the lower pole of the left kidney, appearance compatible with renal infarct. 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   3.  No visualized aortic dissection, intra-abdominal aortic aneurysm with stent in place.   4.  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   5.  Occlusion of the bilateral internal iliac arteries. Line atherosclerosis and atherosclerotic coronary artery disease   6.  Extensive emphysema   7.  Pectus excavatum deformity is seen.   8.  2 6.5 mm lingular pulmonary nodules, see nodule follow-up recommendations below.      Fleischner Society pulmonary nodule recommendations:   Low Risk: CT at 3-6 months, then consider CT at 18-24 months      High Risk: CT at 3-6 months, then at 18-24 months      Comments: Use most suspicious nodule as guide to management. Follow-up intervals may vary according to size and risk.      Low Risk - Minimal or absent history of smoking and of other known risk factors.      High Risk - History of smoking or of other known risk factors.      Note: These recommendations do not apply to lung cancer screening, patients with immunosuppression, or patients with known primary cancer.      Fleischner Society 2017 Guidelines for Management of Incidentally Detected Pulmonary Nodules in Adults         DX-CHEST-PORTABLE (1 VIEW)   Final Result         1.  Interstitial pulmonary parenchymal prominence compatible with chronic underlying lung disease, component of interstitial edema and/or infiltrates not excluded.   2.  Changes of COPD   3.  Atherosclerosis      US-EXTREMITY ARTERY LOWER BILAT    (Results Pending)        Assessment/Plan  * Partial small bowel obstruction (HCC)- (present  on admission)  Assessment & Plan  Suspect partial bowel obstruction vs complete   Resolved. Having bm, tolerating diet.     Pulmonary nodule  Assessment & Plan  Needs outpatient follow up    Leukocytosis  Assessment & Plan  Reactive from vomiting,resolved.     Elevated BUN  Assessment & Plan  Gently ivf  Improved.     Polycythemia  Assessment & Plan  Due to tobacco abuse   Cont to montior    Elevated troponin  Assessment & Plan  Stable no chest pain    Iliac artery occlusion ([facility])  Assessment & Plan  Bilateral iliac artery occlusion  Vascular surgery has been consulted for evaluation Dr. Mason [address] no need for heparin drip.     Renal infarct ([facility])  Assessment & Plan  chronic vascular consulted recommended to stop heparin ggt.        Hyponatremia- (present on admission)  Assessment & Plan  Na 128, fluid restriction. Will check urine Na and osmo    PVD (peripheral vascular disease) ([facility])- (present on admission)  Assessment & Plan  Chronic known history of on statin and asa as outpatient   Vascular surgery consulted.  Per vascular surgery no need for acute intervention.   Needs f/u as outpatient.     COPD (chronic obstructive pulmonary disease) ([facility])- (present on admission)  Assessment & Plan  Not in acute exacerbation   resp care protocol as appropriate  Added pulmicort  IS/flutter valve.   o2 eval    Tobacco dependence- (present on admission)  Assessment & Plan  Needs to quit.    Dyslipidemia, goal LDL below 100- (present on admission)  Assessment & Plan  Statin therapy     Essential hypertension- (present on admission)  Assessment & Plan  Continue monitoring  Continue on coreg, losartan  Spironolactone stopped due to hyponatremia.         VTE prophylaxis: heparin    Case and plan of care discussed with nurse staff  Case and plan of care discussed during multidisciplinary rounds.

## 2020-06-10 NOTE — CARE PLAN
Problem: Communication  Goal: The ability to communicate needs accurately and effectively will improve  Outcome: PROGRESSING AS EXPECTED  Note: Pt communicates needs effectively with staff.       Problem: Safety  Goal: Will remain free from falls  Outcome: PROGRESSING AS EXPECTED  Note: Fall precautions in place. Bed in lowest position. Non-skid socks in place. Personal possessions within reach. Mobility sign on door. Bed-alarm on. Call light within reach. Pt educated regarding fall prevention and states understanding.       Problem: Venous Thromboembolism (VTW)/Deep Vein Thrombosis (DVT) Prevention:  Goal: Patient will participate in Venous Thrombosis (VTE)/Deep Vein Thrombosis (DVT)Prevention Measures  Outcome: PROGRESSING SLOWER THAN EXPECTED  Note: Pt educated on VTE prophylaxis; pt refused heparin injection. Will continue to educate.

## 2020-06-11 ENCOUNTER — APPOINTMENT (OUTPATIENT)
Dept: RADIOLOGY | Facility: MEDICAL CENTER | Age: 75
DRG: 291 | End: 2020-06-11
Attending: EMERGENCY MEDICINE
Payer: MEDICARE

## 2020-06-11 ENCOUNTER — HOSPITAL ENCOUNTER (INPATIENT)
Facility: MEDICAL CENTER | Age: 75
LOS: 6 days | DRG: 291 | End: 2020-06-17
Attending: EMERGENCY MEDICINE | Admitting: HOSPITALIST
Payer: MEDICARE

## 2020-06-11 ENCOUNTER — HOME HEALTH ADMISSION (OUTPATIENT)
Dept: HOME HEALTH SERVICES | Facility: HOME HEALTHCARE | Age: 75
End: 2020-06-11
Payer: MEDICARE

## 2020-06-11 VITALS
WEIGHT: 91.49 LBS | DIASTOLIC BLOOD PRESSURE: 85 MMHG | HEART RATE: 114 BPM | SYSTOLIC BLOOD PRESSURE: 149 MMHG | OXYGEN SATURATION: 99 % | BODY MASS INDEX: 17.27 KG/M2 | TEMPERATURE: 98 F | HEIGHT: 61 IN | RESPIRATION RATE: 16 BRPM

## 2020-06-11 DIAGNOSIS — I50.23 ACUTE ON CHRONIC SYSTOLIC CONGESTIVE HEART FAILURE (HCC): ICD-10-CM

## 2020-06-11 DIAGNOSIS — I50.1 PULMONARY EDEMA CARDIAC CAUSE (HCC): ICD-10-CM

## 2020-06-11 DIAGNOSIS — G89.29 CHRONIC PAIN OF BOTH HIPS: ICD-10-CM

## 2020-06-11 DIAGNOSIS — J96.01 ACUTE HYPOXEMIC RESPIRATORY FAILURE (HCC): ICD-10-CM

## 2020-06-11 DIAGNOSIS — M48.061 SPINAL STENOSIS OF LUMBAR REGION WITHOUT NEUROGENIC CLAUDICATION: ICD-10-CM

## 2020-06-11 DIAGNOSIS — M48.062 SPINAL STENOSIS OF LUMBAR REGION WITH NEUROGENIC CLAUDICATION: ICD-10-CM

## 2020-06-11 DIAGNOSIS — I16.1 HYPERTENSIVE EMERGENCY: ICD-10-CM

## 2020-06-11 DIAGNOSIS — J96.01 ACUTE RESPIRATORY FAILURE WITH HYPOXIA (HCC): ICD-10-CM

## 2020-06-11 DIAGNOSIS — I73.9 PERIPHERAL VASCULAR DISEASE (HCC): ICD-10-CM

## 2020-06-11 DIAGNOSIS — M51.27 HERNIATED NUCLEUS PULPOSUS, L5-S1, LEFT: ICD-10-CM

## 2020-06-11 DIAGNOSIS — R60.9 PERIPHERAL EDEMA: ICD-10-CM

## 2020-06-11 DIAGNOSIS — R79.89 ELEVATED TROPONIN: ICD-10-CM

## 2020-06-11 DIAGNOSIS — J41.0 SIMPLE CHRONIC BRONCHITIS (HCC): ICD-10-CM

## 2020-06-11 DIAGNOSIS — M25.551 CHRONIC PAIN OF BOTH HIPS: ICD-10-CM

## 2020-06-11 DIAGNOSIS — M25.552 CHRONIC PAIN OF BOTH HIPS: ICD-10-CM

## 2020-06-11 PROBLEM — K56.600 PARTIAL SMALL BOWEL OBSTRUCTION (HCC): Status: RESOLVED | Noted: 2018-04-19 | Resolved: 2020-06-11

## 2020-06-11 LAB
ALBUMIN SERPL BCP-MCNC: 3.1 G/DL (ref 3.2–4.9)
ALBUMIN/GLOB SERPL: 1 G/DL
ALP SERPL-CCNC: 98 U/L (ref 30–99)
ALT SERPL-CCNC: 19 U/L (ref 2–50)
ANION GAP SERPL CALC-SCNC: 15 MMOL/L (ref 7–16)
AST SERPL-CCNC: 31 U/L (ref 12–45)
BASOPHILS # BLD AUTO: 0.8 % (ref 0–1.8)
BASOPHILS # BLD: 0.11 K/UL (ref 0–0.12)
BILIRUB SERPL-MCNC: 0.3 MG/DL (ref 0.1–1.5)
BUN SERPL-MCNC: 24 MG/DL (ref 8–22)
CALCIUM SERPL-MCNC: 8.7 MG/DL (ref 8.5–10.5)
CHLORIDE SERPL-SCNC: 98 MMOL/L (ref 96–112)
CO2 SERPL-SCNC: 20 MMOL/L (ref 20–33)
COVID ORDER STATUS COVID19: NORMAL
CREAT SERPL-MCNC: 0.82 MG/DL (ref 0.5–1.4)
EKG IMPRESSION: NORMAL
EOSINOPHIL # BLD AUTO: 0.33 K/UL (ref 0–0.51)
EOSINOPHIL NFR BLD: 2.4 % (ref 0–6.9)
ERYTHROCYTE [DISTWIDTH] IN BLOOD BY AUTOMATED COUNT: 43.6 FL (ref 35.9–50)
GLOBULIN SER CALC-MCNC: 3.2 G/DL (ref 1.9–3.5)
GLUCOSE SERPL-MCNC: 192 MG/DL (ref 65–99)
HCT VFR BLD AUTO: 46.7 % (ref 37–47)
HGB BLD-MCNC: 15 G/DL (ref 12–16)
IMM GRANULOCYTES # BLD AUTO: 0.08 K/UL (ref 0–0.11)
IMM GRANULOCYTES NFR BLD AUTO: 0.6 % (ref 0–0.9)
LACTATE BLD-SCNC: 3.1 MMOL/L (ref 0.5–2)
LYMPHOCYTES # BLD AUTO: 3.29 K/UL (ref 1–4.8)
LYMPHOCYTES NFR BLD: 23.7 % (ref 22–41)
MCH RBC QN AUTO: 28.4 PG (ref 27–33)
MCHC RBC AUTO-ENTMCNC: 32.1 G/DL (ref 33.6–35)
MCV RBC AUTO: 88.4 FL (ref 81.4–97.8)
MONOCYTES # BLD AUTO: 1.12 K/UL (ref 0–0.85)
MONOCYTES NFR BLD AUTO: 8.1 % (ref 0–13.4)
NEUTROPHILS # BLD AUTO: 8.94 K/UL (ref 2–7.15)
NEUTROPHILS NFR BLD: 64.4 % (ref 44–72)
NRBC # BLD AUTO: 0 K/UL
NRBC BLD-RTO: 0 /100 WBC
NT-PROBNP SERPL IA-MCNC: ABNORMAL PG/ML (ref 0–125)
OSMOLALITY UR: 287 MOSM/KG H2O (ref 300–900)
PLATELET # BLD AUTO: 286 K/UL (ref 164–446)
PMV BLD AUTO: 9.2 FL (ref 9–12.9)
POTASSIUM SERPL-SCNC: 4.9 MMOL/L (ref 3.6–5.5)
PROT SERPL-MCNC: 6.3 G/DL (ref 6–8.2)
RBC # BLD AUTO: 5.28 M/UL (ref 4.2–5.4)
SARS-COV-2 RNA RESP QL NAA+PROBE: NOTDETECTED
SODIUM SERPL-SCNC: 131 MMOL/L (ref 135–145)
SODIUM SERPL-SCNC: 133 MMOL/L (ref 135–145)
SODIUM UR-SCNC: 39 MMOL/L
SPECIMEN SOURCE: NORMAL
TROPONIN T SERPL-MCNC: 45 NG/L (ref 6–19)
WBC # BLD AUTO: 13.9 K/UL (ref 4.8–10.8)

## 2020-06-11 PROCEDURE — A9270 NON-COVERED ITEM OR SERVICE: HCPCS | Performed by: EMERGENCY MEDICINE

## 2020-06-11 PROCEDURE — 83605 ASSAY OF LACTIC ACID: CPT

## 2020-06-11 PROCEDURE — 700102 HCHG RX REV CODE 250 W/ 637 OVERRIDE(OP): Performed by: EMERGENCY MEDICINE

## 2020-06-11 PROCEDURE — 84484 ASSAY OF TROPONIN QUANT: CPT

## 2020-06-11 PROCEDURE — 71045 X-RAY EXAM CHEST 1 VIEW: CPT

## 2020-06-11 PROCEDURE — 97116 GAIT TRAINING THERAPY: CPT

## 2020-06-11 PROCEDURE — 94760 N-INVAS EAR/PLS OXIMETRY 1: CPT

## 2020-06-11 PROCEDURE — 83880 ASSAY OF NATRIURETIC PEPTIDE: CPT

## 2020-06-11 PROCEDURE — 83935 ASSAY OF URINE OSMOLALITY: CPT

## 2020-06-11 PROCEDURE — 99223 1ST HOSP IP/OBS HIGH 75: CPT | Mod: AI | Performed by: HOSPITALIST

## 2020-06-11 PROCEDURE — 36415 COLL VENOUS BLD VENIPUNCTURE: CPT

## 2020-06-11 PROCEDURE — 700102 HCHG RX REV CODE 250 W/ 637 OVERRIDE(OP): Performed by: HOSPITALIST

## 2020-06-11 PROCEDURE — 84295 ASSAY OF SERUM SODIUM: CPT

## 2020-06-11 PROCEDURE — 93005 ELECTROCARDIOGRAM TRACING: CPT | Performed by: EMERGENCY MEDICINE

## 2020-06-11 PROCEDURE — 94640 AIRWAY INHALATION TREATMENT: CPT

## 2020-06-11 PROCEDURE — 700111 HCHG RX REV CODE 636 W/ 250 OVERRIDE (IP): Performed by: HOSPITALIST

## 2020-06-11 PROCEDURE — 99285 EMERGENCY DEPT VISIT HI MDM: CPT

## 2020-06-11 PROCEDURE — A9270 NON-COVERED ITEM OR SERVICE: HCPCS | Performed by: HOSPITALIST

## 2020-06-11 PROCEDURE — 84300 ASSAY OF URINE SODIUM: CPT

## 2020-06-11 PROCEDURE — 97530 THERAPEUTIC ACTIVITIES: CPT

## 2020-06-11 PROCEDURE — 85025 COMPLETE CBC W/AUTO DIFF WBC: CPT

## 2020-06-11 PROCEDURE — 5A09357 ASSISTANCE WITH RESPIRATORY VENTILATION, LESS THAN 24 CONSECUTIVE HOURS, CONTINUOUS POSITIVE AIRWAY PRESSURE: ICD-10-PCS | Performed by: EMERGENCY MEDICINE

## 2020-06-11 PROCEDURE — 94660 CPAP INITIATION&MGMT: CPT

## 2020-06-11 PROCEDURE — U0004 COV-19 TEST NON-CDC HGH THRU: HCPCS

## 2020-06-11 PROCEDURE — 770020 HCHG ROOM/CARE - TELE (206)

## 2020-06-11 PROCEDURE — 80053 COMPREHEN METABOLIC PANEL: CPT

## 2020-06-11 PROCEDURE — C9803 HOPD COVID-19 SPEC COLLECT: HCPCS | Performed by: EMERGENCY MEDICINE

## 2020-06-11 RX ORDER — NITROGLYCERIN 0.4 MG/1
TABLET SUBLINGUAL
Status: COMPLETED
Start: 2020-06-11 | End: 2020-06-11

## 2020-06-11 RX ORDER — AMOXICILLIN 250 MG
2 CAPSULE ORAL 2 TIMES DAILY
Status: DISCONTINUED | OUTPATIENT
Start: 2020-06-12 | End: 2020-06-17 | Stop reason: HOSPADM

## 2020-06-11 RX ORDER — OXYCODONE HYDROCHLORIDE 5 MG/1
2.5 TABLET ORAL
Status: DISCONTINUED | OUTPATIENT
Start: 2020-06-11 | End: 2020-06-17 | Stop reason: HOSPADM

## 2020-06-11 RX ORDER — BUDESONIDE AND FORMOTEROL FUMARATE DIHYDRATE 160; 4.5 UG/1; UG/1
2 AEROSOL RESPIRATORY (INHALATION)
Status: DISCONTINUED | OUTPATIENT
Start: 2020-06-12 | End: 2020-06-17 | Stop reason: HOSPADM

## 2020-06-11 RX ORDER — POLYETHYLENE GLYCOL 3350 17 G/17G
1 POWDER, FOR SOLUTION ORAL
Status: DISCONTINUED | OUTPATIENT
Start: 2020-06-11 | End: 2020-06-17 | Stop reason: HOSPADM

## 2020-06-11 RX ORDER — ACETAMINOPHEN 325 MG/1
650 TABLET ORAL EVERY 6 HOURS PRN
Status: DISCONTINUED | OUTPATIENT
Start: 2020-06-11 | End: 2020-06-17 | Stop reason: HOSPADM

## 2020-06-11 RX ORDER — LOSARTAN POTASSIUM 25 MG/1
25 TABLET ORAL DAILY
Status: DISCONTINUED | OUTPATIENT
Start: 2020-06-12 | End: 2020-06-17 | Stop reason: HOSPADM

## 2020-06-11 RX ORDER — NITROGLYCERIN 0.4 MG/1
0.4 TABLET SUBLINGUAL ONCE
Status: COMPLETED | OUTPATIENT
Start: 2020-06-11 | End: 2020-06-11

## 2020-06-11 RX ORDER — OXYCODONE HYDROCHLORIDE 5 MG/1
5 TABLET ORAL
Status: DISCONTINUED | OUTPATIENT
Start: 2020-06-11 | End: 2020-06-17 | Stop reason: HOSPADM

## 2020-06-11 RX ORDER — CARVEDILOL 6.25 MG/1
6.25 TABLET ORAL 2 TIMES DAILY WITH MEALS
Status: DISCONTINUED | OUTPATIENT
Start: 2020-06-12 | End: 2020-06-17 | Stop reason: HOSPADM

## 2020-06-11 RX ORDER — NITROGLYCERIN 0.4 MG/1
TABLET SUBLINGUAL
Status: COMPLETED | OUTPATIENT
Start: 2020-06-11 | End: 2020-06-11

## 2020-06-11 RX ORDER — SPIRONOLACTONE 25 MG/1
25 TABLET ORAL
Status: DISCONTINUED | OUTPATIENT
Start: 2020-06-11 | End: 2020-06-17 | Stop reason: HOSPADM

## 2020-06-11 RX ORDER — ONDANSETRON 4 MG/1
4 TABLET, ORALLY DISINTEGRATING ORAL EVERY 4 HOURS PRN
Status: DISCONTINUED | OUTPATIENT
Start: 2020-06-11 | End: 2020-06-17 | Stop reason: HOSPADM

## 2020-06-11 RX ORDER — ENALAPRILAT 1.25 MG/ML
1.25 INJECTION INTRAVENOUS EVERY 6 HOURS PRN
Status: DISCONTINUED | OUTPATIENT
Start: 2020-06-11 | End: 2020-06-17 | Stop reason: HOSPADM

## 2020-06-11 RX ORDER — MORPHINE SULFATE 4 MG/ML
2 INJECTION, SOLUTION INTRAMUSCULAR; INTRAVENOUS
Status: DISCONTINUED | OUTPATIENT
Start: 2020-06-11 | End: 2020-06-17 | Stop reason: HOSPADM

## 2020-06-11 RX ORDER — ONDANSETRON 2 MG/ML
4 INJECTION INTRAMUSCULAR; INTRAVENOUS EVERY 4 HOURS PRN
Status: DISCONTINUED | OUTPATIENT
Start: 2020-06-11 | End: 2020-06-17 | Stop reason: HOSPADM

## 2020-06-11 RX ORDER — BISACODYL 10 MG
10 SUPPOSITORY, RECTAL RECTAL
Status: DISCONTINUED | OUTPATIENT
Start: 2020-06-11 | End: 2020-06-17 | Stop reason: HOSPADM

## 2020-06-11 RX ORDER — ATORVASTATIN CALCIUM 40 MG/1
40 TABLET, FILM COATED ORAL EVERY EVENING
Status: DISCONTINUED | OUTPATIENT
Start: 2020-06-12 | End: 2020-06-17 | Stop reason: HOSPADM

## 2020-06-11 RX ADMIN — DOCUSATE SODIUM 50 MG AND SENNOSIDES 8.6 MG 2 TABLET: 8.6; 5 TABLET, FILM COATED ORAL at 05:02

## 2020-06-11 RX ADMIN — DOCUSATE SODIUM 50 MG AND SENNOSIDES 8.6 MG 2 TABLET: 8.6; 5 TABLET, FILM COATED ORAL at 17:00

## 2020-06-11 RX ADMIN — CARVEDILOL 6.25 MG: 6.25 TABLET, FILM COATED ORAL at 17:00

## 2020-06-11 RX ADMIN — NICOTINE 14 MG: 14 PATCH TRANSDERMAL at 05:02

## 2020-06-11 RX ADMIN — NITROGLYCERIN 0.4 MG: 0.4 TABLET, ORALLY DISINTEGRATING SUBLINGUAL at 21:36

## 2020-06-11 RX ADMIN — CARVEDILOL 6.25 MG: 6.25 TABLET, FILM COATED ORAL at 05:02

## 2020-06-11 RX ADMIN — OXYCODONE 5 MG: 5 TABLET ORAL at 02:05

## 2020-06-11 RX ADMIN — LOSARTAN POTASSIUM 25 MG: 25 TABLET, FILM COATED ORAL at 05:03

## 2020-06-11 RX ADMIN — BUDESONIDE 0.5 MG: 0.5 SUSPENSION RESPIRATORY (INHALATION) at 06:58

## 2020-06-11 RX ADMIN — OXYCODONE 2.5 MG: 5 TABLET ORAL at 12:39

## 2020-06-11 RX ADMIN — ENOXAPARIN SODIUM 30 MG: 30 INJECTION SUBCUTANEOUS at 05:05

## 2020-06-11 RX ADMIN — NITROGLYCERIN 0.4 MG: 0.4 TABLET, ORALLY DISINTEGRATING SUBLINGUAL at 21:57

## 2020-06-11 RX ADMIN — ATORVASTATIN CALCIUM 40 MG: 40 TABLET, FILM COATED ORAL at 17:00

## 2020-06-11 RX ADMIN — NITROGLYCERIN 0.4 MG: 0.4 TABLET, ORALLY DISINTEGRATING SUBLINGUAL at 21:47

## 2020-06-11 RX ADMIN — LABETALOL HYDROCHLORIDE 10 MG: 5 INJECTION, SOLUTION INTRAVENOUS at 00:52

## 2020-06-11 ASSESSMENT — GAIT ASSESSMENTS
ASSISTIVE DEVICE: FRONT WHEEL WALKER
DISTANCE (FEET): 250
GAIT LEVEL OF ASSIST: SUPERVISED

## 2020-06-11 ASSESSMENT — COGNITIVE AND FUNCTIONAL STATUS - GENERAL
CLIMB 3 TO 5 STEPS WITH RAILING: A LITTLE
WALKING IN HOSPITAL ROOM: A LITTLE
MOBILITY SCORE: 21
SUGGESTED CMS G CODE MODIFIER MOBILITY: CJ
MOVING FROM LYING ON BACK TO SITTING ON SIDE OF FLAT BED: A LITTLE

## 2020-06-11 ASSESSMENT — PULMONARY FUNCTION TESTS: EPAP_CMH2O: 8

## 2020-06-11 ASSESSMENT — LIFESTYLE VARIABLES: DO YOU DRINK ALCOHOL: NO

## 2020-06-11 ASSESSMENT — FIBROSIS 4 INDEX: FIB4 SCORE: 1.39

## 2020-06-11 NOTE — PROGRESS NOTES
Report received from WELLINGTON Anderson. Patient is resting with complaints of pain in her sacrum but refuses pain medication at this time. Patient was repositioned. POC was discussed with patient and white board updated. Bed is locked in lowest position with call light within reach. RN will continue to monitor.

## 2020-06-11 NOTE — DISCHARGE PLANNING
Received Choice form at 1110  Agency/Facility Name: [facility]  Referral sent per Choice form @ 1117     Received Choice form at 1037  Agency/Facility Name: [facility]   Referral sent per Choice form @ 1048

## 2020-06-11 NOTE — DISCHARGE PLANNING
ATTN: Case Management  RE: Referral for Home Health    Reason for referral denial: Per note placed by PT, patient is requesting to go OP PT.              Unfortunately, we are not able to accept this referral for the reason listed above. If further clarity is needed, our Transitional Care Specialists are available to discuss any barriers to service at [phone].      We look forward to collaborating with you in the future,  [facility] Team

## 2020-06-11 NOTE — THERAPY
Physical Therapy   Daily Treatment     Patient Name: Karen Jauregui  Age:  75 y.o., Sex:  female  Medical Record #: [id number]  Today's Date: 6/11/2020     Precautions: Fall Risk    Assessment    Pt less SOB today after ambulation but still demonstrating decreased activity tolerance compared to baseline. Pt demonstrates poor initial standing balance and mild B LE weakness due to inactivity and bedrest. Discussed need for PT services after hospital stay to improve functional mobility. Pt prefers to go to OP PT setting as she reports her current living situation is too crowded. Pt's son can take her to OP PT.    Plan    Continue current treatment plan.    Discharge recommendations:  Recommend outpatient physical therapy services to address higher level deficits.    Subjective    Pt agrees to PT.     Objective       06/11/20 1340   Cognition    Level of Consciousness Alert   Comments cooperative   Passive ROM Lower Body   Passive ROM Lower Body WDL   Active ROM Lower Body    Active ROM Lower Body  WDL   Strength Lower Body   Gross Strength Generalized Weakness, Equal Bilaterally   Sensation Lower Body   Lower Extremity Sensation   WDL   Lower Body Muscle Tone   Lower Body Muscle Tone  WDL   Balance   Standing Balance (Static) Fair +   Standing Balance (Dynamic) Fair +   Weight Shift Standing Fair   Skilled Intervention Compensatory Strategies;Verbal Cuing;Sequencing;Facilitation   Comments with FWW   Gait Analysis   Gait Level Of Assist Supervised   Assistive Device Front Wheel Walker   Distance (Feet) 250  (/95 mmHg after ambulation)   # of Times Distance was Traveled 1   Skilled Intervention Verbal Cuing;Compensatory Strategies;Sequencing;Facilitation   Comments cus to stay closer to walker   Bed Mobility    Supine to Sit Supervised   Scooting Supervised   Skilled Intervention Sequencing;Verbal Cuing   Functional Mobility   Sit to Stand Supervised   Bed, Chair, Wheelchair Transfer Supervised   Mobility gait  in schafer   Skilled Intervention Verbal Cuing;Sequencing;Compensatory Strategies;Tactile Cuing  (poor initial standing balance)   Activity Tolerance   Standing 15   Patient / Family Goals    Patient / Family Goal #1 Home    Short Term Goals    Short Term Goal # 1 Pt will be SPV for gait >300' with FWW to improve functional mobility.   Goal Outcome # 1 Progressing as expected   Short Term Goal # 2 Pt will be SPV for up/down 4 steps with B rails to be able to access her home.   Goal Outcome # 2 Goal not met   Short Term Goal # 3 Pt will be SPV for all transfers with FWW to improve fucntional mobiility.   Goal Outcome # 3 Goal met   Education Group   Role of Physical Therapist Patient Response Patient;Acceptance;Explanation;Action Demonstration;Verbal Demonstration   Anticipated Discharge Equipment   DC Equipment None

## 2020-06-11 NOTE — FACE TO FACE
"Face to Face Note  -  Durable Medical Equipment    Cezar Silva M.D. - NPI: [id number]  I certify that this patient is under my care and that they had a durable medical equipment(DME)face to face encounter by myself that meets the physician DME face-to-face encounter requirements with this patient on:    Date of encounter:   Patient:                    MRN:                       YOB: 2019  Karen Jauregui  [id number]  1945     The encounter with the patient was in whole, or in part, for the following medical condition, which is the primary reason for durable medical equipment:  COPD    I certify that, based on my findings, the following durable medical equipment is medically necessary:  Oxygen.    HOME O2 Saturation Measurements:(Values must be present for Home Oxygen orders)  Room air sat at rest: 90  Room air sat with amb: 82  With liters of O2: .5, O2 sat at rest with O2: 92  With Liters of O2: 2, O2 sat with amb with O2 : 91  Is the patient mobile?: Yes    My Clinical findings support the need for the above equipment due to:  Hypoxia    Supporting Symptoms: The patient requires supplemental oxygen, as the following interventions have been tried with limited or no improvement: \"Bronchodilators and/or steroid inhalers    "

## 2020-06-11 NOTE — DISCHARGE PLANNING
Per note entered by GRETA Mar Pt mentioned that where she lives space is very confined and concerned HH wouldn't be able to accommodate pt at home. Therapy team spoke to pt and discussed possible outpatient therapy. PT to work with pt later today when appropriate. Possible outpatient therapy.  [facility] will reassess after her meeting with PT to see if she will be going OP or go with Home Health.      Thank you

## 2020-06-11 NOTE — DISCHARGE INSTRUCTIONS
Discharge Instructions per Cezar Silva M.D.    Follow up with primary care doctor in 1 week      DIET: healthy diet    ACTIVITY: as tolerated    DIAGNOSIS: partial small bowel obstruction (resolved)      Return to ER if symptoms return, fever, chills, shortness of breath, palpitation,etc.                 Discharge Instructions    Discharged to home by car with relative. Discharged via wheelchair, hospital escort: Yes.  Special equipment needed: Oxygen    Be sure to schedule a follow-up appointment with your primary care doctor or any specialists as instructed.     Discharge Plan:   Smoking Cessation Offered: Patient Counseled    I understand that a diet low in cholesterol, fat, and sodium is recommended for good health. Unless I have been given specific instructions below for another diet, I accept this instruction as my diet prescription.   Other diet: Regular     Special Instructions: None    · Is patient discharged on Warfarin / Coumadin?   No     Depression / Suicide Risk    As [facility] are [facility], it is important to learn how to keep safe from harming yourself.    Recognize the warning signs:  · Abrupt changes in personality, positive or negative- including increase in energy   · Giving away possessions  · Change in eating patterns- significant weight changes-  positive or negative  · Change in sleeping patterns- unable to sleep or sleeping all the time   · Unwillingness or inability to communicate  · Depression  · Unusual sadness, discouragement and loneliness  · Talk of wanting to die  · Neglect of personal appearance   · Rebelliousness- reckless behavior  · Withdrawal from people/activities they love  · Confusion- inability to concentrate     If you or a loved one observes any of these behaviors or has concerns about self-harm, here's what you can do:  · Talk about it- your feelings and reasons for harming yourself  · Remove any means that you might use to hurt  VACCINE ADMINISTRATION RECORD  PARENT / GUARDIAN APPROVAL  Date: 10/5/2020  Vaccine administered to:  Haroldo Blas     : 3/19/2019    MRN: [id number]    A copy of the appropriate Centers for Disease Control and Prevention Vaccine Information stateme yourself (examples: pills, rope, extension cords, firearm)  · Get professional help from the community (Mental Health, Substance Abuse, psychological counseling)  · Do not be alone:Call your Safe Contact- someone whom you trust who will be there for you.  · Call your local CRISIS HOTLINE 965-2790 or 245-811-2381  · Call your local Children's Mobile Crisis Response Team [place] (816) 425-5792 or www.GoPro  · Call the toll free National Suicide Prevention Hotlines   · National Suicide Prevention Lifeline 250-865-ZBHT (2149)  · [employer] Line Network 800-SUICIDE (289-3155)    Small Bowel Obstruction  A small bowel obstruction means that something is blocking the small bowel. The small bowel is also called the small intestine. It is the long tube that connects the stomach to the colon. An obstruction will stop food and fluids from passing through the small bowel. Treatment depends on what is causing the problem and how bad the problem is.  Follow these instructions at home:  · Get a lot of rest.  · Follow your diet as told by your doctor. You may need to:  ¨ Only drink clear liquids until you start to get better.  ¨ Avoid solid foods as told by your doctor.  · Take over-the-counter and prescription medicines only as told by your doctor.  · Keep all follow-up visits as told by your doctor. This is important.  Contact a doctor if:  · You have a fever.  · You have chills.  Get help right away if:  · You have pain or cramps that get worse.  · You throw up (vomit) blood.  · You have a feeling of being sick to your stomach (nausea) that does not go away.  · You cannot stop throwing up.  · You cannot drink fluids.  · You feel confused.  · You feel dry or thirsty (dehydrated).  · Your belly gets more bloated.  · You feel weak or you pass out (faint).  This information is not intended to replace advice given to you by your health care provider. Make sure you discuss any questions you have with your health  care provider.  Document Released: 01/25/2006 Document Revised: 08/14/2017 Document Reviewed: 02/11/2016  [employer] Interactive Patient Education © 2017 Elsevier Inc.    Fluticasone; Salmeterol inhalation aerosol  What is this medicine?  FLUTICASONE; SALMETEROL (floo TIK a sone; sal ME te role) inhalation is a combination of two medicines that decrease inflammation and help to open up the airways of your lungs. It is used to treat asthma. Do NOT use for an acute asthma attack.  This medicine may be used for other purposes; ask your health care provider or pharmacist if you have questions.  COMMON BRAND NAME(S): Advair HFA  What should I tell my health care provider before I take this medicine?  They need to know if you have any of these conditions:  -bone problems  -immune system problems  -diabetes  -heart disease or irregular heartbeat  -high blood pressure  -infection  -pheochromocytoma  -seizures  -thyroid disease  -worsening asthma  -an unusual or allergic reaction to fluticasone; salmeterol, other corticosteroids, other medicines, foods, dyes, or preservatives  -pregnant or trying to get pregnant  -breast-feeding  How should I use this medicine?  This medicine is inhaled through the mouth. Follow the directions on the prescription label. Shake well for 5 seconds before each use. After using the inhaler, rinse your mouth with water. Make sure not to swallow the water. Take your medicine at regular intervals. Do not take your medicine more often than directed. Do not stop taking except on your doctor's advice. Make sure that you are using your inhaler correctly. Ask you doctor or health care provider if you have any questions.  A special MedGuide will be given to you by the pharmacist with each prescription and refill. Be sure to read this information carefully each time.  Talk to your pediatrician regarding the use of this medicine in children. While this drug may be prescribed for children as young as 12 years  of age for selected conditions, precautions do apply.  Overdosage: If you think you have taken too much of this medicine contact a poison control center or emergency room at once.  NOTE: This medicine is only for you. Do not share this medicine with others.  What if I miss a dose?  If you miss a dose, use it as soon as you remember. If it is almost time for your next dose, use only that dose and continue with your regular schedule, spacing doses evenly. Do not use double or extra doses.  What may interact with this medicine?  Do not take this medicine with any of the following medications:  -MAOIs like Carbex, Eldepryl, Marplan, Nardil, and Parnate  This medicine may also interact with the following medications:  -aminophylline or theophylline  -antiviral medicines for HIV or AIDS  -diuretics  -medicines for colds  -medicines for depression or emotional conditions  -medicines for fungal infections like ketoconazole and itraconazole  -medicines for the heart like metoprolol, propanolol  -medicines for weight loss including some herbal products  -other medicine for breathing problems  -pimozide  -some antibiotics like clarithromycin, erythromycin, levofloxacin, linezolid, and telithromycin  -vaccines  This list may not describe all possible interactions. Give your health care provider a list of all the medicines, herbs, non-prescription drugs, or dietary supplements you use. Also tell them if you smoke, drink alcohol, or use illegal drugs. Some items may interact with your medicine.  What should I watch for while using this medicine?  Visit your doctor for regular check ups. Tell your doctor or health care professional if your symptoms do not get better. If your symptoms get worse or if you need your short-acting inhalers more often, call your doctor right away. Do not use this medicine more than every 12 hours.  If you have asthma, be aware that using this medicine may increase your risk of dying from asthma-related  problems. Talk to your doctor about the risks and benefits of taking this medicine. NEVER use this medicine for an acute asthma attack.  This medicine may increase your risk of getting an infection. Tell your doctor or health care professional if you are around anyone with measles or chickenpox, or if you develop sores or blisters that do not heal properly.  What side effects may I notice from receiving this medicine?  Side effects that you should report to your doctor or health care professional as soon as possible:  -allergic reactions like skin rash or hives, swelling of the face, lips, or tongue  -changes in vision  -chest pain  -feeling faint or lightheaded, falls  -fever or chills  -irregular heartbeat  Side effects that usually do not require medical attention (report to your doctor or health care professional if they continue or are bothersome):  -coughing, hoarseness, throat irritation  -headache  -nervousness  -stomach problems  -stuffy nose  -tremors  This list may not describe all possible side effects. Call your doctor for medical advice about side effects. You may report side effects to FDA at 8-587-FDA-0176.  Where should I keep my medicine?  Keep out of the reach of children.  Store at room temperature between 15 and 30 degrees C (59 and 86 degrees F). Store inhaler with the mouthpiece down. Throw away the inhaler when the dose indicator reads 000, or after the expiration date, whichever comes first.  NOTE: This sheet is a summary. It may not cover all possible information. If you have questions about this medicine, talk to your doctor, pharmacist, or health care provider.  © 2018 Elsevier/Gold Standard (2017-02-21 13:13:20)

## 2020-06-11 NOTE — PROGRESS NOTES
Assumed care of pt, received bedside report from WELLINGTON Dickens. Pt currently sleeping in bed, appears calm, unlabored respirations. Fall and safety precautions in place, strip alarm in place. Will discuss POC with pt when she wakes up.  No further needs at this time.

## 2020-06-11 NOTE — DISCHARGE PLANNING
Notified by pharmacy technician Krystyna that patient declined meds to beds service and requested RX for Advair be sent to [facility] pharmacy. Advair was transferred to [facility] pharmacy. WELLINGTON Simon notified.

## 2020-06-11 NOTE — DISCHARGE PLANNING
Anticipated Discharge Disposition: Home with DME (O2) and HH    Action: LSW met with pt to discuss HH CHOICE form and DME CHOICE form. Pt provided verbal consent for referral to be sent to [facility]. Pt mentioned that where she lives space is very confined and concerned HH wouldn't be able to accommodate pt at home. Therapy team spoke to pt and discussed possible outpatient therapy. PT to work with pt later today when appropriate. Possible outpatient therapy.     Pt not sure what O2 company on service with. Previous case management notes state that pt on service with [facility]. Pt provided verbal consent to continue with [facility]. Pt will be needing tank upon d/c. Trini CHAVEZ aware.     LSW and pt discussed IMM. Pt provided verbal for IMM. LSW provided copy to pt.     LSW faxed HH CHOICE, DME CHOICE, and IMM to [facility].     Barriers to Discharge: None    Plan: Await HH acceptance, Await DME O2 acceptance, LSW to assist as needed     Addendum 1309  LSW received tc from Dunia with [facility]. Dunia informed that they anticipate O2 delivery this afternoon.        Addendum 1513  PT note stating that pt has requested for outpatient PT/OT. [facility] declined.         Care Transition Team Assessment      The information provided for this assessment was provided by pt and chart review. Pt confirmed the information on facesheet to be accurate. Pt's PCP is Baltazar Julio and states to have seen PCP about a month ago. Pt lives in a mobile home with 2 sons, sister, and sister's granchild. Pt states to receive SSI and VA benefits. Prior to admit pt independent with ADL's/IADL's. Pt on service with [facility] prior to admit. Pt states that insurance covers medications. Pt uses the [facility] pharmacy in [place].       Information Source  Orientation : Oriented x 4  Information Given By: Patient  Informant's Name: Karen  Who is responsible for making decisions for patient? : Patient    Elopement Risk  Legal Hold:  No  Ambulatory or Self Mobile in Wheelchair: Yes  Disoriented: No  Psychiatric Symptoms: None  History of Wandering: No  Elopement this Admit: No  Vocalizing Wanting to Leave: No  Displays Behaviors, Body Language Wanting to Leave: No-Not at Risk for Elopement  Elopement Risk: Not at Risk for Elopement    Interdisciplinary Discharge Planning  Lives with - Patient's Self Care Capacity: Other (Comments)(sister)  Patient or legal guardian wants to designate a caregiver (see row info): No  Housing / Facility: Mobile Home  Prior Services: None    Discharge Preparedness  What is your plan after discharge?: Home health care  What are your discharge supports?: Child, Sibling  Prior Functional Level: Independent with Activities of Daily Living, Independent with Medication Management  Difficulity with ADLs: None  Difficulity with IADLs: None    Functional Assesment  Prior Functional Level: Independent with Activities of Daily Living, Independent with Medication Management    Finances  Financial Barriers to Discharge: No  Prescription Coverage: Yes    Vision / Hearing Impairment  Vision Impairment : No  Hearing Impairment : No    Domestic Abuse  Have you ever been the victim of abuse or violence?: No  Physical Abuse or Sexual Abuse: No  Verbal Abuse or Emotional Abuse: No  Possible Abuse Reported to:: Not Applicable    Discharge Risks or Barriers  Discharge risks or barriers?: No  Patient risk factors: Vulnerable adult    Anticipated Discharge Information  Anticipated discharge disposition: [facility], Home

## 2020-06-11 NOTE — CARE PLAN
Problem: Communication  Goal: The ability to communicate needs accurately and effectively will improve  Outcome: PROGRESSING AS EXPECTED     Problem: Safety  Goal: Will remain free from injury  Outcome: PROGRESSING AS EXPECTED   Bed locked in lowest position with call light within reach.   Problem: Infection  Goal: Will remain free from infection  Outcome: PROGRESSING AS EXPECTED     Problem: Venous Thromboembolism (VTW)/Deep Vein Thrombosis (DVT) Prevention:  Goal: Patient will participate in Venous Thrombosis (VTE)/Deep Vein Thrombosis (DVT)Prevention Measures  Outcome: PROGRESSING AS EXPECTED     Problem: Bowel/Gastric:  Goal: Normal bowel function is maintained or improved  Outcome: PROGRESSING AS EXPECTED

## 2020-06-12 PROBLEM — J96.01 ACUTE RESPIRATORY FAILURE WITH HYPOXIA (HCC): Status: ACTIVE | Noted: 2020-06-12

## 2020-06-12 PROBLEM — I50.23 ACUTE ON CHRONIC SYSTOLIC CONGESTIVE HEART FAILURE (HCC): Status: ACTIVE | Noted: 2020-06-12

## 2020-06-12 PROBLEM — I16.1 HYPERTENSIVE EMERGENCY: Status: ACTIVE | Noted: 2020-06-12

## 2020-06-12 LAB
ANION GAP SERPL CALC-SCNC: 9 MMOL/L (ref 7–16)
BASOPHILS # BLD AUTO: 0.2 % (ref 0–1.8)
BASOPHILS # BLD: 0.02 K/UL (ref 0–0.12)
BLOOD CULTURE HOLD CXBCH: NORMAL
BLOOD CULTURE HOLD CXBCH: NORMAL
BUN SERPL-MCNC: 26 MG/DL (ref 8–22)
CALCIUM SERPL-MCNC: 9.3 MG/DL (ref 8.5–10.5)
CHLORIDE SERPL-SCNC: 97 MMOL/L (ref 96–112)
CO2 SERPL-SCNC: 28 MMOL/L (ref 20–33)
CREAT SERPL-MCNC: 0.75 MG/DL (ref 0.5–1.4)
EOSINOPHIL # BLD AUTO: 0.01 K/UL (ref 0–0.51)
EOSINOPHIL NFR BLD: 0.1 % (ref 0–6.9)
ERYTHROCYTE [DISTWIDTH] IN BLOOD BY AUTOMATED COUNT: 41.5 FL (ref 35.9–50)
GLUCOSE SERPL-MCNC: 159 MG/DL (ref 65–99)
HCT VFR BLD AUTO: 44.3 % (ref 37–47)
HGB BLD-MCNC: 14.7 G/DL (ref 12–16)
IMM GRANULOCYTES # BLD AUTO: 0.04 K/UL (ref 0–0.11)
IMM GRANULOCYTES NFR BLD AUTO: 0.4 % (ref 0–0.9)
LYMPHOCYTES # BLD AUTO: 0.5 K/UL (ref 1–4.8)
LYMPHOCYTES NFR BLD: 4.9 % (ref 22–41)
MCH RBC QN AUTO: 28.7 PG (ref 27–33)
MCHC RBC AUTO-ENTMCNC: 33.2 G/DL (ref 33.6–35)
MCV RBC AUTO: 86.4 FL (ref 81.4–97.8)
MONOCYTES # BLD AUTO: 0.11 K/UL (ref 0–0.85)
MONOCYTES NFR BLD AUTO: 1.1 % (ref 0–13.4)
NEUTROPHILS # BLD AUTO: 9.45 K/UL (ref 2–7.15)
NEUTROPHILS NFR BLD: 93.3 % (ref 44–72)
NRBC # BLD AUTO: 0 K/UL
NRBC BLD-RTO: 0 /100 WBC
PLATELET # BLD AUTO: 246 K/UL (ref 164–446)
PMV BLD AUTO: 9.3 FL (ref 9–12.9)
POTASSIUM SERPL-SCNC: 3.5 MMOL/L (ref 3.6–5.5)
RBC # BLD AUTO: 5.13 M/UL (ref 4.2–5.4)
SODIUM SERPL-SCNC: 134 MMOL/L (ref 135–145)
WBC # BLD AUTO: 10.1 K/UL (ref 4.8–10.8)

## 2020-06-12 PROCEDURE — 770020 HCHG ROOM/CARE - TELE (206)

## 2020-06-12 PROCEDURE — A9270 NON-COVERED ITEM OR SERVICE: HCPCS | Performed by: INTERNAL MEDICINE

## 2020-06-12 PROCEDURE — 97530 THERAPEUTIC ACTIVITIES: CPT

## 2020-06-12 PROCEDURE — 96374 THER/PROPH/DIAG INJ IV PUSH: CPT

## 2020-06-12 PROCEDURE — 85025 COMPLETE CBC W/AUTO DIFF WBC: CPT

## 2020-06-12 PROCEDURE — 94640 AIRWAY INHALATION TREATMENT: CPT

## 2020-06-12 PROCEDURE — A9270 NON-COVERED ITEM OR SERVICE: HCPCS | Performed by: HOSPITALIST

## 2020-06-12 PROCEDURE — 97116 GAIT TRAINING THERAPY: CPT

## 2020-06-12 PROCEDURE — 700102 HCHG RX REV CODE 250 W/ 637 OVERRIDE(OP): Performed by: HOSPITALIST

## 2020-06-12 PROCEDURE — 99406 BEHAV CHNG SMOKING 3-10 MIN: CPT

## 2020-06-12 PROCEDURE — 94760 N-INVAS EAR/PLS OXIMETRY 1: CPT

## 2020-06-12 PROCEDURE — 80048 BASIC METABOLIC PNL TOTAL CA: CPT

## 2020-06-12 PROCEDURE — 700111 HCHG RX REV CODE 636 W/ 250 OVERRIDE (IP): Performed by: HOSPITALIST

## 2020-06-12 PROCEDURE — 94664 DEMO&/EVAL PT USE INHALER: CPT

## 2020-06-12 PROCEDURE — 99233 SBSQ HOSP IP/OBS HIGH 50: CPT | Performed by: INTERNAL MEDICINE

## 2020-06-12 PROCEDURE — 97161 PT EVAL LOW COMPLEX 20 MIN: CPT

## 2020-06-12 PROCEDURE — 700102 HCHG RX REV CODE 250 W/ 637 OVERRIDE(OP): Performed by: INTERNAL MEDICINE

## 2020-06-12 PROCEDURE — 36415 COLL VENOUS BLD VENIPUNCTURE: CPT

## 2020-06-12 PROCEDURE — 96375 TX/PRO/DX INJ NEW DRUG ADDON: CPT

## 2020-06-12 PROCEDURE — 97166 OT EVAL MOD COMPLEX 45 MIN: CPT

## 2020-06-12 RX ORDER — LORAZEPAM 1 MG/1
1 TABLET ORAL ONCE
Status: COMPLETED | OUTPATIENT
Start: 2020-06-12 | End: 2020-06-12

## 2020-06-12 RX ORDER — FUROSEMIDE 10 MG/ML
40 INJECTION INTRAMUSCULAR; INTRAVENOUS
Status: DISCONTINUED | OUTPATIENT
Start: 2020-06-12 | End: 2020-06-14

## 2020-06-12 RX ORDER — NICOTINE 21 MG/24HR
14 PATCH, TRANSDERMAL 24 HOURS TRANSDERMAL
Status: DISCONTINUED | OUTPATIENT
Start: 2020-06-12 | End: 2020-06-17 | Stop reason: HOSPADM

## 2020-06-12 RX ADMIN — ENALAPRILAT 1.25 MG: 1.25 INJECTION INTRAVENOUS at 01:37

## 2020-06-12 RX ADMIN — ATORVASTATIN CALCIUM 40 MG: 40 TABLET, FILM COATED ORAL at 17:26

## 2020-06-12 RX ADMIN — CARVEDILOL 6.25 MG: 6.25 TABLET, FILM COATED ORAL at 17:27

## 2020-06-12 RX ADMIN — NICOTINE 14 MG: 14 PATCH TRANSDERMAL at 10:38

## 2020-06-12 RX ADMIN — FUROSEMIDE 40 MG: 10 INJECTION, SOLUTION INTRAMUSCULAR; INTRAVENOUS at 06:30

## 2020-06-12 RX ADMIN — LOSARTAN POTASSIUM 25 MG: 25 TABLET, FILM COATED ORAL at 06:30

## 2020-06-12 RX ADMIN — FUROSEMIDE 40 MG: 10 INJECTION, SOLUTION INTRAMUSCULAR; INTRAVENOUS at 01:18

## 2020-06-12 RX ADMIN — BUDESONIDE AND FORMOTEROL FUMARATE DIHYDRATE 2 PUFF: 160; 4.5 AEROSOL RESPIRATORY (INHALATION) at 19:51

## 2020-06-12 RX ADMIN — DOCUSATE SODIUM 50 MG AND SENNOSIDES 8.6 MG 2 TABLET: 8.6; 5 TABLET, FILM COATED ORAL at 17:27

## 2020-06-12 RX ADMIN — ENOXAPARIN SODIUM 40 MG: 40 INJECTION SUBCUTANEOUS at 06:30

## 2020-06-12 RX ADMIN — FUROSEMIDE 40 MG: 10 INJECTION, SOLUTION INTRAMUSCULAR; INTRAVENOUS at 17:27

## 2020-06-12 RX ADMIN — OXYCODONE 5 MG: 5 TABLET ORAL at 15:14

## 2020-06-12 RX ADMIN — CARVEDILOL 6.25 MG: 6.25 TABLET, FILM COATED ORAL at 08:16

## 2020-06-12 RX ADMIN — LORAZEPAM 1 MG: 1 TABLET ORAL at 21:17

## 2020-06-12 RX ADMIN — BUDESONIDE AND FORMOTEROL FUMARATE DIHYDRATE 2 PUFF: 160; 4.5 AEROSOL RESPIRATORY (INHALATION) at 08:01

## 2020-06-12 ASSESSMENT — COGNITIVE AND FUNCTIONAL STATUS - GENERAL
MOBILITY SCORE: 21
MOVING FROM LYING ON BACK TO SITTING ON SIDE OF FLAT BED: A LITTLE
TOILETING: A LITTLE
HELP NEEDED FOR BATHING: A LITTLE
TURNING FROM BACK TO SIDE WHILE IN FLAT BAD: A LITTLE
CLIMB 3 TO 5 STEPS WITH RAILING: A LITTLE
DRESSING REGULAR UPPER BODY CLOTHING: A LITTLE
SUGGESTED CMS G CODE MODIFIER MOBILITY: CK
CLIMB 3 TO 5 STEPS WITH RAILING: A LITTLE
WALKING IN HOSPITAL ROOM: A LITTLE
EATING MEALS: A LITTLE
WALKING IN HOSPITAL ROOM: A LITTLE
DRESSING REGULAR LOWER BODY CLOTHING: A LITTLE
MOBILITY SCORE: 18
SUGGESTED CMS G CODE MODIFIER MOBILITY: CJ
TOILETING: A LITTLE
MOVING TO AND FROM BED TO CHAIR: A LITTLE
DRESSING REGULAR UPPER BODY CLOTHING: A LITTLE
SUGGESTED CMS G CODE MODIFIER DAILY ACTIVITY: CK
PERSONAL GROOMING: A LITTLE
PERSONAL GROOMING: A LITTLE
STANDING UP FROM CHAIR USING ARMS: A LITTLE
MOVING FROM LYING ON BACK TO SITTING ON SIDE OF FLAT BED: A LITTLE
DRESSING REGULAR LOWER BODY CLOTHING: A LITTLE
DAILY ACTIVITIY SCORE: 19
SUGGESTED CMS G CODE MODIFIER DAILY ACTIVITY: CK
HELP NEEDED FOR BATHING: A LITTLE
DAILY ACTIVITIY SCORE: 18

## 2020-06-12 ASSESSMENT — LIFESTYLE VARIABLES
CONSUMPTION TOTAL: NEGATIVE
TOTAL SCORE: 0
CONSUMPTION TOTAL: INCOMPLETE
HAVE PEOPLE ANNOYED YOU BY CRITICIZING YOUR DRINKING: NO
TOTAL SCORE: 0
CONSUMPTION TOTAL: INCOMPLETE
EVER FELT BAD OR GUILTY ABOUT YOUR DRINKING: NO
HAVE YOU EVER FELT YOU SHOULD CUT DOWN ON YOUR DRINKING: NO
EVER FELT BAD OR GUILTY ABOUT YOUR DRINKING: NO
EVER HAD A DRINK FIRST THING IN THE MORNING TO STEADY YOUR NERVES TO GET RID OF A HANGOVER: NO
HAVE YOU EVER FELT YOU SHOULD CUT DOWN ON YOUR DRINKING: NO
ALCOHOL_USE: NO
DOES PATIENT WANT TO STOP DRINKING: NO
AVERAGE NUMBER OF DAYS PER WEEK YOU HAVE A DRINK CONTAINING ALCOHOL: 0
EVER HAD A DRINK FIRST THING IN THE MORNING TO STEADY YOUR NERVES TO GET RID OF A HANGOVER: NO
EVER_SMOKED: YES
HAVE YOU EVER FELT YOU SHOULD CUT DOWN ON YOUR DRINKING: NO
HOW MANY TIMES IN THE PAST YEAR HAVE YOU HAD 5 OR MORE DRINKS IN A DAY: 0
EVER FELT BAD OR GUILTY ABOUT YOUR DRINKING: NO
ALCOHOL_USE: NO
EVER HAD A DRINK FIRST THING IN THE MORNING TO STEADY YOUR NERVES TO GET RID OF A HANGOVER: NO
TOTAL SCORE: 0
TOTAL SCORE: 0
HAVE PEOPLE ANNOYED YOU BY CRITICIZING YOUR DRINKING: NO
ALCOHOL_USE: NO
ON A TYPICAL DAY WHEN YOU DRINK ALCOHOL HOW MANY DRINKS DO YOU HAVE: 0
TOTAL SCORE: 0
HAVE PEOPLE ANNOYED YOU BY CRITICIZING YOUR DRINKING: NO

## 2020-06-12 ASSESSMENT — GAIT ASSESSMENTS
DISTANCE (FEET): 75
DEVIATION: BRADYKINETIC
ASSISTIVE DEVICE: FRONT WHEEL WALKER
GAIT LEVEL OF ASSIST: SUPERVISED

## 2020-06-12 ASSESSMENT — ENCOUNTER SYMPTOMS
PSYCHIATRIC NEGATIVE: 1
FEVER: 0
FALLS: 0
VOMITING: 0
BLURRED VISION: 0
ABDOMINAL PAIN: 0
CONSTIPATION: 0
SORE THROAT: 0
PALPITATIONS: 0
CONSTITUTIONAL NEGATIVE: 1
WEAKNESS: 0
DEPRESSION: 0
DIARRHEA: 0
EYES NEGATIVE: 1
NEUROLOGICAL NEGATIVE: 1
CARDIOVASCULAR NEGATIVE: 1
GASTROINTESTINAL NEGATIVE: 1
NERVOUS/ANXIOUS: 0
MUSCULOSKELETAL NEGATIVE: 1
NAUSEA: 0
COUGH: 1
HEADACHES: 0
CHILLS: 0
DIZZINESS: 0
SHORTNESS OF BREATH: 1

## 2020-06-12 ASSESSMENT — PATIENT HEALTH QUESTIONNAIRE - PHQ9
2. FEELING DOWN, DEPRESSED, IRRITABLE, OR HOPELESS: NOT AT ALL
SUM OF ALL RESPONSES TO PHQ9 QUESTIONS 1 AND 2: 0
1. LITTLE INTEREST OR PLEASURE IN DOING THINGS: NOT AT ALL

## 2020-06-12 ASSESSMENT — FIBROSIS 4 INDEX
FIB4 SCORE: 2.14
FIB4 SCORE: 1.84

## 2020-06-12 ASSESSMENT — ACTIVITIES OF DAILY LIVING (ADL): TOILETING: INDEPENDENT

## 2020-06-12 NOTE — DISCHARGE PLANNING
ATTN: Case Management  RE: Referral for Home Health    As of 6/12/20, we have accepted the Home Health referral for the patient listed above.    A [facility] clinician will be out to see the patient within 48 hours. If you have any questions or concerns regarding the patient’s transition to Home Health, please do not hesitate to contact us at [phone].      We look forward to collaborating with you,  [facility] Team

## 2020-06-12 NOTE — ED NOTES
Bedside report given to Yumiko PARIS. Yumiko made aware that patient was just medicated PRN for BP per the MD order.

## 2020-06-12 NOTE — DIETARY
"Nutrition services: Day 1 of admit.  Karen Jauregui is a 75 y.o. female with admitting DX of acute respiratory failure with hypoxia.   Consult received for MST 1 (2-13 lb wt loss in 1 month) and BMI <19.     Pt's chart reviewed. Pt recently admitted and seen by RD on 6/8. A care plan to monitor for adequate PO intake was initiated and was met on 6/10.     Assessment:  Height: 154.9 cm (5' 1\")  Weight: 41.2 kg (90 lb 13.3 oz)  Body mass index is 17.16 kg/m²., BMI classification: Underweight   Diet/Intake: Regular; No intake documented in flowsheets for current admission. Previous intake noted to be average ~73% of meals from 6/9 to 6/11.     Evaluation:   1. Per previous dietary note 5/14 pt reports a UBW of 95 lb. Current wt is slightly decreased from reported UBW.    2. Per chart review wt history is as follows:  · 6/11: 91.49 lb   · 5/14: 93.92 lb  · 4/9: 85 lb  · 2/28: 85.4 lb  Weight noted to be slightly increased (+5.4 lb) over past 4 months.  3. Pt noted with CHF and plan to diurese per MD note. Lasix and aldactone on MAR. No edema noted in flowsheets.     Malnutrition Risk: Pt does not meet criteria at this time.     Recommendations/Plan:  1. Encourage intake of meals.   2. Document intake of all meals as % taken in ADL's to provide interdisciplinary communication across all shifts.   3. Obtain supplement order per RD as needed.   4. Monitor weight.  5. Nutrition rep will continue to see patient for ongoing meal and snack preferences.     RD will monitor to ensure adequate PO intake continues this admission.         "

## 2020-06-12 NOTE — ED NOTES
Pt reports feeling improved after last nitroglycerin and RR improved. Pt seems slightly less anxious.

## 2020-06-12 NOTE — ASSESSMENT & PLAN NOTE
Likely medication induced   - Asymptomatic. Mentation at baseline  Orthostatics unable to be obtained as patient was too weak to stand.   Hold off on IVF for now. She is currently normotensive.   Continue carvedilol, losartan, spironolactone  Will lower holding parameters for p.o. furosemide  Monitor and adjust as needed

## 2020-06-12 NOTE — H&P
"Hospital Medicine History & Physical Note    Date of Service  6/11/2020    Primary Care Physician  Baltazar Julio M.D.    Code Status  Full Code    Chief Complaint  Chief Complaint   Patient presents with   • Shortness of Breath     pt BIBA after being discharged from [facility] 3 hours ago. RA sat 53% and on BIPAP       History of Presenting Illness  75 y.o. female with history of COPD with current exacerbation, essential hypertension with poor control, and dyslipidemia was in her usual state of health until approximately 3 hours after her discharge from the hospital on the day of admission.  She reports feeling \"great\" after her discharge.  She sat on her couch to rest, and then decided to get up to get some food.  She subsequently developed severe shortness of breath, and ultimately re-presented to the ED.  She currently denies chest pain, abdominal pain, nausea or vomiting, diarrhea or constipation.  No other complaints.      Review of Systems  Review of Systems   Constitutional: Negative.    HENT: Negative.    Eyes: Negative.    Respiratory: Positive for cough and shortness of breath.    Cardiovascular: Negative.    Gastrointestinal: Negative.    Genitourinary: Negative.    Musculoskeletal: Negative.    Skin: Negative.    Neurological: Negative.    Endo/Heme/Allergies: Negative.    Psychiatric/Behavioral: Negative.        Past Medical History   has a past medical history of Abdominal aortic aneurysm (HCC) (11/2010), Angina, Arthritis, Atherosclerosis of arteries of the extremities, Bowel habit changes, Carotid atherosclerosis, COPD (chronic obstructive pulmonary disease) (HCC), Diverticulitis, Diverticulosis of colon, Dyslipidemia, Emphysema of lung (HCC), Eosinophilic colitis, Family history of nonmelanoma skin cancer, Hypertension, Pain, Peripheral vascular disease (HCC), PVD (posterior vitreous detachment), left eye (1/13/2015), Snoring, Spinal stenosis of lumbar region, and Unspecified hemorrhagic " conditions.    Surgical History   has a past surgical history that includes colonoscopy (3/1/2009, 4/2012, 7/2/13); gyn surgery (2002); gyn surgery (1975); other; colon resection laparoscopic (8/5/2013); colonoscopy with biopsy (3/6/2015); other cardiac surgery (2005); aaa with stent graft (N/A, 3/31/2017); and femoral femoral bypass (N/A, 3/31/2017).     Family History  family history includes Cancer in her sister; Heart Disease in her brother and sister; Heart Disease (age of onset: 55) in her father; Heart Disease (age of onset: 82) in her mother; Hyperlipidemia in her father, mother, and sister; Hypertension in her father, mother, and sister; Non-contributory in her sister; Stroke in her sister.     Social History   reports that she has been smoking cigarettes. She has a 45.00 pack-year smoking history. She has never used smokeless tobacco. She reports that she does not drink alcohol or use drugs.    Allergies  Allergies   Allergen Reactions   • Doxycycline Diarrhea     None stop diarrhea   • Tramadol Vomiting   • Amoxicillin Diarrhea     Diarrhea   • Ciprofloxacin Unspecified     Unspecified       Medications  Prior to Admission Medications   Prescriptions Last Dose Informant Patient Reported? Taking?   HYDROcodone-acetaminophen (NORCO) 5-325 MG Tab per tablet  Patient's Home Pharmacy No No   Sig: Take 1 Tab by mouth every 8 hours as needed (sciatica and leg pain/hip pain) for up to 30 days.   VENTOLIN  (90 Base) MCG/ACT Aero Soln inhalation aerosol  Patient's Home Pharmacy Yes No   Sig: Inhale 2 Puffs by mouth every four hours as needed for Shortness of Breath.   aspirin EC (ECOTRIN) 81 MG Tablet Delayed Response  Patient's Home Pharmacy Yes No   Sig: Take 324 mg by mouth as needed (For cheat pain).   atorvastatin (LIPITOR) 40 MG Tab  Patient's Home Pharmacy No No   Sig: Take 1 Tab by mouth every evening.   carvedilol (COREG) 6.25 MG Tab  Patient's Home Pharmacy No No   Sig: Take 1 Tab by mouth 2 times  a day, with meals.   fluticasone-salmeterol (ADVAIR DISKUS) 250-50 MCG/DOSE AEROSOL POWDER, BREATH ACTIVATED   No No   Sig: Inhale 1 Puff by mouth every 12 hours.   losartan (COZAAR) 25 MG Tab  Patient's Home Pharmacy No No   Sig: Take 1 Tab by mouth every day.   spironolactone (ALDACTONE) 25 MG Tab  Patient's Home Pharmacy No No   Sig: Take 1 Tab by mouth 1 time daily as needed (swelling).      Facility-Administered Medications: None       Physical Exam  Temp:  [35.3 °C (95.5 °F)-37.8 °C (100 °F)] 37.8 °C (100 °F)  Pulse:  [] 84  Resp:  [19-40] 24  BP: (126-261)/() 126/59  SpO2:  [94 %-100 %] 98 %    Physical Exam  Vitals signs and nursing note reviewed.   Constitutional:       General: She is in acute distress.      Appearance: Normal appearance. She is normal weight. She is not toxic-appearing.   HENT:      Head: Normocephalic and atraumatic.      Nose: Nose normal.      Mouth/Throat:      Mouth: Mucous membranes are moist.      Pharynx: Oropharynx is clear.   Eyes:      Extraocular Movements: Extraocular movements intact.      Conjunctiva/sclera: Conjunctivae normal.      Pupils: Pupils are equal, round, and reactive to light.   Neck:      Musculoskeletal: Normal range of motion and neck supple. No neck rigidity or muscular tenderness.   Cardiovascular:      Rate and Rhythm: Regular rhythm.      Pulses: Normal pulses.      Heart sounds: Normal heart sounds. No murmur. No friction rub. No gallop.    Pulmonary:      Effort: Respiratory distress present.      Breath sounds: Wheezing present.   Abdominal:      General: Abdomen is flat. Bowel sounds are normal. There is no distension.      Palpations: Abdomen is soft. There is no mass.      Tenderness: There is no abdominal tenderness.   Musculoskeletal: Normal range of motion.         General: No swelling or deformity.   Lymphadenopathy:      Cervical: No cervical adenopathy.   Skin:     General: Skin is warm and dry.   Neurological:      General: No  focal deficit present.      Mental Status: She is alert and oriented to person, place, and time. Mental status is at baseline.   Psychiatric:         Mood and Affect: Mood normal.         Behavior: Behavior normal.         Laboratory:  Recent Labs     06/09/20  0216 06/10/20  0348 06/11/20  2144   WBC 9.9 7.0 13.9*   RBC 4.76 4.53 5.28   HEMOGLOBIN 13.8 12.9 15.0   HEMATOCRIT 41.7 39.7 46.7   MCV 87.6 87.6 88.4   MCH 29.0 28.5 28.4   MCHC 33.1* 32.5* 32.1*   RDW 44.0 43.6 43.6   PLATELETCT 247 251 286   MPV 9.1 9.3 9.2     Recent Labs     06/09/20  0216  06/10/20  0348 06/11/20  0259 06/11/20  2144   SODIUM 129*   < > 128* 131* 133*   POTASSIUM 5.0  --  5.0  --  4.9   CHLORIDE 96  --  95*  --  98   CO2 25  --  30  --  20   GLUCOSE 77  --  91  --  192*   BUN 37*  --  31*  --  24*   CREATININE 0.81  --  0.73  --  0.82   CALCIUM 8.5  --  9.1  --  8.7    < > = values in this interval not displayed.     Recent Labs     06/09/20  0216 06/10/20  0348 06/11/20  2144   ALTSGPT 13  --  19   ASTSGOT 17  --  31   ALKPHOSPHAT 68  --  98   TBILIRUBIN 0.4  --  0.3   GLUCOSE 77 91 192*         Recent Labs     06/11/20 2144   NTPROBNP 23702*         Recent Labs     06/11/20  2144   TROPONINT 45*       Imaging:  DX-CHEST-PORTABLE (1 VIEW)   Final Result         1.  Pulmonary edema and/or infiltrates are identified, which appear somewhat increased since the prior exam.   2.  Moderate left pleural effusion   3.  Changes of COPD   4.  Atherosclerosis            Assessment/Plan:      * Acute respiratory failure with hypoxia (HCC)  Assessment & Plan  Plan for ongoing oxygen supplementation. Monitor.     Hypertensive emergency  Assessment & Plan  With pulmonary edema, improved with treatment.  Continue current medication regimen and monitor.      Acute on chronic systolic congestive heart failure (HCC)  Assessment & Plan  Plan for admission, telemetry monitoring and diuresis.  Can continue BB as already taking.     COPD (chronic  obstructive pulmonary disease) (HCC)- (present on admission)  Assessment & Plan  With current hypoxemia, improved on BiPAP.  Suspect that this is secondary to pulmonary edema from CHF.     Dyslipidemia, goal LDL below 100- (present on admission)  Assessment & Plan  On statin therapy which will be continued.      Essential hypertension- (present on admission)  Assessment & Plan  Poorly controlled.  Monitor with current medical regimen, PRN medications.

## 2020-06-12 NOTE — ASSESSMENT & PLAN NOTE
Improving  Continue carvedilol, losartan, spironolactone, p.o. furosemide  2g sodium and fluid restrictions on diet.

## 2020-06-12 NOTE — THERAPY
Physical Therapy   Daily Treatment     Patient Name: Karen Jauregui  Age:  75 y.o., Sex:  female  Medical Record #: [id number]  Today's Date: 6/12/2020     Precautions: Fall Risk    Assessment    Pt returns after d/c to home with c/o weakness and COPD exacerbation. Today, pt shows unstable with initial sit to stand transfer when using no AD, more stable using FWW. Pt showed decrease endurance for ambulation, see notes.     Plan    Continue current treatment plan.    Discharge recommendations:  Recommend post-acute placement for continued physical therapy services prior to discharge home.            Objective       06/12/20 1211   Prior Living Situation   Prior Services None   Housing / Facility Mobile Home   Steps Into Home 4   Steps In Home 0   Rail Both Rail (Steps into Home)   Elevator No   Equipment Owned None  (but per chart, pt has FWW, 4WW, spc)   Lives with - Patient's Self Care Capacity Other (Comments)   Comments Pt just sold her house, planning on finding an apt with her son, but stuck cue to covid. Pt staying with her sister, but environment is chaotic, crowded.    Prior Level of Functional Mobility   Bed Mobility Independent   Transfer Status Independent   Ambulation Independent   Distance Ambulation (Feet)   (struggling, does not report a distance, anxious)   Assistive Devices Used None   Stairs Independent   Cognition    Comments very anxious, sad, frustrated.    Gait Analysis   Gait Level Of Assist Supervised   Assistive Device Front Wheel Walker   Distance (Feet) 75  (asked to turn back, SOB)   Comments cues for taller posture and come in closer to FWW   Bed Mobility    Supine to Sit Supervised   Sit to Supine Supervised   Scooting Supervised   Rolling Supervised   Comments Improved LE stability with FWW, unstable knees with standing without AD.    Functional Mobility   Sit to Stand Minimal Assist  (wobbly knees with first sit to stand)   Bed, Chair, Wheelchair Transfer Supervised   Short Term  Goals    Short Term Goal # 1 Pt will ambulate x 300 feet using FWW with supervision in 6 vistis.    Short Term Goal # 2 Pt will go up/down 4 stairs with supervision in 6 visits to access home.    Short Term Goal # 3 Pt will transfer with supervision in 6 visits to improve functional indep.    Anticipated Discharge Equipment   DC Equipment Unable To Determine At This Time

## 2020-06-12 NOTE — RESPIRATORY CARE
Oxygen Rounds      Patient found on    O2 L/m:  ______3___    Oxygen device:  snc  Spo2: 96%        Respiratory device skin site inspection completed.

## 2020-06-12 NOTE — CARE PLAN
Problem: Nutritional:  Goal: Achieve adequate nutritional intake  Description: Patient will consume >50% of meals  Outcome: NOT MET   See dietary note.

## 2020-06-12 NOTE — DISCHARGE SUMMARY
Discharge Summary    CHIEF COMPLAINT ON ADMISSION  Chief Complaint   Patient presents with   • Abdominal Pain     started at 0930 on 6-7, reports nausea associated with it as well       Reason for Admission  Abdominal pain    Admission Date  6/7/2020    CODE STATUS  Full Code    HPI & HOSPITAL COURSE  Please see original H&P and consult note for specific information, patient was admitted due to abdominal pain patient has past medical history of aortic aneurysm, COPD hypertension, patient was initially hospitalized for possible code STEMI but this was canceled, patient had complete CTA chest and abdomen that did not show aortic and normality but showed possible small bowel obstruction and also showed occlusion of the bilateral internal iliac artery possible right kidney infarction, vascular surgery was consulted and patient was started on IV heparin after evaluation by vascular surgery these were thought to be chronic and did not require anticoagulation, patient was started on clear liquid diet and this was advanced as tolerated, patient right now is feeling back to normal and she is tolerating diet she is having bowel movements she has been able to ambulate, PT OT evaluated patient and recommended home health care unfortunately this was not accepted by patient but she agreed to do outpatient PT OT, patient is hemodynamically stable she is tolerating diet she is afebrile she is back to baseline she is going to be discharged home today all questions have been answered.  Patient will need oxygen arranged before discharge  has been consulted.       Therefore, she is discharged in good and stable condition to home with close outpatient follow-up.    The patient met 2-midnight criteria for an inpatient stay at the time of discharge.    Discharge Date  6/11/2020    FOLLOW UP ITEMS POST DISCHARGE  Primary care physician in 1 week  Follow-up with surgery as scheduled    DISCHARGE DIAGNOSES  Principal Problem  (Resolved):    Partial small bowel obstruction (HCC) POA: Yes  Active Problems:    Essential hypertension POA: Yes    Dyslipidemia, goal LDL below 100 POA: Yes    Tobacco dependence POA: Yes    COPD (chronic obstructive pulmonary disease) (HCC) POA: Yes    PVD (peripheral vascular disease) (HCC) POA: Yes    Hyponatremia POA: Yes    Renal infarct (HCC) POA: Unknown    Iliac artery occlusion (HCC) POA: Unknown    Elevated troponin POA: Unknown    Polycythemia POA: Unknown    Elevated BUN POA: Unknown    Leukocytosis POA: Unknown    Pulmonary nodule POA: Unknown      FOLLOW UP  Future Appointments   Date Time Provider Department Center   6/17/2020 11:00 AM Baltazar Julio M.D. [address]   6/30/2020  4:00 PM Baltazar Julio M.D. [address] M.D.  [address]  [address]  [place] [address]  [phone]    Go to        MEDICATIONS ON DISCHARGE     Medication List      START taking these medications      Instructions   fluticasone-salmeterol 250-50 MCG/DOSE Aepb  Commonly known as:  Advair Diskus   Inhale 1 Puff by mouth every 12 hours.  Dose:  1 Puff        CONTINUE taking these medications      Instructions   aspirin EC 81 MG Tbec  Commonly known as:  ECOTRIN   Take 324 mg by mouth as needed (For cheat pain).  Dose:  324 mg     atorvastatin 40 MG Tabs  Commonly known as:  LIPITOR   Take 1 Tab by mouth every evening.  Dose:  40 mg     carvedilol 6.25 MG Tabs  Commonly known as:  COREG   Take 1 Tab by mouth 2 times a day, with meals.  Dose:  6.25 mg     HYDROcodone-acetaminophen 5-325 MG Tabs per tablet  Commonly known as:  NORCO   Doctor's comments:  Seen 4/9/2020, renewal of chronic medication.  30 day prescription.  Do not fill until 6/8/2020  Take 1 Tab by mouth every 8 hours as needed (sciatica and leg pain/hip pain) for up to 30 days.  Dose:  1 Tab     losartan 25 MG Tabs  Commonly known as:  COZAAR   Take 1 Tab by mouth every day.  Dose:  25 mg     spironolactone 25 MG  Tabs  Commonly known as:  ALDACTONE   Take 1 Tab by mouth 1 time daily as needed (swelling).  Dose:  25 mg     Ventolin  (90 Base) MCG/ACT Aers inhalation aerosol  Generic drug:  albuterol   Inhale 2 Puffs by mouth every four hours as needed for Shortness of Breath.  Dose:  2 Puff        STOP taking these medications    potassium chloride ER 10 MEQ tablet  Commonly known as:  KLOR-CON            Allergies  Allergies   Allergen Reactions   • Doxycycline Diarrhea     None stop diarrhea   • Tramadol Vomiting   • Amoxicillin Diarrhea     Diarrhea   • Ciprofloxacin Unspecified     Unspecified       DIET  Orders Placed This Encounter   Procedures   • Diet Order Regular     Standing Status:   Standing     Number of Occurrences:   1     Order Specific Question:   Diet:     Answer:   Regular [1]     Order Specific Question:   Consistency/Fluid modifications:     Answer:   1200 ml Fluid Restriction [8]       ACTIVITY  As tolerated.  Weight bearing as tolerated    CONSULTATIONS  General and vascular surgery    PROCEDURES  None    LABORATORY  Lab Results   Component Value Date    SODIUM 131 (L) 06/11/2020    POTASSIUM 5.0 06/10/2020    CHLORIDE 95 (L) 06/10/2020    CO2 30 06/10/2020    GLUCOSE 91 06/10/2020    BUN 31 (H) 06/10/2020    CREATININE 0.73 06/10/2020    CREATININE 0.8 08/25/2008        Lab Results   Component Value Date    WBC 7.0 06/10/2020    HEMOGLOBIN 12.9 06/10/2020    HEMATOCRIT 39.7 06/10/2020    PLATELETCT 251 06/10/2020        Total time of the discharge process exceeds 40 minutes.

## 2020-06-12 NOTE — ASSESSMENT & PLAN NOTE
Poorly controlled.  Monitor with current medical regimen, PRN medications.   Continue carvedilol, losartan, spironolactone, furosemide  Continue to titrate medications.

## 2020-06-12 NOTE — ASSESSMENT & PLAN NOTE
Improved, now on 2L which is her baseline   RT protocol  Will be referred to pulmonary rehab  Continue PO diuresis

## 2020-06-12 NOTE — DISCHARGE PLANNING
Received Choice form at 0800  Agency/Facility Name: [facility],   Referral sent per Choice form @ [phone]

## 2020-06-12 NOTE — PROGRESS NOTES
Patient discharged via family member's car. Patient has oxygen with her. Patient has wheelchair at home and will follow up with physical therapy and occupational therapy outpatient. Patient verbalized understanding of discharge education with no further questions. Son to  medication from [facility].

## 2020-06-12 NOTE — ED PROVIDER NOTES
ED Provider Note    CHIEF COMPLAINT  Chief Complaint   Patient presents with   • Shortness of Breath     pt BIBA after being discharged from [facility] 3 hours ago. RA sat 53% and on BIPAP       HPI  Karen Jauregui is a 75 y.o. female who presents for evaluation of shortness of breath.  Patient notes it started about an hour to an hour and a half prior to arrival and she was found at around 53% oxygen saturation by EMS at home.  Patient was placed on CPAP, given Solu-Medrol, and albuterol.  Blood pressure was also noted to be extremely high.  Patient states she was just discharged from the hospital about 3 hours prior to arrival for evaluation of abdominal pain which is likely related to a partial small bowel obstruction.  Patient notes that symptoms started gradually and are currently very severe..    REVIEW OF SYSTEMS  Constitutional: No recent fevers.  Skin: No rashes  HEENT:  No sore throat, runny nose, sores, trouble swallowing, trouble speaking.  Neck: No neck pain, stiffness, or masses.  Chest: Diffuse chest pain  Pulm: Severe shortness of breath  Gastrointestinal: No nausea, vomiting, diarrhea, or current abdominal pain  Genitourinary: No dysuria or hematuria  Musculoskeletal: No  pain, swelling, weakness  Neurologic: No sensory or motor changes. No confusion or disorientation.  Heme: No bleeding or bruising problems.   Immuno: No hx of recurrent infections      PAST MEDICAL HISTORY   has a past medical history of Abdominal aortic aneurysm (HCC) (11/2010), Angina, Arthritis, Atherosclerosis of arteries of the extremities, Bowel habit changes, Carotid atherosclerosis, COPD (chronic obstructive pulmonary disease) (HCC), Diverticulitis, Diverticulosis of colon, Dyslipidemia, Emphysema of lung (HCC), Eosinophilic colitis, Family history of nonmelanoma skin cancer, Hypertension, Pain, Peripheral vascular disease (HCC), PVD (posterior vitreous detachment), left eye (1/13/2015), Snoring, Spinal stenosis of  "lumbar region, and Unspecified hemorrhagic conditions.    SOCIAL HISTORY  Social History     Tobacco Use   • Smoking status: Current Every Day Smoker     Packs/day: 1.00     Years: 45.00     Pack years: 45.00     Types: Cigarettes   • Smokeless tobacco: Never Used   • Tobacco comment: refuses to consider stopping   Substance and Sexual Activity   • Alcohol use: No     Alcohol/week: 0.0 oz   • Drug use: No   • Sexual activity: Not Currently       SURGICAL HISTORY   has a past surgical history that includes colonoscopy (3/1/2009, 4/2012, 7/2/13); gyn surgery (2002); gyn surgery (1975); other; colon resection laparoscopic (8/5/2013); colonoscopy with biopsy (3/6/2015); other cardiac surgery (2005); aaa with stent graft (N/A, 3/31/2017); and femoral femoral bypass (N/A, 3/31/2017).    CURRENT MEDICATIONS  Home Medications    **Home medications have not yet been reviewed for this encounter**         ALLERGIES  Allergies   Allergen Reactions   • Doxycycline Diarrhea     None stop diarrhea   • Tramadol Vomiting   • Amoxicillin Diarrhea     Diarrhea   • Ciprofloxacin Unspecified     Unspecified       PHYSICAL EXAM  VITAL SIGNS: /70   Pulse 96   Temp 37.8 °C (100 °F) (Rectal)   Resp 19   Ht 1.549 m (5' 1\")   Wt 41.3 kg (91 lb)   LMP 03/01/2002   SpO2 98%   BMI 17.19 kg/m²    Gen: Appears tired, severe distress with marketed tachypnea, prolonged expiratory phase, poor air movement, and anxiety.  HEENT: No signs of trauma, Bilateral external ears normal, Nose normal. Conjunctiva normal, Non-icteric.   Neck:  No tenderness, Supple, No masses  Lymphatic: No cervical lymphadenopathy noted.   Cardiovascular: Tachycardia.  Capillary refill 3 to 4 seconds all extremities.  Thorax & Lungs: Tachypnea, poor air movement, long expiratory phase, expiratory wheezing noted  Abdomen: Bowel sounds normal, Soft, No tenderness, No masses, No pulsatile masses. No Guarding or rebound  Skin: Warm, Dry, No erythema  Extremities: " Intact distal pulses, No edema  Neurologic: Awake, anxious, no facial droop, moves all extremities  Psychiatric: Affect anxious, restless      LABS  Results for orders placed or performed during the hospital encounter of 06/11/20   CBC WITH DIFFERENTIAL   Result Value Ref Range    WBC 13.9 (H) 4.8 - 10.8 K/uL    RBC 5.28 4.20 - 5.40 M/uL    Hemoglobin 15.0 12.0 - 16.0 g/dL    Hematocrit 46.7 37.0 - 47.0 %    MCV 88.4 81.4 - 97.8 fL    MCH 28.4 27.0 - 33.0 pg    MCHC 32.1 (L) 33.6 - 35.0 g/dL    RDW 43.6 35.9 - 50.0 fL    Platelet Count 286 164 - 446 K/uL    MPV 9.2 9.0 - 12.9 fL    Neutrophils-Polys 64.40 44.00 - 72.00 %    Lymphocytes 23.70 22.00 - 41.00 %    Monocytes 8.10 0.00 - 13.40 %    Eosinophils 2.40 0.00 - 6.90 %    Basophils 0.80 0.00 - 1.80 %    Immature Granulocytes 0.60 0.00 - 0.90 %    Nucleated RBC 0.00 /100 WBC    Neutrophils (Absolute) 8.94 (H) 2.00 - 7.15 K/uL    Lymphs (Absolute) 3.29 1.00 - 4.80 K/uL    Monos (Absolute) 1.12 (H) 0.00 - 0.85 K/uL    Eos (Absolute) 0.33 0.00 - 0.51 K/uL    Baso (Absolute) 0.11 0.00 - 0.12 K/uL    Immature Granulocytes (abs) 0.08 0.00 - 0.11 K/uL    NRBC (Absolute) 0.00 K/uL   COMP METABOLIC PANEL   Result Value Ref Range    Sodium 133 (L) 135 - 145 mmol/L    Potassium 4.9 3.6 - 5.5 mmol/L    Chloride 98 96 - 112 mmol/L    Co2 20 20 - 33 mmol/L    Anion Gap 15.0 7.0 - 16.0    Glucose 192 (H) 65 - 99 mg/dL    Bun 24 (H) 8 - 22 mg/dL    Creatinine 0.82 0.50 - 1.40 mg/dL    Calcium 8.7 8.5 - 10.5 mg/dL    AST(SGOT) 31 12 - 45 U/L    ALT(SGPT) 19 2 - 50 U/L    Alkaline Phosphatase 98 30 - 99 U/L    Total Bilirubin 0.3 0.1 - 1.5 mg/dL    Albumin 3.1 (L) 3.2 - 4.9 g/dL    Total Protein 6.3 6.0 - 8.2 g/dL    Globulin 3.2 1.9 - 3.5 g/dL    A-G Ratio 1.0 g/dL   TROPONIN   Result Value Ref Range    Troponin T 45 (H) 6 - 19 ng/L   LACTIC ACID   Result Value Ref Range    Lactic Acid 3.1 (H) 0.5 - 2.0 mmol/L   proBrain Natriuretic Peptide, NT   Result Value Ref Range     NT-proBNP  (H) 0 - 125 pg/mL   COVID/SARS CoV-2    Specimen: Nasopharyngeal; Respirate   Result Value Ref Range    COVID Order Status Received    SARS-CoV-2, PCR (In-House)   Result Value Ref Range    SARS-CoV-2 Source NP Swab     SARS-CoV-2 by PCR NotDetected NotDetected   ESTIMATED GFR   Result Value Ref Range    GFR If African American >60 >60 mL/min/1.73 m 2    GFR If Non African American >60 >60 mL/min/1.73 m 2   EKG (NOW)   Result Value Ref Range    Report       [facility]    Test Date:  2020  Pt Name:    AIDE TONY             Department: ER  MRN:        [id number]                      Room:        02  Gender:     Female                       Technician: [id number]  :        [id number]                   Requested By:ER TRIAGE PROTOCOL  Order #:    [id number]                    Reading MD: Giovanny Barroso MD    Measurements  Intervals                                Axis  Rate:       126                          P:          77  AL:         168                          QRS:        -49  QRSD:       114                          T:          108  QT:         304  QTc:        441    Interpretive Statements  SINUS TACHYCARDIA  JODEE, CONSIDER BIATRIAL ABNORMALITIES  INCOMPLETE LEFT BUNDLE BRANCH BLOCK  LVH WITH SECONDARY REPOLARIZATION ABNORMALITY  ANTERIOR Q WAVES, POSSIBLY DUE TO LVH  ARTIFACT IN LEAD(S) I,III,aVR,aVL  Compared to ECG 2020 23:47:27  Left bundle-branch block now present  Left ventricular  hypertrophy now present  Early repolarization now present  Q waves now present  ST (T wave) deviation no longer present  Electronically Signed On 2020 23:38:37 PDT by Giovanny Barroso MD         RADIOLOGY  DX-CHEST-PORTABLE (1 VIEW)   Final Result         1.  Pulmonary edema and/or infiltrates are identified, which appear somewhat increased since the prior exam.   2.  Moderate left pleural effusion   3.  Changes of COPD   4.  Atherosclerosis        Critical Care  Note  Upon my evaluation, this patient had high probability of imminent and life-threatening deterioration due to hypertensive emergency, acute hypoxemic respiratory failure, which required my direct attention, intervention, and personal management. I personally provided 35 minutes of critical care time exclusive of time spent on separately billable procedures. Time includes review of laboratory data, radiology results, discussion with consultants, and monitoring for potential decompensation.     COURSE & MEDICAL DECISION MAKING  Call immediately to patient's room on her arrival due to severe respiratory distress.  Patient appears to be in extremis due to respiratory distress which is being partially controlled by positive pressure noninvasive ventilation.  She will be continued on BiPAP.  Patient noted to be markedly hypertensive.  This is likely a hypertensive crisis and I feel she is best served by immediate lowering of the blood pressure using sublingual nitroglycerin.  We will attempt up to 3 doses.  At this point sepsis and COVID seem extremely unlikely but she will be tested for COVID as she will likely need admission anyway.    10:30 PM  Patient improved markedly.  She is much less anxious, tachypneic, and seems to be resting quietly on BiPAP.  She states her symptoms have improved subjectively and is notable that the patient appears neurologically intact although very tired appearing.  Given the findings on x-ray, and on arrival, I suspect that she had an episode of flash pulmonary edema in the setting of a hypertensive crisis.  Patient responded well to nitroglycerin but given her frailty and lack of any meaningful respiratory reserve, she will likely need to be readmitted.    11:15 PM  Patient resting quietly, wakes easily, still on BiPAP.  Given the findings today I feel she is best served by an observation admission as her symptoms were likely related to hypertensive emergency.  Unclear if the troponin  today represents demand ischemia however this will need to be trended as well.  Patient noted to have a markedly elevated brain natruretic peptide change all intents and purposes is uninterpretable in her chronic disease.  It does not appear to be any higher than the last time was measured however this was 2 years ago.  Patient was discussed with the hospitalist service who will admit for further evaluation and treatment.  FINAL IMPRESSION  1. Hypertensive emergency    2. Pulmonary edema cardiac cause (HCC)    3. Acute hypoxemic respiratory failure (HCC)    4. Elevated troponin        Electronically signed by: Giovanny Barroso M.D., 6/11/2020 9:47 PM

## 2020-06-12 NOTE — ASSESSMENT & PLAN NOTE
Plan for ongoing oxygen supplementation.   Patient on her baseline 2-3 L.  Secondary to combination COPD and CHF exacerbation

## 2020-06-12 NOTE — RESPIRATORY CARE
Pulmonary consult placed and [facility] appointment made for 7/23/2020 at 1115. Business card given to patient.

## 2020-06-12 NOTE — ED NOTES
Patient improved, she does not want to come off bipap. Minimal accessory muscle use noted at this time.

## 2020-06-12 NOTE — PROGRESS NOTES
Hospital Medicine Daily Progress Note    Date of Service  6/12/2020    Chief Complaint  75 y.o. female admitted 6/11/2020 with shortness of breath    Hospital Course    Ms. Karen Jauregui is a 75 y.o. female with a past medical history significant for COPD, hypertension, dyslipidemia who presented on 6/11/2020 with shortness of breath.  Patient was recently admitted from 6/7-11/2020 for a partial small bowel obstruction.  After being discharged home patient developed progressively worsening shortness of breath while just sitting on her couch.  Denies chest pain, fever, chills.  BNP was greater than 19,000.  Troponins were only mildly elevated and flat.  Chest x-ray consistent with pulmonary edema with left pleural effusion.  EKG demonstrated sinus tachycardia with an incomplete left bundle branch block.  She was admitted with a CHF exacerbation and started on IV furosemide.  Recent admission for congestive heart failure in May 13, 2020 when echo showed ejection fraction 30%, global hypokinesis.        Interval Problem Update  Patient was seen and examined at bedside.  I have personally reviewed vitals, labs, and imaging.    6/12.  Patient had one episode of hypothermia at 35.3.  Has been hypertensive and tachycardic.  Currently on 2 L nasal cannula.  Patient reports her breathing is much improved.  Denies fever, chills, chest pain, shortness of breath.  Currently sitting up eating breakfast.  Exam was discharged yesterday physical therapy felt that she would have no needs the patient was agreeable to do outpatient physical therapy.  However as the patient decompensated after only a few hours on reevaluation physical therapy recommends postacute placement.  Patient was also evaluated by pulmonology and referred to pulmonary rehab.      Consultants/Specialty  Pulmonology    Code Status  Full    Disposition  Post acute placement    Review of Systems  Review of Systems   Constitutional: Negative for chills and  fever.   HENT: Negative for congestion and sore throat.    Eyes: Negative for blurred vision.   Respiratory: Positive for cough and shortness of breath.    Cardiovascular: Negative for chest pain, palpitations and leg swelling.   Gastrointestinal: Negative for abdominal pain, constipation, diarrhea, nausea and vomiting.   Genitourinary: Negative for dysuria, frequency and urgency.   Musculoskeletal: Negative for falls.   Skin: Negative for rash.   Neurological: Negative for dizziness, weakness and headaches.   Psychiatric/Behavioral: Negative for depression. The patient is not nervous/anxious.    All other systems reviewed and are negative.       Physical Exam  Temp:  [35.3 °C (95.5 °F)-37.8 °C (100 °F)] 36.5 °C (97.7 °F)  Pulse:  [] 92  Resp:  [16-40] 16  BP: (126-261)/() 156/76  SpO2:  [90 %-100 %] 95 %    Physical Exam  Vitals signs and nursing note reviewed.   Constitutional:       General: She is not in acute distress.     Appearance: Normal appearance.   HENT:      Head: Normocephalic and atraumatic.      Right Ear: External ear normal.      Left Ear: External ear normal.      Nose: Nose normal.      Mouth/Throat:      Mouth: Mucous membranes are moist.      Pharynx: Oropharynx is clear. No oropharyngeal exudate or posterior oropharyngeal erythema.   Eyes:      Extraocular Movements: Extraocular movements intact.      Conjunctiva/sclera: Conjunctivae normal.   Neck:      Musculoskeletal: Normal range of motion and neck supple.   Cardiovascular:      Rate and Rhythm: Normal rate and regular rhythm.      Pulses: Normal pulses.      Heart sounds: Normal heart sounds. No murmur.   Pulmonary:      Effort: Pulmonary effort is normal. No respiratory distress.      Breath sounds: No stridor. Wheezing and rales present.   Abdominal:      General: Abdomen is flat. Bowel sounds are normal. There is no distension.      Palpations: Abdomen is soft. There is no mass.      Tenderness: There is no abdominal  tenderness.   Musculoskeletal:      Right lower leg: No edema.      Left lower leg: No edema.   Skin:     General: Skin is warm.      Capillary Refill: Capillary refill takes less than 2 seconds.   Neurological:      General: No focal deficit present.      Mental Status: She is alert and oriented to person, place, and time. Mental status is at baseline.      Cranial Nerves: No cranial nerve deficit.   Psychiatric:         Mood and Affect: Mood normal.         Behavior: Behavior normal.         Fluids    Intake/Output Summary (Last 24 hours) at 6/12/2020 0723  Last data filed at 6/12/2020 0600  Gross per 24 hour   Intake --   Output 1000 ml   Net -1000 ml       Laboratory  Recent Labs     06/10/20  0348 06/11/20 2144 06/12/20  0450   WBC 7.0 13.9* 10.1   RBC 4.53 5.28 5.13   HEMOGLOBIN 12.9 15.0 14.7   HEMATOCRIT 39.7 46.7 44.3   MCV 87.6 88.4 86.4   MCH 28.5 28.4 28.7   MCHC 32.5* 32.1* 33.2*   RDW 43.6 43.6 41.5   PLATELETCT 251 286 246   MPV 9.3 9.2 9.3     Recent Labs     06/10/20  0348 06/11/20  0259 06/11/20 2144 06/12/20  0450   SODIUM 128* 131* 133* 134*   POTASSIUM 5.0  --  4.9 3.5*   CHLORIDE 95*  --  98 97   CO2 30  --  20 28   GLUCOSE 91  --  192* 159*   BUN 31*  --  24* 26*   CREATININE 0.73  --  0.82 0.75   CALCIUM 9.1  --  8.7 9.3                   Imaging  DX-CHEST-PORTABLE (1 VIEW)   Final Result         1.  Pulmonary edema and/or infiltrates are identified, which appear somewhat increased since the prior exam.   2.  Moderate left pleural effusion   3.  Changes of COPD   4.  Atherosclerosis           Assessment/Plan  * Acute respiratory failure with hypoxia (HCC)  Assessment & Plan  Plan for ongoing oxygen supplementation.   Patient on her baseline 2-3 L.    Hypertensive emergency- (present on admission)  Assessment & Plan  With pulmonary edema, improved with treatment.  Continue carvedilol, losartan, spironolactone, furosemide IV    Acute on chronic systolic congestive heart failure (HCC)-  (present on admission)  Assessment & Plan  Plan for admission, telemetry monitoring and diuresis.    Continue carvedilol, losartan, furosemide IV, spironolactone    COPD (chronic obstructive pulmonary disease) (HCC)- (present on admission)  Assessment & Plan  With current hypoxemia, improved on BiPAP.    RT protocol  Will be referred to pulmonary rehab  Suspect that this is secondary to pulmonary edema from CHF.  Diurese as above    Dyslipidemia, goal LDL below 100- (present on admission)  Assessment & Plan  Continue atorvastatin    Essential hypertension- (present on admission)  Assessment & Plan  Poorly controlled.  Monitor with current medical regimen, PRN medications.   Improved.  Continue to titrate medications.     Gastrointestinal prophylaxis: The patient has no risk factors for developing gastric ulcers or gastritis.  As the patient is tolerating oral intake, GI prophylaxis is not indicated at this time.  Antibiotics: None  Diet Order Regular  Code status: Full  Prognosis: Guarded  Risk: The Patient is at HIGH risk for complications and decompensation secondary to her multiple cormorbidities including acute hypoxic respiratory failure requiring submental oxygen secondary to COPD and CHF exacerbations.  Uncontrolled hypertension.  Hyperlipidemia.    I have personally reviewed notes, labs, vitals, imaging.  I discussed the plan of care with bedside RN as well as on multidisciplinary rounds    VTE prophylaxis: Enoxaparin

## 2020-06-12 NOTE — DISCHARGE PLANNING
It seems there are referrals for therapies also, to clarify patient needs Home Health services. There is are referrals fo PT, OT and Home Health from 6/10/20    Thank you

## 2020-06-12 NOTE — CARE PLAN
Patient alert and orientedx4. Patient was admitted to [facility] at 0200. She denied pain and appeared to rest well. No complications or concerns arose. Refer to EMR for further information.   Problem: Communication  Goal: The ability to communicate needs accurately and effectively will improve  Outcome: PROGRESSING AS EXPECTED  Intervention: [place] patient and significant other/support system to call light to alert staff of needs  Flowsheets (Taken 6/12/2020 0417)  Oriented to::   Call Light & Bedside Controls   Bedside Report   Unit Routine   Visiting Policy   Patient Rights and Responsibilities   Bedside Rail Policy   Location of bathroom   Smoking Policy  Intervention: Reorient patient to environment as needed  Flowsheets (Taken 6/12/2020 0417)  Oriented to::   Call Light & Bedside Controls   Bedside Report   Unit Routine   Visiting Policy   Patient Rights and Responsibilities   Bedside Rail Policy   Location of bathroom   Smoking Policy  Intervention: Educate patient and significant other/support system about the plan of care, procedures, treatments, medications and allow for questions  Flowsheets (Taken 6/12/2020 0417)  Pt & Family Have Been Educated on Methods Available to Report Concerns Related to Care, Treatment, Services, and Patient Safety Issues: Yes     Problem: Safety  Goal: Will remain free from injury  Outcome: PROGRESSING AS EXPECTED  Intervention: Provide assistance with mobility  Flowsheets (Taken 6/12/2020 3577)  Assistance: Assistance of One  Ambulation Tolerance: General Weakness  Goal: Will remain free from falls  Outcome: PROGRESSING AS EXPECTED  Intervention: Implement fall precautions  Flowsheets (Taken 6/12/2020 0235)  Environmental Precautions:   Treaded Slipper Socks on Patient   Personal Belongings, Wastebasket, Call Bell etc. in Easy Reach   Transferred to Stronger Side   Report Given to Other Health Care Providers Regarding Fall Risk   Bed in Low Position   Communication Sign for Patients  & Families   Mobility Assessed & Appropriate Sign Placed

## 2020-06-12 NOTE — RESPIRATORY CARE
"COPD EDUCATION by COPD CLINICAL EDUCATOR  6/12/2020  at  12:26 PM by Isabela Willson, RRT     Patient interviewed by COPD education team.  Patient unable to participate in full program.  Short intervention has been conducted.  A comprehensive packet including information about COPD, treatments, and smoking cessation given.     Action Plan Completed/Updated? Yes     Pulmonary Function Testing (PFT)? None available in EMR- Recommended    Does patient currently use inhalers/nebulizer at home? Advair was prescribed with last discharge- 6/11/2020 not not picked up at pharmacy. Review of how to use Diskus completed. Spacer provided for her Albuterol rescue inhaler    Additional medication/equipment recommendations  None at this time    Is all Respiratory DME in place? Yes Oxygen                   If yes, has patient been educated on use of DME?   yes    Have all necessary follow up appointments been scheduled?   PCP or D/C clinic Dr Julio 6/30/2012     Palliative Care Team involved in care, been suggested or consulted? no    Has the patient been referred to Pulmonary Rehab? She has information    Has the patient been referred to the [facility] Program? n/a    Home Health referral placed?   Recommended? This was recommended and declined by patient on 6/11/2020 discharge.  Current admission TBD. D/W RN           Smoking Cessation Intervention and education completed, 4 minutes spent on smoking cessation education with patient    Provided smoking cessation packet with \"Tips to Quit\" and flyer for \"Free Smoking Cessation Classes\".           "

## 2020-06-13 LAB
ANION GAP SERPL CALC-SCNC: 9 MMOL/L (ref 7–16)
BASOPHILS # BLD AUTO: 0.4 % (ref 0–1.8)
BASOPHILS # BLD: 0.04 K/UL (ref 0–0.12)
BUN SERPL-MCNC: 31 MG/DL (ref 8–22)
CALCIUM SERPL-MCNC: 9.5 MG/DL (ref 8.5–10.5)
CHLORIDE SERPL-SCNC: 93 MMOL/L (ref 96–112)
CO2 SERPL-SCNC: 31 MMOL/L (ref 20–33)
CREAT SERPL-MCNC: 0.85 MG/DL (ref 0.5–1.4)
EOSINOPHIL # BLD AUTO: 0.1 K/UL (ref 0–0.51)
EOSINOPHIL NFR BLD: 0.9 % (ref 0–6.9)
ERYTHROCYTE [DISTWIDTH] IN BLOOD BY AUTOMATED COUNT: 41.4 FL (ref 35.9–50)
GLUCOSE SERPL-MCNC: 110 MG/DL (ref 65–99)
HCT VFR BLD AUTO: 40.3 % (ref 37–47)
HGB BLD-MCNC: 13.8 G/DL (ref 12–16)
IMM GRANULOCYTES # BLD AUTO: 0.04 K/UL (ref 0–0.11)
IMM GRANULOCYTES NFR BLD AUTO: 0.4 % (ref 0–0.9)
LYMPHOCYTES # BLD AUTO: 1.63 K/UL (ref 1–4.8)
LYMPHOCYTES NFR BLD: 15 % (ref 22–41)
MAGNESIUM SERPL-MCNC: 1.5 MG/DL (ref 1.5–2.5)
MCH RBC QN AUTO: 28.8 PG (ref 27–33)
MCHC RBC AUTO-ENTMCNC: 34.2 G/DL (ref 33.6–35)
MCV RBC AUTO: 84.1 FL (ref 81.4–97.8)
MONOCYTES # BLD AUTO: 0.85 K/UL (ref 0–0.85)
MONOCYTES NFR BLD AUTO: 7.8 % (ref 0–13.4)
NEUTROPHILS # BLD AUTO: 8.21 K/UL (ref 2–7.15)
NEUTROPHILS NFR BLD: 75.5 % (ref 44–72)
NRBC # BLD AUTO: 0 K/UL
NRBC BLD-RTO: 0 /100 WBC
PHOSPHATE SERPL-MCNC: 2.8 MG/DL (ref 2.5–4.5)
PLATELET # BLD AUTO: 254 K/UL (ref 164–446)
PMV BLD AUTO: 9.3 FL (ref 9–12.9)
POTASSIUM SERPL-SCNC: 3.4 MMOL/L (ref 3.6–5.5)
RBC # BLD AUTO: 4.79 M/UL (ref 4.2–5.4)
SODIUM SERPL-SCNC: 133 MMOL/L (ref 135–145)
WBC # BLD AUTO: 10.9 K/UL (ref 4.8–10.8)

## 2020-06-13 PROCEDURE — A9270 NON-COVERED ITEM OR SERVICE: HCPCS | Performed by: INTERNAL MEDICINE

## 2020-06-13 PROCEDURE — 80048 BASIC METABOLIC PNL TOTAL CA: CPT

## 2020-06-13 PROCEDURE — 700102 HCHG RX REV CODE 250 W/ 637 OVERRIDE(OP): Performed by: INTERNAL MEDICINE

## 2020-06-13 PROCEDURE — 83735 ASSAY OF MAGNESIUM: CPT

## 2020-06-13 PROCEDURE — 700111 HCHG RX REV CODE 636 W/ 250 OVERRIDE (IP): Performed by: INTERNAL MEDICINE

## 2020-06-13 PROCEDURE — 700102 HCHG RX REV CODE 250 W/ 637 OVERRIDE(OP): Performed by: HOSPITALIST

## 2020-06-13 PROCEDURE — 94760 N-INVAS EAR/PLS OXIMETRY 1: CPT

## 2020-06-13 PROCEDURE — 99233 SBSQ HOSP IP/OBS HIGH 50: CPT | Performed by: INTERNAL MEDICINE

## 2020-06-13 PROCEDURE — 36415 COLL VENOUS BLD VENIPUNCTURE: CPT

## 2020-06-13 PROCEDURE — 700111 HCHG RX REV CODE 636 W/ 250 OVERRIDE (IP): Performed by: HOSPITALIST

## 2020-06-13 PROCEDURE — 85025 COMPLETE CBC W/AUTO DIFF WBC: CPT

## 2020-06-13 PROCEDURE — A9270 NON-COVERED ITEM OR SERVICE: HCPCS | Performed by: HOSPITALIST

## 2020-06-13 PROCEDURE — 84100 ASSAY OF PHOSPHORUS: CPT

## 2020-06-13 PROCEDURE — 770020 HCHG ROOM/CARE - TELE (206)

## 2020-06-13 PROCEDURE — 94640 AIRWAY INHALATION TREATMENT: CPT

## 2020-06-13 RX ORDER — HYDROXYZINE PAMOATE 25 MG/1
25 CAPSULE ORAL EVERY 6 HOURS PRN
Status: DISCONTINUED | OUTPATIENT
Start: 2020-06-13 | End: 2020-06-17 | Stop reason: HOSPADM

## 2020-06-13 RX ORDER — POTASSIUM CHLORIDE 20 MEQ/1
40 TABLET, EXTENDED RELEASE ORAL 3 TIMES DAILY
Status: COMPLETED | OUTPATIENT
Start: 2020-06-13 | End: 2020-06-13

## 2020-06-13 RX ADMIN — DOCUSATE SODIUM 50 MG AND SENNOSIDES 8.6 MG 2 TABLET: 8.6; 5 TABLET, FILM COATED ORAL at 05:05

## 2020-06-13 RX ADMIN — BUDESONIDE AND FORMOTEROL FUMARATE DIHYDRATE 2 PUFF: 160; 4.5 AEROSOL RESPIRATORY (INHALATION) at 07:43

## 2020-06-13 RX ADMIN — LOSARTAN POTASSIUM 25 MG: 25 TABLET, FILM COATED ORAL at 05:05

## 2020-06-13 RX ADMIN — Medication 400 MG: at 11:55

## 2020-06-13 RX ADMIN — NICOTINE 14 MG: 14 PATCH TRANSDERMAL at 05:06

## 2020-06-13 RX ADMIN — ENOXAPARIN SODIUM 30 MG: 40 INJECTION SUBCUTANEOUS at 05:06

## 2020-06-13 RX ADMIN — OXYCODONE 5 MG: 5 TABLET ORAL at 21:32

## 2020-06-13 RX ADMIN — FUROSEMIDE 40 MG: 10 INJECTION, SOLUTION INTRAMUSCULAR; INTRAVENOUS at 05:05

## 2020-06-13 RX ADMIN — ATORVASTATIN CALCIUM 40 MG: 40 TABLET, FILM COATED ORAL at 16:22

## 2020-06-13 RX ADMIN — CARVEDILOL 6.25 MG: 6.25 TABLET, FILM COATED ORAL at 08:02

## 2020-06-13 RX ADMIN — OXYCODONE 5 MG: 5 TABLET ORAL at 12:07

## 2020-06-13 RX ADMIN — BUDESONIDE AND FORMOTEROL FUMARATE DIHYDRATE 2 PUFF: 160; 4.5 AEROSOL RESPIRATORY (INHALATION) at 18:42

## 2020-06-13 RX ADMIN — CARVEDILOL 6.25 MG: 6.25 TABLET, FILM COATED ORAL at 16:22

## 2020-06-13 RX ADMIN — HYDROXYZINE PAMOATE 25 MG: 25 CAPSULE ORAL at 20:11

## 2020-06-13 RX ADMIN — FUROSEMIDE 40 MG: 10 INJECTION, SOLUTION INTRAMUSCULAR; INTRAVENOUS at 16:22

## 2020-06-13 RX ADMIN — POTASSIUM CHLORIDE 40 MEQ: 1500 TABLET, EXTENDED RELEASE ORAL at 14:41

## 2020-06-13 ASSESSMENT — FIBROSIS 4 INDEX: FIB4 SCORE: 2.07

## 2020-06-13 ASSESSMENT — ENCOUNTER SYMPTOMS
DIZZINESS: 0
CONSTIPATION: 0
ABDOMINAL PAIN: 0
PALPITATIONS: 0
CHILLS: 0
SHORTNESS OF BREATH: 1
SORE THROAT: 0
DIARRHEA: 0
WEAKNESS: 0
FALLS: 0
HEADACHES: 0
VOMITING: 0
NAUSEA: 0
COUGH: 1
FEVER: 0
NERVOUS/ANXIOUS: 0
DEPRESSION: 0
BLURRED VISION: 0

## 2020-06-13 ASSESSMENT — COPD QUESTIONNAIRES
HAVE YOU SMOKED AT LEAST 100 CIGARETTES IN YOUR ENTIRE LIFE: YES
COPD SCREENING SCORE: 8
DO YOU EVER COUGH UP ANY MUCUS OR PHLEGM?: YES, EVERY DAY
DURING THE PAST 4 WEEKS HOW MUCH DID YOU FEEL SHORT OF BREATH: SOME OF THE TIME

## 2020-06-13 NOTE — PROGRESS NOTES
Received Bedside report. Assumed care at 1915. This pt is AOx4, Patient and RN discussed plan of care questions answered. Labs noted. Tele rhythm and monitor verified. Bed in lowest lock postion, 2 side rails up.Call light in place, fall precautions in place, patient educated on importance of calling for assistance. No additional needs at this time. VSS

## 2020-06-13 NOTE — PROGRESS NOTES
Bedside report received from NOC RN. Assumed care of pt. Pt awake, laying in bed. A/Ox4, VSS.Pt is on 3L 02 via NC.  No concerns, complaints or distress. Pt educated to call before getting out of bed. POC reviewed and white board updated. Tele box on. SR 84 on the monitor. Call light in reach. Bed locked in lowest position with 2 upper bed rails up. Bed alarm on.

## 2020-06-13 NOTE — CARE PLAN
Problem: Safety  Goal: Will remain free from injury  Outcome: PROGRESSING AS EXPECTED   Pt educated on safety precautions and precautions in place. Treaded socks on, bed locked and in lowest position, belongings and call light within reach.    Problem: Pain Management  Goal: Pain level will decrease to patient's comfort goal  Outcome: PROGRESSING AS EXPECTED   Patient educated on pain rating scale, pain control management, nonpharmacologic methods of pain management and reaching a tolerable level. Pain medication provided PRN in addition to nonpharmacologic methods. Patient verbalizes understanding of teaching and has no additional questions at this time.

## 2020-06-13 NOTE — PROGRESS NOTES
Hospital Medicine Daily Progress Note    Date of Service  6/13/2020    Chief Complaint  75 y.o. female admitted 6/11/2020 with shortness of breath    Hospital Course    Ms. Karen Jauregui is a 75 y.o. female with a past medical history significant for COPD, hypertension, dyslipidemia who presented on 6/11/2020 with shortness of breath.  Patient was recently admitted from 6/7-11/2020 for a partial small bowel obstruction.  After being discharged home patient developed progressively worsening shortness of breath while just sitting on her couch.  Denies chest pain, fever, chills.  BNP was greater than 19,000.  Troponins were only mildly elevated and flat.  Chest x-ray consistent with pulmonary edema with left pleural effusion.  EKG demonstrated sinus tachycardia with an incomplete left bundle branch block.  She was admitted with a CHF exacerbation and started on IV furosemide.  Recent admission for congestive heart failure in May 13, 2020 when echo showed ejection fraction 30%, global hypokinesis.        Interval Problem Update  Patient was seen and examined at bedside.  I have personally reviewed vitals, labs, and imaging.    6/12.  Patient had one episode of hypothermia at 35.3.  Has been hypertensive and tachycardic.  Currently on 2 L nasal cannula.  Patient reports her breathing is much improved.  Denies fever, chills, chest pain, shortness of breath.  Currently sitting up eating breakfast.  Exam was discharged yesterday physical therapy felt that she would have no needs the patient was agreeable to do outpatient physical therapy.  However as the patient decompensated after only a few hours on reevaluation physical therapy recommends postacute placement.  Patient was also evaluated by pulmonology and referred to pulmonary rehab.  6/13.  Afebrile.  Episodes of tachycardia.  Hypertension is improved.  On 3 L nasal cannula.  Denies fever, chills, chest pain, abdominal pain.  Rest of breath is improved.  She is  back on her baseline oxygen.  Replete mag, potassium.  PT/OT recommended postacute placement as the patient quickly decompensated when she went home previously.  Patient adamantly refuses postacute placement saying that she has a lot of help at home with her family.  She is willing to accept home health.    Consultants/Specialty  Pulmonology    Code Status  Full    Disposition  Post acute placement his home health    Review of Systems  Review of Systems   Constitutional: Negative for chills and fever.   HENT: Negative for congestion and sore throat.    Eyes: Negative for blurred vision.   Respiratory: Positive for cough and shortness of breath.    Cardiovascular: Negative for chest pain, palpitations and leg swelling.   Gastrointestinal: Negative for abdominal pain, constipation, diarrhea, nausea and vomiting.   Genitourinary: Negative for dysuria, frequency and urgency.   Musculoskeletal: Negative for falls.   Skin: Negative for rash.   Neurological: Negative for dizziness, weakness and headaches.   Psychiatric/Behavioral: Negative for depression. The patient is not nervous/anxious.    All other systems reviewed and are negative.       Physical Exam  Temp:  [36.1 °C (96.9 °F)-37.1 °C (98.8 °F)] 36.3 °C (97.3 °F)  Pulse:  [] 105  Resp:  [16-20] 18  BP: (102-150)/(68-95) 137/89  SpO2:  [95 %-98 %] 98 %    Physical Exam  Vitals signs and nursing note reviewed.   Constitutional:       General: She is not in acute distress.     Appearance: Normal appearance.   HENT:      Head: Normocephalic and atraumatic.      Right Ear: External ear normal.      Left Ear: External ear normal.      Nose: Nose normal.      Mouth/Throat:      Mouth: Mucous membranes are moist.      Pharynx: Oropharynx is clear. No oropharyngeal exudate or posterior oropharyngeal erythema.   Eyes:      Extraocular Movements: Extraocular movements intact.      Conjunctiva/sclera: Conjunctivae normal.   Neck:      Musculoskeletal: Normal range of  motion and neck supple.   Cardiovascular:      Rate and Rhythm: Normal rate and regular rhythm.      Pulses: Normal pulses.      Heart sounds: Normal heart sounds. No murmur.   Pulmonary:      Effort: Pulmonary effort is normal. No respiratory distress.      Breath sounds: No stridor. Wheezing and rales present.   Abdominal:      General: Abdomen is flat. Bowel sounds are normal. There is no distension.      Palpations: Abdomen is soft. There is no mass.      Tenderness: There is no abdominal tenderness.   Musculoskeletal:      Right lower leg: No edema.      Left lower leg: No edema.   Skin:     General: Skin is warm.      Capillary Refill: Capillary refill takes less than 2 seconds.   Neurological:      General: No focal deficit present.      Mental Status: She is alert and oriented to person, place, and time. Mental status is at baseline.      Cranial Nerves: No cranial nerve deficit.   Psychiatric:         Mood and Affect: Mood normal.         Behavior: Behavior normal.         Fluids    Intake/Output Summary (Last 24 hours) at 6/13/2020 1053  Last data filed at 6/13/2020 0900  Gross per 24 hour   Intake 360 ml   Output --   Net 360 ml       Laboratory  Recent Labs     06/11/20  2144 06/12/20  0450 06/13/20  0338   WBC 13.9* 10.1 10.9*   RBC 5.28 5.13 4.79   HEMOGLOBIN 15.0 14.7 13.8   HEMATOCRIT 46.7 44.3 40.3   MCV 88.4 86.4 84.1   MCH 28.4 28.7 28.8   MCHC 32.1* 33.2* 34.2   RDW 43.6 41.5 41.4   PLATELETCT 286 246 254   MPV 9.2 9.3 9.3     Recent Labs     06/11/20  2144 06/12/20  0450 06/13/20  0338   SODIUM 133* 134* 133*   POTASSIUM 4.9 3.5* 3.4*   CHLORIDE 98 97 93*   CO2 20 28 31   GLUCOSE 192* 159* 110*   BUN 24* 26* 31*   CREATININE 0.82 0.75 0.85   CALCIUM 8.7 9.3 9.5                   Imaging  DX-CHEST-PORTABLE (1 VIEW)   Final Result         1.  Pulmonary edema and/or infiltrates are identified, which appear somewhat increased since the prior exam.   2.  Moderate left pleural effusion   3.  Changes  of COPD   4.  Atherosclerosis           Assessment/Plan  * Acute respiratory failure with hypoxia (HCC)  Assessment & Plan  Plan for ongoing oxygen supplementation.   Patient on her baseline 2-3 L.  Secondary to combination COPD and CHF exacerbation    Hypertensive emergency- (present on admission)  Assessment & Plan  With pulmonary edema, improved with treatment.  Continue carvedilol, losartan, spironolactone, furosemide IV    Acute on chronic systolic congestive heart failure (HCC)- (present on admission)  Assessment & Plan  Plan for admission, telemetry monitoring and diuresis.    Continue carvedilol, losartan, furosemide IV, spironolactone    COPD (chronic obstructive pulmonary disease) (HCC)- (present on admission)  Assessment & Plan  With current hypoxemia, improved on BiPAP.    RT protocol  Will be referred to pulmonary rehab  Diuresed for volume overload    Dyslipidemia, goal LDL below 100- (present on admission)  Assessment & Plan  Continue atorvastatin    Essential hypertension- (present on admission)  Assessment & Plan  Poorly controlled.  Monitor with current medical regimen, PRN medications.   Improved.  Continue to titrate medications.     Gastrointestinal prophylaxis: The patient has no risk factors for developing gastric ulcers or gastritis.  As the patient is tolerating oral intake, GI prophylaxis is not indicated at this time.  Antibiotics: None  Diet Order Regular  Code status: Full  Prognosis: Guarded  Risk: The Patient is at HIGH risk for complications and decompensation secondary to her multiple cormorbidities including acute hypoxic respiratory failure requiring submental oxygen secondary to COPD and CHF exacerbations.  Uncontrolled hypertension.  Hyperlipidemia.    I have personally reviewed notes, labs, vitals, imaging.  I discussed the plan of care with bedside RN as well as on multidisciplinary rounds    I have performed a physical exam and reviewed and updated ROS and Plan today  (6/13/2020). In review of yesterday's note (6/12/2020), there are no changes except as documented above.     VTE prophylaxis: Enoxaparin

## 2020-06-13 NOTE — THERAPY
"Occupational Therapy   Initial Evaluation     Patient Name: Karen Jauregui  Age:  75 y.o., Sex:  female  Medical Record #: [id number]  Today's Date: 6/12/2020     Precautions: Fall Risk  Comments: anxious and SOB with activity.    Assessment  Patient is 75 y.o. female re-admitted within hours of discharge for shortness of breath.  Additional factors influencing patient status / progress: COPD exacerbation, HTN, and anxiety. Pt unsteady initially, required assist for functional transfers for safety. Pt reports interest in post-acute placement for strengthening prior to DC home as previous DC home did not go well and pt returned to hospital within hours.     Plan    Recommend Occupational Therapy 3 times per week until therapy goals are met for the following treatments:  Self Care/Activities of Daily Living, Therapeutic Activities and Therapeutic Exercises.    Discharge recommendations:  Recommend post-acute placement for additional occupational therapy services prior to discharge home as pt failed at home within 3 hours of DC from previous acute admission, pt feels below her baseline and is high fall risk.         Subjective    \"My sister's house is nasty, the dogs go to the bathroom on the floor sometimes.\"     06/12/20 1210   Prior Living Situation   Housing / Facility Mobile Home   Steps Into Home 4   Rail Both Rail (Steps into Home)   Bathroom Set up Bathtub / Shower Combination   Equipment Owned 4-Wheel Walker;Single Point Cane;Front-Wheel Walker   Lives with - Patient's Self Care Capacity   (sister)   Comments pt staying with sister temporarily while her son finds them an apartment to share, pt reports not being satisfied with or comfortable in current living situation d/t her sister's dogs and messy home   Prior Level of ADL Function   Comments independent   Prior Level of IADL Function   Prior Level Of Mobility Independent Without Device in Community;Independent Without Device in Home   Driving / " Transportation Relatives / Others Provide Transportation   Occupation (Pre-Hospital Vocational) Retired Due To Age   Precautions   Precautions Fall Risk   Comments anxious, wobbly legs initially   Cognition    Level of Consciousness Alert   Comments anxious   Balance Assessment   Weight Shift Sitting Good   Weight Shift Standing Fair   Comments w/FWW   Bed Mobility    Supine to Sit Supervised   Sit to Supine Supervised   Scooting Supervised   Rolling Supervised   ADL Assessment   Eating Modified Independent   Grooming Standing;Minimal Assist   Lower Body Dressing Supervision   Toileting Supervision   Functional Mobility   Sit to Stand Minimal Assist   Bed, Chair, Wheelchair Transfer Supervised   Toilet Transfers Minimal Assist   Transfer Method Stand Step   Mobility w/FWW   Short Term Goals   Short Term Goal # 1 all functional transfers Mod I   Short Term Goal # 2 full body dressing Mod I   Short Term Goal # 3 standing grooming >5min at sink with SPV   Problem List   Problem List Decreased Active Daily Living Skills;Decreased Homemaking Skills;Decreased Activity Tolerance;Decreased Functional Mobility

## 2020-06-13 NOTE — CARE PLAN
Problem: Communication  Goal: The ability to communicate needs accurately and effectively will improve  Outcome: PROGRESSING AS EXPECTED     Problem: Safety  Goal: Will remain free from falls  Outcome: PROGRESSING AS EXPECTED  Intervention: Implement fall precautions  Flowsheets (Taken 6/12/2020 2000)  Environmental Precautions:   Treaded Slipper Socks on Patient   Personal Belongings, Wastebasket, Call Bell etc. in Easy Reach   Transferred to Stronger Side   Report Given to Other Health Care Providers Regarding Fall Risk   Bed in Low Position   Communication Sign for Patients & Families   Mobility Assessed & Appropriate Sign Placed     Problem: Venous Thromboembolism (VTW)/Deep Vein Thrombosis (DVT) Prevention:  Goal: Patient will participate in Venous Thrombosis (VTE)/Deep Vein Thrombosis (DVT)Prevention Measures  Outcome: PROGRESSING AS EXPECTED  Intervention: Assess and monitor for anticoagulation complications  Note: No s/s of bleeding. Pt educated on s/s of bleeding

## 2020-06-14 LAB
ANION GAP SERPL CALC-SCNC: 7 MMOL/L (ref 7–16)
BASOPHILS # BLD AUTO: 0.7 % (ref 0–1.8)
BASOPHILS # BLD: 0.06 K/UL (ref 0–0.12)
BUN SERPL-MCNC: 28 MG/DL (ref 8–22)
CALCIUM SERPL-MCNC: 9.6 MG/DL (ref 8.5–10.5)
CHLORIDE SERPL-SCNC: 88 MMOL/L (ref 96–112)
CO2 SERPL-SCNC: 37 MMOL/L (ref 20–33)
CREAT SERPL-MCNC: 1.05 MG/DL (ref 0.5–1.4)
EOSINOPHIL # BLD AUTO: 0.24 K/UL (ref 0–0.51)
EOSINOPHIL NFR BLD: 2.8 % (ref 0–6.9)
ERYTHROCYTE [DISTWIDTH] IN BLOOD BY AUTOMATED COUNT: 43.8 FL (ref 35.9–50)
GLUCOSE SERPL-MCNC: 89 MG/DL (ref 65–99)
HCT VFR BLD AUTO: 44.8 % (ref 37–47)
HGB BLD-MCNC: 14.7 G/DL (ref 12–16)
IMM GRANULOCYTES # BLD AUTO: 0.05 K/UL (ref 0–0.11)
IMM GRANULOCYTES NFR BLD AUTO: 0.6 % (ref 0–0.9)
LYMPHOCYTES # BLD AUTO: 1.86 K/UL (ref 1–4.8)
LYMPHOCYTES NFR BLD: 22.1 % (ref 22–41)
MAGNESIUM SERPL-MCNC: 1.7 MG/DL (ref 1.5–2.5)
MCH RBC QN AUTO: 28.6 PG (ref 27–33)
MCHC RBC AUTO-ENTMCNC: 32.8 G/DL (ref 33.6–35)
MCV RBC AUTO: 87.2 FL (ref 81.4–97.8)
MONOCYTES # BLD AUTO: 0.7 K/UL (ref 0–0.85)
MONOCYTES NFR BLD AUTO: 8.3 % (ref 0–13.4)
NEUTROPHILS # BLD AUTO: 5.52 K/UL (ref 2–7.15)
NEUTROPHILS NFR BLD: 65.5 % (ref 44–72)
NRBC # BLD AUTO: 0 K/UL
NRBC BLD-RTO: 0 /100 WBC
PHOSPHATE SERPL-MCNC: 3 MG/DL (ref 2.5–4.5)
PLATELET # BLD AUTO: 282 K/UL (ref 164–446)
PMV BLD AUTO: 9.5 FL (ref 9–12.9)
POTASSIUM SERPL-SCNC: 4.3 MMOL/L (ref 3.6–5.5)
RBC # BLD AUTO: 5.14 M/UL (ref 4.2–5.4)
SODIUM SERPL-SCNC: 132 MMOL/L (ref 135–145)
WBC # BLD AUTO: 8.4 K/UL (ref 4.8–10.8)

## 2020-06-14 PROCEDURE — 770020 HCHG ROOM/CARE - TELE (206)

## 2020-06-14 PROCEDURE — 700102 HCHG RX REV CODE 250 W/ 637 OVERRIDE(OP): Performed by: HOSPITALIST

## 2020-06-14 PROCEDURE — 700111 HCHG RX REV CODE 636 W/ 250 OVERRIDE (IP): Performed by: INTERNAL MEDICINE

## 2020-06-14 PROCEDURE — 80048 BASIC METABOLIC PNL TOTAL CA: CPT

## 2020-06-14 PROCEDURE — 85025 COMPLETE CBC W/AUTO DIFF WBC: CPT

## 2020-06-14 PROCEDURE — 36415 COLL VENOUS BLD VENIPUNCTURE: CPT

## 2020-06-14 PROCEDURE — A9270 NON-COVERED ITEM OR SERVICE: HCPCS | Performed by: INTERNAL MEDICINE

## 2020-06-14 PROCEDURE — 700102 HCHG RX REV CODE 250 W/ 637 OVERRIDE(OP): Performed by: INTERNAL MEDICINE

## 2020-06-14 PROCEDURE — 84100 ASSAY OF PHOSPHORUS: CPT

## 2020-06-14 PROCEDURE — A9270 NON-COVERED ITEM OR SERVICE: HCPCS | Performed by: HOSPITALIST

## 2020-06-14 PROCEDURE — 83735 ASSAY OF MAGNESIUM: CPT

## 2020-06-14 PROCEDURE — 94640 AIRWAY INHALATION TREATMENT: CPT

## 2020-06-14 PROCEDURE — 94760 N-INVAS EAR/PLS OXIMETRY 1: CPT

## 2020-06-14 PROCEDURE — 99233 SBSQ HOSP IP/OBS HIGH 50: CPT | Performed by: INTERNAL MEDICINE

## 2020-06-14 PROCEDURE — 700111 HCHG RX REV CODE 636 W/ 250 OVERRIDE (IP): Performed by: HOSPITALIST

## 2020-06-14 RX ORDER — FUROSEMIDE 20 MG/1
20 TABLET ORAL
Status: DISCONTINUED | OUTPATIENT
Start: 2020-06-15 | End: 2020-06-17 | Stop reason: HOSPADM

## 2020-06-14 RX ORDER — HYDRALAZINE HYDROCHLORIDE 20 MG/ML
10 INJECTION INTRAMUSCULAR; INTRAVENOUS EVERY 6 HOURS PRN
Status: DISCONTINUED | OUTPATIENT
Start: 2020-06-14 | End: 2020-06-17 | Stop reason: HOSPADM

## 2020-06-14 RX ADMIN — FUROSEMIDE 40 MG: 10 INJECTION, SOLUTION INTRAMUSCULAR; INTRAVENOUS at 16:30

## 2020-06-14 RX ADMIN — NICOTINE 14 MG: 14 PATCH TRANSDERMAL at 05:07

## 2020-06-14 RX ADMIN — HYDRALAZINE HYDROCHLORIDE 10 MG: 20 INJECTION INTRAMUSCULAR; INTRAVENOUS at 05:02

## 2020-06-14 RX ADMIN — BUDESONIDE AND FORMOTEROL FUMARATE DIHYDRATE 2 PUFF: 160; 4.5 AEROSOL RESPIRATORY (INHALATION) at 08:12

## 2020-06-14 RX ADMIN — BUDESONIDE AND FORMOTEROL FUMARATE DIHYDRATE 2 PUFF: 160; 4.5 AEROSOL RESPIRATORY (INHALATION) at 20:25

## 2020-06-14 RX ADMIN — OXYCODONE 5 MG: 5 TABLET ORAL at 13:38

## 2020-06-14 RX ADMIN — ENALAPRILAT 1.25 MG: 1.25 INJECTION INTRAVENOUS at 04:03

## 2020-06-14 RX ADMIN — CARVEDILOL 6.25 MG: 6.25 TABLET, FILM COATED ORAL at 07:43

## 2020-06-14 RX ADMIN — FUROSEMIDE 40 MG: 10 INJECTION, SOLUTION INTRAMUSCULAR; INTRAVENOUS at 05:05

## 2020-06-14 RX ADMIN — ATORVASTATIN CALCIUM 40 MG: 40 TABLET, FILM COATED ORAL at 16:30

## 2020-06-14 RX ADMIN — OXYCODONE 5 MG: 5 TABLET ORAL at 18:14

## 2020-06-14 RX ADMIN — CARVEDILOL 6.25 MG: 6.25 TABLET, FILM COATED ORAL at 16:30

## 2020-06-14 RX ADMIN — LOSARTAN POTASSIUM 25 MG: 25 TABLET, FILM COATED ORAL at 05:09

## 2020-06-14 ASSESSMENT — ENCOUNTER SYMPTOMS
NERVOUS/ANXIOUS: 0
SHORTNESS OF BREATH: 1
NAUSEA: 0
BLURRED VISION: 0
HEADACHES: 0
COUGH: 1
SORE THROAT: 0
CHILLS: 0
DIARRHEA: 0
CONSTIPATION: 0
FEVER: 0
DEPRESSION: 0
VOMITING: 0
WEAKNESS: 0
DIZZINESS: 0
PALPITATIONS: 0
ABDOMINAL PAIN: 0
FALLS: 0

## 2020-06-14 NOTE — PROGRESS NOTES
Patient lying in bed, calm, sleeping with unlabored breathing, all other needs are assessed, bed in lowest position, call light within reach. Fall precautions in place.

## 2020-06-14 NOTE — CARE PLAN
Problem: Safety  Goal: Will remain free from injury  Outcome: PROGRESSING AS EXPECTED  Note: Patient's room close to nursing station, bed in the lowest position, call light within reach, hourly rounding on the pt.       Problem: Pain Management  Goal: Pain level will decrease to patient's comfort goal  Outcome: PROGRESSING AS EXPECTED  Note: Patient encouraged to call for assistance when pain outside of pain goal. Pain goal established with the patient. Pain level assessed, PRN pain needs provided as prescribed.

## 2020-06-14 NOTE — PROGRESS NOTES
I was notified by COPD coordinator of pt being in the hospital with a diagnosis of COPD.  Per hospitalist notes she does not have a COPD exacerbation, but rather CHF which has improved.  Pt would be appropriate for evaluation in pulmonary clinic with PFTs, and potentially a candidate for pulmonary rehab.  I am happy to formally consult if requested by the inpatient team if there are questions regarding her COPD or other pulmonary problems.

## 2020-06-14 NOTE — PROGRESS NOTES
Bedside report received from NOC RN. Assumed care of pt. Pt awake, laying in bed. A/Ox4, VSS.Pt is on 3L 02 via NC.  No concerns, complaints or distress. Pt educated to call before getting out of bed. POC reviewed and white board updated. Tele box on. SR 80 on the monitor. Call light in reach. Bed locked in lowest position with 2 upper bed rails up. Bed alarm on.

## 2020-06-14 NOTE — PROGRESS NOTES
Hospital Medicine Daily Progress Note    Date of Service  6/14/2020    Chief Complaint  75 y.o. female admitted 6/11/2020 with shortness of breath    Hospital Course    Ms. Karen Jauregui is a 75 y.o. female with a past medical history significant for COPD, hypertension, dyslipidemia who presented on 6/11/2020 with shortness of breath.  Patient was recently admitted from 6/7-11/2020 for a partial small bowel obstruction.  After being discharged home patient developed progressively worsening shortness of breath while just sitting on her couch.  Denies chest pain, fever, chills.  BNP was greater than 19,000.  Troponins were only mildly elevated and flat.  Chest x-ray consistent with pulmonary edema with left pleural effusion.  EKG demonstrated sinus tachycardia with an incomplete left bundle branch block.  She was admitted with a CHF exacerbation and started on IV furosemide.  Recent admission for congestive heart failure in May 13, 2020 when echo showed ejection fraction 30%, global hypokinesis.        Interval Problem Update  Patient was seen and examined at bedside.  I have personally reviewed vitals, labs, and imaging.    6/12.  Patient had one episode of hypothermia at 35.3.  Has been hypertensive and tachycardic.  Currently on 2 L nasal cannula.  Patient reports her breathing is much improved.  Denies fever, chills, chest pain, shortness of breath.  Currently sitting up eating breakfast.  Exam was discharged yesterday physical therapy felt that she would have no needs the patient was agreeable to do outpatient physical therapy.  However as the patient decompensated after only a few hours on reevaluation physical therapy recommends postacute placement.  Patient was also evaluated by pulmonology and referred to pulmonary rehab.  6/13.  Afebrile.  Episodes of tachycardia.  Hypertension is improved.  On 3 L nasal cannula.  Denies fever, chills, chest pain, abdominal pain.  Rest of breath is improved.  She is  back on her baseline oxygen.  Replete mag, potassium.  PT/OT recommended postacute placement as the patient quickly decompensated when she went home previously.  Patient adamantly refuses postacute placement saying that she has a lot of help at home with her family.  She is willing to accept home health.  6/14.  Afebrile.  Mostly normotensive.  Did become hypertensive with systolics of 180 but then received hydralazine and Vasotec which dropped blood pressure to 90s.  On baseline 3 L nasal cannula.  Hyponatremia is stable.  Patient reports shortness of breath is improving.  Denies fever, chills, chest pains.  Adamant about going home with home health rather than placement.    Consultants/Specialty  Pulmonology    Code Status  Full    Disposition  Post acute placement vs home health    Review of Systems  Review of Systems   Constitutional: Negative for chills and fever.   HENT: Negative for congestion and sore throat.    Eyes: Negative for blurred vision.   Respiratory: Positive for cough and shortness of breath.    Cardiovascular: Negative for chest pain, palpitations and leg swelling.   Gastrointestinal: Negative for abdominal pain, constipation, diarrhea, nausea and vomiting.   Genitourinary: Negative for dysuria, frequency and urgency.   Musculoskeletal: Negative for falls.   Skin: Negative for rash.   Neurological: Negative for dizziness, weakness and headaches.   Psychiatric/Behavioral: Negative for depression. The patient is not nervous/anxious.    All other systems reviewed and are negative.       Physical Exam  Temp:  [36.1 °C (96.9 °F)-36.5 °C (97.7 °F)] 36.1 °C (96.9 °F)  Pulse:  [77-99] 92  Resp:  [17-18] 18  BP: ()/() 115/52  SpO2:  [90 %-98 %] 96 %    Physical Exam  Vitals signs and nursing note reviewed.   Constitutional:       General: She is not in acute distress.     Appearance: Normal appearance.   HENT:      Head: Normocephalic and atraumatic.      Right Ear: External ear normal.       Left Ear: External ear normal.      Nose: Nose normal.      Mouth/Throat:      Mouth: Mucous membranes are moist.      Pharynx: Oropharynx is clear. No oropharyngeal exudate or posterior oropharyngeal erythema.   Eyes:      Extraocular Movements: Extraocular movements intact.      Conjunctiva/sclera: Conjunctivae normal.   Neck:      Musculoskeletal: Normal range of motion and neck supple.   Cardiovascular:      Rate and Rhythm: Normal rate and regular rhythm.      Pulses: Normal pulses.      Heart sounds: Normal heart sounds. No murmur.   Pulmonary:      Effort: Pulmonary effort is normal. No respiratory distress.      Breath sounds: No stridor. Wheezing and rales present.   Abdominal:      General: Abdomen is flat. Bowel sounds are normal. There is no distension.      Palpations: Abdomen is soft. There is no mass.      Tenderness: There is no abdominal tenderness.   Musculoskeletal:      Right lower leg: No edema.      Left lower leg: No edema.   Skin:     General: Skin is warm.      Capillary Refill: Capillary refill takes less than 2 seconds.   Neurological:      General: No focal deficit present.      Mental Status: She is alert and oriented to person, place, and time. Mental status is at baseline.      Cranial Nerves: No cranial nerve deficit.   Psychiatric:         Mood and Affect: Mood normal.         Behavior: Behavior normal.         Fluids    Intake/Output Summary (Last 24 hours) at 6/14/2020 1135  Last data filed at 6/14/2020 0800  Gross per 24 hour   Intake 840 ml   Output 1900 ml   Net -1060 ml       Laboratory  Recent Labs     06/12/20 0450 06/13/20 0338 06/14/20  0358   WBC 10.1 10.9* 8.4   RBC 5.13 4.79 5.14   HEMOGLOBIN 14.7 13.8 14.7   HEMATOCRIT 44.3 40.3 44.8   MCV 86.4 84.1 87.2   MCH 28.7 28.8 28.6   MCHC 33.2* 34.2 32.8*   RDW 41.5 41.4 43.8   PLATELETCT 246 254 282   MPV 9.3 9.3 9.5     Recent Labs     06/12/20  0450 06/13/20  0338 06/14/20  0358   SODIUM 134* 133* 132*   POTASSIUM 3.5*  3.4* 4.3   CHLORIDE 97 93* 88*   CO2 28 31 37*   GLUCOSE 159* 110* 89   BUN 26* 31* 28*   CREATININE 0.75 0.85 1.05   CALCIUM 9.3 9.5 9.6                   Imaging  DX-CHEST-PORTABLE (1 VIEW)   Final Result         1.  Pulmonary edema and/or infiltrates are identified, which appear somewhat increased since the prior exam.   2.  Moderate left pleural effusion   3.  Changes of COPD   4.  Atherosclerosis           Assessment/Plan  * Acute respiratory failure with hypoxia (HCC)  Assessment & Plan  Plan for ongoing oxygen supplementation.   Patient on her baseline 2-3 L.  Secondary to combination COPD and CHF exacerbation    Hypertensive emergency- (present on admission)  Assessment & Plan  With pulmonary edema, improved with treatment.  Continue carvedilol, losartan, spironolactone, furosemide IV    Acute on chronic systolic congestive heart failure (HCC)- (present on admission)  Assessment & Plan  Plan for admission, telemetry monitoring and diuresis.    Continue carvedilol, losartan, spironolactone  Transition to p.o. furosemide    COPD (chronic obstructive pulmonary disease) (HCC)- (present on admission)  Assessment & Plan  With current hypoxemia, improved on BiPAP.    RT protocol  Will be referred to pulmonary rehab  Diuresed for volume overload    Dyslipidemia, goal LDL below 100- (present on admission)  Assessment & Plan  Continue atorvastatin    Essential hypertension- (present on admission)  Assessment & Plan  Poorly controlled.  Monitor with current medical regimen, PRN medications.   Improved.  Continue to titrate medications.     Gastrointestinal prophylaxis: The patient has no risk factors for developing gastric ulcers or gastritis.  As the patient is tolerating oral intake, GI prophylaxis is not indicated at this time.  Antibiotics: None  Diet Order Regular  Code status: Full  Prognosis: Guarded  Risk: The Patient is at HIGH risk for complications and decompensation secondary to her multiple cormorbidities  including acute hypoxic respiratory failure requiring submental oxygen secondary to COPD and CHF exacerbations.  Uncontrolled hypertension.  Hyperlipidemia.    I have personally reviewed notes, labs, vitals, imaging.  I discussed the plan of care with bedside RN as well as on multidisciplinary rounds    I have performed a physical exam and reviewed and updated ROS and Plan today (6/14/2020). In review of yesterday's note (6/13/2020), there are no changes except as documented above.     VTE prophylaxis: Enoxaparin

## 2020-06-14 NOTE — FACE TO FACE
Face to Face Supporting Documentation - Home Health    The encounter with this patient was in whole or in part the primary reason for home health admission.    Date of encounter:   Patient:                    MRN:                       YOB: 2019  Karen Jauregui  [id number]  1945     Home health to see patient for:  Skilled Nursing care for assessment, interventions & education    Skilled need for:  Exacerbation of Chronic Disease State CHF and COPD    Homebound status evidenced by:  Needs the assistance of another person in order to leave the home. Leaving home requires a considerable and taxing effort. There is a normal inability to leave the home.    Community Physician to provide follow up care: Baltazar Julio M.D.         I certify the face to face encounter for this home health care referral meets the CMS requirements and the encounter/clinical assessment with the patient was, in whole, or in part, for the medical condition(s) listed above, which is the primary reason for home health care. Based on my clinical findings: the service(s) are medically necessary, support the need for home health care, and the homebound criteria are met.  I certify that this patient has had a face to face encounter by myself.  Ziyad Cox D.O. - PATRICKI: [phone]

## 2020-06-14 NOTE — PROGRESS NOTES
Bedside report received. Pt. Sitting up in bed, reports pain but denies interventions, all other needs are assessed, bed in lowest position, call light within reach. Fall precautions in place, pt. educated call for assistance.

## 2020-06-15 ENCOUNTER — PATIENT OUTREACH (OUTPATIENT)
Dept: HEALTH INFORMATION MANAGEMENT | Facility: OTHER | Age: 75
End: 2020-06-15

## 2020-06-15 LAB
ANION GAP SERPL CALC-SCNC: 7 MMOL/L (ref 7–16)
BASOPHILS # BLD AUTO: 0.6 % (ref 0–1.8)
BASOPHILS # BLD: 0.06 K/UL (ref 0–0.12)
BUN SERPL-MCNC: 33 MG/DL (ref 8–22)
CALCIUM SERPL-MCNC: 9.6 MG/DL (ref 8.5–10.5)
CHLORIDE SERPL-SCNC: 91 MMOL/L (ref 96–112)
CO2 SERPL-SCNC: 36 MMOL/L (ref 20–33)
CREAT SERPL-MCNC: 1 MG/DL (ref 0.5–1.4)
EOSINOPHIL # BLD AUTO: 0.28 K/UL (ref 0–0.51)
EOSINOPHIL NFR BLD: 2.7 % (ref 0–6.9)
ERYTHROCYTE [DISTWIDTH] IN BLOOD BY AUTOMATED COUNT: 42.6 FL (ref 35.9–50)
GLUCOSE SERPL-MCNC: 90 MG/DL (ref 65–99)
HCT VFR BLD AUTO: 42.4 % (ref 37–47)
HGB BLD-MCNC: 14.3 G/DL (ref 12–16)
IMM GRANULOCYTES # BLD AUTO: 0.06 K/UL (ref 0–0.11)
IMM GRANULOCYTES NFR BLD AUTO: 0.6 % (ref 0–0.9)
LYMPHOCYTES # BLD AUTO: 1.85 K/UL (ref 1–4.8)
LYMPHOCYTES NFR BLD: 17.6 % (ref 22–41)
MAGNESIUM SERPL-MCNC: 1.8 MG/DL (ref 1.5–2.5)
MCH RBC QN AUTO: 29 PG (ref 27–33)
MCHC RBC AUTO-ENTMCNC: 33.7 G/DL (ref 33.6–35)
MCV RBC AUTO: 86 FL (ref 81.4–97.8)
MONOCYTES # BLD AUTO: 1.01 K/UL (ref 0–0.85)
MONOCYTES NFR BLD AUTO: 9.6 % (ref 0–13.4)
NEUTROPHILS # BLD AUTO: 7.24 K/UL (ref 2–7.15)
NEUTROPHILS NFR BLD: 68.9 % (ref 44–72)
NRBC # BLD AUTO: 0 K/UL
NRBC BLD-RTO: 0 /100 WBC
NT-PROBNP SERPL IA-MCNC: 2054 PG/ML (ref 0–125)
PHOSPHATE SERPL-MCNC: 3.2 MG/DL (ref 2.5–4.5)
PLATELET # BLD AUTO: 277 K/UL (ref 164–446)
PMV BLD AUTO: 9.6 FL (ref 9–12.9)
POTASSIUM SERPL-SCNC: 4.2 MMOL/L (ref 3.6–5.5)
RBC # BLD AUTO: 4.93 M/UL (ref 4.2–5.4)
SODIUM SERPL-SCNC: 134 MMOL/L (ref 135–145)
WBC # BLD AUTO: 10.5 K/UL (ref 4.8–10.8)

## 2020-06-15 PROCEDURE — 94640 AIRWAY INHALATION TREATMENT: CPT

## 2020-06-15 PROCEDURE — 700102 HCHG RX REV CODE 250 W/ 637 OVERRIDE(OP): Performed by: INTERNAL MEDICINE

## 2020-06-15 PROCEDURE — 97110 THERAPEUTIC EXERCISES: CPT

## 2020-06-15 PROCEDURE — 700102 HCHG RX REV CODE 250 W/ 637 OVERRIDE(OP): Performed by: HOSPITALIST

## 2020-06-15 PROCEDURE — 80048 BASIC METABOLIC PNL TOTAL CA: CPT

## 2020-06-15 PROCEDURE — 99233 SBSQ HOSP IP/OBS HIGH 50: CPT | Performed by: INTERNAL MEDICINE

## 2020-06-15 PROCEDURE — 83880 ASSAY OF NATRIURETIC PEPTIDE: CPT

## 2020-06-15 PROCEDURE — A9270 NON-COVERED ITEM OR SERVICE: HCPCS | Performed by: INTERNAL MEDICINE

## 2020-06-15 PROCEDURE — 97530 THERAPEUTIC ACTIVITIES: CPT

## 2020-06-15 PROCEDURE — 36415 COLL VENOUS BLD VENIPUNCTURE: CPT

## 2020-06-15 PROCEDURE — 83735 ASSAY OF MAGNESIUM: CPT

## 2020-06-15 PROCEDURE — 770020 HCHG ROOM/CARE - TELE (206)

## 2020-06-15 PROCEDURE — 84100 ASSAY OF PHOSPHORUS: CPT

## 2020-06-15 PROCEDURE — 94760 N-INVAS EAR/PLS OXIMETRY 1: CPT

## 2020-06-15 PROCEDURE — A9270 NON-COVERED ITEM OR SERVICE: HCPCS | Performed by: HOSPITALIST

## 2020-06-15 PROCEDURE — 700111 HCHG RX REV CODE 636 W/ 250 OVERRIDE (IP): Performed by: HOSPITALIST

## 2020-06-15 PROCEDURE — 85025 COMPLETE CBC W/AUTO DIFF WBC: CPT

## 2020-06-15 RX ADMIN — FUROSEMIDE 20 MG: 20 TABLET ORAL at 17:23

## 2020-06-15 RX ADMIN — NICOTINE 14 MG: 14 PATCH TRANSDERMAL at 05:36

## 2020-06-15 RX ADMIN — CARVEDILOL 6.25 MG: 6.25 TABLET, FILM COATED ORAL at 17:23

## 2020-06-15 RX ADMIN — FUROSEMIDE 20 MG: 20 TABLET ORAL at 05:36

## 2020-06-15 RX ADMIN — ATORVASTATIN CALCIUM 40 MG: 40 TABLET, FILM COATED ORAL at 17:23

## 2020-06-15 RX ADMIN — LOSARTAN POTASSIUM 25 MG: 25 TABLET, FILM COATED ORAL at 05:36

## 2020-06-15 RX ADMIN — BUDESONIDE AND FORMOTEROL FUMARATE DIHYDRATE 2 PUFF: 160; 4.5 AEROSOL RESPIRATORY (INHALATION) at 08:25

## 2020-06-15 RX ADMIN — CARVEDILOL 6.25 MG: 6.25 TABLET, FILM COATED ORAL at 09:38

## 2020-06-15 RX ADMIN — DOCUSATE SODIUM 50 MG AND SENNOSIDES 8.6 MG 2 TABLET: 8.6; 5 TABLET, FILM COATED ORAL at 17:24

## 2020-06-15 RX ADMIN — BUDESONIDE AND FORMOTEROL FUMARATE DIHYDRATE 2 PUFF: 160; 4.5 AEROSOL RESPIRATORY (INHALATION) at 19:43

## 2020-06-15 RX ADMIN — ENALAPRILAT 1.25 MG: 1.25 INJECTION INTRAVENOUS at 23:53

## 2020-06-15 RX ADMIN — OXYCODONE 5 MG: 5 TABLET ORAL at 11:15

## 2020-06-15 ASSESSMENT — ENCOUNTER SYMPTOMS
CHILLS: 0
DIZZINESS: 0
NERVOUS/ANXIOUS: 0
VOMITING: 0
SORE THROAT: 0
SHORTNESS OF BREATH: 1
BLURRED VISION: 0
FEVER: 0
DIARRHEA: 0
PALPITATIONS: 0
NAUSEA: 0
CONSTIPATION: 0
WEAKNESS: 0
DEPRESSION: 0
HEADACHES: 0
FALLS: 0
ABDOMINAL PAIN: 0
COUGH: 1

## 2020-06-15 ASSESSMENT — COGNITIVE AND FUNCTIONAL STATUS - GENERAL
SUGGESTED CMS G CODE MODIFIER DAILY ACTIVITY: CK
DRESSING REGULAR LOWER BODY CLOTHING: A LITTLE
HELP NEEDED FOR BATHING: A LITTLE
DAILY ACTIVITIY SCORE: 19
DRESSING REGULAR UPPER BODY CLOTHING: A LITTLE
PERSONAL GROOMING: A LITTLE
TOILETING: A LITTLE

## 2020-06-15 ASSESSMENT — FIBROSIS 4 INDEX: FIB4 SCORE: 1.9

## 2020-06-15 NOTE — DISCHARGE PLANNING
Received Choice form at [phone]  Agency/Facility Name: [facility]  Referral sent per Choice form @ [id number]

## 2020-06-15 NOTE — DISCHARGE PLANNING
Anticipated Discharge Disposition: Home    Action: Discussed in IDT rounds; per MD pt needs to go to SNF. Spoke with pt who stated she did not want to go to SNF; agreeable to HH. MD notified and verbalized agreement with HH plan. Choice obtained for HH and sent to [facility]; pt requesting [facility].     Barriers to Discharge: HH acceptance pending.    Plan: Await HH accceptance for discharge.

## 2020-06-15 NOTE — CARE PLAN
Problem: Nutritional:  Goal: Achieve adequate nutritional intake  Description: Patient will consume >50% of meals  Outcome: MET     PO % x 6 meals per flowsheet since 6/12

## 2020-06-15 NOTE — PROGRESS NOTES
Hospital Medicine Daily Progress Note    Date of Service  6/15/2020    Chief Complaint  75 y.o. female admitted 6/11/2020 with shortness of breath    Hospital Course    Ms. Karen Jauregui is a 75 y.o. female with a past medical history significant for COPD, hypertension, dyslipidemia who presented on 6/11/2020 with shortness of breath.  Patient was recently admitted from 6/7-11/2020 for a partial small bowel obstruction.  After being discharged home patient developed progressively worsening shortness of breath while just sitting on her couch.  Denies chest pain, fever, chills.  BNP was greater than 19,000.  Troponins were only mildly elevated and flat.  Chest x-ray consistent with pulmonary edema with left pleural effusion.  EKG demonstrated sinus tachycardia with an incomplete left bundle branch block.  She was admitted with a CHF exacerbation and started on IV furosemide.  Recent admission for congestive heart failure in May 13, 2020 when echo showed ejection fraction 30%, global hypokinesis.        Interval Problem Update  Patient was seen and examined at bedside.  I have personally reviewed vitals, labs, and imaging.    6/12.  Patient had one episode of hypothermia at 35.3.  Has been hypertensive and tachycardic.  Currently on 2 L nasal cannula.  Patient reports her breathing is much improved.  Denies fever, chills, chest pain, shortness of breath.  Currently sitting up eating breakfast.  Exam was discharged yesterday physical therapy felt that she would have no needs the patient was agreeable to do outpatient physical therapy.  However as the patient decompensated after only a few hours on reevaluation physical therapy recommends postacute placement.  Patient was also evaluated by pulmonology and referred to pulmonary rehab.  6/13.  Afebrile.  Episodes of tachycardia.  Hypertension is improved.  On 3 L nasal cannula.  Denies fever, chills, chest pain, abdominal pain.  Rest of breath is improved.  She is  back on her baseline oxygen.  Replete mag, potassium.  PT/OT recommended postacute placement as the patient quickly decompensated when she went home previously.  Patient adamantly refuses postacute placement saying that she has a lot of help at home with her family.  She is willing to accept home health.  6/14.  Afebrile.  Mostly normotensive.  Did become hypertensive with systolics of 180 but then received hydralazine and Vasotec which dropped blood pressure to 90s.  On baseline 3 L nasal cannula.  Hyponatremia is stable.  Patient reports shortness of breath is improving.  Denies fever, chills, chest pains.  Adamant about going home with home health rather than placement.  6/15.  Patient is afebrile.  Episodes of hypotension last night which have resolved.  On 2-3 L nasal cannula.  Denies fever, chills, chest pains.  Patient feels like her shortness of breath is much improved but is not quite back to baseline.  After briefly considering placement patient changed her mind and again is adamant about home with home health.  Referrals have been sent.  She has also previously declined home health in the past but is agreeable to it at this time.    Consultants/Specialty  Pulmonology    Code Status  Full    Disposition  Post acute placement vs home health    Review of Systems  Review of Systems   Constitutional: Negative for chills and fever.   HENT: Negative for congestion and sore throat.    Eyes: Negative for blurred vision.   Respiratory: Positive for cough and shortness of breath.    Cardiovascular: Negative for chest pain, palpitations and leg swelling.   Gastrointestinal: Negative for abdominal pain, constipation, diarrhea, nausea and vomiting.   Genitourinary: Negative for dysuria, frequency and urgency.   Musculoskeletal: Negative for falls.   Skin: Negative for rash.   Neurological: Negative for dizziness, weakness and headaches.   Psychiatric/Behavioral: Negative for depression. The patient is not  nervous/anxious.    All other systems reviewed and are negative.       Physical Exam  Temp:  [36.3 °C (97.3 °F)-37.5 °C (99.5 °F)] 36.6 °C (97.9 °F)  Pulse:  [] 96  Resp:  [16-19] 18  BP: (103-174)/(46-79) 158/75  SpO2:  [94 %-98 %] 98 %    Physical Exam  Vitals signs and nursing note reviewed.   Constitutional:       General: She is not in acute distress.     Appearance: Normal appearance.   HENT:      Head: Normocephalic and atraumatic.      Right Ear: External ear normal.      Left Ear: External ear normal.      Nose: Nose normal.      Mouth/Throat:      Mouth: Mucous membranes are moist.      Pharynx: Oropharynx is clear. No oropharyngeal exudate or posterior oropharyngeal erythema.   Eyes:      Extraocular Movements: Extraocular movements intact.      Conjunctiva/sclera: Conjunctivae normal.   Neck:      Musculoskeletal: Normal range of motion and neck supple.   Cardiovascular:      Rate and Rhythm: Normal rate and regular rhythm.      Pulses: Normal pulses.      Heart sounds: Normal heart sounds. No murmur.   Pulmonary:      Effort: Pulmonary effort is normal. No respiratory distress.      Breath sounds: No stridor. Wheezing and rales present.   Abdominal:      General: Abdomen is flat. Bowel sounds are normal. There is no distension.      Palpations: Abdomen is soft. There is no mass.      Tenderness: There is no abdominal tenderness.   Musculoskeletal:      Right lower leg: No edema.      Left lower leg: No edema.   Skin:     General: Skin is warm.      Capillary Refill: Capillary refill takes less than 2 seconds.   Neurological:      General: No focal deficit present.      Mental Status: She is alert and oriented to person, place, and time. Mental status is at baseline.      Cranial Nerves: No cranial nerve deficit.   Psychiatric:         Mood and Affect: Mood normal.         Behavior: Behavior normal.         Fluids    Intake/Output Summary (Last 24 hours) at 6/15/2020 1117  Last data filed at  6/15/2020 0830  Gross per 24 hour   Intake 480 ml   Output 1650 ml   Net -1170 ml       Laboratory  Recent Labs     06/13/20  0338 06/14/20  0358 06/15/20  0427   WBC 10.9* 8.4 10.5   RBC 4.79 5.14 4.93   HEMOGLOBIN 13.8 14.7 14.3   HEMATOCRIT 40.3 44.8 42.4   MCV 84.1 87.2 86.0   MCH 28.8 28.6 29.0   MCHC 34.2 32.8* 33.7   RDW 41.4 43.8 42.6   PLATELETCT 254 282 277   MPV 9.3 9.5 9.6     Recent Labs     06/13/20  0338 06/14/20 0358 06/15/20  0427   SODIUM 133* 132* 134*   POTASSIUM 3.4* 4.3 4.2   CHLORIDE 93* 88* 91*   CO2 31 37* 36*   GLUCOSE 110* 89 90   BUN 31* 28* 33*   CREATININE 0.85 1.05 1.00   CALCIUM 9.5 9.6 9.6                   Imaging  DX-CHEST-PORTABLE (1 VIEW)   Final Result         1.  Pulmonary edema and/or infiltrates are identified, which appear somewhat increased since the prior exam.   2.  Moderate left pleural effusion   3.  Changes of COPD   4.  Atherosclerosis           Assessment/Plan  * Acute respiratory failure with hypoxia (HCC)  Assessment & Plan  Plan for ongoing oxygen supplementation.   Patient on her baseline 2-3 L.  Secondary to combination COPD and CHF exacerbation    Hypertensive emergency- (present on admission)  Assessment & Plan  With pulmonary edema, improved with treatment.  Continue carvedilol, losartan, spironolactone  Titrated back to p.o. furosemide.    Acute on chronic systolic congestive heart failure (HCC)- (present on admission)  Assessment & Plan  Plan for admission, telemetry monitoring and diuresis.    Continue carvedilol, losartan, spironolactone  Transition to p.o. furosemide  2g sodium and fluid restrictions on diet.    COPD (chronic obstructive pulmonary disease) (HCC)- (present on admission)  Assessment & Plan  With current hypoxemia, improved on BiPAP.    RT protocol  Will be referred to pulmonary rehab  Diuresed for volume overload    Dyslipidemia, goal LDL below 100- (present on admission)  Assessment & Plan  Continue atorvastatin    Essential  hypertension- (present on admission)  Assessment & Plan  Poorly controlled.  Monitor with current medical regimen, PRN medications.   Continue carvedilol, losartan, spironolactone, furosemide  Improved.  Continue to titrate medications.     Gastrointestinal prophylaxis: The patient has no risk factors for developing gastric ulcers or gastritis.  As the patient is tolerating oral intake, GI prophylaxis is not indicated at this time.  Antibiotics: None  Diet Order Regular  Code status: Full  Prognosis: Guarded  Risk: The Patient is at HIGH risk for complications and decompensation secondary to her multiple cormorbidities including acute hypoxic respiratory failure requiring submental oxygen secondary to COPD and CHF exacerbations.  Uncontrolled hypertension.  Hyperlipidemia.    I have personally reviewed notes, labs, vitals, imaging.  I discussed the plan of care with bedside RN as well as on multidisciplinary rounds    I have performed a physical exam and reviewed and updated ROS and Plan today (6/15/2020). In review of yesterday's note (6/14/2020), there are no changes except as documented above.     VTE prophylaxis: Enoxaparin

## 2020-06-15 NOTE — HEART FAILURE PROGRAM
Cardiovascular Nurse Navigator () Advanced Heart Failure Program HF Exacerbation Consult Note:     This is patient's third admission since May 13, 2020 at which time she was first diagnosed with cardiomyopathy with an EF of 30%. She refused angiogram stating she wished to f/u with her cardiologist, Dr. Landin at [facility].    On every admission patient presents with Chest Pain and is found to be in hypertensive crisis - this is true of this admission as well. In the ER, the physician felt that she had flash pulmonary edema due to hypertensive crisis.    After her first discharge, RN AUSTIN reached out to patient and she didn't know what her dose of home O2 was, she was only wearing her O2 intermittently, and she hadn't been weighing herself. The RN offered to help set up home health for her and patient declined stating that she had a PCP appt coming up.    On the day of the PCP appt, patient called one hour prior to the start of the appointment and canceled it.    6/7-6/11 patient again admitted, this time for abdominal pain possible SBO and was discovered to have some vascular issues that were determined to be chronic and therefore not requiring intervention. Home health was again recommended and patient again refused.    Patient was brought back to the hospital 3 hours after her discharge on 6/11/20 and put on biPAP. She is currently in T 722 and although therapy are recommending post acute placement (SNF) patient is refusing.    Patient also continues to smoke and although she is being followed by RD for a BMI of 17.04, she is on a regular diet. She should be on a 2GNA diet for her HF. She also carries diagnosis of COPD and she is sinus rhythm per tele summaries.    Patient has never had a palliative consult. I have messaged Dr. Cox regarding my concerns including med compliance, palliative consult, repeated complaints of chest pain but refusal of angiogram, and needs to be on sodium restricted  diet.    Meanwhile below are measures that need to be addressed for HFrEF.     HF Measures:  1. Documentation of LV systolic function (echo or cath) PTA, during this hospitalization, or plan to assess post discharge or reason for not assessing documented  2. Documentation of fluid intake and urine output every nursing shift  3. 2 hour post diuretic assessment documented 2 hours after diuretic given  4. HF Patient Education using the Living Well With Heart Failure Booklet and Symptom Tracker documented every nursing shift  5. Nutrition consult for diet education  6. Daily weights (one weight documented every 24 hours) on a standing scale unless standing is contraindicated in which case bed scale can be used - have patient write weight on symptom tracker  7. For LVEF less than or equal to 40%, ACE-I, ARNI or ARB prescribed at discharge   8. For LVEF less than or equal to 40%, an Evidence Based Beta Blocker (bisoprolol, carvedilol, toprol xl) must be prescribed at discharge  9. For LVEF less than or equal to 35% aldosterone blockade prescribed at discharge  10. The combination of hydralazine and isosorbide dinitrate is recommended to reduce morbidity and mortality for patients self-described  Americans with NYHA class III-IV HFrEF (EF 40% or less), receiving optimal therapy with ACE inhibitors and beta blockers, unless contraindicated (Class I, BRONWYN: A).  11. If a HF patient is diabetic or is newly diagnosed with DM: prescribed diabetes treatment at discharge in the form of glycemic control (diet or anti-hyperglycemic medication) or f/u appointment for diabetes management scheduled at discharge.  12. If a HF patient has diabetes: prescribed lipid lowering medication at discharge  13. Documented smoking cessation advice or counseling  14. If a HF patient has a-fib: anticoagulation is prescribed upon discharge or contraindication is documented  15. Screening for and administering immunizations as long as no  contraindications: Pneumonia (regardless of age) and Influenza  16. Written discharge instructions include:  ? Daily weights  ? Record weight on tracker  ? Bring tracker to appointments  ? Call MD for weight gain of 3lb /day or 5lb/week  ? HF medication teaching  ? Low sodium diet  ? Follow up appointment within seven calendar days of d/c must include: date, time and location  ? Activity  ? Worsening symptoms    What if any of the above HF measures are contraindicated?  ? Request that the discharging provider document the medication/intervention and the contraindication specifically in a progress note  ? For example: “no CHF meds due to hypotension” is not enough. It needs to say: “No ACE-I, ARNI, ARB due to hypotension”; “No Beta Blockade due to bradycardia”…

## 2020-06-15 NOTE — THERAPY
Occupational Therapy  Daily Treatment     Patient Name: Karen Jauregui  Age:  75 y.o., Sex:  female  Medical Record #: [id number]  Today's Date: 6/15/2020     Precautions  Precautions: Fall Risk  Comments: anxious and SOB with activity.    Assessment    Pt demo decreased motivation today, refusing to work on ADLs and functional mobility. However, with mod encouragement and education on benefits of therapy to ensure a safer return home, pt agreeable to participate. Pt continues to be limited by decreased functional activity tolerance, decreased safety with functional transfers/mobility, and decreased indep with ADLs. Pt would continue to benefit from skilled OT services in the acute care setting to address these deficits.    Plan    Continue current treatment plan.    Discharge recommendations:  Recommend post-acute placement for additional occupational therapy services prior to discharge home.    Subjective    Pt appeared sad today, but agreeable to participate with mod encouragement and enjoyed talking about her cat.      Objective       06/15/20 1610   Cognition    Level of Consciousness Alert   Comments pt sad/demo decreased motivation, required mod encouragement to participate in therapy   Sitting Upper Body Exercises   Sitting Upper Body Exercises Yes   Chest Press 2 sets of 10;Bilateral  (AROM)   Shoulder Press 2 sets of 10;Bilateral  (AROM)   Other Treatments   Other Treatments Provided This therapist discussed HH vs SNF upon d/c from hospital, as per chart review pt returned to hospital shortly after prior d/c home. This therapist educated pt on benefits of post-acute rehab to facilitate safe return home. Pt reported she misses her cat, but she'll think about it   Balance   Sitting Balance (Static) Good   Sitting Balance (Dynamic) Fair +   Standing Balance (Static) Fair +   Standing Balance (Dynamic) Fair   Weight Shift Sitting Good   Weight Shift Standing Fair   Skilled Intervention Verbal Cuing    Comments FWW   Bed Mobility    Supine to Sit Supervised   Sit to Supine Supervised   Scooting Supervised   Skilled Intervention Verbal Cuing  (HOB flat and bed rail down to simulate home environment)   Activities of Daily Living   Grooming   (declined)   Lower Body Dressing   (declined)   Toileting   (declined)   Functional Mobility   Sit to Stand Supervised   Bed, Chair, Wheelchair Transfer Minimal Assist  (min VCs for safe technique)   Toilet Transfers Refused   Transfer Method Stand Step  (FWW)   Mobility EOB<>chair with FWW   Skilled Intervention Verbal Cuing  (mod VCs for encouragement to participate)   Activity Tolerance   Sitting in Chair 8 mins   Sitting Edge of Bed 2 mins   Standing 4 mins   Comments Pt demo decreased functional activity tolerance, and decreased motivation, requesting to return to bed instead of sitting in chair once therapy session over   Patient / Family Goals   Patient / Family Goal #1 I want to be independent   Goal #1 Outcome Goal not met   Short Term Goals   Short Term Goal # 1 all functional transfers Mod I   Goal Outcome # 1 Goal not met   Short Term Goal # 2 full body dressing Mod I   Goal Outcome # 2 Goal not met   Short Term Goal # 3 standing grooming >5min at sink with SPV   Goal Outcome # 3 Goal not met

## 2020-06-16 PROBLEM — I95.9 HYPOTENSION: Status: ACTIVE | Noted: 2020-06-12

## 2020-06-16 LAB
ANION GAP SERPL CALC-SCNC: 7 MMOL/L (ref 7–16)
BASOPHILS # BLD AUTO: 0.5 % (ref 0–1.8)
BASOPHILS # BLD: 0.05 K/UL (ref 0–0.12)
BUN SERPL-MCNC: 29 MG/DL (ref 8–22)
CALCIUM SERPL-MCNC: 9.3 MG/DL (ref 8.5–10.5)
CHLORIDE SERPL-SCNC: 88 MMOL/L (ref 96–112)
CO2 SERPL-SCNC: 35 MMOL/L (ref 20–33)
CREAT SERPL-MCNC: 0.84 MG/DL (ref 0.5–1.4)
EOSINOPHIL # BLD AUTO: 0.2 K/UL (ref 0–0.51)
EOSINOPHIL NFR BLD: 2.1 % (ref 0–6.9)
ERYTHROCYTE [DISTWIDTH] IN BLOOD BY AUTOMATED COUNT: 42.5 FL (ref 35.9–50)
GLUCOSE SERPL-MCNC: 94 MG/DL (ref 65–99)
HCT VFR BLD AUTO: 41.9 % (ref 37–47)
HGB BLD-MCNC: 13.7 G/DL (ref 12–16)
IMM GRANULOCYTES # BLD AUTO: 0.05 K/UL (ref 0–0.11)
IMM GRANULOCYTES NFR BLD AUTO: 0.5 % (ref 0–0.9)
LYMPHOCYTES # BLD AUTO: 1.4 K/UL (ref 1–4.8)
LYMPHOCYTES NFR BLD: 14.9 % (ref 22–41)
MAGNESIUM SERPL-MCNC: 1.9 MG/DL (ref 1.5–2.5)
MCH RBC QN AUTO: 28.3 PG (ref 27–33)
MCHC RBC AUTO-ENTMCNC: 32.7 G/DL (ref 33.6–35)
MCV RBC AUTO: 86.6 FL (ref 81.4–97.8)
MONOCYTES # BLD AUTO: 0.97 K/UL (ref 0–0.85)
MONOCYTES NFR BLD AUTO: 10.4 % (ref 0–13.4)
NEUTROPHILS # BLD AUTO: 6.7 K/UL (ref 2–7.15)
NEUTROPHILS NFR BLD: 71.6 % (ref 44–72)
NRBC # BLD AUTO: 0 K/UL
NRBC BLD-RTO: 0 /100 WBC
PHOSPHATE SERPL-MCNC: 3 MG/DL (ref 2.5–4.5)
PLATELET # BLD AUTO: 252 K/UL (ref 164–446)
PMV BLD AUTO: 9.8 FL (ref 9–12.9)
POTASSIUM SERPL-SCNC: 3.5 MMOL/L (ref 3.6–5.5)
RBC # BLD AUTO: 4.84 M/UL (ref 4.2–5.4)
SODIUM SERPL-SCNC: 130 MMOL/L (ref 135–145)
WBC # BLD AUTO: 9.4 K/UL (ref 4.8–10.8)

## 2020-06-16 PROCEDURE — 85025 COMPLETE CBC W/AUTO DIFF WBC: CPT

## 2020-06-16 PROCEDURE — 84100 ASSAY OF PHOSPHORUS: CPT

## 2020-06-16 PROCEDURE — 94760 N-INVAS EAR/PLS OXIMETRY 1: CPT

## 2020-06-16 PROCEDURE — 700102 HCHG RX REV CODE 250 W/ 637 OVERRIDE(OP): Performed by: HOSPITALIST

## 2020-06-16 PROCEDURE — A9270 NON-COVERED ITEM OR SERVICE: HCPCS | Performed by: INTERNAL MEDICINE

## 2020-06-16 PROCEDURE — 80048 BASIC METABOLIC PNL TOTAL CA: CPT

## 2020-06-16 PROCEDURE — 700102 HCHG RX REV CODE 250 W/ 637 OVERRIDE(OP): Performed by: NURSE PRACTITIONER

## 2020-06-16 PROCEDURE — 83735 ASSAY OF MAGNESIUM: CPT

## 2020-06-16 PROCEDURE — 700102 HCHG RX REV CODE 250 W/ 637 OVERRIDE(OP): Performed by: INTERNAL MEDICINE

## 2020-06-16 PROCEDURE — 770020 HCHG ROOM/CARE - TELE (206)

## 2020-06-16 PROCEDURE — 36415 COLL VENOUS BLD VENIPUNCTURE: CPT

## 2020-06-16 PROCEDURE — A9270 NON-COVERED ITEM OR SERVICE: HCPCS | Performed by: HOSPITALIST

## 2020-06-16 PROCEDURE — A9270 NON-COVERED ITEM OR SERVICE: HCPCS | Performed by: NURSE PRACTITIONER

## 2020-06-16 PROCEDURE — 99233 SBSQ HOSP IP/OBS HIGH 50: CPT | Performed by: HOSPITALIST

## 2020-06-16 PROCEDURE — 94640 AIRWAY INHALATION TREATMENT: CPT

## 2020-06-16 RX ORDER — POTASSIUM CHLORIDE 20 MEQ/1
40 TABLET, EXTENDED RELEASE ORAL ONCE
Status: COMPLETED | OUTPATIENT
Start: 2020-06-16 | End: 2020-06-16

## 2020-06-16 RX ADMIN — ATORVASTATIN CALCIUM 40 MG: 40 TABLET, FILM COATED ORAL at 17:28

## 2020-06-16 RX ADMIN — OXYCODONE 5 MG: 5 TABLET ORAL at 16:12

## 2020-06-16 RX ADMIN — NICOTINE 14 MG: 14 PATCH TRANSDERMAL at 05:37

## 2020-06-16 RX ADMIN — FUROSEMIDE 20 MG: 20 TABLET ORAL at 15:59

## 2020-06-16 RX ADMIN — CARVEDILOL 6.25 MG: 6.25 TABLET, FILM COATED ORAL at 17:27

## 2020-06-16 RX ADMIN — CARVEDILOL 6.25 MG: 6.25 TABLET, FILM COATED ORAL at 07:24

## 2020-06-16 RX ADMIN — ACETAMINOPHEN 650 MG: 325 TABLET, FILM COATED ORAL at 12:49

## 2020-06-16 RX ADMIN — LOSARTAN POTASSIUM 25 MG: 25 TABLET, FILM COATED ORAL at 05:37

## 2020-06-16 RX ADMIN — FUROSEMIDE 20 MG: 20 TABLET ORAL at 05:37

## 2020-06-16 RX ADMIN — BUDESONIDE AND FORMOTEROL FUMARATE DIHYDRATE 2 PUFF: 160; 4.5 AEROSOL RESPIRATORY (INHALATION) at 19:18

## 2020-06-16 RX ADMIN — POTASSIUM CHLORIDE 40 MEQ: 1500 TABLET, EXTENDED RELEASE ORAL at 10:26

## 2020-06-16 RX ADMIN — OXYCODONE 5 MG: 5 TABLET ORAL at 04:01

## 2020-06-16 RX ADMIN — BUDESONIDE AND FORMOTEROL FUMARATE DIHYDRATE 2 PUFF: 160; 4.5 AEROSOL RESPIRATORY (INHALATION) at 10:26

## 2020-06-16 ASSESSMENT — ENCOUNTER SYMPTOMS
HEADACHES: 0
ABDOMINAL PAIN: 0
BACK PAIN: 1
TINGLING: 0
DIZZINESS: 0
STRIDOR: 0
SHORTNESS OF BREATH: 1
MYALGIAS: 1
SINUS PAIN: 0
SPUTUM PRODUCTION: 0
VOMITING: 0
NAUSEA: 0
WHEEZING: 0
HEMOPTYSIS: 0
NECK PAIN: 0
CHILLS: 0
DIAPHORESIS: 0
WEIGHT LOSS: 0
CARDIOVASCULAR NEGATIVE: 1
COUGH: 1

## 2020-06-16 ASSESSMENT — FIBROSIS 4 INDEX: FIB4 SCORE: 2.09

## 2020-06-16 NOTE — PROGRESS NOTES
Handoff report received. Assumed patient care. Patient resting in bed. Patient not in distress, AAOX 4. Safety precautions in place. Call light and personal belongings within reach. Bed is locked and in lowest position. Educated to call for assistance if needed. Will continue to monitor.

## 2020-06-16 NOTE — PROGRESS NOTES
"Patient complaining of dizziness and her head feels \"fuzzy\" Checked BP and manual BP. Manual reading of 64/39. O2 and HR normal. Dr. Valero at bedside. Orders for orthostatic BP. Will assess when patient less symptomatic. No new orders at this time. Will continue to monitor. RR RN aware.   "

## 2020-06-16 NOTE — CONSULTS
"Reason for PC Consult: Advance Care Planning    Consulted by: Dr. Cox    Assessment:  General: 75 year old pt admitted for acute respiratory failure secondary to CHF and COPD exacerbations.  Pt currently back to oxygen baseline of 2L NC.  Pt with hypotension medication being adjusted.  Pt with history of COPD (oxygen dependent) , hypertension, dyslipidemia and CHF.  Pt admitted 6/7-6/11/2020 for SBO.  Pt also recently admitted 5/13/2020 for CHF exacerbation EF 30%.  Pt refused angiogram. Pt continuing to smoke, wear oxygen intermittently  and missed her follow up PCP appointment.    Social: Pt lives with multiple family members and states she has plenty of help.  Pt has a sister and two sons that assist her with any of her needs including driving to appointments.     Dyspnea: No-  2 L NC  Last BM: 06/14/20-    Pain: No-  Pt denies  Depression: Mood appropriate for situation-  \"I want to go home I have plenty of help\"  Dementia: No;       Spiritual:  Is Yarsanism or spirituality important for coping with this illness? Yes-    Has a  or spiritual provider visit been requested? No Pt declined    Palliative Performance Scale: 60%    Advance Directive: None-  Completed during encounter.  Pt selected her sister Yue Rene as her madhav care agent and her two sons as alternates.  Pt selected statements of desire 2, 3, & 5.  Provided guidance to pt that she needs to review the documents with her family so that they are aware of her choices, values and preferences.      DPOA:  - NOK would be the pt's adult children (her sons) but pt has selected her sister as her health care agent.   POLST: No-      Code Status: Full-  Confirmed with pt.      Outcome:  Introduced self and role of Palliative Care. Explored patient's values, beliefs, and preferences in order to identify goals of care and a plan of care.  Pt wanting to go home as she feels she has enough support.  Discussed the progressive nature of both CHF and " "COPD with pt.  Pt OK with full code now.  \"I don't feel like I'm that sick yet\".  Discussed completing an advance directive.  Pt willing and interested.  DPOA-HC completed with pt.      Active listening, reflection, reminiscing, validation, normalization, and empathic support utilized throughout this encounter.  All questions answered.  Palliative Care contact information given.     Updated: Dr. Valero, Larissa ISLAS, Sherry RN, Glendy PARIS CM    Plan: Advance directive completed with pt.  Will request notary today.  Dr. Valero to sign certificate of \"Competency\".  Original DPOA-HC to be given to pt and copy scanned into EMR.  Plan is to discharge home with HH once medically cleared.     Thank you for allowing Palliative Care to participate in this patient's care. Please feel free to call [phone] with any questions or concerns.  "

## 2020-06-16 NOTE — PROGRESS NOTES
Hospital Medicine Daily Progress Note    Date of Service  6/16/2020    Chief Complaint  75 y.o. female admitted 6/11/2020 with shortness of breath    Hospital Course    Ms. Karen Jauregui is a 75 y.o. female with a past medical history significant for COPD, hypertension, dyslipidemia who presented on 6/11/2020 with shortness of breath.  Patient was recently admitted from 6/7-11/2020 for a partial small bowel obstruction.  After being discharged home patient developed progressively worsening shortness of breath while just sitting on her couch.  Denies chest pain, fever, chills.  BNP was greater than 19,000.  Troponins were only mildly elevated and flat.  Chest x-ray consistent with pulmonary edema with left pleural effusion.  EKG demonstrated sinus tachycardia with an incomplete left bundle branch block.  She was admitted with a CHF exacerbation and started on IV furosemide.  Recent admission for congestive heart failure in May 13, 2020 when echo showed ejection fraction 30%, global hypokinesis.        Interval Problem Update  Patient was seen and examined at bedside.  I have personally reviewed vitals, labs, and imaging.    6/12.  Patient had one episode of hypothermia at 35.3.  Has been hypertensive and tachycardic.  Currently on 2 L nasal cannula.  Patient reports her breathing is much improved.  Denies fever, chills, chest pain, shortness of breath.  Currently sitting up eating breakfast.  Exam was discharged yesterday physical therapy felt that she would have no needs the patient was agreeable to do outpatient physical therapy.  However as the patient decompensated after only a few hours on reevaluation physical therapy recommends postacute placement.  Patient was also evaluated by pulmonology and referred to pulmonary rehab.  6/13.  Afebrile.  Episodes of tachycardia.  Hypertension is improved.  On 3 L nasal cannula.  Denies fever, chills, chest pain, abdominal pain.  Rest of breath is improved.  She is  back on her baseline oxygen.  Replete mag, potassium.  PT/OT recommended postacute placement as the patient quickly decompensated when she went home previously.  Patient adamantly refuses postacute placement saying that she has a lot of help at home with her family.  She is willing to accept home health.  6/14.  Afebrile.  Mostly normotensive.  Did become hypertensive with systolics of 180 but then received hydralazine and Vasotec which dropped blood pressure to 90s.  On baseline 3 L nasal cannula.  Hyponatremia is stable.  Patient reports shortness of breath is improving.  Denies fever, chills, chest pains.  Adamant about going home with home health rather than placement.  6/15.  Patient is afebrile.  Episodes of hypotension last night which have resolved.  On 2-3 L nasal cannula.  Denies fever, chills, chest pains.  Patient feels like her shortness of breath is much improved but is not quite back to baseline.  After briefly considering placement patient changed her mind and again is adamant about home with home health.  Referrals have been sent.  She has also previously declined home health in the past but is agreeable to it at this time.  6/16: Hypotensive this morning, but asymptomatic and improved without IVFB. Suspect this may be medication related, since BP was normal prior to administration of antihypertensives/diuretics. Will increased the holding parameters and monitor. Possible discharge home tomorrow with [facility] if BP remains stable.     Consultants/Specialty  Pulmonology    Code Status  Full    Disposition  Possible discharge home with home health tomorrow     Review of Systems  Review of Systems   Constitutional: Positive for malaise/fatigue. Negative for chills, diaphoresis and weight loss.   HENT: Negative for congestion and sinus pain.    Respiratory: Positive for cough and shortness of breath. Negative for hemoptysis, sputum production, wheezing and stridor.    Cardiovascular: Negative.     Gastrointestinal: Negative for abdominal pain, nausea and vomiting.   Genitourinary: Negative for dysuria, frequency and urgency.   Musculoskeletal: Positive for back pain and myalgias. Negative for neck pain.   Neurological: Negative for dizziness, tingling and headaches.        Physical Exam  Temp:  [36.1 °C (96.9 °F)-37.3 °C (99.1 °F)] 37.1 °C (98.8 °F)  Pulse:  [74-96] 82  Resp:  [16-18] 18  BP: ()/(39-72) 124/69  SpO2:  [94 %-99 %] 98 %    Physical Exam  Vitals signs and nursing note reviewed.   Constitutional:       General: She is not in acute distress.     Comments: Chronically ill appearing    HENT:      Head: Normocephalic and atraumatic.      Right Ear: External ear normal.      Left Ear: External ear normal.      Nose: Nose normal.      Mouth/Throat:      Mouth: Mucous membranes are moist.      Pharynx: Oropharynx is clear. No oropharyngeal exudate or posterior oropharyngeal erythema.   Eyes:      General: No scleral icterus.     Conjunctiva/sclera: Conjunctivae normal.   Neck:      Musculoskeletal: Normal range of motion and neck supple.   Cardiovascular:      Rate and Rhythm: Normal rate.      Pulses: Normal pulses.      Heart sounds: Normal heart sounds. No murmur.   Pulmonary:      Effort: Pulmonary effort is normal. No respiratory distress.      Breath sounds: No stridor. Wheezing and rales present.   Abdominal:      General: Abdomen is flat. Bowel sounds are normal. There is no distension.      Palpations: Abdomen is soft. There is no mass.      Tenderness: There is no abdominal tenderness.   Musculoskeletal:      Lumbar back: She exhibits tenderness. She exhibits no swelling and no edema.      Right lower leg: No edema.      Left lower leg: No edema.   Skin:     General: Skin is warm and dry.      Coloration: Skin is pale.   Neurological:      General: No focal deficit present.      Mental Status: She is alert and oriented to person, place, and time. Mental status is at baseline.    Psychiatric:         Mood and Affect: Mood normal.         Behavior: Behavior normal.         Fluids    Intake/Output Summary (Last 24 hours) at 6/16/2020 1318  Last data filed at 6/16/2020 1200  Gross per 24 hour   Intake 1220 ml   Output 2050 ml   Net -830 ml       Laboratory  Recent Labs     06/14/20 0358 06/15/20  0427 06/16/20  0350   WBC 8.4 10.5 9.4   RBC 5.14 4.93 4.84   HEMOGLOBIN 14.7 14.3 13.7   HEMATOCRIT 44.8 42.4 41.9   MCV 87.2 86.0 86.6   MCH 28.6 29.0 28.3   MCHC 32.8* 33.7 32.7*   RDW 43.8 42.6 42.5   PLATELETCT 282 277 252   MPV 9.5 9.6 9.8     Recent Labs     06/14/20  0358 06/15/20  0427 06/16/20  0350   SODIUM 132* 134* 130*   POTASSIUM 4.3 4.2 3.5*   CHLORIDE 88* 91* 88*   CO2 37* 36* 35*   GLUCOSE 89 90 94   BUN 28* 33* 29*   CREATININE 1.05 1.00 0.84   CALCIUM 9.6 9.6 9.3                   Imaging  DX-CHEST-PORTABLE (1 VIEW)   Final Result         1.  Pulmonary edema and/or infiltrates are identified, which appear somewhat increased since the prior exam.   2.  Moderate left pleural effusion   3.  Changes of COPD   4.  Atherosclerosis           Assessment/Plan  * Acute respiratory failure with hypoxia (HCC)  Assessment & Plan  Plan for ongoing oxygen supplementation.   Patient on her baseline 2-3 L.  Secondary to combination COPD and CHF exacerbation    Hypotension- (present on admission)  Assessment & Plan  Likely medication induced   - Asymptomatic. Mentation at baseline  Orthostatics unable to be obtained as patient was too weak to stand.   Hold off on IVF for now. She is currently normotensive.   Continue carvedilol, losartan, spironolactone  Will lower holding parameters for p.o. furosemide  Monitor and adjust as needed     Acute on chronic systolic congestive heart failure (HCC)- (present on admission)  Assessment & Plan  Improving  Continue carvedilol, losartan, spironolactone, p.o. furosemide  2g sodium and fluid restrictions on diet.    Hypokalemia- (present on  admission)  Assessment & Plan  Mild  Repleted PO  Monitor with BMP     COPD (chronic obstructive pulmonary disease) (HCC)- (present on admission)  Assessment & Plan  Improved, now on 2L which is her baseline   RT protocol  Will be referred to pulmonary rehab  Continue PO diuresis     Dyslipidemia, goal LDL below 100- (present on admission)  Assessment & Plan  Continue atorvastatin    Essential hypertension- (present on admission)  Assessment & Plan  Poorly controlled.  Monitor with current medical regimen, PRN medications.   Continue carvedilol, losartan, spironolactone, furosemide  Continue to titrate medications.       I have performed a physical exam and reviewed and updated ROS and Plan today (6/16/2020). In review of yesterday's note (6/15/2020), there are no changes except as documented above.     VTE prophylaxis: Enoxaparin

## 2020-06-16 NOTE — CARE PLAN
Problem: Communication  Goal: The ability to communicate needs accurately and effectively will improve  Outcome: PROGRESSING AS EXPECTED   Plan of care discussed w/ patient. Encourage pt to voice feelings/concerns regarding treatment plan, diagnostic tests, procedures/results and medications. Answer accordingly. Call light within reach.       Problem: Infection  Goal: Will remain free from infection  Outcome: PROGRESSING AS EXPECTED   Standard precautions in place. Hand hygiene performed before and after pt contact.

## 2020-06-16 NOTE — RESPIRATORY CARE
Oxygen Rounds      Patient found on    O2 L/m:  ___2______    Oxygen device:  ____nc____   Spo2: _____99____%      Patient titrated to   O2 L/m: ____1____   Oxygen device: ____nc_____   Spo2: ___97______%   Respiratory device skin site inspection completed.

## 2020-06-16 NOTE — PROGRESS NOTES
Received Bedside report. Assumed care at 0700. This pt is AOx4, ambulatory with SBA, voiding adequately, reports no pain. Patient and RN discussed plan of care: questions answered. Labs noted, assessment complete, patient tolerating regular 2g Na restriction diet. Tele box in place. Pt is on 2L of O2 via NC. Call light in place, fall precautions in place, patient educated on importance of calling for assistance. No additional needs at this time. VSS

## 2020-06-17 ENCOUNTER — PATIENT OUTREACH (OUTPATIENT)
Dept: HEALTH INFORMATION MANAGEMENT | Facility: OTHER | Age: 75
End: 2020-06-17

## 2020-06-17 VITALS
SYSTOLIC BLOOD PRESSURE: 106 MMHG | TEMPERATURE: 97.3 F | HEIGHT: 61 IN | RESPIRATION RATE: 17 BRPM | DIASTOLIC BLOOD PRESSURE: 58 MMHG | BODY MASS INDEX: 16.15 KG/M2 | WEIGHT: 85.54 LBS | HEART RATE: 77 BPM | OXYGEN SATURATION: 93 %

## 2020-06-17 DIAGNOSIS — R60.9 PERIPHERAL EDEMA: ICD-10-CM

## 2020-06-17 PROBLEM — E87.6 HYPOKALEMIA: Status: RESOLVED | Noted: 2019-07-16 | Resolved: 2020-06-17

## 2020-06-17 PROBLEM — J96.01 ACUTE RESPIRATORY FAILURE WITH HYPOXIA (HCC): Status: RESOLVED | Noted: 2020-06-12 | Resolved: 2020-06-17

## 2020-06-17 PROBLEM — I50.23 ACUTE ON CHRONIC SYSTOLIC CONGESTIVE HEART FAILURE (HCC): Status: RESOLVED | Noted: 2020-06-12 | Resolved: 2020-06-17

## 2020-06-17 PROBLEM — I95.9 HYPOTENSION: Status: RESOLVED | Noted: 2020-06-12 | Resolved: 2020-06-17

## 2020-06-17 PROCEDURE — 94640 AIRWAY INHALATION TREATMENT: CPT

## 2020-06-17 PROCEDURE — 700102 HCHG RX REV CODE 250 W/ 637 OVERRIDE(OP): Performed by: INTERNAL MEDICINE

## 2020-06-17 PROCEDURE — 99239 HOSP IP/OBS DSCHRG MGMT >30: CPT | Performed by: HOSPITALIST

## 2020-06-17 PROCEDURE — A9270 NON-COVERED ITEM OR SERVICE: HCPCS | Performed by: INTERNAL MEDICINE

## 2020-06-17 PROCEDURE — 700111 HCHG RX REV CODE 636 W/ 250 OVERRIDE (IP): Performed by: INTERNAL MEDICINE

## 2020-06-17 PROCEDURE — 700102 HCHG RX REV CODE 250 W/ 637 OVERRIDE(OP): Performed by: NURSE PRACTITIONER

## 2020-06-17 PROCEDURE — 94760 N-INVAS EAR/PLS OXIMETRY 1: CPT

## 2020-06-17 PROCEDURE — A9270 NON-COVERED ITEM OR SERVICE: HCPCS | Performed by: NURSE PRACTITIONER

## 2020-06-17 PROCEDURE — 700102 HCHG RX REV CODE 250 W/ 637 OVERRIDE(OP): Performed by: HOSPITALIST

## 2020-06-17 PROCEDURE — A9270 NON-COVERED ITEM OR SERVICE: HCPCS | Performed by: HOSPITALIST

## 2020-06-17 RX ORDER — AMOXICILLIN 250 MG
2 CAPSULE ORAL 2 TIMES DAILY
Qty: 30 TAB | Refills: 0 | Status: SHIPPED | OUTPATIENT
Start: 2020-06-17 | End: 2020-06-17 | Stop reason: SDUPTHER

## 2020-06-17 RX ORDER — CARVEDILOL 6.25 MG/1
6.25 TABLET ORAL 2 TIMES DAILY WITH MEALS
Qty: 60 TAB | Refills: 3 | Status: SHIPPED | OUTPATIENT
Start: 2020-06-17 | End: 2020-06-17 | Stop reason: SDUPTHER

## 2020-06-17 RX ORDER — LOSARTAN POTASSIUM 25 MG/1
25 TABLET ORAL DAILY
Qty: 30 TAB | Refills: 3 | Status: SHIPPED | OUTPATIENT
Start: 2020-06-17 | End: 2021-10-20 | Stop reason: SDUPTHER

## 2020-06-17 RX ORDER — ATORVASTATIN CALCIUM 40 MG/1
40 TABLET, FILM COATED ORAL EVERY EVENING
Qty: 30 TAB | Refills: 3 | Status: SHIPPED
Start: 2020-06-17 | End: 2020-06-17

## 2020-06-17 RX ORDER — FUROSEMIDE 20 MG/1
20 TABLET ORAL 2 TIMES DAILY
Qty: 60 TAB | Refills: 3 | Status: SHIPPED | OUTPATIENT
Start: 2020-06-17 | End: 2020-06-17 | Stop reason: SDUPTHER

## 2020-06-17 RX ORDER — SPIRONOLACTONE 25 MG/1
25 TABLET ORAL
Qty: 30 TAB | Refills: 2 | Status: SHIPPED | OUTPATIENT
Start: 2020-06-17 | End: 2021-11-18 | Stop reason: SDUPTHER

## 2020-06-17 RX ORDER — SPIRONOLACTONE 25 MG/1
25 TABLET ORAL
Qty: 30 TAB | Refills: 2 | Status: SHIPPED | OUTPATIENT
Start: 2020-06-17 | End: 2020-06-17 | Stop reason: SDUPTHER

## 2020-06-17 RX ORDER — FUROSEMIDE 20 MG/1
20 TABLET ORAL 2 TIMES DAILY
Qty: 60 TAB | Refills: 3 | Status: SHIPPED | OUTPATIENT
Start: 2020-06-17 | End: 2021-11-18

## 2020-06-17 RX ORDER — OXYCODONE HYDROCHLORIDE 5 MG/1
5 TABLET ORAL EVERY 8 HOURS PRN
Qty: 15 TAB | Refills: 0 | Status: SHIPPED | OUTPATIENT
Start: 2020-06-17 | End: 2020-06-22

## 2020-06-17 RX ORDER — AMOXICILLIN 250 MG
2 CAPSULE ORAL 2 TIMES DAILY
Qty: 30 TAB | Refills: 0 | Status: SHIPPED | OUTPATIENT
Start: 2020-06-17 | End: 2022-01-22

## 2020-06-17 RX ORDER — LOSARTAN POTASSIUM 25 MG/1
25 TABLET ORAL DAILY
Qty: 30 TAB | Refills: 3 | Status: SHIPPED | OUTPATIENT
Start: 2020-06-17 | End: 2020-06-17 | Stop reason: SDUPTHER

## 2020-06-17 RX ORDER — ATORVASTATIN CALCIUM 40 MG/1
40 TABLET, FILM COATED ORAL EVERY EVENING
Qty: 30 TAB | Refills: 3 | Status: SHIPPED | OUTPATIENT
Start: 2020-06-17 | End: 2020-06-17 | Stop reason: SDUPTHER

## 2020-06-17 RX ORDER — CARVEDILOL 6.25 MG/1
6.25 TABLET ORAL 2 TIMES DAILY WITH MEALS
Qty: 60 TAB | Refills: 3 | Status: SHIPPED | OUTPATIENT
Start: 2020-06-17 | End: 2021-10-20

## 2020-06-17 RX ADMIN — FUROSEMIDE 20 MG: 20 TABLET ORAL at 05:38

## 2020-06-17 RX ADMIN — LOSARTAN POTASSIUM 25 MG: 25 TABLET, FILM COATED ORAL at 05:38

## 2020-06-17 RX ADMIN — OXYCODONE 5 MG: 5 TABLET ORAL at 09:44

## 2020-06-17 RX ADMIN — CARVEDILOL 6.25 MG: 6.25 TABLET, FILM COATED ORAL at 08:05

## 2020-06-17 RX ADMIN — BUDESONIDE AND FORMOTEROL FUMARATE DIHYDRATE 2 PUFF: 160; 4.5 AEROSOL RESPIRATORY (INHALATION) at 07:20

## 2020-06-17 RX ADMIN — NICOTINE 14 MG: 14 PATCH TRANSDERMAL at 05:38

## 2020-06-17 NOTE — CARE PLAN
Problem: Venous Thromboembolism (VTW)/Deep Vein Thrombosis (DVT) Prevention:  Goal: Patient will participate in Venous Thrombosis (VTE)/Deep Vein Thrombosis (DVT)Prevention Measures  Outcome: PROGRESSING AS EXPECTED   Patient educated on DVT risks. Patient VS are stable. No signs of DVT. Will continue to monitor for change status.     Problem: Bowel/Gastric:  Goal: Normal bowel function is maintained or improved  Outcome: PROGRESSING AS EXPECTED   Patient bowel function is assessed. Education provided on diet, fluid intake and activities that promote bowel function. Toileting at scheduled intervals in place for patient. Patient verbalizes understanding.

## 2020-06-17 NOTE — DISCHARGE INSTRUCTIONS
Discharge Instructions    Discharged to home by car with relative. Discharged via wheelchair, hospital escort: Yes.  Special equipment needed: Not Applicable    Be sure to schedule a follow-up appointment with your primary care doctor or any specialists as instructed.     Discharge Plan:   Diet Plan: Discussed  Activity Level: Discussed  Smoking Cessation Offered: Patient Refused  Confirmed Follow up Appointment: Appointment Scheduled  Confirmed Symptoms Management: Discussed    I understand that a diet low in cholesterol, fat, and sodium is recommended for good health. Unless I have been given specific instructions below for another diet, I accept this instruction as my diet prescription.   Other diet: low sodium    Special Instructions: None    · Is patient discharged on Warfarin / Coumadin?   No     Depression / Suicide Risk    As you are discharged from this [facility] facility, it is important to learn how to keep safe from harming yourself.    Recognize the warning signs:  · Abrupt changes in personality, positive or negative- including increase in energy   · Giving away possessions  · Change in eating patterns- significant weight changes-  positive or negative  · Change in sleeping patterns- unable to sleep or sleeping all the time   · Unwillingness or inability to communicate  · Depression  · Unusual sadness, discouragement and loneliness  · Talk of wanting to die  · Neglect of personal appearance   · Rebelliousness- reckless behavior  · Withdrawal from people/activities they love  · Confusion- inability to concentrate     If you or a loved one observes any of these behaviors or has concerns about self-harm, here's what you can do:  · Talk about it- your feelings and reasons for harming yourself  · Remove any means that you might use to hurt yourself (examples: pills, rope, extension cords, firearm)  · Get professional help from the community (Mental Health, Substance Abuse, psychological  counseling)  · Do not be alone:Call your Safe Contact- someone whom you trust who will be there for you.  · Call your local CRISIS HOTLINE 613-0251 or 270-090-0063  · Call your local Children's Mobile Crisis Response Team [place] (937) 495-4981 or www.[employer]-IT  · Call the toll free National Suicide Prevention Hotlines   · National Suicide Prevention Lifeline 823-529-GQLN (0582)  · [employer] Hope Line Network 800-SUICIDE (692-6407)        Heart Failure  Heart failure means your heart has trouble pumping blood. This makes it hard for your body to work well. Heart failure is usually a long-term (chronic) condition. You must take good care of yourself and follow your doctor's treatment plan.  HOME CARE  · Take your heart medicine as told by your doctor.  ¨ Do not stop taking medicine unless your doctor tells you to.  ¨ Do not skip any dose of medicine.  ¨ Refill your medicines before they run out.  ¨ Take other medicines only as told by your doctor or pharmacist.  · Stay active if told by your doctor. The elderly and people with severe heart failure should talk with a doctor about physical activity.  · Eat heart-healthy foods. Choose foods that are without trans fat and are low in saturated fat, cholesterol, and salt (sodium). This includes fresh or frozen fruits and vegetables, fish, lean meats, fat-free or low-fat dairy foods, whole grains, and high-fiber foods. Lentils and dried peas and beans (legumes) are also good choices.  · Limit salt if told by your doctor.  · Cook in a healthy way. Roast, grill, broil, bake, poach, steam, or stir-aguillon foods.  · Limit fluids as told by your doctor.  · Weigh yourself every morning. Do this after you pee (urinate) and before you eat breakfast. Write down your weight to give to your doctor.  · Take your blood pressure and write it down if your doctor tells you to.  · Ask your doctor how to check your pulse. Check your pulse as told.  · Lose weight if told by your  doctor.  · Stop smoking or chewing tobacco. Do not use gum or patches that help you quit without your doctor's approval.  · Schedule and go to doctor visits as told.  · Nonpregnant women should have no more than 1 drink a day. Men should have no more than 2 drinks a day. Talk to your doctor about drinking alcohol.  · Stop illegal drug use.  · Stay current with shots (immunizations).  · Manage your health conditions as told by your doctor.  · Learn to manage your stress.  · Rest when you are tired.  · If it is really hot outside:  ¨ Avoid intense activities.  ¨ Use air conditioning or fans, or get in a cooler place.  ¨ Avoid caffeine and alcohol.  ¨ Wear loose-fitting, lightweight, and light-colored clothing.  · If it is really cold outside:  ¨ Avoid intense activities.  ¨ Layer your clothing.  ¨ Wear mittens or gloves, a hat, and a scarf when going outside.  ¨ Avoid alcohol.  · Learn about heart failure and get support as needed.  · Get help to maintain or improve your quality of life and your ability to care for yourself as needed.  GET HELP IF:   · You gain weight quickly.  · You are more short of breath than usual.  · You cannot do your normal activities.  · You tire easily.  · You cough more than normal, especially with activity.  · You have any or more puffiness (swelling) in areas such as your hands, feet, ankles, or belly (abdomen).  · You cannot sleep because it is hard to breathe.  · You feel like your heart is beating fast (palpitations).  · You get dizzy or light-headed when you stand up.  GET HELP RIGHT AWAY IF:   · You have trouble breathing.  · There is a change in mental status, such as becoming less alert or not being able to focus.  · You have chest pain or discomfort.  · You faint.  MAKE SURE YOU:   · Understand these instructions.  · Will watch your condition.  · Will get help right away if you are not doing well or get worse.  This information is not intended to replace advice given to you by  "your health care provider. Make sure you discuss any questions you have with your health care provider.  Document Released: 09/26/2009 Document Revised: 01/08/2016 Document Reviewed: 02/03/2014  [employer] Interactive Patient Education © 2017 [employer]      Low-Sodium Eating Plan  Sodium raises blood pressure and causes water to be held in the body. Getting less sodium from food will help lower your blood pressure, reduce any swelling, and protect your heart, liver, and kidneys. We get sodium by adding salt (sodium chloride) to food. Most of our sodium comes from canned, boxed, and frozen foods. Restaurant foods, fast foods, and pizza are also very high in sodium. Even if you take medicine to lower your blood pressure or to reduce fluid in your body, getting less sodium from your food is important.  What is my plan?  Most people should limit their sodium intake to 2,300 mg a day. Your health care provider recommends that you limit your sodium intake to __________ a day.  What do I need to know about this eating plan?  For the low-sodium eating plan, you will follow these general guidelines:  · Choose foods with a % Daily Value for sodium of less than 5% (as listed on the food label).  · Use salt-free seasonings or herbs instead of table salt or sea salt.  · Check with your health care provider or pharmacist before using salt substitutes.  · Eat fresh foods.  · Eat more vegetables and fruits.  · Limit canned vegetables. If you do use them, rinse them well to decrease the sodium.  · Limit cheese to 1 oz (28 g) per day.  · Eat lower-sodium products, often labeled as \"lower sodium\" or \"no salt added.\"  · Avoid foods that contain monosodium glutamate (MSG). MSG is sometimes added to Chinese food and some canned foods.  · Check food labels (Nutrition Facts labels) on foods to learn how much sodium is in one serving.  · Eat more home-cooked food and less restaurant, buffet, and fast food.  · When eating at a restaurant, " ask that your food be prepared with less salt, or no salt if possible.  How do I read food labels for sodium information?  The Nutrition Facts label lists the amount of sodium in one serving of the food. If you eat more than one serving, you must multiply the listed amount of sodium by the number of servings.  Food labels may also identify foods as:  · Sodium free--Less than 5 mg in a serving.  · Very low sodium--35 mg or less in a serving.  · Low sodium--140 mg or less in a serving.  · Light in sodium--50% less sodium in a serving. For example, if a food that usually has 300 mg of sodium is changed to become light in sodium, it will have 150 mg of sodium.  · Reduced sodium--25% less sodium in a serving. For example, if a food that usually has 400 mg of sodium is changed to reduced sodium, it will have 300 mg of sodium.  What foods can I eat?  Grains   Low-sodium cereals, including oats, puffed wheat and rice, and shredded wheat cereals. Low-sodium crackers. Unsalted rice and pasta. Lower-sodium bread.  Vegetables   Frozen or fresh vegetables. Low-sodium or reduced-sodium canned vegetables. Low-sodium or reduced-sodium tomato sauce and paste. Low-sodium or reduced-sodium tomato and vegetable juices.  Fruits   Fresh, frozen, and canned fruit. Fruit juice.  Meat and Other Protein Products   Low-sodium canned tuna and salmon. Fresh or frozen meat, poultry, seafood, and fish. Lamb. Unsalted nuts. Dried beans, peas, and lentils without added salt. Unsalted canned beans. Homemade soups without salt. Eggs.  Dairy   Milk. Soy milk. Ricotta cheese. Low-sodium or reduced-sodium cheeses. Yogurt.  Condiments   Fresh and dried herbs and spices. Salt-free seasonings. Onion and garlic powders. Low-sodium varieties of mustard and ketchup. Fresh or refrigerated horseradish. Lemon juice.  Fats and Oils   Reduced-sodium salad dressings. Unsalted butter.  Other   Unsalted popcorn and pretzels.  The items listed above may not be a  complete list of recommended foods or beverages. Contact your dietitian for more options.   What foods are not recommended?  Grains   Instant hot cereals. Bread stuffing, pancake, and biscuit mixes. Croutons. Seasoned rice or pasta mixes. Noodle soup cups. Boxed or frozen macaroni and cheese. Self-rising flour. Regular salted crackers.  Vegetables   Regular canned vegetables. Regular canned tomato sauce and paste. Regular tomato and vegetable juices. Frozen vegetables in sauces. Salted French fries. Olives. Pickles. Relishes. Sauerkraut. Salsa.  Meat and Other Protein Products   Salted, canned, smoked, spiced, or pickled meats, seafood, or fish. Lemos, ham, sausage, hot dogs, corned beef, chipped beef, and packaged luncheon meats. Salt pork. Jerky. Pickled herring. Anchovies, regular canned tuna, and sardines. Salted nuts.  Dairy   Processed cheese and cheese spreads. Cheese curds. Blue cheese and cottage cheese. Buttermilk.  Condiments   Onion and garlic salt, seasoned salt, table salt, and sea salt. Canned and packaged gravies. Worcestershire sauce. Tartar sauce. Barbecue sauce. Teriyaki sauce. Soy sauce, including reduced sodium. Steak sauce. Fish sauce. Oyster sauce. Cocktail sauce. Horseradish that you find on the shelf. Regular ketchup and mustard. Meat flavorings and tenderizers. Bouillon cubes. Hot sauce. Tabasco sauce. Marinades. Taco seasonings. Relishes.  Fats and Oils   Regular salad dressings. Salted butter. Margarine. Ghee. Lemos fat.  Other   Potato and tortilla chips. Corn chips and puffs. Salted popcorn and pretzels. Canned or dried soups. Pizza. Frozen entrees and pot pies.  The items listed above may not be a complete list of foods and beverages to avoid. Contact your dietitian for more information.   This information is not intended to replace advice given to you by your health care provider. Make sure you discuss any questions you have with your health care provider.  Document Released:  06/09/2003 Document Revised: 05/25/2017 Document Reviewed: 10/22/2014  [employer] Interactive Patient Education © 2017 [employer]

## 2020-06-17 NOTE — PROGRESS NOTES
Bedside report received from day RN. Assumed care of pt at 0700. Pt is on 1L of O2 NC. Pt has tele box in place. No s/s of pain or discomfort. Educated to call light. Bed is in low and locked position. Will continue to monitor.

## 2020-06-17 NOTE — DISCHARGE SUMMARY
Discharge Summary    CHIEF COMPLAINT ON ADMISSION  Chief Complaint   Patient presents with   • Shortness of Breath     pt BIBA after being discharged from [facility] 3 hours ago. RA sat 53% and on BIPAP       Reason for Admission  Acute respiratory failure with hypoxia     Admission Date  6/11/2020    CODE STATUS  Full Code    HPI & HOSPITAL COURSE   Ms. Karen Jauregui is a 75 y.o. female with a past medical history significant for COPD, hypertension, dyslipidemia who presented on 6/11/2020 with shortness of breath.  Patient was recently admitted from 6/7-11/2020 for a partial small bowel obstruction.  After being discharged home patient developed progressively worsening shortness of breath while just sitting on her couch.  Denies chest pain, fever, chills.  BNP was greater than 19,000.  Troponins were only mildly elevated and flat.  Chest x-ray consistent with pulmonary edema with left pleural effusion.  EKG demonstrated sinus tachycardia with an incomplete left bundle branch block.  She was admitted with a CHF exacerbation and started on IV furosemide.  Recent admission for congestive heart failure in May 13, 2020 when echo showed ejection fraction 30%, global hypokinesis. She was seen by PT/OT who recommended post-acute placement as the patient quickly decompensated when she went home previously. However, patient adamantly refuses postacute placement saying that she has a lot of help at home with her family. She was agreeable to home health, so this was arranged. Her SOB improved with diuresis. She did experience episodes of hypotension, but was asymptomatic. This resolved with the adjustment of her antihypertensives. Once patient was back to her baseline, she was discharge with instructions to follow-up with her PCP in 48 hours and Cardiologist in the next 7 days. [facility] schedulers were contacted to assist with these appointments . At time of discharge, patient reported no pain, was ambulating independently,  and reported last BM was <24 hours ago.           Therefore, she is discharged in good and stable condition to home with close outpatient follow-up.    The patient met 2-midnight criteria for an inpatient stay at the time of discharge.    Discharge Date  06/17/2020    FOLLOW UP ITEMS POST DISCHARGE  Follow-up with cardiology   Follow-up with PCP on 6/19/20    DISCHARGE DIAGNOSES  Principal Problem (Resolved):    Acute respiratory failure with hypoxia (HCC) POA: Unknown  Active Problems:    Essential hypertension POA: Yes    Dyslipidemia, goal LDL below 100 POA: Yes    COPD (chronic obstructive pulmonary disease) (HCC) POA: Yes  Resolved Problems:    Hypotension POA: Yes    Hypokalemia POA: Yes    Acute on chronic systolic congestive heart failure (HCC) POA: Yes      FOLLOW UP  Future Appointments   Date Time Provider Department Center   6/19/2020 11:00 AM Baltazar Julio M.D. [address]   6/30/2020  4:00 PM Baltazar Julio M.D. [address]   7/23/2020 11:15 AM PRASHANT Elmore [facility] None     Nathanael Landin M.D.  [address] [address]  [phone]    Go on 7/13/2020  2:45PM CHECK IN WITH CARDIOLOGY.       MEDICATIONS ON DISCHARGE     Medication List      START taking these medications      Instructions   oxyCODONE immediate-release 5 MG Tabs  Commonly known as:  ROXICODONE   Take 1 Tab by mouth every 8 hours as needed for Severe Pain for up to 5 days.  Dose:  5 mg        CONTINUE taking these medications      Instructions   carvedilol 6.25 MG Tabs  Commonly known as:  COREG   Take 1 Tab by mouth 2 times a day, with meals.  Dose:  6.25 mg     fluticasone-salmeterol 250-50 MCG/DOSE Aepb  Commonly known as:  Advair Diskus   Inhale 1 Puff by mouth every 12 hours.  Dose:  1 Puff     losartan 25 MG Tabs  Commonly known as:  COZAAR   Take 1 Tab by mouth every day.  Dose:  25 mg     spironolactone 25 MG Tabs  Commonly known as:  ALDACTONE   Take 1 Tab by mouth 1 time  daily as needed (swelling).  Dose:  25 mg     Ventolin  (90 Base) MCG/ACT Aers inhalation aerosol  Generic drug:  albuterol   Inhale 2 Puffs by mouth every four hours as needed for Shortness of Breath.  Dose:  2 Puff        STOP taking these medications    atorvastatin 40 MG Tabs  Commonly known as:  LIPITOR     HYDROcodone-acetaminophen 5-325 MG Tabs per tablet  Commonly known as:  NORCO        ASK your doctor about these medications      Instructions   furosemide 20 MG Tabs  Commonly known as:  LASIX   Take 1 Tab by mouth 2 Times a Day.  Dose:  20 mg     senna-docusate 8.6-50 MG Tabs  Commonly known as:  PERICOLACE or SENOKOT S   Take 2 Tabs by mouth 2 Times a Day.  Dose:  2 Tab            Allergies  Allergies   Allergen Reactions   • Doxycycline Diarrhea     None stop diarrhea   • Tramadol Vomiting   • Amoxicillin Diarrhea     Diarrhea   • Ciprofloxacin Unspecified     Unspecified       DIET  Orders Placed This Encounter   Procedures   • Diet Order 2 Gram Sodium     Standing Status:   Standing     Number of Occurrences:   1     Order Specific Question:   Diet:     Answer:   2 Gram Sodium [7]       ACTIVITY  As tolerated.  Weight bearing as tolerated    CONSULTATIONS  Palliative Care    PROCEDURES  None     LABORATORY  Lab Results   Component Value Date    SODIUM 130 (L) 06/16/2020    POTASSIUM 3.5 (L) 06/16/2020    CHLORIDE 88 (L) 06/16/2020    CO2 35 (H) 06/16/2020    GLUCOSE 94 06/16/2020    BUN 29 (H) 06/16/2020    CREATININE 0.84 06/16/2020    CREATININE 0.8 08/25/2008        Lab Results   Component Value Date    WBC 9.4 06/16/2020    HEMOGLOBIN 13.7 06/16/2020    HEMATOCRIT 41.9 06/16/2020    PLATELETCT 252 06/16/2020        Total time of the discharge process exceeds 35 minutes.

## 2020-06-17 NOTE — PROGRESS NOTES
Handoff report received. Assumed patient care. Patient resting in bed. Patient not in distress, AAOx4. Safety precautions in place. Call light and personal belongings within reach. Bed is locked and in lowest position. Educated to call for assistance if needed, pt verbalized understanding. Will continue to monitor.

## 2020-06-17 NOTE — DISCHARGE PLANNING
Patient denied M2B and wants RX resent to to [facility].    Resending Atorvastatin, carvedilol, Advair, furosemide, losartan, Senna, and spironolactone.

## 2020-06-17 NOTE — HEART FAILURE PROGRAM
Stat order to schedulers to attempt to move around patient's appointments. Per APRN note, pt likely to dc today and as the appointments stand right now, patient won't be seen by a provider until June 30. Complicating factor is that she sees TRACIE [facility] Cardiology.    The 7 calendar day HF f/u appointment was implemented with this type of patient in mind: very high risk, multiple readmissions. This is exactly the type of patient who must be in front of a provider much sooner than 13 days after discharge.    Respectfully, Jessi Cardio RN Navigator [phone]

## 2020-06-17 NOTE — CARE PLAN
Problem: Safety  Goal: Will remain free from falls  Outcome: PROGRESSING AS EXPECTED    Educate pt to call for assistance as needed. Safety precautions in place: bed is locked and in lowest position, call light within reach, non-skid socks on and hourly rounds in place.      Problem: Infection  Goal: Will remain free from infection  Outcome: PROGRESSING AS EXPECTED    Standard precautions in place. Hand hygiene performed before and after pt contact.

## 2020-06-18 ENCOUNTER — PATIENT OUTREACH (OUTPATIENT)
Dept: HEALTH INFORMATION MANAGEMENT | Facility: OTHER | Age: 75
End: 2020-06-18

## 2020-06-18 ENCOUNTER — PATIENT OUTREACH (OUTPATIENT)
Dept: MEDICAL GROUP | Facility: MEDICAL CENTER | Age: 75
End: 2020-06-18

## 2020-06-18 NOTE — PROGRESS NOTES
LIAT Rivera spoke with pt via mobile phone after d/c. We completed outpatient assessment. Pt has received all of her medications after d/c and has no questions regarding her medications for a pharmacist. Pt is aware of her upcoming f/u appts and will have transportation to medical appts. Pt will be d/c from [facility] at this time due to meeting all [facility] program goals. [facility] contact information was provided to pt and was told to reach out if she had any further questions. Pt agreed.

## 2020-06-18 NOTE — PROGRESS NOTES
Nurse CM post discharge call.  Pt recently discharged on 6/17/20. Pt reports she is home and feeling better. Denies any edema in her feet. States she is currently not short of breath. Pt states she is wearing her oxygen. Pt is not able to tell CM how many liters of oxygen she is wearing. Family member is present and believes oxygen is at 1 liter. Pt reports she spoke to somebody on telephone earlier today to cancel an appointment. Pt is not able to tell this CM what appointment she cancelled. CM discussed importance of keeping appt with PCP for tomorrow and having [facility] make visits for assessments. Pt states she would have home care and would follow-up with PCP.  CM discussed importance of daily weights, and logging for provider. Discussed contacting provider if weight gain of 3 lbs in a day or 5 lbs in a week.  Pt verbalized understanding.  Reviewed following low sodium diet. Pt has no questions at this time.  Again CM reviewed importance of close follow-up with her providers.

## 2020-07-08 ENCOUNTER — PATIENT OUTREACH (OUTPATIENT)
Dept: MEDICAL GROUP | Facility: MEDICAL CENTER | Age: 75
End: 2020-07-08

## 2020-07-16 ENCOUNTER — TELEPHONE (OUTPATIENT)
Dept: PULMONOLOGY | Facility: HOSPICE | Age: 75
End: 2020-07-16

## 2020-07-21 ENCOUNTER — TELEPHONE (OUTPATIENT)
Dept: PULMONOLOGY | Facility: HOSPICE | Age: 75
End: 2020-07-21

## 2020-07-22 ENCOUNTER — TELEPHONE (OUTPATIENT)
Dept: PULMONOLOGY | Facility: HOSPICE | Age: 75
End: 2020-07-22

## 2020-08-18 ENCOUNTER — PATIENT OUTREACH (OUTPATIENT)
Dept: MEDICAL GROUP | Facility: MEDICAL CENTER | Age: 75
End: 2020-08-18

## 2020-08-18 NOTE — PROGRESS NOTES
Nurse CM outreach call for follow-up. Patients sister answered telephone. Reports patient is currently living in [place] with her son.

## 2021-01-16 DIAGNOSIS — Z23 NEED FOR VACCINATION: ICD-10-CM

## 2021-02-02 NOTE — ASSESSMENT & PLAN NOTE
Stable no chest pain   [de-identified] : Nutritional Counseling: Discussed 5-2-1-0 Healthy Habits Questionnaire\par Goals: see care plan [FreeTextEntry1] : \par declined flu vaccine today

## 2021-09-25 ENCOUNTER — HOSPITAL ENCOUNTER (INPATIENT)
Dept: HOSPITAL 8 - ED | Age: 76
LOS: 5 days | Discharge: HOME | DRG: 189 | End: 2021-09-30
Attending: STUDENT IN AN ORGANIZED HEALTH CARE EDUCATION/TRAINING PROGRAM | Admitting: INTERNAL MEDICINE
Payer: MEDICARE

## 2021-09-25 VITALS — BODY MASS INDEX: 17.57 KG/M2 | HEIGHT: 61 IN | WEIGHT: 93.04 LBS

## 2021-09-25 DIAGNOSIS — J44.9: ICD-10-CM

## 2021-09-25 DIAGNOSIS — Z99.81: ICD-10-CM

## 2021-09-25 DIAGNOSIS — E43: ICD-10-CM

## 2021-09-25 DIAGNOSIS — I71.4: ICD-10-CM

## 2021-09-25 DIAGNOSIS — Z63.8: ICD-10-CM

## 2021-09-25 DIAGNOSIS — I73.9: ICD-10-CM

## 2021-09-25 DIAGNOSIS — F17.200: ICD-10-CM

## 2021-09-25 DIAGNOSIS — I42.8: ICD-10-CM

## 2021-09-25 DIAGNOSIS — I50.22: ICD-10-CM

## 2021-09-25 DIAGNOSIS — R54: ICD-10-CM

## 2021-09-25 DIAGNOSIS — Z95.820: ICD-10-CM

## 2021-09-25 DIAGNOSIS — J96.21: Primary | ICD-10-CM

## 2021-09-25 DIAGNOSIS — R00.0: ICD-10-CM

## 2021-09-25 DIAGNOSIS — I44.7: ICD-10-CM

## 2021-09-25 DIAGNOSIS — I11.0: ICD-10-CM

## 2021-09-25 DIAGNOSIS — I21.A1: ICD-10-CM

## 2021-09-25 SDOH — SOCIAL STABILITY - SOCIAL INSECURITY: OTHER SPECIFIED PROBLEMS RELATED TO PRIMARY SUPPORT GROUP: Z63.8

## 2021-09-30 VITALS — DIASTOLIC BLOOD PRESSURE: 72 MMHG | SYSTOLIC BLOOD PRESSURE: 117 MMHG

## 2021-10-20 ENCOUNTER — OFFICE VISIT (OUTPATIENT)
Dept: MEDICAL GROUP | Facility: MEDICAL CENTER | Age: 76
End: 2021-10-20
Payer: MEDICARE

## 2021-10-20 VITALS
SYSTOLIC BLOOD PRESSURE: 148 MMHG | TEMPERATURE: 97.8 F | WEIGHT: 88.85 LBS | OXYGEN SATURATION: 92 % | RESPIRATION RATE: 16 BRPM | BODY MASS INDEX: 16.77 KG/M2 | HEIGHT: 61 IN | DIASTOLIC BLOOD PRESSURE: 86 MMHG | HEART RATE: 108 BPM

## 2021-10-20 DIAGNOSIS — R09.02 HYPOXIA: ICD-10-CM

## 2021-10-20 DIAGNOSIS — K56.699 COLONIC STRICTURE (HCC): ICD-10-CM

## 2021-10-20 DIAGNOSIS — J41.0 SIMPLE CHRONIC BRONCHITIS (HCC): ICD-10-CM

## 2021-10-20 DIAGNOSIS — F43.21 SITUATIONAL DEPRESSION: ICD-10-CM

## 2021-10-20 DIAGNOSIS — I73.9 PERIPHERAL VASCULAR DISEASE (HCC): ICD-10-CM

## 2021-10-20 DIAGNOSIS — I70.209 ARTERIOSCLEROSIS OF ARTERIES OF EXTREMITIES (HCC): ICD-10-CM

## 2021-10-20 DIAGNOSIS — I77.1 SUBCLAVIAN ARTERY STENOSIS, LEFT (HCC): ICD-10-CM

## 2021-10-20 DIAGNOSIS — E78.5 DYSLIPIDEMIA, GOAL LDL BELOW 100: ICD-10-CM

## 2021-10-20 DIAGNOSIS — Z23 NEED FOR IMMUNIZATION AGAINST INFLUENZA: ICD-10-CM

## 2021-10-20 DIAGNOSIS — R94.30 EJECTION FRACTION < 50%: ICD-10-CM

## 2021-10-20 DIAGNOSIS — I71.40 ABDOMINAL AORTIC ANEURYSM GREATER THAN 39 MM IN DIAMETER (HCC): ICD-10-CM

## 2021-10-20 DIAGNOSIS — I10 ESSENTIAL HYPERTENSION: ICD-10-CM

## 2021-10-20 DIAGNOSIS — Z87.19 HISTORY OF SMALL BOWEL OBSTRUCTION: ICD-10-CM

## 2021-10-20 DIAGNOSIS — I50.22 CHRONIC SYSTOLIC CONGESTIVE HEART FAILURE (HCC): ICD-10-CM

## 2021-10-20 PROBLEM — R78.81 POSITIVE BLOOD CULTURE: Status: RESOLVED | Noted: 2020-05-14 | Resolved: 2021-10-20

## 2021-10-20 PROBLEM — I74.5 ILIAC ARTERY OCCLUSION (HCC): Status: RESOLVED | Noted: 2020-06-08 | Resolved: 2021-10-20

## 2021-10-20 PROBLEM — I50.9 ACUTE EXACERBATION OF CHF (CONGESTIVE HEART FAILURE) (HCC): Status: RESOLVED | Noted: 2020-05-13 | Resolved: 2021-10-20

## 2021-10-20 PROBLEM — I50.9 CHF (CONGESTIVE HEART FAILURE) (HCC): Status: ACTIVE | Noted: 2021-09-30

## 2021-10-20 PROBLEM — R07.9 CHEST PAIN: Status: RESOLVED | Noted: 2020-05-13 | Resolved: 2021-10-20

## 2021-10-20 PROBLEM — F33.2 ENDOGENOUS DEPRESSION (HCC): Status: RESOLVED | Noted: 2018-08-15 | Resolved: 2021-10-20

## 2021-10-20 PROBLEM — N28.0 RENAL INFARCT (HCC): Status: RESOLVED | Noted: 2020-06-08 | Resolved: 2021-10-20

## 2021-10-20 PROBLEM — D72.829 LEUKOCYTOSIS: Status: RESOLVED | Noted: 2020-06-08 | Resolved: 2021-10-20

## 2021-10-20 PROCEDURE — G0008 ADMIN INFLUENZA VIRUS VAC: HCPCS | Performed by: FAMILY MEDICINE

## 2021-10-20 PROCEDURE — 99214 OFFICE O/P EST MOD 30 MIN: CPT | Mod: 25 | Performed by: FAMILY MEDICINE

## 2021-10-20 PROCEDURE — 90662 IIV NO PRSV INCREASED AG IM: CPT | Performed by: FAMILY MEDICINE

## 2021-10-20 RX ORDER — DILTIAZEM HYDROCHLORIDE 120 MG/1
CAPSULE, EXTENDED RELEASE ORAL
COMMUNITY
Start: 2021-09-16 | End: 2021-11-18

## 2021-10-20 RX ORDER — CLOPIDOGREL BISULFATE 75 MG/1
TABLET ORAL
COMMUNITY
Start: 2021-09-16 | End: 2022-01-19

## 2021-10-20 RX ORDER — LOSARTAN POTASSIUM 50 MG/1
50 TABLET ORAL DAILY
Qty: 90 TABLET | Refills: 3 | Status: SHIPPED
Start: 2021-10-20 | End: 2022-02-10

## 2021-10-20 RX ORDER — LISINOPRIL AND HYDROCHLOROTHIAZIDE 25; 20 MG/1; MG/1
TABLET ORAL
COMMUNITY
Start: 2021-09-16 | End: 2021-11-11

## 2021-10-20 RX ORDER — FUROSEMIDE 40 MG/1
TABLET ORAL
COMMUNITY
Start: 2021-09-23 | End: 2021-11-18

## 2021-10-20 RX ORDER — CARVEDILOL 3.12 MG/1
TABLET ORAL
COMMUNITY
Start: 2021-10-19 | End: 2022-01-19

## 2021-10-20 RX ORDER — PREDNISONE 5 MG/1
TABLET ORAL
COMMUNITY
Start: 2021-09-23 | End: 2021-11-29 | Stop reason: SDUPTHER

## 2021-10-20 RX ORDER — POTASSIUM CHLORIDE 1500 MG/1
TABLET, EXTENDED RELEASE ORAL
COMMUNITY
Start: 2021-09-23 | End: 2021-11-29 | Stop reason: SDUPTHER

## 2021-10-20 RX ORDER — CEFDINIR 300 MG/1
CAPSULE ORAL
COMMUNITY
Start: 2021-09-23 | End: 2021-10-20

## 2021-10-20 RX ORDER — IPRATROPIUM BROMIDE AND ALBUTEROL 20; 100 UG/1; UG/1
SPRAY, METERED RESPIRATORY (INHALATION)
COMMUNITY
Start: 2021-09-23 | End: 2022-01-22

## 2021-10-20 ASSESSMENT — PATIENT HEALTH QUESTIONNAIRE - PHQ9
5. POOR APPETITE OR OVEREATING: NOT AT ALL
2. FEELING DOWN, DEPRESSED, IRRITABLE, OR HOPELESS: NEARLY EVERY DAY
8. MOVING OR SPEAKING SO SLOWLY THAT OTHER PEOPLE COULD HAVE NOTICED. OR THE OPPOSITE, BEING SO FIGETY OR RESTLESS THAT YOU HAVE BEEN MOVING AROUND A LOT MORE THAN USUAL: NOT AT ALL
SUM OF ALL RESPONSES TO PHQ QUESTIONS 1-9: 10
9. THOUGHTS THAT YOU WOULD BE BETTER OFF DEAD, OR OF HURTING YOURSELF: NOT AT ALL
1. LITTLE INTEREST OR PLEASURE IN DOING THINGS: MORE THAN HALF THE DAYS
7. TROUBLE CONCENTRATING ON THINGS, SUCH AS READING THE NEWSPAPER OR WATCHING TELEVISION: NOT AT ALL
SUM OF ALL RESPONSES TO PHQ9 QUESTIONS 1 AND 2: 5
3. TROUBLE FALLING OR STAYING ASLEEP OR SLEEPING TOO MUCH: NOT AT ALL
6. FEELING BAD ABOUT YOURSELF - OR THAT YOU ARE A FAILURE OR HAVE LET YOURSELF OR YOUR FAMILY DOWN: MORE THAN HALF THE DAYS
4. FEELING TIRED OR HAVING LITTLE ENERGY: NEARLY EVERY DAY

## 2021-10-20 ASSESSMENT — FIBROSIS 4 INDEX: FIB4 SCORE: 2.12

## 2021-10-20 NOTE — PROGRESS NOTES
Chief Complaint   Patient presents with   • Referral Needed     pulmonary and cardiology   • Medication Management   • Depression     PHQ-9: 10   • Congestive Heart Failure       Subjective:     HPI:   Karen Jauregui presents today with the following:     She was in [place] with family for nearly a year but could not tolerate that busy household.  She has moved back up here and has the assistance of her son.    1. Chronic systolic congestive heart failure (HCC)/Ejection fraction < 50%  Most recent echocardiogram accessible to me shows an ejection fraction of 30%.  Patient has been relatively stable on her current regimen but clearly needs cardiology follow-up.  She was hospitalized in [facility] and recently was hospitalized in the last few weeks here locally at [place].  Unfortunately I do not have any of those records.  Referral to cardiology is placed.    2. Simple chronic bronchitis (HCC)/Hypoxia  Referral to pulmonology is discussed and placed.  Patient did finally stop smoking a few months ago.  She is heartily congratulated.  She is oxygen dependent 24/7, significant desaturation even with mild exertion.  They have neglected to bring the oxygen with them today and she was 84% on room air at rest once she had rested in the room about 10 minutes.  We placed her on oxygen 3 L and she did come up to 92%.  Her respiratory rate is slightly high, no retractions appreciated.  Patient unfortunately is not COVID-19 vaccinated.  Discussed this with her and that I strongly recommend.  She states she will get this done.  Discussed diet for patient with COPD.  I recommend very high fat high protein.  Have recommended foods such as cheesecake and similar things.  She is so emphysematous she needs to follow a relatively low carbohydrate but very high calorie diet.    3. Dyslipidemia, goal LDL below 100  Patient denies chest pain, chest pressure, palpitations or exertional shortness of breath. Patient is not on  lipid-lowering medication.  Very mild lipid elevation in the past though the most recent result had an LDL of 45, well within target range. Patient is a former heavy smoker. Patient takes no aspirin daily.  She is on Plavix.  Patient has no documented history of myocardial infarction or stroke.  She has severe atherosclerotic PVD.  Results from recent hospitalization have been requested.    4. Essential hypertension  HTN - Chronic condition stable. Currently taking all meds as directed.   She is not taking baby aspirin daily.  She is taking clopidogrel.  She is not monitoring BP at home.   Denies symptoms low BP: light-headed, tunnel-vision, unusual fatigue.   Denies symptoms high BP:pounding headache, visual changes, palpitations, flushed face.   Denies medicine side effects: unusual fatigue, slow heartbeat, foot/leg swelling, cough.    5. Arteriosclerosis of arteries of extremities (HCC)/Peripheral vascular disease (HCC)/Subclavian artery stenosis, left (HCC)  Patient has known quite severe atherosclerotic disease particularly the femorals, iliac and abdominal aorta.  Her subclavian artery stenosis has been clinically stable.  She follows with vascular surgery.  Denies any claudication symptoms at this time.    6. Abdominal aortic aneurysm greater than 39 mm in diameter ([facility])  She is followed by vascular surgery.  She has a stent graft.    7. Colonic stricture (HCC)/History of small bowel obstruction  Patient has history of atherosclerotic and ischemic colonic stricture.  She is status post colon resection.  She has history of small bowel obstruction as well and tries to stay well-hydrated to combat this.    8. Need for immunization against influenza  High-dose flu vaccine administered today    9. Situational depression  Patient is having some depression and difficulty adjusting back here.  However, she states she is happy or up here overall.          Patient Active Problem List    Diagnosis Date Noted   •  Ejection fraction < 50% 10/20/2021   • Situational depression 10/20/2021   • Hypoxia 10/20/2021   • CHF (congestive heart failure) ([facility]) 09/30/2021   • Elevated troponin 06/08/2020   • Polycythemia 06/08/2020   • Elevated BUN 06/08/2020   • Pulmonary nodule 06/08/2020   • Left lower lobe consolidation ([facility]) 05/09/2018   • Anxiety 04/22/2018   • Hyponatremia 04/19/2018   • Osteopenia of lumbar spine 02/14/2018   • Colonic stricture ([facility]) 06/27/2017   • History of ischemic colitis 06/27/2017   • PVD (peripheral vascular disease) ([facility]) 06/02/2017   • Insomnia disorder 02/20/2017   • History of COPD 02/20/2017   • History of small bowel obstruction 02/17/2017   • Eosinophilic colitis 07/21/2015   • Family history of nonmelanoma skin cancer    • COPD (chronic obstructive pulmonary disease) ([facility])    • Constipated 05/26/2012   • Subclavian artery stenosis, left ([facility]) 03/27/2012   • History of diverticulitis of colon 11/16/2011   • Herniated nucleus pulposus, L5-S1, left 09/08/2011   • Carotid atherosclerosis 06/15/2011   • Abdominal aortic aneurysm greater than 39 mm in diameter ([facility])    • Peripheral vascular disease ([facility])    • Essential hypertension    • Dyslipidemia, goal LDL below 100    • Tobacco dependence    • Spinal stenosis of lumbar region    • Arteriosclerosis of arteries of extremities ([facility])        Current medicines (including changes today)  Current Outpatient Medications   Medication Sig Dispense Refill   • losartan (COZAAR) 50 MG Tab Take 1 Tablet by mouth every day. 90 Tablet 3   • clopidogrel (PLAVIX) 75 MG Tab      • CARTIA  MG CAPSULE SR 24 HR      • COMBIVENT RESPIMAT  MCG/ACT Aero Soln      • lisinopril-hydrochlorothiazide (PRINZIDE) 20-25 MG per tablet      • KLOR-CON M20 20 MEQ Tab CR      • predniSONE (DELTASONE) 5 MG Tab      • carvedilol (COREG) 3.125 MG Tab      • furosemide (LASIX) 40 MG Tab      • fluticasone-salmeterol (ADVAIR DISKUS) 250-50 MCG/DOSE AEROSOL POWDER, BREATH ACTIVATED  "Inhale 1 Puff by mouth every 12 hours. 1 Inhaler 3   • furosemide (LASIX) 20 MG Tab Take 1 Tab by mouth 2 Times a Day. 60 Tab 3   • senna-docusate (PERICOLACE OR SENOKOT S) 8.6-50 MG Tab Take 2 Tabs by mouth 2 Times a Day. 30 Tab 0   • spironolactone (ALDACTONE) 25 MG Tab Take 1 Tab by mouth 1 time daily as needed (swelling). 30 Tab 2   • VENTOLIN  (90 Base) MCG/ACT Aero Soln inhalation aerosol Inhale 2 Puffs by mouth every four hours as needed for Shortness of Breath.       No current facility-administered medications for this visit.       Allergies   Allergen Reactions   • Doxycycline Diarrhea     None stop diarrhea   • Tramadol Vomiting   • Amoxicillin Diarrhea     Diarrhea   • Ciprofloxacin Unspecified     Unspecified       ROS: As per HPI       Objective:     /86 (BP Location: Right arm, Patient Position: Sitting, BP Cuff Size: Adult)   Pulse (!) 108   Temp 36.6 °C (97.8 °F) (Temporal)   Resp 16   Ht 1.549 m (5' 1\")   Wt 40.3 kg (88 lb 13.5 oz)   SpO2 92%  Body mass index is 16.79 kg/m².    Physical Exam:  Constitutional: Well-developed and well-nourished. Not diaphoretic. No distress. Lucid and fluent.  Skin: Skin is warm and dry. No rash noted.  Head: Atraumatic without lesions.  Eyes: Conjunctivae and extraocular motions are normal. Pupils are equal, round, and reactive to light. No scleral icterus.   Ears:  External ears unremarkable. Tympanic membranes clear and intact.  Nose: Nares patent. Mucosa without edema or erythema. No discharge. No facial tenderness.  Mouth/Throat: Tongue normal. Oropharynx is clear and moist. Posterior pharynx without erythema or exudates.  Neck: Supple, trachea midline. No thyromegaly present. No cervical or supraclavicular lymphadenopathy. No JVD or carotid bruits appreciated  Cardiovascular: Regular rate and rhythm.  Normal S1, S2 without murmur appreciated.  Chest: Effort normal. Clear to auscultation throughout. No adventitious sounds.   Abdomen: Soft, non " tender, and without distention. Active bowel sounds in all four quadrants. No rebound, guarding, masses or hepatosplenomegaly.  Extremities: No cyanosis, clubbing, erythema, nor edema.   Neurological: Alert and oriented x 3.  No tremor appreciated though patient is definitely weak and appears deconditioned.    Psychiatric:  Behavior, mood, and affect are appropriate.       Assessment and Plan:     76 y.o. female with the following issues:    1. Chronic systolic congestive heart failure (HCC)  losartan (COZAAR) 50 MG Tab    REFERRAL TO CARDIOLOGY   2. Ejection fraction < 50%  losartan (COZAAR) 50 MG Tab    REFERRAL TO CARDIOLOGY   3. Simple chronic bronchitis (HCC)  REFERRAL TO PULMONARY AND SLEEP MEDICINE   4. Hypoxia     5. Dyslipidemia, goal LDL below 100     6. Essential hypertension     7. Arteriosclerosis of arteries of extremities (HCC)     8. Abdominal aortic aneurysm greater than 39 mm in diameter (HCC)     9. Peripheral vascular disease (HCC)     10. Colonic stricture (HCC)     11. History of small bowel obstruction     12. Need for immunization against influenza  Influenza Vaccine, High Dose (65+ Only)   13. Situational depression           Followup: Return in about 6 months (around 4/20/2022), or if symptoms worsen or fail to improve.

## 2021-11-11 ENCOUNTER — TELEPHONE (OUTPATIENT)
Dept: MEDICAL GROUP | Facility: MEDICAL CENTER | Age: 76
End: 2021-11-11

## 2021-11-11 NOTE — TELEPHONE ENCOUNTER
I had AJ from [facility] call about the pt, letting us know she was released from [facility]. She was at the hospital due to COPD and CHF exacerbation.    Before pt was released she stated her BP was 60/40 and then 10 minutes later it was at 77/52. [facility] stated pt is taking quite a bit of blood pressure medication. Wanted to know if you had additional instructions for her on how to take this medication.    If you can reach out to [facility] at [phone]

## 2021-11-11 NOTE — TELEPHONE ENCOUNTER
No, I would like her to stay just on the losartan but take it every other day.  It looks like she does not yet have an appointment with a heart specialist.  If she would like to schedule with them which I do suggest she can call [phone].

## 2021-11-11 NOTE — TELEPHONE ENCOUNTER
She should reduce the lisinopril HCT to every other day.  Verify please with the patient if she is still taking the losartan.  If she is taking the losartan she should stop as long as she is taking the lisinopril HCT.

## 2021-11-18 ENCOUNTER — TELEPHONE (OUTPATIENT)
Dept: MEDICAL GROUP | Facility: MEDICAL CENTER | Age: 76
End: 2021-11-18

## 2021-11-18 DIAGNOSIS — R60.9 PERIPHERAL EDEMA: ICD-10-CM

## 2021-11-18 DIAGNOSIS — I50.82 BIVENTRICULAR CONGESTIVE HEART FAILURE (HCC): ICD-10-CM

## 2021-11-18 DIAGNOSIS — R94.30 CARDIAC LV EJECTION FRACTION 21-40%: ICD-10-CM

## 2021-11-18 RX ORDER — ALBUTEROL SULFATE 90 UG/1
2 AEROSOL, METERED RESPIRATORY (INHALATION)
COMMUNITY
Start: 2021-10-26 | End: 2021-11-25

## 2021-11-18 RX ORDER — DILTIAZEM HYDROCHLORIDE 120 MG/1
120 CAPSULE, EXTENDED RELEASE ORAL DAILY
COMMUNITY
Start: 2021-10-26 | End: 2021-11-25

## 2021-11-18 RX ORDER — SPIRONOLACTONE 25 MG/1
25 TABLET ORAL
Qty: 90 TABLET | Refills: 2 | Status: SHIPPED | OUTPATIENT
Start: 2021-11-18 | End: 2022-01-19

## 2021-11-18 RX ORDER — LISINOPRIL AND HYDROCHLOROTHIAZIDE 25; 20 MG/1; MG/1
1 TABLET ORAL DAILY
COMMUNITY
Start: 2021-10-26 | End: 2021-11-18

## 2021-11-18 RX ORDER — FUROSEMIDE 20 MG/1
20 TABLET ORAL DAILY
Qty: 90 TABLET | Refills: 2 | Status: SHIPPED
Start: 2021-11-18 | End: 2022-01-19

## 2021-11-18 NOTE — TELEPHONE ENCOUNTER
She can take the diltiazem 120 mg sustained release once daily.  She should take the losartan 50 mg once a day and stop the lisinopril HCT.  She will use the albuterol inhaler as needed.  The clopidogrel 75 mg once a day she should be on the Combivent Respimat  inhaler that she should be on.  She should also be on the potassium 20 mEq usually known as K-Genna con 20, once a day.  Prednisone 5 mg once a day.  Carvedilol 3.125 mg twice daily.  Her furosemide is listed as 40 mg/day.  She probably should be taking 20 mg/day instead.  She can use the Advair Diskus once a day or if she prefers to use the Combivent instead she would stay with that.  She also needs to be on the spironolactone 25 mg per cardiology.  I know that cardiology wanted her to take the lisinopril and the losartan but her blood pressure will not tolerate that.  Please relate to the nurse and also the nurse should if possible related the patient that a lot of the changes in her medication regimen are because her heart is not squeezing well she has significant reduction in the heart's ability to squeeze.  She is in chronic biventricular heart failure.

## 2021-11-18 NOTE — TELEPHONE ENCOUNTER
April from [facility] Nurse called about pt medications. She needs some help. It appears that the son and the sister hasn't been helping with pt's medications and she has just been randomly taking medication. She has prescriptions her that are on her list but would like her medications listed updated a bit more. Her BP is 80/40 today and it just keeps going down.    I told the April that we spoke to the son on 11/11/21 and that is when I was informed that they haven't been helping the pt manage the medications.    Is it possible to go over the medications and just make sure everything listed is okay for her to take, that way I can fax over a medication list for the Nurse, please advise.    April's ph: [phone]  Fax: [phone]

## 2021-11-19 ENCOUNTER — TELEPHONE (OUTPATIENT)
Dept: MEDICAL GROUP | Facility: MEDICAL CENTER | Age: 76
End: 2021-11-19

## 2021-11-20 NOTE — TELEPHONE ENCOUNTER
Johnny called about pt's medication. Called LVM no answer. Called pt herself and ask if there was anything I can do to help with her medications. Do know that the nurse was over yesterday and we went over them. Pt said no we are all in the loop, everything is good.

## 2021-11-24 ENCOUNTER — TELEPHONE (OUTPATIENT)
Dept: MEDICAL GROUP | Facility: MEDICAL CENTER | Age: 76
End: 2021-11-24

## 2021-11-25 NOTE — TELEPHONE ENCOUNTER
April [facility] nurse would like us to go over the medications list with pt's son. Have called and spoke to nurse and son about medications list before. But would like to go over it again. If possible to give him a call and go over listed that was printed and sent over.

## 2021-11-29 RX ORDER — PREDNISONE 5 MG/1
5 TABLET ORAL 2 TIMES DAILY WITH MEALS
Qty: 60 TABLET | Refills: 0 | Status: SHIPPED | OUTPATIENT
Start: 2021-11-29 | End: 2022-01-19

## 2021-11-29 RX ORDER — POTASSIUM CHLORIDE 1500 MG/1
20 TABLET, EXTENDED RELEASE ORAL 2 TIMES DAILY
Qty: 60 TABLET | Refills: 0 | Status: SHIPPED | OUTPATIENT
Start: 2021-11-29 | End: 2022-01-19

## 2022-01-07 ENCOUNTER — TELEPHONE (OUTPATIENT)
Dept: MEDICAL GROUP | Facility: MEDICAL CENTER | Age: 77
End: 2022-01-07

## 2022-01-07 NOTE — TELEPHONE ENCOUNTER
[facility] called stating that the patient is refusing all home care including PT, OT, and Nursing. Patient has stated that she feels as though she does not need it. [facility] calling to make sure Dr. Julio is aware she will not be receiving any home care services.

## 2022-01-19 ENCOUNTER — OFFICE VISIT (OUTPATIENT)
Dept: MEDICAL GROUP | Facility: MEDICAL CENTER | Age: 77
End: 2022-01-19
Payer: MEDICARE

## 2022-01-19 VITALS
DIASTOLIC BLOOD PRESSURE: 88 MMHG | WEIGHT: 92.81 LBS | BODY MASS INDEX: 17.52 KG/M2 | HEIGHT: 61 IN | TEMPERATURE: 97.1 F | HEART RATE: 126 BPM | SYSTOLIC BLOOD PRESSURE: 160 MMHG | OXYGEN SATURATION: 92 % | RESPIRATION RATE: 14 BRPM

## 2022-01-19 DIAGNOSIS — R60.9 PERIPHERAL EDEMA: ICD-10-CM

## 2022-01-19 DIAGNOSIS — I70.209 ARTERIOSCLEROSIS OF ARTERIES OF EXTREMITIES (HCC): ICD-10-CM

## 2022-01-19 DIAGNOSIS — I50.42 CHRONIC COMBINED SYSTOLIC AND DIASTOLIC CONGESTIVE HEART FAILURE (HCC): ICD-10-CM

## 2022-01-19 DIAGNOSIS — M51.27 HERNIATED NUCLEUS PULPOSUS, L5-S1, LEFT: ICD-10-CM

## 2022-01-19 DIAGNOSIS — I10 ESSENTIAL HYPERTENSION: ICD-10-CM

## 2022-01-19 DIAGNOSIS — F43.21 SITUATIONAL DEPRESSION: ICD-10-CM

## 2022-01-19 DIAGNOSIS — R94.30 CARDIAC LV EJECTION FRACTION 21-40%: ICD-10-CM

## 2022-01-19 DIAGNOSIS — F51.01 PRIMARY INSOMNIA: ICD-10-CM

## 2022-01-19 DIAGNOSIS — I71.40 ABDOMINAL AORTIC ANEURYSM GREATER THAN 39 MM IN DIAMETER (HCC): ICD-10-CM

## 2022-01-19 DIAGNOSIS — K56.699 COLONIC STRICTURE (HCC): ICD-10-CM

## 2022-01-19 DIAGNOSIS — I50.82 BIVENTRICULAR CONGESTIVE HEART FAILURE (HCC): ICD-10-CM

## 2022-01-19 DIAGNOSIS — I77.1 SUBCLAVIAN ARTERY STENOSIS, LEFT (HCC): ICD-10-CM

## 2022-01-19 DIAGNOSIS — I73.9 PERIPHERAL VASCULAR DISEASE (HCC): ICD-10-CM

## 2022-01-19 DIAGNOSIS — J41.1 MUCOPURULENT CHRONIC BRONCHITIS (HCC): ICD-10-CM

## 2022-01-19 DIAGNOSIS — I65.23 ATHEROSCLEROSIS OF BOTH CAROTID ARTERIES: ICD-10-CM

## 2022-01-19 PROBLEM — J18.9 PNEUMONIA, UNSPECIFIED ORGANISM: Status: ACTIVE | Noted: 2021-12-03

## 2022-01-19 PROBLEM — J18.1 LEFT LOWER LOBE CONSOLIDATION (HCC): Status: RESOLVED | Noted: 2018-05-09 | Resolved: 2022-01-19

## 2022-01-19 PROBLEM — R11.2 NAUSEA WITH VOMITING, UNSPECIFIED: Status: ACTIVE | Noted: 2021-12-03

## 2022-01-19 PROBLEM — J18.9 PNEUMONIA, UNSPECIFIED ORGANISM: Status: RESOLVED | Noted: 2021-12-03 | Resolved: 2022-01-19

## 2022-01-19 PROBLEM — Z79.01 LONG TERM (CURRENT) USE OF ANTICOAGULANTS: Status: ACTIVE | Noted: 2021-12-03

## 2022-01-19 PROBLEM — M62.81 MUSCLE WEAKNESS (GENERALIZED): Status: ACTIVE | Noted: 2021-10-26

## 2022-01-19 PROBLEM — R26.2 DIFFICULTY IN WALKING, NOT ELSEWHERE CLASSIFIED: Status: ACTIVE | Noted: 2021-10-26

## 2022-01-19 PROBLEM — U07.1 COVID-19: Status: RESOLVED | Noted: 2021-12-20 | Resolved: 2022-01-19

## 2022-01-19 PROBLEM — R79.89 ELEVATED TROPONIN: Status: RESOLVED | Noted: 2020-06-08 | Resolved: 2022-01-19

## 2022-01-19 PROBLEM — R11.2 NAUSEA WITH VOMITING, UNSPECIFIED: Status: RESOLVED | Noted: 2021-12-03 | Resolved: 2022-01-19

## 2022-01-19 PROBLEM — U07.1 COVID-19: Status: ACTIVE | Noted: 2021-12-20

## 2022-01-19 PROCEDURE — 99214 OFFICE O/P EST MOD 30 MIN: CPT | Performed by: FAMILY MEDICINE

## 2022-01-19 RX ORDER — SPIRONOLACTONE 25 MG/1
25 TABLET ORAL DAILY
COMMUNITY
Start: 2021-11-18 | End: 2022-01-19

## 2022-01-19 RX ORDER — CLOPIDOGREL BISULFATE 75 MG/1
75 TABLET ORAL DAILY
Qty: 30 TABLET | Refills: 11 | Status: SHIPPED | OUTPATIENT
Start: 2022-01-19 | End: 2022-02-10 | Stop reason: SDUPTHER

## 2022-01-19 RX ORDER — FUROSEMIDE 40 MG/1
40 TABLET ORAL DAILY
Qty: 90 TABLET | Refills: 3 | Status: ON HOLD | OUTPATIENT
Start: 2022-01-19 | End: 2022-01-24 | Stop reason: SDUPTHER

## 2022-01-19 RX ORDER — HYDROCODONE BITARTRATE AND ACETAMINOPHEN 5; 325 MG/1; MG/1
1 TABLET ORAL EVERY 4 HOURS PRN
Qty: 40 TABLET | Refills: 0 | Status: SHIPPED | OUTPATIENT
Start: 2022-01-19 | End: 2022-05-05 | Stop reason: SDUPTHER

## 2022-01-19 RX ORDER — SPIRONOLACTONE 25 MG/1
25 TABLET ORAL
Qty: 90 TABLET | Refills: 2 | Status: SHIPPED
Start: 2022-01-19 | End: 2022-01-22

## 2022-01-19 RX ORDER — CARVEDILOL 6.25 MG/1
6.25 TABLET ORAL 2 TIMES DAILY WITH MEALS
Qty: 60 TABLET | Refills: 6 | Status: SHIPPED | OUTPATIENT
Start: 2022-01-19 | End: 2022-02-10 | Stop reason: SDUPTHER

## 2022-01-19 RX ORDER — POTASSIUM CHLORIDE 1.5 G/1.58G
20 POWDER, FOR SOLUTION ORAL
COMMUNITY
End: 2022-01-19

## 2022-01-19 RX ORDER — MIRTAZAPINE 15 MG/1
15 TABLET, FILM COATED ORAL NIGHTLY
Qty: 90 TABLET | Refills: 3 | Status: SHIPPED | OUTPATIENT
Start: 2022-01-19 | End: 2022-03-14 | Stop reason: SDUPTHER

## 2022-01-19 RX ORDER — CARVEDILOL 3.12 MG/1
3.12 TABLET ORAL
COMMUNITY
End: 2022-01-19

## 2022-01-19 RX ORDER — DILTIAZEM HYDROCHLORIDE 120 MG/1
120 TABLET, FILM COATED ORAL DAILY
Qty: 90 TABLET | Refills: 3 | Status: SHIPPED | OUTPATIENT
Start: 2022-01-19 | End: 2022-03-14

## 2022-01-19 RX ORDER — ALBUTEROL SULFATE 90 UG/1
2 AEROSOL, METERED RESPIRATORY (INHALATION)
COMMUNITY
End: 2022-01-19

## 2022-01-19 RX ORDER — DILTIAZEM HYDROCHLORIDE 120 MG/1
120 TABLET, FILM COATED ORAL DAILY
COMMUNITY
End: 2022-01-19 | Stop reason: SDUPTHER

## 2022-01-19 RX ORDER — CLOPIDOGREL BISULFATE 75 MG/1
75 TABLET ORAL DAILY
COMMUNITY
End: 2022-01-19

## 2022-01-19 RX ORDER — POTASSIUM CHLORIDE 1500 MG/1
20 TABLET, EXTENDED RELEASE ORAL DAILY
Qty: 90 TABLET | Refills: 3 | Status: SHIPPED
Start: 2022-01-19 | End: 2022-02-10

## 2022-01-19 ASSESSMENT — FIBROSIS 4 INDEX: FIB4 SCORE: 2.12

## 2022-01-19 ASSESSMENT — PATIENT HEALTH QUESTIONNAIRE - PHQ9: CLINICAL INTERPRETATION OF PHQ2 SCORE: 0

## 2022-01-19 NOTE — PROGRESS NOTES
Chief Complaint   Patient presents with   • Congestive Heart Failure   • Aortic Atherosclerosis   • COPD       Subjective:     HPI:   Karen Jauregui presents today with the following:     Patient was hospitalized at [facility] and then in nursing home for couple weeks.    1. Mucopurulent chronic bronchitis (HCC)  She is feeling much better.  She does have COPD but she is breathing better overall.  She has not been using the Advair as the powder makes her cough your chemistry panel and cholesterol test are normal.  It does not seem to give her any benefit.  She uses the Combivent regularly and then albuterol as needed.      2. Arteriosclerosis of arteries of extremities (HCC)/Peripheral vascular disease (HCC)  Patient has significant peripheral vascular disease and atherosclerosis of multiple arteries particularly in the lower extremities.  She is not currently taking her Plavix and I have advised her to please do that to try to avoid further problems from her atherosclerosis..  She has had several surgeries    3. Abdominal aortic aneurysm greater than 39 mm in diameter (HCC)  Patient has an abdominal aortic aneurysm stent in place.  No current abdominal pain.    4. Cardiac LV ejection fraction 21-40%/Chronic combined systolic and diastolic congestive heart failure (HCC)/Biventricular congestive heart failure (HCC)/Peripheral edema  Patient needs to see cardiology again.  When she does see cardiology she needs to be sure she brings a list of her medications and what she is actually taking.    5. Atherosclerosis of both carotid arteries/Subclavian artery stenosis, left (HCC)  Patient has carotid artery atherosclerosis but has not needed endarterectomy of the carotids.  She was able to stop smoking this last admission.  It is unclear to me why she is not on cholesterol-lowering medication.  I have asked her to discuss this with cardiology.  She does not have a documented adverse reaction to statins.    6.  Colonic stricture (HCC)  Patient has history of colonic stricture which is felt to be postischemic.  She has no current symptoms.  Patient had colon resection in 2013 so some of the stricture may be scarring from surgery as well.    7. Essential hypertension  It is unclear which of her medications the patient is actually taking.  It seems clear that she is not taking any carvedilol or clonidine.  I have asked her to not resume the clonidine.  We will resume the carvedilol as that has benefits for her congestive heart failure.  She is on diltiazem at least partially for rate control but did not take it this morning.  I have asked her to resume that.  Patient is afraid of getting too low blood pressure.  I think that is a reasonable concern but the diltiazem is a small dose.    8. Primary insomnia/Situational depression  Patient was placed on mirtazapine recently and appears to have been in the last hospitalization.  It is unclear whether she is taking the medication as she does not know the name of her medications.  Her son is trying to help her with a list but he is not quite sure what she is taking and what she is not.  She would like the Remeron renewed as she thinks it was helpful for the insomnia and for the situational depression and frustration.  Patient has significant difficulty with says she wants to be up and moving but tires very quickly.  I believe this is a combination of her severe lung disease and her heart disease.    9. Herniated nucleus pulposus, L5-S1, left  Patient does have chronic lumbar back pain with some radicular component.  What has helped her in the past is hydrocodone.  This was last filled for her in February 2021.  Patient takes this 1/2 tablet at a time episodically as needed only.  Her son endorses that.  She does not ingest alcohol at all.  Controlled substance treatment agreement is reviewed and signed.  PDMP review shows no inconsistencies.  I have renewed a small quantity.    It  is unclear if patient received COVID-vaccine at the hospital rehab facility.  She and her son will look for that documentation.  I could not find any documentation in her current paperwork.    Patient Active Problem List    Diagnosis Date Noted   • Long term (current) use of anticoagulants 12/03/2021   • Difficulty in walking, not elsewhere classified 10/26/2021   • Edema, unspecified 10/26/2021   • Muscle weakness (generalized) 10/26/2021   • Cardiac LV ejection fraction 21-40% 10/20/2021   • Situational depression 10/20/2021   • Hypoxia 10/20/2021   • CHF (congestive heart failure) (formerly [facility]) 09/30/2021   • Polycythemia 06/08/2020   • Elevated BUN 06/08/2020   • Pulmonary nodule 06/08/2020   • Anxiety 04/22/2018   • Hyponatremia 04/19/2018   • Osteopenia of lumbar spine 02/14/2018   • Colonic stricture (formerly [facility]) 06/27/2017   • History of ischemic colitis 06/27/2017   • PVD (peripheral vascular disease) (formerly [facility]) 06/02/2017   • Insomnia disorder 02/20/2017   • History of COPD 02/20/2017   • History of small bowel obstruction 02/17/2017   • Eosinophilic colitis 07/21/2015   • Family history of nonmelanoma skin cancer    • COPD (chronic obstructive pulmonary disease) (formerly [facility])    • Constipated 05/26/2012   • Subclavian artery stenosis, left (formerly [facility]) 03/27/2012   • History of diverticulitis of colon 11/16/2011   • Herniated nucleus pulposus, L5-S1, left 09/08/2011   • Carotid atherosclerosis 06/15/2011   • Abdominal aortic aneurysm greater than 39 mm in diameter (formerly [facility])    • Peripheral vascular disease (formerly [facility])    • Essential hypertension    • Dyslipidemia, goal LDL below 100    • Spinal stenosis of lumbar region    • Arteriosclerosis of arteries of extremities (formerly [facility])        Current medicines (including changes today)  Current Outpatient Medications   Medication Sig Dispense Refill   • ipratropium-albuterol (COMBIVENT RESPIMAT)  MCG/ACT Aero Soln Inhale.     • carvedilol (COREG) 6.25 MG Tab Take 1 Tablet by mouth 2 times a day with  "meals. 60 Tablet 6   • spironolactone (ALDACTONE) 25 MG Tab Take 1 Tablet by mouth 1 time a day as needed (swelling). 90 Tablet 2   • clopidogrel (PLAVIX) 75 MG Tab Take 1 Tablet by mouth every day. 30 Tablet 11   • DILTIAZem (CARDIZEM) 120 MG Tab Take 1 Tablet by mouth every day. 90 Tablet 3   • furosemide (LASIX) 40 MG Tab Take 1 Tablet by mouth every day. 90 Tablet 3   • KLOR-CON M20 20 MEQ Tab CR Take 1 Tablet by mouth every day. 90 Tablet 3   • mirtazapine (REMERON) 15 MG Tab Take 1 Tablet by mouth every evening. 90 Tablet 3   • HYDROcodone-acetaminophen (NORCO) 5-325 MG Tab per tablet Take 1 Tablet by mouth every four hours as needed for up to 7 days. 40 tablets is a 7 day prescription 40 Tablet 0   • COMBIVENT RESPIMAT  MCG/ACT Aero Soln      • losartan (COZAAR) 50 MG Tab Take 1 Tablet by mouth every day. 90 Tablet 3   • senna-docusate (PERICOLACE OR SENOKOT S) 8.6-50 MG Tab Take 2 Tabs by mouth 2 Times a Day. 30 Tab 0   • VENTOLIN  (90 Base) MCG/ACT Aero Soln inhalation aerosol Inhale 2 Puffs by mouth every four hours as needed for Shortness of Breath.       No current facility-administered medications for this visit.       Allergies   Allergen Reactions   • Doxycycline Diarrhea     None stop diarrhea   • Tramadol Vomiting   • Amoxicillin Diarrhea     Diarrhea   • Ciprofloxacin Unspecified     Unspecified       ROS: As per HPI       Objective:     /88 (BP Location: Right arm, Patient Position: Sitting, BP Cuff Size: Small adult)   Pulse (!) 126   Temp 36.2 °C (97.1 °F) (Temporal)   Resp 14   Ht 1.549 m (5' 1\")   Wt 42.1 kg (92 lb 13 oz)   SpO2 92%  Body mass index is 17.54 kg/m².    Physical Exam:  Constitutional: Well-developed and well-nourished. Not diaphoretic. No distress. Lucid and fluent.  Patient, son, physician and staff all wearing masks.  Skin: Skin is warm and dry. No rash noted.  Head: Atraumatic without lesions.  Eyes: Conjunctivae and extraocular motions are normal. " Pupils are equal, round, and reactive to light. No scleral icterus.   Ears:  External ears unremarkable.   Neck: Supple, trachea midline. No thyromegaly present. No cervical or supraclavicular lymphadenopathy. No JVD or carotid bruits appreciated  Cardiovascular: Regular rate and rhythm.  Normal S1, S2 without murmur appreciated.  Chest: Effort normal. Clear to auscultation throughout. No adventitious sounds.   Abdomen: Soft, non tender, and without distention. Active bowel sounds in all four quadrants. No rebound, guarding, masses or hepatosplenomegaly.  Extremities: No cyanosis, clubbing, erythema, nor edema.   Neurological: Alert and oriented x 3.  Movements are symmetric.  She is slow but quite stable.  She tires very quickly after just a few steps so a wheelchair is being used to help her back to her car.  Psychiatric:  Behavior, mood, and affect are appropriate.       Assessment and Plan:     76 y.o. female with the following issues:    1. Mucopurulent chronic bronchitis (HCC)     2. Arteriosclerosis of arteries of extremities (HCC)  clopidogrel (PLAVIX) 75 MG Tab   3. Abdominal aortic aneurysm greater than 39 mm in diameter (HCC)     4. Cardiac LV ejection fraction 21-40%  carvedilol (COREG) 6.25 MG Tab    spironolactone (ALDACTONE) 25 MG Tab    furosemide (LASIX) 40 MG Tab    REFERRAL TO CARDIOLOGY   5. Chronic combined systolic and diastolic congestive heart failure (HCC)  carvedilol (COREG) 6.25 MG Tab    spironolactone (ALDACTONE) 25 MG Tab    furosemide (LASIX) 40 MG Tab    KLOR-CON M20 20 MEQ Tab CR    REFERRAL TO CARDIOLOGY   6. Biventricular congestive heart failure (HCC)  furosemide (LASIX) 40 MG Tab    KLOR-CON M20 20 MEQ Tab CR    REFERRAL TO CARDIOLOGY   7. Peripheral edema  spironolactone (ALDACTONE) 25 MG Tab    furosemide (LASIX) 40 MG Tab    KLOR-CON M20 20 MEQ Tab CR    REFERRAL TO CARDIOLOGY   8. Peripheral vascular disease (HCC)  clopidogrel (PLAVIX) 75 MG Tab    REFERRAL TO CARDIOLOGY   9.  Atherosclerosis of both carotid arteries     10. Subclavian artery stenosis, left (HCC)  clopidogrel (PLAVIX) 75 MG Tab   11. Colonic stricture (HCC)  clopidogrel (PLAVIX) 75 MG Tab   12. Essential hypertension  DILTIAZem (CARDIZEM) 120 MG Tab   13. Primary insomnia  mirtazapine (REMERON) 15 MG Tab   14. Situational depression  mirtazapine (REMERON) 15 MG Tab   15. Herniated nucleus pulposus, L5-S1, left  HYDROcodone-acetaminophen (NORCO) 5-325 MG Tab per tablet    Consent for Opiate Prescription     Consequences of Chronic Opiate therapy:  (5 A's)  Analgesia:  Improved, patient takes in a limited fashion as needed only  Activity:  improved but limited  Adverse Events: None reported or observed  Aberrant Behaviors: None reported or observed  Affect/Mood: good grooming, full facial expressions, normal speech pattern and content, normal thought patterns, normal perception, good insight, normal reasoning  Last CMP:   3 weeks ago  Appropriate Imaging done:   Yes      Followup: Return in about 3 months (around 4/19/2022), or if symptoms worsen or fail to improve.

## 2022-01-22 ENCOUNTER — APPOINTMENT (OUTPATIENT)
Dept: CARDIOLOGY | Facility: MEDICAL CENTER | Age: 77
DRG: 291 | End: 2022-01-22
Attending: STUDENT IN AN ORGANIZED HEALTH CARE EDUCATION/TRAINING PROGRAM
Payer: MEDICARE

## 2022-01-22 ENCOUNTER — HOSPITAL ENCOUNTER (INPATIENT)
Facility: MEDICAL CENTER | Age: 77
LOS: 2 days | DRG: 291 | End: 2022-01-24
Attending: EMERGENCY MEDICINE | Admitting: STUDENT IN AN ORGANIZED HEALTH CARE EDUCATION/TRAINING PROGRAM
Payer: MEDICARE

## 2022-01-22 ENCOUNTER — APPOINTMENT (OUTPATIENT)
Dept: RADIOLOGY | Facility: MEDICAL CENTER | Age: 77
DRG: 291 | End: 2022-01-22
Attending: EMERGENCY MEDICINE
Payer: MEDICARE

## 2022-01-22 DIAGNOSIS — R60.9 PERIPHERAL EDEMA: ICD-10-CM

## 2022-01-22 DIAGNOSIS — I50.82 BIVENTRICULAR CONGESTIVE HEART FAILURE (HCC): ICD-10-CM

## 2022-01-22 DIAGNOSIS — J41.0 SIMPLE CHRONIC BRONCHITIS (HCC): ICD-10-CM

## 2022-01-22 DIAGNOSIS — I50.42 CHRONIC COMBINED SYSTOLIC AND DIASTOLIC CONGESTIVE HEART FAILURE (HCC): ICD-10-CM

## 2022-01-22 DIAGNOSIS — I16.1 HYPERTENSIVE EMERGENCY: ICD-10-CM

## 2022-01-22 DIAGNOSIS — R94.30 CARDIAC LV EJECTION FRACTION 21-40%: ICD-10-CM

## 2022-01-22 DIAGNOSIS — J44.1 ACUTE EXACERBATION OF CHRONIC OBSTRUCTIVE PULMONARY DISEASE (COPD) (HCC): ICD-10-CM

## 2022-01-22 DIAGNOSIS — Z87.09 HISTORY OF COPD: ICD-10-CM

## 2022-01-22 PROBLEM — J81.0 ACUTE PULMONARY EDEMA (HCC): Status: ACTIVE | Noted: 2022-01-22

## 2022-01-22 PROBLEM — I50.21 ACUTE SYSTOLIC CHF (CONGESTIVE HEART FAILURE) (HCC): Status: ACTIVE | Noted: 2022-01-22

## 2022-01-22 LAB
ALBUMIN SERPL BCP-MCNC: 4.2 G/DL (ref 3.2–4.9)
ALBUMIN/GLOB SERPL: 1.3 G/DL
ALP SERPL-CCNC: 122 U/L (ref 30–99)
ALT SERPL-CCNC: 18 U/L (ref 2–50)
ANION GAP SERPL CALC-SCNC: 13 MMOL/L (ref 7–16)
AST SERPL-CCNC: 27 U/L (ref 12–45)
BASOPHILS # BLD AUTO: 0.8 % (ref 0–1.8)
BASOPHILS # BLD: 0.11 K/UL (ref 0–0.12)
BILIRUB SERPL-MCNC: 0.2 MG/DL (ref 0.1–1.5)
BUN SERPL-MCNC: 29 MG/DL (ref 8–22)
CALCIUM SERPL-MCNC: 9.9 MG/DL (ref 8.5–10.5)
CHLORIDE SERPL-SCNC: 99 MMOL/L (ref 96–112)
CO2 SERPL-SCNC: 27 MMOL/L (ref 20–33)
CREAT SERPL-MCNC: 1.04 MG/DL (ref 0.5–1.4)
EKG IMPRESSION: NORMAL
EOSINOPHIL # BLD AUTO: 0.34 K/UL (ref 0–0.51)
EOSINOPHIL NFR BLD: 2.4 % (ref 0–6.9)
ERYTHROCYTE [DISTWIDTH] IN BLOOD BY AUTOMATED COUNT: 48.4 FL (ref 35.9–50)
FLUAV RNA SPEC QL NAA+PROBE: NEGATIVE
FLUBV RNA SPEC QL NAA+PROBE: NEGATIVE
GLOBULIN SER CALC-MCNC: 3.3 G/DL (ref 1.9–3.5)
GLUCOSE SERPL-MCNC: 182 MG/DL (ref 65–99)
HCT VFR BLD AUTO: 40.8 % (ref 37–47)
HGB BLD-MCNC: 12.9 G/DL (ref 12–16)
IMM GRANULOCYTES # BLD AUTO: 0.11 K/UL (ref 0–0.11)
IMM GRANULOCYTES NFR BLD AUTO: 0.8 % (ref 0–0.9)
LACTATE BLD-SCNC: 1.1 MMOL/L (ref 0.5–2)
LV EJECT FRACT  99904: 55
LV EJECT FRACT MOD 2C 99903: 79.06
LV EJECT FRACT MOD 4C 99902: 57.37
LV EJECT FRACT MOD BP 99901: 70.01
LYMPHOCYTES # BLD AUTO: 3.11 K/UL (ref 1–4.8)
LYMPHOCYTES NFR BLD: 21.8 % (ref 22–41)
MCH RBC QN AUTO: 28.4 PG (ref 27–33)
MCHC RBC AUTO-ENTMCNC: 31.6 G/DL (ref 33.6–35)
MCV RBC AUTO: 89.7 FL (ref 81.4–97.8)
MONOCYTES # BLD AUTO: 0.98 K/UL (ref 0–0.85)
MONOCYTES NFR BLD AUTO: 6.9 % (ref 0–13.4)
NEUTROPHILS # BLD AUTO: 9.59 K/UL (ref 2–7.15)
NEUTROPHILS NFR BLD: 67.3 % (ref 44–72)
NRBC # BLD AUTO: 0 K/UL
NRBC BLD-RTO: 0 /100 WBC
NT-PROBNP SERPL IA-MCNC: 3820 PG/ML (ref 0–125)
PLATELET # BLD AUTO: 309 K/UL (ref 164–446)
PMV BLD AUTO: 9.7 FL (ref 9–12.9)
POTASSIUM SERPL-SCNC: 4.4 MMOL/L (ref 3.6–5.5)
PROCALCITONIN SERPL-MCNC: <0.05 NG/ML
PROT SERPL-MCNC: 7.5 G/DL (ref 6–8.2)
RBC # BLD AUTO: 4.55 M/UL (ref 4.2–5.4)
RSV RNA SPEC QL NAA+PROBE: NEGATIVE
SARS-COV-2 RNA RESP QL NAA+PROBE: NOTDETECTED
SODIUM SERPL-SCNC: 139 MMOL/L (ref 135–145)
SPECIMEN SOURCE: NORMAL
TROPONIN T SERPL-MCNC: 26 NG/L (ref 6–19)
TROPONIN T SERPL-MCNC: 50 NG/L (ref 6–19)
WBC # BLD AUTO: 14.2 K/UL (ref 4.8–10.8)

## 2022-01-22 PROCEDURE — 0241U HCHG SARS-COV-2 COVID-19 NFCT DS RESP RNA 4 TRGT MIC: CPT

## 2022-01-22 PROCEDURE — 99223 1ST HOSP IP/OBS HIGH 75: CPT | Mod: AI | Performed by: STUDENT IN AN ORGANIZED HEALTH CARE EDUCATION/TRAINING PROGRAM

## 2022-01-22 PROCEDURE — 700111 HCHG RX REV CODE 636 W/ 250 OVERRIDE (IP): Performed by: STUDENT IN AN ORGANIZED HEALTH CARE EDUCATION/TRAINING PROGRAM

## 2022-01-22 PROCEDURE — 700102 HCHG RX REV CODE 250 W/ 637 OVERRIDE(OP): Performed by: EMERGENCY MEDICINE

## 2022-01-22 PROCEDURE — 94664 DEMO&/EVAL PT USE INHALER: CPT

## 2022-01-22 PROCEDURE — 700102 HCHG RX REV CODE 250 W/ 637 OVERRIDE(OP): Performed by: STUDENT IN AN ORGANIZED HEALTH CARE EDUCATION/TRAINING PROGRAM

## 2022-01-22 PROCEDURE — A9270 NON-COVERED ITEM OR SERVICE: HCPCS | Performed by: STUDENT IN AN ORGANIZED HEALTH CARE EDUCATION/TRAINING PROGRAM

## 2022-01-22 PROCEDURE — 84145 PROCALCITONIN (PCT): CPT

## 2022-01-22 PROCEDURE — 96374 THER/PROPH/DIAG INJ IV PUSH: CPT

## 2022-01-22 PROCEDURE — 700101 HCHG RX REV CODE 250: Performed by: EMERGENCY MEDICINE

## 2022-01-22 PROCEDURE — 99221 1ST HOSP IP/OBS SF/LOW 40: CPT | Mod: GC | Performed by: INTERNAL MEDICINE

## 2022-01-22 PROCEDURE — 99406 BEHAV CHNG SMOKING 3-10 MIN: CPT

## 2022-01-22 PROCEDURE — A9270 NON-COVERED ITEM OR SERVICE: HCPCS | Performed by: EMERGENCY MEDICINE

## 2022-01-22 PROCEDURE — 85025 COMPLETE CBC W/AUTO DIFF WBC: CPT

## 2022-01-22 PROCEDURE — 84484 ASSAY OF TROPONIN QUANT: CPT | Mod: 91

## 2022-01-22 PROCEDURE — 94640 AIRWAY INHALATION TREATMENT: CPT

## 2022-01-22 PROCEDURE — 93306 TTE W/DOPPLER COMPLETE: CPT

## 2022-01-22 PROCEDURE — 83880 ASSAY OF NATRIURETIC PEPTIDE: CPT

## 2022-01-22 PROCEDURE — 80053 COMPREHEN METABOLIC PANEL: CPT

## 2022-01-22 PROCEDURE — C9803 HOPD COVID-19 SPEC COLLECT: HCPCS | Performed by: EMERGENCY MEDICINE

## 2022-01-22 PROCEDURE — 99285 EMERGENCY DEPT VISIT HI MDM: CPT

## 2022-01-22 PROCEDURE — 94669 MECHANICAL CHEST WALL OSCILL: CPT

## 2022-01-22 PROCEDURE — 71045 X-RAY EXAM CHEST 1 VIEW: CPT

## 2022-01-22 PROCEDURE — 93306 TTE W/DOPPLER COMPLETE: CPT | Mod: 26 | Performed by: INTERNAL MEDICINE

## 2022-01-22 PROCEDURE — 96376 TX/PRO/DX INJ SAME DRUG ADON: CPT

## 2022-01-22 PROCEDURE — 83605 ASSAY OF LACTIC ACID: CPT

## 2022-01-22 PROCEDURE — 700111 HCHG RX REV CODE 636 W/ 250 OVERRIDE (IP): Performed by: EMERGENCY MEDICINE

## 2022-01-22 PROCEDURE — 770020 HCHG ROOM/CARE - TELE (206)

## 2022-01-22 PROCEDURE — 94660 CPAP INITIATION&MGMT: CPT

## 2022-01-22 PROCEDURE — 93005 ELECTROCARDIOGRAM TRACING: CPT | Performed by: EMERGENCY MEDICINE

## 2022-01-22 RX ORDER — PREDNISONE 20 MG/1
40 TABLET ORAL DAILY
Status: DISCONTINUED | OUTPATIENT
Start: 2022-01-22 | End: 2022-01-23

## 2022-01-22 RX ORDER — IPRATROPIUM BROMIDE AND ALBUTEROL SULFATE 2.5; .5 MG/3ML; MG/3ML
3 SOLUTION RESPIRATORY (INHALATION)
Status: DISCONTINUED | OUTPATIENT
Start: 2022-01-22 | End: 2022-01-22

## 2022-01-22 RX ORDER — CARVEDILOL 6.25 MG/1
6.25 TABLET ORAL 2 TIMES DAILY WITH MEALS
Status: DISCONTINUED | OUTPATIENT
Start: 2022-01-22 | End: 2022-01-22

## 2022-01-22 RX ORDER — AZITHROMYCIN 250 MG/1
500 TABLET, FILM COATED ORAL ONCE
Status: COMPLETED | OUTPATIENT
Start: 2022-01-22 | End: 2022-01-22

## 2022-01-22 RX ORDER — SPIRONOLACTONE 25 MG/1
25 TABLET ORAL
Status: DISCONTINUED | OUTPATIENT
Start: 2022-01-22 | End: 2022-01-22

## 2022-01-22 RX ORDER — MIRTAZAPINE 15 MG/1
15 TABLET, FILM COATED ORAL NIGHTLY
Status: DISCONTINUED | OUTPATIENT
Start: 2022-01-23 | End: 2022-01-24 | Stop reason: HOSPADM

## 2022-01-22 RX ORDER — FUROSEMIDE 10 MG/ML
40 INJECTION INTRAMUSCULAR; INTRAVENOUS ONCE
Status: COMPLETED | OUTPATIENT
Start: 2022-01-22 | End: 2022-01-22

## 2022-01-22 RX ORDER — IPRATROPIUM BROMIDE AND ALBUTEROL SULFATE 2.5; .5 MG/3ML; MG/3ML
3 SOLUTION RESPIRATORY (INHALATION)
Status: COMPLETED | OUTPATIENT
Start: 2022-01-22 | End: 2022-01-22

## 2022-01-22 RX ORDER — CLOPIDOGREL BISULFATE 75 MG/1
75 TABLET ORAL DAILY
Status: DISCONTINUED | OUTPATIENT
Start: 2022-01-22 | End: 2022-01-24 | Stop reason: HOSPADM

## 2022-01-22 RX ORDER — FUROSEMIDE 10 MG/ML
40 INJECTION INTRAMUSCULAR; INTRAVENOUS
Status: DISCONTINUED | OUTPATIENT
Start: 2022-01-22 | End: 2022-01-24

## 2022-01-22 RX ORDER — LOSARTAN POTASSIUM 50 MG/1
50 TABLET ORAL
Status: DISCONTINUED | OUTPATIENT
Start: 2022-01-22 | End: 2022-01-24 | Stop reason: HOSPADM

## 2022-01-22 RX ORDER — IPRATROPIUM BROMIDE AND ALBUTEROL SULFATE 2.5; .5 MG/3ML; MG/3ML
3 SOLUTION RESPIRATORY (INHALATION)
Status: DISCONTINUED | OUTPATIENT
Start: 2022-01-22 | End: 2022-01-24 | Stop reason: HOSPADM

## 2022-01-22 RX ADMIN — AZITHROMYCIN MONOHYDRATE 500 MG: 250 TABLET ORAL at 02:12

## 2022-01-22 RX ADMIN — IPRATROPIUM BROMIDE AND ALBUTEROL SULFATE 3 ML: .5; 2.5 SOLUTION RESPIRATORY (INHALATION) at 02:39

## 2022-01-22 RX ADMIN — FUROSEMIDE 40 MG: 10 INJECTION, SOLUTION INTRAMUSCULAR; INTRAVENOUS at 02:12

## 2022-01-22 RX ADMIN — IPRATROPIUM BROMIDE AND ALBUTEROL SULFATE 3 ML: .5; 2.5 SOLUTION RESPIRATORY (INHALATION) at 13:37

## 2022-01-22 RX ADMIN — LOSARTAN POTASSIUM 50 MG: 50 TABLET, FILM COATED ORAL at 05:08

## 2022-01-22 RX ADMIN — IPRATROPIUM BROMIDE AND ALBUTEROL SULFATE 3 ML: .5; 2.5 SOLUTION RESPIRATORY (INHALATION) at 06:51

## 2022-01-22 RX ADMIN — PREDNISONE 40 MG: 20 TABLET ORAL at 05:09

## 2022-01-22 RX ADMIN — CLOPIDOGREL 75 MG: 75 TABLET, FILM COATED ORAL at 07:19

## 2022-01-22 RX ADMIN — FUROSEMIDE 40 MG: 10 INJECTION, SOLUTION INTRAMUSCULAR; INTRAVENOUS at 05:11

## 2022-01-22 RX ADMIN — IPRATROPIUM BROMIDE AND ALBUTEROL SULFATE 3 ML: .5; 2.5 SOLUTION RESPIRATORY (INHALATION) at 00:39

## 2022-01-22 RX ADMIN — FUROSEMIDE 40 MG: 10 INJECTION, SOLUTION INTRAMUSCULAR; INTRAVENOUS at 17:01

## 2022-01-22 RX ADMIN — IPRATROPIUM BROMIDE AND ALBUTEROL SULFATE 3 ML: .5; 2.5 SOLUTION RESPIRATORY (INHALATION) at 10:26

## 2022-01-22 ASSESSMENT — LIFESTYLE VARIABLES
EVER_SMOKED: YES
SUBSTANCE_ABUSE: 0

## 2022-01-22 ASSESSMENT — ENCOUNTER SYMPTOMS
PHOTOPHOBIA: 0
GASTROINTESTINAL NEGATIVE: 1
PND: 1
FOCAL WEAKNESS: 0
DIAPHORESIS: 0
SORE THROAT: 0
WEIGHT LOSS: 0
BRUISES/BLEEDS EASILY: 0
ORTHOPNEA: 1
HEMOPTYSIS: 0
DIZZINESS: 0
FEVER: 0
NEUROLOGICAL NEGATIVE: 1
VOMITING: 0
TINGLING: 0
DOUBLE VISION: 0
MYALGIAS: 0
HEADACHES: 0
NECK PAIN: 0
NAUSEA: 0
BLURRED VISION: 0
PALPITATIONS: 0
CARDIOVASCULAR NEGATIVE: 1
HEARTBURN: 0
MUSCULOSKELETAL NEGATIVE: 1
WHEEZING: 1
DEPRESSION: 0
BACK PAIN: 0
WEAKNESS: 0
PSYCHIATRIC NEGATIVE: 1
ABDOMINAL PAIN: 0
COUGH: 0
EYES NEGATIVE: 1
CHILLS: 0
SHORTNESS OF BREATH: 1

## 2022-01-22 ASSESSMENT — FIBROSIS 4 INDEX
FIB4 SCORE: 2.12
FIB4 SCORE: 1.54

## 2022-01-22 ASSESSMENT — COGNITIVE AND FUNCTIONAL STATUS - GENERAL
SUGGESTED CMS G CODE MODIFIER MOBILITY: CK
MOVING TO AND FROM BED TO CHAIR: A LITTLE
STANDING UP FROM CHAIR USING ARMS: A LITTLE
MOVING FROM LYING ON BACK TO SITTING ON SIDE OF FLAT BED: A LITTLE
MOBILITY SCORE: 19
SUGGESTED CMS G CODE MODIFIER DAILY ACTIVITY: CH
CLIMB 3 TO 5 STEPS WITH RAILING: A LITTLE
WALKING IN HOSPITAL ROOM: A LITTLE
DAILY ACTIVITIY SCORE: 24

## 2022-01-22 ASSESSMENT — COPD QUESTIONNAIRES
DO YOU EVER COUGH UP ANY MUCUS OR PHLEGM?: NO/ONLY WITH OCCASIONAL COLDS OR INFECTIONS
COPD SCREENING SCORE: 5
HAVE YOU SMOKED AT LEAST 100 CIGARETTES IN YOUR ENTIRE LIFE: YES
DURING THE PAST 4 WEEKS HOW MUCH DID YOU FEEL SHORT OF BREATH: SOME OF THE TIME

## 2022-01-22 ASSESSMENT — PULMONARY FUNCTION TESTS: EPAP_CMH2O: 8

## 2022-01-22 NOTE — PROGRESS NOTES
[facility] Medicine Daily Progress Note    Date of Service  1/22/2022    Chief Complaint  Karen Jauregui is a 76 y.o. female admitted 1/22/2022 with shortness of breath    Hospital Course  Patient is a 76 y.o. female with past medical history of COPD (on home oxygen 4 L), CHF last EF 25% and a former smoker 20-pack-year history (quit 3 months ago). She  Presented to the ER on 1/22/2022 with shortness of breath.  Patient reports that she cannot breathe.  She reported worsening symptoms when laying flat and improved with sitting up.  She reports she can only walk 10 blocks.  She denies any fever, chills, nausea or vomiting.     Upon EMS arrival, patient was given three duo nebs and steroids while in route to ER.  In ER, her blood pressure was 234/112, heart rate 118, tachypneic breathing 24, saturating 95% on 4 L.    Interval Problem Update  She reports she is feeling much better after her last breathing treatment, non productive cough, no chest pain, no n/v. ROS otherwise negative. Axox3.     I have personally seen and examined the patient at bedside. I discussed the plan of care with patient and nursing    Consultants/Specialty    Code Status  DNAR/DNI    Disposition  Patient is not medically cleared for discharge.   Anticipate discharge to be determined    Review of Systems  Review of Systems   Constitutional: Positive for malaise/fatigue. Negative for chills, diaphoresis, fever and weight loss.   HENT: Negative.  Negative for sore throat.    Eyes: Negative.  Negative for blurred vision.   Respiratory: Positive for shortness of breath. Negative for cough.    Cardiovascular: Negative.  Negative for chest pain, palpitations and leg swelling.   Gastrointestinal: Negative.  Negative for abdominal pain, nausea and vomiting.   Genitourinary: Negative.  Negative for dysuria.   Musculoskeletal: Negative.  Negative for myalgias.   Skin: Negative.  Negative for itching and rash.   Neurological: Negative.  Negative for  dizziness, focal weakness, weakness and headaches.   Endo/Heme/Allergies: Negative.  Does not bruise/bleed easily.   Psychiatric/Behavioral: Negative.  Negative for depression, substance abuse and suicidal ideas.   All other systems reviewed and are negative.       Physical Exam  Temp:  [36 °C (96.8 °F)-36.3 °C (97.3 °F)] 36.3 °C (97.3 °F)  Pulse:  [] 104  Resp:  [12-26] 20  BP: (101-234)/() 104/60  SpO2:  [89 %-99 %] 95 %    Physical Exam  Vitals and nursing note reviewed. Exam conducted with a chaperone present.   Constitutional:       General: She is not in acute distress.     Appearance: Normal appearance. She is not diaphoretic.   HENT:      Head: Normocephalic.      Nose: Nose normal.      Mouth/Throat:      Mouth: Mucous membranes are moist.      Pharynx: Oropharynx is clear. No oropharyngeal exudate or posterior oropharyngeal erythema.   Eyes:      General:         Right eye: No discharge.         Left eye: No discharge.      Pupils: Pupils are equal, round, and reactive to light.   Cardiovascular:      Rate and Rhythm: Normal rate and regular rhythm.      Pulses: Normal pulses.      Heart sounds: Normal heart sounds.   Pulmonary:      Effort: Pulmonary effort is normal. No respiratory distress.      Breath sounds: No stridor. Wheezing present. No rhonchi or rales.   Chest:      Chest wall: No tenderness.   Abdominal:      General: Abdomen is flat. Bowel sounds are normal. There is no distension.      Palpations: Abdomen is soft. There is no mass.      Tenderness: There is no abdominal tenderness. There is no guarding or rebound.      Hernia: No hernia is present.   Musculoskeletal:         General: No swelling or deformity. Normal range of motion.   Skin:     General: Skin is warm and dry.      Capillary Refill: Capillary refill takes less than 2 seconds.   Neurological:      General: No focal deficit present.      Mental Status: She is alert and oriented to person, place, and time.      Cranial  Nerves: No cranial nerve deficit.   Psychiatric:         Mood and Affect: Mood normal.         Behavior: Behavior normal.         Fluids  No intake or output data in the 24 hours ending 01/22/22 1302    Laboratory  Recent Labs     01/22/22  0037   WBC 14.2*   RBC 4.55   HEMOGLOBIN 12.9   HEMATOCRIT 40.8   MCV 89.7   MCH 28.4   MCHC 31.6*   RDW 48.4   PLATELETCT 309   MPV 9.7     Recent Labs     01/22/22  0037   SODIUM 139   POTASSIUM 4.4   CHLORIDE 99   CO2 27   GLUCOSE 182*   BUN 29*   CREATININE 1.04   CALCIUM 9.9                   Imaging  EC-ECHOCARDIOGRAM COMPLETE W/O CONT   Final Result      DX-CHEST-PORTABLE (1 VIEW)   Final Result         1. There are bilateral pulmonary edema and/or infiltrate.   2. Chronic obstructive pulmonary disease.           Assessment/Plan  * Acute systolic CHF (congestive heart failure) ([facility])- (present on admission)  Assessment & Plan  Telemetry monitoring  Forced diuresis with IV Lasix  Losartan 50 mg daily   Aldactone 25 mg daily  Hold BB given acute decompensated HF   Echocardiogram in 2020 showing EF of 30%.  Repeat echocardiogram transthoracic  Monitor intake/output  1 liter fluid; cardiac diet       Hypertensive emergency  Assessment & Plan  Upon arrival SBP's in 230s given nitroglycerin paste improved to SBP's in 170s.   Gentle reduction in blood target 's first 24 hours  Forced diuresis with IV Lasix  Resume spironolactone 25 mg daily   Afterload reduction with Losartan  Resume plavix 75 mg daily     Acute pulmonary edema ([facility])  Assessment & Plan  Telemetry monitoring  Forced diuresis with IV Lasix  Monitor intake/output  Losartan 50 mg daily   Aldactone 25 mg daily  Hold BB given acute decompensated HF  Echocardiogram in 2020 showing EF of 30%.  Repeat echocardiogram transthoracic       Acute exacerbation of chronic obstructive pulmonary disease (COPD) ([facility])  Assessment & Plan  Acute respiratory failure likely secondary to flash pulmonary edema secondary to  hypertensive emergency with superimposed COPD exacerbation given widespread wheezing on lung exam as per EMS.  Given multiple breathing treatments.  Upon arrival to ER she was  placed on BiPAP and nitroglycerin patch with improvement in symptoms.  Bronchodilators as needed  Prednisone 40 mg daily for 5 days   Zithromax given in the ER. If procal elevated continue with abx  Continue with oxygen supplementation obtain SPO2 above 88%  RT consult appreciated         VTE prophylaxis: enoxaparin ppx    I have performed a physical exam and reviewed and updated ROS and Plan today (1/22/2022). In review of yesterday's note (1/21/2022), there are no changes except as documented above.

## 2022-01-22 NOTE — ASSESSMENT & PLAN NOTE
Telemetry monitoring  Forced diuresis with IV Lasix  Losartan 50 mg daily   Aldactone 25 mg daily  Hold BB given acute decompensated HF   Echocardiogram in 2020 showing EF of 30%.  Repeat echocardiogram transthoracic  Monitor intake/output  1 liter fluid; cardiac diet     Improving, cont IV diuresis for 1-2 days

## 2022-01-22 NOTE — PROGRESS NOTES
Pulmonary Attending Note       I have seen and examined the patient today.  I have reviewed the medical record, laboratory data, imaging, and all relevant studies.  I have discussed the assessment and plan of care with the Internal Medicine Resident. Will attest their note when complete    Consult for COPD exacerbation  This admission is c/w CHF exacerbation  Pt improved with lasix  Reviewed with pt - no cough or sputum production  Imaging, labs and clinical picture c/w CHF exacerbation this admission and not COPD

## 2022-01-22 NOTE — ED NOTES
Patient provided cherry sherbet. Patient denies any SOB or pain at this time. Vital signs are stable. Call light within reach.

## 2022-01-22 NOTE — ED NOTES
Patient resting comfortably in bed at this time. Patient reports feeling a lot better after diuresing. Patient seen with multiple cups at bedside and instructed and educated regarding fluid restrictions. VSS and no respiratory distress. Call light within reach.

## 2022-01-22 NOTE — ED PROVIDER NOTES
"ED Provider Note    Scribed for Delon Thompson M.D. by Yanely Espinoza. 1/22/2022  12:25 AM    Primary care provider: Baltazar Julio M.D.  Means of arrival: EMS  History obtained from: EMS and patient  History limited by: None    CHIEF COMPLAINT  Hypoxia    HPI  Karen Jauregui is a 76 y.o. female who presents to the Emergency Department via EMS to bedside with acute hypoxia onset one hour before EMS arrival. She has a pertinent past medical history including CHF and COPD, but states that she has \"never experienced shortness of breath this bad\". She complains of worsening bilateral leg swelling, but she does deny fevers, chest pain, or new or worsening pains since yesterday. She states she was at baseline yesterday, and became acutely ill one hour ago. She is normally reliant on 4 L of oxygen daily, but does not use a CPAP machine in the home. Before EMS arrived she took her inhaler, but she notes no relief of symptoms. She is compliant with her daily medication regimen. En route, she was given 1 Albuterol treatment, 1 Duo neb, Nitro paste, and Solumedrol treatment. Her highest recorded blood pressure was 210/126. Per EMS, there were wheezes after albuterol, which subsided after Duoneb treatment.     REVIEW OF SYSTEMS  Pertinent positives include: shortness of breath, chest pain, and bilateral leg swelling. Pertinent negatives include: new or worsening pain or fever. See history of present illness. All other systems are negative.     PAST MEDICAL HISTORY   has a past medical history of Abdominal aortic aneurysm (HCC) (11/2010), Angina, Arthritis, Atherosclerosis of arteries of the extremities, Bowel habit changes, Carotid atherosclerosis, COPD (chronic obstructive pulmonary disease) ([facility]), Diverticulitis, Diverticulosis of colon, Dyslipidemia, Ejection fraction < 50% (10/20/2021), Elevated troponin (6/8/2020), Emphysema of lung (HCC), Eosinophilic colitis, Family history of nonmelanoma skin cancer, " Hypertension, Left lower lobe consolidation (HCC) (2018), Pain, Peripheral vascular disease (HCC), Pneumonia, unspecified organism (12/3/2021), Positive blood culture (2020), PVD (posterior vitreous detachment), left eye (2015), Snoring, Spinal stenosis of lumbar region, Tobacco dependence, and Unspecified hemorrhagic conditions.    SURGICAL HISTORY   has a past surgical history that includes colonoscopy (3/1/2009, 2012, 13); gyn surgery (); gyn surgery (); other; colon resection laparoscopic (2013); colonoscopy with biopsy (3/6/2015); other cardiac surgery (); aaa with stent graft (N/A, 3/31/2017); and femoral femoral bypass (N/A, 3/31/2017).    SOCIAL HISTORY  Social History     Tobacco Use   • Smoking status: Former Smoker     Packs/day: 1.00     Years: 45.00     Pack years: 45.00     Types: Cigarettes     Quit date: 9/10/2021     Years since quittin.3   • Smokeless tobacco: Never Used   • Tobacco comment: has stopped   Vaping Use   • Vaping Use: Never used   Substance Use Topics   • Alcohol use: No     Alcohol/week: 0.0 oz   • Drug use: No      Social History     Substance and Sexual Activity   Drug Use No       FAMILY HISTORY  Family History   Problem Relation Age of Onset   • Hyperlipidemia Sister    • Hypertension Sister    • Stroke Sister    • Heart Disease Sister         heart attack   • Non-contributory Sister         carotid atherosclerosis   • Hypertension Mother    • Hyperlipidemia Mother    • Heart Disease Mother 82        congestive heart failure, AAA   • Heart Disease Father 55        multiple heart issues   • Hypertension Father    • Hyperlipidemia Father    • Cancer Sister         sin cancer   • Heart Disease Brother         heart attack       CURRENT MEDICATIONS  Current Outpatient Medications   Medication Instructions   • carvedilol (COREG) 6.25 mg, Oral, 2 TIMES DAILY WITH MEALS   • clopidogrel (PLAVIX) 75 mg, Oral, DAILY   • COMBIVENT RESPIMAT   "MCG/ACT Aero Soln No dose, route, or frequency recorded.   • DILTIAZem (CARDIZEM) 120 mg, Oral, DAILY   • furosemide (LASIX) 40 mg, Oral, DAILY   • HYDROcodone-acetaminophen (NORCO) 5-325 MG Tab per tablet 1 Tablet, Oral, EVERY 4 HOURS PRN, 40 tablets is a 7 day prescription   • ipratropium-albuterol (COMBIVENT RESPIMAT)  MCG/ACT Aero Soln Inhalation   • KLOR-CON M20 20 MEQ Tab CR 20 mEq, Oral, DAILY   • losartan (COZAAR) 50 mg, Oral, DAILY   • mirtazapine (REMERON) 15 mg, Oral, NIGHTLY   • senna-docusate (PERICOLACE OR SENOKOT S) 8.6-50 MG Tab 2 Tablets, Oral, 2 TIMES DAILY   • spironolactone (ALDACTONE) 25 mg, Oral, 1 TIME DAILY PRN   • VENTOLIN  (90 Base) MCG/ACT Aero Soln inhalation aerosol 2 Puffs, Inhalation, EVERY 4 HOURS PRN       ALLERGIES  Allergies   Allergen Reactions   • Doxycycline Diarrhea     None stop diarrhea   • Tramadol Vomiting   • Amoxicillin Diarrhea     Diarrhea   • Ciprofloxacin Unspecified     Unspecified       PHYSICAL EXAM  VITAL SIGNS: BP (!) 234/112   Pulse (!) 118   Temp 36 °C (96.8 °F) (Temporal)   Resp (!) 24   Ht 1.549 m (5' 1\")   Wt 42.6 kg (94 lb)   LMP 03/01/2002   SpO2 99%   BMI 17.76 kg/m²   Constitutional: Cachectic. Alert. Ill appearing. CPAP device on her face  HENT: No signs of trauma, Bilateral external ears normal, Nose normal. Uvula midline.   Eyes: Pupils are equal and reactive, Conjunctiva normal, Non-icteric.   Neck: Normal range of motion, No tenderness, Supple, No stridor.   Lymphatic: No lymphadenopathy noted.   Cardiovascular: Tachycardic rate and rhythm, no murmurs.   Thorax & Lungs: Able to speak in 3 word sentences. Increased work of breathing. Diminished breath sounds bilaterally, left greater than right. Tachypneic. Mild respiratory distress, No wheezing, No chest tenderness.   Abdomen:  Soft, No tenderness, No peritoneal signs, No masses, No pulsatile masses.   Skin: Warm, Dry, No erythema, No rash.   Back: No bony tenderness, No CVA " tenderness.   Extremities: Intact distal pulses, No edema, No tenderness, No cyanosis.  Musculoskeletal: Good range of motion in all major joints. No tenderness to palpation or major deformities noted.   Neurologic: Alert , Normal motor function, Normal sensory function, No focal deficits noted.   Psychiatric: Affect normal, Judgment normal, Mood normal.     DIAGNOSTIC STUDIES / PROCEDURES    LABS  Labs Reviewed   CBC WITH DIFFERENTIAL - Abnormal; Notable for the following components:       Result Value    WBC 14.2 (*)     MCHC 31.6 (*)     Lymphocytes 21.80 (*)     Neutrophils (Absolute) 9.59 (*)     Monos (Absolute) 0.98 (*)     All other components within normal limits   COMP METABOLIC PANEL - Abnormal; Notable for the following components:    Glucose 182 (*)     Bun 29 (*)     Alkaline Phosphatase 122 (*)     All other components within normal limits   TROPONIN - Abnormal; Notable for the following components:    Troponin T 26 (*)     All other components within normal limits   PROBRAIN NATRIURETIC PEPTIDE, NT - Abnormal; Notable for the following components:    NT-proBNP 3820 (*)     All other components within normal limits   ESTIMATED GFR - Abnormal; Notable for the following components:    GFR If Non  52 (*)     All other components within normal limits   LACTIC ACID   COV-2, FLU A/B, AND RSV BY PCR    Narrative:     Have you been in close contact with a person who is suspected  or known to be positive for COVID-19 within the last 30 days  (e.g. last seen that person < 30 days ago)->Unknown   TROPONIN   PROCALCITONIN      All labs reviewed by me.    EKG   Report   Date Value Ref Range Status   2022       [facility] Emergency Dept.    Test Date:  2022  Pt Name:    AIDE TONY             Department: ER  MRN:        [id number]                      Room:       RD 08  Gender:     Female                       Technician: [id number]  :        [id number]                    Requested By:MARIBEL THOMPSON  Order #:    [id number]                    Reading MD: Maribel Thompson MD    Measurements  Intervals                                Axis  Rate:       111                          P:          242  ID:         125                          QRS:        2  QRSD:       122                          T:          176  QT:         348  QTc:        473    Interpretive Statements  ECTOPIC ATRIAL TACHYCARDIA  VENTRICULAR PREMATURE COMPLEX  LEFT BUNDLE BRANCH BLOCK  Compared to ECG 06/11/2020 21:39:25  No STEMI by sgarbosa criteria  Ventricular premature complex(es) now present  Sinus tachycardia no longer present  Left ventricular hypertrophy no longer present  Early repolarization no longer present  Q waves no longer present  Electronically Signed On 1- 0:37:00 PST by Maribel Thompson MD             RADIOLOGY  DX-CHEST-PORTABLE (1 VIEW)   Final Result         1. There are bilateral pulmonary edema and/or infiltrate.   2. Chronic obstructive pulmonary disease.        The radiologist's interpretation of all radiological studies have been reviewed by me.    COURSE & MEDICAL DECISION MAKING  Nursing notes, VS, PMSFHx reviewed in chart. Per review of records, patient's EF has been recorded at 25-30%. She has been hospitalized in the past with hypoxia (11/28/2021).      76 y.o. female p/w chief complaint of acute hypoxia.    12:25 AM Patient seen and examined at bedside. EMS is at bedside and gives encounter history.   PT given in line ferny and nina upon arrival to ED  pt w/ no e/o ptx or pna on CXR  hx and PE c/w prior COPD exacerbation, no pain or pressure concerning for ACS and unremarkable ECG  Sx without improvement  Given VS, and reevaluation pt seems appropriate for floor medicine admission at this time    1:36 AM- Lasix ordered to combat complaints of bilateral leg swelling.    1:40 AM- Patient reassessed and informed of the plan of care, which includes hospitalization over night.      1:41 AM Paged hospitalist.     1:54 AM I discussed the patient's case and the above findings with Dr. Patel (hospitalist) who agrees to hospitalize the patient and take over the plan of care moving forwards.     The total critical care time on this patient is 40 minutes, resuscitating patient, speaking with admitting physician, and deciphering test results. This 40 minutes is exclusive of separately billable procedures.     I verified that the patient was wearing a mask and I was wearing appropriate PPE every time I entered the room. The patient's mask was on the patient at all times during my encounter except for a brief view of the oropharynx.     The differential diagnoses include but are not limited to:   COPD exacerbation    DISPOSITION:  Patient will be hospitalized by Dr. Patel in critical condition.    FINAL IMPRESSION  1. Acute exacerbation of chronic obstructive pulmonary disease (COPD) (HCC)    CCT: 40min      Yanely WILKS ([place]), am scribing for, and in the presence of, Delon Thompson M.D..    Electronically signed by: Yanely Espinoza (Valerie), 1/22/2022    Delon WILKS M.D. personally performed the services described in this documentation, as scribed by Yanely Espinoza in my presence, and it is both accurate and complete.    The note accurately reflects work and decisions made by me.  Delon Thompson M.D.  1/22/2022  7:24 AM

## 2022-01-22 NOTE — H&P
Hospital Medicine History & Physical Note    Date of Service  1/22/2022    Primary Care Physician  Baltazar Julio M.D.    Consultants  none     Specialist Names: none     Code Status  Full Code    Chief Complaint  Chief Complaint   Patient presents with   • Shortness of Breath     since 2330 even on home 4L NC       History of Presenting Illness  Karen Jauregui is a 76 y.o. female past medical history of COPD on home oxygen 4 L, CHF last EF 25%, former smoker 20-pack-year history quit 3 months ago who presented  1/22/2022 with shortness of breath.  Patient reports that she cannot breathe.  She reports exacerbated with laying flat and improved with sitting up.  She reports she can only walk 10 blocks.  She denies any fever, chills, nausea or vomiting.    Upon EMS arrival, patient was given three duo nebs and steroids while in route to ER.  In ER, her blood pressure was 234/112, heart rate 118, tachypneic breathing 24, saturating 95% on 4 L.  Patient was placed on BiPAP in ED and nitroglycerin paste was applied which improved blood pressure and symptoms.  She will be admitted to telemetry floor for further monitoring and care.    I discussed the plan of care with patient.    Review of Systems  Review of Systems   Constitutional: Negative for chills and fever.   HENT: Negative for hearing loss and tinnitus.    Eyes: Negative for blurred vision, double vision and photophobia.   Respiratory: Positive for shortness of breath and wheezing. Negative for cough and hemoptysis.    Cardiovascular: Positive for orthopnea, leg swelling and PND. Negative for chest pain and palpitations.   Gastrointestinal: Negative for heartburn, nausea and vomiting.   Genitourinary: Negative for dysuria and urgency.   Musculoskeletal: Negative for back pain, myalgias and neck pain.   Skin: Negative for itching and rash.   Neurological: Negative for dizziness, tingling and headaches.   Endo/Heme/Allergies: Does not bruise/bleed easily.    Psychiatric/Behavioral: Negative for depression, substance abuse and suicidal ideas.       Past Medical History   has a past medical history of Abdominal aortic aneurysm (HCC) (11/2010), Angina, Arthritis, Atherosclerosis of arteries of the extremities, Bowel habit changes, Carotid atherosclerosis, COPD (chronic obstructive pulmonary disease) (HCC), Diverticulitis, Diverticulosis of colon, Dyslipidemia, Ejection fraction < 50% (10/20/2021), Elevated troponin (6/8/2020), Emphysema of lung (HCC), Eosinophilic colitis, Family history of nonmelanoma skin cancer, Hypertension, Left lower lobe consolidation (HCC) (5/9/2018), Pain, Peripheral vascular disease (HCC), Pneumonia, unspecified organism (12/3/2021), Positive blood culture (5/14/2020), PVD (posterior vitreous detachment), left eye (1/13/2015), Snoring, Spinal stenosis of lumbar region, Tobacco dependence, and Unspecified hemorrhagic conditions.    Surgical History   has a past surgical history that includes colonoscopy (3/1/2009, 4/2012, 7/2/13); gyn surgery (2002); gyn surgery (1975); other; colon resection laparoscopic (8/5/2013); colonoscopy with biopsy (3/6/2015); other cardiac surgery (2005); aaa with stent graft (N/A, 3/31/2017); and femoral femoral bypass (N/A, 3/31/2017).     Family History  family history includes Cancer in her sister; Heart Disease in her brother and sister; Heart Disease (age of onset: 55) in her father; Heart Disease (age of onset: 82) in her mother; Hyperlipidemia in her father, mother, and sister; Hypertension in her father, mother, and sister; Non-contributory in her sister; Stroke in her sister.   Family history reviewed with patient. There is no family history that is pertinent to the chief complaint.     Social History   reports that she quit smoking about 4 months ago. Her smoking use included cigarettes. She has a 45.00 pack-year smoking history. She has never used smokeless tobacco. She reports that she does not drink  alcohol and does not use drugs.    Allergies  Allergies   Allergen Reactions   • Doxycycline Diarrhea     None stop diarrhea   • Tramadol Vomiting   • Amoxicillin Diarrhea     Diarrhea   • Ciprofloxacin Unspecified     Unspecified       Medications  Prior to Admission Medications   Prescriptions Last Dose Informant Patient Reported? Taking?   COMBIVENT RESPIMAT  MCG/ACT Aero Soln   Yes No   DILTIAZem (CARDIZEM) 120 MG Tab   No No   Sig: Take 1 Tablet by mouth every day.   HYDROcodone-acetaminophen (NORCO) 5-325 MG Tab per tablet   No No   Sig: Take 1 Tablet by mouth every four hours as needed for up to 7 days. 40 tablets is a 7 day prescription   KLOR-CON M20 20 MEQ Tab CR   No No   Sig: Take 1 Tablet by mouth every day.   VENTOLIN  (90 Base) MCG/ACT Aero Soln inhalation aerosol  Patient's Home Pharmacy Yes No   Sig: Inhale 2 Puffs by mouth every four hours as needed for Shortness of Breath.   carvedilol (COREG) 6.25 MG Tab   No No   Sig: Take 1 Tablet by mouth 2 times a day with meals.   clopidogrel (PLAVIX) 75 MG Tab   No No   Sig: Take 1 Tablet by mouth every day.   furosemide (LASIX) 40 MG Tab   No No   Sig: Take 1 Tablet by mouth every day.   ipratropium-albuterol (COMBIVENT RESPIMAT)  MCG/ACT Aero Soln   Yes No   Sig: Inhale.   losartan (COZAAR) 50 MG Tab   No No   Sig: Take 1 Tablet by mouth every day.   mirtazapine (REMERON) 15 MG Tab   No No   Sig: Take 1 Tablet by mouth every evening.   senna-docusate (PERICOLACE OR SENOKOT S) 8.6-50 MG Tab   No No   Sig: Take 2 Tabs by mouth 2 Times a Day.   spironolactone (ALDACTONE) 25 MG Tab   No No   Sig: Take 1 Tablet by mouth 1 time a day as needed (swelling).      Facility-Administered Medications: None       Physical Exam  Temp:  [36 °C (96.8 °F)] 36 °C (96.8 °F)  Pulse:  [] 97  Resp:  [13-24] 13  BP: (179-234)/() 179/76  SpO2:  [92 %-99 %] 93 %  Blood Pressure : (!) 179/76   Temperature: 36 °C (96.8 °F)   Pulse: 97   Respiration: 13    Pulse Oximetry: 93 %       Physical Exam  Vitals and nursing note reviewed.   Constitutional:       Appearance: Normal appearance. She is ill-appearing.   HENT:      Head: Normocephalic and atraumatic.      Right Ear: Tympanic membrane normal.      Left Ear: Tympanic membrane normal.      Nose: Nose normal.      Mouth/Throat:      Mouth: Mucous membranes are moist.      Pharynx: Oropharynx is clear.   Eyes:      Extraocular Movements: Extraocular movements intact.      Pupils: Pupils are equal, round, and reactive to light.   Cardiovascular:      Rate and Rhythm: Normal rate and regular rhythm.      Pulses: Normal pulses.      Heart sounds: Normal heart sounds. No murmur heard.  No friction rub.   Pulmonary:      Effort: Pulmonary effort is normal.      Breath sounds: Wheezing and rales present.   Abdominal:      General: Bowel sounds are normal. There is no distension.      Palpations: Abdomen is soft. There is no mass.      Tenderness: There is no abdominal tenderness.      Hernia: No hernia is present.   Musculoskeletal:         General: No swelling, tenderness, deformity or signs of injury. Normal range of motion.      Cervical back: Neck supple.      Right lower leg: Edema (1+) present.      Left lower leg: Edema (1+) present.   Skin:     General: Skin is warm.      Capillary Refill: Capillary refill takes less than 2 seconds.      Coloration: Skin is not jaundiced or pale.      Findings: No bruising or erythema.   Neurological:      General: No focal deficit present.      Mental Status: She is alert and oriented to person, place, and time. Mental status is at baseline.      Cranial Nerves: No cranial nerve deficit.      Sensory: No sensory deficit.      Motor: No weakness.      Coordination: Coordination normal.   Psychiatric:         Mood and Affect: Mood normal.         Behavior: Behavior normal.         Laboratory:  Recent Labs     01/22/22  0037   WBC 14.2*   RBC 4.55   HEMOGLOBIN 12.9   HEMATOCRIT 40.8    MCV 89.7   MCH 28.4   MCHC 31.6*   RDW 48.4   PLATELETCT 309   MPV 9.7     Recent Labs     01/22/22  0037   SODIUM 139   POTASSIUM 4.4   CHLORIDE 99   CO2 27   GLUCOSE 182*   BUN 29*   CREATININE 1.04   CALCIUM 9.9     Recent Labs     01/22/22  0037   ALTSGPT 18   ASTSGOT 27   ALKPHOSPHAT 122*   TBILIRUBIN 0.2   GLUCOSE 182*         Recent Labs     01/22/22  0037   NTPROBNP 3820*         Recent Labs     01/22/22  0037   TROPONINT 26*       Imaging:  DX-CHEST-PORTABLE (1 VIEW)   Final Result         1. There are bilateral pulmonary edema and/or infiltrate.   2. Chronic obstructive pulmonary disease.          X-Ray:  I have personally reviewed the images and compared with prior images.    Assessment/Plan:  I anticipate this patient will require at least two midnights for appropriate medical management, necessitating inpatient admission.    * Acute systolic CHF (congestive heart failure) ([facility])- (present on admission)  Assessment & Plan  Telemetry monitoring  Forced diuresis with IV Lasix  Losartan 50 mg daily   Aldactone 25 mg daily  Hold BB given acute decompensated HF   Echocardiogram in 2020 showing EF of 30%.  Repeat echocardiogram transthoracic  Monitor intake/output  1 liter fluid; cardiac diet       Hypertensive emergency  Assessment & Plan  Upon arrival SBP's in 230s given nitroglycerin paste improved to SBP's in 170s.   Gentle reduction in blood target 's first 24 hours  Forced diuresis with IV Lasix  Resume spironolactone 25 mg daily   Afterload reduction with Losartan  Resume plavix 75 mg daily     Acute pulmonary edema (HCC)  Assessment & Plan  Telemetry monitoring  Forced diuresis with IV Lasix  Monitor intake/output  Losartan 50 mg daily   Aldactone 25 mg daily  Hold BB given acute decompensated HF  Echocardiogram in 2020 showing EF of 30%.  Repeat echocardiogram transthoracic       Acute exacerbation of chronic obstructive pulmonary disease (COPD) ([facility])  Assessment & Plan  Acute respiratory  failure likely secondary to flash pulmonary edema secondary to hypertensive emergency with superimposed COPD exacerbation given widespread wheezing on lung exam as per EMS.  Given multiple breathing treatments.  Upon arrival to ER she was  placed on BiPAP and nitroglycerin patch with improvement in symptoms.  Bronchodilators as needed  Prednisone 40 mg daily for 5 days   Zithromax given in the ER. If procal elevated continue with abx  Continue with oxygen supplementation obtain SPO2 above 88%  RT consult appreciated        VTE prophylaxis: enoxaparin ppx

## 2022-01-22 NOTE — ED TRIAGE NOTES
Chief Complaint   Patient presents with   • Shortness of Breath     since 2330 even on home 4L NC     Pt BIB [facility] for above. Pt was at home and had sudden onset SOB on baseline 4L.Placed on cpap by EMS and given albuterol. Duoneb, solumedrol and nitropaste w/ some improvement. ERP and RT at bedside upon ED presentation; tachypneic, hypertensive, GCS 15. Placed on CPAP.

## 2022-01-22 NOTE — ASSESSMENT & PLAN NOTE
Upon arrival SBP's in 230s given nitroglycerin paste improved to SBP's in 170s.   Gentle reduction in blood target 's first 24 hours  Forced diuresis with IV Lasix  Resume spironolactone 25 mg daily   Afterload reduction with Losartan  Resume plavix 75 mg daily     Now resolved

## 2022-01-22 NOTE — ASSESSMENT & PLAN NOTE
Telemetry monitoring  Forced diuresis with IV Lasix  Monitor intake/output  Losartan 50 mg daily   Aldactone 25 mg daily  Hold BB given acute decompensated HF  Echocardiogram in 2020 showing EF of 30%.  Repeat echocardiogram transthoracic

## 2022-01-23 LAB
ANION GAP SERPL CALC-SCNC: 12 MMOL/L (ref 7–16)
BASOPHILS # BLD AUTO: 0.1 % (ref 0–1.8)
BASOPHILS # BLD: 0.02 K/UL (ref 0–0.12)
BUN SERPL-MCNC: 47 MG/DL (ref 8–22)
CALCIUM SERPL-MCNC: 9.8 MG/DL (ref 8.5–10.5)
CHLORIDE SERPL-SCNC: 95 MMOL/L (ref 96–112)
CO2 SERPL-SCNC: 27 MMOL/L (ref 20–33)
CREAT SERPL-MCNC: 1.15 MG/DL (ref 0.5–1.4)
EOSINOPHIL # BLD AUTO: 0 K/UL (ref 0–0.51)
EOSINOPHIL NFR BLD: 0 % (ref 0–6.9)
ERYTHROCYTE [DISTWIDTH] IN BLOOD BY AUTOMATED COUNT: 46.3 FL (ref 35.9–50)
GLUCOSE BLD-MCNC: 121 MG/DL (ref 65–99)
GLUCOSE SERPL-MCNC: 138 MG/DL (ref 65–99)
HCT VFR BLD AUTO: 39.1 % (ref 37–47)
HGB BLD-MCNC: 12.9 G/DL (ref 12–16)
IMM GRANULOCYTES # BLD AUTO: 0.05 K/UL (ref 0–0.11)
IMM GRANULOCYTES NFR BLD AUTO: 0.3 % (ref 0–0.9)
LYMPHOCYTES # BLD AUTO: 1.23 K/UL (ref 1–4.8)
LYMPHOCYTES NFR BLD: 8.1 % (ref 22–41)
MAGNESIUM SERPL-MCNC: 2.1 MG/DL (ref 1.5–2.5)
MCH RBC QN AUTO: 28.2 PG (ref 27–33)
MCHC RBC AUTO-ENTMCNC: 33 G/DL (ref 33.6–35)
MCV RBC AUTO: 85.6 FL (ref 81.4–97.8)
MONOCYTES # BLD AUTO: 0.91 K/UL (ref 0–0.85)
MONOCYTES NFR BLD AUTO: 6 % (ref 0–13.4)
NEUTROPHILS # BLD AUTO: 12.93 K/UL (ref 2–7.15)
NEUTROPHILS NFR BLD: 85.5 % (ref 44–72)
NRBC # BLD AUTO: 0 K/UL
NRBC BLD-RTO: 0 /100 WBC
PLATELET # BLD AUTO: 316 K/UL (ref 164–446)
PMV BLD AUTO: 10.1 FL (ref 9–12.9)
POTASSIUM SERPL-SCNC: 3.8 MMOL/L (ref 3.6–5.5)
RBC # BLD AUTO: 4.57 M/UL (ref 4.2–5.4)
SODIUM SERPL-SCNC: 134 MMOL/L (ref 135–145)
TROPONIN T SERPL-MCNC: 44 NG/L (ref 6–19)
WBC # BLD AUTO: 15.1 K/UL (ref 4.8–10.8)

## 2022-01-23 PROCEDURE — A9270 NON-COVERED ITEM OR SERVICE: HCPCS | Performed by: STUDENT IN AN ORGANIZED HEALTH CARE EDUCATION/TRAINING PROGRAM

## 2022-01-23 PROCEDURE — 83735 ASSAY OF MAGNESIUM: CPT

## 2022-01-23 PROCEDURE — 700102 HCHG RX REV CODE 250 W/ 637 OVERRIDE(OP): Performed by: STUDENT IN AN ORGANIZED HEALTH CARE EDUCATION/TRAINING PROGRAM

## 2022-01-23 PROCEDURE — 84484 ASSAY OF TROPONIN QUANT: CPT

## 2022-01-23 PROCEDURE — 99233 SBSQ HOSP IP/OBS HIGH 50: CPT | Performed by: HOSPITALIST

## 2022-01-23 PROCEDURE — 80048 BASIC METABOLIC PNL TOTAL CA: CPT

## 2022-01-23 PROCEDURE — 85025 COMPLETE CBC W/AUTO DIFF WBC: CPT

## 2022-01-23 PROCEDURE — 770020 HCHG ROOM/CARE - TELE (206)

## 2022-01-23 PROCEDURE — 82962 GLUCOSE BLOOD TEST: CPT

## 2022-01-23 PROCEDURE — 36415 COLL VENOUS BLD VENIPUNCTURE: CPT

## 2022-01-23 PROCEDURE — 700111 HCHG RX REV CODE 636 W/ 250 OVERRIDE (IP): Performed by: STUDENT IN AN ORGANIZED HEALTH CARE EDUCATION/TRAINING PROGRAM

## 2022-01-23 RX ORDER — ACETAMINOPHEN 325 MG/1
650 TABLET ORAL ONCE
Status: COMPLETED | OUTPATIENT
Start: 2022-01-23 | End: 2022-01-23

## 2022-01-23 RX ORDER — LABETALOL HYDROCHLORIDE 5 MG/ML
20 INJECTION, SOLUTION INTRAVENOUS ONCE
Status: DISPENSED | OUTPATIENT
Start: 2022-01-23 | End: 2022-01-24

## 2022-01-23 RX ORDER — LOSARTAN POTASSIUM 50 MG/1
50 TABLET ORAL ONCE
Status: COMPLETED | OUTPATIENT
Start: 2022-01-23 | End: 2022-01-23

## 2022-01-23 RX ADMIN — LOSARTAN POTASSIUM 50 MG: 50 TABLET, FILM COATED ORAL at 04:55

## 2022-01-23 RX ADMIN — LOSARTAN POTASSIUM 50 MG: 50 TABLET, FILM COATED ORAL at 04:30

## 2022-01-23 RX ADMIN — FUROSEMIDE 40 MG: 10 INJECTION, SOLUTION INTRAMUSCULAR; INTRAVENOUS at 04:29

## 2022-01-23 RX ADMIN — ACETAMINOPHEN 650 MG: 325 TABLET, FILM COATED ORAL at 04:55

## 2022-01-23 RX ADMIN — PREDNISONE 40 MG: 20 TABLET ORAL at 04:29

## 2022-01-23 RX ADMIN — MIRTAZAPINE 15 MG: 15 TABLET, FILM COATED ORAL at 21:17

## 2022-01-23 RX ADMIN — ENOXAPARIN SODIUM 30 MG: 30 INJECTION SUBCUTANEOUS at 04:29

## 2022-01-23 RX ADMIN — CLOPIDOGREL 75 MG: 75 TABLET, FILM COATED ORAL at 04:29

## 2022-01-23 RX ADMIN — FUROSEMIDE 40 MG: 10 INJECTION, SOLUTION INTRAMUSCULAR; INTRAVENOUS at 15:57

## 2022-01-23 ASSESSMENT — FIBROSIS 4 INDEX: FIB4 SCORE: 1.69

## 2022-01-23 ASSESSMENT — ENCOUNTER SYMPTOMS
FEVER: 0
VOMITING: 0
COUGH: 0
SHORTNESS OF BREATH: 1
ABDOMINAL PAIN: 0
NAUSEA: 0
DIZZINESS: 0
PALPITATIONS: 0
CHILLS: 0
HEADACHES: 0

## 2022-01-23 ASSESSMENT — PAIN DESCRIPTION - PAIN TYPE
TYPE: ACUTE PAIN
TYPE: ACUTE PAIN

## 2022-01-23 NOTE — CARE PLAN
The patient is Stable - Low risk of patient condition declining or worsening    Shift Goals  Clinical Goals: diurese  Patient Goals: sleep    Progress made toward(s) clinical / shift goals:    Problem: Knowledge Deficit - Standard  Goal: Patient and family/care givers will demonstrate understanding of plan of care, disease process/condition, diagnostic tests and medications  Outcome: Progressing     Problem: Ineffective Airway Clearance  Goal: Patient will maintain patent airway with clear/clearing breath sounds  Outcome: Progressing     Problem: Impaired Gas Exchange  Goal: Patient will demonstrate improved ventilation and adequate oxygenation and participate in treatment regimen within the level of ability/situation.  Outcome: Progressing     Problem: Fall Risk  Goal: Patient will remain free from falls  Outcome: Progressing       Patient is not progressing towards the following goals:

## 2022-01-23 NOTE — PROGRESS NOTES
Pt arrived to unit via gurney at 1645. Pt oriented to room, unit, and plan of care. Tele-monitor placed and monitor room notified. All questions answered at this time. Call light within reach; fall precautions in place.

## 2022-01-23 NOTE — PROGRESS NOTES
Bedside report received from [facility] Horn. Patient is ST on the monitor, resting comfortably in bed. 4 L O2 in use via NC. Call light is in reach.

## 2022-01-23 NOTE — RESPIRATORY CARE
"COPD EDUCATION by COPD CLINICAL EDUCATOR  1/23/2022  at  3:10 PM by Cindi Florez RRT     Patient interviewed by COPD education team.  Patient declined to participate in full program.  A short intervention has been conducted.  A comprehensive packet including information about COPD, types of treatments to manage their disease and safe home Oxygen usage was provided and reviewed with patient at the bedside.  Patient was given a spacer and instructed on how to use it with her rescue inhaler.     Smoking Cessation Intervention and education completed, 5 minutes spent on smoking cessation education with patient. Patient quit smoking about 3 months ago and is doing well. Encouraged patient to continue to not smoke.    Provided smoking cessation packet with \"Tips to Quit\" and brochure for \"Free Smoking Cessation Classes\".     COPD Screen  COPD Risk Screening  Do you have a history of COPD?: Yes  Do you have a Pulmonologist?: No  COPD Population Screener  During the past 4 weeks, how much did you feel short of breath?: Some of the time  Do you ever cough up any mucus or phlegm?: No/only with occasional colds or infections  In the past 12 months, you do less than you used to because of your breathing problems: Disagree/unsure  Have you smoked at least 100 cigarettes in your entire life?: Yes  How old are you?: 60+  COPD Screening Score: 5  COPD Coordinator Recommended: Yes    COPD Assessment  COPD Clinical Specialists ONLY  COPD Education Initiated: Yes--Short Intervention (Pt given COPD and Smoking Cessation literature, spacer, and list of pulmonary clinics to follow up with.)  DME Company: Pt unable to recall name of company  DME Equipment Type: O2 concentrator and portable concentrator, 3 L  Physician Follow Up Appointment:  (Pt declined appt assistance)  Physician Name: Baltazar Julio M.D.  Pulmonary Follow Up Appointment:  (declined appt assistance)  Pulmonologist Name: Bryon Dominguez M.D. @ [facility]" "Pulmonary  Referrals Initiated: Yes  Pulmonary Rehab: Yes (placed by admitting MD)  Smoking Cessation: Yes  $ Smoking Cessation 3-10 Minutes: Asymptomatic (5 min)  Hospice: N/A  Home Health Care: N/A  [facility] Outreach: N/A (unavailable)  [facility]: N/A  [facility]: Yes (placed on referral list)  Is this a COPD exacerbation patient?: No (CHF exacerbation Per IP Pulmonary Consult 1/22/2022)  $ Demo/Eval of SVN's, MDI's and Aerosols: Yes (given spacer w/ instrucation)  (OP) Pulmonary Function Testing: Yes (results unavailable)    PFT Results    No results found for: PFT    Meds to Beds  Would the patient like to opt in for Bedside Medication Delivery at Discharge?: Yes, interested     MY COPD ACTION PLAN     It is recommended that patients and physicians /healthcare providers complete this action plan together. This plan should be discussed at each physician visit and updated as needed.    The green, yellow and red zones show groups of symptoms of COPD. This list of symptoms is not comprehensive, and you may experience other symptoms. In the \"Actions\" column, your healthcare provider has recommended actions for you to take based on your symptoms.    Patient Name: aKren Jauregui   YOB: 1945   Last Updated on:     Green Zone:  I am doing well today Actions   •  Usual activitiy and exercise level •  Take daily medications   •  Usual amounts of cough and phlegm/mucus •  Use oxygen as prescribed   •  Sleep well at night •  Continue regular exercise/diet plan   •  Appetite is good •  At all times avoid cigarette smoke, inhaled irritants     Daily Medications (these medications are taken every day):                Yellow Zone:  I am having a bad day or a COPD flare Actions   •  More breathless than usual •  Continue daily medications   •  I have less energy for my daily activities •  Use quick relief inhaler as ordered   •  Increased or thicker phlegm/mucus •  Use oxygen as " "prescribed   •  Using quick relief inhaler/nebulizer more often •  Get plenty of rest   •  Swelling of ankles more than usual •  Use pursed lip breathing   •  More coughing than usual •  At all times avoid cigarette smoke, inhaled irritants   •  I feel like I have a \"chest cold\"   •  Poor sleep and my symptoms woke me up   •  My appetite is not good   •  My medicine is not helping    •  Call provider immediately if symptoms don’t improve     Continue daily medications, add rescue medications:   Albuterol  Albuterol/Ipratropium (Combivent, Duoneb) 2 Puffs  1 Puff Every 4 hours PRN       Medications to be used during a flare up, (as Discussed with Provider):           Additional Information:  If using the Albuterol rescue inhaler use with the spacer.     Red Zone:  I need urgent medical care Actions   •  Severe shortness of breath even at rest •  Call 911 or seek medical care immediately   •  Not able to do any activity because of breathing    •  Fever or shaking chills    •  Feeling confused or very drowsy     •  Chest pains    •  Coughing up blood              "

## 2022-01-23 NOTE — PROGRESS NOTES
Bedside report received from WELLINGTON Bagley. POC discussed with pt; all questions answered at this time. Patient is wearing 4LNC, sating at 98%.

## 2022-01-23 NOTE — CARE PLAN
The patient is Watcher - Medium risk of patient condition declining or worsening    Shift Goals  Clinical Goals: diurese  Patient Goals: sleep      Problem: Care Map:  Admission Optimal Outcome for the Heart Failure Patient  Goal: Admission:  Optimal Care of the heart failure patient  Outcome: Progressing     Problem: Care Map:  Day 1 Optimal Outcome for the Heart Failure Patient  Goal: Day 1:  Optimal Care of the heart failure patient  Outcome: Progressing     Problem: Care Map:  Day 2 Optimal Outcome for the Heart Failure Patient  Goal: Day 2:  Optimal Care of the heart failure patient  Outcome: Progressing

## 2022-01-23 NOTE — CONSULTS
Pulmonology Consult Note:      Date of Service: 1/22/2022  Attending: Dr. Marcy CASTORENA  Resident: Dr. Delgadillo           Subjective:  Karen Jauregui is a 76 y.o. female with a past medical history of HFrEF 25%, Former tobacco hx of 20 pack years quit 3 months prior, and COPD on baseline 4L of oxygen, No PFT's on file. Pt admitted w/ acute on chronic dyspnea. Pt evaluated Bedside, pt is not in currently in  respiratory distress, states improvement of shortness of breath worse when laying flat. Pt denies wheezing, cough at this time. Pt denies fever, chills, chest pain. CXR shows hyperinflated luns       Review of Systems:    Review of Systems   Constitutional: Negative for chills and fever.       Objective Data:   Physical Exam:   Vitals:   Temp:  [36 °C (96.8 °F)-36.3 °C (97.3 °F)] 36.3 °C (97.3 °F)  Pulse:  [] 101  Resp:  [12-26] 16  BP: (101-234)/() 126/83  SpO2:  [89 %-99 %] 99 %     Physical Exam  Constitutional:       General: She is not in acute distress.     Appearance: Normal appearance.   HENT:      Nose: Nose normal.      Mouth/Throat:      Mouth: Mucous membranes are moist.      Pharynx: Oropharynx is clear.   Cardiovascular:      Rate and Rhythm: Normal rate and regular rhythm.      Pulses: Normal pulses.      Heart sounds: No murmur heard.      Pulmonary:      Effort: Pulmonary effort is normal. No respiratory distress.      Breath sounds: Rales present. No wheezing or rhonchi.   Abdominal:      General: Abdomen is flat. There is no distension.   Musculoskeletal:         General: No swelling. Normal range of motion.      Right lower leg: No edema.      Left lower leg: No edema.   Skin:     Capillary Refill: Capillary refill takes less than 2 seconds.   Neurological:      General: No focal deficit present.      Mental Status: She is alert and oriented to person, place, and time.           Labs:   EC-ECHOCARDIOGRAM COMPLETE W/O CONT   Final Result      DX-CHEST-PORTABLE (1 VIEW)   Final  Result         1. There are bilateral pulmonary edema and/or infiltrate.   2. Chronic obstructive pulmonary disease.          Imaging:   Lab Results   Component Value Date/Time    PROTHROMBTM 14.0 05/13/2020 03:19 AM    INR 1.06 05/13/2020 03:19 AM      Lab Results   Component Value Date/Time    SODIUM 139 01/22/2022 12:37 AM    POTASSIUM 4.4 01/22/2022 12:37 AM    CHLORIDE 99 01/22/2022 12:37 AM    CO2 27 01/22/2022 12:37 AM    GLUCOSE 182 (H) 01/22/2022 12:37 AM    BUN 29 (H) 01/22/2022 12:37 AM    CREATININE 1.04 01/22/2022 12:37 AM    CREATININE 0.8 08/25/2008 05:40 PM        Problem Representation:     No new Assessment & Plan notes have been filed under this hospital service since the last note was generated.  Service: Pulmonary    #AHRF  Secondary to decompensated Heart failure. CXR shows mild pulmonary edema,.   Pt with improving oxygen requirements, now on baseline oxygen of 4L W/ administration of Diuretics w/ good UOP.     #Acute on Chronic Heart Failure  Previous Echo 5/2021 shows reduced EF 30%,  Improved Ejection fraction 1/22 echo shows Moderate concentric left ventricular hypertrophy. Normal regional wall   motion. The left ventricular ejection fraction is visually estimated to   be 55%.  Pt with crackles on exam and cont to be orthopneic.   Pt shows improvement with diuretics       #Hx of COPD  In setting of 20Pack year smoking hx, Chest Xray shows hyperinflated lungs w/ upward tenting of right horizontal fissure. w/ mild bilateral pulmonary edema.   No cough or sputum noted, no wheezing noted on exam. AHRF likely not secondary to COPD exacerbation.   Can cont bronchodilators as needed.

## 2022-01-23 NOTE — CARE PLAN
Problem: Bronchoconstriction  Goal: Improve in air movement and diminished wheezing  Description: Target End Date:  2 to 3 days    1.  Implement inhaled treatments  2.  Evaluate and manage medication effects  Outcome: Met     Problem: Bronchopulmonary Hygiene  Goal: Increase mobilization of retained secretions  Description: Target End Date:  2 to 3 days    1.  Perform bronchopulmonary therapy as indicated by assessment  2.  Perform airway suctioning  3.  Perform actions to maintain patient airway  Outcome: Met

## 2022-01-23 NOTE — PROGRESS NOTES
4 Eyes Skin Assessment Completed by Justina PARIS and WELLINGTON Montes.    Head WDL  Ears WDL  Nose WDL  Mouth Discoloration, bilateral bruising lower lip  Neck WDL  Breast/Chest WDL  Shoulder Blades WDL  Spine WDL  (R) Arm/Elbow/Hand Redness, Blanching, Bruising, Abrasion, Scab and Swelling  (L) Arm/Elbow/Hand Redness, Blanching and Swelling  Abdomen WDL  Groin WDL  Scrotum/Coccyx/Buttocks Redness, Blanching and Excoriation  (R) Leg Redness and Blanching  (L) Leg Redness and Blanching  (R) Heel/Foot/Toe Redness, Blanching and Boggy  (L) Heel/Foot/Toe Redness, Blanching and Boggy          Devices In Places Tele Box, Blood Pressure Cuff and Pulse Ox      Interventions In Place Gray Ear Foams    Possible Skin Injury Yes    Pictures Uploaded Into Epic Yes  Wound Consult Placed Yes  RN Wound Prevention Protocol Ordered Yes

## 2022-01-23 NOTE — PROGRESS NOTES
[facility] Medicine Daily Progress Note    Date of Service  1/23/2022    Chief Complaint  Karen Jauregui is a 76 y.o. female admitted 1/22/2022 with shortness of breath    Hospital Course  Patient is a 76 y.o. female with past medical history of COPD (on home oxygen 4 L), CHF last EF 25% and a former smoker 20-pack-year history (quit 3 months ago). She  Presented to the ER on 1/22/2022 with shortness of breath.  Patient reports that she cannot breathe.  She reported worsening symptoms when laying flat and improved with sitting up.  She reports she can only walk 10 blocks.  She denies any fever, chills, nausea or vomiting.     Upon EMS arrival, patient was given three duo nebs and steroids while in route to ER.  In ER, her blood pressure was 234/112, heart rate 118, tachypneic breathing 24, saturating 95% on 4 L.    Interval Problem Update  GERRI overnight  BP better controlled  remainso n 5L (baseline is 4)  Echo noted  Her SOB is improved but not yet at baseline    I have personally seen and examined the patient at bedside. I discussed the plan of care with patient and nursing    Consultants/Specialty    Code Status  DNAR/DNI    Disposition  Patient is not medically cleared for discharge.   Anticipate discharge to be determined    Review of Systems  Review of Systems   Constitutional: Negative for chills and fever.   Respiratory: Positive for shortness of breath. Negative for cough.    Cardiovascular: Negative for chest pain and palpitations.   Gastrointestinal: Negative for abdominal pain, nausea and vomiting.   Genitourinary: Negative for dysuria and urgency.   Neurological: Negative for dizziness and headaches.   All other systems reviewed and are negative.       Physical Exam  Temp:  [35.9 °C (96.7 °F)-36.9 °C (98.5 °F)] 36.5 °C (97.7 °F)  Pulse:  [] 96  Resp:  [14-25] 16  BP: ()/() 95/57  SpO2:  [91 %-100 %] 93 %    Physical Exam  Vitals and nursing note reviewed.   Constitutional:        Appearance: Normal appearance.   Cardiovascular:      Rate and Rhythm: Normal rate and regular rhythm.   Pulmonary:      Effort: Pulmonary effort is normal.      Comments: Bibasilar crackles, tachypneic  Musculoskeletal:      Right lower leg: No edema.      Left lower leg: No edema.   Skin:     General: Skin is warm and dry.   Neurological:      General: No focal deficit present.      Mental Status: She is alert and oriented to person, place, and time.         Fluids    Intake/Output Summary (Last 24 hours) at 1/23/2022 1152  Last data filed at 1/23/2022 0900  Gross per 24 hour   Intake --   Output 700 ml   Net -700 ml       Laboratory  Recent Labs     01/22/22  0037 01/23/22  0110   WBC 14.2* 15.1*   RBC 4.55 4.57   HEMOGLOBIN 12.9 12.9   HEMATOCRIT 40.8 39.1   MCV 89.7 85.6   MCH 28.4 28.2   MCHC 31.6* 33.0*   RDW 48.4 46.3   PLATELETCT 309 316   MPV 9.7 10.1     Recent Labs     01/22/22  0037 01/23/22  0110   SODIUM 139 134*   POTASSIUM 4.4 3.8   CHLORIDE 99 95*   CO2 27 27   GLUCOSE 182* 138*   BUN 29* 47*   CREATININE 1.04 1.15   CALCIUM 9.9 9.8                   Imaging  EC-ECHOCARDIOGRAM COMPLETE W/O CONT   Final Result      DX-CHEST-PORTABLE (1 VIEW)   Final Result         1. There are bilateral pulmonary edema and/or infiltrate.   2. Chronic obstructive pulmonary disease.           Assessment/Plan  * Acute systolic CHF (congestive heart failure) (HCC)- (present on admission)  Assessment & Plan  Telemetry monitoring  Forced diuresis with IV Lasix  Losartan 50 mg daily   Aldactone 25 mg daily  Hold BB given acute decompensated HF   Echocardiogram in 2020 showing EF of 30%.  Repeat echocardiogram transthoracic  Monitor intake/output  1 liter fluid; cardiac diet     Improving, cont IV diuresis for 1-2 days      Hypertensive emergency  Assessment & Plan  Upon arrival SBP's in 230s given nitroglycerin paste improved to SBP's in 170s.   Gentle reduction in blood target 's first 24 hours  Forced diuresis  with IV Lasix  Resume spironolactone 25 mg daily   Afterload reduction with Losartan  Resume plavix 75 mg daily     Now resolved    Acute pulmonary edema (HCC)  Assessment & Plan  Telemetry monitoring  Forced diuresis with IV Lasix  Monitor intake/output  Losartan 50 mg daily   Aldactone 25 mg daily  Hold BB given acute decompensated HF  Echocardiogram in 2020 showing EF of 30%.  Repeat echocardiogram transthoracic       Acute exacerbation of chronic obstructive pulmonary disease (COPD) (HCC)  Assessment & Plan  Pt not in COPD exacerbation. Dc steroids  Cont diuresis         VTE prophylaxis: enoxaparin ppx    I have performed a physical exam and reviewed and updated ROS and Plan today (1/23/2022). In review of yesterday's note (1/22/2022), there are no changes except as documented above.

## 2022-01-24 VITALS
RESPIRATION RATE: 16 BRPM | HEART RATE: 90 BPM | TEMPERATURE: 97 F | HEIGHT: 61 IN | DIASTOLIC BLOOD PRESSURE: 52 MMHG | BODY MASS INDEX: 17.4 KG/M2 | SYSTOLIC BLOOD PRESSURE: 95 MMHG | OXYGEN SATURATION: 93 % | WEIGHT: 92.15 LBS

## 2022-01-24 LAB
BASOPHILS # BLD AUTO: 0.2 % (ref 0–1.8)
BASOPHILS # BLD: 0.03 K/UL (ref 0–0.12)
EOSINOPHIL # BLD AUTO: 0.02 K/UL (ref 0–0.51)
EOSINOPHIL NFR BLD: 0.2 % (ref 0–6.9)
ERYTHROCYTE [DISTWIDTH] IN BLOOD BY AUTOMATED COUNT: 46.8 FL (ref 35.9–50)
HCT VFR BLD AUTO: 38.2 % (ref 37–47)
HGB BLD-MCNC: 12.5 G/DL (ref 12–16)
IMM GRANULOCYTES # BLD AUTO: 0.06 K/UL (ref 0–0.11)
IMM GRANULOCYTES NFR BLD AUTO: 0.5 % (ref 0–0.9)
LYMPHOCYTES # BLD AUTO: 2.13 K/UL (ref 1–4.8)
LYMPHOCYTES NFR BLD: 16 % (ref 22–41)
MCH RBC QN AUTO: 28.3 PG (ref 27–33)
MCHC RBC AUTO-ENTMCNC: 32.7 G/DL (ref 33.6–35)
MCV RBC AUTO: 86.6 FL (ref 81.4–97.8)
MONOCYTES # BLD AUTO: 1.13 K/UL (ref 0–0.85)
MONOCYTES NFR BLD AUTO: 8.5 % (ref 0–13.4)
NEUTROPHILS # BLD AUTO: 9.95 K/UL (ref 2–7.15)
NEUTROPHILS NFR BLD: 74.6 % (ref 44–72)
NRBC # BLD AUTO: 0 K/UL
NRBC BLD-RTO: 0 /100 WBC
PLATELET # BLD AUTO: 282 K/UL (ref 164–446)
PMV BLD AUTO: 9.9 FL (ref 9–12.9)
RBC # BLD AUTO: 4.41 M/UL (ref 4.2–5.4)
WBC # BLD AUTO: 13.3 K/UL (ref 4.8–10.8)

## 2022-01-24 PROCEDURE — 700111 HCHG RX REV CODE 636 W/ 250 OVERRIDE (IP): Performed by: HOSPITALIST

## 2022-01-24 PROCEDURE — A9270 NON-COVERED ITEM OR SERVICE: HCPCS | Performed by: HOSPITALIST

## 2022-01-24 PROCEDURE — A9270 NON-COVERED ITEM OR SERVICE: HCPCS | Performed by: STUDENT IN AN ORGANIZED HEALTH CARE EDUCATION/TRAINING PROGRAM

## 2022-01-24 PROCEDURE — 85025 COMPLETE CBC W/AUTO DIFF WBC: CPT

## 2022-01-24 PROCEDURE — 99239 HOSP IP/OBS DSCHRG MGMT >30: CPT | Performed by: HOSPITALIST

## 2022-01-24 PROCEDURE — 700102 HCHG RX REV CODE 250 W/ 637 OVERRIDE(OP): Performed by: HOSPITALIST

## 2022-01-24 PROCEDURE — 700111 HCHG RX REV CODE 636 W/ 250 OVERRIDE (IP): Performed by: STUDENT IN AN ORGANIZED HEALTH CARE EDUCATION/TRAINING PROGRAM

## 2022-01-24 PROCEDURE — 700102 HCHG RX REV CODE 250 W/ 637 OVERRIDE(OP): Performed by: STUDENT IN AN ORGANIZED HEALTH CARE EDUCATION/TRAINING PROGRAM

## 2022-01-24 PROCEDURE — 700101 HCHG RX REV CODE 250: Performed by: STUDENT IN AN ORGANIZED HEALTH CARE EDUCATION/TRAINING PROGRAM

## 2022-01-24 PROCEDURE — 36415 COLL VENOUS BLD VENIPUNCTURE: CPT

## 2022-01-24 RX ORDER — FUROSEMIDE 10 MG/ML
40 INJECTION INTRAMUSCULAR; INTRAVENOUS ONCE
Status: COMPLETED | OUTPATIENT
Start: 2022-01-24 | End: 2022-01-24

## 2022-01-24 RX ORDER — LABETALOL HYDROCHLORIDE 5 MG/ML
10 INJECTION, SOLUTION INTRAVENOUS EVERY 4 HOURS PRN
Status: DISCONTINUED | OUTPATIENT
Start: 2022-01-24 | End: 2022-01-24 | Stop reason: HOSPADM

## 2022-01-24 RX ORDER — FUROSEMIDE 40 MG/1
40 TABLET ORAL DAILY
Qty: 90 TABLET | Refills: 3 | Status: SHIPPED | OUTPATIENT
Start: 2022-01-24 | End: 2022-03-14 | Stop reason: SDUPTHER

## 2022-01-24 RX ORDER — AMLODIPINE BESYLATE 5 MG/1
5 TABLET ORAL DAILY
Qty: 30 TABLET | Refills: 0 | Status: SHIPPED
Start: 2022-01-24 | End: 2022-01-27

## 2022-01-24 RX ORDER — AMLODIPINE BESYLATE 5 MG/1
5 TABLET ORAL
Status: DISCONTINUED | OUTPATIENT
Start: 2022-01-24 | End: 2022-01-24 | Stop reason: HOSPADM

## 2022-01-24 RX ADMIN — FUROSEMIDE 40 MG: 10 INJECTION, SOLUTION INTRAMUSCULAR; INTRAVENOUS at 05:21

## 2022-01-24 RX ADMIN — AMLODIPINE BESYLATE 5 MG: 5 TABLET ORAL at 10:46

## 2022-01-24 RX ADMIN — ENOXAPARIN SODIUM 30 MG: 30 INJECTION SUBCUTANEOUS at 05:22

## 2022-01-24 RX ADMIN — LOSARTAN POTASSIUM 50 MG: 50 TABLET, FILM COATED ORAL at 05:22

## 2022-01-24 RX ADMIN — LABETALOL HYDROCHLORIDE 10 MG: 5 INJECTION INTRAVENOUS at 01:16

## 2022-01-24 RX ADMIN — FUROSEMIDE 40 MG: 10 INJECTION, SOLUTION INTRAMUSCULAR; INTRAVENOUS at 10:46

## 2022-01-24 RX ADMIN — CLOPIDOGREL 75 MG: 75 TABLET, FILM COATED ORAL at 05:22

## 2022-01-24 ASSESSMENT — PAIN DESCRIPTION - PAIN TYPE: TYPE: ACUTE PAIN

## 2022-01-24 NOTE — DISCHARGE INSTRUCTIONS
Discharge Instructions    Discharged to home by car with relative. Discharged via wheelchair, hospital escort: Yes.  Special equipment needed: Not Applicable    Be sure to schedule a follow-up appointment with your primary care doctor or any specialists as instructed.     Discharge Plan:        I understand that a diet low in cholesterol, fat, and sodium is recommended for good health. Unless I have been given specific instructions below for another diet, I accept this instruction as my diet prescription.   Other diet: Cardiac    Special Instructions:   HF Patient Discharge Instructions  · Monitor your weight daily, and maintain a weight chart, to track your weight changes.   · Activity as tolerated, unless your Doctor has ordered otherwise.   · Follow a low fat, low cholesterol, low salt diet unless instructed otherwise by your Doctor. Read the labels on the back of food products and track your intake of fat, cholesterol and salt.   · Fluid Restriction No. If a Fluid Restriction has been ordered by your Doctor, measure fluids with a measuring cup to ensure that you are not exceeding the restriction.   · No smoking.  · Oxygen No. If your Doctor has ordered that you wear Oxygen at home, it is important to wear it as ordered.  · Did you receive an explanation from staff on the importance of taking each of your medications and why it is necessary to keep taking them unless your doctor says to stop? Yes  · Were all of your questions answered about how to manage your heart failure and what to do if you have increased signs and symptoms after you go home? Yes  · Do you feel like your heart failure care team involved you in the care treatment plan and allowed you to make decisions regarding your care while in the hospital and addressed any discharge needs you might have? Yes    See the educational handout provided at discharge for more information on monitoring your daily weight, activity and diet. This also explains more  about Heart Failure, symptoms of a flare-up and some of the tests that you have undergone.     Warning Signs of a Flare-Up include:  · Swelling in the ankles or lower legs.  · Shortness of breath, while at rest, or while doing normal activities.   · Shortness of breath at night when in bed, or coughing in bed.   · Requiring more pillows to sleep at night, or needing to sit up at night to sleep.  · Feeling weak, dizzy or fatigued.     When to call your Doctor:  · Call [facility] seven days a week from 8:00 a.m. to 8:00 p.m. for medical questions [phone].  · Call your Primary Care Physician or Cardiologist if:   1. You experience any pain radiating to your jaw or neck.  2. You have any difficulty breathing.  3. You experience weight gain of 3 lbs in a day or 5 lbs in a week.   4. You feel any palpitations or irregular heartbeats.  5. You become dizzy or lose consciousness.   If you have had an angiogram or had a pacemaker or AICD placed, and experience:  1. Bleeding, drainage or swelling at the surgical / puncture site.  2. Fever greater than 100.0 F  3. Shock from internal defibrillator.  4. Cool and / or numb extremities.      · Is patient discharged on Warfarin / Coumadin?   No     Depression / Suicide Risk    As you are discharged from this [facility], it is important to learn how to keep safe from harming yourself.    Recognize the warning signs:  · Abrupt changes in personality, positive or negative- including increase in energy   · Giving away possessions  · Change in eating patterns- significant weight changes-  positive or negative  · Change in sleeping patterns- unable to sleep or sleeping all the time   · Unwillingness or inability to communicate  · Depression  · Unusual sadness, discouragement and loneliness  · Talk of wanting to die  · Neglect of personal appearance   · Rebelliousness- reckless behavior  · Withdrawal from people/activities they love  · Confusion- inability to  concentrate     If you or a loved one observes any of these behaviors or has concerns about self-harm, here's what you can do:  · Talk about it- your feelings and reasons for harming yourself  · Remove any means that you might use to hurt yourself (examples: pills, rope, extension cords, firearm)  · Get professional help from the community (Mental Health, Substance Abuse, psychological counseling)  · Do not be alone:Call your Safe Contact- someone whom you trust who will be there for you.  · Call your local CRISIS HOTLINE 100-9535 or 666-133-5559  · Call your local Children's Mobile Crisis Response Team [place] (851) 124-6690 or www.[employer]  · Call the toll free National Suicide Prevention Hotlines   · National Suicide Prevention Lifeline 409-003-YCCW (7261)  · National Hope Line Network 800-SUICIDE (095-5726)

## 2022-01-24 NOTE — HEART FAILURE PROGRAM
Discharge Order Check Up - HFpEF, improved (55%) per discussion with bedside RN patient is discharging to home. Sent an urgent request to hospital schedulers asking for a HF clinic appointment that is sooner than 2/28/22 which she is currently scheduled for.    GDMT:    Where we stand right now with HFrEF MEASURES per review of most recent notes and discharge med rec:    • Evidence Based Beta Blocker (bisoprolol, carvedilol, or toprol xl), for EF of 40% or less: carvedilol  • DENA - I, for EF of 40% or less ARNI is preferred If not cost prohibitive for patient: losartan  • SGLT2 inhibitor with proven ASCVD, HF, or DKD benefit (canagliflozin, dapagliflozin, or empagliflozin) If not cost prohibitive for patient  • Aldosterone antagonist, for EF of 35% or less: currently not prescribed on AVS  • Anticoagulation for atrial arrhythmia: n/a  • Glycemic control for DM + HF: n/a  • Lipid lowering medication for DM + HF: n/a  • Hydralazine Hydrochloride/Isosorbide Dinitrate: n/a  • Pneumococcal vaccine: 2012, 2016  • Influenza vaccine for current season: 10/20/21  • Smoking cessation counseling documented: quit 3 months ago per Dr. Lira's note  • Device screening: n/a EF has improved to greater than 35%  • Referral to disease management program specializing in heart failure care      Thank you, Jessi, Cardio RN Navigator [id number]

## 2022-01-24 NOTE — PROGRESS NOTES
Assumed care of patient at bedside report from DAY RN. Updated on POC. Patient currently A & O x 3; on 4 L O2 silicone nasal cannula; up with one assist; without complaints of acute pain. Call light within reach. Whiteboard updated. Fall precautions in place. Bed locked and in lowest position. All questions answered. No other needs indicated at this time.

## 2022-01-24 NOTE — CARE PLAN
The patient is Stable - Low risk of patient condition declining or worsening    Shift Goals  Clinical Goals: Reinforce fluid restriction, rest.   Patient Goals: sleep    Progress made toward(s) clinical / shift goals:  Fluid restriction reinforced throughout shift, pt rested during shift.     Patient is not progressing towards the following goals:N/A    Problem: Care Map:  Day 1 Optimal Outcome for the Heart Failure Patient  Goal: Day 1:  Optimal Care of the heart failure patient  Outcome: Progressing     Problem: Knowledge Deficit - Standard  Goal: Patient and family/care givers will demonstrate understanding of plan of care, disease process/condition, diagnostic tests and medications  Outcome: Progressing     Problem: Knowledge Deficit - COPD  Goal: Patient/significant other demonstrates understanding of disease process, utilization of the Action Plan, medications and discharge instruction  Outcome: Progressing     Problem: Fall Risk  Goal: Patient will remain free from falls  Outcome: Progressing  Note: Patient is at high risk for falling. Patient educated on use of call light and encouraged to call before getting up. Patient verbalized understanding. All fall precautions in place.        Problem: Pain - Standard  Goal: Alleviation of pain or a reduction in pain to the patient’s comfort goal  Outcome: Progressing

## 2022-01-24 NOTE — CARE PLAN
The patient is Stable - Low risk of patient condition declining or worsening    Shift Goals  Clinical Goals: Reinforce fluid restriction, rest.   Patient Goals: sleep    Progress made toward(s) clinical / shift goals:    Problem: Risk for Infection - COPD  Goal: Patient will remain free from signs and symptoms of infection  Outcome: Progressing     Problem: Risk for Aspiration  Goal: Patient's risk for aspiration will be absent or decrease  Outcome: Progressing     Pt sitting up in bed at 90 degree, pt able to swallow without any difficulty.

## 2022-01-24 NOTE — PROGRESS NOTES
Assumed care of pt. Bedside report received from Melina PARIS. Pt was updated on plan of care. Call light, phone and personal belongings in reach. Bed alarm on and working properly, bed in lowest position, and locked.

## 2022-01-24 NOTE — PROGRESS NOTES
Patient discharged self and son  All personal belongings collected. IV access removed. Monitor removed, monitor room notified. Discharge instructions discussed. Medications reviewed; . Follow up appointments went over and understood.

## 2022-01-24 NOTE — DISCHARGE SUMMARY
Discharge Summary    CHIEF COMPLAINT ON ADMISSION  Chief Complaint   Patient presents with   • Shortness of Breath     since 2330 even on home 4L NC       Reason for Admission  Ambulance     Admission Date  1/22/2022    CODE STATUS  DNAR/DNI    HPI & HOSPITAL COURSE  This is a 76 y.o. female with past medical history of COPD, HFrEF, chronic respiratory failure on 3 to 4 L home oxygen presenting with shortness of breath.  She was found to have acute on chronic respiratory failure secondary to acute exacerbation of CHF along with hypertensive urgency with SBP in 200s.  Patient was started on IV diuresis with resultant improvement in her shortness of breath as well as uncontrolled blood pressure.  Amlodipine has been added onto her blood pressure regimen.      Therefore, she is discharged in good and stable condition to home with close outpatient follow-up.    The patient met 2-midnight criteria for an inpatient stay at the time of discharge.    Discharge Date  1/24/22    FOLLOW UP ITEMS POST DISCHARGE  F/u with PCP for BP management  F/u with CHF clinic    DISCHARGE DIAGNOSES  Principal Problem:    Acute systolic CHF (congestive heart failure) (HCC) POA: Yes  Active Problems:    Acute exacerbation of chronic obstructive pulmonary disease (COPD) (HCC) POA: Unknown    Acute pulmonary edema (HCC) POA: Unknown    Hypertensive emergency POA: Unknown  Resolved Problems:    * No resolved hospital problems. *      FOLLOW UP  Future Appointments   Date Time Provider Department Center   1/27/2022  2:30 PM Baltazar Julio M.D. [address]   2/10/2022  3:00 PM Adrian Leonard M.D. [facility] None   2/28/2022 11:15 AM Adrian Leonard M.D. [facility] None   3/2/2022  1:50 PM Gil Velasquez M.D. [facility] None   4/20/2022 11:00 AM Baltazar Julio M.D. [address]     No follow-up provider specified.    MEDICATIONS ON DISCHARGE     Medication List      START taking these medications      Instructions   amLODIPine 5 MG  Tabs  Commonly known as: NORVASC   Take 1 Tablet by mouth every day.  Dose: 5 mg        CHANGE how you take these medications      Instructions   carvedilol 6.25 MG Tabs  What changed: when to take this  Commonly known as: COREG   Doctor's comments: Note dose increase  Take 1 Tablet by mouth 2 times a day with meals.  Dose: 6.25 mg        CONTINUE taking these medications      Instructions   clopidogrel 75 MG Tabs  Commonly known as: PLAVIX   Take 1 Tablet by mouth every day.  Dose: 75 mg     DILTIAZem 120 MG Tabs  Commonly known as: CARDIZEM   Take 1 Tablet by mouth every day.  Dose: 120 mg     furosemide 40 MG Tabs  Commonly known as: LASIX   Take 1 Tablet by mouth every day.  Dose: 40 mg     HYDROcodone-acetaminophen 5-325 MG Tabs per tablet  Commonly known as: NORCO   Doctor's comments: 40 tablets is a 7 day prescription.  ICD-10 code M51.27  Take 1 Tablet by mouth every four hours as needed for up to 7 days. 40 tablets is a 7 day prescription  Dose: 1 Tablet     ipratropium-albuterol  MCG/ACT Aers  Commonly known as: COMBIVENT RESPIMAT   Inhale 1 Puff 1 time a day as needed. Indications: Chronic Bronchitis  Dose: 1 Puff     Klor-Con M20 20 MEQ Tbcr  Generic drug: potassium chloride SA   Take 1 Tablet by mouth every day.  Dose: 20 mEq     losartan 50 MG Tabs  Commonly known as: COZAAR   Take 1 Tablet by mouth every day.  Dose: 50 mg     mirtazapine 15 MG Tabs  Commonly known as: Remeron   Take 1 Tablet by mouth every evening.  Dose: 15 mg     Ventolin  (90 Base) MCG/ACT Aers inhalation aerosol  Generic drug: albuterol   Inhale 2 Puffs by mouth every four hours as needed for Shortness of Breath.  Dose: 2 Puff            Allergies  Allergies   Allergen Reactions   • Doxycycline Diarrhea     None stop diarrhea   • Tramadol Vomiting   • Amoxicillin Diarrhea     Diarrhea   • Ciprofloxacin Unspecified     Unspecified       DIET  Orders Placed This Encounter   Procedures   • Diet Order Diet: Cardiac; Fluid  modifications: (optional): 1000 ml Fluid Restriction     Standing Status:   Standing     Number of Occurrences:   1     Order Specific Question:   Diet:     Answer:   Cardiac [6]     Order Specific Question:   Fluid modifications: (optional)     Answer:   1000 ml Fluid Restriction [7]       ACTIVITY  As tolerated.  Weight bearing as tolerated    CONSULTATIONS  Pulmonary     PROCEDURES  none    LABORATORY  Lab Results   Component Value Date    SODIUM 134 (L) 01/23/2022    POTASSIUM 3.8 01/23/2022    CHLORIDE 95 (L) 01/23/2022    CO2 27 01/23/2022    GLUCOSE 138 (H) 01/23/2022    BUN 47 (H) 01/23/2022    CREATININE 1.15 01/23/2022    CREATININE 0.8 08/25/2008        Lab Results   Component Value Date    WBC 13.3 (H) 01/24/2022    HEMOGLOBIN 12.5 01/24/2022    HEMATOCRIT 38.2 01/24/2022    PLATELETCT 282 01/24/2022        Total time of the discharge process exceeds 35 minutes.

## 2022-01-25 ENCOUNTER — PATIENT OUTREACH (OUTPATIENT)
Dept: MEDICAL GROUP | Facility: MEDICAL CENTER | Age: 77
End: 2022-01-25

## 2022-01-25 ENCOUNTER — PATIENT OUTREACH (OUTPATIENT)
Dept: HEALTH INFORMATION MANAGEMENT | Facility: OTHER | Age: 77
End: 2022-01-25

## 2022-01-25 NOTE — PROGRESS NOTES
RN Transitional Care Management discharge follow up call completed. Patient denies questions regarding medications or follow up care. Patient has discharge follow up appointment scheduled with PCP on 1/27/22. Provided CCM RN contact number for any additional questions/concerns. Please route chart back to RN if additional follow up is needed/requested.     Thank you!    WELLINGTON Lebron Care Coordinator  [facility] [phone]  Chronic Care Management [phone]

## 2022-01-25 NOTE — PROGRESS NOTES
CHW Natividad introduced Community Care Management services to pt via TC but was unable to complete SDOH. Pt declined CHW services and declined CHW contact information. CHW will remove pt from caseload and master list as all goals have been met and pt has no needs.

## 2022-01-27 ENCOUNTER — OFFICE VISIT (OUTPATIENT)
Dept: MEDICAL GROUP | Facility: MEDICAL CENTER | Age: 77
End: 2022-01-27
Payer: MEDICARE

## 2022-01-27 VITALS
RESPIRATION RATE: 16 BRPM | TEMPERATURE: 98.5 F | SYSTOLIC BLOOD PRESSURE: 130 MMHG | OXYGEN SATURATION: 94 % | HEIGHT: 61 IN | HEART RATE: 85 BPM | BODY MASS INDEX: 17.52 KG/M2 | WEIGHT: 92.81 LBS | DIASTOLIC BLOOD PRESSURE: 52 MMHG

## 2022-01-27 DIAGNOSIS — I10 ESSENTIAL HYPERTENSION: ICD-10-CM

## 2022-01-27 DIAGNOSIS — N18.31 STAGE 3A CHRONIC KIDNEY DISEASE: ICD-10-CM

## 2022-01-27 DIAGNOSIS — J41.0 SIMPLE CHRONIC BRONCHITIS (HCC): ICD-10-CM

## 2022-01-27 DIAGNOSIS — I50.22 CHRONIC SYSTOLIC CONGESTIVE HEART FAILURE (HCC): ICD-10-CM

## 2022-01-27 DIAGNOSIS — R09.02 HYPOXIA: ICD-10-CM

## 2022-01-27 DIAGNOSIS — E78.5 DYSLIPIDEMIA, GOAL LDL BELOW 100: ICD-10-CM

## 2022-01-27 DIAGNOSIS — J96.11 CHRONIC RESPIRATORY FAILURE WITH HYPOXIA (HCC): ICD-10-CM

## 2022-01-27 DIAGNOSIS — Z86.16 HISTORY OF 2019 NOVEL CORONAVIRUS DISEASE (COVID-19): ICD-10-CM

## 2022-01-27 PROBLEM — J81.0 ACUTE PULMONARY EDEMA (HCC): Status: RESOLVED | Noted: 2022-01-22 | Resolved: 2022-01-27

## 2022-01-27 PROBLEM — J44.1 ACUTE EXACERBATION OF CHRONIC OBSTRUCTIVE PULMONARY DISEASE (COPD) (HCC): Status: RESOLVED | Noted: 2022-01-22 | Resolved: 2022-01-27

## 2022-01-27 PROBLEM — I50.21 ACUTE SYSTOLIC CHF (CONGESTIVE HEART FAILURE) (HCC): Status: RESOLVED | Noted: 2022-01-22 | Resolved: 2022-01-27

## 2022-01-27 PROBLEM — R94.30 CARDIAC LV EJECTION FRACTION 21-40%: Status: RESOLVED | Noted: 2021-10-20 | Resolved: 2022-01-27

## 2022-01-27 PROBLEM — I16.1 HYPERTENSIVE EMERGENCY: Status: RESOLVED | Noted: 2022-01-22 | Resolved: 2022-01-27

## 2022-01-27 PROBLEM — Z87.09 HISTORY OF COPD: Status: RESOLVED | Noted: 2017-02-20 | Resolved: 2022-01-27

## 2022-01-27 PROCEDURE — 99495 TRANSJ CARE MGMT MOD F2F 14D: CPT | Performed by: FAMILY MEDICINE

## 2022-01-27 RX ORDER — SPIRONOLACTONE 25 MG/1
25 TABLET ORAL DAILY
Qty: 90 TABLET | Refills: 3 | Status: SHIPPED
Start: 2022-01-27 | End: 2022-02-28

## 2022-01-27 ASSESSMENT — FIBROSIS 4 INDEX: FIB4 SCORE: 1.69

## 2022-01-27 NOTE — PROGRESS NOTES
Subjective:     Karen Jauregui is a 76 y.o. female who presents for Hospital Follow-up.      POST DISCHARGE CALL:  Discharge Date:1/24/2022   Date of Outreach Call: 1/25/2022  2:52 PM  Now that you're home, how are you doing? Good  Do you have questions about your medications? No    Did you fill your medications? No  Comment:will pick them up this evening    Do you have a follow-up appointment scheduled?Yes    Discharging Department: Telemetry 8    Number of Attempts: 1  Current or previous attempts completed within two business days of discharge? Yes  Provided education regarding treatment plan, medication, self-management, ADLs? Yes  Has patient completed Advance Directive? If yes, advise them to bring to appointment. Yes  Care Manager phone number provided? Yes  Is there anything else I can help you with? No      HPI:   Recently hospitalized for fluid overload, possible acute COPD exacerbation and acute on chronic CHF. She was acutely hypoxic.   Now resolved.  Medications still difficult to sort out.  Amlodipine prescribed but already on diltiazem.  Have asked her to stop the amlodipine and make sure she is taking the losartan.  She is feeing much better.       Current medicines (including reconciliation performed today)  Current Outpatient Medications   Medication Sig Dispense Refill   • spironolactone (ALDACTONE) 25 MG Tab Take 1 Tablet by mouth every day. 90 Tablet 3   • furosemide (LASIX) 40 MG Tab Take 1 Tablet by mouth every day. 90 Tablet 3   • ipratropium-albuterol (COMBIVENT RESPIMAT)  MCG/ACT Aero Soln Inhale 1 Puff 1 time a day as needed. Indications: Chronic Bronchitis     • carvedilol (COREG) 6.25 MG Tab Take 1 Tablet by mouth 2 times a day with meals. (Patient taking differently: Take 6.25 mg by mouth one time.) 60 Tablet 6   • clopidogrel (PLAVIX) 75 MG Tab Take 1 Tablet by mouth every day. 30 Tablet 11   • DILTIAZem (CARDIZEM) 120 MG Tab Take 1 Tablet by mouth every day. 90 Tablet 3    • KLOR-CON M20 20 MEQ Tab CR Take 1 Tablet by mouth every day. 90 Tablet 3   • mirtazapine (REMERON) 15 MG Tab Take 1 Tablet by mouth every evening. 90 Tablet 3   • losartan (COZAAR) 50 MG Tab Take 1 Tablet by mouth every day. 90 Tablet 3   • VENTOLIN  (90 Base) MCG/ACT Aero Soln inhalation aerosol Inhale 2 Puffs by mouth every four hours as needed for Shortness of Breath.       No current facility-administered medications for this visit.       Allergies:   Doxycycline, Tramadol, Amoxicillin, and Ciprofloxacin    Social History:  Social History     Socioeconomic History   • Marital status:      Spouse name: Not on file   • Number of children: Not on file   • Years of education: Not on file   • Highest education level: Not on file   Occupational History   • Occupation: stocking     Employer: [employer]     Comment: retired   Tobacco Use   • Smoking status: Former Smoker     Packs/day: 1.00     Years: 45.00     Pack years: 45.00     Types: Cigarettes     Quit date: 9/10/2021     Years since quittin.3   • Smokeless tobacco: Never Used   • Tobacco comment: has stopped   Vaping Use   • Vaping Use: Never used   Substance and Sexual Activity   • Alcohol use: No     Alcohol/week: 0.0 oz   • Drug use: No   • Sexual activity: Not Currently   Other Topics Concern   • Not on file   Social History Narrative   • Not on file     Social Determinants of Health     Financial Resource Strain:    • Difficulty of Paying Living Expenses: Not on file   Food Insecurity:    • Worried About Running Out of Food in the Last Year: Not on file   • Ran Out of Food in the Last Year: Not on file   Transportation Needs:    • Lack of Transportation (Medical): Not on file   • Lack of Transportation (Non-Medical): Not on file   Physical Activity:    • Days of Exercise per Week: Not on file   • Minutes of Exercise per Session: Not on file   Stress:    • Feeling of Stress : Not on file   Social Connections:    • Frequency of  "Communication with Friends and Family: Not on file   • Frequency of Social Gatherings with Friends and Family: Not on file   • Attends [employer] Services: Not on file   • Active Member of Clubs or Organizations: Not on file   • Attends Club or Organization Meetings: Not on file   • Marital Status: Not on file   Intimate Partner Violence:    • Fear of Current or Ex-Partner: Not on file   • Emotionally Abused: Not on file   • Physically Abused: Not on file   • Sexually Abused: Not on file   Housing Stability:    • Unable to Pay for Housing in the Last Year: Not on file   • Number of Places Lived in the Last Year: Not on file   • Unstable Housing in the Last Year: Not on file         ROS:  No fever, chest pain, shortness of breath or abdominal pain.  Denies fever or chills.       Objective:     /52 (BP Location: Right arm, Patient Position: Sitting, BP Cuff Size: Small adult)   Pulse 85   Temp 36.9 °C (98.5 °F) (Temporal)   Resp 16   Ht 1.549 m (5' 1\")   Wt 42.1 kg (92 lb 13 oz)   SpO2 94%   Body mass index is 17.54 kg/m².    Physical Exam:  Vital signs reviewed and discussed.  Alert and well oriented.  Wearing her O2.  Patient, son, physician and staff all wearing masks.  HEENT:   EOMI, PERRLA.     Neck:       Full range of motion. No JVD or carotid bruits appreciated. No cervical adenopathy appreciated. No thyromegaly or neck masses appreciated.  No retractions appreciated.  Lungs:     Has rales both lung bases, good air movement.  Heart:      Regular rate and rhythm normal S1 and S2 without murmur appreciated.  Strong and symmetric radial and DP pulses.  Abd:        Soft, bowel sounds positive, nontender. No bloating noted. No hepatosplenomegaly or mass appreciated. No pulsatile mass appreciated.  Ext:         Extremities show symmetric and full range of motion with normal strength. No cyanosis or clubbing appreciated. No peripheral edema appreciated.  Neuro:    Gait is normal. Patient is lucid, fluent " and appropriate. No significant tremor appreciated.  Skin:       Exhibit no rashes, pigmented lesions or ulcerations.      Assessment and Plan:     1. Chronic systolic congestive heart failure (HCC)  Patient had apparently an exacerbation of her pulmonary edema.  Patient's regimen she has been difficult to adhere to.  She is feeling much better now.  Her weight is about the same.  She is exhibiting bibasilar rales today.  I have asked her to resume the spironolactone.    2. Essential hypertension  HTN - Chronic condition stable. Currently taking all meds as directed.   She is not taking baby aspirin daily.   She is not monitoring BP at home.  She was extremely high in ER.  Amlodipine was added to her diltiazem regimen.  Since she was severely hypotensive just a few months ago I do not think that will be a good regimen for her long-term and I have asked her to stop the amlodipine and the clonidine and have asked her to take the losartan as well as the diltiazem and furosemide, carvedilol and now also the spironolactone.  Denies symptoms low BP: light-headed, tunnel-vision, unusual fatigue.   Denies symptoms high BP:pounding headache, visual changes, palpitations, flushed face.   Denies medicine side effects: unusual fatigue, slow heartbeat, foot/leg swelling, cough.    3. Stage 3a chronic kidney disease (HCC)  Patient does have mild stable chronic kidney disease    4. Dyslipidemia, goal LDL below 100  Patient does have dyslipidemia in the past with low HDL but her last 2 lipid tests were ideal.    5. Simple chronic bronchitis (HCC)  Patient does have COPD with chronic bronchitis and some centrilobular emphysema.  She did stop smoking and had smoked for many years.    6. Chronic respiratory failure with hypoxia (HCC)  She has chronic hypoxemia and is O2 dependent 24/7.    7. History of 2019 novel coronavirus disease (COVID-19)  Outside records seem to indicate that she was positive for Covid somewhere around January  11.  It is still hard to pin this down.      - Chart and discharge summary were reviewed.   - Hospitalization and results reviewed with patient.   - Medications reviewed including instructions regarding high risk medications, dosing and side effects.  - Recommended Services: No services needed at this time  - Advance directive/POLST on file?  Yes    Follow-up:Return in about 3 months (around 4/20/2022), or if symptoms worsen or fail to improve.    Face-to-face transitional care management services with MODERATE (today's visit is within 14 days post discharge & LACE+ score of 28-58) medical decision complexity were provided.     LACE+ Historical Score Over Time (0-28: Low, 29-58: Medium, 59+: High): 67    Coding guide:   48721        - face-to-face within 14 day        - moderate decision complexity (LACE+ score of 28-58)  44895       - face-to-face within 7 days       - high medical decision complexity (LACE+ score 59+)

## 2022-01-28 ENCOUNTER — TELEPHONE (OUTPATIENT)
Dept: CARDIOLOGY | Facility: MEDICAL CENTER | Age: 77
End: 2022-01-28

## 2022-01-28 NOTE — TELEPHONE ENCOUNTER
Spoke to patient in regards to obtaining records for NP appointment with  . Per patient has never been treated by a previous cardiologist. Confirmed date and time with patient.

## 2022-02-10 ENCOUNTER — OFFICE VISIT (OUTPATIENT)
Dept: CARDIOLOGY | Facility: MEDICAL CENTER | Age: 77
End: 2022-02-10
Payer: MEDICARE

## 2022-02-10 VITALS
BODY MASS INDEX: 17.86 KG/M2 | SYSTOLIC BLOOD PRESSURE: 180 MMHG | HEIGHT: 61 IN | DIASTOLIC BLOOD PRESSURE: 90 MMHG | WEIGHT: 94.6 LBS | HEART RATE: 102 BPM | OXYGEN SATURATION: 91 % | RESPIRATION RATE: 13 BRPM

## 2022-02-10 DIAGNOSIS — I77.1 SUBCLAVIAN ARTERY STENOSIS, LEFT (HCC): ICD-10-CM

## 2022-02-10 DIAGNOSIS — I65.23 ATHEROSCLEROSIS OF BOTH CAROTID ARTERIES: ICD-10-CM

## 2022-02-10 DIAGNOSIS — I73.9 PERIPHERAL VASCULAR DISEASE (HCC): ICD-10-CM

## 2022-02-10 DIAGNOSIS — M62.81 MUSCLE WEAKNESS (GENERALIZED): ICD-10-CM

## 2022-02-10 DIAGNOSIS — I50.82 BIVENTRICULAR CONGESTIVE HEART FAILURE (HCC): ICD-10-CM

## 2022-02-10 DIAGNOSIS — R94.30 CARDIAC LV EJECTION FRACTION 21-40%: ICD-10-CM

## 2022-02-10 DIAGNOSIS — N18.31 STAGE 3A CHRONIC KIDNEY DISEASE: ICD-10-CM

## 2022-02-10 DIAGNOSIS — I50.42 CHRONIC COMBINED SYSTOLIC AND DIASTOLIC CONGESTIVE HEART FAILURE (HCC): ICD-10-CM

## 2022-02-10 DIAGNOSIS — Z86.16 HISTORY OF 2019 NOVEL CORONAVIRUS DISEASE (COVID-19): ICD-10-CM

## 2022-02-10 DIAGNOSIS — K56.699 COLONIC STRICTURE (HCC): ICD-10-CM

## 2022-02-10 DIAGNOSIS — R94.30 EJECTION FRACTION < 50%: ICD-10-CM

## 2022-02-10 DIAGNOSIS — I50.22 CHRONIC SYSTOLIC CONGESTIVE HEART FAILURE (HCC): ICD-10-CM

## 2022-02-10 DIAGNOSIS — J96.11 CHRONIC RESPIRATORY FAILURE WITH HYPOXIA (HCC): ICD-10-CM

## 2022-02-10 DIAGNOSIS — R60.0 LOCALIZED EDEMA: ICD-10-CM

## 2022-02-10 DIAGNOSIS — I10 ESSENTIAL HYPERTENSION: ICD-10-CM

## 2022-02-10 DIAGNOSIS — E78.5 DYSLIPIDEMIA, GOAL LDL BELOW 100: ICD-10-CM

## 2022-02-10 DIAGNOSIS — I70.209 ARTERIOSCLEROSIS OF ARTERIES OF EXTREMITIES (HCC): ICD-10-CM

## 2022-02-10 DIAGNOSIS — I73.9 PVD (PERIPHERAL VASCULAR DISEASE) (HCC): ICD-10-CM

## 2022-02-10 DIAGNOSIS — I71.40 ABDOMINAL AORTIC ANEURYSM GREATER THAN 39 MM IN DIAMETER (HCC): ICD-10-CM

## 2022-02-10 DIAGNOSIS — J41.0 SIMPLE CHRONIC BRONCHITIS (HCC): ICD-10-CM

## 2022-02-10 PROCEDURE — 99214 OFFICE O/P EST MOD 30 MIN: CPT | Performed by: INTERNAL MEDICINE

## 2022-02-10 RX ORDER — CLOPIDOGREL BISULFATE 75 MG/1
75 TABLET ORAL DAILY
Qty: 30 TABLET | Refills: 11 | Status: SHIPPED | OUTPATIENT
Start: 2022-02-10

## 2022-02-10 RX ORDER — CARVEDILOL 6.25 MG/1
6.25 TABLET ORAL 2 TIMES DAILY WITH MEALS
Qty: 60 TABLET | Refills: 6 | Status: SHIPPED | OUTPATIENT
Start: 2022-02-10

## 2022-02-10 RX ORDER — ATORVASTATIN CALCIUM 20 MG/1
20 TABLET, FILM COATED ORAL NIGHTLY
Qty: 30 TABLET | Refills: 11 | Status: SHIPPED | OUTPATIENT
Start: 2022-02-10

## 2022-02-10 RX ORDER — LOSARTAN POTASSIUM 50 MG/1
50 TABLET ORAL DAILY
Qty: 90 TABLET | Refills: 3 | Status: SHIPPED | OUTPATIENT
Start: 2022-02-10

## 2022-02-10 ASSESSMENT — ENCOUNTER SYMPTOMS
CONSTITUTIONAL NEGATIVE: 1
CHILLS: 0
PALPITATIONS: 0
WEAKNESS: 0
SHORTNESS OF BREATH: 1
PND: 0
LOSS OF CONSCIOUSNESS: 0
COUGH: 0
NEUROLOGICAL NEGATIVE: 1
DIZZINESS: 0
FEVER: 0
BRUISES/BLEEDS EASILY: 0
STRIDOR: 0
CARDIOVASCULAR NEGATIVE: 1
MUSCULOSKELETAL NEGATIVE: 1
SORE THROAT: 0
ORTHOPNEA: 0
EYES NEGATIVE: 1
GASTROINTESTINAL NEGATIVE: 1
WHEEZING: 0
CLAUDICATION: 0
HEMOPTYSIS: 0
SPUTUM PRODUCTION: 0

## 2022-02-10 ASSESSMENT — MINNESOTA LIVING WITH HEART FAILURE QUESTIONNAIRE (MLHF)
COSTING YOU MONEY FOR MEDICAL CARE: 2
MAKING YOU SHORT OF BREATH: 5
DIFFICULTY WITH RECREATIONAL PASTIMES, SPORTS, HOBBIES: 5
FEELING LIKE A BURDEN TO FAMILY AND FRIENDS: 4
DIFFICULTY TO CONCENTRATE OR REMEMBERING THINGS: 5
DIFFICULTY GOING AWAY FROM HOME: 4
MAKING YOU WORRY: 4
HAVING TO SIT OR LIE DOWN DURING THE DAY: 5
DIFFICULTY WORKING TO EARN A LIVING: 5
WORKING AROUND THE HOUSE OR YARD DIFFICULT: 4
DIFFICULTY WITH SEXUAL ACTIVITIES: 5
MAKING YOU STAY IN A HOSPITAL: 3
WALKING ABOUT OR CLIMBING STAIRS DIFFICULT: 5
MAKING YOU FEEL DEPRESSED: 4
GIVING YOU SIDE EFFECTS FROM TREATMENTS: 0
DIFFICULTY SLEEPING WELL AT NIGHT: 0
EATING LESS FOODS YOU LIKE: 5
DIFFICULTY SOCIALIZING WITH FAMILY OR FRIENDS: 4
SWELLING IN ANKLES OR LEGS: 3
TOTAL_SCORE: 80
LOSS OF SELF CONTROL IN YOUR LIFE: 3
TIRED, FATIGUED OR LOW ON ENERGY: 5

## 2022-02-10 ASSESSMENT — FIBROSIS 4 INDEX: FIB4 SCORE: 1.69

## 2022-02-10 NOTE — PROGRESS NOTES
"Chief Complaint   Patient presents with   • Congestive Heart Failure     F/V DX\" Congestive Heart Failure: New       Subjective     Karen Jauregui is a 76 y.o. female who presents today as a new consultation for heart failure.  She is a 76-year-old female with a 60-pack-year smoking history.  She was admitted to hospital for lower extremity edema shortness of breath.  She had a CT scan showing massive bilateral blebs throughout her operative middle lung fields.  She had echocardiogram showing normal LV systolic function.  She appears to be investigated.  She has lower extreme edema which is now been responding to spironolactone and Lasix.  Her blood pressure is severely elevated.  She does not understand what medication she should be currently taking.  She is off her carvedilol losartan and confused about what dose of furosemide to take.    Past Medical History:   Diagnosis Date   • Abdominal aortic aneurysm ([facility]) 11/2010    3.6 cm 8/2014   • Acute exacerbation of chronic obstructive pulmonary disease (COPD) ([facility]) 1/22/2022   • Acute pulmonary edema ([facility]) 1/22/2022   • Acute systolic CHF (congestive heart failure) ([facility]) 1/22/2022   • Angina     with stress test   • Arthritis     thumbs   • Atherosclerosis of arteries of the extremities     two stents in the groin, Dr. Pickett   • Bowel habit changes    • Cardiac LV ejection fraction 21-40% 10/20/2021   • Carotid atherosclerosis     Dr. Pickett   • COPD (chronic obstructive pulmonary disease) ([facility])    • Diverticulitis     Dx 11/25/11   • Diverticulosis of colon    • Dyslipidemia    • Ejection fraction < 50% 10/20/2021   • Elevated troponin 6/8/2020   • Emphysema of lung ([facility])    • Eosinophilic colitis    • Family history of nonmelanoma skin cancer    • Hypertension    • Hypertensive emergency 1/22/2022   • Left lower lobe consolidation (HCC) 5/9/2018   • Pain    • Peripheral vascular disease (HCC)     9 months, may relate to PVD   • Pneumonia, unspecified " organism 12/3/2021   • Positive blood culture 5/14/2020   • PVD (posterior vitreous detachment), left eye 1/13/2015   • Snoring    • Spinal stenosis of lumbar region     Dr. Navarro   • Stage 3a chronic kidney disease (HCC) 1/27/2022   • Tobacco dependence    • Unspecified hemorrhagic conditions     asa/plavix, bruises easily     Past Surgical History:   Procedure Laterality Date   • AAA WITH STENT GRAFT N/A 3/31/2017    Procedure: AAA WITH STENT GRAFT - 5-CM INFRARENAL;  Surgeon: Spring Pemberton M.D.;  Location: SURGERY [facility];  Service:    • FEMORAL FEMORAL BYPASS N/A 3/31/2017    Procedure: FEMORAL FEMORAL BYPASS - POSS;  Surgeon: Spring Pemberton M.D.;  Location: SURGERY [facility];  Service:    • COLONOSCOPY WITH BIOPSY  3/6/2015    Performed by Pranav THOMPSON M.D. at ENDOSCOPY [facility]   • COLON RESECTION LAPAROSCOPIC  8/5/2013    Performed by Spring Pemberton M.D. at SURGERY [facility]   • OTHER CARDIAC SURGERY  2005    cardiac stents   • GYN SURGERY  2002    hysterectomy, tubal, fibroids, ovaries gone   • GYN SURGERY  1975    ectopic pregnacy   • COLONOSCOPY  3/1/2009, 4/2012, 7/2/13    normal, normal, normal but heavy diverticulosis   • OTHER      LLE /RLEstent, common iliac, Dr. Pickett     Family History   Problem Relation Age of Onset   • Hyperlipidemia Sister    • Hypertension Sister    • Stroke Sister    • Heart Disease Sister         heart attack   • Non-contributory Sister         carotid atherosclerosis   • Hypertension Mother    • Hyperlipidemia Mother    • Heart Disease Mother 82        congestive heart failure, AAA   • Heart Disease Father 55        multiple heart issues   • Hypertension Father    • Hyperlipidemia Father    • Cancer Sister         sin cancer   • Heart Disease Brother         heart attack     Social History     Socioeconomic History   • Marital status:      Spouse name: Not on file   • Number of children: Not on file   • Years of education: Not on  file   • Highest education level: Not on file   Occupational History   • Occupation: stocking     Employer: [employer]     Comment: retired   Tobacco Use   • Smoking status: Former Smoker     Packs/day: 1.00     Years: 45.00     Pack years: 45.00     Types: Cigarettes     Quit date: 9/10/2021     Years since quittin.4   • Smokeless tobacco: Never Used   • Tobacco comment: has stopped   Vaping Use   • Vaping Use: Never used   Substance and Sexual Activity   • Alcohol use: No     Alcohol/week: 0.0 oz   • Drug use: No   • Sexual activity: Not Currently   Other Topics Concern   • Not on file   Social History Narrative   • Not on file     Social Determinants of Health     Financial Resource Strain:    • Difficulty of Paying Living Expenses: Not on file   Food Insecurity:    • Worried About Running Out of Food in the Last Year: Not on file   • Ran Out of Food in the Last Year: Not on file   Transportation Needs:    • Lack of Transportation (Medical): Not on file   • Lack of Transportation (Non-Medical): Not on file   Physical Activity:    • Days of Exercise per Week: Not on file   • Minutes of Exercise per Session: Not on file   Stress:    • Feeling of Stress : Not on file   Social Connections:    • Frequency of Communication with Friends and Family: Not on file   • Frequency of Social Gatherings with Friends and Family: Not on file   • Attends Oriental orthodox Services: Not on file   • Active Member of Clubs or Organizations: Not on file   • Attends Club or Organization Meetings: Not on file   • Marital Status: Not on file   Intimate Partner Violence:    • Fear of Current or Ex-Partner: Not on file   • Emotionally Abused: Not on file   • Physically Abused: Not on file   • Sexually Abused: Not on file   Housing Stability:    • Unable to Pay for Housing in the Last Year: Not on file   • Number of Places Lived in the Last Year: Not on file   • Unstable Housing in the Last Year: Not on file     Allergies   Allergen  Reactions   • Doxycycline Diarrhea     None stop diarrhea   • Tramadol Vomiting   • Amoxicillin Diarrhea     Diarrhea   • Ciprofloxacin Unspecified     Unspecified     Outpatient Encounter Medications as of 2/10/2022   Medication Sig Dispense Refill   • clopidogrel (PLAVIX) 75 MG Tab Take 1 Tablet by mouth every day. 30 Tablet 11   • carvedilol (COREG) 6.25 MG Tab Take 1 Tablet by mouth 2 times a day with meals. 60 Tablet 6   • atorvastatin (LIPITOR) 20 MG Tab Take 1 Tablet by mouth every evening. 30 Tablet 11   • losartan (COZAAR) 50 MG Tab Take 1 Tablet by mouth every day. 90 Tablet 3   • spironolactone (ALDACTONE) 25 MG Tab Take 1 Tablet by mouth every day. 90 Tablet 3   • furosemide (LASIX) 40 MG Tab Take 1 Tablet by mouth every day. 90 Tablet 3   • DILTIAZem (CARDIZEM) 120 MG Tab Take 1 Tablet by mouth every day. 90 Tablet 3   • mirtazapine (REMERON) 15 MG Tab Take 1 Tablet by mouth every evening. 90 Tablet 3   • ipratropium-albuterol (COMBIVENT RESPIMAT)  MCG/ACT Aero Soln Inhale 1 Puff 1 time a day as needed. Indications: Chronic Bronchitis (Patient not taking: Reported on 2/10/2022)     • [DISCONTINUED] carvedilol (COREG) 6.25 MG Tab Take 1 Tablet by mouth 2 times a day with meals. (Patient not taking: Reported on 2/10/2022) 60 Tablet 6   • [DISCONTINUED] clopidogrel (PLAVIX) 75 MG Tab Take 1 Tablet by mouth every day. (Patient not taking: Reported on 2/10/2022) 30 Tablet 11   • [DISCONTINUED] KLOR-CON M20 20 MEQ Tab CR Take 1 Tablet by mouth every day. (Patient not taking: Reported on 2/10/2022) 90 Tablet 3   • [DISCONTINUED] losartan (COZAAR) 50 MG Tab Take 1 Tablet by mouth every day. (Patient not taking: Reported on 2/10/2022) 90 Tablet 3   • VENTOLIN  (90 Base) MCG/ACT Aero Soln inhalation aerosol Inhale 2 Puffs by mouth every four hours as needed for Shortness of Breath. (Patient not taking: Reported on 2/10/2022)       No facility-administered encounter medications on file as of  "2/10/2022.     Review of Systems   Constitutional: Negative.  Negative for chills, fever and malaise/fatigue.   HENT: Negative.  Negative for sore throat.    Eyes: Negative.    Respiratory: Positive for shortness of breath. Negative for cough, hemoptysis, sputum production, wheezing and stridor.    Cardiovascular: Negative.  Negative for chest pain, palpitations, orthopnea, claudication, leg swelling and PND.   Gastrointestinal: Negative.    Genitourinary: Negative.    Musculoskeletal: Negative.    Skin: Negative.    Neurological: Negative.  Negative for dizziness, loss of consciousness and weakness.   Endo/Heme/Allergies: Negative.  Does not bruise/bleed easily.   All other systems reviewed and are negative.             Objective     BP (!) 180/90 (BP Location: Right arm, Patient Position: Sitting, BP Cuff Size: Adult)   Pulse (!) 102   Resp 13   Ht 1.549 m (5' 1\")   Wt 42.9 kg (94 lb 9.6 oz)   LMP 03/01/2002   SpO2 91%   BMI 17.87 kg/m²     Physical Exam  Vitals and nursing note reviewed.   Constitutional:       General: She is not in acute distress.     Appearance: She is well-developed. She is not diaphoretic.   HENT:      Head: Normocephalic and atraumatic.      Right Ear: External ear normal.      Left Ear: External ear normal.      Nose: Nose normal.      Mouth/Throat:      Pharynx: No oropharyngeal exudate.   Eyes:      General: No scleral icterus.        Right eye: No discharge.         Left eye: No discharge.      Conjunctiva/sclera: Conjunctivae normal.      Pupils: Pupils are equal, round, and reactive to light.   Neck:      Vascular: No JVD.   Cardiovascular:      Rate and Rhythm: Normal rate and regular rhythm.      Heart sounds: No murmur heard.  No friction rub. No gallop.    Pulmonary:      Effort: Pulmonary effort is normal. No respiratory distress.      Breath sounds: No stridor. No wheezing or rales.   Chest:      Chest wall: No tenderness.   Abdominal:      General: There is no " distension.      Palpations: Abdomen is soft.      Tenderness: There is no guarding.   Musculoskeletal:         General: No tenderness or deformity. Normal range of motion.      Cervical back: Neck supple.   Skin:     General: Skin is warm and dry.      Coloration: Skin is not pale.      Findings: No erythema or rash.   Neurological:      Mental Status: She is alert.      Cranial Nerves: No cranial nerve deficit.      Motor: No abnormal muscle tone.      Coordination: Coordination normal.      Deep Tendon Reflexes: Reflexes are normal and symmetric. Reflexes normal.   Psychiatric:         Behavior: Behavior normal.         Thought Content: Thought content normal.         Judgment: Judgment normal.          Echocardiogram: Dated 5/13/2020 personally viewed inter myself showing EF of 30% with global hypokinesis.    Echocardiogram: Dated 1/22/2020 personally viewed inter myself showing normal LV systolic function.  No valvular heart disease.    Lab Results   Component Value Date/Time    CHOLSTRLTOT 115 05/13/2020 09:54 AM    LDL 61 05/13/2020 09:54 AM    HDL 41 05/13/2020 09:54 AM    TRIGLYCERIDE 67 05/13/2020 09:54 AM       Lab Results   Component Value Date/Time    SODIUM 134 (L) 01/23/2022 01:10 AM    POTASSIUM 3.8 01/23/2022 01:10 AM    CHLORIDE 95 (L) 01/23/2022 01:10 AM    CO2 27 01/23/2022 01:10 AM    GLUCOSE 138 (H) 01/23/2022 01:10 AM    BUN 47 (H) 01/23/2022 01:10 AM    CREATININE 1.15 01/23/2022 01:10 AM    CREATININE 0.8 08/25/2008 05:40 PM     Lab Results   Component Value Date/Time    ALKPHOSPHAT 122 (H) 01/22/2022 12:37 AM    ASTSGOT 27 01/22/2022 12:37 AM    ALTSGPT 18 01/22/2022 12:37 AM    TBILIRUBIN 0.2 01/22/2022 12:37 AM            Assessment & Plan     1. Essential hypertension     2. Dyslipidemia, goal LDL below 100     3. Arteriosclerosis of arteries of extremities (HCC)  clopidogrel (PLAVIX) 75 MG Tab   4. Abdominal aortic aneurysm greater than 39 mm in diameter (HCC)     5. Peripheral vascular  disease (HCC)  clopidogrel (PLAVIX) 75 MG Tab   6. Atherosclerosis of both carotid arteries     7. Subclavian artery stenosis, left (HCC)  clopidogrel (PLAVIX) 75 MG Tab    atorvastatin (LIPITOR) 20 MG Tab   8. Simple chronic bronchitis (HCC)  atorvastatin (LIPITOR) 20 MG Tab   9. PVD (peripheral vascular disease) (HCC)  atorvastatin (LIPITOR) 20 MG Tab   10. Colonic stricture (HCC)  clopidogrel (PLAVIX) 75 MG Tab    atorvastatin (LIPITOR) 20 MG Tab   11. Biventricular congestive heart failure (HCC)     12. Localized edema     13. Stage 3a chronic kidney disease (HCC)     14. Muscle weakness (generalized)     15. Chronic respiratory failure with hypoxia (HCC)     16. History of 2019 novel coronavirus disease (COVID-19)     17. Cardiac LV ejection fraction 21-40%  carvedilol (COREG) 6.25 MG Tab   18. Chronic combined systolic and diastolic congestive heart failure (HCC)  carvedilol (COREG) 6.25 MG Tab   19. Ejection fraction < 50%  losartan (COZAAR) 50 MG Tab   20. Chronic systolic congestive heart failure (HCC)  losartan (COZAAR) 50 MG Tab       Medical Decision Making: Today's Assessment/Status/Plan:        76-year-old female with severe near end-stage COPD with a history of reduced ejection fraction normalized.  I think the majority of her symptoms are related to her severe COPD.  I have given her instructions about medications to restart for her high blood pressure.  I have given her a new prescription for carvedilol losartan and Plavix.  I also started on atorvastatin for her severe peripheral vascular disease.  I will see her back in 2 weeks for blood pressure check.

## 2022-02-28 ENCOUNTER — APPOINTMENT (OUTPATIENT)
Dept: RADIOLOGY | Facility: MEDICAL CENTER | Age: 77
End: 2022-02-28
Attending: EMERGENCY MEDICINE
Payer: MEDICARE

## 2022-02-28 ENCOUNTER — HOSPITAL ENCOUNTER (OUTPATIENT)
Facility: MEDICAL CENTER | Age: 77
End: 2022-03-01
Attending: EMERGENCY MEDICINE | Admitting: STUDENT IN AN ORGANIZED HEALTH CARE EDUCATION/TRAINING PROGRAM
Payer: MEDICARE

## 2022-02-28 ENCOUNTER — OFFICE VISIT (OUTPATIENT)
Dept: CARDIOLOGY | Facility: MEDICAL CENTER | Age: 77
End: 2022-02-28
Payer: MEDICARE

## 2022-02-28 VITALS
BODY MASS INDEX: 17.71 KG/M2 | RESPIRATION RATE: 12 BRPM | OXYGEN SATURATION: 77 % | DIASTOLIC BLOOD PRESSURE: 50 MMHG | HEART RATE: 87 BPM | SYSTOLIC BLOOD PRESSURE: 86 MMHG | HEIGHT: 61 IN | WEIGHT: 93.8 LBS

## 2022-02-28 DIAGNOSIS — R09.02 HYPOXIA: ICD-10-CM

## 2022-02-28 DIAGNOSIS — J44.1 ACUTE EXACERBATION OF CHRONIC OBSTRUCTIVE PULMONARY DISEASE (COPD) (HCC): ICD-10-CM

## 2022-02-28 DIAGNOSIS — N39.0 ACUTE UTI: ICD-10-CM

## 2022-02-28 DIAGNOSIS — I10 ESSENTIAL HYPERTENSION: ICD-10-CM

## 2022-02-28 DIAGNOSIS — R06.02 SHORTNESS OF BREATH: ICD-10-CM

## 2022-02-28 PROBLEM — A41.9 SEPSIS (HCC): Status: ACTIVE | Noted: 2022-02-28

## 2022-02-28 PROBLEM — Z71.89 ADVANCED CARE PLANNING/COUNSELING DISCUSSION: Status: ACTIVE | Noted: 2022-02-28

## 2022-02-28 LAB
ALBUMIN SERPL BCP-MCNC: 3.8 G/DL (ref 3.2–4.9)
ALBUMIN/GLOB SERPL: 1.1 G/DL
ALP SERPL-CCNC: 97 U/L (ref 30–99)
ALT SERPL-CCNC: 17 U/L (ref 2–50)
ANION GAP SERPL CALC-SCNC: 11 MMOL/L (ref 7–16)
APPEARANCE UR: ABNORMAL
AST SERPL-CCNC: 23 U/L (ref 12–45)
BACTERIA #/AREA URNS HPF: ABNORMAL /HPF
BASOPHILS # BLD AUTO: 0.8 % (ref 0–1.8)
BASOPHILS # BLD: 0.1 K/UL (ref 0–0.12)
BILIRUB SERPL-MCNC: 0.2 MG/DL (ref 0.1–1.5)
BILIRUB UR QL STRIP.AUTO: NEGATIVE
BUN SERPL-MCNC: 46 MG/DL (ref 8–22)
CALCIUM SERPL-MCNC: 10.1 MG/DL (ref 8.5–10.5)
CHLORIDE SERPL-SCNC: 93 MMOL/L (ref 96–112)
CO2 SERPL-SCNC: 26 MMOL/L (ref 20–33)
COLOR UR: YELLOW
CREAT SERPL-MCNC: 1.21 MG/DL (ref 0.5–1.4)
EKG IMPRESSION: NORMAL
EKG IMPRESSION: NORMAL
EOSINOPHIL # BLD AUTO: 0.8 K/UL (ref 0–0.51)
EOSINOPHIL NFR BLD: 6.3 % (ref 0–6.9)
EPI CELLS #/AREA URNS HPF: NEGATIVE /HPF
ERYTHROCYTE [DISTWIDTH] IN BLOOD BY AUTOMATED COUNT: 42.4 FL (ref 35.9–50)
GLOBULIN SER CALC-MCNC: 3.4 G/DL (ref 1.9–3.5)
GLUCOSE SERPL-MCNC: 117 MG/DL (ref 65–99)
GLUCOSE UR STRIP.AUTO-MCNC: NEGATIVE MG/DL
HCT VFR BLD AUTO: 38.1 % (ref 37–47)
HGB BLD-MCNC: 12.1 G/DL (ref 12–16)
HYALINE CASTS #/AREA URNS LPF: ABNORMAL /LPF
IMM GRANULOCYTES # BLD AUTO: 0.12 K/UL (ref 0–0.11)
IMM GRANULOCYTES NFR BLD AUTO: 0.9 % (ref 0–0.9)
KETONES UR STRIP.AUTO-MCNC: NEGATIVE MG/DL
LACTATE BLD-SCNC: 0.8 MMOL/L (ref 0.5–2)
LEUKOCYTE ESTERASE UR QL STRIP.AUTO: ABNORMAL
LYMPHOCYTES # BLD AUTO: 1.58 K/UL (ref 1–4.8)
LYMPHOCYTES NFR BLD: 12.4 % (ref 22–41)
MCH RBC QN AUTO: 26.5 PG (ref 27–33)
MCHC RBC AUTO-ENTMCNC: 31.8 G/DL (ref 33.6–35)
MCV RBC AUTO: 83.6 FL (ref 81.4–97.8)
MICRO URNS: ABNORMAL
MONOCYTES # BLD AUTO: 1.11 K/UL (ref 0–0.85)
MONOCYTES NFR BLD AUTO: 8.7 % (ref 0–13.4)
NEUTROPHILS # BLD AUTO: 9.06 K/UL (ref 2–7.15)
NEUTROPHILS NFR BLD: 70.9 % (ref 44–72)
NITRITE UR QL STRIP.AUTO: POSITIVE
NRBC # BLD AUTO: 0 K/UL
NRBC BLD-RTO: 0 /100 WBC
NT-PROBNP SERPL IA-MCNC: 819 PG/ML (ref 0–125)
PH UR STRIP.AUTO: 5.5 [PH] (ref 5–8)
PLATELET # BLD AUTO: 456 K/UL (ref 164–446)
PMV BLD AUTO: 9.6 FL (ref 9–12.9)
POTASSIUM SERPL-SCNC: 5.2 MMOL/L (ref 3.6–5.5)
PROT SERPL-MCNC: 7.2 G/DL (ref 6–8.2)
PROT UR QL STRIP: NEGATIVE MG/DL
RBC # BLD AUTO: 4.56 M/UL (ref 4.2–5.4)
RBC # URNS HPF: ABNORMAL /HPF
RBC UR QL AUTO: ABNORMAL
SARS-COV-2 RNA RESP QL NAA+PROBE: NOTDETECTED
SODIUM SERPL-SCNC: 130 MMOL/L (ref 135–145)
SP GR UR STRIP.AUTO: 1.01
SPECIMEN SOURCE: NORMAL
TROPONIN T SERPL-MCNC: 20 NG/L (ref 6–19)
TROPONIN T SERPL-MCNC: 30 NG/L (ref 6–19)
UROBILINOGEN UR STRIP.AUTO-MCNC: 0.2 MG/DL
WBC # BLD AUTO: 12.8 K/UL (ref 4.8–10.8)
WBC #/AREA URNS HPF: ABNORMAL /HPF

## 2022-02-28 PROCEDURE — 99215 OFFICE O/P EST HI 40 MIN: CPT | Performed by: INTERNAL MEDICINE

## 2022-02-28 PROCEDURE — 96375 TX/PRO/DX INJ NEW DRUG ADDON: CPT

## 2022-02-28 PROCEDURE — 87186 SC STD MICRODIL/AGAR DIL: CPT

## 2022-02-28 PROCEDURE — U0005 INFEC AGEN DETEC AMPLI PROBE: HCPCS

## 2022-02-28 PROCEDURE — 81001 URINALYSIS AUTO W/SCOPE: CPT

## 2022-02-28 PROCEDURE — 87086 URINE CULTURE/COLONY COUNT: CPT

## 2022-02-28 PROCEDURE — 93005 ELECTROCARDIOGRAM TRACING: CPT | Performed by: EMERGENCY MEDICINE

## 2022-02-28 PROCEDURE — 700111 HCHG RX REV CODE 636 W/ 250 OVERRIDE (IP): Performed by: STUDENT IN AN ORGANIZED HEALTH CARE EDUCATION/TRAINING PROGRAM

## 2022-02-28 PROCEDURE — G0378 HOSPITAL OBSERVATION PER HR: HCPCS

## 2022-02-28 PROCEDURE — 700117 HCHG RX CONTRAST REV CODE 255: Performed by: EMERGENCY MEDICINE

## 2022-02-28 PROCEDURE — 83880 ASSAY OF NATRIURETIC PEPTIDE: CPT

## 2022-02-28 PROCEDURE — 84484 ASSAY OF TROPONIN QUANT: CPT

## 2022-02-28 PROCEDURE — 700111 HCHG RX REV CODE 636 W/ 250 OVERRIDE (IP): Performed by: EMERGENCY MEDICINE

## 2022-02-28 PROCEDURE — 94760 N-INVAS EAR/PLS OXIMETRY 1: CPT

## 2022-02-28 PROCEDURE — 87077 CULTURE AEROBIC IDENTIFY: CPT

## 2022-02-28 PROCEDURE — 96365 THER/PROPH/DIAG IV INF INIT: CPT

## 2022-02-28 PROCEDURE — 36415 COLL VENOUS BLD VENIPUNCTURE: CPT

## 2022-02-28 PROCEDURE — A9270 NON-COVERED ITEM OR SERVICE: HCPCS | Performed by: STUDENT IN AN ORGANIZED HEALTH CARE EDUCATION/TRAINING PROGRAM

## 2022-02-28 PROCEDURE — 93005 ELECTROCARDIOGRAM TRACING: CPT

## 2022-02-28 PROCEDURE — 700105 HCHG RX REV CODE 258: Performed by: STUDENT IN AN ORGANIZED HEALTH CARE EDUCATION/TRAINING PROGRAM

## 2022-02-28 PROCEDURE — 94640 AIRWAY INHALATION TREATMENT: CPT

## 2022-02-28 PROCEDURE — 99285 EMERGENCY DEPT VISIT HI MDM: CPT

## 2022-02-28 PROCEDURE — 93000 ELECTROCARDIOGRAM COMPLETE: CPT | Performed by: INTERNAL MEDICINE

## 2022-02-28 PROCEDURE — 71275 CT ANGIOGRAPHY CHEST: CPT | Mod: ME

## 2022-02-28 PROCEDURE — 85025 COMPLETE CBC W/AUTO DIFF WBC: CPT

## 2022-02-28 PROCEDURE — 99220 PR INITIAL OBSERVATION CARE,LEVL III: CPT | Performed by: STUDENT IN AN ORGANIZED HEALTH CARE EDUCATION/TRAINING PROGRAM

## 2022-02-28 PROCEDURE — U0003 INFECTIOUS AGENT DETECTION BY NUCLEIC ACID (DNA OR RNA); SEVERE ACUTE RESPIRATORY SYNDROME CORONAVIRUS 2 (SARS-COV-2) (CORONAVIRUS DISEASE [COVID-19]), AMPLIFIED PROBE TECHNIQUE, MAKING USE OF HIGH THROUGHPUT TECHNOLOGIES AS DESCRIBED BY CMS-2020-01-R: HCPCS

## 2022-02-28 PROCEDURE — 700101 HCHG RX REV CODE 250: Performed by: STUDENT IN AN ORGANIZED HEALTH CARE EDUCATION/TRAINING PROGRAM

## 2022-02-28 PROCEDURE — 71045 X-RAY EXAM CHEST 1 VIEW: CPT

## 2022-02-28 PROCEDURE — 700102 HCHG RX REV CODE 250 W/ 637 OVERRIDE(OP): Performed by: STUDENT IN AN ORGANIZED HEALTH CARE EDUCATION/TRAINING PROGRAM

## 2022-02-28 PROCEDURE — 87040 BLOOD CULTURE FOR BACTERIA: CPT | Mod: 91

## 2022-02-28 PROCEDURE — 80053 COMPREHEN METABOLIC PANEL: CPT

## 2022-02-28 PROCEDURE — 83605 ASSAY OF LACTIC ACID: CPT

## 2022-02-28 RX ORDER — CLOPIDOGREL BISULFATE 75 MG/1
75 TABLET ORAL DAILY
Status: DISCONTINUED | OUTPATIENT
Start: 2022-03-01 | End: 2022-03-01 | Stop reason: HOSPADM

## 2022-02-28 RX ORDER — BUDESONIDE AND FORMOTEROL FUMARATE DIHYDRATE 160; 4.5 UG/1; UG/1
2 AEROSOL RESPIRATORY (INHALATION)
Status: DISCONTINUED | OUTPATIENT
Start: 2022-03-01 | End: 2022-03-01 | Stop reason: HOSPADM

## 2022-02-28 RX ORDER — CLONIDINE HYDROCHLORIDE 0.1 MG/1
0.1 TABLET ORAL 2 TIMES DAILY
COMMUNITY
End: 2022-03-14

## 2022-02-28 RX ORDER — PREDNISONE 20 MG/1
40 TABLET ORAL DAILY
Status: DISCONTINUED | OUTPATIENT
Start: 2022-03-01 | End: 2022-03-01 | Stop reason: HOSPADM

## 2022-02-28 RX ORDER — CARVEDILOL 6.25 MG/1
6.25 TABLET ORAL 2 TIMES DAILY WITH MEALS
Status: DISCONTINUED | OUTPATIENT
Start: 2022-02-28 | End: 2022-03-01 | Stop reason: HOSPADM

## 2022-02-28 RX ORDER — FUROSEMIDE 40 MG/1
40 TABLET ORAL DAILY
Status: DISCONTINUED | OUTPATIENT
Start: 2022-03-01 | End: 2022-03-01 | Stop reason: HOSPADM

## 2022-02-28 RX ORDER — IPRATROPIUM BROMIDE AND ALBUTEROL SULFATE 2.5; .5 MG/3ML; MG/3ML
3 SOLUTION RESPIRATORY (INHALATION)
Status: DISCONTINUED | OUTPATIENT
Start: 2022-02-28 | End: 2022-03-01 | Stop reason: HOSPADM

## 2022-02-28 RX ORDER — MIRTAZAPINE 15 MG/1
15 TABLET, FILM COATED ORAL NIGHTLY
Status: DISCONTINUED | OUTPATIENT
Start: 2022-02-28 | End: 2022-03-01 | Stop reason: HOSPADM

## 2022-02-28 RX ORDER — ATORVASTATIN CALCIUM 20 MG/1
20 TABLET, FILM COATED ORAL NIGHTLY
Status: DISCONTINUED | OUTPATIENT
Start: 2022-02-28 | End: 2022-03-01 | Stop reason: HOSPADM

## 2022-02-28 RX ORDER — DILTIAZEM HYDROCHLORIDE 120 MG/1
120 TABLET, FILM COATED ORAL DAILY
Status: DISCONTINUED | OUTPATIENT
Start: 2022-03-01 | End: 2022-03-01 | Stop reason: HOSPADM

## 2022-02-28 RX ORDER — HEPARIN SODIUM 5000 [USP'U]/ML
5000 INJECTION, SOLUTION INTRAVENOUS; SUBCUTANEOUS EVERY 8 HOURS
Status: DISCONTINUED | OUTPATIENT
Start: 2022-02-28 | End: 2022-03-01 | Stop reason: HOSPADM

## 2022-02-28 RX ORDER — AZITHROMYCIN 500 MG/5ML
500 INJECTION, POWDER, LYOPHILIZED, FOR SOLUTION INTRAVENOUS EVERY 24 HOURS
Status: DISCONTINUED | OUTPATIENT
Start: 2022-02-28 | End: 2022-02-28

## 2022-02-28 RX ORDER — PREDNISONE 20 MG/1
40 TABLET ORAL ONCE
Status: COMPLETED | OUTPATIENT
Start: 2022-02-28 | End: 2022-02-28

## 2022-02-28 RX ADMIN — IPRATROPIUM BROMIDE AND ALBUTEROL SULFATE 3 ML: 2.5; .5 SOLUTION RESPIRATORY (INHALATION) at 18:33

## 2022-02-28 RX ADMIN — IOHEXOL 50 ML: 350 INJECTION, SOLUTION INTRAVENOUS at 16:24

## 2022-02-28 RX ADMIN — MIRTAZAPINE 15 MG: 15 TABLET, FILM COATED ORAL at 20:36

## 2022-02-28 RX ADMIN — PREDNISONE 40 MG: 20 TABLET ORAL at 13:40

## 2022-02-28 RX ADMIN — ATORVASTATIN CALCIUM 20 MG: 20 TABLET, FILM COATED ORAL at 20:36

## 2022-02-28 RX ADMIN — IPRATROPIUM BROMIDE AND ALBUTEROL SULFATE 3 ML: 2.5; .5 SOLUTION RESPIRATORY (INHALATION) at 22:32

## 2022-02-28 RX ADMIN — CEFTRIAXONE SODIUM 1 G: 10 INJECTION, POWDER, FOR SOLUTION INTRAVENOUS at 20:36

## 2022-02-28 RX ADMIN — AZITHROMYCIN MONOHYDRATE 500 MG: 500 INJECTION, POWDER, LYOPHILIZED, FOR SOLUTION INTRAVENOUS at 18:50

## 2022-02-28 ASSESSMENT — FIBROSIS 4 INDEX
FIB4 SCORE: 0.92
FIB4 SCORE: 1.69
FIB4 SCORE: 1.69

## 2022-02-28 ASSESSMENT — ENCOUNTER SYMPTOMS
LOSS OF CONSCIOUSNESS: 0
STRIDOR: 0
CHILLS: 0
ORTHOPNEA: 0
PND: 0
NEUROLOGICAL NEGATIVE: 1
GASTROINTESTINAL NEGATIVE: 1
PALPITATIONS: 0
BRUISES/BLEEDS EASILY: 0
CARDIOVASCULAR NEGATIVE: 1
GASTROINTESTINAL NEGATIVE: 1
WHEEZING: 0
MUSCULOSKELETAL NEGATIVE: 1
EYES NEGATIVE: 1
COUGH: 1
CLAUDICATION: 0
MUSCULOSKELETAL NEGATIVE: 1
WEAKNESS: 0
SHORTNESS OF BREATH: 1
DIZZINESS: 0
CONSTITUTIONAL NEGATIVE: 1
SORE THROAT: 0
FEVER: 0
EYES NEGATIVE: 1
HEMOPTYSIS: 0
SHORTNESS OF BREATH: 1
SPUTUM PRODUCTION: 0
PSYCHIATRIC NEGATIVE: 1
NEUROLOGICAL NEGATIVE: 1

## 2022-02-28 ASSESSMENT — LIFESTYLE VARIABLES
HOW MANY TIMES IN THE PAST YEAR HAVE YOU HAD 5 OR MORE DRINKS IN A DAY: 0
DO YOU DRINK ALCOHOL: NO
EVER HAD A DRINK FIRST THING IN THE MORNING TO STEADY YOUR NERVES TO GET RID OF A HANGOVER: NO
TOTAL SCORE: 0
CONSUMPTION TOTAL: NEGATIVE
EVER_SMOKED: YES
DOES PATIENT WANT TO STOP DRINKING: NO
AVERAGE NUMBER OF DAYS PER WEEK YOU HAVE A DRINK CONTAINING ALCOHOL: 0
ALCOHOL_USE: NO
ON A TYPICAL DAY WHEN YOU DRINK ALCOHOL HOW MANY DRINKS DO YOU HAVE: 0
PACK_YEARS: 30
TOTAL SCORE: 0
EVER FELT BAD OR GUILTY ABOUT YOUR DRINKING: NO
HAVE PEOPLE ANNOYED YOU BY CRITICIZING YOUR DRINKING: NO
HAVE YOU EVER FELT YOU SHOULD CUT DOWN ON YOUR DRINKING: NO
TOTAL SCORE: 0

## 2022-02-28 ASSESSMENT — COPD QUESTIONNAIRES
HAVE YOU SMOKED AT LEAST 100 CIGARETTES IN YOUR ENTIRE LIFE: YES
COPD SCREENING SCORE: 4
DURING THE PAST 4 WEEKS HOW MUCH DID YOU FEEL SHORT OF BREATH: NONE/LITTLE OF THE TIME
DO YOU EVER COUGH UP ANY MUCUS OR PHLEGM?: NO/ONLY WITH OCCASIONAL COLDS OR INFECTIONS

## 2022-02-28 NOTE — ED PROVIDER NOTES
"ED Provider Note    CHIEF COMPLAINT  Chief Complaint   Patient presents with   • Shortness of Breath   • Cough   • Sent by MD MURRAY  Karen Jauregui is a 76 y.o. female with COPD on 3 L as needed, dyslipidemia, hypertension, and CHF who presents complaining of cough and shortness of breath.    Patient states she has a history of pneumonia and thinks she may have COPD but has not been diagnosed with it.  Patient quit smoking 4 months ago.  She states she had an appointment at the cardiology office today and was found to have low oxygen so she was sent here for evaluation.  She reports clear sputum and shortness of breath.  She denies chest pain, nausea/vomiting, diarrhea, fever, chills, leg swelling, calf pain, history of PE/DVT, nausea/vomiting, diarrhea, diaphoresis.    Patient states she only wears her oxygen \"as needed.\"  She will wake up at night and put it on if she feels short of breath, several times per night.  She has only been on oxygen for the last 3 weeks.  She prefers to sit on the couch and watch TV during the day with her sister rather than putting oxygen on to move around the house more.  \"I do not like wearing it.\"      ALLERGIES  Allergies   Allergen Reactions   • Doxycycline Diarrhea     None stop diarrhea   • Tramadol Vomiting   • Amoxicillin Diarrhea     Diarrhea   • Ciprofloxacin Unspecified     Unspecified       CURRENT MEDICATIONS  Home Medications     Reviewed by James Xie (Pharmacy Tech) on 02/28/22 at 1417  Med List Status: Complete   Medication Last Dose Status   atorvastatin (LIPITOR) 20 MG Tab 2/27/2022 Active   carvedilol (COREG) 6.25 MG Tab 2/27/2022 Active   cloNIDine (CATAPRES) 0.1 MG Tab 2/27/2022 Active   clopidogrel (PLAVIX) 75 MG Tab UNK Active   DILTIAZem (CARDIZEM) 120 MG Tab 2/27/2022 Active   Fluticasone Furoate-Vilanterol (BREO ELLIPTA) 200-25 MCG/INH AEROSOL POWDER, BREATH ACTIVATED 2/27/2022 Active   furosemide (LASIX) 40 MG Tab 2/27/2022 Active "   losartan (COZAAR) 50 MG Tab 2022 Active   mirtazapine (REMERON) 15 MG Tab UNK Active   VENTOLIN  (90 Base) MCG/ACT Aero Soln inhalation aerosol PRN Active                PAST MEDICAL HISTORY   has a past medical history of Abdominal aortic aneurysm (HCC) (2010), Acute exacerbation of chronic obstructive pulmonary disease (COPD) ([facility]) (2022), Acute pulmonary edema ([facility]) (2022), Acute systolic CHF (congestive heart failure) ([facility]) (2022), Angina, Arthritis, Atherosclerosis of arteries of the extremities, Bowel habit changes, Cardiac LV ejection fraction 21-40% (10/20/2021), Carotid atherosclerosis, COPD (chronic obstructive pulmonary disease) ([facility]), Diverticulitis, Diverticulosis of colon, Dyslipidemia, Ejection fraction < 50% (10/20/2021), Elevated troponin (2020), Emphysema of lung ([facility]), Eosinophilic colitis, Family history of nonmelanoma skin cancer, Hypertension, Hypertensive emergency (2022), Left lower lobe consolidation ([facility]) (2018), Pain, Peripheral vascular disease ([facility]), Pneumonia, unspecified organism (12/3/2021), Positive blood culture (2020), PVD (posterior vitreous detachment), left eye (2015), Snoring, Spinal stenosis of lumbar region, Stage 3a chronic kidney disease (HCC) (2022), Tobacco dependence, and Unspecified hemorrhagic conditions.    SURGICAL HISTORY   has a past surgical history that includes colonoscopy (3/1/2009, 2012, 13); gyn surgery (); gyn surgery (); other; colon resection laparoscopic (2013); colonoscopy with biopsy (3/6/2015); other cardiac surgery (); aaa with stent graft (N/A, 3/31/2017); and femoral femoral bypass (N/A, 3/31/2017).    SOCIAL HISTORY  Social History     Tobacco Use   • Smoking status: Former Smoker     Packs/day: 1.00     Years: 45.00     Pack years: 45.00     Types: Cigarettes     Quit date: 9/10/2021     Years since quittin.4   • Smokeless tobacco: Never Used   • Tobacco comment:  "has stopped   Vaping Use   • Vaping Use: Never used   Substance and Sexual Activity   • Alcohol use: No     Alcohol/week: 0.0 oz   • Drug use: No   • Sexual activity: Not Currently       Family Hx:  CHF, CAD      REVIEW OF SYSTEMS  See HPI for further details.  All other systems are negative except as above in HPI.      PHYSICAL EXAM  VITAL SIGNS: /53   Pulse 99   Temp 37.1 °C (98.8 °F) (Temporal)   Resp 17   Ht 1.549 m (5' 1\")   Wt 42.6 kg (94 lb)   LMP 03/01/2002   SpO2 98%   BMI 17.76 kg/m²     General:  WD thin elderly female, nontoxic but chronically ill appearing in NAD; A+Ox3; V/S as above; tachycardic at triage, afebrile; productive cough noted  Skin: warm and dry; good color; no rash  HEENT: NCAT; EOMs intact; no scleral icterus   Neck: soft, no JVD, no stridor  Cardiovascular: Regular heart rate and rhythm.  No murmurs, rubs, or gallops; pulses 2+ bilaterally radially and DP areas  Lungs: Bilateral rhonchi with expiratory wheezing; decreased air movement bilaterally.  No rales  Abdomen: BS present; soft; NTND; no rebound, guarding, or rigidity.  No organomegaly or pulsatile mass  Extremities: HUNTER x 4; no e/o trauma; no pedal edema; neg Antwan's  Neurologic: CNs III-XII grossly intact; speech clear; distal sensation intact; strength 5/5 UE/LEs  Psychiatric: Appropriate affect, normal mood    LABS  Results for orders placed or performed during the hospital encounter of 02/28/22   CBC with Differential   Result Value Ref Range    WBC 12.8 (H) 4.8 - 10.8 K/uL    RBC 4.56 4.20 - 5.40 M/uL    Hemoglobin 12.1 12.0 - 16.0 g/dL    Hematocrit 38.1 37.0 - 47.0 %    MCV 83.6 81.4 - 97.8 fL    MCH 26.5 (L) 27.0 - 33.0 pg    MCHC 31.8 (L) 33.6 - 35.0 g/dL    RDW 42.4 35.9 - 50.0 fL    Platelet Count 456 (H) 164 - 446 K/uL    MPV 9.6 9.0 - 12.9 fL    Neutrophils-Polys 70.90 44.00 - 72.00 %    Lymphocytes 12.40 (L) 22.00 - 41.00 %    Monocytes 8.70 0.00 - 13.40 %    Eosinophils 6.30 0.00 - 6.90 %    Basophils " 0.80 0.00 - 1.80 %    Immature Granulocytes 0.90 0.00 - 0.90 %    Nucleated RBC 0.00 /100 WBC    Neutrophils (Absolute) 9.06 (H) 2.00 - 7.15 K/uL    Lymphs (Absolute) 1.58 1.00 - 4.80 K/uL    Monos (Absolute) 1.11 (H) 0.00 - 0.85 K/uL    Eos (Absolute) 0.80 (H) 0.00 - 0.51 K/uL    Baso (Absolute) 0.10 0.00 - 0.12 K/uL    Immature Granulocytes (abs) 0.12 (H) 0.00 - 0.11 K/uL    NRBC (Absolute) 0.00 K/uL   Comp Metabolic Panel   Result Value Ref Range    Sodium 130 (L) 135 - 145 mmol/L    Potassium 5.2 3.6 - 5.5 mmol/L    Chloride 93 (L) 96 - 112 mmol/L    Co2 26 20 - 33 mmol/L    Anion Gap 11.0 7.0 - 16.0    Glucose 117 (H) 65 - 99 mg/dL    Bun 46 (H) 8 - 22 mg/dL    Creatinine 1.21 0.50 - 1.40 mg/dL    Calcium 10.1 8.5 - 10.5 mg/dL    AST(SGOT) 23 12 - 45 U/L    ALT(SGPT) 17 2 - 50 U/L    Alkaline Phosphatase 97 30 - 99 U/L    Total Bilirubin 0.2 0.1 - 1.5 mg/dL    Albumin 3.8 3.2 - 4.9 g/dL    Total Protein 7.2 6.0 - 8.2 g/dL    Globulin 3.4 1.9 - 3.5 g/dL    A-G Ratio 1.1 g/dL   Lactic acid (lactate)   Result Value Ref Range    Lactic Acid 0.8 0.5 - 2.0 mmol/L   TROPONIN   Result Value Ref Range    Troponin T 30 (H) 6 - 19 ng/L   ESTIMATED GFR   Result Value Ref Range    GFR If  52 (A) >60 mL/min/1.73 m 2    GFR If Non  43 (A) >60 mL/min/1.73 m 2   proBrain Natriuretic Peptide, NT   Result Value Ref Range    NT-proBNP 819 (H) 0 - 125 pg/mL   EKG   Result Value Ref Range    Report       [facility] Emergency Dept.    Test Date:  2022  Pt Name:    AIDE TONY             Department: ER  MRN:        [id number]                      Room:       [facility]  Gender:     Female                       Technician: [id number]  :        1945                   Requested By:ER TRIAGE PROTOCOL  Order #:    [id number]                    Reading MD: IRENE MCDERMOTT MD    Measurements  Intervals                                Axis  Rate:       110                          P:           84  MD:         178                          QRS:        -48  QRSD:       119                          T:          111  QT:         332  QTc:        450    Interpretive Statements  Sinus tachycardia  Biatrial enlargement  LVH with IVCD, LAD and secondary repol abnrm  Inferior infarct, acute (RCA)  Anterior ST elevation, probably due to LVH  Probable RV involvement, suggest recording right precordial leads  Artifact in lead(s) I,II,III,aVR,aVL,aVF,V2,V4,V5  Compared to ECG 02/28/2 022 10:53:30  Intraventricular conduction delay now present  ST (T wave) deviation now present  Myocardial infarct finding still present  Electronically Signed On 2- 12:15:33 PST by IRENE MCDERMOTT MD             IMAGING  CT-CTA CHEST PULMONARY ARTERY W/ RECONS   Final Result      1.  No evidence of pulmonary embolism.   2.  Severe emphysema.   3.  Stable, nonspecific mildly enlarged right hilar lymph node.   4.  Opacification of the dependent bronchi consistent with aspiration.   5.  Gas in the right upper quadrant appears to be within an interposed loop of colon. If there is abdominal pain, abdominal imaging can be obtained to exclude pneumoperitoneum.   6.  6 mm left lower lobe pulmonary nodule not definitively seen on prior exam. Follow-up recommendations below.   7.  Atherosclerotic changes. Coronary artery atherosclerotic disease.   8.  Nonspecific thickening of the left adrenal gland. Correlate with biochemical analysis.   9.  Right renal cortical atrophy.      Low Risk: CT at 6-12 months, then consider CT at 18-24 months      High Risk: CT at 6-12 months, then CT at 18-24 months      Low Risk - Minimal or absent history of smoking and of other known risk factors.      High Risk - History of smoking or of other known risk factors.      Note: These recommendations do not apply to lung cancer screening, patients with immunosuppression, or patients with known primary cancer.      Fleischner Society 2017 Guidelines for  Management of Incidentally Detected Pulmonary Nodules in Adults      DX-CHEST-PORTABLE (1 VIEW)   Final Result      Hyperaeration of the lungs without acute cardiopulmonary abnormality.        MEDICAL RECORD  I have reviewed patient's medical record and pertinent results are listed below.      COURSE & MEDICAL DECISION MAKING  I have reviewed any medical record information, laboratory studies and radiographic results as noted.    Karen Jauregui is a 76 y.o. female who presents complaining of hypoxia in the noted at the cardiology office off of oxygen/on room air, cough for the last 3 to 7 days.     Chest x-ray demonstrates hyperaeration bilaterally without pulmonary edema, effusion, or consolidation.    EKG demonstrates sinus tachycardia with biatrial enlargement, likely secondary to COPD. No acute ST changes per my read.    Appropriate PPE was worn at all times while interacting with the patient.    Troponin is elevated to 30.  BNP elevated to 819.  White blood cell count 12.8 with sodium of 130, glucose 117, and BUN 46.    No obvious focal consolidation on chest x-ray but the patient appears to be mildly dehydrated on labs so perhaps not visualized.    CT imaging noted.  Patient is not having any abdominal pain.  No evidence of PE.  Also no evidence of pneumonia.  New pulmonary nodule noted.    Patient will be admitted for COPD exacerbation and trending of troponin.  BNP is elevated but no evidence of pulmonary edema on imaging.    5:27 PM  I discussed the case with the hospitalist who agrees to admit the patient    FINAL IMPRESSION  1. Hypoxia     2. Shortness of breath       Electronically signed by: Rosa Isela Campbell M.D., 2/28/2022 12:12 PM

## 2022-02-28 NOTE — ED NOTES
Chief Complaint   Patient presents with   • Shortness of Breath   • Cough   • Sent by MD [facility] from cardiologist office, where they found pt's room air spo2 77%. She was placed on 2L.   Pt has history of copd and chf, she has been having productive cough. Pt uses home oxygen only as needed. Increase sob with activity.

## 2022-02-28 NOTE — PROGRESS NOTES
Chief Complaint   Patient presents with   • Hypertension     F/V DX: Essential hypertension        Subjective     Karen Jauregui is a 76 y.o. female who presents today as a new consultation for heart failure.  She is a 76-year-old female with a 60-pack-year smoking history.  She was admitted to hospital for lower extremity edema shortness of breath.  She had a CT scan showing massive bilateral blebs throughout her operative middle lung fields.  She had echocardiogram showing normal LV systolic function.  She appears to be investigated.  She has lower extreme edema which is now been responding to spironolactone and Lasix.  Her blood pressure is severely elevated.  She does not understand what medication she should be currently taking.  She is off her carvedilol losartan and confused about what dose of furosemide to take.  Since she was last seen she walked in with a heart rate in the 117 range.  ECG shows a narrow complex tachycardia which is unchanged and rate consistent with possibly a left atrial flutter.  She is hypotensive.  Her oxygen saturations are low in the 70s.  She was dizzy and lightheaded.  She is immediately placed on oxygen.  Her O2 returned to normal however her heart rate did not change at all.  She is feeling chest pressure and shortness of breath with worsening cough.      Past Medical History:   Diagnosis Date   • Abdominal aortic aneurysm ([facility]) 11/2010    3.6 cm 8/2014   • Acute exacerbation of chronic obstructive pulmonary disease (COPD) ([facility]) 1/22/2022   • Acute pulmonary edema ([facility]) 1/22/2022   • Acute systolic CHF (congestive heart failure) ([facility]) 1/22/2022   • Angina     with stress test   • Arthritis     thumbs   • Atherosclerosis of arteries of the extremities     two stents in the groin, Dr. Pickett   • Bowel habit changes    • Cardiac LV ejection fraction 21-40% 10/20/2021   • Carotid atherosclerosis     Dr. Pickett   • COPD (chronic obstructive pulmonary disease) ([facility])    •  Diverticulitis     Dx 11/25/11   • Diverticulosis of colon    • Dyslipidemia    • Ejection fraction < 50% 10/20/2021   • Elevated troponin 6/8/2020   • Emphysema of lung (HCC)    • Eosinophilic colitis    • Family history of nonmelanoma skin cancer    • Hypertension    • Hypertensive emergency 1/22/2022   • Left lower lobe consolidation (HCC) 5/9/2018   • Pain    • Peripheral vascular disease (HCC)     9 months, may relate to PVD   • Pneumonia, unspecified organism 12/3/2021   • Positive blood culture 5/14/2020   • PVD (posterior vitreous detachment), left eye 1/13/2015   • Snoring    • Spinal stenosis of lumbar region     Dr. Navarro   • Stage 3a chronic kidney disease (HCC) 1/27/2022   • Tobacco dependence    • Unspecified hemorrhagic conditions     asa/plavix, bruises easily     Past Surgical History:   Procedure Laterality Date   • AAA WITH STENT GRAFT N/A 3/31/2017    Procedure: AAA WITH STENT GRAFT - 5-CM INFRARENAL;  Surgeon: Spring Pemberton M.D.;  Location: SURGERY [facility];  Service:    • FEMORAL FEMORAL BYPASS N/A 3/31/2017    Procedure: FEMORAL FEMORAL BYPASS - POSS;  Surgeon: Spring Pemberton M.D.;  Location: SURGERY [facility];  Service:    • COLONOSCOPY WITH BIOPSY  3/6/2015    Performed by Pranav THOMPSON M.D. at ENDOSCOPY [facility]   • COLON RESECTION LAPAROSCOPIC  8/5/2013    Performed by Spring Pemberton M.D. at SURGERY [facility]   • OTHER CARDIAC SURGERY  2005    cardiac stents   • GYN SURGERY  2002    hysterectomy, tubal, fibroids, ovaries gone   • GYN SURGERY  1975    ectopic pregnacy   • COLONOSCOPY  3/1/2009, 4/2012, 7/2/13    normal, normal, normal but heavy diverticulosis   • OTHER      LLE /RLEstent, common iliac, Dr. Pickett     Family History   Problem Relation Age of Onset   • Hyperlipidemia Sister    • Hypertension Sister    • Stroke Sister    • Heart Disease Sister         heart attack   • Non-contributory Sister         carotid atherosclerosis   • Hypertension  Mother    • Hyperlipidemia Mother    • Heart Disease Mother 82        congestive heart failure, AAA   • Heart Disease Father 55        multiple heart issues   • Hypertension Father    • Hyperlipidemia Father    • Cancer Sister         sin cancer   • Heart Disease Brother         heart attack     Social History     Socioeconomic History   • Marital status:      Spouse name: Not on file   • Number of children: Not on file   • Years of education: Not on file   • Highest education level: Not on file   Occupational History   • Occupation: stocking     Employer: [employer]     Comment: retired   Tobacco Use   • Smoking status: Former Smoker     Packs/day: 1.00     Years: 45.00     Pack years: 45.00     Types: Cigarettes     Quit date: 9/10/2021     Years since quittin.4   • Smokeless tobacco: Never Used   • Tobacco comment: has stopped   Vaping Use   • Vaping Use: Never used   Substance and Sexual Activity   • Alcohol use: No     Alcohol/week: 0.0 oz   • Drug use: No   • Sexual activity: Not Currently   Other Topics Concern   • Not on file   Social History Narrative   • Not on file     Social Determinants of Health     Financial Resource Strain: Not on file   Food Insecurity: Not on file   Transportation Needs: Not on file   Physical Activity: Not on file   Stress: Not on file   Social Connections: Not on file   Intimate Partner Violence: Not on file   Housing Stability: Not on file     Allergies   Allergen Reactions   • Doxycycline Diarrhea     None stop diarrhea   • Tramadol Vomiting   • Amoxicillin Diarrhea     Diarrhea   • Ciprofloxacin Unspecified     Unspecified     Outpatient Encounter Medications as of 2022   Medication Sig Dispense Refill   • cloNIDine (CATAPRES) 0.1 MG Tab Take 0.1 mg by mouth 2 times a day.     • Fluticasone Furoate-Vilanterol (BREO ELLIPTA) 200-25 MCG/INH AEROSOL POWDER, BREATH ACTIVATED Inhale 1 Puff.     • clopidogrel (PLAVIX) 75 MG Tab Take 1 Tablet by mouth every day.  "30 Tablet 11   • carvedilol (COREG) 6.25 MG Tab Take 1 Tablet by mouth 2 times a day with meals. 60 Tablet 6   • losartan (COZAAR) 50 MG Tab Take 1 Tablet by mouth every day. 90 Tablet 3   • furosemide (LASIX) 40 MG Tab Take 1 Tablet by mouth every day. 90 Tablet 3   • DILTIAZem (CARDIZEM) 120 MG Tab Take 1 Tablet by mouth every day. 90 Tablet 3   • mirtazapine (REMERON) 15 MG Tab Take 1 Tablet by mouth every evening. 90 Tablet 3   • VENTOLIN  (90 Base) MCG/ACT Aero Soln inhalation aerosol Inhale 2 Puffs every four hours as needed for Shortness of Breath.     • atorvastatin (LIPITOR) 20 MG Tab Take 1 Tablet by mouth every evening. 30 Tablet 11   • spironolactone (ALDACTONE) 25 MG Tab Take 1 Tablet by mouth every day. (Patient not taking: Reported on 2/28/2022) 90 Tablet 3   • ipratropium-albuterol (COMBIVENT RESPIMAT)  MCG/ACT Aero Soln Inhale 1 Puff 1 time a day as needed. Indications: Chronic Bronchitis (Patient not taking: No sig reported)       No facility-administered encounter medications on file as of 2/28/2022.     Review of Systems   Constitutional: Negative.  Negative for chills, fever and malaise/fatigue.   HENT: Negative.  Negative for sore throat.    Eyes: Negative.    Respiratory: Positive for cough and shortness of breath. Negative for hemoptysis, sputum production, wheezing and stridor.    Cardiovascular: Positive for chest pain. Negative for palpitations, orthopnea, claudication, leg swelling and PND.   Gastrointestinal: Negative.    Genitourinary: Negative.    Musculoskeletal: Negative.    Skin: Negative.    Neurological: Negative.  Negative for dizziness, loss of consciousness and weakness.   Endo/Heme/Allergies: Negative.  Does not bruise/bleed easily.   All other systems reviewed and are negative.             Objective     BP (!) 86/50 (BP Location: Right arm, Patient Position: Sitting, BP Cuff Size: Adult)   Pulse 87   Resp 12   Ht 1.549 m (5' 1\")   Wt 42.5 kg (93 lb 12.8 oz)  "  LMP 03/01/2002   SpO2 (!) 77%   BMI 17.72 kg/m²     Physical Exam  Vitals and nursing note reviewed.   Constitutional:       General: She is not in acute distress.     Appearance: She is well-developed. She is not diaphoretic.   HENT:      Head: Normocephalic and atraumatic.      Right Ear: External ear normal.      Left Ear: External ear normal.      Nose: Nose normal.      Mouth/Throat:      Pharynx: No oropharyngeal exudate.   Eyes:      General: No scleral icterus.        Right eye: No discharge.         Left eye: No discharge.      Conjunctiva/sclera: Conjunctivae normal.      Pupils: Pupils are equal, round, and reactive to light.   Neck:      Vascular: No JVD.   Cardiovascular:      Rate and Rhythm: Normal rate and regular rhythm.      Heart sounds: No murmur heard.    No friction rub. No gallop.   Pulmonary:      Effort: Pulmonary effort is normal. No respiratory distress.      Breath sounds: No stridor. No wheezing or rales.   Chest:      Chest wall: No tenderness.   Abdominal:      General: There is no distension.      Palpations: Abdomen is soft.      Tenderness: There is no guarding.   Musculoskeletal:         General: No tenderness or deformity. Normal range of motion.      Cervical back: Neck supple.   Skin:     General: Skin is warm and dry.      Coloration: Skin is not pale.      Findings: No erythema or rash.   Neurological:      Mental Status: She is alert.      Cranial Nerves: No cranial nerve deficit.      Motor: No abnormal muscle tone.      Coordination: Coordination normal.      Deep Tendon Reflexes: Reflexes are normal and symmetric. Reflexes normal.   Psychiatric:         Behavior: Behavior normal.         Thought Content: Thought content normal.         Judgment: Judgment normal.          Echocardiogram: Dated 5/13/2020 personally viewed inter myself showing EF of 30% with global hypokinesis.    Echocardiogram: Dated 1/22/2020 personally viewed inter myself showing normal LV systolic  function.  No valvular heart disease.    Lab Results   Component Value Date/Time    CHOLSTRLTOT 115 05/13/2020 09:54 AM    LDL 61 05/13/2020 09:54 AM    HDL 41 05/13/2020 09:54 AM    TRIGLYCERIDE 67 05/13/2020 09:54 AM       Lab Results   Component Value Date/Time    SODIUM 134 (L) 01/23/2022 01:10 AM    POTASSIUM 3.8 01/23/2022 01:10 AM    CHLORIDE 95 (L) 01/23/2022 01:10 AM    CO2 27 01/23/2022 01:10 AM    GLUCOSE 138 (H) 01/23/2022 01:10 AM    BUN 47 (H) 01/23/2022 01:10 AM    CREATININE 1.15 01/23/2022 01:10 AM    CREATININE 0.8 08/25/2008 05:40 PM     Lab Results   Component Value Date/Time    ALKPHOSPHAT 122 (H) 01/22/2022 12:37 AM    ASTSGOT 27 01/22/2022 12:37 AM    ALTSGPT 18 01/22/2022 12:37 AM    TBILIRUBIN 0.2 01/22/2022 12:37 AM            Assessment & Plan     1. Essential hypertension  EKG       Medical Decision Making: Today's Assessment/Status/Plan:        76-year-old female with severe near end-stage COPD with a history of reduced ejection fraction normalized.  She been stabilized now.  Her oxygen saturation is now normal however her heart rate is unchanged.  I am concerned that she has developed a flutter arrhythmia and would need a cardioversion or EP consultation.  Furthermore with her chest pain and ST depressions I think that she is having some degree of ischemia that would require further evaluation.  She will be taken by EMS to the ER for further care.

## 2022-02-28 NOTE — ED NOTES
Med Rec complete per Pt and Home Pharmacy.  Pt was able to identify most medications but was unsure if she's still taking Plavix, Mirtazapine. Medications were verified pickup from Home Pharmacy.  Allergies reviewed.    Home Pharmacy:  [facility]

## 2022-03-01 ENCOUNTER — PATIENT OUTREACH (OUTPATIENT)
Dept: HEALTH INFORMATION MANAGEMENT | Facility: OTHER | Age: 77
End: 2022-03-01
Payer: MEDICARE

## 2022-03-01 VITALS
DIASTOLIC BLOOD PRESSURE: 51 MMHG | HEART RATE: 80 BPM | RESPIRATION RATE: 20 BRPM | SYSTOLIC BLOOD PRESSURE: 95 MMHG | HEIGHT: 61 IN | BODY MASS INDEX: 17.52 KG/M2 | WEIGHT: 92.81 LBS | TEMPERATURE: 97.6 F | OXYGEN SATURATION: 91 %

## 2022-03-01 PROBLEM — A41.9 SEPSIS (HCC): Status: RESOLVED | Noted: 2022-02-28 | Resolved: 2022-03-01

## 2022-03-01 LAB
FEV1 % PREDICTED: 43
FEV1/FVC % PREDICTED: 55
FEV1/FVC: 43.55 %
FEV1: 0.81 L
FVC % PREDICTED: 77
FVC (L): 1.87

## 2022-03-01 PROCEDURE — A9270 NON-COVERED ITEM OR SERVICE: HCPCS | Performed by: STUDENT IN AN ORGANIZED HEALTH CARE EDUCATION/TRAINING PROGRAM

## 2022-03-01 PROCEDURE — G0378 HOSPITAL OBSERVATION PER HR: HCPCS

## 2022-03-01 PROCEDURE — 94010 BREATHING CAPACITY TEST: CPT

## 2022-03-01 PROCEDURE — 99217 PR OBSERVATION CARE DISCHARGE: CPT | Performed by: NURSE PRACTITIONER

## 2022-03-01 PROCEDURE — 94640 AIRWAY INHALATION TREATMENT: CPT

## 2022-03-01 PROCEDURE — 700111 HCHG RX REV CODE 636 W/ 250 OVERRIDE (IP): Performed by: STUDENT IN AN ORGANIZED HEALTH CARE EDUCATION/TRAINING PROGRAM

## 2022-03-01 PROCEDURE — 700102 HCHG RX REV CODE 250 W/ 637 OVERRIDE(OP): Performed by: STUDENT IN AN ORGANIZED HEALTH CARE EDUCATION/TRAINING PROGRAM

## 2022-03-01 PROCEDURE — 94669 MECHANICAL CHEST WALL OSCILL: CPT

## 2022-03-01 PROCEDURE — 700101 HCHG RX REV CODE 250: Performed by: STUDENT IN AN ORGANIZED HEALTH CARE EDUCATION/TRAINING PROGRAM

## 2022-03-01 PROCEDURE — 94760 N-INVAS EAR/PLS OXIMETRY 1: CPT

## 2022-03-01 RX ORDER — SULFAMETHOXAZOLE AND TRIMETHOPRIM 800; 160 MG/1; MG/1
1 TABLET ORAL 2 TIMES DAILY
Qty: 14 TABLET | Refills: 0 | Status: SHIPPED | OUTPATIENT
Start: 2022-03-01 | End: 2022-03-08

## 2022-03-01 RX ORDER — PREDNISONE 20 MG/1
20 TABLET ORAL DAILY
Qty: 7 TABLET | Refills: 0 | Status: SHIPPED | OUTPATIENT
Start: 2022-03-01 | End: 2022-03-08

## 2022-03-01 RX ADMIN — BUDESONIDE AND FORMOTEROL FUMARATE DIHYDRATE 2 PUFF: 160; 4.5 AEROSOL RESPIRATORY (INHALATION) at 09:50

## 2022-03-01 RX ADMIN — CARVEDILOL 6.25 MG: 6.25 TABLET, FILM COATED ORAL at 09:49

## 2022-03-01 RX ADMIN — IPRATROPIUM BROMIDE AND ALBUTEROL SULFATE 3 ML: 2.5; .5 SOLUTION RESPIRATORY (INHALATION) at 11:50

## 2022-03-01 RX ADMIN — IPRATROPIUM BROMIDE AND ALBUTEROL SULFATE 3 ML: 2.5; .5 SOLUTION RESPIRATORY (INHALATION) at 06:33

## 2022-03-01 RX ADMIN — DILTIAZEM HYDROCHLORIDE 120 MG: 120 TABLET, FILM COATED ORAL at 06:12

## 2022-03-01 RX ADMIN — PREDNISONE 40 MG: 20 TABLET ORAL at 06:12

## 2022-03-01 RX ADMIN — CLOPIDOGREL BISULFATE 75 MG: 75 TABLET ORAL at 06:12

## 2022-03-01 RX ADMIN — IPRATROPIUM BROMIDE AND ALBUTEROL SULFATE 3 ML: 2.5; .5 SOLUTION RESPIRATORY (INHALATION) at 03:20

## 2022-03-01 RX ADMIN — FUROSEMIDE 40 MG: 40 TABLET ORAL at 06:12

## 2022-03-01 SDOH — ECONOMIC STABILITY: FOOD INSECURITY: WITHIN THE PAST 12 MONTHS, YOU WORRIED THAT YOUR FOOD WOULD RUN OUT BEFORE YOU GOT MONEY TO BUY MORE.: NEVER TRUE

## 2022-03-01 SDOH — ECONOMIC STABILITY: FOOD INSECURITY: WITHIN THE PAST 12 MONTHS, THE FOOD YOU BOUGHT JUST DIDN'T LAST AND YOU DIDN'T HAVE MONEY TO GET MORE.: NEVER TRUE

## 2022-03-01 ASSESSMENT — SOCIAL DETERMINANTS OF HEALTH (SDOH): HOW HARD IS IT FOR YOU TO PAY FOR THE VERY BASICS LIKE FOOD, HOUSING, MEDICAL CARE, AND HEATING?: NOT HARD AT ALL

## 2022-03-01 ASSESSMENT — PAIN DESCRIPTION - PAIN TYPE: TYPE: ACUTE PAIN

## 2022-03-01 NOTE — ASSESSMENT & PLAN NOTE
Patient states that she does not want any aggressive measures to be taken in attempt to prolong her life, including CPR and intubation.  Patient wishes to be DNR/DNI

## 2022-03-01 NOTE — RESPIRATORY CARE
"COPD EDUCATION by COPD CLINICAL EDUCATOR  (Phone: [phone])  3/1/2022 at 2:45 PM by Tara Linder, RRT     Patient seen by Respiratory Education team to complete Block 1 of a 2 part series. Reference material about the program was given to patient along with our contact information.  Part #1 includes: What is COPD (how the lungs work), common treatments for COPD, the difference between \"rescue\" medications and \"daily\" medications, bronchial hygiene  with an explanation of COPD Action Plan. We reviewed their appropriate medication techniques -including a demonstration. We discussed the correct way to care for and clean respiratory equipment and when applicable, Advance Care Planning was discussed.  Question and answer session followed.     COPD Screen  COPD Risk Screening  Do you have a history of COPD?: No  COPD Population Screener  During the past 4 weeks, how much did you feel short of breath?: None/Little of the time  Do you ever cough up any mucus or phlegm?: No/only with occasional colds or infections  In the past 12 months, you do less than you used to because of your breathing problems: Disagree/unsure  Have you smoked at least 100 cigarettes in your entire life?: Yes  How old are you?: 60+  COPD Screening Score: 4  COPD Coordinator Not Recommended: Yes    COPD Assessment  COPD Clinical Specialists ONLY  COPD Education Initiated: Yes--Full Intervention  COPD Education Session 1: Yes  DME Company: pt unable to recall name of company she belcharleenves it starts with a z  DME Equipment Type: oxygen and concentrator  Physician Follow Up Appointment:  (declined assistance in scheduling appt)  Physician Name: Stefany Julio  Pulmonary Follow Up Appointment:  (decliened ssisatance in scheduling appt)  Pulmonologist Name: [facility]  Palliative Care:  (not seen onthis visit, seen on last admission 01/2022)  Referrals Initiated: Yes  Pulmonary Rehab: Yes (admitting MD placed referral)  Smoking Cessation: N/A " "(pt quit smoking on last admit)  Smoking Pack Years: 30  Hospice: N/A  Home Health Care:  (TBD)  [facility] Outreach: N/A  [facility]: N/A  [facility]: Declined (left info)  Private In-Home Care Agency: N/A  Is this a COPD exacerbation patient?:  (pending IP pulmonary)  $ Bedside PFT Screen: Yes (done 03/01/2022)    PFT Results    Lab Results   Component Value Date    FEV1 (L) 0.81 03/01/2022    FEV1/FVC % 43.55 03/01/2022    FVC % Predicted 77 03/01/2022    FEV1 % Predicted 43 03/01/2022    FVC 1.87 03/01/2022    FEV1/FVC % Predicted 55 03/01/2022       Meds to Beds  Would the patient like to opt in for Bedside Medication Delivery at Discharge?: Yes, interested     MY COPD ACTION PLAN     It is recommended that patients and physicians /healthcare providers complete this action plan together. This plan should be discussed at each physician visit and updated as needed.    The green, yellow and red zones show groups of symptoms of COPD. This list of symptoms is not comprehensive, and you may experience other symptoms. In the \"Actions\" column, your healthcare provider has recommended actions for you to take based on your symptoms.    Patient Name: Karen Jauregui   YOB: 1945   Last Updated on: 3/1/2022  2:45 PM   Green Zone:  I am doing well today Actions   •  Usual activitiy and exercise level •  Take daily medications   •  Usual amounts of cough and phlegm/mucus •  Use oxygen as prescribed   •  Sleep well at night •  Continue regular exercise/diet plan   •  Appetite is good •  At all times avoid cigarette smoke, inhaled irritants     Daily Medications (these medications are taken every day):   Fluticasone Furoate and Vilanterol trifenatate (Breo) 1 Puff Once daily     Additional Information:  rinse mouth after use     Yellow Zone:  I am having a bad day or a COPD flare Actions   •  More breathless than usual •  Continue daily medications   •  I have less energy for my " "daily activities •  Use quick relief inhaler as ordered   •  Increased or thicker phlegm/mucus •  Use oxygen as prescribed   •  Using quick relief inhaler/nebulizer more often •  Get plenty of rest   •  Swelling of ankles more than usual •  Use pursed lip breathing   •  More coughing than usual •  At all times avoid cigarette smoke, inhaled irritants   •  I feel like I have a \"chest cold\"   •  Poor sleep and my symptoms woke me up   •  My appetite is not good   •  My medicine is not helping    •  Call provider immediately if symptoms don’t improve     Continue daily medications, add rescue medications:   Albuterol 2 Puffs Every 4 hours PRN       Medications to be used during a flare up, (as Discussed with Provider):           Additional Information:  If using the Albuterol rescue inhaler use with the spacer.     Red Zone:  I need urgent medical care Actions   •  Severe shortness of breath even at rest •  Call 911 or seek medical care immediately   •  Not able to do any activity because of breathing    •  Fever or shaking chills    •  Feeling confused or very drowsy     •  Chest pains    •  Coughing up blood              "

## 2022-03-01 NOTE — PROGRESS NOTES
Received report from ER RN. Transported to unit via gurney. Ambulated x1HH from schafer to bed in room. Patient in bed locked in lowest position with call light in reach. POC discussed. All questions answered.

## 2022-03-01 NOTE — PROGRESS NOTES
2 RN Skin Assessment Completed by June RN and Melony RN.     Head: WDL  Ears: WDL  Nose: WDL  Mouth: WDL  Neck: WDL  Breasts/Chest: WDL  Shoulder Blades: WDL  Spine: WDL  (R) Arm/Elbow/hand: WDL  (L) Arm/Elbow/hand: WDL  Abdomen:WDL  Groin: WDL  Sacrum/Coccyx/Buttocks: blanching  (R) Leg: WDL  (L) Leg: WDL  (R) Heel/Foot/Toe: blanching  (L) Heel/Foot/Toe: blanching              Devices in place: BP Cuff and Pulse Ox     Interventions in place: Gray ear foams and Pillows     Possible skin injury found: No     Pictures uploaded into Epic: N/A  Wound Consult Placed: N/A

## 2022-03-01 NOTE — ASSESSMENT & PLAN NOTE
This is Sepsis Present on admission  SIRS criteria identified on my evaluation include: Tachycardia, with heart rate greater than 90 BPM and Leukocytosis, with WBC greater than 12,000  Source is Urine  Sepsis protocol initiated  Fluid resuscitation ordered per protocol  IV antibiotics as appropriate for source of sepsis  While organ dysfunction may be noted elsewhere in this problem list or in the chart, degree of organ dysfunction does not meet CMS criteria for severe sepsis

## 2022-03-01 NOTE — H&P
Hospital Medicine History & Physical Note    Date of Service  2/28/2022    Primary Care Physician  Baltazar Julio M.D.    Consultants  None    Code Status  DNAR/DNI    Chief Complaint  Chief Complaint   Patient presents with   • Shortness of Breath   • Cough   • Sent by MD       History of Presenting Illness  Karen Jauregui is a 76 y.o. female who presented 2/28/2022 with a referral for hypoxia.  Patient has extensive smoking history, she has been smoking since she was 18 years old, recently quit 4 months ago.  Never been formally diagnosed with COPD, however is sure that she has it.  She uses home oxygen as needed.  She went to see her cardiologist today for routine checkup and was found to have hypoxia, saturating 75% on room air. Patient denies fever chills shortness of breath chest pain nausea vomiting. Patient was referred to the hospital for further care.    Patient was recently discharged from [facility] for CHF exacerbation in January 2022.  She states that she takes her medications daily.  Denies alcohol and illicit drug use.    In the ED, patient found to be tachycardic, hypoxic, other vital signs within normal limits.  Remarkable labs include leukocytosis, hyponatremia, hypochloremia, prerenal azotemia, troponin 30, .  CTPA shows severe emphysema but no evidence of pulmonary embolism.  EKG shows sinus tachycardia with left axis deviation, left ventricular perjury, nonspecific T wave changes in the lateral leads, however previously seen on previous EKGs.    I discussed the plan of care with patient.    Review of Systems  Review of Systems   Constitutional: Positive for malaise/fatigue.   HENT: Negative.    Eyes: Negative.    Respiratory: Positive for shortness of breath.    Cardiovascular: Negative.    Gastrointestinal: Negative.    Genitourinary: Negative.    Musculoskeletal: Negative.    Skin: Negative.    Neurological: Negative.    Endo/Heme/Allergies: Negative.     Psychiatric/Behavioral: Negative.        Past Medical History   has a past medical history of Abdominal aortic aneurysm (HCC) (11/2010), Acute exacerbation of chronic obstructive pulmonary disease (COPD) ([facility]) (1/22/2022), Acute pulmonary edema ([facility]) (1/22/2022), Acute systolic CHF (congestive heart failure) ([facility]) (1/22/2022), Angina, Arthritis, Atherosclerosis of arteries of the extremities, Bowel habit changes, Cardiac LV ejection fraction 21-40% (10/20/2021), Carotid atherosclerosis, COPD (chronic obstructive pulmonary disease) ([facility]), Diverticulitis, Diverticulosis of colon, Dyslipidemia, Ejection fraction < 50% (10/20/2021), Elevated troponin (6/8/2020), Emphysema of lung ([facility]), Eosinophilic colitis, Family history of nonmelanoma skin cancer, Hypertension, Hypertensive emergency (1/22/2022), Left lower lobe consolidation ([facility]) (5/9/2018), Pain, Peripheral vascular disease ([facility]), Pneumonia, unspecified organism (12/3/2021), Positive blood culture (5/14/2020), PVD (posterior vitreous detachment), left eye (1/13/2015), Snoring, Spinal stenosis of lumbar region, Stage 3a chronic kidney disease ([facility]) (1/27/2022), Tobacco dependence, and Unspecified hemorrhagic conditions.    Surgical History   has a past surgical history that includes colonoscopy (3/1/2009, 4/2012, 7/2/13); gyn surgery (2002); gyn surgery (1975); other; colon resection laparoscopic (8/5/2013); colonoscopy with biopsy (3/6/2015); other cardiac surgery (2005); aaa with stent graft (N/A, 3/31/2017); and femoral femoral bypass (N/A, 3/31/2017).     Family History  family history includes Cancer in her sister; Heart Disease in her brother and sister; Heart Disease (age of onset: 55) in her father; Heart Disease (age of onset: 82) in her mother; Hyperlipidemia in her father, mother, and sister; Hypertension in her father, mother, and sister; Non-contributory in her sister; Stroke in her sister.   Family history reviewed with patient. There is no family  history that is pertinent to the chief complaint.     Social History   reports that she quit smoking about 5 months ago. Her smoking use included cigarettes. She has a 45.00 pack-year smoking history. She has never used smokeless tobacco. She reports that she does not drink alcohol and does not use drugs.    Allergies  Allergies   Allergen Reactions   • Doxycycline Diarrhea     None stop diarrhea   • Tramadol Vomiting   • Amoxicillin Diarrhea     Diarrhea   • Ciprofloxacin Unspecified     Unspecified       Medications  Prior to Admission Medications   Prescriptions Last Dose Informant Patient Reported? Taking?   DILTIAZem (CARDIZEM) 120 MG Tab 2/27/2022 at AM Patient No No   Sig: Take 1 Tablet by mouth every day.   Fluticasone Furoate-Vilanterol (BREO ELLIPTA) 200-25 MCG/INH AEROSOL POWDER, BREATH ACTIVATED 2/27/2022 at AM Patient Yes No   Sig: Inhale 1 Puff every day.   VENTOLIN  (90 Base) MCG/ACT Aero Soln inhalation aerosol PRN at PRN Patient Yes No   Sig: Inhale 2 Puffs every four hours as needed for Shortness of Breath.   atorvastatin (LIPITOR) 20 MG Tab 2/27/2022 at PM Patient No No   Sig: Take 1 Tablet by mouth every evening.   carvedilol (COREG) 6.25 MG Tab 2/27/2022 at PM Patient No No   Sig: Take 1 Tablet by mouth 2 times a day with meals.   cloNIDine (CATAPRES) 0.1 MG Tab 2/27/2022 at PM Patient Yes No   Sig: Take 0.1 mg by mouth 2 times a day.   clopidogrel (PLAVIX) 75 MG Tab UNK at UNK Patient No No   Sig: Take 1 Tablet by mouth every day.   furosemide (LASIX) 40 MG Tab 2/27/2022 at AM Patient No No   Sig: Take 1 Tablet by mouth every day.   losartan (COZAAR) 50 MG Tab 2/27/2022 at AM Patient No No   Sig: Take 1 Tablet by mouth every day.   mirtazapine (REMERON) 15 MG Tab UNK at UNK Patient No No   Sig: Take 1 Tablet by mouth every evening.      Facility-Administered Medications: None       Physical Exam  Temp:  [37.1 °C (98.8 °F)] 37.1 °C (98.8 °F)  Pulse:  [] 99  Resp:  [12-20] 17  BP:  ()/(46-70) 110/53  SpO2:  [77 %-98 %] 98 %  Blood Pressure : 110/53   Temperature: 37.1 °C (98.8 °F)   Pulse: 99   Respiration: 17   Pulse Oximetry: 98 %       Physical Exam  Constitutional:       Appearance: Normal appearance.      Comments: Thin appearing female   HENT:      Head: Normocephalic.      Nose: Nose normal.      Mouth/Throat:      Mouth: Mucous membranes are moist.   Eyes:      Pupils: Pupils are equal, round, and reactive to light.   Cardiovascular:      Rate and Rhythm: Regular rhythm. Tachycardia present.      Pulses: Normal pulses.   Pulmonary:      Effort: Pulmonary effort is normal.      Comments: Saturating 80% on room air  Speaking complete sentences without stopping to catch her breath  Mild expiratory wheezing heard diffusely  Abdominal:      General: Abdomen is flat. Bowel sounds are normal.      Palpations: Abdomen is soft.   Musculoskeletal:         General: Normal range of motion.      Cervical back: Normal range of motion.   Skin:     General: Skin is warm.   Neurological:      General: No focal deficit present.      Mental Status: She is alert and oriented to person, place, and time. Mental status is at baseline.   Psychiatric:         Mood and Affect: Mood normal.         Behavior: Behavior normal.         Thought Content: Thought content normal.         Judgment: Judgment normal.         Laboratory:  Recent Labs     02/28/22  1153   WBC 12.8*   RBC 4.56   HEMOGLOBIN 12.1   HEMATOCRIT 38.1   MCV 83.6   MCH 26.5*   MCHC 31.8*   RDW 42.4   PLATELETCT 456*   MPV 9.6     Recent Labs     02/28/22  1153   SODIUM 130*   POTASSIUM 5.2   CHLORIDE 93*   CO2 26   GLUCOSE 117*   BUN 46*   CREATININE 1.21   CALCIUM 10.1     Recent Labs     02/28/22  1153   ALTSGPT 17   ASTSGOT 23   ALKPHOSPHAT 97   TBILIRUBIN 0.2   GLUCOSE 117*         Recent Labs     02/28/22  1230   NTPROBNP 819*         Recent Labs     02/28/22  1230   TROPONINT 30*       Imaging:  CT-CTA CHEST PULMONARY ARTERY W/ RECONS    Final Result      1.  No evidence of pulmonary embolism.   2.  Severe emphysema.   3.  Stable, nonspecific mildly enlarged right hilar lymph node.   4.  Opacification of the dependent bronchi consistent with aspiration.   5.  Gas in the right upper quadrant appears to be within an interposed loop of colon. If there is abdominal pain, abdominal imaging can be obtained to exclude pneumoperitoneum.   6.  6 mm left lower lobe pulmonary nodule not definitively seen on prior exam. Follow-up recommendations below.   7.  Atherosclerotic changes. Coronary artery atherosclerotic disease.   8.  Nonspecific thickening of the left adrenal gland. Correlate with biochemical analysis.   9.  Right renal cortical atrophy.      Low Risk: CT at 6-12 months, then consider CT at 18-24 months      High Risk: CT at 6-12 months, then CT at 18-24 months      Low Risk - Minimal or absent history of smoking and of other known risk factors.      High Risk - History of smoking or of other known risk factors.      Note: These recommendations do not apply to lung cancer screening, patients with immunosuppression, or patients with known primary cancer.      Fleischner Society 2017 Guidelines for Management of Incidentally Detected Pulmonary Nodules in Adults      DX-CHEST-PORTABLE (1 VIEW)   Final Result      Hyperaeration of the lungs without acute cardiopulmonary abnormality.          X-Ray:  I have personally reviewed the images and compared with prior images.    Assessment/Plan:  I anticipate this patient will require at least two midnights for appropriate medical management, necessitating inpatient admission.    * Acute exacerbation of chronic obstructive pulmonary disease (COPD) (HCC)- (present on admission)  Assessment & Plan  Admit patient to medical floor  Duo nebs every 4 hours  Prednisone 40 mg for 5 days  Oxygen as needed, keep O2 over 90%    Sepsis (HCC)  Assessment & Plan  This is Sepsis Present on admission  SIRS criteria  identified on my evaluation include: Tachycardia, with heart rate greater than 90 BPM and Leukocytosis, with WBC greater than 12,000  Source is Urine  Sepsis protocol initiated  Fluid resuscitation ordered per protocol  IV antibiotics as appropriate for source of sepsis  While organ dysfunction may be noted elsewhere in this problem list or in the chart, degree of organ dysfunction does not meet CMS criteria for severe sepsis          Advanced care planning/counseling discussion  Assessment & Plan  Patient states that she does not want any aggressive measures to be taken in attempt to prolong her life, including CPR and intubation.  Patient wishes to be DNR/DNI    CHF (congestive heart failure) (HCC)- (present on admission)  Assessment & Plan  Echo showing EF 55%  Continue Coreg diltiazem and Lasix    Anxiety- (present on admission)  Assessment & Plan  Continue remeron    Acute UTI  Assessment & Plan  Rocephin  Blood cultures  Urine cultures    Hyperlipidemia  Assessment & Plan  Continue lipitor     Hypertension  Assessment & Plan  Restart other blood pressure medications as needed      VTE prophylaxis: heparin ppx

## 2022-03-01 NOTE — ASSESSMENT & PLAN NOTE
Admit patient to medical floor  Duo nebs every 4 hours  Prednisone 40 mg for 5 days  Oxygen as needed, keep O2 over 90%

## 2022-03-01 NOTE — DISCHARGE SUMMARY
"Discharge Summary    CHIEF COMPLAINT ON ADMISSION  Chief Complaint   Patient presents with   • Shortness of Breath   • Cough   • Sent by MD       Reason for Admission  [facility]     Admission Date  2/28/2022    CODE STATUS  DNAR/DNI    HPI & HOSPITAL COURSE    Ms. Karen Jauregui is a 76-year-old female with a PMHx of COPD on home O2 as needed, DLD, hypertension, CHF who was admitted on 2/28/2022 due to cough and shortness of breath.    Patient reports that she has had history of pneumonia and thinks she \"might have COPD\", but has not been diagnosed.  Patient states that she went to her cardiology appointment in the morning and noted to have a low O2 sat at 75% and was sent to ER for evaluation.  Patient also reports symptoms associated will clear sputum.  She denies any chest pain, no nausea or vomiting, no diarrhea no fever, no chills, no leg swelling, no calf pain, no diaphoresis.    In ER, patient states that she only uses her oxygen as needed as this was prescribed to her after being at the skilled facility.  Patient also notes that she sometimes wakes up at night and feels short of breath and puts on her oxygen.  Patient also reports that she feels better sitting up on a couch as she is able to breathe better rather than putting on the oxygen to move around the house.  Patient reports that she does not like wearing her oxygen.    In ER patient noted to have slight leukocytosis, hyponatremia, and positive for UTI.  Patient's lactic acid was 0.8.  CXR demonstrated hyperaeration bilaterally without pulmonary edema, no effusion, or consolidation.  Initial EKG demonstrates sinus tachycardia with biatrial enlargement, likely secondary to COPD with no acute ST changes.  Troponin mildly elevated at 30, BNP elevated 819.  CT imaging obtained which noted no evidence of PE or pneumonia.  Patient will be admitted into the observation unit for COPD exacerbation.    During this course of stay, patient was monitored for oxygen " needs.  Patient recommended to use oxygen 2 L during activity and monitor O2 while at rest at least twice daily.  COPD coordinator was also consulted.  Patient will continue inhalers as prescribed outpatient.  Patient will be placed on a 5-day course of steroids to help with her exacerbation.  Patient referred to outpatient pulmonary rehab for COPD testing.  Patient has recommended to follow-up with PCP s/p hospitalization.  All questions and concerns answered prior to being discharged.  Patient discharged home.      Therefore, she is discharged in guarded and stable condition to home with close outpatient follow-up.    The patient recovered much more quickly than anticipated on admission.    Discharge Date  03/01/22      FOLLOW UP ITEMS POST DISCHARGE  Please call [phone] to schedule PCP appointment for patient.    Required specialty appointments include:       Discharge Instructions per Doe Singer A.P.R.NMisti    -Follow-up with PCP s/p hospitalization  -Continue to follow cardiology for CHF  -Referral to pulmonary rehab placed to test for COPD  -Utilize oxygen during activity  -Check O2 level with pulse oximetry at least twice daily  -Stop smoking  -Utilize inhalers as already prescribed    DIET: Low-sodium    ACTIVITY: As tolerated    DIAGNOSIS: Acute on chronic COPD exacerbation    Return to ER if symptoms persist, chest pain, palpitations, shortness of breath, numbness, tingling, weakness, and high fevers.      DISCHARGE DIAGNOSES  Principal Problem:    Acute exacerbation of chronic obstructive pulmonary disease (COPD) (formerly [facility]) POA: Yes  Active Problems:    Hypertension POA: Unknown    Hyperlipidemia POA: Unknown    Acute UTI POA: Unknown    Anxiety POA: Yes    CHF (congestive heart failure) (formerly [facility]) POA: Yes    Advanced care planning/counseling discussion POA: Unknown  Resolved Problems:    Sepsis (HCC) POA: Unknown      FOLLOW UP  Future Appointments   Date Time Provider Department Center    3/7/2022  4:00 PM Baltazar Julio M.D. [address]   4/20/2022 11:00 AM Baltaazr Julio M.D. [address] M.D.  [address]  [address]  [place] [id number]  [phone]    Schedule an appointment as soon as possible for a visit in 1 week        MEDICATIONS ON DISCHARGE     Medication List      START taking these medications      Instructions   predniSONE 20 MG Tabs  Commonly known as: DELTASONE   Take 1 Tablet by mouth every day for 7 days.  Dose: 20 mg     sulfamethoxazole-trimethoprim 800-160 MG tablet  Commonly known as: BACTRIM DS   Take 1 Tablet by mouth 2 times a day for 7 days.  Dose: 1 Tablet        CONTINUE taking these medications      Instructions   atorvastatin 20 MG Tabs  Commonly known as: LIPITOR   Take 1 Tablet by mouth every evening.  Dose: 20 mg     Breo Ellipta 200-25 MCG/INH Aepb  Generic drug: Fluticasone Furoate-Vilanterol   Inhale 1 Puff every day.  Dose: 1 Puff     carvedilol 6.25 MG Tabs  Commonly known as: COREG   Take 1 Tablet by mouth 2 times a day with meals.  Dose: 6.25 mg     cloNIDine 0.1 MG Tabs  Commonly known as: CATAPRES   Take 0.1 mg by mouth 2 times a day.  Dose: 0.1 mg     clopidogrel 75 MG Tabs  Commonly known as: PLAVIX   Take 1 Tablet by mouth every day.  Dose: 75 mg     DILTIAZem 120 MG Tabs  Commonly known as: CARDIZEM   Take 1 Tablet by mouth every day.  Dose: 120 mg     furosemide 40 MG Tabs  Commonly known as: LASIX   Take 1 Tablet by mouth every day.  Dose: 40 mg     losartan 50 MG Tabs  Commonly known as: COZAAR   Take 1 Tablet by mouth every day.  Dose: 50 mg     mirtazapine 15 MG Tabs  Commonly known as: Remeron   Take 1 Tablet by mouth every evening.  Dose: 15 mg     Ventolin  (90 Base) MCG/ACT Aers inhalation aerosol  Generic drug: albuterol   Inhale 2 Puffs every four hours as needed for Shortness of Breath.  Dose: 2 Puff            Allergies  Allergies   Allergen Reactions   • Doxycycline Diarrhea     None stop  diarrhea   • Tramadol Vomiting   • Amoxicillin Diarrhea     Diarrhea   • Ciprofloxacin Unspecified     Unspecified       DIET  Orders Placed This Encounter   Procedures   • Diet Order Diet: Cardiac     Standing Status:   Standing     Number of Occurrences:   1     Order Specific Question:   Diet:     Answer:   Cardiac [6]       ACTIVITY  As tolerated.  Weight bearing as tolerated    CONSULTATIONS  COPD Navigator    PROCEDURES  NONE    IMAGING  CT-CTA CHEST PULMONARY ARTERY W/ RECONS   Final Result      1.  No evidence of pulmonary embolism.   2.  Severe emphysema.   3.  Stable, nonspecific mildly enlarged right hilar lymph node.   4.  Opacification of the dependent bronchi consistent with aspiration.   5.  Gas in the right upper quadrant appears to be within an interposed loop of colon. If there is abdominal pain, abdominal imaging can be obtained to exclude pneumoperitoneum.   6.  6 mm left lower lobe pulmonary nodule not definitively seen on prior exam. Follow-up recommendations below.   7.  Atherosclerotic changes. Coronary artery atherosclerotic disease.   8.  Nonspecific thickening of the left adrenal gland. Correlate with biochemical analysis.   9.  Right renal cortical atrophy.      Low Risk: CT at 6-12 months, then consider CT at 18-24 months      High Risk: CT at 6-12 months, then CT at 18-24 months      Low Risk - Minimal or absent history of smoking and of other known risk factors.      High Risk - History of smoking or of other known risk factors.      Note: These recommendations do not apply to lung cancer screening, patients with immunosuppression, or patients with known primary cancer.      Fleischner Society 2017 Guidelines for Management of Incidentally Detected Pulmonary Nodules in Adults      DX-CHEST-PORTABLE (1 VIEW)   Final Result      Hyperaeration of the lungs without acute cardiopulmonary abnormality.            LABORATORY  Lab Results   Component Value Date    SODIUM 130 (L) 02/28/2022     POTASSIUM 5.2 02/28/2022    CHLORIDE 93 (L) 02/28/2022    CO2 26 02/28/2022    GLUCOSE 117 (H) 02/28/2022    BUN 46 (H) 02/28/2022    CREATININE 1.21 02/28/2022    CREATININE 0.8 08/25/2008        Lab Results   Component Value Date    WBC 12.8 (H) 02/28/2022    HEMOGLOBIN 12.1 02/28/2022    HEMATOCRIT 38.1 02/28/2022    PLATELETCT 456 (H) 02/28/2022        Total time of the discharge process exceeds 36 minutes.    ============================================================================================================  Please note that this dictation was created using voice recognition software. I have made every reasonable attempt to correct obvious errors, but there may be errors of grammar and possibly content that I did not discover before finalizing the note.    Electronically signed by:  ZAINA Singer, MSN, APRN, FNP-C  Hospitalist Services  [facility]  [phone]  [email or web address]  03/01/22                    [phone]

## 2022-03-01 NOTE — CARE PLAN
The patient is Stable - Low risk of patient condition declining or worsening    Shift Goals  Clinical Goals: wean O2, IV abx  Patient Goals: sleep  Family Goals: DEBRA    Progress made toward(s) clinical / shift goals:    Problem: Knowledge Deficit - Standard  Goal: Patient and family/care givers will demonstrate understanding of plan of care, disease process/condition, diagnostic tests and medications  Outcome: Progressing     Problem: Knowledge Deficit - COPD  Goal: Patient/significant other demonstrates understanding of disease process, utilization of the Action Plan, medications and discharge instruction  Outcome: Progressing

## 2022-03-01 NOTE — HOSPITAL COURSE
"Ms. Karen Jauregui is a 76-year-old female with a PMHx of COPD on home O2 as needed, DLD, hypertension, CHF who was admitted on 2/28/2022 due to cough and shortness of breath.    Patient reports that she has had history of pneumonia and thinks she \"might have COPD\", but has not been diagnosed.  Patient states that she went to her cardiology appointment in the morning and noted to have a low O2 sat at 75% and was sent to ER for evaluation.  Patient also reports symptoms associated will clear sputum.  She denies any chest pain, no nausea or vomiting, no diarrhea no fever, no chills, no leg swelling, no calf pain, no diaphoresis.    In ER, patient states that she only uses her oxygen as needed as this was prescribed to her after being at the skilled facility.  Patient also notes that she sometimes wakes up at night and feels short of breath and puts on her oxygen.  Patient also reports that she feels better sitting up on a couch as she is able to breathe better rather than putting on the oxygen to move around the house.  Patient reports that she does not like wearing her oxygen.    In ER patient noted to have slight leukocytosis, hyponatremia, and positive for UTI.  Patient's lactic acid was 0.8.  CXR demonstrated hyperaeration bilaterally without pulmonary edema, no effusion, or consolidation.  Initial EKG demonstrates sinus tachycardia with biatrial enlargement, likely secondary to COPD with no acute ST changes.  Troponin mildly elevated at 30, BNP elevated 819.  CT imaging obtained which noted no evidence of PE or pneumonia.  Patient will be admitted into the observation unit for COPD exacerbation.    During this course of stay, patient was monitored for oxygen needs.  Patient recommended to use oxygen 2 L during activity and monitor O2 while at rest at least twice daily.  COPD coordinator was also consulted.  Patient will continue inhalers as prescribed outpatient.  Patient will be placed on a 5-day course of " steroids to help with her exacerbation.  Patient referred to outpatient pulmonary rehab for COPD testing.  Patient has recommended to follow-up with PCP s/p hospitalization.  All questions and concerns answered prior to being discharged.  Patient discharged home.

## 2022-03-01 NOTE — HEART FAILURE PROGRAM
Discussed with Lucrecia ISLAS today: patient's HF is not acutely decompensated at this time, her presentation is related to COPD.    HF f/u and checklist not indicated.     Thank you, Jessi Cardio RN Navigator [phone]

## 2022-03-01 NOTE — CARE PLAN
The patient is Stable - Low risk of patient condition declining or worsening    Shift Goals  Clinical Goals: wearn oxygen  Patient Goals: DC  Family Goals: N/A    Progress made toward(s) clinical / shift goals:    Problem: Knowledge Deficit - COPD  Goal: Patient/significant other demonstrates understanding of disease process, utilization of the Action Plan, medications and discharge instruction  Outcome: Progressing     Problem: Knowledge Deficit - Standard  Goal: Patient and family/care givers will demonstrate understanding of plan of care, disease process/condition, diagnostic tests and medications  Outcome: Progressing       Patient is not progressing towards the following goals:

## 2022-03-01 NOTE — PROGRESS NOTES
LIAT Byrnes introduced community care management to the patient. Patient states that she just sold her home, is now living with her sister, has her son for transportation, has no reported barriers to food, and has been scheduled for a hospital follow up on 3/7/2022. Patient has no other questions or concerns.     Community Health Worker Intake  • Social determinates of health intake complete.   • Identified barriers to none.   • Contact information provided to Karen Jauregui   • Has PCP appointment scheduled for 3/7/2022  • Accepted Meds-To-Beds.   • Inpatient assessment completed.    Plan: CHW will follow up after DC.

## 2022-03-02 ENCOUNTER — PATIENT OUTREACH (OUTPATIENT)
Dept: MEDICAL GROUP | Facility: MEDICAL CENTER | Age: 77
End: 2022-03-02
Payer: MEDICARE

## 2022-03-02 NOTE — PROGRESS NOTES
Patient outreach for Transitional Care Management.  Patient staying with her son while she recovers.  Patient stated that she has not picked up her prescription yet as she was discharged late yesterday but will pick them up today.  Reviewed new prescriptions with patient.  Verbalized understanding.  Confirmed follow up appointment with PCP for 3/7 at p.

## 2022-03-02 NOTE — PROGRESS NOTES
Patient discharged home. Patient AOX 4.  IV removed, discharge instructions provided to patient and reviewed with them. All questions answered, patient provided copy of discharge instructions and instructed on when to F/U with MD. Patient wheeled off unit. Patient discharged into the care of her son Johnny.

## 2022-03-02 NOTE — DISCHARGE INSTRUCTIONS
-Follow-up with PCP s/p hospitalization  -Continue to follow cardiology for CHF  -Referral to pulmonary rehab placed to test for COPD  -Utilize oxygen during activity  -Check O2 level with pulse oximetry at least twice daily  -Stop smoking  -Utilize inhalers as already prescribed  DIET: Low-sodium    ACTIVITY: As tolerated    DIAGNOSIS: Acute on chronic COPD exacerbation    Return to ER if symptoms persist, chest pain, palpitations, shortness of breath, numbness, tingling, weakness, and high fevers.  Discharge Instructions    Discharged to home by car with relative. Discharged via wheelchair, hospital escort: Yes.  Special equipment needed: Not Applicable    Be sure to schedule a follow-up appointment with your primary care doctor or any specialists as instructed.     Discharge Plan:   Diet Plan: Discussed  Activity Level: Discussed  Smoking Cessation Offered: Patient Refused  Confirmed Follow up Appointment: Patient to Call and Schedule Appointment  Confirmed Symptoms Management: Discussed  Medication Reconciliation Updated: Yes  Influenza Vaccine Indication: Not indicated: Previously immunized this influenza season and > 8 years of age    I understand that a diet low in cholesterol, fat, and sodium is recommended for good health. Unless I have been given specific instructions below for another diet, I accept this instruction as my diet prescription.   Other diet: regular diet    Special Instructions: Chronic Obstructive Pulmonary Disease (COPD) is a long-term, progressive lung disease that makes it harder to breathe. It includes chronic bronchitis, emphysema, and refractory (non-reversible) asthma. With COPD, the airways in your lungs become inflamed and thicken, and the tissue where oxygen is exchanged is destroyed. The flow of air in and out of your lungs decreases. When that happens, less oxygen gets into your body tissues, and it becomes harder to get rid of the waste gas carbon dioxide. As the disease  gets worse, shortness of breath makes it harder to remain active. There is no cure for COPD, but it is preventable and treatable.    COPD Patient Discharge Instructions    • Diet  o Follow a low fat, low cholesterol, low salt diet unless instructed otherwise by your Doctor. Read the labels on the back of food products and track your intake of fat, cholesterol and salt.  • No smoking  o Discontinuing smoking will have the biggest impact on preventing progression of disease.  o To participate in [facility]’s Quit Tobacco Program, call [phone] or visit [email or web address]  • Oxygen  o If your doctor has order that you wear oxygen at home, it is important to wear it as ordered.  o Do not smoke, vape, or use e-cigarettes with oxygen on.  o Store in an appropriate location: upright in its hall or laid flat down, away from open flames and stoves.   o Do not use oil-based creams and moisturizers (ie: petroleum products, oil-based lip moisturizers) or aerosol sprays (ie: hair sprays or deodorants) when using your oxygen equipment.  o Be careful with tubing placement to prevent yourself and others from tripping.  • Medications  o Refer to your personalized Action Plan to manage your symptoms.  • Warning signs of an exacerbation  o Breathing fast and shallow, worsening shortness of breath (like you just finished exercising)  o Chest tightness  o Increases in sputum production  o Changes in sputum color  o Lower oxygen levels than baseline  • When to call your doctor  o If the warning signs of an exacerbation do not improve  o Refer to your personalized Action Plan   • Pulmonary Rehab  o Your doctor has ordered you a referral to Pulmonary Rehab. Call [phone] to schedule an appointment  • Attend your follow-up appointment with your PCP and/or Pulmonologist  • Remote Monitoring: At the direction of the remote monitoring on-call provider, you may increase your oxygen by 2 liters above your baseline.     See the  educational handout provided by your COPD Navigator for more information. This also explains more about COPD, symptoms of an exacerbation, and some of the tests that you have undergone.    · Is patient discharged on Warfarin / Coumadin?   No     Depression / Suicide Risk    As you are discharged from this [facility] facility, it is important to learn how to keep safe from harming yourself.    Recognize the warning signs:  · Abrupt changes in personality, positive or negative- including increase in energy   · Giving away possessions  · Change in eating patterns- significant weight changes-  positive or negative  · Change in sleeping patterns- unable to sleep or sleeping all the time   · Unwillingness or inability to communicate  · Depression  · Unusual sadness, discouragement and loneliness  · Talk of wanting to die  · Neglect of personal appearance   · Rebelliousness- reckless behavior  · Withdrawal from people/activities they love  · Confusion- inability to concentrate     If you or a loved one observes any of these behaviors or has concerns about self-harm, here's what you can do:  · Talk about it- your feelings and reasons for harming yourself  · Remove any means that you might use to hurt yourself (examples: pills, rope, extension cords, firearm)  · Get professional help from the community (Mental Health, Substance Abuse, psychological counseling)  · Do not be alone:Call your Safe Contact- someone whom you trust who will be there for you.  · Call your local CRISIS HOTLINE 193-0997 or 696-053-7002  · Call your local Children's Mobile Crisis Response Team [place] (738) 167-0943 or www.[employer]  · Call the toll free National Suicide Prevention Hotlines   · National Suicide Prevention Lifeline 147-822-PBCE (0278)  · National Hope Line Network 800-SUICIDE (623-1897)    Prednisone tablets  What is this medicine?  PREDNISONE (PRED ni sone) is a corticosteroid. It is commonly used to treat inflammation  of the skin, joints, lungs, and other organs. Common conditions treated include asthma, allergies, and arthritis. It is also used for other conditions, such as blood disorders and diseases of the adrenal glands.  This medicine may be used for other purposes; ask your health care provider or pharmacist if you have questions.  COMMON BRAND NAME(S): Deltasone, Predone, Sterapred, Sterapred DS  What should I tell my health care provider before I take this medicine?  They need to know if you have any of these conditions:  · Cushing's syndrome  · diabetes  · glaucoma  · heart disease  · high blood pressure  · infection (especially a virus infection such as chickenpox, cold sores, or herpes)  · kidney disease  · liver disease  · mental illness  · myasthenia gravis  · osteoporosis  · seizures  · stomach or intestine problems  · thyroid disease  · an unusual or allergic reaction to lactose, prednisone, other medicines, foods, dyes, or preservatives  · pregnant or trying to get pregnant  · breast-feeding  How should I use this medicine?  Take this medicine by mouth with a glass of water. Follow the directions on the prescription label. Take this medicine with food. If you are taking this medicine once a day, take it in the morning. Do not take more medicine than you are told to take. Do not suddenly stop taking your medicine because you may develop a severe reaction. Your doctor will tell you how much medicine to take. If your doctor wants you to stop the medicine, the dose may be slowly lowered over time to avoid any side effects.  Talk to your pediatrician regarding the use of this medicine in children. Special care may be needed.  Overdosage: If you think you have taken too much of this medicine contact a poison control center or emergency room at once.  NOTE: This medicine is only for you. Do not share this medicine with others.  What if I miss a dose?  If you miss a dose, take it as soon as you can. If it is almost time  for your next dose, talk to your doctor or health care professional. You may need to miss a dose or take an extra dose. Do not take double or extra doses without advice.  What may interact with this medicine?  Do not take this medicine with any of the following medications:  · metyrapone  · mifepristone  This medicine may also interact with the following medications:  · aminoglutethimide  · amphotericin B  · aspirin and aspirin-like medicines  · barbiturates  · certain medicines for diabetes, like glipizide or glyburide  · cholestyramine  · cholinesterase inhibitors  · cyclosporine  · digoxin  · diuretics  · ephedrine  · female hormones, like estrogens and birth control pills  · isoniazid  · ketoconazole  · NSAIDS, medicines for pain and inflammation, like ibuprofen or naproxen  · phenytoin  · rifampin  · toxoids  · vaccines  · warfarin  This list may not describe all possible interactions. Give your health care provider a list of all the medicines, herbs, non-prescription drugs, or dietary supplements you use. Also tell them if you smoke, drink alcohol, or use illegal drugs. Some items may interact with your medicine.  What should I watch for while using this medicine?  Visit your doctor or health care professional for regular checks on your progress. If you are taking this medicine over a prolonged period, carry an identification card with your name and address, the type and dose of your medicine, and your doctor's name and address.  This medicine may increase your risk of getting an infection. Tell your doctor or health care professional if you are around anyone with measles or chickenpox, or if you develop sores or blisters that do not heal properly.  If you are going to have surgery, tell your doctor or health care professional that you have taken this medicine within the last twelve months.  Ask your doctor or health care professional about your diet. You may need to lower the amount of salt you eat.  This  medicine may increase blood sugar. Ask your healthcare provider if changes in diet or medicines are needed if you have diabetes.  What side effects may I notice from receiving this medicine?  Side effects that you should report to your doctor or health care professional as soon as possible:  · allergic reactions like skin rash, itching or hives, swelling of the face, lips, or tongue  · changes in emotions or moods  · changes in vision  · depressed mood  · eye pain  · fever or chills, cough, sore throat, pain or difficulty passing urine  · signs and symptoms of high blood sugar such as being more thirsty or hungry or having to urinate more than normal. You may also feel very tired or have blurry vision.  · swelling of ankles, feet  Side effects that usually do not require medical attention (report to your doctor or health care professional if they continue or are bothersome):  · confusion, excitement, restlessness  · headache  · nausea, vomiting  · skin problems, acne, thin and shiny skin  · trouble sleeping  · weight gain  This list may not describe all possible side effects. Call your doctor for medical advice about side effects. You may report side effects to FDA at 1-627-FDA-2929.  Where should I keep my medicine?  Keep out of the reach of children.  Store at room temperature between 15 and 30 degrees C (59 and 86 degrees F). Protect from light. Keep container tightly closed. Throw away any unused medicine after the expiration date.  NOTE: This sheet is a summary. It may not cover all possible information. If you have questions about this medicine, talk to your doctor, pharmacist, or health care provider.  © 2020 [employer] (2019-09-17 10:54:22)  Sulfamethoxazole; Trimethoprim, SMX-TMP tablets  What is this medicine?  SULFAMETHOXAZOLE; TRIMETHOPRIM or SMX-TMP (suhl fuh meth OK piter zohl; trye METH oh prim) is a combination of a sulfonamide antibiotic and a second antibiotic, trimethoprim. It is used to  treat or prevent certain kinds of bacterial infections. It will not work for colds, flu, or other viral infections.  This medicine may be used for other purposes; ask your health care provider or pharmacist if you have questions.  COMMON BRAND NAME(S): Bacter-Aid DS, Bactrim, Bactrim DS, Septra, Septra DS  What should I tell my health care provider before I take this medicine?  They need to know if you have any of these conditions:  · anemia  · asthma  · being treated with anticonvulsants  · if you frequently drink alcohol containing drinks  · kidney disease  · liver disease  · low level of folic acid or glucose-6-phosphate dehydrogenase  · poor nutrition or malabsorption  · porphyria  · severe allergies  · thyroid disorder  · an unusual or allergic reaction to sulfamethoxazole, trimethoprim, sulfa drugs, other medicines, foods, dyes, or preservatives  · pregnant or trying to get pregnant  · breast-feeding  How should I use this medicine?  Take this medicine by mouth with a full glass of water. Follow the directions on the prescription label. Take your medicine at regular intervals. Do not take it more often than directed. Do not skip doses or stop your medicine early.  Talk to your pediatrician regarding the use of this medicine in children. Special care may be needed. This medicine has been used in children as young as 2 months of age.  Overdosage: If you think you have taken too much of this medicine contact a poison control center or emergency room at once.  NOTE: This medicine is only for you. Do not share this medicine with others.  What if I miss a dose?  If you miss a dose, take it as soon as you can. If it is almost time for your next dose, take only that dose. Do not take double or extra doses.  What may interact with this medicine?  Do not take this medicine with any of the following medications:  · aminobenzoate potassium  · dofetilide  · metronidazole  This medicine may also interact with the following  medications:  · ACE inhibitors like benazepril, enalapril, lisinopril, and ramipril  · birth control pills  · cyclosporine  · digoxin  · diuretics  · indomethacin  · medicines for diabetes  · methenamine  · methotrexate  · phenytoin  · potassium supplements  · pyrimethamine  · sulfinpyrazone  · tricyclic antidepressants  · warfarin  This list may not describe all possible interactions. Give your health care provider a list of all the medicines, herbs, non-prescription drugs, or dietary supplements you use. Also tell them if you smoke, drink alcohol, or use illegal drugs. Some items may interact with your medicine.  What should I watch for while using this medicine?  Tell your doctor or health care professional if your symptoms do not improve. Drink several glasses of water a day to reduce the risk of kidney problems.  Do not treat diarrhea with over the counter products. Contact your doctor if you have diarrhea that lasts more than 2 days or if it is severe and watery.  This medicine can make you more sensitive to the sun. Keep out of the sun. If you cannot avoid being in the sun, wear protective clothing and use a sunscreen. Do not use sun lamps or tanning beds/booths.  What side effects may I notice from receiving this medicine?  Side effects that you should report to your doctor or health care professional as soon as possible:  · allergic reactions like skin rash or hives, swelling of the face, lips, or tongue  · breathing problems  · fever or chills, sore throat  · irregular heartbeat, chest pain  · joint or muscle pain  · pain or difficulty passing urine  · red pinpoint spots on skin  · redness, blistering, peeling or loosening of the skin, including inside the mouth  · unusual bleeding or bruising  · unusually weak or tired  · yellowing of the eyes or skin  Side effects that usually do not require medical attention (report to your doctor or health care professional if they continue or are  bothersome):  · diarrhea  · dizziness  · headache  · loss of appetite  · nausea, vomiting  · nervousness  This list may not describe all possible side effects. Call your doctor for medical advice about side effects. You may report side effects to FDA at 5-377-WAA-7864.  Where should I keep my medicine?  Keep out of the reach of children.  Store at room temperature between 20 to 25 degrees C (68 to 77 degrees F). Protect from light. Throw away any unused medicine after the expiration date.  NOTE: This sheet is a summary. It may not cover all possible information. If you have questions about this medicine, talk to your doctor, pharmacist, or health care provider.  © 2020 [employer] Standard (2014-07-25 14:38:26)

## 2022-03-03 ENCOUNTER — PATIENT OUTREACH (OUTPATIENT)
Dept: HEALTH INFORMATION MANAGEMENT | Facility: OTHER | Age: 77
End: 2022-03-03
Payer: MEDICARE

## 2022-03-04 NOTE — PROGRESS NOTES
Per encounter from Nichole Cox R.N., patient reports that she has no barriers at this time.   Patient has been provided with CCM contact information and will call as needed.   Patient reported no needs to housing, transportation, or food and has scheduled follow up care.     CHW will remove the patient from CCM caseload.

## 2022-03-07 ENCOUNTER — APPOINTMENT (OUTPATIENT)
Dept: MEDICAL GROUP | Facility: MEDICAL CENTER | Age: 77
End: 2022-03-07
Payer: MEDICARE

## 2022-03-09 ENCOUNTER — TELEPHONE (OUTPATIENT)
Dept: RESPIRATORY THERAPY | Facility: MEDICAL CENTER | Age: 77
End: 2022-03-09

## 2022-03-11 NOTE — RESPIRATORY CARE
Oxygen Rounds      Patient found on    O2 L/m:  4  Oxygen device:  Nasal Cannula  Spo2: 99%      Patient titrated to   O2 L/m: 3  Oxygen device: Nasal Cannula  Spo2: 97%   Respiratory device skin site inspection completed.     Advancement Flap (Double) Text: The defect edges were debeveled with a #15 scalpel blade.  Given the location of the defect and the proximity to free margins a double advancement flap was deemed most appropriate.  Using a sterile surgical marker, the appropriate advancement flaps were drawn incorporating the defect and placing the expected incisions within the relaxed skin tension lines where possible.    The area thus outlined was incised deep to adipose tissue with a #15 scalpel blade.  The skin margins were undermined to an appropriate distance in all directions utilizing iris scissors.

## 2022-03-14 ENCOUNTER — OFFICE VISIT (OUTPATIENT)
Dept: MEDICAL GROUP | Facility: MEDICAL CENTER | Age: 77
End: 2022-03-14
Payer: MEDICARE

## 2022-03-14 VITALS
BODY MASS INDEX: 17.67 KG/M2 | HEIGHT: 61 IN | DIASTOLIC BLOOD PRESSURE: 48 MMHG | WEIGHT: 93.6 LBS | OXYGEN SATURATION: 92 % | SYSTOLIC BLOOD PRESSURE: 62 MMHG | TEMPERATURE: 96.8 F | HEART RATE: 95 BPM | RESPIRATION RATE: 16 BRPM

## 2022-03-14 DIAGNOSIS — R60.9 PERIPHERAL EDEMA: ICD-10-CM

## 2022-03-14 DIAGNOSIS — I50.82 BIVENTRICULAR CONGESTIVE HEART FAILURE (HCC): ICD-10-CM

## 2022-03-14 DIAGNOSIS — F51.01 PRIMARY INSOMNIA: ICD-10-CM

## 2022-03-14 DIAGNOSIS — I50.42 CHRONIC COMBINED SYSTOLIC AND DIASTOLIC CONGESTIVE HEART FAILURE (HCC): ICD-10-CM

## 2022-03-14 DIAGNOSIS — F43.21 SITUATIONAL DEPRESSION: ICD-10-CM

## 2022-03-14 PROBLEM — J44.1 ACUTE EXACERBATION OF CHRONIC OBSTRUCTIVE PULMONARY DISEASE (COPD) (HCC): Status: RESOLVED | Noted: 2022-02-28 | Resolved: 2022-03-14

## 2022-03-14 PROBLEM — R60.0 PERIPHERAL EDEMA: Status: ACTIVE | Noted: 2021-10-26

## 2022-03-14 PROBLEM — N39.0 ACUTE UTI: Status: RESOLVED | Noted: 2017-02-17 | Resolved: 2022-03-14

## 2022-03-14 PROCEDURE — 99495 TRANSJ CARE MGMT MOD F2F 14D: CPT | Performed by: FAMILY MEDICINE

## 2022-03-14 RX ORDER — FUROSEMIDE 40 MG/1
40 TABLET ORAL DAILY
Qty: 90 TABLET | Refills: 3 | Status: SHIPPED | OUTPATIENT
Start: 2022-03-14 | End: 2022-04-11

## 2022-03-14 RX ORDER — MIRTAZAPINE 15 MG/1
15 TABLET, FILM COATED ORAL NIGHTLY
Qty: 90 TABLET | Refills: 3 | Status: SHIPPED | OUTPATIENT
Start: 2022-03-14 | End: 2022-04-11

## 2022-03-14 ASSESSMENT — FIBROSIS 4 INDEX: FIB4 SCORE: 0.92

## 2022-03-14 NOTE — PROGRESS NOTES
Subjective:     Karen Jauregui is a 76 y.o. female who presents for Hospital Follow-up.      POST DISCHARGE CALL:  Discharge Date:3/1/2022   Date of Outreach Call: 3/2/2022  9:43 AM  Now that you're home, how are you doing? Good  Do you have questions about your medications? No    Did you fill your medications? Yes    Do you have a follow-up appointment scheduled?Yes    Discharging Department: Clinical Decision Unit    Number of Attempts: 1  Current or previous attempts completed within two business days of discharge? Yes  Provided education regarding treatment plan, medication, self-management, ADLs? Yes  Has patient completed Advance Directive? If yes, advise them to bring to appointment. Yes  Care Manager phone number provided? Yes  Is there anything else I can help you with? No      HPI:   Recently hospitalized for exacerbation of COPD.  Congestive heart failure.  Recent COVID infection 1/2022, survived, unvaccinated.  Medications have been adjusted.        Current medicines (including reconciliation performed today)  Current Outpatient Medications   Medication Sig Dispense Refill   • furosemide (LASIX) 40 MG Tab Take 1 Tablet by mouth every day. 90 Tablet 3   • mirtazapine (REMERON) 15 MG Tab Take 1 Tablet by mouth every evening. 90 Tablet 3   • Fluticasone Furoate-Vilanterol (BREO ELLIPTA) 200-25 MCG/INH AEROSOL POWDER, BREATH ACTIVATED Inhale 1 Puff every day.     • clopidogrel (PLAVIX) 75 MG Tab Take 1 Tablet by mouth every day. 30 Tablet 11   • carvedilol (COREG) 6.25 MG Tab Take 1 Tablet by mouth 2 times a day with meals. 60 Tablet 6   • atorvastatin (LIPITOR) 20 MG Tab Take 1 Tablet by mouth every evening. 30 Tablet 11   • losartan (COZAAR) 50 MG Tab Take 1 Tablet by mouth every day. 90 Tablet 3   • VENTOLIN  (90 Base) MCG/ACT Aero Soln inhalation aerosol Inhale 2 Puffs every four hours as needed for Shortness of Breath.       No current facility-administered medications for this visit.  "      Allergies:   Doxycycline, Tramadol, Amoxicillin, and Ciprofloxacin    Social History:  Social History     Socioeconomic History   • Marital status:    Occupational History   • Occupation: stocking     Employer: [employer]     Comment: retired   Tobacco Use   • Smoking status: Former Smoker     Packs/day: 1.00     Years: 45.00     Pack years: 45.00     Types: Cigarettes     Quit date: 9/10/2021     Years since quittin.5   • Smokeless tobacco: Never Used   • Tobacco comment: has stopped   Vaping Use   • Vaping Use: Never used   Substance and Sexual Activity   • Alcohol use: No     Alcohol/week: 0.0 oz   • Drug use: No   • Sexual activity: Not Currently     Social Determinants of Health     Financial Resource Strain: Low Risk    • Difficulty of Paying Living Expenses: Not hard at all   Food Insecurity: No Food Insecurity   • Worried About Running Out of Food in the Last Year: Never true   • Ran Out of Food in the Last Year: Never true   Transportation Needs: No Transportation Needs   • Lack of Transportation (Medical): No   • Lack of Transportation (Non-Medical): No         ROS:  No fever, chest pain, shortness of breath or abdominal pain.she is very fatigued, blood pressure is very low.       Objective:     BP (!) 62/48 (BP Location: Right arm, Patient Position: Sitting, BP Cuff Size: Child)   Pulse 95   Temp 36 °C (96.8 °F) (Temporal)   Resp 16   Ht 1.549 m (5' 1\")   Wt 42.5 kg (93 lb 9.6 oz)   SpO2 92%   Body mass index is 17.69 kg/m².  BP 76/42 when I checked it.    Physical Exam:  Vital signs reviewed and blood pressure rechecked myself.  Alert, somewhat fatigued and pale appearing.  Respiratory rate is normal and oxygen is currently low normal on room air.  She is unsteady when she tries to stand.  Neck has good range of motion.  Heart rhythm is regular to auscultation right now.  Normal S1-S2 without murmur appreciated.  Lungs show good air movement with rare rhonchi.  No rales " appreciated.  Patient has trace pitting edema currently.  No JVD appreciated.  Abdomen is benign.        Assessment and Plan:   1. Chronic combined systolic and diastolic congestive heart failure (HCC)  Her most recent echocardiogram her function had improved.  Her blood pressure today is very low.  This is symptomatic.  She is to stop the diltiazem.  Cardiology did not put it on the regimen.  Spironolactone is only as needed for edema.    - furosemide (LASIX) 40 MG Tab; Take 1 Tablet by mouth every day.  Dispense: 90 Tablet; Refill: 3    2. Peripheral edema  Patient continues to have peripheral edema that is well controlled with the furosemide.  She will continue that regimen.  - furosemide (LASIX) 40 MG Tab; Take 1 Tablet by mouth every day.  Dispense: 90 Tablet; Refill: 3    3. Biventricular congestive heart failure (HCC)  Medication is rechecked and reviewed.  Per her last cardiology note I believe she is off the diltiazem.  I have asked her to stay off that for now.  The clonidine has also been discontinued.  Her pressure is far too low today.  I have reminded her that the spironolactone is for use only if she notices significant edema.  She last took the diltiazem a day or 2 ago.  I hope and believe that her blood pressure will improve off that medication.  She will check at least once weekly.  - furosemide (LASIX) 40 MG Tab; Take 1 Tablet by mouth every day.  Dispense: 90 Tablet; Refill: 3    4. Primary insomnia  Patient does need a renewal of her mirtazapine.  This continues to be helpful for her insomnia.  - mirtazapine (REMERON) 15 MG Tab; Take 1 Tablet by mouth every evening.  Dispense: 90 Tablet; Refill: 3    5. Situational depression  This also has been helpful for her situational depression and is renewed.  She is currently in a quieter environment.  - mirtazapine (REMERON) 15 MG Tab; Take 1 Tablet by mouth every evening.  Dispense: 90 Tablet; Refill: 3      - Chart and discharge summary were reviewed.    - Hospitalization and results reviewed with patient.   - Medications reviewed including instructions regarding high risk medications, dosing and side effects.  - Recommended Services: No services needed at this time  - Advance directive/POLST on file?  Yes    Follow-up:Return in about 5 weeks (around 4/20/2022), or if symptoms worsen or fail to improve.    Face-to-face transitional care management services with MODERATE (today's visit is within 14 days post discharge & LACE+ score of 28-58) medical decision complexity were provided.     LACE+ Historical Score Over Time (0-28: Low, 29-58: Medium, 59+: High): 66    Coding guide:   66171        - face-to-face within 14 day        - moderate decision complexity (LACE+ score of 28-58)  66002       - face-to-face within 7 days       - high medical decision complexity (LACE+ score 59+)

## 2022-04-11 ENCOUNTER — OFFICE VISIT (OUTPATIENT)
Dept: CARDIOLOGY | Facility: MEDICAL CENTER | Age: 77
End: 2022-04-11
Payer: MEDICARE

## 2022-04-11 VITALS
DIASTOLIC BLOOD PRESSURE: 50 MMHG | BODY MASS INDEX: 17.9 KG/M2 | WEIGHT: 94.8 LBS | HEIGHT: 61 IN | OXYGEN SATURATION: 86 % | RESPIRATION RATE: 15 BRPM | HEART RATE: 120 BPM | SYSTOLIC BLOOD PRESSURE: 90 MMHG

## 2022-04-11 DIAGNOSIS — I65.23 ATHEROSCLEROSIS OF BOTH CAROTID ARTERIES: ICD-10-CM

## 2022-04-11 DIAGNOSIS — I50.30 ACC/AHA STAGE C HEART FAILURE WITH PRESERVED EJECTION FRACTION (HCC): ICD-10-CM

## 2022-04-11 DIAGNOSIS — I71.40 ABDOMINAL AORTIC ANEURYSM GREATER THAN 39 MM IN DIAMETER (HCC): ICD-10-CM

## 2022-04-11 DIAGNOSIS — I50.82 BIVENTRICULAR CONGESTIVE HEART FAILURE (HCC): ICD-10-CM

## 2022-04-11 DIAGNOSIS — I10 ESSENTIAL HYPERTENSION: ICD-10-CM

## 2022-04-11 DIAGNOSIS — N18.31 STAGE 3A CHRONIC KIDNEY DISEASE: ICD-10-CM

## 2022-04-11 DIAGNOSIS — E78.5 DYSLIPIDEMIA, GOAL LDL BELOW 100: ICD-10-CM

## 2022-04-11 DIAGNOSIS — R60.9 PERIPHERAL EDEMA: ICD-10-CM

## 2022-04-11 DIAGNOSIS — I73.9 PVD (PERIPHERAL VASCULAR DISEASE) (HCC): ICD-10-CM

## 2022-04-11 LAB — EKG IMPRESSION: NORMAL

## 2022-04-11 PROCEDURE — 99214 OFFICE O/P EST MOD 30 MIN: CPT | Performed by: NURSE PRACTITIONER

## 2022-04-11 PROCEDURE — 93000 ELECTROCARDIOGRAM COMPLETE: CPT | Performed by: INTERNAL MEDICINE

## 2022-04-11 RX ORDER — ASPIRIN 81 MG/1
81 TABLET, CHEWABLE ORAL DAILY
COMMUNITY
Start: 2022-03-25 | End: 2022-04-24

## 2022-04-11 RX ORDER — FUROSEMIDE 40 MG/1
20 TABLET ORAL DAILY
Qty: 90 TABLET | Refills: 3 | Status: SHIPPED | OUTPATIENT
Start: 2022-04-11

## 2022-04-11 ASSESSMENT — FIBROSIS 4 INDEX: FIB4 SCORE: 0.92

## 2022-04-11 ASSESSMENT — ENCOUNTER SYMPTOMS
FEVER: 0
MYALGIAS: 0
SHORTNESS OF BREATH: 1
COUGH: 0
DIZZINESS: 1
ABDOMINAL PAIN: 0
PND: 0
PALPITATIONS: 0
CLAUDICATION: 0
ORTHOPNEA: 0

## 2022-04-11 NOTE — PROGRESS NOTES
Chief Complaint   Patient presents with   • Hypertension     F/V DX: Essential hypertension        Subjective     Karen Jauregui is a 76 y.o. female who presents today for follow up on her heart failure after her recent hospitalization with her son, Johnny.    Patient of Dr. Leonard.  She was last seen in clinic on 2/28/2022.  During that visit, patient was sent to the hospital for further evaluation for chest pain and ST depression.  She had a hospitalization from 2/28/2022 through 3/1/2022 for shortness of breath.  She was admitted for COPD exacerbation, recommended to use oxygen at 2 L.  She was also started on 5-day course of steroids and referred to pulmonary rehab.    She then had a second hospitalization at [facility] from 3/17/2022 through 3/25/2022.  She was admitted for shortness of breath.  She had positive troponins and cardiology was consulted.  She had a cardiac cath with a ESTRELLITA placed to the proximal LAD with residual CAD.  Patient was also identified to have peripheral vascular disease.  Echocardiogram was performed and showed preserved ejection fraction.  He did have some sinus tachycardia and her medications were adjusted.    She reports feeling fatigued, with continued shortness of breath using oxygen at 2 L and occasional dizziness.  She denies chest pain, palpitations, orthopnea, PND or edema.    She is not weighing herself at home.    Patient son reports patient has had decreased appetite, does not eat very much only a few bites at a time.  She does drink an occasional boost.    Home blood pressures around 127 over 80s.    Additonally, patient has the following medical problems:    -History of AAA repair    -Carotid atherosclerosis    -COPD    -Osteoarthritis    -Dyslipidemia    -Former smoker, quit around September 2021    -Spinal stenosis    Past Medical History:   Diagnosis Date   • Abdominal aortic aneurysm (HCC) 11/2010    3.6 cm 8/2014   • Acute exacerbation of chronic  obstructive pulmonary disease (COPD) ([facility]) 1/22/2022   • Acute pulmonary edema ([facility]) 1/22/2022   • Acute systolic CHF (congestive heart failure) ([facility]) 1/22/2022   • Angina     with stress test   • Arthritis     thumbs   • Atherosclerosis of arteries of the extremities     two stents in the groin, Dr. Pickett   • Bowel habit changes    • Cardiac LV ejection fraction 21-40% 10/20/2021   • Carotid atherosclerosis     Dr. Pickett   • COPD (chronic obstructive pulmonary disease) ([facility])    • Diverticulitis     Dx 11/25/11   • Diverticulosis of colon    • Dyslipidemia    • Ejection fraction < 50% 10/20/2021   • Elevated troponin 6/8/2020   • Emphysema of lung ([facility])    • Eosinophilic colitis    • Family history of nonmelanoma skin cancer    • Hypertension    • Hypertensive emergency 1/22/2022   • Left lower lobe consolidation ([facility]) 5/9/2018   • Pain    • Peripheral vascular disease ([facility])     9 months, may relate to PVD   • Pneumonia, unspecified organism 12/3/2021   • Positive blood culture 5/14/2020   • PVD (posterior vitreous detachment), left eye 1/13/2015   • Snoring    • Spinal stenosis of lumbar region     Dr. Navarro   • Stage 3a chronic kidney disease ([facility]) 1/27/2022   • Tobacco dependence    • Unspecified hemorrhagic conditions     asa/plavix, bruises easily     Past Surgical History:   Procedure Laterality Date   • AAA WITH STENT GRAFT N/A 3/31/2017    Procedure: AAA WITH STENT GRAFT - 5-CM INFRARENAL;  Surgeon: Spring Pemberton M.D.;  Location: SURGERY [facility];  Service:    • FEMORAL FEMORAL BYPASS N/A 3/31/2017    Procedure: FEMORAL FEMORAL BYPASS - POSS;  Surgeon: Spring Pemberton M.D.;  Location: SURGERY [facility];  Service:    • COLONOSCOPY WITH BIOPSY  3/6/2015    Performed by Pranav THOMPSON M.D. at ENDOSCOPY [facility]   • COLON RESECTION LAPAROSCOPIC  8/5/2013    Performed by Spring Pemberton M.D. at SURGERY [facility]   • OTHER CARDIAC SURGERY  2005    cardiac stents   • GYN SURGERY   2002    hysterectomy, tubal, fibroids, ovaries gone   • GYN SURGERY      ectopic pregnacy   • COLONOSCOPY  3/1/2009, 2012, 14    normal, normal, normal but heavy diverticulosis   • OTHER      LLE /RLEstent, common iliac, Dr. Pickett     Family History   Problem Relation Age of Onset   • Hyperlipidemia Sister    • Hypertension Sister    • Stroke Sister    • Heart Disease Sister         heart attack   • Non-contributory Sister         carotid atherosclerosis   • Hypertension Mother    • Hyperlipidemia Mother    • Heart Disease Mother 82        congestive heart failure, AAA   • Heart Disease Father 55        multiple heart issues   • Hypertension Father    • Hyperlipidemia Father    • Cancer Sister         sin cancer   • Heart Disease Brother         heart attack     Social History     Socioeconomic History   • Marital status:      Spouse name: Not on file   • Number of children: Not on file   • Years of education: Not on file   • Highest education level: Not on file   Occupational History   • Occupation: stocking     Employer: [employer]     Comment: retired   Tobacco Use   • Smoking status: Former Smoker     Packs/day: 1.00     Years: 45.00     Pack years: 45.00     Types: Cigarettes     Quit date: 9/10/2021     Years since quittin.5   • Smokeless tobacco: Never Used   • Tobacco comment: has stopped   Vaping Use   • Vaping Use: Never used   Substance and Sexual Activity   • Alcohol use: No     Alcohol/week: 0.0 oz   • Drug use: No   • Sexual activity: Not Currently   Other Topics Concern   • Not on file   Social History Narrative   • Not on file     Social Determinants of Health     Financial Resource Strain: Low Risk    • Difficulty of Paying Living Expenses: Not hard at all   Food Insecurity: No Food Insecurity   • Worried About Running Out of Food in the Last Year: Never true   • Ran Out of Food in the Last Year: Never true   Transportation Needs: No Transportation Needs   • Lack of  Transportation (Medical): No   • Lack of Transportation (Non-Medical): No   Physical Activity: Not on file   Stress: Not on file   Social Connections: Not on file   Intimate Partner Violence: Not on file   Housing Stability: Not on file     Allergies   Allergen Reactions   • Doxycycline Diarrhea     None stop diarrhea   • Tramadol Vomiting   • Amoxicillin Diarrhea     Diarrhea   • Ciprofloxacin Unspecified     Unspecified     Outpatient Encounter Medications as of 4/11/2022   Medication Sig Dispense Refill   • aspirin (ASA) 81 MG Chew Tab chewable tablet Chew 81 mg every day.     • Potassium 99 MG Tab Take  by mouth.     • furosemide (LASIX) 40 MG Tab Take 0.5 Tablets by mouth every day. 90 Tablet 3   • Fluticasone Furoate-Vilanterol (BREO ELLIPTA) 200-25 MCG/INH AEROSOL POWDER, BREATH ACTIVATED Inhale 1 Puff every day.     • clopidogrel (PLAVIX) 75 MG Tab Take 1 Tablet by mouth every day. 30 Tablet 11   • carvedilol (COREG) 6.25 MG Tab Take 1 Tablet by mouth 2 times a day with meals. 60 Tablet 6   • atorvastatin (LIPITOR) 20 MG Tab Take 1 Tablet by mouth every evening. 30 Tablet 11   • losartan (COZAAR) 50 MG Tab Take 1 Tablet by mouth every day. 90 Tablet 3   • VENTOLIN  (90 Base) MCG/ACT Aero Soln inhalation aerosol Inhale 2 Puffs every four hours as needed for Shortness of Breath.     • [DISCONTINUED] furosemide (LASIX) 40 MG Tab Take 1 Tablet by mouth every day. 90 Tablet 3   • [DISCONTINUED] mirtazapine (REMERON) 15 MG Tab Take 1 Tablet by mouth every evening. (Patient not taking: Reported on 4/11/2022) 90 Tablet 3     No facility-administered encounter medications on file as of 4/11/2022.     Review of Systems   Constitutional: Positive for malaise/fatigue. Negative for fever.   Respiratory: Positive for shortness of breath. Negative for cough.    Cardiovascular: Negative for chest pain, palpitations, orthopnea, claudication, leg swelling and PND.   Gastrointestinal: Negative for abdominal pain.  "  Musculoskeletal: Negative for myalgias.   Neurological: Positive for dizziness.   All other systems reviewed and are negative.             Objective     BP (!) 90/50 (BP Location: Left arm, Patient Position: Sitting, BP Cuff Size: Adult)   Pulse (!) 120   Resp 15   Ht 1.549 m (5' 1\")   Wt 43 kg (94 lb 12.8 oz)   LMP 03/01/2002   SpO2 (!) 86%   BMI 17.91 kg/m²     Physical Exam  Vitals reviewed.   Constitutional:       Appearance: She is well-developed.   HENT:      Head: Normocephalic and atraumatic.   Eyes:      Pupils: Pupils are equal, round, and reactive to light.   Neck:      Vascular: No JVD.   Cardiovascular:      Rate and Rhythm: Normal rate and regular rhythm.      Heart sounds: Normal heart sounds.   Pulmonary:      Effort: Pulmonary effort is normal. No respiratory distress.      Breath sounds: Normal breath sounds. No wheezing or rales.   Abdominal:      General: Bowel sounds are normal.      Palpations: Abdomen is soft.   Musculoskeletal:         General: Normal range of motion.      Cervical back: Normal range of motion and neck supple.      Right lower leg: No edema.      Left lower leg: No edema.   Skin:     General: Skin is warm and dry.   Neurological:      General: No focal deficit present.      Mental Status: She is alert and oriented to person, place, and time.   Psychiatric:         Behavior: Behavior normal.       Lab Results   Component Value Date/Time    CHOLSTRLTOT 115 05/13/2020 09:54 AM    LDL 61 05/13/2020 09:54 AM    HDL 41 05/13/2020 09:54 AM    TRIGLYCERIDE 67 05/13/2020 09:54 AM       Lab Results   Component Value Date/Time    SODIUM 130 (L) 02/28/2022 11:53 AM    POTASSIUM 5.2 02/28/2022 11:53 AM    CHLORIDE 93 (L) 02/28/2022 11:53 AM    CO2 26 02/28/2022 11:53 AM    GLUCOSE 117 (H) 02/28/2022 11:53 AM    BUN 46 (H) 02/28/2022 11:53 AM    CREATININE 1.21 02/28/2022 11:53 AM    CREATININE 0.8 08/25/2008 05:40 PM     Lab Results   Component Value Date/Time    ALKPHOSPHAT 97 " 02/28/2022 11:53 AM    ASTSGOT 23 02/28/2022 11:53 AM    ALTSGPT 17 02/28/2022 11:53 AM    TBILIRUBIN 0.2 02/28/2022 11:53 AM      Transthoracic Echo Report 3/6/2015  Technically difficult study.   Contrast used to enhance definition of the LV borders.Hyperdynamic left   ventricular systolic function.   Left ventricular ejection fraction is greater than 70%.   Grade I diastolic dysfunction.   Right ventricle not well visualized.   Right atrium not well visualized.     Transthoracic Echo Report 5/13/2020  Severely reduced left ventricular systolic function. Left ventricular   ejection fraction is visually estimated to be 30%.  Global hypokinesis.   No evidence of valvular abnormality based on Doppler evaluation.   Compared to prior study on 03/06/2015, there has been new reduction in   left ventricular systolic function.     Transthoracic Echo Report 1/22/2022  Moderate concentric left ventricular hypertrophy.  Small pericardial effusion without evidence of hemodynamic compromise.  Normal LV, RV size and systolic function  Unable to estimate RVSP  Right atrial pressure estimated to be normal    Other medical records in care everywhere    Assessment & Plan     1. Peripheral edema  furosemide (LASIX) 40 MG Tab   2. Biventricular congestive heart failure (HCC)  furosemide (LASIX) 40 MG Tab   3. Stage 3a chronic kidney disease (HCC)  Basic Metabolic Panel   4. ACC/AHA stage C heart failure with preserved ejection fraction (HCC)  Basic Metabolic Panel   5. PVD (peripheral vascular disease) ([facility]     6. Atherosclerosis of both carotid arteries     7. Abdominal aortic aneurysm greater than 39 mm in diameter ([facility])     8. Essential hypertension     9. Dyslipidemia, goal LDL below 100         Medical Decision Making: Today's Assessment/Status/Plan:        HFpEF, Stage C, Class 3, LVEF 55%: Based on physical examination findings, patient is euvolemic. No JVD, lungs are clear to auscultation, no pitting edema in bilateral lower  extremities, no ascites.  -Patient likely a little bit dry with borderline hypotension and tachycardia  -Ischemic cardiomyopathy  -Reduce furosemide to 20 mg daily, continue over-the-counter potassium  -BMP in the next couple of weeks  -Patient to start weighing herself regularly, able to get a scale.  -Start monitoring weights at home daily. Call office if weight increasing greater than 3 lbs in 1 day or greater than 5 lbs in 1 week.   -Reinforced s/sx of worsening heart failure with patient and weight monitoring. Pt verbalizes understanding. Pt to call office or RTC if present.   -Patient given pump line number  -Patient eats high sodium foods (i.e. [employer] lasagna and meat loaf), but eats only a couple of bites of her foods.  Recommend lower sodium foods or more nutritious foods as she has a decreased appetite.  Patient is having some boost.  -Patient has advanced directive on file  -HF RN to further educate patient    CAD, s/p ESTRELLITA to the proximal LAD on 3/17/2022:  -Continue aspirin 81 mg daily  -Continue atorvastatin 20 mg daily  -Continue clopidogrel 75 mg daily  -Continue carvedilol 6.25 mg twice a day  -Continue losartan 50 mg daily, consider decreasing this to increase beta-blocker for heart rate if needed  -EKG today shows sinus tachycardia 107   -continue smoking cessation    Carotid atherosclerosis/PVD:  -Continue recommendations per above  -Continue smoking cessation    COPD:  -Continue oxygen  -Patient does have a follow-up with pulmonary in a few months.    Hypertension:  -BP borderline low  -Medication adjustments per above    FU in clinic in 2 weeks with labs. Sooner if needed.    Patient verbalizes understanding and agrees with the plan of care.     PLEASE NOTE: This Note was created using voice recognition Software. I have made every reasonable attempt to correct obvious errors, but I expect that there are errors of grammar and possibly content that I did not discover before finalizing the  note

## 2022-04-18 ENCOUNTER — APPOINTMENT (OUTPATIENT)
Dept: MEDICAL GROUP | Facility: MEDICAL CENTER | Age: 77
End: 2022-04-18
Payer: MEDICARE

## 2022-05-05 ENCOUNTER — OFFICE VISIT (OUTPATIENT)
Dept: MEDICAL GROUP | Facility: MEDICAL CENTER | Age: 77
End: 2022-05-05
Payer: MEDICARE

## 2022-05-05 VITALS
HEART RATE: 97 BPM | TEMPERATURE: 99.6 F | OXYGEN SATURATION: 98 % | BODY MASS INDEX: 17.91 KG/M2 | HEIGHT: 61 IN | SYSTOLIC BLOOD PRESSURE: 120 MMHG | DIASTOLIC BLOOD PRESSURE: 78 MMHG | RESPIRATION RATE: 20 BRPM

## 2022-05-05 DIAGNOSIS — I50.82 BIVENTRICULAR CONGESTIVE HEART FAILURE (HCC): ICD-10-CM

## 2022-05-05 DIAGNOSIS — I71.40 ABDOMINAL AORTIC ANEURYSM GREATER THAN 39 MM IN DIAMETER (HCC): ICD-10-CM

## 2022-05-05 DIAGNOSIS — R82.71 BACTERIURIA: ICD-10-CM

## 2022-05-05 DIAGNOSIS — I73.9 PERIPHERAL VASCULAR DISEASE (HCC): ICD-10-CM

## 2022-05-05 DIAGNOSIS — R60.9 PERIPHERAL EDEMA: ICD-10-CM

## 2022-05-05 DIAGNOSIS — I70.209 ARTERIOSCLEROSIS OF ARTERIES OF EXTREMITIES (HCC): ICD-10-CM

## 2022-05-05 DIAGNOSIS — N18.31 STAGE 3A CHRONIC KIDNEY DISEASE: ICD-10-CM

## 2022-05-05 DIAGNOSIS — J96.11 CHRONIC RESPIRATORY FAILURE WITH HYPOXIA (HCC): ICD-10-CM

## 2022-05-05 DIAGNOSIS — M51.27 HERNIATED NUCLEUS PULPOSUS, L5-S1, LEFT: ICD-10-CM

## 2022-05-05 DIAGNOSIS — J41.0 SIMPLE CHRONIC BRONCHITIS (HCC): ICD-10-CM

## 2022-05-05 PROCEDURE — 99214 OFFICE O/P EST MOD 30 MIN: CPT | Performed by: FAMILY MEDICINE

## 2022-05-05 RX ORDER — HYDROCODONE BITARTRATE AND ACETAMINOPHEN 5; 325 MG/1; MG/1
1 TABLET ORAL EVERY 6 HOURS PRN
Qty: 120 TABLET | Refills: 0 | Status: SHIPPED | OUTPATIENT
Start: 2022-05-05 | End: 2022-05-06 | Stop reason: SDUPTHER

## 2022-05-05 RX ORDER — ALBUTEROL SULFATE 90 UG/1
2 AEROSOL, METERED RESPIRATORY (INHALATION)
COMMUNITY
Start: 2022-04-17 | End: 2022-05-17

## 2022-05-05 RX ORDER — IPRATROPIUM BROMIDE AND ALBUTEROL 20; 100 UG/1; UG/1
SPRAY, METERED RESPIRATORY (INHALATION)
COMMUNITY
Start: 2022-04-28

## 2022-05-05 RX ORDER — HYDROCHLOROTHIAZIDE 12.5 MG/1
CAPSULE, GELATIN COATED ORAL
COMMUNITY
Start: 2022-04-28

## 2022-05-05 RX ORDER — NITROFURANTOIN 25; 75 MG/1; MG/1
100 CAPSULE ORAL 2 TIMES DAILY
Qty: 14 CAPSULE | Refills: 0 | Status: SHIPPED | OUTPATIENT
Start: 2022-05-05 | End: 2022-05-06 | Stop reason: SDUPTHER

## 2022-05-05 RX ORDER — PREDNISONE 20 MG/1
TABLET ORAL
COMMUNITY
Start: 2022-04-27 | End: 2022-05-05

## 2022-05-05 RX ORDER — TIOTROPIUM BROMIDE 18 UG/1
CAPSULE ORAL; RESPIRATORY (INHALATION)
COMMUNITY
Start: 2022-04-28

## 2022-05-05 RX ORDER — FLUTICASONE PROPIONATE AND SALMETEROL 250; 50 UG/1; UG/1
POWDER RESPIRATORY (INHALATION)
COMMUNITY
Start: 2022-04-28

## 2022-05-05 NOTE — PROGRESS NOTES
Chief Complaint   Patient presents with   • Hospital Follow-up       Subjective:     HPI:   Karen Jauregui presents today with the followin. Simple chronic bronchitis (HCC)  She was hospitalized recently with respiratory failure.  Patient has longstanding severe chronic COPD with chronic bronchitis and emphysema.  She is oxygen dependent .  Patient was recently hospitalized at [facility] and Spiriva and Combivent were added to her regimen.  She feels she is doing better overall on this new regimen.  We will continue these prescriptions.    2. Chronic respiratory failure with hypoxia (HCC)  Patient is oxygen dependent .  Unfortunately her oxygen concentrator battery stopped.  They did not have another battery with them nor do I have access to those batteries.  They state that they are not far from home and she will be wheeled downstairs in her transport chair and get on her oxygen as soon as she gets home.    3. Biventricular congestive heart failure ([facility])/Peripheral edema  Patient has been diagnosed with biventricular congestive heart failure.  She is on carvedilol as well as her current diuretic regimen.  She will continue to follow with cardiology.    4. Stage 3a chronic kidney disease ([facility])  Most recent creatinine is actually normal.  She often appears to have chronic kidney disease but I feel this is mostly diuretic and poor fluid intake based.    5. Arteriosclerosis of arteries of extremities ([facility])/Peripheral vascular disease ([facility])  Patient has history of arterial atherosclerosis in both legs and history of aortic atherosclerosis.    6. Abdominal aortic aneurysm greater than 39 mm in diameter ([facility])  Patient does have an abdominal aortic aneurysm.  This has been stable on recent imaging.    7. Bacteriuria  Recent blood testing does show numerous bacteria on urinalysis and abnormal urinalysis.  No culture result is found.  Patient is not aware of being treated for UTI  nor is her son.  Patient does not have any antibiotics as part of her discharge medication.  Nitrofurantoin is selected based on the sensitivities from her urinary tract infection in February.    8. Herniated nucleus pulposus, L5-S1, left  Patient does have chronic back pain from degenerative changes in the lumbar spine, particularly disc disease but also arthritic change.  Patient finds the hydrocodone to be helpful.  She uses this on an as-needed basis.  PDMP review shows no inconsistencies.  Patient is lucid and appropriate.  The medication is renewed.        Patient Active Problem List    Diagnosis Date Noted   • Advanced care planning/counseling discussion 02/28/2022   • Stage 3a chronic kidney disease ([facility]) 01/27/2022   • Chronic respiratory failure with hypoxia ([facility]) 01/27/2022   • History of 2019 novel coronavirus disease (COVID-19) 01/27/2022   • Long term (current) use of anticoagulants 12/03/2021   • Difficulty in walking, not elsewhere classified 10/26/2021   • Peripheral edema 10/26/2021   • Muscle weakness (generalized) 10/26/2021   • Situational depression 10/20/2021   • Hypoxia 10/20/2021   • CHF (congestive heart failure) ([facility]) 09/30/2021   • Polycythemia 06/08/2020   • Elevated BUN 06/08/2020   • Pulmonary nodule 06/08/2020   • Anxiety 04/22/2018   • Hyponatremia 04/19/2018   • Osteopenia of lumbar spine 02/14/2018   • Colonic stricture ([facility]) 06/27/2017   • History of ischemic colitis 06/27/2017   • PVD (peripheral vascular disease) ([facility]) 06/02/2017   • Insomnia disorder 02/20/2017   • History of small bowel obstruction 02/17/2017   • Eosinophilic colitis 07/21/2015   • Family history of nonmelanoma skin cancer    • COPD (chronic obstructive pulmonary disease) ([facility])    • Constipated 05/26/2012   • Subclavian artery stenosis, left ([facility]) 03/27/2012   • History of diverticulitis of colon 11/16/2011   • Herniated nucleus pulposus, L5-S1, left 09/08/2011   • Coronary artery disease involving native  coronary artery of native heart without angina pectoris 06/15/2011   • Abdominal aortic aneurysm greater than 39 mm in diameter (HCC)    • Peripheral vascular disease (HCC)    • Hypertension    • Hyperlipidemia    • Spinal stenosis of lumbar region    • Arteriosclerosis of arteries of extremities (HCC)        Current medicines (including changes today)  Current Outpatient Medications   Medication Sig Dispense Refill   • fluticasone-salmeterol (ADVAIR) 250-50 MCG/ACT AEROSOL POWDER, BREATH ACTIVATED      • hydrochlorothiazide (MICROZIDE) 12.5 MG capsule      • ipratropium-albuterol (COMBIVENT RESPIMAT)  MCG/ACT Aero Soln Inhale.     • COMBIVENT RESPIMAT  MCG/ACT Aero Soln      • SPIRIVA HANDIHALER 18 MCG Cap      • albuterol 108 (90 Base) MCG/ACT Aero Soln inhalation aerosol Inhale 2 Puffs.     • nitrofurantoin (MACROBID) 100 MG Cap Take 1 Capsule by mouth 2 times a day for 7 days. 14 Capsule 0   • HYDROcodone-acetaminophen (NORCO) 5-325 MG Tab per tablet Take 1 Tablet by mouth every 6 hours as needed (moderate to severe pain) for up to 30 days. 120 tablets is a 30 day quantity 120 Tablet 0   • Potassium 99 MG Tab Take  by mouth.     • furosemide (LASIX) 40 MG Tab Take 0.5 Tablets by mouth every day. 90 Tablet 3   • clopidogrel (PLAVIX) 75 MG Tab Take 1 Tablet by mouth every day. 30 Tablet 11   • carvedilol (COREG) 6.25 MG Tab Take 1 Tablet by mouth 2 times a day with meals. 60 Tablet 6   • atorvastatin (LIPITOR) 20 MG Tab Take 1 Tablet by mouth every evening. 30 Tablet 11   • losartan (COZAAR) 50 MG Tab Take 1 Tablet by mouth every day. 90 Tablet 3   • VENTOLIN  (90 Base) MCG/ACT Aero Soln inhalation aerosol Inhale 2 Puffs every four hours as needed for Shortness of Breath.       No current facility-administered medications for this visit.       Allergies   Allergen Reactions   • Doxycycline Diarrhea     None stop diarrhea   • Tramadol Vomiting   • Amoxicillin Diarrhea     Diarrhea   •  "Ciprofloxacin Unspecified     Unspecified       ROS: As per HPI       Objective:     /78 (BP Location: Left arm, Patient Position: Sitting, BP Cuff Size: Small adult)   Pulse 97   Temp 37.6 °C (99.6 °F) (Temporal)   Resp 20   Ht 1.549 m (5' 1\")   SpO2 98%  Body mass index is 17.91 kg/m².    Physical Exam:  Constitutional: Well-developed and well-nourished. Not diaphoretic. No distress. Lucid and fluent.  Skin: Skin is warm and dry. No rash noted.  Head: Atraumatic without lesions.  Eyes: Conjunctivae and extraocular motions are normal. Pupils are equal, round, and reactive to light. No scleral icterus.   Ears:  External ears unremarkable. Tympanic membranes clear and intact.  Nose: Nares patent. Mucosa without edema or erythema. No discharge. No facial tenderness.  Mouth/Throat: Tongue normal. Oropharynx is clear and moist. Posterior pharynx without erythema or exudates.  Neck: Supple, trachea midline. No thyromegaly present. No cervical or supraclavicular lymphadenopathy. No JVD or carotid bruits appreciated  Cardiovascular: Regular rate and rhythm.  Normal S1, S2 without murmur appreciated.  Chest: Effort normal. Clear to auscultation throughout. No adventitious sounds.   Abdomen: Soft, non tender, and without distention. Active bowel sounds in all four quadrants. No rebound, guarding, masses or hepatosplenomegaly.  Extremities: No cyanosis, clubbing, erythema, nor edema.   Neurological: Alert and oriented x 3.  Movements are symmetric.  Psychiatric:  Behavior, mood, and affect are appropriate.       Assessment and Plan:     76 y.o. female with the following issues:    1. Simple chronic bronchitis (HCC)  fluticasone-salmeterol (ADVAIR) 250-50 MCG/ACT AEROSOL POWDER, BREATH ACTIVATED    ipratropium-albuterol (COMBIVENT RESPIMAT)  MCG/ACT Aero Soln    COMBIVENT RESPIMAT  MCG/ACT Aero Soln    SPIRIVA HANDIHALER 18 MCG Cap    albuterol 108 (90 Base) MCG/ACT Aero Soln inhalation aerosol   2. " Chronic respiratory failure with hypoxia (HCC)     3. Biventricular congestive heart failure (HCC)     4. Peripheral edema  hydrochlorothiazide (MICROZIDE) 12.5 MG capsule   5. Stage 3a chronic kidney disease (HCC)     6. Arteriosclerosis of arteries of extremities (HCC)     7. Abdominal aortic aneurysm greater than 39 mm in diameter (HCC)     8. Peripheral vascular disease (HCC)     9. Bacteriuria  nitrofurantoin (MACROBID) 100 MG Cap   10. Herniated nucleus pulposus, L5-S1, left  HYDROcodone-acetaminophen (NORCO) 5-325 MG Tab per tablet     Consequences of Chronic Opiate therapy:  (5 A's)  Analgesia:  improved  Activity:  improved  Adverse Events: None reported or observed  Aberrant Behaviors: None reported or observed  Affect/Mood: good grooming, full facial expressions, normal speech pattern and content, normal thought patterns, normal perception, good insight, normal reasoning  Last CMP:   Less than 2 weeks ago.  Appropriate Imaging done:   Yes      Recheck 2 months, sooner as needed  Followup: Return in about 6 weeks (around 6/16/2022), or if symptoms worsen or fail to improve.

## 2022-05-06 DIAGNOSIS — M51.27 HERNIATED NUCLEUS PULPOSUS, L5-S1, LEFT: ICD-10-CM

## 2022-05-06 DIAGNOSIS — R82.71 BACTERIURIA: ICD-10-CM

## 2022-05-10 RX ORDER — HYDROCODONE BITARTRATE AND ACETAMINOPHEN 5; 325 MG/1; MG/1
1 TABLET ORAL EVERY 6 HOURS PRN
Qty: 120 TABLET | Refills: 0 | Status: SHIPPED | OUTPATIENT
Start: 2022-05-10 | End: 2022-06-09

## 2022-05-10 RX ORDER — NITROFURANTOIN 25; 75 MG/1; MG/1
100 CAPSULE ORAL 2 TIMES DAILY
Qty: 14 CAPSULE | Refills: 0 | Status: SHIPPED | OUTPATIENT
Start: 2022-05-10 | End: 2022-05-17

## 2022-05-11 NOTE — TELEPHONE ENCOUNTER
PDMP review shows no inconsistencies.  The medication is sent to her [facility] pharmacy.

## 2022-05-20 ENCOUNTER — TELEPHONE (OUTPATIENT)
Dept: MEDICAL GROUP | Facility: MEDICAL CENTER | Age: 77
End: 2022-05-20
Payer: MEDICARE

## 2022-05-20 DIAGNOSIS — J96.11 CHRONIC RESPIRATORY FAILURE WITH HYPOXIA (HCC): ICD-10-CM

## 2022-05-20 DIAGNOSIS — R09.02 HYPOXIA: ICD-10-CM

## 2022-05-20 DIAGNOSIS — I50.82 BIVENTRICULAR CONGESTIVE HEART FAILURE (HCC): ICD-10-CM

## 2022-05-20 DIAGNOSIS — J41.0 SIMPLE CHRONIC BRONCHITIS (HCC): ICD-10-CM

## 2022-05-20 DIAGNOSIS — I25.10 CORONARY ARTERY DISEASE INVOLVING NATIVE CORONARY ARTERY OF NATIVE HEART WITHOUT ANGINA PECTORIS: ICD-10-CM

## 2022-05-20 NOTE — TELEPHONE ENCOUNTER
[facility]. They are seeking hospice orders for this pt. The message from Katiuska at [facility] was as follows:    The pt has Acute-on-chronic respiratory Failure w/hypoxia due to COPD. Significant comorborditiy with acute hypertensive heart disease and corinary artiery disease.     Katiuska:  [phone]    Please advise.

## 2022-05-21 NOTE — TELEPHONE ENCOUNTER
Yes, I feel a hospice referral is appropriate.  Patient does indeed have very severe longstanding COPD with multiple acute exacerbations the last few months.  She has congestive heart failure and coronary artery disease disease as well.

## 2022-07-08 ENCOUNTER — APPOINTMENT (OUTPATIENT)
Dept: MEDICAL GROUP | Facility: MEDICAL CENTER | Age: 77
End: 2022-07-08
Payer: MEDICARE

## 2022-07-11 PROBLEM — I11.0 HYPERTENSIVE HEART FAILURE (HCC): Status: ACTIVE | Noted: 2022-07-11

## 2022-07-19 ENCOUNTER — APPOINTMENT (OUTPATIENT)
Dept: SLEEP MEDICINE | Facility: MEDICAL CENTER | Age: 77
End: 2022-07-19
Payer: MEDICARE

## 2022-11-09 ENCOUNTER — PATIENT MESSAGE (OUTPATIENT)
Dept: HEALTH INFORMATION MANAGEMENT | Facility: OTHER | Age: 77
End: 2022-11-09

## 2023-08-14 NOTE — ED NOTES
Chief Complaint   Patient presents with   • LUQ Pain     Pt denies chest pain or SOB. VSS. Pt states 10/10 LUQ abd pain. Pt has aortic aneurism and received CT scan last Fri, results unknown to pt. Chart up for ERP.     No

## 2023-11-16 NOTE — TELEPHONE ENCOUNTER
Johnny called about pt stated insurance is no longer covered at [facility], would like to change pharmacy to [facility], please advise.   Refill requested by: Patient  Phone:  Cell phone number  [phone]  Med: oxycodone  Dosage: 5 mg immediate release tablets  Sig:  Take 1 tablet by mouth every 6 hours as needed for Pain  Quantity requestin  Preferred pharmacy selected      Comments:   Patient told to check with pharmacy after 72 hours.  Also told they would be notified if there's a problem concerning the refill.

## 2023-11-29 ENCOUNTER — PATIENT MESSAGE (OUTPATIENT)
Dept: HEALTH INFORMATION MANAGEMENT | Facility: OTHER | Age: 78
End: 2023-11-29

## 2023-12-03 NOTE — PROGRESS NOTES
[facility] Hospitalist Progress Note    Date of Service: 2018    Chief Complaint  72 y.o. female with past medical history of hypertension , chronic back pain secondary to spinal stenosis on chronic Norco admitted 2018 with CT concerning for small bowel obstruction, nausea vomiting and abdominal pain.    Interval Problem Update  Patient began having bowel movements morning, CT did not show clear transition point, there was quite a bit of stool in the right colon. She is on chronic Norco. I attempted to pull a prescription monitoring information however no data was obtained for unclear reasons. She does have her prescriptions filled locally. I discussed other alternatives for managing her back pain such as topical Voltaren gel.    Consultants/Specialty  None    Disposition  Anticipate home        Review of Systems   Constitutional: Negative for chills, fever and malaise/fatigue.   HENT: Negative for sore throat.    Respiratory: Negative for cough and shortness of breath.    Cardiovascular: Negative for chest pain.   Gastrointestinal: Positive for constipation (improving). Negative for abdominal pain, nausea and vomiting.   Musculoskeletal: Positive for back pain.   Neurological: Negative for dizziness and headaches.   Psychiatric/Behavioral: The patient is nervous/anxious.       Physical Exam  Laboratory/Imaging   Hemodynamics  Temp (24hrs), Av.6 °C (97.8 °F), Min:36.1 °C (97 °F), Max:37 °C (98.6 °F)   Temperature: 37 °C (98.6 °F)  Pulse  Av.5  Min: 68  Max: 117 Heart Rate (Monitored): 88  Blood Pressure : 148/58, NIBP: 114/55      Respiratory      Respiration: 16, Pulse Oximetry: 95 %             Fluids    Intake/Output Summary (Last 24 hours) at 18 1756  Last data filed at 18 1505   Gross per 24 hour   Intake              150 ml   Output                0 ml   Net              150 ml       Nutrition  Orders Placed This Encounter   Procedures   • DIET ORDER     Standing Status:   Standing      Number of Occurrences:   1     Order Specific Question:   Diet:     Answer:   Regular [1]     Physical Exam   Constitutional: She is oriented to person, place, and time. She appears well-developed and well-nourished. No distress.   HENT:   Head: Normocephalic and atraumatic.   Mouth/Throat: Oropharynx is clear and moist.   Eyes: EOM are normal. Pupils are equal, round, and reactive to light. Right eye exhibits no discharge. Left eye exhibits no discharge.   Neck: Neck supple.   Cardiovascular: Normal rate and regular rhythm.    Pulmonary/Chest: Effort normal.   Diminished breath sounds but otherwise clear   Abdominal: Soft. She exhibits distension (protuberant round bloating-she says this is due to all the liquid she drank). There is no tenderness.   Bowel sounds remained slightly diminished   Musculoskeletal: She exhibits no edema or tenderness.   Neurological: She is alert and oriented to person, place, and time. No cranial nerve deficit.   Skin: Skin is warm and dry. She is not diaphoretic.   Psychiatric: She has a normal mood and affect.   Nursing note and vitals reviewed.      Recent Labs      04/19/18   1455  04/20/18   0257   WBC  9.8  9.1   RBC  5.32  4.53   HEMOGLOBIN  15.4  12.6   HEMATOCRIT  44.6  39.4   MCV  83.8  87.0   MCH  28.9  27.8   MCHC  34.5  32.0*   RDW  46.4  48.0   PLATELETCT  282  230   MPV  9.2  9.2     Recent Labs      04/19/18   1455  04/20/18   0257   SODIUM  131*  133*   POTASSIUM  4.3  3.9   CHLORIDE  93*  98   CO2  30  27   GLUCOSE  107*  71   BUN  30*  32*   CREATININE  0.89  0.99   CALCIUM  9.8  8.6     Recent Labs      04/19/18   1702   INR  1.04         Recent Labs      04/20/18   0257   TRIGLYCERIDE  78   HDL  26*   LDL  30          Assessment/Plan     Pseudo-obstruction of colon- (present on admission)   Assessment & Plan    2/2 constipated 2/2 norco  Despite negative  report she gets Rx filled at [facility]  Discussed alternate management chronic back pain         Hyponatremia- (present on admission)   Assessment & Plan    On HCTZ  Not dry, stop IVF        Partial small bowel obstruction   Assessment & Plan    Patient will partial small bowel obstruction noted on CT  Will keep nothing by mouth she has had multiple of these in the past  Antiemetics and IV fluids  Advance diet as clinically appropriate  Consider general surgery consultation if no improvement        Opiate dependence (CMS-HCC)- (present on admission)   Assessment & Plan    Advised patient to seek alternate options to manage her chronic back pain such as Voltaren gel        COPD (chronic obstructive pulmonary disease) (CMS-HCC)- (present on admission)   Assessment & Plan    This is not an acute exacerbation continue respiratory care protocol        Peripheral vascular disease (CMS-HCC)- (present on admission)   Assessment & Plan    Continue aspirin and statin Plavix        Tobacco dependence- (present on admission)   Assessment & Plan    Tobacco cessation counseling        Essential hypertension- (present on admission)   Assessment & Plan    Was at goal now borderline high, stop IVF          Quality-Core Measures   Reviewed items::  Labs reviewed, Medications reviewed and Radiology images reviewed  Resendiz catheter::  No Resendiz  DVT prophylaxis pharmacological::  Heparin  Ulcer Prophylaxis::  Not indicated  Assessed for rehabilitation services:  Patient returned to prior level of function, rehabilitation not indicated at this time

## 2024-05-19 NOTE — PROGRESS NOTES
Received report from night shift RN. Assumed care of patient. Pt assessed and stable. VSS. Patient reports 0/10 pain at this time.  Discussed plan of care for day with patient and received verbal understanding. Call light within reach, bed alarm active, bed in low position.    Statement Selected

## 2024-12-18 NOTE — RESPIRATORY CARE
Oxygen Rounds      Patient found on    O2 L/m:  __2_______    Oxygen device:  __nc______   Spo2: ______96___%      Respiratory device skin site inspection completed.    
  Oxygen Rounds      Patient found on    O2 L/m:  ____2_____    Oxygen device:  ____nc____   Spo2: _____97____%      Respiratory device skin site inspection completed.    
COPD EDUCATION by COPD CLINICAL EDUCATOR  6/9/2020 at 9:18 AM by Senait Salmeron, RRT     Patient interviewed by COPD education team. Patient refused COPD program at this time.   
Oxygen Rounds      Patient found on    O2 L/m:  0  Oxygen device:  Room air  Spo2: 95%        Respiratory device skin site inspection completed.    
No change

## (undated) DEVICE — SHEATH INTRODUCER PERFORMER 12F X 30CM

## (undated) DEVICE — PROTECTOR ULNA NERVE - (36PR/CA)

## (undated) DEVICE — GLOVE BIOGEL ECLIPSE  PF LATEX SIZE 6.5 (50PR/BX)

## (undated) DEVICE — SUTURE 3-0 VICRYL PLUS SH - 27 INCH (36/BX)

## (undated) DEVICE — MARKER SIZING OMNIFLUSH 5F 70 - 5/BX .035 20CM SEGMENT

## (undated) DEVICE — Device

## (undated) DEVICE — ELECTRODE DUAL RETURN W/ CORD - (50/PK)

## (undated) DEVICE — SLEEVE, VASO, THIGH, MED

## (undated) DEVICE — STERI STRIP COMPOUND BENZOIN - TINCTURE 0.6ML WITH APPLICATOR (40EA/BX)

## (undated) DEVICE — MULTI TORQUE DEVICE DISP (5EA/BX)

## (undated) DEVICE — VESSELOOP MINI BLUE STERILE - SURG-I-LOOP (10EA/BX)

## (undated) DEVICE — KIT ANESTHESIA W/CIRCUIT & 3/LT BAG W/FILTER (20EA/CA)

## (undated) DEVICE — STAPLER SKIN DISP - (6/BX 10BX/CA) VISISTAT

## (undated) DEVICE — SENSOR SPO2 NEO LNCS ADHESIVE (20/BX) SEE USER NOTES

## (undated) DEVICE — WIRE GUIDE LUNDERQST .035X260  (5EA/BX)

## (undated) DEVICE — BAG DECANTER (50EA/CS)

## (undated) DEVICE — SOD. CHL. INJ. 0.9% 1000 ML - (14EA/CA 60CA/PF)

## (undated) DEVICE — BAG ISOLATION 20X20 INVISI - SHEILD (10EA/BX)

## (undated) DEVICE — SURGIFOAM (SIZE 100) - (6EA/CA)

## (undated) DEVICE — LACTATED RINGERS INJ 1000 ML - (14EA/CA 60CA/PF)

## (undated) DEVICE — SUTURE 4-0 SILK 12 X 18 INCH - (36/BX)

## (undated) DEVICE — DRAPE IOBAN II 23 IN X 33 IN - (10/BX)

## (undated) DEVICE — GLOVE BIOGEL SZ 6.5 SURGICAL PF LTX (50PR/BX 4BX/CA)

## (undated) DEVICE — GLIDEWIRE ANGLED .035 X 180 (5EA/BX)

## (undated) DEVICE — SET EXTENSION WITH 2 PORTS (48EA/CA) ***PART #2C8610 IS A SUBSTITUTE*****

## (undated) DEVICE — STOPCOCK 3-WAY W/SWIVEL LEVER LOCK (50EA/CA)

## (undated) DEVICE — KIT RADIAL ARTERY 20GA W/MAX BARRIER AND BIOPATCH  (5EA/CA) #10740 IS FOR THE SET RADIAL ARTERIAL

## (undated) DEVICE — DEVICE INFLATION DIGITAL BLUE DIAMOND (5EA/BX)

## (undated) DEVICE — VESSELOOP SUPER MAXI BLUE - SURG-I-LOOP (2EA/PK 10PK/BX)

## (undated) DEVICE — NEEDLE THINWALL 19GA X 7CM

## (undated) DEVICE — HEAD HOLDER JUNIOR/ADULT

## (undated) DEVICE — SUTURE 2-0 VICRYL PLUS SH - 27 INCH (36/BX)

## (undated) DEVICE — SODIUM CHL IRRIGATION 0.9% 1000ML (12EA/CA)

## (undated) DEVICE — MASK ANESTHESIA ADULT  - (100/CA)

## (undated) DEVICE — SHEATH RO 5F 10CM (10EA/BX)

## (undated) DEVICE — GELAQUASONIC 100 ULTRASOUND - 48/BX 20GM STERILE FOIL POUCH

## (undated) DEVICE — SYRINGE SAFETY 5 ML 18 GA X 1-1/2 BLUNT LL (100/BX 4BX/CA)

## (undated) DEVICE — PAD LAP STERILE 18 X 18 - (5/PK 40PK/CA)

## (undated) DEVICE — BLADE SURGICAL CLIPPER - (50EA/CA)

## (undated) DEVICE — HEMOSTAT SURG ABSORBABLE - 4 X 8 IN SURGICEL (24EA/CA)

## (undated) DEVICE — DERMABOND ADVANCED - (12EA/BX)

## (undated) DEVICE — BLADE SURGICAL #11 - (50/BX)

## (undated) DEVICE — GLOVE BIOGEL INDICATOR SZ 6.5 SURGICAL PF LTX - (50PR/BX 4BX/CA)

## (undated) DEVICE — POLY UMBILICAL TAPE 1/8X30 - (36/BX)

## (undated) DEVICE — SYRINGE 30 ML LL (56/BX)

## (undated) DEVICE — SYRINGE SAFETY 10 ML 18 GA X 1 1/2 BLUNT LL (100/BX 4BX/CA)

## (undated) DEVICE — SET LEADWIRE 5 LEAD BEDSIDE DISPOSABLE ECG (1SET OF 5/EA)

## (undated) DEVICE — SYRINGE 10 ML CONTROL LL (25EA/BX 4BX/CA)

## (undated) DEVICE — SUTURE 4-0 MONOCRYL PLUS PS-1 - 27 INCH (36/BX)

## (undated) DEVICE — SHEATH DESTINATION WITH DILATOR 6FR 45CM

## (undated) DEVICE — DISC, CD-R PLAT 700MB MEMOREX

## (undated) DEVICE — SUTURE GORE-TEX CV-6 TT-13 (36PK/BX)

## (undated) DEVICE — SUTURE GORE-TEX CV-5 TT-13 (12/BX)

## (undated) DEVICE — GLOVE BIOGEL SZ 7.5 SURGICAL PF LTX - (50PR/BX 4BX/CA)

## (undated) DEVICE — DRAPE IOBAN II INCISE 23X17 - (10EA/BX 4BX/CA)

## (undated) DEVICE — GLOVE BIOGEL SZ 8 SURGICAL PF LTX - (50PR/BX 4BX/CA)

## (undated) DEVICE — ELECTRODE 850 FOAM ADHESIVE - HYDROGEL RADIOTRNSPRNT (50/PK)

## (undated) DEVICE — KIT SYRINGE SINGLE FOR MEDRAD MARK V + PROVIS (50/CA)

## (undated) DEVICE — SUCTION INSTRUMENT YANKAUER BULBOUS TIP W/O VENT (50EA/CA)

## (undated) DEVICE — LEAD SET 6 DISP. EKG NIHON KOHDEN (100EA/CA) [9859].

## (undated) DEVICE — NEPTUNE 4 PORT MANIFOLD - (20/PK)

## (undated) DEVICE — SET FLUID WARMING STANDARD FLOW - (10/CA)

## (undated) DEVICE — SODIUM CHL. INJ. 0.9% 500ML (24EA/CA 50CA/PF)

## (undated) DEVICE — PACK ENDO VASCULAR - (6/CA)

## (undated) DEVICE — CHLORAPREP 26 ML APPLICATOR - ORANGE TINT(25/CA)

## (undated) DEVICE — CLIP MED INTNL HRZN TI ESCP - (25/BX)

## (undated) DEVICE — SYRINGE DISP. 12 CC LL - (100/BX)

## (undated) DEVICE — SUTURE CV

## (undated) DEVICE — GUIDEWIRES STARTER (PTFE COATED) J CURVED FIXED CORE 0.035 180CM 10 3 MM J FS

## (undated) DEVICE — SYRINGE 20 ML LL (50EA/BX 4BX/CA)

## (undated) DEVICE — GLOVE BIOGEL PI ORTHO SZ 7.5 PF LF (40PR/BX)

## (undated) DEVICE — KIT SURGIFLO W/OUT THROMBIN - (6EA/CA)

## (undated) DEVICE — MEDICINE CUP STERILE 2 OZ - (100/CA)

## (undated) DEVICE — TRANSDUCER ADULT DISP. SINGLE BONDED STERILE - (20EA/CA)

## (undated) DEVICE — TUBING CLEARLINK DUO-VENT - C-FLO (48EA/CA)

## (undated) DEVICE — TUBING PRSS MNTR 48IN NAMIC - (25/CA)

## (undated) DEVICE — TRAY SURESTEP FOLEY TEMP SENSING 16FR (10EA/CA) ORDER  #18764 FOR TEMP FOLEY ONLY

## (undated) DEVICE — SPONGE GAUZESTER. 2X2 4-PL - (2/PK 50PK/BX 30BX/CS)

## (undated) DEVICE — DRAPE SURGICAL U 77X120 - (10/CA)

## (undated) DEVICE — VESSELOOP MAXI BLUE STERILE- SURG-I-LOOP (10EA/BX)

## (undated) DEVICE — SUTURE GENERAL

## (undated) DEVICE — SPONGE XRAY 8X4 STERL. 12PL - (10EA/TY 80TY/CA)

## (undated) DEVICE — SHEAR HS FOCUS 9CM CVD - (6/BX)

## (undated) DEVICE — CLIP SM INTNL HRZN TI ESCP LGT - (24EA/PK 25PK/BX)

## (undated) DEVICE — SYSTEM PREVENA INCISION MNGM - (1/EA)

## (undated) DEVICE — GOWN SURGEONS X-LARGE - DISP. (30/CA)

## (undated) DEVICE — SUTURE 3-0 SILK 12 X 18 IN - (36/BX)

## (undated) DEVICE — CANISTER SUCTION 3000ML MECHANICAL FILTER AUTO SHUTOFF MEDI-VAC NONSTERILE LF DISP  (40EA/CA)

## (undated) DEVICE — TOWELS CLOTH SURGICAL - (4/PK 20PK/CA)

## (undated) DEVICE — SUTURE 6-0 PROLENE BV-1 D/A 24 (36PK/BX)"

## (undated) DEVICE — SUTURE 3-0 VICRYL PLUS SH - 8X 18 INCH (12/BX)

## (undated) DEVICE — BALLOON RELIANT

## (undated) DEVICE — GLOVE BIOGEL INDICATOR SZ 7.5 SURGICAL PF LTX - (50PR/BX 4BX/CA)

## (undated) DEVICE — KIT ROOM DECONTAMINATION

## (undated) DEVICE — GLOVE BIOGEL SZ 7 SURGICAL PF LTX - (50PR/BX 4BX/CA)

## (undated) DEVICE — SUTURE 5-0 PROLENE BV-1 HEMOSEAL (36PK/BX)